# Patient Record
Sex: FEMALE | Race: WHITE | NOT HISPANIC OR LATINO | Employment: OTHER | URBAN - METROPOLITAN AREA
[De-identification: names, ages, dates, MRNs, and addresses within clinical notes are randomized per-mention and may not be internally consistent; named-entity substitution may affect disease eponyms.]

---

## 2017-01-16 ENCOUNTER — OFFICE VISIT (OUTPATIENT)
Dept: CARDIOLOGY | Facility: MEDICAL CENTER | Age: 82
End: 2017-01-16
Payer: MEDICARE

## 2017-01-16 VITALS
OXYGEN SATURATION: 94 % | WEIGHT: 173 LBS | DIASTOLIC BLOOD PRESSURE: 56 MMHG | HEART RATE: 74 BPM | HEIGHT: 67 IN | BODY MASS INDEX: 27.15 KG/M2 | SYSTOLIC BLOOD PRESSURE: 98 MMHG

## 2017-01-16 DIAGNOSIS — I48.92 ATRIAL FLUTTER, UNSPECIFIED TYPE (HCC): ICD-10-CM

## 2017-01-16 DIAGNOSIS — E78.2 MIXED HYPERLIPIDEMIA: ICD-10-CM

## 2017-01-16 DIAGNOSIS — Z79.01 ANTICOAGULANT LONG-TERM USE: ICD-10-CM

## 2017-01-16 DIAGNOSIS — R42 DIZZINESS: ICD-10-CM

## 2017-01-16 DIAGNOSIS — G45.8 OTHER SPECIFIED TRANSIENT CEREBRAL ISCHEMIAS: ICD-10-CM

## 2017-01-16 PROCEDURE — 99214 OFFICE O/P EST MOD 30 MIN: CPT | Performed by: INTERNAL MEDICINE

## 2017-01-16 RX ORDER — BRIMONIDINE TARTRATE AND TIMOLOL MALEATE 2; 5 MG/ML; MG/ML
SOLUTION OPHTHALMIC
COMMUNITY
End: 2017-07-10

## 2017-01-16 ASSESSMENT — ENCOUNTER SYMPTOMS
BRUISES/BLEEDS EASILY: 0
COUGH: 1
NERVOUS/ANXIOUS: 1
SHORTNESS OF BREATH: 0
BLURRED VISION: 0
LOSS OF CONSCIOUSNESS: 0
SPEECH CHANGE: 0
CLAUDICATION: 0
ABDOMINAL PAIN: 0
DIZZINESS: 1
DEPRESSION: 0
PND: 0
ORTHOPNEA: 0
HEADACHES: 0
FEVER: 0
MYALGIAS: 0
PALPITATIONS: 0

## 2017-01-16 NOTE — Clinical Note
[facility]   [address]  [place] [address]  Phone: [phone]  Fax: [phone]              Dorie Wadsworth  1935    Encounter Date: 1/16/2017    Rose Whipple M.D.          PROGRESS NOTE:  Subjective:   Dorie aWdsworth is a pleasant 80-year-old female with known history of hypertension, hypothyroidism, dyslipidemia, paroxysmal atrial fibrillation, TIA, on Eliquis for anticoagulation presenting today for evaluation of dizziness.    For the last 2 weeks patient has been  from congestion as well as cough worse at nighttime. Denied any bleeding issues while on anticoagulation. Still continues to have dizzy spells when she gets up from sitting to standing position no loss of consciousness. Denied any breakthrough episodes of atrial fibrillation. No complaints of lower extremity edema or claudication.      Past Medical History   Diagnosis Date   • Indigestion    • Glaucoma    • T.I.A.    • CAD (coronary artery disease)    • Arthritis      fingers   • Unspecified hemorrhagic conditions      not sure when (2009)  blood in urin    • Hypertension    • Cancer      skin   • Unspecified disorder of thyroid    • A-fib    • Atrial fibrillation 2/2009     A FIB X2,[ resolved on own][   • Paroxysmal atrial fibrillation    • Urinary bladder disorder      urinary incontinence   • Bladder problem      Chronic bladder infection for the past 17months   • Bladder disease 2011     bladder removed   • Ailment      urostomy   • Hyperlipidemia 2/24/2011   • UTI (urinary tract infection) 2/24/2011   • Hypothyroid 2/24/2011   • CHEST PAIN 3/11/2011   • TIA (transient ischemic attack) 3/11/2011   • Sepsis urinary source 1/23/2012   • Vaginal bleeding 10/22/2012   • History of hematuria      3/15/10: with Plavix.   • Stroke      2 TIAs 2/2010, no stroke, 2/2013   • CATARACT      freddie surgery complete   • Atrial fibrillation, rapid  "(CMS-[facility]) 1/31/2014   • Other specified pre-operative examination 1/28/2014     Past Surgical History   Procedure Laterality Date   • Bladder biopsy with cystoscopy  10/10/08     Performed by CARLTON LUNA at SURGERY [facility] ORS   • Pyelogram  10/10/08     Performed by CARLTON LUNA at SURGERY [facility]S   • Retrogrades  10/10/08     Performed by CARTLON LUNA at SURGERY [facility] ORS   • Bladder biopsy with cystoscopy  6/15/2009     Performed by CARLTON LUNA at SURGERY [facility] ORS   • Bladder biopsy with cystoscopy  10/12/2009     Performed by JENNIFER LINCOLN at SURGERY [facility] ORS   • Other abdominal surgery       appendectomy, pancratitis    • Cholecystectomy  1994   • Cholecystectomy  1994   • Gyn surgery       hysterectomy   • Pelvic exam under anesthesia  10/12/2009     Performed by JENNIFER LINCOLN at SURGERY [facility]   • Rectocele repair  11/3/2009     Performed by ZEKE HARRINGTON at SURGERY SAME DAY [facility]   • Other       TONSILLECTOMY AS TEENAGER   • Cystectomy  9/6/2011     Performed by JENNIFER LINCOLN at SURGERY [facility] ORS   • Ileo loop diversion  9/6/2011     Performed by JENNIFER LINCOLN at SURGERY [facility]   • Biopsy general  10/22/2012     Performed by Jennifer Lincoln M.D. at SURGERY [facility]   • Parastomal hernia repair   1/28/2014     Performed by Nicol Dorman M.D. at SURGERY [facility]     Family History   Problem Relation Age of Onset   • Stroke Mother 81   • Stroke Father 77     History   Smoking status   • Never Smoker    Smokeless tobacco   • Never Used     Allergies   Allergen Reactions   • Cardizem Swelling   • Doxycycline      Swollen lips.   • Sulfa Drugs      Makes tongue swell, severely   • Zyvox Swelling     \"Whole mouth swelled up\"    • Codeine      Does not remember the reaction     • Macrobid [Nitrofurantoin Monohydrate Macrocrystals]      Does not remember the reaction     Outpatient " Encounter Prescriptions as of 1/16/2017   Medication Sig Dispense Refill   • Brimonidine Tartrate-Timolol (COMBIGAN) 0.2-0.5 % Solution by Ophthalmic route.     • Multiple Vitamins-Minerals (PRESERVISION AREDS PO) Take  by mouth.     • apixaban (ELIQUIS) 5mg Tab Take 1 Tab by mouth 2 Times a Day. 84 Tab 0   • multivitamin (THERAGRAN) Tab Take 1 Tab by mouth every day.     • furosemide (LASIX) 20 MG Tab Take 0.5 Tabs by mouth as needed. 30 Tab 11   • Brimonidine Tartrate-Timolol 0.2-0.5 % Solution Place 1 Drop in right ear 2 Times a Day.     • metoprolol SR (TOPROL XL) 50 MG TB24 Take 1 Tab by mouth every bedtime. 30 Tab 5   • lovastatin (MEVACOR) 10 MG tablet Take 10 mg by mouth every evening.     • pantoprazole (PROTONIX) 40 MG TBEC Take 40 mg by mouth every day.     • Biotin 10 MG CAPS Take 1 Cap by mouth every day. Indications: **OTC**     • Bimatoprost (LUMIGAN) 0.01 % SOLN 1 Drop by Ophthalmic route every bedtime. Both eyes     • levothyroxine (SYNTHROID) 100 MCG TABS Take 50 mcg by mouth every morning before breakfast.     • warfarin (COUMADIN) 4 MG Tab Take one to one and one-half (1-1.5) tablets daily as directed by coumadin clinic 135 Tab 1     No facility-administered encounter medications on file as of 1/16/2017.     Review of Systems   Constitutional: Negative for fever.   HENT: Positive for congestion.    Eyes: Negative for blurred vision.   Respiratory: Positive for cough. Negative for shortness of breath.    Cardiovascular: Negative for chest pain, palpitations, orthopnea, claudication, leg swelling and PND.   Gastrointestinal: Negative for abdominal pain.   Musculoskeletal: Negative for myalgias and joint pain.   Skin: Negative for rash.   Neurological: Positive for dizziness. Negative for speech change, loss of consciousness and headaches.   Endo/Heme/Allergies: Does not bruise/bleed easily.   Psychiatric/Behavioral: Negative for depression. The patient is nervous/anxious.    All other systems  "reviewed and are negative.       Objective:   BP 98/56 mmHg  Pulse 74  Ht 1.702 m (5' 7.01\")  Wt 78.472 kg (173 lb)  BMI 27.09 kg/m2  SpO2 94%    Physical Exam   Constitutional: She is oriented to person, place, and time. She appears well-developed. No distress.   HENT:   Mouth/Throat: Mucous membranes are normal.   Eyes: Conjunctivae and EOM are normal.   Neck: No JVD present. No tracheal deviation present. No thyroid mass present.   Cardiovascular: Normal rate and regular rhythm.    No murmur heard.  Pulses:       Carotid pulses are 2+ on the right side, and 2+ on the left side.       Radial pulses are 2+ on the right side, and 2+ on the left side.        Dorsalis pedis pulses are 2+ on the right side, and 2+ on the left side.        Posterior tibial pulses are 2+ on the right side, and 2+ on the left side.   Pulmonary/Chest: Effort normal and breath sounds normal. No respiratory distress. She exhibits no tenderness.   Abdominal: Soft. There is no tenderness.   Musculoskeletal: Normal range of motion. She exhibits no edema.   Neurological: She is alert and oriented to person, place, and time. She has normal strength. She displays no tremor.   Skin: Skin is warm and dry. She is not diaphoretic.   Bruises seen in upper and lower extremities   Psychiatric: She has a normal mood and affect. Her behavior is normal.   Vitals reviewed.   Results for BERNARD BAER (MRN [id number]) as of 8/10/2016 14:55   2016    Sodium 139 138   Potassium 4.9 5.4   Chloride 108 107   Co2 25 27   Anion Gap 6.0 4.0   Glucose 98 125 (H)   Bun 24 (H) 20   Creatinine 1.34 1.43 (H)   GFR If Non  38 (A) 35 (A)   Calcium 8.8 8.9   AST(SGOT) 30    ALT(SGPT) 19    Alkaline Phosphatase 114 (H)    Cholesterol,Tot 131    Triglycerides 55    HDL 44    LDL 76      EK16  NSR 63 bpm normal axis, T-wave inversion lateral leads QRS duration 92 ms  faith    RTMST: 6/12/15  Patient exercised on Danny " protocol for 4 minutes reached 5.8 METsachieved 92% MPHR.  Rest EKG showed sinus rhythm LVH with anterior T wave inversion,no widening of QRS with exercise. (Stress test was performed toassess effect of flecainide at peak heart rate).  Inconclusive stress test secondary to baseline resting EKG abnormalities.    EKG: 3/12/14  Normal sinus rhythm at 64 bpm, normal axis is 0.5 mm ST depressions in the inferolateral leads with T-wave inversions V1 to V5 one and aVL    Transthoracic ECHO: 2/24/14   Normal left ventricular chamber size. Mild concentric left ventricular hypertrophy. Normal regional wall motion. Normal left ventricular systolic function. Left ventricular ejection fraction is 70% to 75%.  No significant valve abnormalities.   Mild tricuspid regurgitation. Right ventricular systolic pressure is estimated to be 40 mmHg.   No pericardial effusion seen.   Ascending aorta 3.0 cm. aortic root 3.1 cm.   Compared to prior study done on 1/23/12 mild increase in RV pressure.    CT chest: 1/31/14   1. No pulmonary embolus appreciated.  2. Nodular hepatic contour, appearance most compatible with cirrhosis.  3. Hazy right upper lobe infiltrate.  4. Bibasilar atelectasis, right greater than left with trace right pleural effusion.  5. Cardiomegaly  6. Atherosclerosis and atherosclerotic coronary artery disease  7. Moderate size hiatal hernia  8. Right upper lobe pulmonary nodule, recommend followup in 3 months with repeat CT chest to evaluate stability     Nuclear stress test: 10/25/13  Normal stress myocardial perfusion scan.  No evidence of ischemia or infarction. Low probability of hemodynamically significant CAD.  Normal wall motion. Calculated LVEF 78%.    Transthoracic echo: 1/23/12   Normal left ventricular chamber size. Normal left ventricular systolic function. Normal left ventricular wall thickness. Left ventricular ejection fraction is 55% to 60%.  Moderately enlarged LA.  Thickened mitral valve leaflets. Mitral  annular calcification. Mitral valve opens normally. Trace mitral regurgitation.  Moderate tricuspid regurgitation. Right ventricular systolic pressure is estimated to be 46 mmhg.    Nuclear stress test on 3/11/11   Cine images demonstrate no regional wall motion abnormality and the left ventricular ejection fraction exceeds 70%.  SPECT images show no fixed or reversible defects to suggest infarction or ischemia.  Normal myocardial perfusion scan    Assessment:   1. Hypertension  2. Dyslipidemia  3. Atrial fibrillation and dizziness  4. Chronic anticoagulation    Medical Decision Making:  Today's Assessment / Status / Plan:     1. Blood pressure is low today.  Advised patient to discontinue Toprol-XL completely.  Patient currently is taking Toprol-XL 50 mg daily.  2. Continue lovastatin 10 mg qHs.  LDL labs prior to next visit.  3. No breakthrough episodes of atrial fibrillation.  4. Continue Eliquis 5 mg BID.    Followup in one year.  Labs prior to next visit.      Thank you for allowing me to participate in taking care of patient.    Rose Whipple. MD.          Yue Restrepo M.D.  [address]  [address]  [place] [address]  VIA Facsimile: [phone]

## 2017-01-16 NOTE — PROGRESS NOTES
Subjective:   Dorie Wadsworth is a pleasant 80-year-old female with known history of hypertension, hypothyroidism, dyslipidemia, paroxysmal atrial fibrillation, TIA, on Eliquis for anticoagulation presenting today for evaluation of dizziness.    For the last 2 weeks patient has been  from congestion as well as cough worse at nighttime. Denied any bleeding issues while on anticoagulation. Still continues to have dizzy spells when she gets up from sitting to standing position no loss of consciousness. Denied any breakthrough episodes of atrial fibrillation. No complaints of lower extremity edema or claudication.      Past Medical History   Diagnosis Date   • Indigestion    • Glaucoma    • T.I.A.    • CAD (coronary artery disease)    • Arthritis      fingers   • Unspecified hemorrhagic conditions      not sure when (2009)  blood in urin    • Hypertension    • Cancer      skin   • Unspecified disorder of thyroid    • A-fib    • Atrial fibrillation 2/2009     A FIB X2,[ resolved on own][   • Paroxysmal atrial fibrillation    • Urinary bladder disorder      urinary incontinence   • Bladder problem      Chronic bladder infection for the past 17months   • Bladder disease 2011     bladder removed   • Ailment      urostomy   • Hyperlipidemia 2/24/2011   • UTI (urinary tract infection) 2/24/2011   • Hypothyroid 2/24/2011   • CHEST PAIN 3/11/2011   • TIA (transient ischemic attack) 3/11/2011   • Sepsis urinary source 1/23/2012   • Vaginal bleeding 10/22/2012   • History of hematuria      3/15/10: with Plavix.   • Stroke      2 TIAs 2/2010, no stroke, 2/2013   • CATARACT      freddie surgery complete   • Atrial fibrillation, rapid (CMS-HCC) 1/31/2014   • Other specified pre-operative examination 1/28/2014     Past Surgical History   Procedure Laterality Date   • Bladder biopsy with cystoscopy  10/10/08     Performed by CARLTON LUNA at SURGERY [facility]   • Pyelogram  10/10/08     Performed by CARLTON LUNA  "L at SURGERY [facility] ORS   • Retrogrades  10/10/08     Performed by CARLTON LUNA at SURGERY [facility] ORS   • Bladder biopsy with cystoscopy  6/15/2009     Performed by CARLTON LUNA at SURGERY [facility] ORS   • Bladder biopsy with cystoscopy  10/12/2009     Performed by JENNIFER LINCOLN at SURGERY [facility] ORS   • Other abdominal surgery       appendectomy, pancratitis    • Cholecystectomy  1994   • Cholecystectomy  1994   • Gyn surgery       hysterectomy   • Pelvic exam under anesthesia  10/12/2009     Performed by JENNIFER LINCOLN at SURGERY [facility] ORS   • Rectocele repair  11/3/2009     Performed by ZEKE HARRINGTON at SURGERY SAME DAY [facility]S   • Other       TONSILLECTOMY AS TEENAGER   • Cystectomy  9/6/2011     Performed by JENNIFER LINCOLN at SURGERY [facility] ORS   • Ileo loop diversion  9/6/2011     Performed by JENNIFER LINCOLN at SURGERY [facility]   • Biopsy general  10/22/2012     Performed by Jennifer Lincoln M.D. at SURGERY [facility]   • Parastomal hernia repair   1/28/2014     Performed by Nicol Dorman M.D. at SURGERY [facility]     Family History   Problem Relation Age of Onset   • Stroke Mother 81   • Stroke Father 77     History   Smoking status   • Never Smoker    Smokeless tobacco   • Never Used     Allergies   Allergen Reactions   • Cardizem Swelling   • Doxycycline      Swollen lips.   • Sulfa Drugs      Makes tongue swell, severely   • Zyvox Swelling     \"Whole mouth swelled up\"    • Codeine      Does not remember the reaction     • Macrobid [Nitrofurantoin Monohydrate Macrocrystals]      Does not remember the reaction     Outpatient Encounter Prescriptions as of 1/16/2017   Medication Sig Dispense Refill   • Brimonidine Tartrate-Timolol (COMBIGAN) 0.2-0.5 % Solution by Ophthalmic route.     • Multiple Vitamins-Minerals (PRESERVISION AREDS PO) Take  by mouth.     • apixaban (ELIQUIS) 5mg Tab Take 1 Tab by mouth 2 Times a " "Day. 84 Tab 0   • multivitamin (THERAGRAN) Tab Take 1 Tab by mouth every day.     • furosemide (LASIX) 20 MG Tab Take 0.5 Tabs by mouth as needed. 30 Tab 11   • Brimonidine Tartrate-Timolol 0.2-0.5 % Solution Place 1 Drop in right ear 2 Times a Day.     • metoprolol SR (TOPROL XL) 50 MG TB24 Take 1 Tab by mouth every bedtime. 30 Tab 5   • lovastatin (MEVACOR) 10 MG tablet Take 10 mg by mouth every evening.     • pantoprazole (PROTONIX) 40 MG TBEC Take 40 mg by mouth every day.     • Biotin 10 MG CAPS Take 1 Cap by mouth every day. Indications: **OTC**     • Bimatoprost (LUMIGAN) 0.01 % SOLN 1 Drop by Ophthalmic route every bedtime. Both eyes     • levothyroxine (SYNTHROID) 100 MCG TABS Take 50 mcg by mouth every morning before breakfast.     • warfarin (COUMADIN) 4 MG Tab Take one to one and one-half (1-1.5) tablets daily as directed by coumadin clinic 135 Tab 1     No facility-administered encounter medications on file as of 1/16/2017.     Review of Systems   Constitutional: Negative for fever.   HENT: Positive for congestion.    Eyes: Negative for blurred vision.   Respiratory: Positive for cough. Negative for shortness of breath.    Cardiovascular: Negative for chest pain, palpitations, orthopnea, claudication, leg swelling and PND.   Gastrointestinal: Negative for abdominal pain.   Musculoskeletal: Negative for myalgias and joint pain.   Skin: Negative for rash.   Neurological: Positive for dizziness. Negative for speech change, loss of consciousness and headaches.   Endo/Heme/Allergies: Does not bruise/bleed easily.   Psychiatric/Behavioral: Negative for depression. The patient is nervous/anxious.    All other systems reviewed and are negative.       Objective:   BP 98/56 mmHg  Pulse 74  Ht 1.702 m (5' 7.01\")  Wt 78.472 kg (173 lb)  BMI 27.09 kg/m2  SpO2 94%    Physical Exam   Constitutional: She is oriented to person, place, and time. She appears well-developed. No distress.   HENT:   Mouth/Throat: Mucous " membranes are normal.   Eyes: Conjunctivae and EOM are normal.   Neck: No JVD present. No tracheal deviation present. No thyroid mass present.   Cardiovascular: Normal rate and regular rhythm.    No murmur heard.  Pulses:       Carotid pulses are 2+ on the right side, and 2+ on the left side.       Radial pulses are 2+ on the right side, and 2+ on the left side.        Dorsalis pedis pulses are 2+ on the right side, and 2+ on the left side.        Posterior tibial pulses are 2+ on the right side, and 2+ on the left side.   Pulmonary/Chest: Effort normal and breath sounds normal. No respiratory distress. She exhibits no tenderness.   Abdominal: Soft. There is no tenderness.   Musculoskeletal: Normal range of motion. She exhibits no edema.   Neurological: She is alert and oriented to person, place, and time. She has normal strength. She displays no tremor.   Skin: Skin is warm and dry. She is not diaphoretic.   Bruises seen in upper and lower extremities   Psychiatric: She has a normal mood and affect. Her behavior is normal.   Vitals reviewed.   Results for BERNARD BAER (MRN [id number]) as of 8/10/2016 14:55   2016    Sodium 139 138   Potassium 4.9 5.4   Chloride 108 107   Co2 25 27   Anion Gap 6.0 4.0   Glucose 98 125 (H)   Bun 24 (H) 20   Creatinine 1.34 1.43 (H)   GFR If Non  38 (A) 35 (A)   Calcium 8.8 8.9   AST(SGOT) 30    ALT(SGPT) 19    Alkaline Phosphatase 114 (H)    Cholesterol,Tot 131    Triglycerides 55    HDL 44    LDL 76      EK16  NSR 63 bpm normal axis, T-wave inversion lateral leads QRS duration 92 ms  faith    RTMST: 6/12/15  Patient exercised on Danny protocol for 4 minutes reached 5.8 METsachieved 92% MPHR.  Rest EKG showed sinus rhythm LVH with anterior T wave inversion,no widening of QRS with exercise. (Stress test was performed toassess effect of flecainide at peak heart rate).  Inconclusive stress test secondary to baseline resting EKG  abnormalities.    EKG: 3/12/14  Normal sinus rhythm at 64 bpm, normal axis is 0.5 mm ST depressions in the inferolateral leads with T-wave inversions V1 to V5 one and aVL    Transthoracic ECHO: 2/24/14   Normal left ventricular chamber size. Mild concentric left ventricular hypertrophy. Normal regional wall motion. Normal left ventricular systolic function. Left ventricular ejection fraction is 70% to 75%.  No significant valve abnormalities.   Mild tricuspid regurgitation. Right ventricular systolic pressure is estimated to be 40 mmHg.   No pericardial effusion seen.   Ascending aorta 3.0 cm. aortic root 3.1 cm.   Compared to prior study done on 1/23/12 mild increase in RV pressure.    CT chest: 1/31/14   1. No pulmonary embolus appreciated.  2. Nodular hepatic contour, appearance most compatible with cirrhosis.  3. Hazy right upper lobe infiltrate.  4. Bibasilar atelectasis, right greater than left with trace right pleural effusion.  5. Cardiomegaly  6. Atherosclerosis and atherosclerotic coronary artery disease  7. Moderate size hiatal hernia  8. Right upper lobe pulmonary nodule, recommend followup in 3 months with repeat CT chest to evaluate stability     Nuclear stress test: 10/25/13  Normal stress myocardial perfusion scan.  No evidence of ischemia or infarction. Low probability of hemodynamically significant CAD.  Normal wall motion. Calculated LVEF 78%.    Transthoracic echo: 1/23/12   Normal left ventricular chamber size. Normal left ventricular systolic function. Normal left ventricular wall thickness. Left ventricular ejection fraction is 55% to 60%.  Moderately enlarged LA.  Thickened mitral valve leaflets. Mitral annular calcification. Mitral valve opens normally. Trace mitral regurgitation.  Moderate tricuspid regurgitation. Right ventricular systolic pressure is estimated to be 46 mmhg.    Nuclear stress test on 3/11/11   Cine images demonstrate no regional wall motion abnormality and the left  ventricular ejection fraction exceeds 70%.  SPECT images show no fixed or reversible defects to suggest infarction or ischemia.  Normal myocardial perfusion scan    Assessment:   1. Hypertension  2. Dyslipidemia  3. Atrial fibrillation and dizziness  4. Chronic anticoagulation    Medical Decision Making:  Today's Assessment / Status / Plan:     1. Blood pressure is low today.  Advised patient to discontinue Toprol-XL completely.  Patient currently is taking Toprol-XL 50 mg daily.  2. Continue lovastatin 10 mg qHs.  LDL labs prior to next visit.  3. No breakthrough episodes of atrial fibrillation.  4. Continue Eliquis 5 mg BID.  Labs from 7/16 showed creatinine of 1.4, age less than 80, weight more than 60 Kg (are now continue Eliquis 5 mg BID).  No bleeding issues while on anticoagulation.    Followup in one year.  Labs prior to next visit.      Thank you for allowing me to participate in taking care of patient.    Rose Donis MD.

## 2017-01-16 NOTE — MR AVS SNAPSHOT
"        Dorie Lombardir   2017 11:00 AM   Office Visit   MRN: [id number]    [facility]  [facility]   Dept Phone:  [phone]    Description:  Female : 1935   Provider:  Rose Whipple M.D.           Allergies as of 2017     Allergen Noted Reactions    Cardizem 10/19/2012   Swelling    Doxycycline 10/22/2013       Swollen lips.    Sulfa Drugs 2008       Makes tongue swell, severely    Zyvox 2011   Swelling    \"Whole mouth swelled up\"     Codeine 2008       Does not remember the reaction      Macrobid [Nitrofurantoin Monohydrate Macrocrystals] 2009       Does not remember the reaction      You were diagnosed with     Anticoagulant long-term use   [661794]       Other specified transient cerebral ischemias   [435.8.ICD-9-CM]       Atrial flutter, unspecified type (CMS-[facility])   [3796112]       Mixed hyperlipidemia   [272.2.ICD-9-CM]       Dizziness   [905946]         Vital Signs     Blood Pressure Pulse Height Weight Body Mass Index Oxygen Saturation    98/56 mmHg 74 1.702 m (5' 7.01\") 78.472 kg (173 lb) 27.09 kg/m2 94%    Smoking Status                   Never Smoker            Basic Information     Date Of Birth Sex Race Ethnicity Preferred Language    1935 Female White Non- English      Problem List              ICD-10-CM Priority Class Noted - Resolved    UTI (urinary tract infection) N39.0   2011 - Present    Hypothyroid E03.9   2011 - Present    Hypertension I10   2011 - Present    Hyperlipidemia E78.5   2011 - Present    TIA (transient ischemic attack) G45.9   3/11/2011 - Present    Atrial fibrillation (CMS-[facility]) I48.91   2012 - Present    Vaginal bleeding N93.9   10/22/2012 - Present    History of hematuria (Chronic) Z87.448   Unknown - Present    MRSA carrier Z22.322   2014 - Present    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   2014 - Present    Atrial flutter (CMS-[facility]) I48.92   2015 - Present    Anticoagulant long-term use " Z79.01   8/11/2016 - Present      Health Maintenance        Date Due Completion Dates    IMM DTaP/Tdap/Td Vaccine (1 - Tdap) 9/29/1956 ---    PAP SMEAR 9/29/1958 ---    MAMMOGRAM 9/29/1977 ---    COLONOSCOPY 9/29/1987 ---    IMM ZOSTER VACCINE 9/29/1997 ---    BONE DENSITY 9/29/2002 ---    IMM PNEUMOCOCCAL 65+ (ADULT) LOW/MEDIUM RISK SERIES (2 of 2 - PPSV23) 10/8/2017 10/8/2016            Current Immunizations     13-VALENT PCV PREVNAR 10/8/2016 12:03 PM    Influenza TIV (IM) 11/29/2013, 1/27/2012 10:00 AM    Influenza Vaccine 11/23/2010    Influenza Vaccine Adult HD 10/8/2016 12:02 PM, 10/4/2014    Pneumococcal Vaccine (UF)Historical Data 2/23/2010      Below and/or attached are the medications your provider expects you to take. Review all of your home medications and newly ordered medications with your provider and/or pharmacist. Follow medication instructions as directed by your provider and/or pharmacist. Please keep your medication list with you and share with your provider. Update the information when medications are discontinued, doses are changed, or new medications (including over-the-counter products) are added; and carry medication information at all times in the event of emergency situations     Allergies:  CARDIZEM - Swelling     DOXYCYCLINE - (reactions not documented)     SULFA DRUGS - (reactions not documented)     ZYVOX - Swelling     CODEINE - (reactions not documented)     MACROBID - (reactions not documented)               Medications  Valid as of: January 16, 2017 - 11:39 AM    Generic Name Brand Name Tablet Size Instructions for use    Apixaban (Tab) ELIQUIS 5mg Take 1 Tab by mouth 2 Times a Day.        Bimatoprost (Solution) LUMIGAN 0.01 % 1 Drop by Ophthalmic route every bedtime. Both eyes        Biotin (Cap) Biotin 10 MG Take 1 Cap by mouth every day. Indications: **OTC**        Brimonidine Tartrate-Timolol (Solution) Brimonidine Tartrate-Timolol 0.2-0.5 % Place 1 Drop in right ear 2 Times a  Day.        Brimonidine Tartrate-Timolol (Solution) Brimonidine Tartrate-Timolol 0.2-0.5 % by Ophthalmic route.        Furosemide (Tab) LASIX 20 MG Take 0.5 Tabs by mouth as needed.        Levothyroxine Sodium (Tab) SYNTHROID 100 MCG Take 50 mcg by mouth every morning before breakfast.        Lovastatin (Tab) MEVACOR 10 MG Take 10 mg by mouth every evening.        Metoprolol Succinate (TABLET SR 24 HR) TOPROL XL 50 MG Take 1 Tab by mouth every bedtime.        Multiple Vitamin (Tab) THERAGRAN  Take 1 Tab by mouth every day.        Multiple Vitamins-Minerals   Take  by mouth.        Pantoprazole Sodium (Tablet Delayed Response) PROTONIX 40 MG Take 40 mg by mouth every day.        Warfarin Sodium (Tab) COUMADIN 4 MG Take one to one and one-half (1-1.5) tablets daily as directed by coumadin clinic        .                 Medicines prescribed today were sent to:     [facility] [address]    Phone: [phone] Fax: [phone]    Open 24 Hours?: No      Medication refill instructions:       If your prescription bottle indicates you have medication refills left, it is not necessary to call your provider’s office. Please contact your pharmacy and they will refill your medication.    If your prescription bottle indicates you do not have any refills left, you may request refills at any time through one of the following ways: The online [employer] system (except Urgent Care), by calling your provider’s office, or by asking your pharmacy to contact your provider’s office with a refill request. Medication refills are processed only during regular business hours and may not be available until the next business day. Your provider may request additional information or to have a follow-up visit with you prior to refilling your medication.   *Please Note: Medication refills are assigned a new Rx number when refilled electronically. Your pharmacy may indicate that no refills  were authorized even though a new prescription for the same medication is available at the pharmacy. Please request the medicine by name with the pharmacy before contacting your provider for a refill.        Your To Do List     Future Labs/Procedures Complete By Expires    COMP METABOLIC PANEL  As directed 1/16/2018    LIPID PROFILE  As directed 1/16/2018         MyChart Status: Patient Declined

## 2017-02-01 ENCOUNTER — HOSPITAL ENCOUNTER (OUTPATIENT)
Dept: LAB | Facility: MEDICAL CENTER | Age: 82
End: 2017-02-01
Attending: INTERNAL MEDICINE
Payer: MEDICARE

## 2017-02-01 DIAGNOSIS — E78.2 MIXED HYPERLIPIDEMIA: ICD-10-CM

## 2017-02-01 LAB
ALBUMIN SERPL BCP-MCNC: 3.8 G/DL (ref 3.2–4.9)
ALBUMIN/GLOB SERPL: 0.8 G/DL
ALP SERPL-CCNC: 125 U/L (ref 30–99)
ALT SERPL-CCNC: 13 U/L (ref 2–50)
ANION GAP SERPL CALC-SCNC: 6 MMOL/L (ref 0–11.9)
AST SERPL-CCNC: 26 U/L (ref 12–45)
BILIRUB SERPL-MCNC: 0.6 MG/DL (ref 0.1–1.5)
BUN SERPL-MCNC: 18 MG/DL (ref 8–22)
CALCIUM SERPL-MCNC: 9.6 MG/DL (ref 8.5–10.5)
CHLORIDE SERPL-SCNC: 105 MMOL/L (ref 96–112)
CHOLEST SERPL-MCNC: 120 MG/DL (ref 100–199)
CO2 SERPL-SCNC: 26 MMOL/L (ref 20–33)
CREAT SERPL-MCNC: 1.14 MG/DL (ref 0.5–1.4)
GLOBULIN SER CALC-MCNC: 4.5 G/DL (ref 1.9–3.5)
GLUCOSE SERPL-MCNC: 109 MG/DL (ref 65–99)
HDLC SERPL-MCNC: 36 MG/DL
LDLC SERPL CALC-MCNC: 66 MG/DL
POTASSIUM SERPL-SCNC: 4.4 MMOL/L (ref 3.6–5.5)
PROT SERPL-MCNC: 8.3 G/DL (ref 6–8.2)
SODIUM SERPL-SCNC: 137 MMOL/L (ref 135–145)
TRIGL SERPL-MCNC: 91 MG/DL (ref 0–149)

## 2017-02-01 PROCEDURE — 80061 LIPID PANEL: CPT

## 2017-02-01 PROCEDURE — 80053 COMPREHEN METABOLIC PANEL: CPT

## 2017-02-01 PROCEDURE — 36415 COLL VENOUS BLD VENIPUNCTURE: CPT

## 2017-02-07 ENCOUNTER — TELEPHONE (OUTPATIENT)
Dept: CARDIOLOGY | Facility: MEDICAL CENTER | Age: 82
End: 2017-02-07

## 2017-02-07 NOTE — PROGRESS NOTES
Results for BERNARD BAER (MRN [id number]) as of 2/6/2017 16:09   11/17/2016 2/1/2017    Sodium 138 137   Potassium 4.5 4.4   Chloride 106 105   Co2 26 26   Anion Gap 6.0 6.0   Glucose 113 (H) 109 (H)   Bun 19 18   Creatinine 1.26 1.14   GFR If Non  41 (A) 46 (A)   Calcium 9.3 9.6   AST(SGOT) 34 26   ALT(SGPT) 23 13   Alkaline Phosphatase 148 (H) 125 (H)   Magnesium 2.0    Cholesterol,Tot 151 120   Triglycerides 95 91   HDL 40 36 (A)   LDL 92 66

## 2017-02-07 NOTE — TELEPHONE ENCOUNTER
----- Message from Kathy Duke L.P.N. sent at 2/7/2017  2:08 PM PST -----      ----- Message -----     From: Rose Whipple M.D.     Sent: 2/6/2017   4:10 PM       To: Preeti Santana R.N.    Renal function is stable  Prove alkaline phosphatase.      ----- Message -----     From: Evette Duran R.N.     Sent: 2/2/2017  11:09 AM       To: Rose Whipple M.D.

## 2017-03-20 ENCOUNTER — TELEPHONE (OUTPATIENT)
Dept: CARDIOLOGY | Facility: MEDICAL CENTER | Age: 82
End: 2017-03-20

## 2017-03-20 NOTE — TELEPHONE ENCOUNTER
Patient calling to find out how she goes about getting more eliquis as she received a 3 month supply through patient assistance [employer] and has not heard anything about how she is getting her next supply.  I saw in media on 12/5 where it stated the patient was receiving her final shipment and that she needed to reapply  For 2017... Patient states she did not fill out application for 2017 so I requested new application from [facility] and they will fax and I will complete it and patient will stop by office to sign and if we having eliquis samples she would need some to get her through until patient assistance kicks in.  SHe will come in Thursday afternoon and I instructed her to ask for me and I will have her sign and get it faxed to foundation.

## 2017-03-20 NOTE — TELEPHONE ENCOUNTER
----- Message from Shirin [place] sent at 3/20/2017 11:14 AM PDT -----  Regarding: Question about Eliquis medication  AM/Taz      Patient has question about her Eliquis. She can be reached at [phone].

## 2017-03-23 DIAGNOSIS — I48.0 PAROXYSMAL ATRIAL FIBRILLATION (HCC): ICD-10-CM

## 2017-03-24 ENCOUNTER — TELEPHONE (OUTPATIENT)
Dept: CARDIOLOGY | Facility: MEDICAL CENTER | Age: 82
End: 2017-03-24

## 2017-03-24 DIAGNOSIS — I48.0 PAROXYSMAL ATRIAL FIBRILLATION (HCC): ICD-10-CM

## 2017-03-24 NOTE — TELEPHONE ENCOUNTER
Patient assistance paperwork with new Rx sent to [employer] - patient given samples yesterday to get her through until shipment from [facility] arrives.

## 2017-04-04 ENCOUNTER — APPOINTMENT (OUTPATIENT)
Dept: RADIOLOGY | Facility: MEDICAL CENTER | Age: 82
End: 2017-04-04
Attending: EMERGENCY MEDICINE
Payer: MEDICARE

## 2017-04-04 ENCOUNTER — TELEPHONE (OUTPATIENT)
Dept: CARDIOLOGY | Facility: MEDICAL CENTER | Age: 82
End: 2017-04-04

## 2017-04-04 ENCOUNTER — APPOINTMENT (OUTPATIENT)
Dept: RADIOLOGY | Facility: MEDICAL CENTER | Age: 82
End: 2017-04-04
Payer: MEDICARE

## 2017-04-04 ENCOUNTER — HOSPITAL ENCOUNTER (EMERGENCY)
Facility: MEDICAL CENTER | Age: 82
End: 2017-04-04
Attending: EMERGENCY MEDICINE
Payer: MEDICARE

## 2017-04-04 VITALS
DIASTOLIC BLOOD PRESSURE: 74 MMHG | SYSTOLIC BLOOD PRESSURE: 120 MMHG | HEIGHT: 68 IN | WEIGHT: 176 LBS | OXYGEN SATURATION: 98 % | RESPIRATION RATE: 16 BRPM | BODY MASS INDEX: 26.67 KG/M2 | TEMPERATURE: 97.5 F | HEART RATE: 82 BPM

## 2017-04-04 DIAGNOSIS — R00.2 PALPITATIONS: ICD-10-CM

## 2017-04-04 DIAGNOSIS — S93.432A SPRAIN OF TIBIOFIBULAR LIGAMENT OF LEFT ANKLE, INITIAL ENCOUNTER: ICD-10-CM

## 2017-04-04 LAB
ALBUMIN SERPL BCP-MCNC: 3.5 G/DL (ref 3.2–4.9)
ALBUMIN/GLOB SERPL: 0.8 G/DL
ALP SERPL-CCNC: 132 U/L (ref 30–99)
ALT SERPL-CCNC: 13 U/L (ref 2–50)
ANION GAP SERPL CALC-SCNC: 7 MMOL/L (ref 0–11.9)
APTT PPP: 34 SEC (ref 24.7–36)
AST SERPL-CCNC: 28 U/L (ref 12–45)
BASOPHILS # BLD AUTO: 0.7 % (ref 0–1.8)
BASOPHILS # BLD: 0.06 K/UL (ref 0–0.12)
BILIRUB SERPL-MCNC: 0.6 MG/DL (ref 0.1–1.5)
BNP SERPL-MCNC: 389 PG/ML (ref 0–100)
BUN SERPL-MCNC: 19 MG/DL (ref 8–22)
CALCIUM SERPL-MCNC: 9.4 MG/DL (ref 8.5–10.5)
CHLORIDE SERPL-SCNC: 111 MMOL/L (ref 96–112)
CO2 SERPL-SCNC: 20 MMOL/L (ref 20–33)
CREAT SERPL-MCNC: 1.1 MG/DL (ref 0.5–1.4)
EKG IMPRESSION: NORMAL
EOSINOPHIL # BLD AUTO: 0.17 K/UL (ref 0–0.51)
EOSINOPHIL NFR BLD: 1.9 % (ref 0–6.9)
ERYTHROCYTE [DISTWIDTH] IN BLOOD BY AUTOMATED COUNT: 54.4 FL (ref 35.9–50)
GFR SERPL CREATININE-BSD FRML MDRD: 48 ML/MIN/1.73 M 2
GLOBULIN SER CALC-MCNC: 4.6 G/DL (ref 1.9–3.5)
GLUCOSE SERPL-MCNC: 100 MG/DL (ref 65–99)
HCT VFR BLD AUTO: 42.4 % (ref 37–47)
HGB BLD-MCNC: 13.5 G/DL (ref 12–16)
IMM GRANULOCYTES # BLD AUTO: 0.02 K/UL (ref 0–0.11)
IMM GRANULOCYTES NFR BLD AUTO: 0.2 % (ref 0–0.9)
INR PPP: 1.3 (ref 0.87–1.13)
LIPASE SERPL-CCNC: 76 U/L (ref 11–82)
LYMPHOCYTES # BLD AUTO: 2.86 K/UL (ref 1–4.8)
LYMPHOCYTES NFR BLD: 32.8 % (ref 22–41)
MCH RBC QN AUTO: 26.9 PG (ref 27–33)
MCHC RBC AUTO-ENTMCNC: 31.8 G/DL (ref 33.6–35)
MCV RBC AUTO: 84.5 FL (ref 81.4–97.8)
MONOCYTES # BLD AUTO: 1.31 K/UL (ref 0–0.85)
MONOCYTES NFR BLD AUTO: 15 % (ref 0–13.4)
NEUTROPHILS # BLD AUTO: 4.3 K/UL (ref 2–7.15)
NEUTROPHILS NFR BLD: 49.4 % (ref 44–72)
NRBC # BLD AUTO: 0 K/UL
NRBC BLD AUTO-RTO: 0 /100 WBC
PLATELET # BLD AUTO: 141 K/UL (ref 164–446)
PMV BLD AUTO: 11.3 FL (ref 9–12.9)
POTASSIUM SERPL-SCNC: 3.8 MMOL/L (ref 3.6–5.5)
PROT SERPL-MCNC: 8.1 G/DL (ref 6–8.2)
PROTHROMBIN TIME: 16.6 SEC (ref 12–14.6)
RBC # BLD AUTO: 5.02 M/UL (ref 4.2–5.4)
SODIUM SERPL-SCNC: 138 MMOL/L (ref 135–145)
TROPONIN I SERPL-MCNC: 0.02 NG/ML (ref 0–0.04)
WBC # BLD AUTO: 8.7 K/UL (ref 4.8–10.8)

## 2017-04-04 PROCEDURE — 85730 THROMBOPLASTIN TIME PARTIAL: CPT

## 2017-04-04 PROCEDURE — 99284 EMERGENCY DEPT VISIT MOD MDM: CPT

## 2017-04-04 PROCEDURE — 85025 COMPLETE CBC W/AUTO DIFF WBC: CPT

## 2017-04-04 PROCEDURE — 93005 ELECTROCARDIOGRAM TRACING: CPT

## 2017-04-04 PROCEDURE — 36415 COLL VENOUS BLD VENIPUNCTURE: CPT

## 2017-04-04 PROCEDURE — 80053 COMPREHEN METABOLIC PANEL: CPT

## 2017-04-04 PROCEDURE — 73600 X-RAY EXAM OF ANKLE: CPT | Mod: LT

## 2017-04-04 PROCEDURE — 71010 DX-CHEST-LIMITED (1 VIEW): CPT

## 2017-04-04 PROCEDURE — 83690 ASSAY OF LIPASE: CPT

## 2017-04-04 PROCEDURE — 84484 ASSAY OF TROPONIN QUANT: CPT

## 2017-04-04 PROCEDURE — 85610 PROTHROMBIN TIME: CPT

## 2017-04-04 PROCEDURE — 83880 ASSAY OF NATRIURETIC PEPTIDE: CPT

## 2017-04-04 RX ORDER — ROCURONIUM BROMIDE 10 MG/ML
100 INJECTION, SOLUTION INTRAVENOUS ONCE
Status: DISCONTINUED | OUTPATIENT
Start: 2017-04-04 | End: 2017-04-04

## 2017-04-04 RX ORDER — ETOMIDATE 2 MG/ML
20 INJECTION INTRAVENOUS ONCE
Status: DISCONTINUED | OUTPATIENT
Start: 2017-04-04 | End: 2017-04-04

## 2017-04-04 NOTE — ED AVS SNAPSHOT
[employer] Access Code: Activation code not generated  Current [employer] Status: Patient Declined    Your email address is not on file at [facility].  Email Addresses are required for you to sign up for [employer], please contact [phone] to verify your personal information and to provide your email address prior to attempting to register for [employer].    [place] [address]    [employer] Exchange  A secure, online tool to manage your health information     [facility]’s [employer] Exchange® is a secure, online tool that connects you to your personalized health information from the privacy of your home -- day or night - making it very easy for you to manage your healthcare. Once the activation process is completed, you can even access your medical information using the [employer] christina, which is available for free in the Apple Christina store or Google Play store.     To learn more about [employer], visit [email or web address] Exchange    There are two levels of access available (as shown below):   My Chart Features  [facility] Primary Care Doctor [facility]  Specialists [facility]  Urgent  Care Non-[facility] Primary Care Doctor   Email your healthcare team securely and privately 24/7 X X X    Manage appointments: schedule your next appointment; view details of past/upcoming appointments X      Request prescription refills. X      View recent personal medical records, including lab and immunizations X X X X   View health record, including health history, allergies, medications X X X X   Read reports about your outpatient visits, procedures, consult and ER notes X X X X   See your discharge summary, which is a recap of your hospital and/or ER visit that includes your diagnosis, lab results, and care plan X X  X     How to register for [employer]:  Once your e-mail address has been verified, follow the following steps to sign up for [employer].     1. Go to  [email or web address]  2. Click on the Sign Up Now box, which takes you to the  New Member Sign Up page. You will need to provide the following information:  a. Enter your [employer] Access Code exactly as it appears at the top of this page. (You will not need to use this code after you’ve completed the sign-up process. If you do not sign up before the expiration date, you must request a new code.)   b. Enter your date of birth.   c. Enter your home email address.   d. Click Submit, and follow the next screen’s instructions.  3. Create a [employer] ID. This will be your [employer] login ID and cannot be changed, so think of one that is secure and easy to remember.  4. Create a [employer] password. You can change your password at any time.  5. Enter your Password Reset Question and Answer. This can be used at a later time if you forget your password.   6. Enter your e-mail address. This allows you to receive e-mail notifications when new information is available in [employer].  7. Click Sign Up. You can now view your health information.    For assistance activating your [employer] account, call [phone]

## 2017-04-04 NOTE — TELEPHONE ENCOUNTER
Patient called to report having chest pressure and possibly being in Afib and that she wanted to come into the office to see Dr. Whipple - informed her that Dr. Whipple out of the office and that they symptoms she is experiencing needed to be evaluated in the ER asap.  She states that urgent care in [place] wanted her to go by ambulance when she called them with her symptoms but she refused and is going to have her daughter bring her to [facility] ER.  Will notify ER that patient is coming.

## 2017-04-04 NOTE — ED AVS SNAPSHOT
Home Care Instructions                                                                                                                Dorie Wadsworth   MRN: [id number]    Department:  [facility], Emergency Dept   Date of Visit:  4/4/2017            [facility], Emergency Dept    [address]    [place] [address]    Phone:  [phone]      You were seen by     Bhavesh Petersen M.D.      Your Diagnosis Was     Palpitations     R00.2       Medication Information     Review all of your home medications and newly ordered medications with your primary doctor and/or pharmacist as soon as possible. Follow medication instructions as directed by your doctor and/or pharmacist.     Please keep your complete medication list with you and share with your physician. Update the information when medications are discontinued, doses are changed, or new medications (including over-the-counter products) are added; and carry medication information at all times in the event of emergency situations.               Medication List      ASK your doctor about these medications        Instructions    Morning Afternoon Evening Bedtime    apixaban 5mg Tabs   Commonly known as:  ELIQUIS        Take 1 Tab by mouth 2 Times a Day.   Dose:  5 mg                        Biotin 10 MG Caps        Take 1 Cap by mouth every day. Indications: **OTC**   Dose:  1 Cap                        * Brimonidine Tartrate-Timolol 0.2-0.5 % Soln        Place 1 Drop in right ear 2 Times a Day.   Dose:  1 Drop                        * COMBIGAN 0.2-0.5 % Soln   Generic drug:  Brimonidine Tartrate-Timolol        by Ophthalmic route.                        furosemide 20 MG Tabs   Commonly known as:  LASIX        Take 0.5 Tabs by mouth as needed.   Dose:  10 mg                        levothyroxine 100 MCG Tabs   Commonly known as:  SYNTHROID        Take 50 mcg by mouth every morning before breakfast.   Dose:  50 mcg                           lovastatin 10 MG tablet   Commonly known as:  MEVACOR        Take 10 mg by mouth every evening.   Dose:  10 mg                        LUMIGAN 0.01 % Soln   Generic drug:  bimatoprost        1 Drop by Ophthalmic route every bedtime. Both eyes   Dose:  1 Drop                        metoprolol SR 50 MG Tb24   Commonly known as:  TOPROL XL        Take 1 Tab by mouth every bedtime.   Dose:  50 mg                        multivitamin Tabs        Take 1 Tab by mouth every day.   Dose:  1 Tab                        pantoprazole 40 MG Tbec   Commonly known as:  PROTONIX        Take 40 mg by mouth every day.   Dose:  40 mg                        PRESERVISION AREDS PO        Take  by mouth.                        warfarin 4 MG Tabs   Commonly known as:  COUMADIN        Take one to one and one-half (1-1.5) tablets daily as directed by coumadin clinic                        * Notice:  This list has 2 medication(s) that are the same as other medications prescribed for you. Read the directions carefully, and ask your doctor or other care provider to review them with you.            Procedures and tests performed during your visit     APTT    Btype Natriuretic Peptide    CBC with Differential    Complete Metabolic Panel (CMP)    DX-ANKLE 2- VIEWS LEFT    DX-CHEST-LIMITED (1 VIEW)    EKG (ER)    ESTIMATED GFR    Lipase    Oxygen Therapy per Protocol    Prothrombin Time    Troponin        Discharge Instructions       Cardiac Arrhythmia  Your heart is a muscle that works to pump blood through your body by regular contractions. The beating of your heart is controlled by a system of special pacemaker cells. These cells control the electrical activity of the heart. When the system controlling this regular beating is disturbed, a heart rhythm abnormality (arrhythmia) results.  WHEN YOUR HEART SKIPS A BEAT  One of the most common and least serious heart arrhythmias is called an ectopic or premature atrial heartbeat (PAC). This may  be noticed as a small change in your regular pulse. A PAC originates from the top part (atrium) of the heart. Within the right atrium, the SA node is the area that normally controls the regularity of the heart. PACs occur in heart tissue outside of the SA node region. You may feel this as a skipped beat or heart flutter, especially if several occur in succession or occur frequently.   Another arrhythmia is ventricular premature complex (VCP or PVC). These extra beats start out in the bottom, more muscular chambers of the heart. In most cases a PVC is harmless. If there are underlying causes that are making the heart irritable such as an overactive thyroid or a prior heart attack PVCs may be of more concern. In a few cases, medications to control the heart rhythm may be prescribed.  Things to try at home:  · Cut down or avoid alcohol, tobacco and caffeine.  · Get enough sleep.  · Reduce stress.  · Exercise more.  WHEN THE HEART BEATS TOO FAST  Atrial tachycardia is a fast heart rate, which starts out in the atrium. It may last from minutes to much longer. Your heart may beat 140 to 240 times per minute instead of the normal 60 to 100.  · Symptoms include a worried feeling (anxiety) and a sense that your heart is beating fast and hard.  · You may be able to stop the fast rate by holding your breath or bearing down as if you were going to have a bowel movement.  · This type of fast rate is usually not dangerous.  Atrial fibrillation and atrial flutter are other fast rhythms that start in the atria. Both conditions keep the atria from filling with enough blood so the heart does not work well.  · Symptoms include feeling light-headed or faint.  · These fast rates may be the result of heart damage or disease. Too much thyroid hormone may play a role.  · There may be no clear cause or it may be from heart disease or damage.  · Medication or a special electrical treatment (cardioversion) may be needed to get the heart  beating normally.  Ventricular tachycardia is a fast heart rate that starts in the lower muscular chambers (ventricles) This is a serious disorder that requires treatment as soon as possible. You need someone else to get and use a small defibrillator.  · Symptoms include collapse, chest pain, or being short of breath.  · Treatment may include medication, procedures to improve blood flow to the heart, or an implantable cardiac defibrillator (ICD).  DIAGNOSIS   · A cardiogram (EKG or ECG) will be done to see the arrhythmia, as well as lab tests to check the underlying cause.  · If the extra beats or fast rate come and go, you may wear a Holter monitor that records your heart rate for a longer period of time.  SEEK MEDICAL CARE IF:  · You have irregular or fast heartbeats (palpitations).  · You experience skipped beats.  · You develop lightheadedness.  · You have chest discomfort.  · You have shortness of breath.  · You have more frequent episodes, if you are already being treated.  SEEK IMMEDIATE MEDICAL CARE IF:   · You have severe chest pain, especially if the pain is crushing or pressure-like and spreads to the arms, back, neck, or jaw, or if you have sweating, feeling sick to your stomach (nausea), or shortness of breath. THIS IS AN EMERGENCY. Do not wait to see if the pain will go away. Get medical help at once. Call 911 or 0 (). DO NOT drive yourself to the hospital.  · You feel dizzy or faint.  · You have episodes of previously documented atrial tachycardia that do not resolve with the techniques your caregiver has taught you.  · Irregular or rapid heartbeats begin to occur more often than in the past, especially if they are associated with more pronounced symptoms or of longer duration.  Document Released: 12/18/2006 Document Revised: 03/11/2013 Document Reviewed: 08/05/2009  [employer]® Patient Information ©2014 [employer].    Ankle Sprain  An ankle sprain is an injury to the strong, fibrous tissues  (ligaments) that hold the bones of your ankle joint together.   CAUSES  An ankle sprain is usually caused by a fall or by twisting your ankle. Ankle sprains most commonly occur when you step on the outer edge of your foot, and your ankle turns inward. People who participate in sports are more prone to these types of injuries.   SYMPTOMS   · Pain in your ankle. The pain may be present at rest or only when you are trying to stand or walk.  · Swelling.  · Bruising. Bruising may develop immediately or within 1 to 2 days after your injury.  · Difficulty standing or walking, particularly when turning corners or changing directions.  DIAGNOSIS   Your caregiver will ask you details about your injury and perform a physical exam of your ankle to determine if you have an ankle sprain. During the physical exam, your caregiver will press on and apply pressure to specific areas of your foot and ankle. Your caregiver will try to move your ankle in certain ways. An X-ray exam may be done to be sure a bone was not broken or a ligament did not separate from one of the bones in your ankle (avulsion fracture).   TREATMENT   Certain types of braces can help stabilize your ankle. Your caregiver can make a recommendation for this. Your caregiver may recommend the use of medicine for pain. If your sprain is severe, your caregiver may refer you to a surgeon who helps to restore function to parts of your skeletal system (orthopedist) or a physical therapist.  HOME CARE INSTRUCTIONS   · Apply ice to your injury for 1-2 days or as directed by your caregiver. Applying ice helps to reduce inflammation and pain.  ¨ Put ice in a plastic bag.  ¨ Place a towel between your skin and the bag.  ¨ Leave the ice on for 15-20 minutes at a time, every 2 hours while you are awake.  · Only take over-the-counter or prescription medicines for pain, discomfort, or fever as directed by your caregiver.  · Elevate your injured ankle above the level of your heart  as much as possible for 2-3 days.  · If your caregiver recommends crutches, use them as instructed. Gradually put weight on the affected ankle. Continue to use crutches or a cane until you can walk without feeling pain in your ankle.  · If you have a plaster splint, wear the splint as directed by your caregiver. Do not rest it on anything harder than a pillow for the first 24 hours. Do not put weight on it. Do not get it wet. You may take it off to take a shower or bath.  · You may have been given an elastic bandage to wear around your ankle to provide support. If the elastic bandage is too tight (you have numbness or tingling in your foot or your foot becomes cold and blue), adjust the bandage to make it comfortable.  · If you have an air splint, you may blow more air into it or let air out to make it more comfortable. You may take your splint off at night and before taking a shower or bath. Wiggle your toes in the splint several times per day to decrease swelling.  SEEK MEDICAL CARE IF:   · You have rapidly increasing bruising or swelling.  · Your toes feel extremely cold or you lose feeling in your foot.  · Your pain is not relieved with medicine.  SEEK IMMEDIATE MEDICAL CARE IF:  · Your toes are numb or blue.  · You have severe pain that is increasing.  MAKE SURE YOU:   · Understand these instructions.  · Will watch your condition.  · Will get help right away if you are not doing well or get worse.     This information is not intended to replace advice given to you by your health care provider. Make sure you discuss any questions you have with your health care provider.     Document Released: 12/18/2006 Document Revised: 01/08/2016 Document Reviewed: 12/29/2012  [employer] Patient Education ©2016 [employer]            Patient Information     Patient Information    Following emergency treatment: all patient requiring follow-up care must return either to a private physician or a clinic if your  condition worsens before you are able to obtain further medical attention, please return to the emergency room.     Billing Information    At [facility], we work to make the billing process streamlined for our patients.  Our Representatives are here to answer any questions you may have regarding your hospital bill.  If you have insurance coverage and have supplied your insurance information to us, we will submit a claim to your insurer on your behalf.  Should you have any questions regarding your bill, we can be reached online or by phone as follows:  Online: You are able pay your bills online or live chat with our representatives about any billing questions you may have. We are here to help Monday - Friday from 8:00am to 7:30pm and 9:00am - 12:00pm on Saturdays.  Please visit [email or web address]  for more information.   Phone:  [phone] or [phone]    Please note that your emergency physician, surgeon, pathologist, radiologist, anesthesiologist, and other specialists are not employed by [facility] and will therefore bill separately for their services.  Please contact them directly for any questions concerning their bills at the numbers below:     Emergency Physician Services:  [phone]  [facility]:  [phone]  [facility]:  [phone]  [facility]:  [phone]    1. Your final bill may vary from the amount quoted upon discharge if all procedures are not complete at that time, or if your doctor has additional procedures of which we are not aware. You will receive an additional bill if you return to the Emergency Department at [facility] for suture removal regardless of the facility of which the sutures were placed.     2. Please arrange for settlement of this account at the emergency registration.    3. All self-pay accounts are due in full at the time of treatment.  If you are unable to meet this obligation  then payment is expected within 4-5 days.     4. If you have had radiology studies (CT, X-ray, Ultrasound, MRI), you have received a preliminary result during your emergency department visit. Please contact the radiology department [phone] to receive a copy of your final result. Please discuss the Final result with your primary physician or with the follow up physician provided.     Crisis Hotline:  [place] Crisis Hotline:  0-133-MTKMKEH or 1-274.662.7508  [place] Crisis Hotline:    1-214.213.6635 or 616-693-7600         ED Discharge Follow Up Questions    1. In order to provide you with very good care, we would like to follow up with a phone call in the next few days.  May we have your permission to contact you?     YES /  NO    2. What is the best phone number to call you? (       )_____-__________    3. What is the best time to call you?      Morning  /  Afternoon  /  Evening                   Patient Signature:  ____________________________________________________________    Date:  ____________________________________________________________

## 2017-04-04 NOTE — ED AVS SNAPSHOT
4/4/2017          Dorie Wadsworth  [place] [address]    Dear Dorie:    [facility] wants to ensure your discharge home is safe and you or your loved ones have had all your questions answered regarding your care after you leave the hospital.    You may receive a telephone call within two days of your discharge.  This call is to make certain you understand your discharge instructions as well as ensure we provided you with the best care possible during your stay with us.     The call will only last approximately 3-5 minutes and will be done by a nurse.    Once again, we want to ensure your discharge home is safe and that you have a clear understanding of any next steps in your care.  If you have any questions or concerns, please do not hesitate to contact us, we are here for you.  Thank you for choosing [facility] for your healthcare needs.    Sincerely,    Arturo Deleon    [facility]

## 2017-04-04 NOTE — ED NOTES
Pt c/o chest pressure, hx of afib. Pt believes she had an episode of Afib lastnight. Pt also felt like maybe she had an episode this AM. Pt on Eliquis.

## 2017-04-04 NOTE — TELEPHONE ENCOUNTER
----- Message from Yola Casey sent at 4/4/2017  7:25 AM PDT -----  Regarding: Problem with episode of A Fib  AM/Taz    Patient had an episode of A Fib last night and wants a call back at [phone] to find out what she should do.

## 2017-04-05 NOTE — TELEPHONE ENCOUNTER
THOMAS/Susana     Patient spoke to Taz on 3/24 about a program for Eliquis and she wants a call back at [phone] to discuss.              Pt wondering if we have heard anything from assistance program. Informed pt that I would get in contact with Gladys KEANE to assist with information. Pt states she has enough samples to get her through the rest of the month.

## 2017-04-05 NOTE — DISCHARGE INSTRUCTIONS
Cardiac Arrhythmia  Your heart is a muscle that works to pump blood through your body by regular contractions. The beating of your heart is controlled by a system of special pacemaker cells. These cells control the electrical activity of the heart. When the system controlling this regular beating is disturbed, a heart rhythm abnormality (arrhythmia) results.  WHEN YOUR HEART SKIPS A BEAT  One of the most common and least serious heart arrhythmias is called an ectopic or premature atrial heartbeat (PAC). This may be noticed as a small change in your regular pulse. A PAC originates from the top part (atrium) of the heart. Within the right atrium, the SA node is the area that normally controls the regularity of the heart. PACs occur in heart tissue outside of the SA node region. You may feel this as a skipped beat or heart flutter, especially if several occur in succession or occur frequently.   Another arrhythmia is ventricular premature complex (VCP or PVC). These extra beats start out in the bottom, more muscular chambers of the heart. In most cases a PVC is harmless. If there are underlying causes that are making the heart irritable such as an overactive thyroid or a prior heart attack PVCs may be of more concern. In a few cases, medications to control the heart rhythm may be prescribed.  Things to try at home:  · Cut down or avoid alcohol, tobacco and caffeine.  · Get enough sleep.  · Reduce stress.  · Exercise more.  WHEN THE HEART BEATS TOO FAST  Atrial tachycardia is a fast heart rate, which starts out in the atrium. It may last from minutes to much longer. Your heart may beat 140 to 240 times per minute instead of the normal 60 to 100.  · Symptoms include a worried feeling (anxiety) and a sense that your heart is beating fast and hard.  · You may be able to stop the fast rate by holding your breath or bearing down as if you were going to have a bowel movement.  · This type of fast rate is usually not  dangerous.  Atrial fibrillation and atrial flutter are other fast rhythms that start in the atria. Both conditions keep the atria from filling with enough blood so the heart does not work well.  · Symptoms include feeling light-headed or faint.  · These fast rates may be the result of heart damage or disease. Too much thyroid hormone may play a role.  · There may be no clear cause or it may be from heart disease or damage.  · Medication or a special electrical treatment (cardioversion) may be needed to get the heart beating normally.  Ventricular tachycardia is a fast heart rate that starts in the lower muscular chambers (ventricles) This is a serious disorder that requires treatment as soon as possible. You need someone else to get and use a small defibrillator.  · Symptoms include collapse, chest pain, or being short of breath.  · Treatment may include medication, procedures to improve blood flow to the heart, or an implantable cardiac defibrillator (ICD).  DIAGNOSIS   · A cardiogram (EKG or ECG) will be done to see the arrhythmia, as well as lab tests to check the underlying cause.  · If the extra beats or fast rate come and go, you may wear a Holter monitor that records your heart rate for a longer period of time.  SEEK MEDICAL CARE IF:  · You have irregular or fast heartbeats (palpitations).  · You experience skipped beats.  · You develop lightheadedness.  · You have chest discomfort.  · You have shortness of breath.  · You have more frequent episodes, if you are already being treated.  SEEK IMMEDIATE MEDICAL CARE IF:   · You have severe chest pain, especially if the pain is crushing or pressure-like and spreads to the arms, back, neck, or jaw, or if you have sweating, feeling sick to your stomach (nausea), or shortness of breath. THIS IS AN EMERGENCY. Do not wait to see if the pain will go away. Get medical help at once. Call 911 or 0 (). DO NOT drive yourself to the hospital.  · You feel dizzy or  faint.  · You have episodes of previously documented atrial tachycardia that do not resolve with the techniques your caregiver has taught you.  · Irregular or rapid heartbeats begin to occur more often than in the past, especially if they are associated with more pronounced symptoms or of longer duration.  Document Released: 12/18/2006 Document Revised: 03/11/2013 Document Reviewed: 08/05/2009  [employer]® Patient Information ©2014 [employer].    Ankle Sprain  An ankle sprain is an injury to the strong, fibrous tissues (ligaments) that hold the bones of your ankle joint together.   CAUSES  An ankle sprain is usually caused by a fall or by twisting your ankle. Ankle sprains most commonly occur when you step on the outer edge of your foot, and your ankle turns inward. People who participate in sports are more prone to these types of injuries.   SYMPTOMS   · Pain in your ankle. The pain may be present at rest or only when you are trying to stand or walk.  · Swelling.  · Bruising. Bruising may develop immediately or within 1 to 2 days after your injury.  · Difficulty standing or walking, particularly when turning corners or changing directions.  DIAGNOSIS   Your caregiver will ask you details about your injury and perform a physical exam of your ankle to determine if you have an ankle sprain. During the physical exam, your caregiver will press on and apply pressure to specific areas of your foot and ankle. Your caregiver will try to move your ankle in certain ways. An X-ray exam may be done to be sure a bone was not broken or a ligament did not separate from one of the bones in your ankle (avulsion fracture).   TREATMENT   Certain types of braces can help stabilize your ankle. Your caregiver can make a recommendation for this. Your caregiver may recommend the use of medicine for pain. If your sprain is severe, your caregiver may refer you to a surgeon who helps to restore function to parts of your skeletal system  (orthopedist) or a physical therapist.  HOME CARE INSTRUCTIONS   · Apply ice to your injury for 1-2 days or as directed by your caregiver. Applying ice helps to reduce inflammation and pain.  ¨ Put ice in a plastic bag.  ¨ Place a towel between your skin and the bag.  ¨ Leave the ice on for 15-20 minutes at a time, every 2 hours while you are awake.  · Only take over-the-counter or prescription medicines for pain, discomfort, or fever as directed by your caregiver.  · Elevate your injured ankle above the level of your heart as much as possible for 2-3 days.  · If your caregiver recommends crutches, use them as instructed. Gradually put weight on the affected ankle. Continue to use crutches or a cane until you can walk without feeling pain in your ankle.  · If you have a plaster splint, wear the splint as directed by your caregiver. Do not rest it on anything harder than a pillow for the first 24 hours. Do not put weight on it. Do not get it wet. You may take it off to take a shower or bath.  · You may have been given an elastic bandage to wear around your ankle to provide support. If the elastic bandage is too tight (you have numbness or tingling in your foot or your foot becomes cold and blue), adjust the bandage to make it comfortable.  · If you have an air splint, you may blow more air into it or let air out to make it more comfortable. You may take your splint off at night and before taking a shower or bath. Wiggle your toes in the splint several times per day to decrease swelling.  SEEK MEDICAL CARE IF:   · You have rapidly increasing bruising or swelling.  · Your toes feel extremely cold or you lose feeling in your foot.  · Your pain is not relieved with medicine.  SEEK IMMEDIATE MEDICAL CARE IF:  · Your toes are numb or blue.  · You have severe pain that is increasing.  MAKE SURE YOU:   · Understand these instructions.  · Will watch your condition.  · Will get help right away if you are not doing well or get  worse.     This information is not intended to replace advice given to you by your health care provider. Make sure you discuss any questions you have with your health care provider.     Document Released: 12/18/2006 Document Revised: 01/08/2016 Document Reviewed: 12/29/2012  [employer] Interactive Patient Education ©2016 [employer]

## 2017-04-05 NOTE — ED PROVIDER NOTES
CHIEF COMPLAINT  Chief Complaint   Patient presents with   • Chest Pressure       HPI (1,4)  Dorie Wadsworth is a 81 y.o. female who presents with complaint of chest pressure that has subsequently resolved.  She has a history of aflutter and reports that she had an episode of palpitations last night from approx 10:30-11:10pm and again briefly around two hours later. She denied any symptoms including SOB, dizziness, lightheadedness, n/v, chest pain during these episodes. She is on anticoagulation for aflutter with eliquis. This am she had a mild dull aching pain in mid left chest that radiating around left side into back. It resolved without intervention and was not associated with any symptoms. She called her doctor's office and was told her doctor was on vacation so came to ED for evaluation. At time of eval she states she is feeling fine and wants to go home. She does request an xray of her left ankle as she rolled it stepping off of a curb a few days ago and it has been painful and intermittently swollen since.    REVIEW OF SYSTEMS(2/10)  Gen: denies fever, chills, sweats  HENT: denies headache, change in hearing  Eyes: denies change in vision, eye pain  CV: pos chest pain, palpitations; denies edema  Pulm: denies SOB, cough, sputum production  GI: denies abd pain, nausea, vomiting, constipation, diarrhea  : denies dysuria, hematuria  MSK: L ankle pain, denies myalgias  Neuro: denies LOC, changes in sensation, focal weakness   Hem/lymph: denies easy bruising, on eliquis    PAST MEDICAL HISTORY(PFS1,2)  Past Medical History   Diagnosis Date   • Indigestion    • Glaucoma    • T.I.A.    • CAD (coronary artery disease)    • Arthritis      fingers   • Unspecified hemorrhagic conditions      not sure when (2009)  blood in urin    • Hypertension    • Cancer      skin   • Unspecified disorder of thyroid    • A-fib    • Atrial fibrillation 2/2009     A FIB X2,[ resolved on own][   • Paroxysmal atrial  fibrillation    • Urinary bladder disorder      urinary incontinence   • Bladder problem      Chronic bladder infection for the past 17months   • Bladder disease 2011     bladder removed   • Ailment      urostomy   • Hyperlipidemia 2/24/2011   • UTI (urinary tract infection) 2/24/2011   • Hypothyroid 2/24/2011   • CHEST PAIN 3/11/2011   • TIA (transient ischemic attack) 3/11/2011   • Sepsis urinary source 1/23/2012   • Vaginal bleeding 10/22/2012   • History of hematuria      3/15/10: with Plavix.   • Stroke      2 TIAs 2/2010, no stroke, 2/2013   • CATARACT      freddie surgery complete   • Atrial fibrillation, rapid (CMS-HCC) 1/31/2014   • Other specified pre-operative examination 1/28/2014       FAMILY HISTORY  Family History   Problem Relation Age of Onset   • Stroke Mother 81   • Stroke Father 77       SOCIAL HISTORY  Social History   Substance Use Topics   • Smoking status: Never Smoker    • Smokeless tobacco: Never Used   • Alcohol Use: No     History   Drug Use No       SURGICAL HISTORY  Past Surgical History   Procedure Laterality Date   • Bladder biopsy with cystoscopy  10/10/08     Performed by CARLTON LUNA at SURGERY [facility] ORS   • Pyelogram  10/10/08     Performed by CARLTON LUNA at SURGERY [facility] ORS   • Retrogrades  10/10/08     Performed by CARLTON LUNA at SURGERY [facility] ORS   • Bladder biopsy with cystoscopy  6/15/2009     Performed by CARLTON LUNA at SURGERY [facility] ORS   • Bladder biopsy with cystoscopy  10/12/2009     Performed by JENNIFER MENDES at SURGERY [facility] ORS   • Other abdominal surgery       appendectomy, pancratitis    • Cholecystectomy  1994   • Cholecystectomy  1994   • Gyn surgery       hysterectomy   • Pelvic exam under anesthesia  10/12/2009     Performed by JENNIFER MENDES at SURGERY [facility] ORS   • Rectocele repair  11/3/2009     Performed by ZEKE HARRINGTON [facility]   • Other       TONSILLECTOMY AS TEENAGER  "  • Cystectomy  9/6/2011     Performed by JENNIFER LINCOLN at SURGERY [facility] ORS   • Ileo loop diversion  9/6/2011     Performed by JENNIFER LINCOLN at SURGERY [facility] ORS   • Biopsy general  10/22/2012     Performed by Jennifer Lincoln M.D. at SURGERY [facility]   • Parastomal hernia repair   1/28/2014     Performed by Nicol Dorman M.D. at SURGERY [facility]       CURRENT MEDICATIONS  Home Medications     **Home medications have not yet been reviewed for this encounter**          ALLERGIES  Allergies   Allergen Reactions   • Cardizem Swelling   • Doxycycline      Swollen lips.   • Sulfa Drugs      Makes tongue swell, severely   • Zyvox Swelling     \"Whole mouth swelled up\"    • Codeine      Does not remember the reaction     • Macrobid [Nitrofurantoin Monohydrate Macrocrystals]      Does not remember the reaction       PHYSICAL EXAM (2,8)  VITAL SIGNS: /74 mmHg  Pulse 82  Temp(Src) 36.4 °C (97.5 °F) (Temporal)  Resp 16  Ht 1.727 m (5' 8\")  Wt 79.833 kg (176 lb)  BMI 26.77 kg/m2  SpO2 98% Reviewed  Constitutional: female sitting in chair, appears stated age, no acute distress  HENT: normocephalic, atraumatic, trachea midline, no JVD, no thyromegaly  Eyes: EOMI, PERRLA, no scleral icterus  Cardiovascular: RRR; no murmurs, gallops, or rubs; nontender to palpation  Respiratory: CTAB, normal effort  Gastrointestinal: abdomen soft, nontender, no hepatosplenomegaly, no guarding  Back: nontender midline, no step offs  Skin: no rashes or lesions noted on visualized skin  Genitourinary:  deferred  Neurologic: alert, orientedx4, CN II-XII grossly intact by passive exam  Psychiatric: normal affect, normal judgement  MSK: L ankle: intact ROM, no swelling/warmth/erythema, no joint laxity, slightly tender to palpation of medial aspect    DIFFERENTIAL DIAGNOSIS:  Aflutter with RVR, afib with RVR, anxiety, ACS    EKG  Rate 79, , QTc 455  SINUS RHYTHM   PROBABLE LVH WITH " SECONDARY REPOL ABNRM   ST DEPRESSION, CONSIDER ISCHEMIA, ANT-LAT LDS   BASELINE WANDER IN LEAD(S) V4   Compared to ECG 08/11/2016 14:04:21   ST (T wave) deviation now present   Possible ischemia still present     RADIOLOGY/PROCEDURES  DX-ANKLE 2- VIEWS LEFT   Final Result      No radiographic evidence of acute traumatic injury.      DX-CHEST-LIMITED (1 VIEW)   Final Result         1.  Minimal pulmonary edema.          LABORATORY: Reviewed as below.  Results for orders placed or performed during the hospital encounter of 04/04/17   Troponin   Result Value Ref Range    Troponin I 0.02 0.00 - 0.04 ng/mL   Btype Natriuretic Peptide   Result Value Ref Range    B Natriuretic Peptide 389 (H) 0 - 100 pg/mL   CBC with Differential   Result Value Ref Range    WBC 8.7 4.8 - 10.8 K/uL    RBC 5.02 4.20 - 5.40 M/uL    Hemoglobin 13.5 12.0 - 16.0 g/dL    Hematocrit 42.4 37.0 - 47.0 %    MCV 84.5 81.4 - 97.8 fL    MCH 26.9 (L) 27.0 - 33.0 pg    MCHC 31.8 (L) 33.6 - 35.0 g/dL    RDW 54.4 (H) 35.9 - 50.0 fL    Platelet Count 141 (L) 164 - 446 K/uL    MPV 11.3 9.0 - 12.9 fL    Neutrophils-Polys 49.40 44.00 - 72.00 %    Lymphocytes 32.80 22.00 - 41.00 %    Monocytes 15.00 (H) 0.00 - 13.40 %    Eosinophils 1.90 0.00 - 6.90 %    Basophils 0.70 0.00 - 1.80 %    Immature Granulocytes 0.20 0.00 - 0.90 %    Nucleated RBC 0.00 /100 WBC    Neutrophils (Absolute) 4.30 2.00 - 7.15 K/uL    Lymphs (Absolute) 2.86 1.00 - 4.80 K/uL    Monos (Absolute) 1.31 (H) 0.00 - 0.85 K/uL    Eos (Absolute) 0.17 0.00 - 0.51 K/uL    Baso (Absolute) 0.06 0.00 - 0.12 K/uL    Immature Granulocytes (abs) 0.02 0.00 - 0.11 K/uL    NRBC (Absolute) 0.00 K/uL   Complete Metabolic Panel (CMP)   Result Value Ref Range    Sodium 138 135 - 145 mmol/L    Potassium 3.8 3.6 - 5.5 mmol/L    Chloride 111 96 - 112 mmol/L    Co2 20 20 - 33 mmol/L    Anion Gap 7.0 0.0 - 11.9    Glucose 100 (H) 65 - 99 mg/dL    Bun 19 8 - 22 mg/dL    Creatinine 1.10 0.50 - 1.40 mg/dL    Calcium 9.4  8.5 - 10.5 mg/dL    AST(SGOT) 28 12 - 45 U/L    ALT(SGPT) 13 2 - 50 U/L    Alkaline Phosphatase 132 (H) 30 - 99 U/L    Total Bilirubin 0.6 0.1 - 1.5 mg/dL    Albumin 3.5 3.2 - 4.9 g/dL    Total Protein 8.1 6.0 - 8.2 g/dL    Globulin 4.6 (H) 1.9 - 3.5 g/dL    A-G Ratio 0.8 g/dL   Prothrombin Time   Result Value Ref Range    PT 16.6 (H) 12.0 - 14.6 sec    INR 1.30 (H) 0.87 - 1.13   APTT   Result Value Ref Range    APTT 34.0 24.7 - 36.0 sec   Lipase   Result Value Ref Range    Lipase 76 11 - 82 U/L   ESTIMATED GFR   Result Value Ref Range    GFR If  58 (A) >60 mL/min/1.73 m 2    GFR If Non  48 (A) >60 mL/min/1.73 m 2   EKG (ER)   Result Value Ref Range    Report       [facility]    Test Date:  2017  Pt Name:    BERNARD BAER                Department: ER  MRN:        [id number]                      Room:  Gender:     F                            Technician: [id number]  :        [id number]                   Requested By:ER TRIAGE PROTOCOL  Order #:    [id number]                    Reading MD:    Measurements  Intervals                                Axis  Rate:       79                           P:          0  NV:         145                          QRS:        1  QRSD:       78                           T:          171  QT:         396  QTc:        455    Interpretive Statements  SINUS RHYTHM  PROBABLE LVH WITH SECONDARY REPOL ABNRM  ST DEPRESSION, CONSIDER ISCHEMIA, ANT-LAT LDS  BASELINE WANDER IN LEAD(S) V4  Compared to ECG 2016 14:04:21  ST (T wave) deviation now present  Possible ischemia still present         INTERVENTIONS:  Medications - No data to display.  Response: reassured.    COURSE & MEDICAL DECISION MAKING  1053 - EKG in triage - no STEMI    1555 - Evaluated at bedside, patient reports she feels well and would like to go home. Labs and CXR reviewed. Patient reporting L ankle pain, will evaluate with xray.    1715 - Reviewed ankle  xrays - no acute pathology. Patient to discharge to home.    This is a 80 yo female who presented with complaint of chest pressure and recent episode of palpitations. Has history of aflutter, she is on eliquis and metoprolol. Chest pressure resolved and episodes of presumed aflutter with RVR were asymptomatic. EKG on arrival showed SR. She is stable for outpatient follow up with PCP and cardiology. Was instructed that should palpitations recur with symptoms that she should seek medical attention. Additionally complained of left ankle pain, xray neg; ligament sprain with no intervention indicated. Discharged to home in stable condition.    PLAN:  Discharge to home with instructions to follow up with PCP and cardiologist.    CONDITION: good    FINAL IMPRESSION  1. Palpitations    2. Sprain of tibiofibular ligament of left ankle, initial encounter          Electronically signed by: Ag De La Cruz, 4/4/2017 5:06 PM

## 2017-04-05 NOTE — TELEPHONE ENCOUNTER
Letter received from [employer] patient assistance program stating denial of coverage. Letter sent to scan    Informed pt and educated her that some times [employer] can accept application in two weeks or so. Number given to pt #[phone] to call and discuss possible coverage. Pt requesting to  more samples of Eliquis for the time being. Pt to come into to [facility] location tomorrow to  samples.

## 2017-06-21 ENCOUNTER — TELEPHONE (OUTPATIENT)
Dept: VASCULAR LAB | Facility: MEDICAL CENTER | Age: 82
End: 2017-06-21

## 2017-06-21 NOTE — TELEPHONE ENCOUNTER
Called to check in with pt. Per last coag note, pt was transitioning to Eliquis. Per chart review pt has been on Eliquis but has been having issues with getting it covered, and has been using samples from MD office.     Per pt, doing well on Eliquis, prefers it to warfarin, as she has less bruising. Still waiting on patient assistance application to come through.     Scheduled a office visit with [employer] in [place] for a face to face check in on 7/14/17. Pt agrees with this plan.     Nannette Cueto, PHARMD

## 2017-07-07 ENCOUNTER — HOSPITAL ENCOUNTER (OUTPATIENT)
Dept: LAB | Facility: MEDICAL CENTER | Age: 82
End: 2017-07-07
Attending: NURSE PRACTITIONER
Payer: MEDICARE

## 2017-07-07 PROCEDURE — 87077 CULTURE AEROBIC IDENTIFY: CPT

## 2017-07-07 PROCEDURE — 87086 URINE CULTURE/COLONY COUNT: CPT

## 2017-07-07 PROCEDURE — 87186 SC STD MICRODIL/AGAR DIL: CPT

## 2017-07-09 LAB
BACTERIA UR CULT: ABNORMAL
SIGNIFICANT IND 70042: ABNORMAL
SITE SITE: ABNORMAL
SOURCE SOURCE: ABNORMAL

## 2017-07-10 ENCOUNTER — HOSPITAL ENCOUNTER (OUTPATIENT)
Facility: MEDICAL CENTER | Age: 82
End: 2017-07-13
Attending: EMERGENCY MEDICINE | Admitting: HOSPITALIST
Payer: MEDICARE

## 2017-07-10 ENCOUNTER — RESOLUTE PROFESSIONAL BILLING HOSPITAL PROF FEE (OUTPATIENT)
Dept: HOSPITALIST | Facility: MEDICAL CENTER | Age: 82
End: 2017-07-10
Payer: MEDICARE

## 2017-07-10 ENCOUNTER — APPOINTMENT (OUTPATIENT)
Dept: RADIOLOGY | Facility: MEDICAL CENTER | Age: 82
End: 2017-07-10
Attending: EMERGENCY MEDICINE
Payer: MEDICARE

## 2017-07-10 DIAGNOSIS — R10.13 EPIGASTRIC PAIN: ICD-10-CM

## 2017-07-10 PROBLEM — K52.9 ENTERITIS: Status: ACTIVE | Noted: 2017-07-10

## 2017-07-10 LAB
ALBUMIN SERPL BCP-MCNC: 3.5 G/DL (ref 3.2–4.9)
ALBUMIN/GLOB SERPL: 0.9 G/DL
ALP SERPL-CCNC: 125 U/L (ref 30–99)
ALT SERPL-CCNC: 18 U/L (ref 2–50)
ANION GAP SERPL CALC-SCNC: 9 MMOL/L (ref 0–11.9)
APPEARANCE UR: ABNORMAL
APPEARANCE UR: ABNORMAL
AST SERPL-CCNC: 32 U/L (ref 12–45)
BACTERIA #/AREA URNS HPF: ABNORMAL /HPF
BASOPHILS # BLD AUTO: 0.3 % (ref 0–1.8)
BASOPHILS # BLD: 0.04 K/UL (ref 0–0.12)
BILIRUB SERPL-MCNC: 1 MG/DL (ref 0.1–1.5)
BILIRUB UR QL STRIP.AUTO: NEGATIVE
BUN SERPL-MCNC: 31 MG/DL (ref 8–22)
CALCIUM SERPL-MCNC: 8.7 MG/DL (ref 8.4–10.2)
CHLORIDE SERPL-SCNC: 109 MMOL/L (ref 96–112)
CO2 SERPL-SCNC: 19 MMOL/L (ref 20–33)
COLOR UR: YELLOW
COLOR UR: YELLOW
CREAT SERPL-MCNC: 1.22 MG/DL (ref 0.5–1.4)
CULTURE IF INDICATED INDCX: YES UA CULTURE
EKG IMPRESSION: NORMAL
EOSINOPHIL # BLD AUTO: 0.13 K/UL (ref 0–0.51)
EOSINOPHIL NFR BLD: 1.1 % (ref 0–6.9)
EPI CELLS #/AREA URNS HPF: ABNORMAL /HPF
ERYTHROCYTE [DISTWIDTH] IN BLOOD BY AUTOMATED COUNT: 49 FL (ref 35.9–50)
ERYTHROCYTE [SEDIMENTATION RATE] IN BLOOD BY WESTERGREN METHOD: 19 MM/HOUR (ref 0–30)
GFR SERPL CREATININE-BSD FRML MDRD: 42 ML/MIN/1.73 M 2
GLOBULIN SER CALC-MCNC: 4.1 G/DL (ref 1.9–3.5)
GLUCOSE SERPL-MCNC: 112 MG/DL (ref 65–99)
GLUCOSE UR STRIP.AUTO-MCNC: NEGATIVE MG/DL
GLUCOSE UR STRIP.AUTO-MCNC: NEGATIVE MG/DL
HCT VFR BLD AUTO: 44.2 % (ref 37–47)
HGB BLD-MCNC: 14.6 G/DL (ref 12–16)
IMM GRANULOCYTES # BLD AUTO: 0.04 K/UL (ref 0–0.11)
IMM GRANULOCYTES NFR BLD AUTO: 0.3 % (ref 0–0.9)
KETONES UR STRIP.AUTO-MCNC: NEGATIVE MG/DL
KETONES UR STRIP.AUTO-MCNC: NEGATIVE MG/DL
LEUKOCYTE ESTERASE UR QL STRIP.AUTO: ABNORMAL
LEUKOCYTE ESTERASE UR QL STRIP.AUTO: ABNORMAL
LIPASE SERPL-CCNC: 79 U/L (ref 7–58)
LYMPHOCYTES # BLD AUTO: 1.69 K/UL (ref 1–4.8)
LYMPHOCYTES NFR BLD: 14 % (ref 22–41)
MCH RBC QN AUTO: 27.5 PG (ref 27–33)
MCHC RBC AUTO-ENTMCNC: 33 G/DL (ref 33.6–35)
MCV RBC AUTO: 83.4 FL (ref 81.4–97.8)
MICRO URNS: ABNORMAL
MONOCYTES # BLD AUTO: 1.54 K/UL (ref 0–0.85)
MONOCYTES NFR BLD AUTO: 12.7 % (ref 0–13.4)
NEUTROPHILS # BLD AUTO: 8.64 K/UL (ref 2–7.15)
NEUTROPHILS NFR BLD: 71.6 % (ref 44–72)
NITRITE UR QL STRIP.AUTO: NEGATIVE
NITRITE UR QL STRIP.AUTO: NEGATIVE
NRBC # BLD AUTO: 0 K/UL
NRBC BLD AUTO-RTO: 0 /100 WBC
PH UR STRIP.AUTO: 6.5 [PH]
PH UR STRIP.AUTO: 7 [PH]
PLATELET # BLD AUTO: 163 K/UL (ref 164–446)
PMV BLD AUTO: 11.6 FL (ref 9–12.9)
POTASSIUM SERPL-SCNC: 4.6 MMOL/L (ref 3.6–5.5)
PROT SERPL-MCNC: 7.6 G/DL (ref 6–8.2)
PROT UR QL STRIP: 30 MG/DL
PROT UR QL STRIP: NEGATIVE MG/DL
RBC # BLD AUTO: 5.3 M/UL (ref 4.2–5.4)
RBC # URNS HPF: ABNORMAL /HPF
RBC UR QL AUTO: ABNORMAL
RBC UR QL AUTO: ABNORMAL
SODIUM SERPL-SCNC: 137 MMOL/L (ref 135–145)
SP GR UR STRIP.AUTO: 1.01
SP GR UR STRIP.AUTO: 1.01
UNIDENT CRYS URNS QL MICRO: ABNORMAL /HPF
WBC # BLD AUTO: 12.1 K/UL (ref 4.8–10.8)
WBC #/AREA URNS HPF: ABNORMAL /HPF

## 2017-07-10 PROCEDURE — 96367 TX/PROPH/DG ADDL SEQ IV INF: CPT

## 2017-07-10 PROCEDURE — 83690 ASSAY OF LIPASE: CPT

## 2017-07-10 PROCEDURE — 700111 HCHG RX REV CODE 636 W/ 250 OVERRIDE (IP): Performed by: EMERGENCY MEDICINE

## 2017-07-10 PROCEDURE — 700105 HCHG RX REV CODE 258: Performed by: EMERGENCY MEDICINE

## 2017-07-10 PROCEDURE — A9270 NON-COVERED ITEM OR SERVICE: HCPCS | Performed by: HOSPITALIST

## 2017-07-10 PROCEDURE — A9270 NON-COVERED ITEM OR SERVICE: HCPCS | Performed by: EMERGENCY MEDICINE

## 2017-07-10 PROCEDURE — 80053 COMPREHEN METABOLIC PANEL: CPT

## 2017-07-10 PROCEDURE — 96365 THER/PROPH/DIAG IV INF INIT: CPT

## 2017-07-10 PROCEDURE — 700117 HCHG RX CONTRAST REV CODE 255: Performed by: EMERGENCY MEDICINE

## 2017-07-10 PROCEDURE — 700101 HCHG RX REV CODE 250: Performed by: HOSPITALIST

## 2017-07-10 PROCEDURE — 87077 CULTURE AEROBIC IDENTIFY: CPT

## 2017-07-10 PROCEDURE — 36415 COLL VENOUS BLD VENIPUNCTURE: CPT

## 2017-07-10 PROCEDURE — 700111 HCHG RX REV CODE 636 W/ 250 OVERRIDE (IP): Performed by: HOSPITALIST

## 2017-07-10 PROCEDURE — 99220 PR INITIAL OBSERVATION CARE,LEVL III: CPT | Performed by: HOSPITALIST

## 2017-07-10 PROCEDURE — 99285 EMERGENCY DEPT VISIT HI MDM: CPT

## 2017-07-10 PROCEDURE — G0378 HOSPITAL OBSERVATION PER HR: HCPCS

## 2017-07-10 PROCEDURE — 93005 ELECTROCARDIOGRAM TRACING: CPT

## 2017-07-10 PROCEDURE — 85025 COMPLETE CBC W/AUTO DIFF WBC: CPT

## 2017-07-10 PROCEDURE — 87186 SC STD MICRODIL/AGAR DIL: CPT

## 2017-07-10 PROCEDURE — 85652 RBC SED RATE AUTOMATED: CPT

## 2017-07-10 PROCEDURE — 700105 HCHG RX REV CODE 258: Performed by: HOSPITALIST

## 2017-07-10 PROCEDURE — 81001 URINALYSIS AUTO W/SCOPE: CPT

## 2017-07-10 PROCEDURE — 96375 TX/PRO/DX INJ NEW DRUG ADDON: CPT

## 2017-07-10 PROCEDURE — 81002 URINALYSIS NONAUTO W/O SCOPE: CPT

## 2017-07-10 PROCEDURE — 87086 URINE CULTURE/COLONY COUNT: CPT

## 2017-07-10 PROCEDURE — 96374 THER/PROPH/DIAG INJ IV PUSH: CPT

## 2017-07-10 PROCEDURE — 96361 HYDRATE IV INFUSION ADD-ON: CPT

## 2017-07-10 PROCEDURE — 74177 CT ABD & PELVIS W/CONTRAST: CPT

## 2017-07-10 PROCEDURE — 96376 TX/PRO/DX INJ SAME DRUG ADON: CPT

## 2017-07-10 PROCEDURE — 700102 HCHG RX REV CODE 250 W/ 637 OVERRIDE(OP): Performed by: HOSPITALIST

## 2017-07-10 PROCEDURE — 700102 HCHG RX REV CODE 250 W/ 637 OVERRIDE(OP): Performed by: EMERGENCY MEDICINE

## 2017-07-10 RX ORDER — SODIUM CHLORIDE 9 MG/ML
INJECTION, SOLUTION INTRAVENOUS CONTINUOUS
Status: ACTIVE | OUTPATIENT
Start: 2017-07-10 | End: 2017-07-12

## 2017-07-10 RX ORDER — OMEPRAZOLE 20 MG/1
20 CAPSULE, DELAYED RELEASE ORAL DAILY
Status: DISCONTINUED | OUTPATIENT
Start: 2017-07-10 | End: 2017-07-13 | Stop reason: HOSPADM

## 2017-07-10 RX ORDER — BRIMONIDINE TARTRATE 2 MG/ML
1 SOLUTION/ DROPS OPHTHALMIC 2 TIMES DAILY
Status: DISCONTINUED | OUTPATIENT
Start: 2017-07-10 | End: 2017-07-13 | Stop reason: HOSPADM

## 2017-07-10 RX ORDER — POLYETHYLENE GLYCOL 3350 17 G/17G
1 POWDER, FOR SOLUTION ORAL
Status: DISCONTINUED | OUTPATIENT
Start: 2017-07-10 | End: 2017-07-13 | Stop reason: HOSPADM

## 2017-07-10 RX ORDER — LEVOTHYROXINE SODIUM 0.03 MG/1
25 TABLET ORAL
COMMUNITY
End: 2018-07-30

## 2017-07-10 RX ORDER — SODIUM CHLORIDE 9 MG/ML
INJECTION, SOLUTION INTRAVENOUS CONTINUOUS
Status: DISCONTINUED | OUTPATIENT
Start: 2017-07-10 | End: 2017-07-10

## 2017-07-10 RX ORDER — MORPHINE SULFATE 10 MG/ML
2-5 INJECTION, SOLUTION INTRAMUSCULAR; INTRAVENOUS EVERY 4 HOURS PRN
Status: DISCONTINUED | OUTPATIENT
Start: 2017-07-10 | End: 2017-07-13 | Stop reason: HOSPADM

## 2017-07-10 RX ORDER — LEVOTHYROXINE SODIUM 0.03 MG/1
25 TABLET ORAL
Status: DISCONTINUED | OUTPATIENT
Start: 2017-07-11 | End: 2017-07-13 | Stop reason: HOSPADM

## 2017-07-10 RX ORDER — METOPROLOL SUCCINATE 25 MG/1
50 TABLET, EXTENDED RELEASE ORAL
Status: DISCONTINUED | OUTPATIENT
Start: 2017-07-10 | End: 2017-07-13 | Stop reason: HOSPADM

## 2017-07-10 RX ORDER — BISACODYL 10 MG
10 SUPPOSITORY, RECTAL RECTAL
Status: DISCONTINUED | OUTPATIENT
Start: 2017-07-10 | End: 2017-07-13 | Stop reason: HOSPADM

## 2017-07-10 RX ORDER — ONDANSETRON 2 MG/ML
4 INJECTION INTRAMUSCULAR; INTRAVENOUS ONCE
Status: COMPLETED | OUTPATIENT
Start: 2017-07-10 | End: 2017-07-10

## 2017-07-10 RX ORDER — CIPROFLOXACIN 500 MG/1
500 TABLET, FILM COATED ORAL ONCE
Status: COMPLETED | OUTPATIENT
Start: 2017-07-10 | End: 2017-07-10

## 2017-07-10 RX ORDER — HALOPERIDOL 5 MG/ML
5 INJECTION INTRAMUSCULAR EVERY 4 HOURS PRN
Status: DISCONTINUED | OUTPATIENT
Start: 2017-07-10 | End: 2017-07-11

## 2017-07-10 RX ORDER — CIPROFLOXACIN 2 MG/ML
400 INJECTION, SOLUTION INTRAVENOUS EVERY 12 HOURS
Status: DISCONTINUED | OUTPATIENT
Start: 2017-07-10 | End: 2017-07-11

## 2017-07-10 RX ORDER — ACETAMINOPHEN 325 MG/1
650 TABLET ORAL EVERY 6 HOURS PRN
Status: DISCONTINUED | OUTPATIENT
Start: 2017-07-10 | End: 2017-07-13 | Stop reason: HOSPADM

## 2017-07-10 RX ORDER — PANTOPRAZOLE SODIUM 40 MG/1
40 TABLET, DELAYED RELEASE ORAL DAILY
Status: DISCONTINUED | OUTPATIENT
Start: 2017-07-10 | End: 2017-07-10

## 2017-07-10 RX ORDER — MULTIVIT,IRON,MINERALS/LUTEIN
1 TABLET ORAL DAILY
COMMUNITY
End: 2020-08-23

## 2017-07-10 RX ORDER — TIMOLOL MALEATE 5 MG/ML
1 SOLUTION/ DROPS OPHTHALMIC 2 TIMES DAILY
Status: DISCONTINUED | OUTPATIENT
Start: 2017-07-10 | End: 2017-07-13 | Stop reason: HOSPADM

## 2017-07-10 RX ORDER — BIOTIN 10000 MCG
1 CAPSULE ORAL DAILY
Status: DISCONTINUED | OUTPATIENT
Start: 2017-07-10 | End: 2017-07-10

## 2017-07-10 RX ORDER — ONDANSETRON 4 MG/1
4 TABLET, ORALLY DISINTEGRATING ORAL EVERY 4 HOURS PRN
Status: DISCONTINUED | OUTPATIENT
Start: 2017-07-10 | End: 2017-07-13 | Stop reason: HOSPADM

## 2017-07-10 RX ORDER — ONDANSETRON 2 MG/ML
4 INJECTION INTRAMUSCULAR; INTRAVENOUS EVERY 4 HOURS PRN
Status: DISCONTINUED | OUTPATIENT
Start: 2017-07-10 | End: 2017-07-13 | Stop reason: HOSPADM

## 2017-07-10 RX ORDER — LABETALOL HYDROCHLORIDE 5 MG/ML
10 INJECTION, SOLUTION INTRAVENOUS EVERY 4 HOURS PRN
Status: DISCONTINUED | OUTPATIENT
Start: 2017-07-10 | End: 2017-07-13 | Stop reason: HOSPADM

## 2017-07-10 RX ORDER — AMOXICILLIN 250 MG
2 CAPSULE ORAL 2 TIMES DAILY
Status: DISCONTINUED | OUTPATIENT
Start: 2017-07-10 | End: 2017-07-13 | Stop reason: HOSPADM

## 2017-07-10 RX ORDER — LOVASTATIN 20 MG/1
10 TABLET ORAL NIGHTLY
Status: DISCONTINUED | OUTPATIENT
Start: 2017-07-10 | End: 2017-07-13 | Stop reason: HOSPADM

## 2017-07-10 RX ORDER — TRAMADOL HYDROCHLORIDE 50 MG/1
50 TABLET ORAL EVERY 6 HOURS PRN
Status: DISCONTINUED | OUTPATIENT
Start: 2017-07-10 | End: 2017-07-13 | Stop reason: HOSPADM

## 2017-07-10 RX ORDER — BRIMONIDINE TARTRATE AND TIMOLOL MALEATE 2; 5 MG/ML; MG/ML
1 SOLUTION OPHTHALMIC 2 TIMES DAILY
COMMUNITY
End: 2019-12-20

## 2017-07-10 RX ORDER — LATANOPROST 50 UG/ML
1 SOLUTION/ DROPS OPHTHALMIC EVERY EVENING
Status: DISCONTINUED | OUTPATIENT
Start: 2017-07-10 | End: 2017-07-13 | Stop reason: HOSPADM

## 2017-07-10 RX ORDER — BRIMONIDINE TARTRATE AND TIMOLOL MALEATE 2; 5 MG/ML; MG/ML
1 SOLUTION OPHTHALMIC 2 TIMES DAILY
Status: DISCONTINUED | OUTPATIENT
Start: 2017-07-10 | End: 2017-07-10

## 2017-07-10 RX ADMIN — SODIUM CHLORIDE: 9 INJECTION, SOLUTION INTRAVENOUS at 17:51

## 2017-07-10 RX ADMIN — CIPROFLOXACIN 500 MG: 500 TABLET, FILM COATED ORAL at 16:44

## 2017-07-10 RX ADMIN — CIPROFLOXACIN 400 MG: 2 INJECTION, SOLUTION INTRAVENOUS at 20:13

## 2017-07-10 RX ADMIN — FENTANYL CITRATE 50 MCG: 50 INJECTION, SOLUTION INTRAMUSCULAR; INTRAVENOUS at 16:43

## 2017-07-10 RX ADMIN — IOHEXOL 100 ML: 350 INJECTION, SOLUTION INTRAVENOUS at 15:22

## 2017-07-10 RX ADMIN — BRIMONIDINE TARTRATE 1 DROP: 2 SOLUTION/ DROPS OPHTHALMIC at 20:14

## 2017-07-10 RX ADMIN — MORPHINE SULFATE 4 MG: 10 INJECTION INTRAVENOUS at 20:12

## 2017-07-10 RX ADMIN — ONDANSETRON 4 MG: 2 INJECTION INTRAMUSCULAR; INTRAVENOUS at 20:12

## 2017-07-10 RX ADMIN — TIMOLOL MALEATE 1 DROP: 5 SOLUTION OPHTHALMIC at 20:14

## 2017-07-10 RX ADMIN — METRONIDAZOLE 500 MG: 500 INJECTION, SOLUTION INTRAVENOUS at 17:51

## 2017-07-10 RX ADMIN — APIXABAN 5 MG: 5 TABLET, FILM COATED ORAL at 20:13

## 2017-07-10 RX ADMIN — SODIUM CHLORIDE: 9 INJECTION, SOLUTION INTRAVENOUS at 14:28

## 2017-07-10 RX ADMIN — LATANOPROST 1 DROP: 50 SOLUTION OPHTHALMIC at 20:14

## 2017-07-10 RX ADMIN — ONDANSETRON 4 MG: 2 INJECTION INTRAMUSCULAR; INTRAVENOUS at 14:29

## 2017-07-10 ASSESSMENT — PAIN SCALES - GENERAL
PAINLEVEL_OUTOF10: 6
PAINLEVEL_OUTOF10: 8

## 2017-07-10 ASSESSMENT — ENCOUNTER SYMPTOMS
SHORTNESS OF BREATH: 0
PALPITATIONS: 0
ABDOMINAL PAIN: 0
BLOOD IN STOOL: 0
DIZZINESS: 1
STRIDOR: 0
NERVOUS/ANXIOUS: 0
FLANK PAIN: 0
EYE DISCHARGE: 0
HEADACHES: 0
FEVER: 0
SPEECH CHANGE: 0
VOMITING: 0
NAUSEA: 1
WEAKNESS: 1
BACK PAIN: 0
DEPRESSION: 0
DIARRHEA: 0
COUGH: 0
CHILLS: 0

## 2017-07-10 ASSESSMENT — CHA2DS2 SCORE
HYPERTENSION: YES
DIABETES: NO
AGE 75 OR GREATER: YES
CHA2DS2 VASC SCORE: 6
CHF OR LEFT VENTRICULAR DYSFUNCTION: NO
VASCULAR DISEASE: NO
SEX: FEMALE
PRIOR STROKE OR TIA OR THROMBOEMBOLISM: YES
AGE 65 TO 74: NO

## 2017-07-10 ASSESSMENT — LIFESTYLE VARIABLES
EVER_SMOKED: NEVER
DO YOU DRINK ALCOHOL: NO

## 2017-07-10 NOTE — IP AVS SNAPSHOT
" <p align=\"LEFT\"><IMG SRC=\"//EMRWB/blob$/[email or web address]\" alt=\"Image\" WIDTH=\"50%\" HEIGHT=\"200\" BORDER=\"\"></p>                   Name:Dorie Wadsworth  Medical Record Number:[id number]  CSN: [id number]    YOB: 1935   Age: 81 y.o.  Sex: female  HT:1.727 m (5' 7.99\") WT: 79.4 kg (175 lb 0.7 oz)          Admit Date: 7/10/2017     Discharge Date:   Today's Date: 7/13/2017  Attending Doctor:  Luis A Hood M.D.                  Allergies:  Cardizem; Doxycycline; Sulfa drugs; Zyvox; Codeine; and Macrobid          Your appointments     Jul 14, 2017 10:30 AM   Anti-Coag Routine with [facility]   [address] [address] [phone]            Jul 14, 2017  1:30 PM   CARE MANAGER TELEPHONE VISIT with CARE MANAGER   [address]  [facility] [address] [phone]           ***IMPORTANT**** This is a phone visit only. Do not come into the office. The Care Manager will call you at the scheduled time for Care Manager Telephone Visit.            Jul 17, 2017 11:20 AM   Established Patient with ELIUD Jackson   [address]  [facility] [id number] [phone]           You will be receiving a confirmation call a few days before your appointment from our automated call confirmation system.            Aug 15, 2017 11:15 AM   FOLLOW UP with Rose Whipple M.D.   [facility]    [address]  [address]  [facility] [id number] [phone]              Follow-up Information     1. Follow up with Yue Restrepo M.D.. Schedule an appointment as soon as possible for a visit in 2 weeks.    Specialty:  Family Medicine    Contact information    [address] [facility] [address]  [address]  [place] [address] [phone]           Medication List      Take these Medications        Instructions    apixaban " 5mg Tabs   Commonly known as:  ELIQUIS    Take 1 Tab by mouth 2 Times a Day.   Dose:  5 mg       Biotin 10 MG Caps    Take 1 Cap by mouth every day. Indications: **OTC**   Dose:  1 Cap       ciprofloxacin 500 MG Tabs   Commonly known as:  CIPRO    Take 1 Tab by mouth 2 times a day for 3 days.   Dose:  500 mg       COMBIGAN 0.2-0.5 % Soln   Generic drug:  Brimonidine Tartrate-Timolol    1 Drop by Ophthalmic route 2 Times a Day.   Dose:  1 Drop       furosemide 20 MG Tabs   Commonly known as:  LASIX    Take 0.5 Tabs by mouth as needed.   Dose:  10 mg       levothyroxine 25 MCG Tabs   Commonly known as:  SYNTHROID    Take 25 mcg by mouth Every morning on an empty stomach.   Dose:  25 mcg       lovastatin 10 MG tablet   Commonly known as:  MEVACOR    Take 10 mg by mouth every evening.   Dose:  10 mg       LUMIGAN 0.01 % Soln   Generic drug:  bimatoprost    1 Drop by Ophthalmic route every bedtime. Both eyes   Dose:  1 Drop       metoprolol SR 50 MG Tb24   Commonly known as:  TOPROL XL    Take 1 Tab by mouth every bedtime.   Dose:  50 mg       pantoprazole 40 MG Tbec   Commonly known as:  PROTONIX    Take 40 mg by mouth every day.   Dose:  40 mg       * PRESERVISION AREDS PO    Take 1 Tab by mouth 2 Times a Day.   Dose:  1 Tab       * CENTRUM SILVER ULTRA WOMENS Tabs    Take 1 Tab by mouth every day.   Dose:  1 Tab       * Notice:  This list has 2 medication(s) that are the same as other medications prescribed for you. Read the directions carefully, and ask your doctor or other care provider to review them with you.

## 2017-07-10 NOTE — IP AVS SNAPSHOT
7/13/2017    Dorie Wadsworth  [place] [address]    Dear Dorie:    Critical access hospital wants to ensure your discharge home is safe and you or your loved ones have had all of your questions answered regarding your care after you leave the hospital.    Below is a list of resources and contact information should you have any questions regarding your hospital stay, follow-up instructions, or active medical symptoms.    Questions or Concerns Regarding… Contact   Medical Questions Related to Your Discharge  (7 days a week, 8am-5pm) Contact a Nurse Care Coordinator   [phone]   Medical Questions Not Related to Your Discharge  (24 hours a day / 7 days a week)  Contact the Nurse Health Line   [phone]    Medications or Discharge Instructions Refer to your discharge packet   or contact your [facility] Primary Care Provider   [phone]   Follow-up Appointment(s) Schedule your appointment via Zinwave   or contact Scheduling [phone]   Billing Review your statement via Zinwave  or contact Billing [phone]   Medical Records Review your records via Zinwave   or contact Medical Records [phone]     You may receive a telephone call within two days of discharge. This call is to make certain you understand your discharge instructions and have the opportunity to have any questions answered. You can also easily access your medical information, test results and upcoming appointments via the Zinwave free online health management tool. You can learn more and sign up at [email or web address]. For assistance setting up your Zinwave account, please call [phone].    Once again, we want to ensure your discharge home is safe and that you have a clear understanding of any next steps in your care. If you have any questions or concerns, please do not hesitate to contact us, we are here for you. Thank you for choosing [facility] for your healthcare needs.    Sincerely,    Your [facility] Healthcare Team

## 2017-07-10 NOTE — ED PROVIDER NOTES
ED Provider Note    CHIEF COMPLAINT  Chief Complaint   Patient presents with   • Abdominal Pain   • Pelvic Pain   • Flank Pain       HPI  Dorie Wadsworth is a 81 y.o. female who presents to the emergency department with abdominal discomfort. The patient states the pain started last Thursday and upper quadrant regions. She states now it has become much more diffuse. The patient describes the pain as sharp. She does have associated nausea but has not had any vomiting. She has a history of bladder cancer and has a stoma for urination but has not had any change in her bladder habits. She is unaware of any fevers but does feel warm. The patient states the pain is currently moderate to severe in intensity. She denies associated chest pain, difficulty breathing, and palpitations.    REVIEW OF SYSTEMS  See HPI for further details. All other systems are negative.     PAST MEDICAL HISTORY  Past Medical History   Diagnosis Date   • Indigestion    • Glaucoma    • T.I.A.    • CAD (coronary artery disease)    • Arthritis      fingers   • Unspecified hemorrhagic conditions      not sure when (2009)  blood in urin    • Hypertension    • Cancer (CMS-[facility])      skin   • Unspecified disorder of thyroid    • A-fib (CMS-[facility])    • Atrial fibrillation (CMS-[facility]) 2/2009     A FIB X2,[ resolved on own][   • Paroxysmal atrial fibrillation (CMS-[facility])    • Urinary bladder disorder      urinary incontinence   • Bladder problem      Chronic bladder infection for the past 17months   • Bladder disease 2011     bladder removed   • Ailment      urostomy   • Hyperlipidemia 2/24/2011   • UTI (urinary tract infection) 2/24/2011   • Hypothyroid 2/24/2011   • CHEST PAIN 3/11/2011   • TIA (transient ischemic attack) 3/11/2011   • Sepsis urinary source 1/23/2012   • Vaginal bleeding 10/22/2012   • History of hematuria      3/15/10: with Plavix.   • Stroke (CMS-[facility])      2 TIAs 2/2010, no stroke, 2/2013   • CATARACT      freddie surgery complete   • Atrial  "fibrillation, rapid (CMS-[facility]) 1/31/2014   • Other specified pre-operative examination 1/28/2014       SOCIAL HISTORY  Social History     Social History   • Marital Status:      Spouse Name: N/A   • Number of Children: N/A   • Years of Education: N/A     Social History Main Topics   • Smoking status: Never Smoker    • Smokeless tobacco: Never Used   • Alcohol Use: No   • Drug Use: No   • Sexual Activity: Not Asked     Other Topics Concern   • None     Social History Narrative           PHYSICAL EXAM  VITAL SIGNS: /61 mmHg  Pulse 89  Temp(Src) 37.4 °C (99.4 °F)  Resp 42  Ht 1.727 m (5' 8\")  Wt 78.926 kg (174 lb)  BMI 26.46 kg/m2  SpO2 96%  Constitutional: in acute distress, Non-toxic appearance.   HENT: Normocephalic, Atraumatic, tympanic membranes are intact and nonerythematous bilaterally, Oropharynx moist without exudates or erythema, Nose normal.   Eyes: PERRLA, EOMI, Conjunctiva normal.  Neck: Supple without meningismus  Lymphatic: No lymphadenopathy noted.   Cardiovascular: Normal heart rate, Normal rhythm, No murmurs, No rubs, No gallops.   Thorax & Lungs: Normal breath sounds, No respiratory distress, No wheezing, No chest tenderness.   Abdomen: Diffuse discomfort to deep palpation, slight distention, no rebound, no guarding, stoma appears healthy   Skin: Warm, Dry, No erythema, No rash.   Back: No tenderness, No CVA tenderness.   Extremities: Atraumatic with symmetric distal pulses, No edema, No tenderness, No cyanosis, No clubbing.   Neurologic: Alert & oriented x 3, cranial nerves II through XII are intact, Normal motor function, Normal sensory function, No focal deficits noted.   Psychiatric: Affect normal, Judgment normal, Mood normal.         RADIOLOGY/PROCEDURES  CT-ABDOMEN-PELVIS WITH   Final Result      New medium segmental mid small bowel wall thickening/transmural edema. This most likely indicates infectious enteritis with no ischemic cause detected. Inflammatory bowel disease is " possible      Status post cystectomy with areas of renal scarring and a stable parastomal hernia which contains fat and bowel but does not result in obstruction      Moderate severity colonic diverticulosis without evidence of diverticulitis      Severe atherosclerosis without aneurysm      Medium-sized hiatal hernia            COURSE & MEDICAL DECISION MAKING  Pertinent Labs & Imaging studies reviewed. (See chart for details)  This a 81-year-old female who presents the emergency department with abdominal discomfort. As for the source this could be from a viral enteritis as the CT scan does show some evidence of small bowel inflammation. She also has an elevation in her lipase and does have continued epigastric discomfort to deep palpation consistent with pancreatitis. Therefore we will get the patient for observation and further workup. I'm concerned as she does have a slight acidosis and therefore she develops vomiting she is having a very dehydrated. Furthermore the patient continues have discomfort despite treatment. As for her urinalysis this is from a stoma and therefore could be contaminated. The patient will receive Macrobid and we'll await her culture. The patient be admitted for observation.    FINAL IMPRESSION  1. Abdominal pain   2. Suspect pancreatitis  3. Urinary tract infection     Disposition  The patient will be admitted in stable condition    Electronically signed by: Elmer Irizarry, 7/10/2017 2:09 PM

## 2017-07-10 NOTE — IP AVS SNAPSHOT
Factorli Access Code: [email or web address]  Expires: 8/12/2017 10:12 AM    Your email address is not on file at [facility].  Email Addresses are required for you to sign up for Factorli, please contact [phone] to verify your personal information and to provide your email address prior to attempting to register for Factorli.    Dorie Wadsworth  [facility] [place] [address]    Factorli  A secure, online tool to manage your health information     [facility]’s Factorli® is a secure, online tool that connects you to your personalized health information from the privacy of your home -- day or night - making it very easy for you to manage your healthcare. Once the activation process is completed, you can even access your medical information using the Factorli christina, which is available for free in the Apple Christina store or Google Play store.     To learn more about Factorli, visit [email or web address]    There are two levels of access available (as shown below):   My Chart Features  [facility] Primary Care Doctor [facility]  Specialists [facility]  Urgent  Care Non-[facility] Primary Care Doctor   Email your healthcare team securely and privately 24/7 X X X    Manage appointments: schedule your next appointment; view details of past/upcoming appointments X      Request prescription refills. X      View recent personal medical records, including lab and immunizations X X X X   View health record, including health history, allergies, medications X X X X   Read reports about your outpatient visits, procedures, consult and ER notes X X X X   See your discharge summary, which is a recap of your hospital and/or ER visit that includes your diagnosis, lab results, and care plan X X  X     How to register for Factorli:  Once your e-mail address has been verified, follow the following steps to sign up for Factorli.     1. Go to  [email or web address]  2. Click on the Sign Up Now box, which takes you to the New Member Sign Up page.  You will need to provide the following information:  a. Enter your [employer] Access Code exactly as it appears at the top of this page. (You will not need to use this code after you’ve completed the sign-up process. If you do not sign up before the expiration date, you must request a new code.)   b. Enter your date of birth.   c. Enter your home email address.   d. Click Submit, and follow the next screen’s instructions.  3. Create a [employer] Softwaret ID. This will be your [employer] login ID and cannot be changed, so think of one that is secure and easy to remember.  4. Create a [employer] password. You can change your password at any time.  5. Enter your Password Reset Question and Answer. This can be used at a later time if you forget your password.   6. Enter your e-mail address. This allows you to receive e-mail notifications when new information is available in [employer] Power.  7. Click Sign Up. You can now view your health information.    For assistance activating your [employer] account, call [phone]

## 2017-07-10 NOTE — ED NOTES
Pt bib self with c/o of generalized abd and pelvic pain that radiates to her left flank since Friday. Reports feeling nauseous. Denies body aches and chills. Pt SOB during triage.

## 2017-07-10 NOTE — ED NOTES
1428:  IVF infusing as ordered.  Pt declined Fentanyl at this time, stated drove self to ER and does not have any one to call for ride home.    1429:  Medicated w/ Zofran as ordered  1433:  Urine collected from Urostomy bag.  POC pending.  Urine sent to lab

## 2017-07-10 NOTE — IP AVS SNAPSHOT
" Home Care Instructions                                                                                                                  Name:Dorie Wadsworth  Medical Record Number:[id number]  CSN: [id number]    YOB: 1935   Age: 81 y.o.  Sex: female  HT:1.727 m (5' 7.99\") WT: 79.4 kg (175 lb 0.7 oz)          Admit Date: 7/10/2017     Discharge Date:   Today's Date: 7/13/2017  Attending Doctor:  Luis A Hood M.D.                  Allergies:  Cardizem; Doxycycline; Sulfa drugs; Zyvox; Codeine; and Macrobid            Discharge Instructions       Discharge Instructions    Discharged to home by car with relative. Discharged via wheelchair, hospital escort: Yes.  Special equipment needed: Not Applicable    Be sure to schedule a follow-up appointment with your primary care doctor or any specialists as instructed.     Discharge Plan:   Diet Plan: Discussed  Activity Level: Discussed  Confirmed Follow up Appointment: Appointment Scheduled  Confirmed Symptoms Management: Discussed  Medication Reconciliation Updated: Yes  Influenza Vaccine Indication: Not indicated: Previously immunized this influenza season and > 8 years of age    I understand that a diet low in cholesterol, fat, and sodium is recommended for good health. Unless I have been given specific instructions below for another diet, I accept this instruction as my diet prescription.   Other diet: regular    Special Instructions: None    · Is patient discharged on Warfarin / Coumadin?   No     · Is patient Post Blood Transfusion?  No    Urinary Tract Infection  Urinary tract infections (UTIs) can develop anywhere along your urinary tract. Your urinary tract is your body's drainage system for removing wastes and extra water. Your urinary tract includes two kidneys, two ureters, a bladder, and a urethra. Your kidneys are a pair of bean-shaped organs. Each kidney is about the size of your fist. They are located below your ribs, one on each side of your " spine.  CAUSES  Infections are caused by microbes, which are microscopic organisms, including fungi, viruses, and bacteria. These organisms are so small that they can only be seen through a microscope. Bacteria are the microbes that most commonly cause UTIs.  SYMPTOMS   Symptoms of UTIs may vary by age and gender of the patient and by the location of the infection. Symptoms in young women typically include a frequent and intense urge to urinate and a painful, burning feeling in the bladder or urethra during urination. Older women and men are more likely to be tired, shaky, and weak and have muscle aches and abdominal pain. A fever may mean the infection is in your kidneys. Other symptoms of a kidney infection include pain in your back or sides below the ribs, nausea, and vomiting.  DIAGNOSIS  To diagnose a UTI, your caregiver will ask you about your symptoms. Your caregiver also will ask to provide a urine sample. The urine sample will be tested for bacteria and white blood cells. White blood cells are made by your body to help fight infection.  TREATMENT   Typically, UTIs can be treated with medication. Because most UTIs are caused by a bacterial infection, they usually can be treated with the use of antibiotics. The choice of antibiotic and length of treatment depend on your symptoms and the type of bacteria causing your infection.  HOME CARE INSTRUCTIONS  · If you were prescribed antibiotics, take them exactly as your caregiver instructs you. Finish the medication even if you feel better after you have only taken some of the medication.  · Drink enough water and fluids to keep your urine clear or pale yellow.  · Avoid caffeine, tea, and carbonated beverages. They tend to irritate your bladder.  · Empty your bladder often. Avoid holding urine for long periods of time.  · Empty your bladder before and after sexual intercourse.  · After a bowel movement, women should cleanse from front to back. Use each tissue only  once.  SEEK MEDICAL CARE IF:   · You have back pain.  · You develop a fever.  · Your symptoms do not begin to resolve within 3 days.  SEEK IMMEDIATE MEDICAL CARE IF:   · You have severe back pain or lower abdominal pain.  · You develop chills.  · You have nausea or vomiting.  · You have continued burning or discomfort with urination.  MAKE SURE YOU:   · Understand these instructions.  · Will watch your condition.  · Will get help right away if you are not doing well or get worse.     This information is not intended to replace advice given to you by your health care provider. Make sure you discuss any questions you have with your health care provider.     Document Released: 09/27/2006 Document Revised: 01/08/2016 Document Reviewed: 01/25/2013  [employer] Interactive Patient Education ©2016 [employer].    Ciprofloxacin tablets  What is this medicine?  CIPROFLOXACIN (sip sebastien FLOX a sin) is a quinolone antibiotic. It is used to treat certain kinds of bacterial infections. It will not work for colds, flu, or other viral infections.  This medicine may be used for other purposes; ask your health care provider or pharmacist if you have questions.  COMMON BRAND NAME(S): Cipro  What should I tell my health care provider before I take this medicine?  They need to know if you have any of these conditions:  -bone problems  -cerebral disease  -joint problems  -irregular heartbeat  -kidney disease  -liver disease  -myasthenia gravis  -seizure disorder  -tendon problems  -an unusual or allergic reaction to ciprofloxacin, other antibiotics or medicines, foods, dyes, or preservatives  -pregnant or trying to get pregnant  -breast-feeding  How should I use this medicine?  Take this medicine by mouth with a glass of water. Follow the directions on the prescription label. Take your medicine at regular intervals. Do not take your medicine more often than directed. Take all of your medicine as directed even if you think your are better.  Do not skip doses or stop your medicine early.  You can take this medicine with food or on an empty stomach. It can be taken with a meal that contains dairy or calcium, but do not take it alone with a dairy product, like milk or yogurt or calcium-fortified juice.  A special MedGuide will be given to you by the pharmacist with each prescription and refill. Be sure to read this information carefully each time.  Talk to your pediatrician regarding the use of this medicine in children. Special care may be needed.  Overdosage: If you think you have taken too much of this medicine contact a poison control center or emergency room at once.  NOTE: This medicine is only for you. Do not share this medicine with others.  What if I miss a dose?  If you miss a dose, take it as soon as you can. If it is almost time for your next dose, take only that dose. Do not take double or extra doses.  What may interact with this medicine?  Do not take this medicine with any of the following medications:  -cisapride  -droperidol  -terfenadine  -tizanidine  This medicine may also interact with the following medications:  -antacids  -caffeine  -cyclosporin  -didanosine (ddI) buffered tablets or powder  -medicines for diabetes  -medicines for inflammation like ibuprofen, naproxen  -methotrexate  -multivitamins  -omeprazole  -phenytoin  -probenecid  -sucralfate  -theophylline  -warfarin  This list may not describe all possible interactions. Give your health care provider a list of all the medicines, herbs, non-prescription drugs, or dietary supplements you use. Also tell them if you smoke, drink alcohol, or use illegal drugs. Some items may interact with your medicine.  What should I watch for while using this medicine?  Tell your doctor or health care professional if your symptoms do not improve.  Do not treat diarrhea with over the counter products. Contact your doctor if you have diarrhea that lasts more than 2 days or if it is severe and  watery.  You may get drowsy or dizzy. Do not drive, use machinery, or do anything that needs mental alertness until you know how this medicine affects you. Do not stand or sit up quickly, especially if you are an older patient. This reduces the risk of dizzy or fainting spells.  This medicine can make you more sensitive to the sun. Keep out of the sun. If you cannot avoid being in the sun, wear protective clothing and use sunscreen. Do not use sun lamps or tanning beds/booths.  Avoid antacids, aluminum, calcium, iron, magnesium, and zinc products for 6 hours before and 2 hours after taking a dose of this medicine.  What side effects may I notice from receiving this medicine?  Side effects that you should report to your doctor or health care professional as soon as possible:  -  allergic reactions like skin rash, itching or hives, swelling of the face, lips, or tongue  -  breathing problems  -  confusion, nightmares or hallucinations  -  feeling faint or lightheaded, falls  -  irregular heartbeat  -  joint, muscle or tendon pain or swelling  -  pain or trouble passing urine  -persistent headache with or without blurred vision  -  redness, blistering, peeling or loosening of the skin, including inside the mouth  -  seizure  -  unusual pain, numbness, tingling, or weakness  Side effects that usually do not require medical attention (report to your doctor or health care professional if they continue or are bothersome):  -  diarrhea  -  nausea or stomach upset  -  white patches or sores in the mouth  This list may not describe all possible side effects. Call your doctor for medical advice about side effects. You may report side effects to FDA at 4-066-FDA-5181.  Where should I keep my medicine?  Keep out of the reach of children.  Store at room temperature below 30 degrees C (86 degrees F). Keep container tightly closed. Throw away any unused medicine after the expiration date.  NOTE: This sheet is a summary. It may not  cover all possible information. If you have questions about this medicine, talk to your doctor, pharmacist, or health care provider.  © 2014, Elsevier/Gold Standard. (1/4/2013 12:53:06 PM)      Depression / Suicide Risk    As you are discharged from this [facility] facility, it is important to learn how to keep safe from harming yourself.    Recognize the warning signs:  · Abrupt changes in personality, positive or negative- including increase in energy   · Giving away possessions  · Change in eating patterns- significant weight changes-  positive or negative  · Change in sleeping patterns- unable to sleep or sleeping all the time   · Unwillingness or inability to communicate  · Depression  · Unusual sadness, discouragement and loneliness  · Talk of wanting to die  · Neglect of personal appearance   · Rebelliousness- reckless behavior  · Withdrawal from people/activities they love  · Confusion- inability to concentrate     If you or a loved one observes any of these behaviors or has concerns about self-harm, here's what you can do:  · Talk about it- your feelings and reasons for harming yourself  · Remove any means that you might use to hurt yourself (examples: pills, rope, extension cords, firearm)  · Get professional help from the community (Mental Health, Substance Abuse, psychological counseling)  · Do not be alone:Call your Safe Contact- someone whom you trust who will be there for you.  · Call your local CRISIS HOTLINE 927-7305 or 301-548-8717  · Call your local Children's Mobile Crisis Response Team [place] (340) 938-5177 or www.FlowPay  · Call the toll free National Suicide Prevention Hotlines   · National Suicide Prevention Lifeline 983-924-FKQT (9269)  · National Hope Line Network 800-SUICIDE (454-0721)        Your appointments     Jul 14, 2017 10:30 AM   Anti-Coag Routine with JARON ANTI-COAG   [facility] [place] [facility] [place]    [address]  [place] [address]      [phone]            Jul 14, 2017  1:30 PM   CARE MANAGER TELEPHONE VISIT with CARE MANAGER   [facility])    [address]  [place] [id number] [phone]           ***IMPORTANT**** This is a phone visit only. Do not come into the office. The Care Manager will call you at the scheduled time for Care Manager Telephone Visit.            Jul 17, 2017 11:20 AM   Established Patient with ELIUD Jackson   [facility])    [address]  [place] [address] [phone]           You will be receiving a confirmation call a few days before your appointment from our automated call confirmation system.            Aug 15, 2017 11:15 AM   FOLLOW UP with Rose Whipple M.D.   [facility]    [address]  [address]  [place] [id number] [phone]              Follow-up Information     1. Follow up with Yue Restrepo M.D.. Schedule an appointment as soon as possible for a visit in 2 weeks.    Specialty:  Family Medicine    Contact information    [address]  [address]  [place] [address]  [phone]           Discharge Medication Instructions:    Below are the medications your physician expects you to take upon discharge:    Review all your home medications and newly ordered medications with your doctor and/or pharmacist. Follow medication instructions as directed by your doctor and/or pharmacist.    Please keep your medication list with you and share with your physician.               Medication List      START taking these medications        Instructions    Morning Afternoon Evening Bedtime    ciprofloxacin 500 MG Tabs   Last time this was given:  500 mg on 7/10/2017  4:44 PM   Commonly known as:  CIPRO        Take 1 Tab by mouth 2 times a day for 3 days.   Dose:  500 mg                          CONTINUE taking these medications        Instructions    Morning Afternoon  Evening Bedtime    apixaban 5mg Tabs   Last time this was given:  5 mg on 7/13/2017  9:04 AM   Commonly known as:  ELIQUIS        Take 1 Tab by mouth 2 Times a Day.   Dose:  5 mg                        Biotin 10 MG Caps        Take 1 Cap by mouth every day. Indications: **OTC**   Dose:  1 Cap                        COMBIGAN 0.2-0.5 % Soln   Generic drug:  Brimonidine Tartrate-Timolol        1 Drop by Ophthalmic route 2 Times a Day.   Dose:  1 Drop                        furosemide 20 MG Tabs   Commonly known as:  LASIX        Take 0.5 Tabs by mouth as needed.   Dose:  10 mg                        levothyroxine 25 MCG Tabs   Last time this was given:  25 mcg on 7/13/2017  6:04 AM   Commonly known as:  SYNTHROID        Take 25 mcg by mouth Every morning on an empty stomach.   Dose:  25 mcg                        lovastatin 10 MG tablet   Last time this was given:  10 mg on 7/12/2017  9:13 PM   Commonly known as:  MEVACOR        Take 10 mg by mouth every evening.   Dose:  10 mg                        LUMIGAN 0.01 % Soln   Generic drug:  bimatoprost        1 Drop by Ophthalmic route every bedtime. Both eyes   Dose:  1 Drop                        metoprolol SR 50 MG Tb24   Last time this was given:  50 mg on 7/12/2017  9:13 PM   Commonly known as:  TOPROL XL        Take 1 Tab by mouth every bedtime.   Dose:  50 mg                        pantoprazole 40 MG Tbec   Commonly known as:  PROTONIX        Take 40 mg by mouth every day.   Dose:  40 mg                        * PRESERVISION AREDS PO        Take 1 Tab by mouth 2 Times a Day.   Dose:  1 Tab                        * CENTRUM SILVER ULTRA WOMENS Tabs        Take 1 Tab by mouth every day.   Dose:  1 Tab                        * Notice:  This list has 2 medication(s) that are the same as other medications prescribed for you. Read the directions carefully, and ask your doctor or other care provider to review them with you.         Where to Get Your Medications      These  medications were sent to [facility] [place], [place] - [address]  [address] [place] [address]     Phone:  [phone]    - ciprofloxacin 500 MG Tabs            Instructions           Diet / Nutrition:    Follow any diet instructions given to you by your doctor or the dietician, including how much salt (sodium) you are allowed each day.    If you are overweight, talk to your doctor about a weight reduction plan.    Activity:    Remain physically active following your doctor's instructions about exercise and activity.    Rest often.     Any time you become even a little tired or short of breath, SIT DOWN and rest.    Worsening Symptoms:    Report any of the following signs and symptoms to the doctor's office immediately:    *Pain of jaw, arm, or neck  *Chest pain not relieved by medication                               *Dizziness or loss of consciousness  *Difficulty breathing even when at rest   *More tired than usual                                       *Bleeding drainage or swelling of surgical site  *Swelling of feet, ankles, legs or stomach                 *Fever (>100ºF)  *Pink or blood tinged sputum  *Weight gain (3lbs/day or 5lbs /week)           *Shock from internal defibrillator (if applicable)  *Palpitations or irregular heartbeats                *Cool and/or numb extremities    Stroke Awareness    Common Risk Factors for Stroke include:    Age  Atrial Fibrillation  Carotid Artery Stenosis  Diabetes Mellitus  Excessive alcohol consumption  High blood pressure  Overweight   Physical inactivity  Smoking    Warning signs and symptoms of a stroke include:    *Sudden numbness or weakness of the face, arm or leg (especially on one side of the body).  *Sudden confusion, trouble speaking or understanding.  *Sudden trouble seeing in one or both eyes.  *Sudden trouble walking, dizziness, loss of balance or coordination.Sudden severe headache with no known cause.    It is very important to get treatment  quickly when a stroke occurs. If you experience any of the above warning signs, call 911 immediately.                   Disclaimer         Quit Smoking / Tobacco Use:    I understand the use of any tobacco products increases my chance of suffering from future heart disease or stroke and could cause other illnesses which may shorten my life. Quitting the use of tobacco products is the single most important thing I can do to improve my health. For further information on smoking / tobacco cessation call a Toll Free Quit Line at 1-273.304.8266 (*National Cancer [place]) or 1-738.384.9512 (American Lung Association) or you can access the web based program at www.lungusa.org.    [place] Tobacco Users Help Line:  (397) 329-1721       Toll Free: 1-669.151.3934  Quit Tobacco Program [facility] (065)557-5201    Crisis Hotline:    [place] Crisis Hotline:  4-333-XHFRILE or 1-456.149.4802    [place] Crisis Hotline:    1-322.489.8142 or 713-690-7259    Discharge Survey:   Thank you for choosing [facility]. We hope we did everything we could to make your hospital stay a pleasant one. You may be receiving a phone survey and we would appreciate your time and participation in answering the questions. Your input is very valuable to us in our efforts to improve our service to our patients and their families.        My signature on this form indicates that:    1. I have reviewed and understand the above information.  2. My questions regarding this information have been answered to my satisfaction.  3. I have formulated a plan with my discharge nurse to obtain my prescribed medications for home.                  Disclaimer         __________________________________                     __________       ________                       Patient Signature                                                 Date                    Time

## 2017-07-11 PROBLEM — I10 HTN (HYPERTENSION): Status: ACTIVE | Noted: 2017-07-11

## 2017-07-11 PROBLEM — D72.829 LEUKOCYTOSIS: Status: ACTIVE | Noted: 2017-07-11

## 2017-07-11 PROBLEM — N39.0 URINARY TRACT INFECTION: Status: ACTIVE | Noted: 2017-07-11

## 2017-07-11 LAB
ANION GAP SERPL CALC-SCNC: 7 MMOL/L (ref 0–11.9)
BASOPHILS # BLD AUTO: 0.2 % (ref 0–1.8)
BASOPHILS # BLD: 0.02 K/UL (ref 0–0.12)
BUN SERPL-MCNC: 24 MG/DL (ref 8–22)
CALCIUM SERPL-MCNC: 7.8 MG/DL (ref 8.4–10.2)
CHLORIDE SERPL-SCNC: 108 MMOL/L (ref 96–112)
CO2 SERPL-SCNC: 23 MMOL/L (ref 20–33)
CREAT SERPL-MCNC: 1.18 MG/DL (ref 0.5–1.4)
EOSINOPHIL # BLD AUTO: 0.17 K/UL (ref 0–0.51)
EOSINOPHIL NFR BLD: 1.8 % (ref 0–6.9)
ERYTHROCYTE [DISTWIDTH] IN BLOOD BY AUTOMATED COUNT: 50.2 FL (ref 35.9–50)
GFR SERPL CREATININE-BSD FRML MDRD: 44 ML/MIN/1.73 M 2
GLUCOSE SERPL-MCNC: 97 MG/DL (ref 65–99)
HCT VFR BLD AUTO: 37.5 % (ref 37–47)
HGB BLD-MCNC: 12.2 G/DL (ref 12–16)
IMM GRANULOCYTES # BLD AUTO: 0.03 K/UL (ref 0–0.11)
IMM GRANULOCYTES NFR BLD AUTO: 0.3 % (ref 0–0.9)
LYMPHOCYTES # BLD AUTO: 2.32 K/UL (ref 1–4.8)
LYMPHOCYTES NFR BLD: 24.7 % (ref 22–41)
MCH RBC QN AUTO: 27.2 PG (ref 27–33)
MCHC RBC AUTO-ENTMCNC: 32.5 G/DL (ref 33.6–35)
MCV RBC AUTO: 83.7 FL (ref 81.4–97.8)
MONOCYTES # BLD AUTO: 1.38 K/UL (ref 0–0.85)
MONOCYTES NFR BLD AUTO: 14.7 % (ref 0–13.4)
NEUTROPHILS # BLD AUTO: 5.48 K/UL (ref 2–7.15)
NEUTROPHILS NFR BLD: 58.3 % (ref 44–72)
NRBC # BLD AUTO: 0 K/UL
NRBC BLD AUTO-RTO: 0 /100 WBC
PLATELET # BLD AUTO: 115 K/UL (ref 164–446)
PMV BLD AUTO: 12.6 FL (ref 9–12.9)
POTASSIUM SERPL-SCNC: 4.4 MMOL/L (ref 3.6–5.5)
RBC # BLD AUTO: 4.48 M/UL (ref 4.2–5.4)
SODIUM SERPL-SCNC: 138 MMOL/L (ref 135–145)
WBC # BLD AUTO: 9.4 K/UL (ref 4.8–10.8)

## 2017-07-11 PROCEDURE — 96366 THER/PROPH/DIAG IV INF ADDON: CPT

## 2017-07-11 PROCEDURE — 96376 TX/PRO/DX INJ SAME DRUG ADON: CPT

## 2017-07-11 PROCEDURE — 99225 PR SUBSEQUENT OBSERVATION CARE,LEVEL II: CPT | Performed by: HOSPITALIST

## 2017-07-11 PROCEDURE — A9270 NON-COVERED ITEM OR SERVICE: HCPCS | Performed by: HOSPITALIST

## 2017-07-11 PROCEDURE — 700105 HCHG RX REV CODE 258: Performed by: HOSPITALIST

## 2017-07-11 PROCEDURE — 700101 HCHG RX REV CODE 250: Performed by: HOSPITALIST

## 2017-07-11 PROCEDURE — 700102 HCHG RX REV CODE 250 W/ 637 OVERRIDE(OP): Performed by: HOSPITALIST

## 2017-07-11 PROCEDURE — 700111 HCHG RX REV CODE 636 W/ 250 OVERRIDE (IP): Performed by: HOSPITALIST

## 2017-07-11 PROCEDURE — G0378 HOSPITAL OBSERVATION PER HR: HCPCS

## 2017-07-11 PROCEDURE — 85025 COMPLETE CBC W/AUTO DIFF WBC: CPT

## 2017-07-11 PROCEDURE — 80048 BASIC METABOLIC PNL TOTAL CA: CPT

## 2017-07-11 PROCEDURE — 96367 TX/PROPH/DG ADDL SEQ IV INF: CPT

## 2017-07-11 RX ORDER — HALOPERIDOL 5 MG/ML
1 INJECTION INTRAMUSCULAR EVERY 4 HOURS PRN
Status: DISCONTINUED | OUTPATIENT
Start: 2017-07-11 | End: 2017-07-13 | Stop reason: HOSPADM

## 2017-07-11 RX ADMIN — TRAMADOL HYDROCHLORIDE 50 MG: 50 TABLET, COATED ORAL at 08:16

## 2017-07-11 RX ADMIN — ONDANSETRON 4 MG: 2 INJECTION INTRAMUSCULAR; INTRAVENOUS at 02:26

## 2017-07-11 RX ADMIN — METRONIDAZOLE 500 MG: 500 INJECTION, SOLUTION INTRAVENOUS at 20:57

## 2017-07-11 RX ADMIN — SODIUM CHLORIDE: 9 INJECTION, SOLUTION INTRAVENOUS at 14:10

## 2017-07-11 RX ADMIN — METRONIDAZOLE 500 MG: 500 INJECTION, SOLUTION INTRAVENOUS at 05:51

## 2017-07-11 RX ADMIN — TRAMADOL HYDROCHLORIDE 50 MG: 50 TABLET, COATED ORAL at 14:43

## 2017-07-11 RX ADMIN — CEFTRIAXONE 2 G: 2 INJECTION, POWDER, FOR SOLUTION INTRAMUSCULAR; INTRAVENOUS at 12:46

## 2017-07-11 RX ADMIN — OMEPRAZOLE 20 MG: 20 CAPSULE, DELAYED RELEASE ORAL at 08:11

## 2017-07-11 RX ADMIN — BRIMONIDINE TARTRATE 1 DROP: 2 SOLUTION/ DROPS OPHTHALMIC at 20:57

## 2017-07-11 RX ADMIN — METRONIDAZOLE 500 MG: 500 INJECTION, SOLUTION INTRAVENOUS at 00:01

## 2017-07-11 RX ADMIN — TIMOLOL MALEATE 1 DROP: 5 SOLUTION OPHTHALMIC at 08:10

## 2017-07-11 RX ADMIN — LOVASTATIN 10 MG: 20 TABLET ORAL at 20:56

## 2017-07-11 RX ADMIN — LEVOTHYROXINE SODIUM 25 MCG: 25 TABLET ORAL at 05:51

## 2017-07-11 RX ADMIN — TIMOLOL MALEATE 1 DROP: 5 SOLUTION OPHTHALMIC at 20:57

## 2017-07-11 RX ADMIN — DOCUSATE SODIUM AND SENNOSIDES 2 TABLET: 8.6; 5 TABLET, FILM COATED ORAL at 20:56

## 2017-07-11 RX ADMIN — LATANOPROST 1 DROP: 50 SOLUTION OPHTHALMIC at 20:57

## 2017-07-11 RX ADMIN — ONDANSETRON 4 MG: 2 INJECTION INTRAMUSCULAR; INTRAVENOUS at 16:20

## 2017-07-11 RX ADMIN — BRIMONIDINE TARTRATE 1 DROP: 2 SOLUTION/ DROPS OPHTHALMIC at 08:11

## 2017-07-11 RX ADMIN — METOPROLOL SUCCINATE 50 MG: 25 TABLET, EXTENDED RELEASE ORAL at 20:55

## 2017-07-11 RX ADMIN — METRONIDAZOLE 500 MG: 500 INJECTION, SOLUTION INTRAVENOUS at 14:10

## 2017-07-11 RX ADMIN — CIPROFLOXACIN 400 MG: 2 INJECTION, SOLUTION INTRAVENOUS at 08:09

## 2017-07-11 RX ADMIN — TRAMADOL HYDROCHLORIDE 50 MG: 50 TABLET, COATED ORAL at 20:55

## 2017-07-11 RX ADMIN — MORPHINE SULFATE 4 MG: 10 INJECTION INTRAVENOUS at 02:26

## 2017-07-11 RX ADMIN — ONDANSETRON 4 MG: 4 TABLET, ORALLY DISINTEGRATING ORAL at 20:56

## 2017-07-11 RX ADMIN — APIXABAN 5 MG: 5 TABLET, FILM COATED ORAL at 08:11

## 2017-07-11 RX ADMIN — APIXABAN 5 MG: 5 TABLET, FILM COATED ORAL at 20:56

## 2017-07-11 RX ADMIN — SODIUM CHLORIDE: 9 INJECTION, SOLUTION INTRAVENOUS at 00:01

## 2017-07-11 ASSESSMENT — ENCOUNTER SYMPTOMS
COUGH: 0
HEMOPTYSIS: 0
PALPITATIONS: 0

## 2017-07-11 ASSESSMENT — PAIN SCALES - GENERAL
PAINLEVEL_OUTOF10: 3
PAINLEVEL_OUTOF10: 3
PAINLEVEL_OUTOF10: 6
PAINLEVEL_OUTOF10: 8

## 2017-07-11 NOTE — CARE PLAN
Problem: Infection  Goal: Will remain free from infection  Outcome: PROGRESSING AS EXPECTED  Pt afebrile, WBC normalized. Pt on IV ABX. Standard precautions and hand washing utilized. Pt verbalizes understanding of infection prevention and treatment.    Problem: Pain Management  Goal: Pain level will decrease to patient’s comfort goal  Outcome: PROGRESSING AS EXPECTED  Pt c/o abd/back pain. Medicated per MAR. Pt offered non pharmacological comfort measures, declines. Pt verbalizes understanding of pain management options.

## 2017-07-11 NOTE — PROGRESS NOTES
[facility] Hospitalist Progress Note    Date of Service: 2017    Chief Complaint  81 y.o. female admitted 7/10/2017 with abdominal pain, found to have small bowel enteritis and UTI    Interval Problem Update  Feels better than on admission  She continues to have abdominal discomfort and intermittent pain  Pain changes with position  Nausea improved, no emesis  Endorses polyuria, no dysuria    Consultants/Specialty  None    Disposition  TBD, probably home        Review of Systems   Respiratory: Negative for cough and hemoptysis.    Cardiovascular: Negative for chest pain and palpitations.   Skin: Negative for itching and rash.      Physical Exam  Laboratory/Imaging   Hemodynamics  Temp (24hrs), Av.7 °C (98 °F), Min:36.2 °C (97.2 °F), Max:37.4 °C (99.4 °F)   Temperature: 36.5 °C (97.7 °F)  Pulse  Av.2  Min: 73  Max: 100 Heart Rate (Monitored): 81  Blood Pressure : 118/57 mmHg, NIBP: 123/69 mmHg      Respiratory      Respiration: 18, Pulse Oximetry: 93 %        RUL Breath Sounds: Clear, RML Breath Sounds: Clear, RLL Breath Sounds: Diminished, DIA Breath Sounds: Clear, LLL Breath Sounds: Diminished    Fluids    Intake/Output Summary (Last 24 hours) at 17 1134  Last data filed at 17 1030   Gross per 24 hour   Intake   2100 ml   Output   1400 ml   Net    700 ml       Nutrition  Orders Placed This Encounter   Procedures   • Diet Order     Standing Status: Standing      Number of Occurrences: 1      Standing Expiration Date:      Order Specific Question:  Diet:     Answer:  Full Liquid [11]     Physical Exam   Constitutional: She is oriented to person, place, and time. She appears well-developed and well-nourished. No distress.   Eyes: Conjunctivae are normal. Right eye exhibits no discharge. Left eye exhibits no discharge.   Neck: Normal range of motion. Neck supple.   Cardiovascular: Normal rate and regular rhythm.    No murmur heard.  Pulmonary/Chest: Effort normal and breath sounds normal. No  respiratory distress. She has no wheezes.   Abdominal: Soft. She exhibits no distension. There is tenderness. There is no rebound and no guarding.   Hyperactive bowel sounds   Neurological: She is alert and oriented to person, place, and time.   Skin: She is not diaphoretic.       Recent Labs      07/10/17   1400  07/11/17   0437   WBC  12.1*  9.4   RBC  5.30  4.48   HEMOGLOBIN  14.6  12.2   HEMATOCRIT  44.2  37.5   MCV  83.4  83.7   MCH  27.5  27.2   MCHC  33.0*  32.5*   RDW  49.0  50.2*   PLATELETCT  163*  115*   MPV  11.6  12.6     Recent Labs      07/10/17   1400  07/11/17   0437   SODIUM  137  138   POTASSIUM  4.6  4.4   CHLORIDE  109  108   CO2  19*  23   GLUCOSE  112*  97   BUN  31*  24*   CREATININE  1.22  1.18   CALCIUM  8.7  7.8*                      Assessment/Plan     * Enteritis (present on admission)  Assessment & Plan  Imaging and exam consistent with enteritis  Is improving  Continue antibiotics and supportive medicaitons    Urinary tract infection (present on admission)  Assessment & Plan  Chart reviewed, last UTI was e coli, resistant to cipro  Change to ceftriaxone    Atrial fibrillation (CMS-HCC) (present on admission)  Assessment & Plan  Continue metoprolol, rate is controlled  Continue apixiban    Leukocytosis (present on admission)  Assessment & Plan  Resolved    HTN (hypertension) (present on admission)  Assessment & Plan  Controlled  Continue metoprolol      Medications reviewed and Labs reviewed  Rodriguez catheter: No Rodriguez            Antibiotics: Treating active infection/contamination beyond 24 hours perioperative coverage

## 2017-07-11 NOTE — PROGRESS NOTES
No changes in pt status. Pt reports pain is controlled at this time. IV ABX infusing. Pt tolerating diet, no reports of nausea. No needs/complaints, will CTM.

## 2017-07-11 NOTE — PROGRESS NOTES
Bedside report received from Letitia GIBBS. Assumed care. POC discussed. Pt resting comfortably in bed. Safety precautions in place.

## 2017-07-11 NOTE — CARE PLAN
Problem: Communication  Goal: The ability to communicate needs accurately and effectively will improve  Outcome: PROGRESSING AS EXPECTED  Oriented patient to the call light in order to alert staff of her needs. Educated patient about the plan of care, procedures, treatments (IVF and abx), medications (pain medications and antiemetics), and allowing for patient questions and answering them appropriately    Problem: Safety  Goal: Will remain free from falls  Outcome: PROGRESSING AS EXPECTED  Assessing patient for risk factors for falls and implementing fall precautions as needed. Bed alarm is on, bed controls are on and bed is locked in a low position, treaded slipper socks on patient, CLIP    Problem: Infection  Goal: Will remain free from infection  Outcome: PROGRESSING AS EXPECTED  Administering antiinfective (IV Flagyl and Cipro) as ordered and assessing for signs and symptoms of improvement    Problem: Pain Management  Goal: Pain level will decrease to patient’s comfort goal  Outcome: PROGRESSING AS EXPECTED  Assessing patient's pain, patient encouraged to verbalize experiencing pain. 0-10 pain scale explained, verbalized understanding. Administer pain meds as ordered.

## 2017-07-11 NOTE — PROGRESS NOTES
Assessment completed. Pt AAOx4. C/o pain in abd 6/10, medicated per MAR. No nausea this AM. Urostomy draining without issue, pt managing well on her own, declines wound care. Due meds given. IV ABX infusing, pt sitting up in bed for breakfast. No other needs/complaints, call light in reach, will monitor.

## 2017-07-11 NOTE — DISCHARGE PLANNING
Medical Social Work    Referral: Pt discussed at IDT rounds this AM.    Intervention: Per edmundheet, pt lives with adult child and expects to d.c home.  No SS needs identified nor any requests for SERGIO Us during rounds.      Plan: SERGIO available for any assistance with d.c planning.    Care Transition Team Assessment    Information Source  Information Given By: Patient    Readmission Evaluation  Is this a readmission?: No    Elopement Risk  Legal Hold: No  Ambulatory or Self Mobile in Wheelchair: No-Not an Elopement Risk  Elopement Risk: Not at Risk for Elopement    Interdisciplinary Discharge Planning  Does Admitting Nurse Feel This Could be a Complex Discharge?: No  Primary Care Physician: Dr. Meme Restrepo  Lives with - Patient's Self Care Capacity: Adult Children  Patient or legal guardian wants to designate a caregiver (see row info): No  Support Systems: Children  Housing / Facility: [address]  Do You Take your Prescribed Medications Regularly: Yes  Able to Return to Previous ADL's: Yes  Mobility Issues: No  Prior Services: Home-Independent  Patient Expects to be Discharged to:: home  Assistance Needed: No  Durable Medical Equipment: Not Applicable    Discharge Preparedness  What is your plan after discharge?: Uncertain - pending medical team collaboration  What are your discharge supports?: Child         Finances  Prescription Coverage: Yes    Vision / Hearing Impairment  Vision Impairment : No  Hearing Impairment : Yes  Hearing Impairment: Both Ears, Hearing Device Not Available    Values / Beliefs / Concerns  Values / Beliefs Concerns : No    Advance Directive  Advance Directive?: None    Domestic Abuse  Have you ever been the victim of abuse or violence?: No    Psychological Assessment  History of Substance Abuse: None         Anticipated Discharge Information  Anticipated discharge disposition: Home

## 2017-07-11 NOTE — PROGRESS NOTES
1722 Pt arrived to room 302-B via gurney.    1900 Report given to [facility] nurse, Letitia, at bedside.

## 2017-07-11 NOTE — H&P
Hospital Medicine History and Physical    Date of Service  7/10/2017    Chief Complaint  Chief Complaint   Patient presents with   • Abdominal Pain   • Pelvic Pain   • Flank Pain       History of Presenting Illness  81 y.o. female who presented 7/10/2017 with mid abdominal pain over the last four days.  No diarrhea, no melena/hematochezia.  She denies fever.  Has a hx of justin-bladder with stoma but no acute symptoms of UTI despite a possible UTI on UA here.  She has not been on antibiotics.  She has had off and on nausea but no vomiting over the past several days.  Decrease in appetite.  Her CT abd is showing small bowel enteritis.     Primary Care Physician  Yue Restrepo M.D.    Code Status  FULL    Review of Systems  Review of Systems   Constitutional: Negative for fever and chills.   Eyes: Negative for discharge.   Respiratory: Negative for cough, shortness of breath and stridor.    Cardiovascular: Negative for chest pain, palpitations and leg swelling.   Gastrointestinal: Positive for nausea. Negative for vomiting, abdominal pain, diarrhea, blood in stool and melena.   Genitourinary: Negative for dysuria, hematuria and flank pain.   Musculoskeletal: Negative for back pain and joint pain.   Skin: Negative for rash.   Neurological: Positive for dizziness and weakness. Negative for speech change and headaches.   Psychiatric/Behavioral: Negative for depression. The patient is not nervous/anxious.           Past Medical History  Past Medical History   Diagnosis Date   • Indigestion    • Glaucoma    • T.I.A.    • CAD (coronary artery disease)    • Arthritis      fingers   • Unspecified hemorrhagic conditions      not sure when (2009)  blood in urin    • Hypertension    • Cancer (CMS-HCC)      skin   • Unspecified disorder of thyroid    • A-fib (CMS-HCC)    • Atrial fibrillation (CMS-HCC) 2/2009     A FIB X2,[ resolved on own][   • Paroxysmal atrial fibrillation (CMS-HCC)    • Urinary bladder disorder       urinary incontinence   • Bladder problem      Chronic bladder infection for the past 17months   • Bladder disease 2011     bladder removed   • Ailment      urostomy   • Hyperlipidemia 2/24/2011   • UTI (urinary tract infection) 2/24/2011   • Hypothyroid 2/24/2011   • CHEST PAIN 3/11/2011   • TIA (transient ischemic attack) 3/11/2011   • Sepsis urinary source 1/23/2012   • Vaginal bleeding 10/22/2012   • History of hematuria      3/15/10: with Plavix.   • Stroke (CMS-HCC)      2 TIAs 2/2010, no stroke, 2/2013   • CATARACT      freddie surgery complete   • Atrial fibrillation, rapid (CMS-HCC) 1/31/2014   • Other specified pre-operative examination 1/28/2014       Surgical History  Past Surgical History   Procedure Laterality Date   • Bladder biopsy with cystoscopy  10/10/08     Performed by CARLTON LUNA at SURGERY [facility]   • Pyelogram  10/10/08     Performed by CARLTON LUNA at SURGERY [facility]   • Retrogrades  10/10/08     Performed by CARLTON LUNA at SURGERY [facility]   • Bladder biopsy with cystoscopy  6/15/2009     Performed by CARLTON LUNA at SURGERY [facility]   • Bladder biopsy with cystoscopy  10/12/2009     Performed by JENNIFER LINCOLN at SURGERY [facility]   • Other abdominal surgery       appendectomy, pancratitis    • Cholecystectomy  1994   • Cholecystectomy  1994   • Gyn surgery       hysterectomy   • Pelvic exam under anesthesia  10/12/2009     Performed by JENNIFER LINCOLN at SURGERY [facility]   • Rectocele repair  11/3/2009     Performed by ZEKE HARRINGTON at SURGERY [facility] DAY [facility]   • Other       TONSILLECTOMY AS TEENAGER   • Cystectomy  9/6/2011     Performed by JENNIFER LINCOLN at SURGERY [facility] OR[facility]   • Ileo loop diversion  9/6/2011     Performed by JENNIFER LINCOLN at SURGERY [facility]   • Biopsy general  10/22/2012     Performed by Jennifer Lincoln M.D. at SURGERY [facility]   • Parastomal hernia repair    "2014     Performed by Nicol Dorman M.D. at SURGERY [facility]       Medications  No current facility-administered medications on file prior to encounter.     Current Outpatient Prescriptions on File Prior to Encounter   Medication Sig Dispense Refill   • apixaban (ELIQUIS) 5mg Tab Take 1 Tab by mouth 2 Times a Day. 180 Tab 3   • Multiple Vitamins-Minerals (PRESERVISION AREDS PO) Take 1 Tab by mouth 2 Times a Day.     • furosemide (LASIX) 20 MG Tab Take 0.5 Tabs by mouth as needed. 30 Tab 11   • metoprolol SR (TOPROL XL) 50 MG TB24 Take 1 Tab by mouth every bedtime. 30 Tab 5   • lovastatin (MEVACOR) 10 MG tablet Take 10 mg by mouth every evening.     • pantoprazole (PROTONIX) 40 MG TBEC Take 40 mg by mouth every day.     • Biotin 10 MG CAPS Take 1 Cap by mouth every day. Indications: **OTC**     • Bimatoprost (LUMIGAN) 0.01 % SOLN 1 Drop by Ophthalmic route every bedtime. Both eyes         Family History  Family History   Problem Relation Age of Onset   • Stroke Mother 81   • Stroke Father 77       Social History  Social History   Substance Use Topics   • Smoking status: Never Smoker    • Smokeless tobacco: Never Used   • Alcohol Use: No       Allergies  Allergies   Allergen Reactions   • Cardizem Swelling   • Doxycycline      Swollen lips.   • Sulfa Drugs      Makes tongue swell, severely   • Zyvox Swelling     \"Whole mouth swelled up\"    • Codeine      Does not remember the reaction     • Macrobid [Nitrofurantoin Monohydrate Macrocrystals]      Does not remember the reaction        Physical Exam  Laboratory   Hemodynamics  Temp (24hrs), Av.4 °C (99.4 °F), Min:37.4 °C (99.4 °F), Max:37.4 °C (99.4 °F)   Temperature: 37.4 °C (99.4 °F)  Pulse  Av  Min: 78  Max: 100 Heart Rate (Monitored): 81  Blood Pressure : 116/61 mmHg, NIBP: 123/69 mmHg      Respiratory      Respiration: 13, Pulse Oximetry: 93 %             Physical Exam   Constitutional: She is oriented to person, place, and time. She appears " well-nourished. No distress.   HENT:   Head: Normocephalic and atraumatic.   Nose: Nose normal.   Mouth/Throat: Oropharynx is clear and moist. No oropharyngeal exudate.   Eyes: Conjunctivae and EOM are normal. Pupils are equal, round, and reactive to light. Right eye exhibits no discharge. Left eye exhibits no discharge. No scleral icterus.   Neck: No tracheal deviation present.   Cardiovascular: Normal rate, regular rhythm, normal heart sounds and intact distal pulses.    No murmur heard.  Pulses:       Radial pulses are 2+ on the right side, and 2+ on the left side.        Dorsalis pedis pulses are 2+ on the right side, and 2+ on the left side.   Pulmonary/Chest: Effort normal and breath sounds normal. No stridor. No respiratory distress. She has no wheezes.   Abdominal: Soft. Bowel sounds are normal. She exhibits no distension. There is no tenderness. There is no rebound.   Urostomy with yellow urine.     Musculoskeletal: She exhibits no edema.   Lymphadenopathy:     She has no cervical adenopathy.   Neurological: She is alert and oriented to person, place, and time. No cranial nerve deficit.   Skin: Skin is warm and dry. She is not diaphoretic.   Psychiatric: She has a normal mood and affect. Her behavior is normal. Thought content normal.   Vitals reviewed.      Recent Labs      07/10/17   1400   WBC  12.1*   RBC  5.30   HEMOGLOBIN  14.6   HEMATOCRIT  44.2   MCV  83.4   MCH  27.5   MCHC  33.0*   RDW  49.0   PLATELETCT  163*   MPV  11.6     Recent Labs      07/10/17   1400   SODIUM  137   POTASSIUM  4.6   CHLORIDE  109   CO2  19*   GLUCOSE  112*   BUN  31*   CREATININE  1.22   CALCIUM  8.7                   Imaging  CT Abd reviewed   Assessment/Plan     I anticipate this patient is appropriate for observation    1.  Small bowel enteritis:       - cipro and flagyl started.  Check ESR.  Pain management and antiemetics.         2.  Questionable Urinary tract infection       - follow urine culture.   Antibiotics    3.  Paroxysmal atrial fibrillation       - anticoagulation, rate control.  Monitor vitals.    4.  Hypertension       - monitor vitals.  Continue active treatment with outpatient meds    5.  Hypothyroidism:       - levothyroxine    VTE prophylaxis on Eliquis.

## 2017-07-11 NOTE — ASSESSMENT & PLAN NOTE
Imaging and exam consistent with enteritis  Symptoms a bit worse, abdomen more distended today  She did have a significant bowel movement this morning  Continue antibiotics and supportive medicaitons  Consider repeat xray in AM if doesn't improve

## 2017-07-11 NOTE — PROGRESS NOTES
1900: Bedside report received from Tammy GIBBS, patient updated about POC and questions answered, IVF without difficulty CLIP    2015: Assessment performed, patient is A&O x 4 at this time, patient complaining of 8/10 lower abdominal pain, prn pain medication as well as due medications administered per MAR, patient's urostomy pouch also emptied at this time, patient denies any other needs or complaints at this time, safety precautions remain in place    2240: Patient states pain is no longer present at this time, urostomy bag emptied again, patient denies any needs or complaints at this time    0222: Patient complaining of 8/10 lower abdominal as well as back pain, prn pain medication administered per MAR, patient denies any other complaints and resumed resting after medication administration    0400: Patient resting on and off in bed with no s/s of distress noted, respirations even and unlabored    0600: Due meds administered per MAR, patient's urostomy pouch emptied, hand  provided    0700: Report given to Effie CABRERA, patient laying comfortably in bed and denies any needs or complaints at this time, patient refusing wound care orders for urostomy at this time, day shift RN updated

## 2017-07-12 LAB
ANION GAP SERPL CALC-SCNC: 8 MMOL/L (ref 0–11.9)
BASOPHILS # BLD AUTO: 0.4 % (ref 0–1.8)
BASOPHILS # BLD: 0.03 K/UL (ref 0–0.12)
BUN SERPL-MCNC: 17 MG/DL (ref 8–22)
CALCIUM SERPL-MCNC: 8 MG/DL (ref 8.4–10.2)
CHLORIDE SERPL-SCNC: 108 MMOL/L (ref 96–112)
CO2 SERPL-SCNC: 23 MMOL/L (ref 20–33)
CREAT SERPL-MCNC: 1.16 MG/DL (ref 0.5–1.4)
EOSINOPHIL # BLD AUTO: 0.23 K/UL (ref 0–0.51)
EOSINOPHIL NFR BLD: 2.9 % (ref 0–6.9)
ERYTHROCYTE [DISTWIDTH] IN BLOOD BY AUTOMATED COUNT: 50.8 FL (ref 35.9–50)
GFR SERPL CREATININE-BSD FRML MDRD: 45 ML/MIN/1.73 M 2
GLUCOSE SERPL-MCNC: 98 MG/DL (ref 65–99)
HCT VFR BLD AUTO: 37.4 % (ref 37–47)
HGB BLD-MCNC: 11.9 G/DL (ref 12–16)
IMM GRANULOCYTES # BLD AUTO: 0.04 K/UL (ref 0–0.11)
IMM GRANULOCYTES NFR BLD AUTO: 0.5 % (ref 0–0.9)
LYMPHOCYTES # BLD AUTO: 2.08 K/UL (ref 1–4.8)
LYMPHOCYTES NFR BLD: 25.9 % (ref 22–41)
MCH RBC QN AUTO: 27.5 PG (ref 27–33)
MCHC RBC AUTO-ENTMCNC: 31.8 G/DL (ref 33.6–35)
MCV RBC AUTO: 86.6 FL (ref 81.4–97.8)
MONOCYTES # BLD AUTO: 1.1 K/UL (ref 0–0.85)
MONOCYTES NFR BLD AUTO: 13.7 % (ref 0–13.4)
NEUTROPHILS # BLD AUTO: 4.55 K/UL (ref 2–7.15)
NEUTROPHILS NFR BLD: 56.6 % (ref 44–72)
NRBC # BLD AUTO: 0 K/UL
NRBC BLD AUTO-RTO: 0 /100 WBC
PLATELET # BLD AUTO: 110 K/UL (ref 164–446)
PMV BLD AUTO: 11.7 FL (ref 9–12.9)
POTASSIUM SERPL-SCNC: 4.2 MMOL/L (ref 3.6–5.5)
RBC # BLD AUTO: 4.32 M/UL (ref 4.2–5.4)
SODIUM SERPL-SCNC: 139 MMOL/L (ref 135–145)
WBC # BLD AUTO: 8 K/UL (ref 4.8–10.8)

## 2017-07-12 PROCEDURE — 80048 BASIC METABOLIC PNL TOTAL CA: CPT

## 2017-07-12 PROCEDURE — A9270 NON-COVERED ITEM OR SERVICE: HCPCS | Performed by: HOSPITALIST

## 2017-07-12 PROCEDURE — 85025 COMPLETE CBC W/AUTO DIFF WBC: CPT

## 2017-07-12 PROCEDURE — 700111 HCHG RX REV CODE 636 W/ 250 OVERRIDE (IP): Performed by: HOSPITALIST

## 2017-07-12 PROCEDURE — G0378 HOSPITAL OBSERVATION PER HR: HCPCS

## 2017-07-12 PROCEDURE — 99225 PR SUBSEQUENT OBSERVATION CARE,LEVEL II: CPT | Performed by: HOSPITALIST

## 2017-07-12 PROCEDURE — 700102 HCHG RX REV CODE 250 W/ 637 OVERRIDE(OP): Performed by: HOSPITALIST

## 2017-07-12 PROCEDURE — 700101 HCHG RX REV CODE 250: Performed by: HOSPITALIST

## 2017-07-12 PROCEDURE — 96366 THER/PROPH/DIAG IV INF ADDON: CPT

## 2017-07-12 PROCEDURE — 700105 HCHG RX REV CODE 258: Performed by: HOSPITALIST

## 2017-07-12 RX ORDER — TEMAZEPAM 15 MG/1
15 CAPSULE ORAL
Status: DISCONTINUED | OUTPATIENT
Start: 2017-07-12 | End: 2017-07-13 | Stop reason: HOSPADM

## 2017-07-12 RX ADMIN — BRIMONIDINE TARTRATE 1 DROP: 2 SOLUTION/ DROPS OPHTHALMIC at 21:12

## 2017-07-12 RX ADMIN — OMEPRAZOLE 20 MG: 20 CAPSULE, DELAYED RELEASE ORAL at 09:00

## 2017-07-12 RX ADMIN — APIXABAN 5 MG: 5 TABLET, FILM COATED ORAL at 21:13

## 2017-07-12 RX ADMIN — APIXABAN 5 MG: 5 TABLET, FILM COATED ORAL at 09:00

## 2017-07-12 RX ADMIN — TRAMADOL HYDROCHLORIDE 50 MG: 50 TABLET, COATED ORAL at 04:44

## 2017-07-12 RX ADMIN — TIMOLOL MALEATE 1 DROP: 5 SOLUTION OPHTHALMIC at 09:00

## 2017-07-12 RX ADMIN — BRIMONIDINE TARTRATE 1 DROP: 2 SOLUTION/ DROPS OPHTHALMIC at 09:00

## 2017-07-12 RX ADMIN — LATANOPROST 1 DROP: 50 SOLUTION OPHTHALMIC at 21:12

## 2017-07-12 RX ADMIN — LEVOTHYROXINE SODIUM 25 MCG: 25 TABLET ORAL at 05:38

## 2017-07-12 RX ADMIN — ONDANSETRON 4 MG: 4 TABLET, ORALLY DISINTEGRATING ORAL at 04:44

## 2017-07-12 RX ADMIN — METRONIDAZOLE 500 MG: 500 INJECTION, SOLUTION INTRAVENOUS at 21:13

## 2017-07-12 RX ADMIN — METRONIDAZOLE 500 MG: 500 INJECTION, SOLUTION INTRAVENOUS at 05:38

## 2017-07-12 RX ADMIN — TEMAZEPAM 15 MG: 15 CAPSULE ORAL at 21:21

## 2017-07-12 RX ADMIN — SODIUM CHLORIDE: 9 INJECTION, SOLUTION INTRAVENOUS at 05:38

## 2017-07-12 RX ADMIN — TRAMADOL HYDROCHLORIDE 50 MG: 50 TABLET, COATED ORAL at 12:25

## 2017-07-12 RX ADMIN — LOVASTATIN 10 MG: 20 TABLET ORAL at 21:13

## 2017-07-12 RX ADMIN — CEFTRIAXONE 2 G: 2 INJECTION, POWDER, FOR SOLUTION INTRAMUSCULAR; INTRAVENOUS at 09:00

## 2017-07-12 RX ADMIN — METOPROLOL SUCCINATE 50 MG: 25 TABLET, EXTENDED RELEASE ORAL at 21:13

## 2017-07-12 RX ADMIN — METRONIDAZOLE 500 MG: 500 INJECTION, SOLUTION INTRAVENOUS at 14:46

## 2017-07-12 RX ADMIN — TIMOLOL MALEATE 1 DROP: 5 SOLUTION OPHTHALMIC at 21:12

## 2017-07-12 ASSESSMENT — PAIN SCALES - GENERAL
PAINLEVEL_OUTOF10: 4
PAINLEVEL_OUTOF10: 3
PAINLEVEL_OUTOF10: 6
PAINLEVEL_OUTOF10: 4

## 2017-07-12 ASSESSMENT — ENCOUNTER SYMPTOMS
HEMOPTYSIS: 0
PALPITATIONS: 0
WEAKNESS: 0
CHILLS: 0
COUGH: 0

## 2017-07-12 NOTE — CARE PLAN
Problem: Communication  Goal: The ability to communicate needs accurately and effectively will improve  Outcome: PROGRESSING AS EXPECTED  Oriented patient to the call light in order to alert staff of her needs. Educated patient about the plan of care, procedures, treatments, medications (IVF and abx as well as pain medications), and allowing for patient questions and answering them appropriately    Problem: Safety  Goal: Will remain free from falls  Outcome: PROGRESSING AS EXPECTED  Assessing patient for risk factors for falls and implementing fall precautions as needed. Bed controls are on and bed is locked in a low position, treaded slipper socks on patient, CLIP    Problem: Infection  Goal: Will remain free from infection  Outcome: PROGRESSING AS EXPECTED  Administering antiinfective (IV Ceftriaxone and Flagyl) as ordered and assessing for signs and symptoms of improvement

## 2017-07-12 NOTE — PROGRESS NOTES
Bedside report given to Letitia GIBBS. POC discussed. Pt resting comfortably in bed. Safety precautions in place.

## 2017-07-12 NOTE — CARE PLAN
Problem: Venous Thromboembolism (VTW)/Deep Vein Thrombosis (DVT) Prevention:  Goal: Patient will participate in Venous Thrombosis (VTE)/Deep Vein Thrombosis (DVT)Prevention Measures  Outcome: PROGRESSING AS EXPECTED  Pt on Eliquis for afib. Verbalizes understanding of VTE prevention. Pt encouraged to mobilize/ambulate.    Problem: Bowel/Gastric:  Goal: Normal bowel function is maintained or improved  Outcome: PROGRESSING AS EXPECTED  Pt had large BM today. Abd pain is better than admission. No n/v. Tolerating diet. Pt on IV ABX for UTI/enteritis. Pt verbalizes understanding of GI POC.

## 2017-07-12 NOTE — PROGRESS NOTES
Assessment completed. Pt AAOx4, c/o pain in abd 3/10, declines interventions. Pt states no n/v, tolerated GI soft diet for breakfast this AM. Pt up to bathroom, SBA. Formed BMx1. Back to bed safely. IV ABX infusing without issue. No needs at this time, call light in reach, will monitor.

## 2017-07-12 NOTE — PROGRESS NOTES
[facility] Hospitalist Progress Note    Date of Service: 2017    Chief Complaint  81 y.o. female admitted 7/10/2017 with abdominal pain, found to have small bowel enteritis and UTI    Interval Problem Update  Feels better than on admission  She continues to have abdominal discomfort and intermittent pain  Pain changes with position  Nausea improved, no emesis  Endorses polyuria, no dysuria    Consultants/Specialty  None    Disposition  TBD, probably home        Review of Systems   Constitutional: Negative for chills.   Respiratory: Negative for cough and hemoptysis.    Cardiovascular: Negative for chest pain and palpitations.   Skin: Negative for itching and rash.   Neurological: Negative for weakness.      Physical Exam  Laboratory/Imaging   Hemodynamics  Temp (24hrs), Av.7 °C (98.1 °F), Min:36.6 °C (97.9 °F), Max:36.8 °C (98.2 °F)   Temperature: 36.7 °C (98.1 °F)  Pulse  Av.7  Min: 69  Max: 100    Blood Pressure : 130/67 mmHg      Respiratory      Respiration: 18, Pulse Oximetry: 91 %        RUL Breath Sounds: Clear, RML Breath Sounds: Clear, RLL Breath Sounds: Diminished, IDA Breath Sounds: Clear, LLL Breath Sounds: Diminished    Fluids    Intake/Output Summary (Last 24 hours) at 17 1450  Last data filed at 17 1300   Gross per 24 hour   Intake   1040 ml   Output   1950 ml   Net   -910 ml       Nutrition  Orders Placed This Encounter   Procedures   • Diet Order     Standing Status: Standing      Number of Occurrences: 1      Standing Expiration Date:      Order Specific Question:  Diet:     Answer:  Low Fiber(GI Soft) [2]     Physical Exam   Constitutional: She is oriented to person, place, and time. She appears well-developed and well-nourished. No distress.   Eyes: Conjunctivae are normal. Right eye exhibits no discharge. Left eye exhibits no discharge.   Neck: Normal range of motion. Neck supple.   Cardiovascular: Normal rate and regular rhythm.    No murmur heard.  Pulmonary/Chest: Effort  normal and breath sounds normal. No respiratory distress. She has no wheezes.   Abdominal: Soft. She exhibits no distension. There is tenderness. There is no rebound and no guarding.   Normoactive bowel sounds  No rebound or guarding   Musculoskeletal: Normal range of motion. She exhibits no edema.   Neurological: She is alert and oriented to person, place, and time.   Skin: She is not diaphoretic.       Recent Labs      07/10/17   1400  07/11/17   0437  07/12/17   0415   WBC  12.1*  9.4  8.0   RBC  5.30  4.48  4.32   HEMOGLOBIN  14.6  12.2  11.9*   HEMATOCRIT  44.2  37.5  37.4   MCV  83.4  83.7  86.6   MCH  27.5  27.2  27.5   MCHC  33.0*  32.5*  31.8*   RDW  49.0  50.2*  50.8*   PLATELETCT  163*  115*  110*   MPV  11.6  12.6  11.7     Recent Labs      07/10/17   1400  07/11/17   0437  07/12/17   0415   SODIUM  137  138  139   POTASSIUM  4.6  4.4  4.2   CHLORIDE  109  108  108   CO2  19*  23  23   GLUCOSE  112*  97  98   BUN  31*  24*  17   CREATININE  1.22  1.18  1.16   CALCIUM  8.7  7.8*  8.0*                      Assessment/Plan     * Enteritis (present on admission)  Assessment & Plan  Imaging and exam consistent with enteritis  Symptoms a bit worse, abdomen more distended today  She did have a significant bowel movement this morning  Continue antibiotics and supportive medicaitons  Consider repeat xray in AM if doesn't improve    Urinary tract infection (present on admission)  Assessment & Plan  E coli, sens pending  Continue ceftriaxone    Atrial fibrillation (CMS-HCC) (present on admission)  Assessment & Plan  Continue metoprolol, rate is controlled  Continue apixiban    Leukocytosis (present on admission)  Assessment & Plan  Resolved    HTN (hypertension) (present on admission)  Assessment & Plan  Controlled  Continue metoprolol      Medications reviewed and Labs reviewed  Rodriguez catheter: No Rodriguez            Antibiotics: Treating active infection/contamination beyond 24 hours perioperative coverage

## 2017-07-12 NOTE — PROGRESS NOTES
Bedside report received from Letitia GIBBS @ 0715. Assumed care. POC discussed. Pt resting comfortably in bed. Safety precautions in place.

## 2017-07-12 NOTE — PROGRESS NOTES
1900: Report received from Effie GIBBS, patient laying comfortably in bed, safety precautions in place, CLIP    1915: Assessment performed, patient is A&O x 4 at this time, patient denies any needs or discomfort, patient gown and linens changed as well as ostomy bag (by patient), IVF infusing without difficulty, safety precautions in place, CLIP    2100: Due medications administered per MAR, patient complaining of 3/10 abdominal pain, prn pain medication as well as antinausea medication, patient denies any other needs at this time    0000: Patient resting comfortably in bed with no s/s of distress noted, respirations even and unlabored, IVF infusing without difficulty, safety precautions in place, CLIP    0200: Patient complaining of inability to fall asleep, patient offered quiet pack but refuses at this time, lights dimmed and TV turned off, patient denies any other needs or complaints at this time    0444: Patient complaining of 4/10 lower abdominal pain, prn pain medication administered per MAR    0700: Report given to Effie GIBBS, POC discussed, patient laying comfortably in bed and denies any needs or complaint

## 2017-07-12 NOTE — DISCHARGE PLANNING
Medical Social Work    Referral: Pt discussed at IDT rounds this AM.    Intervention: Per flowsheet, pt lives with adult child and expects to d.c home.  Blood cultures gram negative rods in urine.  No SS needs identified nor any requests for SERGIO Us during rounds.      Plan: SERGIO available for any assistance with d.c planning.

## 2017-07-13 VITALS
OXYGEN SATURATION: 92 % | WEIGHT: 175.04 LBS | SYSTOLIC BLOOD PRESSURE: 140 MMHG | RESPIRATION RATE: 18 BRPM | HEART RATE: 73 BPM | BODY MASS INDEX: 26.53 KG/M2 | TEMPERATURE: 98.2 F | HEIGHT: 68 IN | DIASTOLIC BLOOD PRESSURE: 70 MMHG

## 2017-07-13 PROBLEM — D72.829 LEUKOCYTOSIS: Status: RESOLVED | Noted: 2017-07-11 | Resolved: 2017-07-13

## 2017-07-13 LAB
ANION GAP SERPL CALC-SCNC: 7 MMOL/L (ref 0–11.9)
BACTERIA UR CULT: ABNORMAL
BASOPHILS # BLD AUTO: 0.5 % (ref 0–1.8)
BASOPHILS # BLD: 0.05 K/UL (ref 0–0.12)
BUN SERPL-MCNC: 14 MG/DL (ref 8–22)
CALCIUM SERPL-MCNC: 8.3 MG/DL (ref 8.4–10.2)
CHLORIDE SERPL-SCNC: 106 MMOL/L (ref 96–112)
CO2 SERPL-SCNC: 25 MMOL/L (ref 20–33)
CREAT SERPL-MCNC: 1.12 MG/DL (ref 0.5–1.4)
EOSINOPHIL # BLD AUTO: 0.23 K/UL (ref 0–0.51)
EOSINOPHIL NFR BLD: 2.3 % (ref 0–6.9)
ERYTHROCYTE [DISTWIDTH] IN BLOOD BY AUTOMATED COUNT: 50.7 FL (ref 35.9–50)
GFR SERPL CREATININE-BSD FRML MDRD: 47 ML/MIN/1.73 M 2
GLUCOSE SERPL-MCNC: 94 MG/DL (ref 65–99)
HCT VFR BLD AUTO: 39.4 % (ref 37–47)
HGB BLD-MCNC: 12.5 G/DL (ref 12–16)
IMM GRANULOCYTES # BLD AUTO: 0.05 K/UL (ref 0–0.11)
IMM GRANULOCYTES NFR BLD AUTO: 0.5 % (ref 0–0.9)
LYMPHOCYTES # BLD AUTO: 2.03 K/UL (ref 1–4.8)
LYMPHOCYTES NFR BLD: 20.2 % (ref 22–41)
MCH RBC QN AUTO: 27.5 PG (ref 27–33)
MCHC RBC AUTO-ENTMCNC: 31.7 G/DL (ref 33.6–35)
MCV RBC AUTO: 86.6 FL (ref 81.4–97.8)
MONOCYTES # BLD AUTO: 1.32 K/UL (ref 0–0.85)
MONOCYTES NFR BLD AUTO: 13.1 % (ref 0–13.4)
NEUTROPHILS # BLD AUTO: 6.38 K/UL (ref 2–7.15)
NEUTROPHILS NFR BLD: 63.4 % (ref 44–72)
NRBC # BLD AUTO: 0 K/UL
NRBC BLD AUTO-RTO: 0 /100 WBC
PLATELET # BLD AUTO: 129 K/UL (ref 164–446)
PMV BLD AUTO: 12.6 FL (ref 9–12.9)
POTASSIUM SERPL-SCNC: 4.1 MMOL/L (ref 3.6–5.5)
RBC # BLD AUTO: 4.55 M/UL (ref 4.2–5.4)
SIGNIFICANT IND 70042: ABNORMAL
SITE SITE: ABNORMAL
SODIUM SERPL-SCNC: 138 MMOL/L (ref 135–145)
SOURCE SOURCE: ABNORMAL
WBC # BLD AUTO: 10.1 K/UL (ref 4.8–10.8)

## 2017-07-13 PROCEDURE — 99217 PR OBSERVATION CARE DISCHARGE: CPT | Performed by: HOSPITALIST

## 2017-07-13 PROCEDURE — 96366 THER/PROPH/DIAG IV INF ADDON: CPT

## 2017-07-13 PROCEDURE — 700111 HCHG RX REV CODE 636 W/ 250 OVERRIDE (IP): Performed by: HOSPITALIST

## 2017-07-13 PROCEDURE — 700102 HCHG RX REV CODE 250 W/ 637 OVERRIDE(OP): Performed by: HOSPITALIST

## 2017-07-13 PROCEDURE — 700101 HCHG RX REV CODE 250: Performed by: HOSPITALIST

## 2017-07-13 PROCEDURE — A9270 NON-COVERED ITEM OR SERVICE: HCPCS | Performed by: HOSPITALIST

## 2017-07-13 PROCEDURE — 85025 COMPLETE CBC W/AUTO DIFF WBC: CPT

## 2017-07-13 PROCEDURE — G0378 HOSPITAL OBSERVATION PER HR: HCPCS

## 2017-07-13 PROCEDURE — 700105 HCHG RX REV CODE 258: Performed by: HOSPITALIST

## 2017-07-13 PROCEDURE — 80048 BASIC METABOLIC PNL TOTAL CA: CPT

## 2017-07-13 RX ORDER — CIPROFLOXACIN 500 MG/1
500 TABLET, FILM COATED ORAL 2 TIMES DAILY
Qty: 6 TAB | Refills: 0 | Status: SHIPPED | OUTPATIENT
Start: 2017-07-13 | End: 2017-07-15

## 2017-07-13 RX ADMIN — BRIMONIDINE TARTRATE 1 DROP: 2 SOLUTION/ DROPS OPHTHALMIC at 09:04

## 2017-07-13 RX ADMIN — TIMOLOL MALEATE 1 DROP: 5 SOLUTION OPHTHALMIC at 09:04

## 2017-07-13 RX ADMIN — LEVOTHYROXINE SODIUM 25 MCG: 25 TABLET ORAL at 06:04

## 2017-07-13 RX ADMIN — OMEPRAZOLE 20 MG: 20 CAPSULE, DELAYED RELEASE ORAL at 09:05

## 2017-07-13 RX ADMIN — APIXABAN 5 MG: 5 TABLET, FILM COATED ORAL at 09:04

## 2017-07-13 RX ADMIN — METRONIDAZOLE 500 MG: 500 INJECTION, SOLUTION INTRAVENOUS at 06:04

## 2017-07-13 RX ADMIN — CEFTRIAXONE 2 G: 2 INJECTION, POWDER, FOR SOLUTION INTRAMUSCULAR; INTRAVENOUS at 09:05

## 2017-07-13 ASSESSMENT — LIFESTYLE VARIABLES: EVER_SMOKED: NEVER

## 2017-07-13 ASSESSMENT — PAIN SCALES - GENERAL: PAINLEVEL_OUTOF10: 4

## 2017-07-13 NOTE — PROGRESS NOTES
Patient feeling well at rest, has abdominal pain when standing up and walking. MD explained her discharge directions.

## 2017-07-13 NOTE — PROGRESS NOTES
A&oX4, very pleasant and cooperative. C/o 4/10 generalized, abd pain (dull, aching and constant), but refused tramadol and heat pack is provided. Tolerating regular diet. Up ambulating to BR with SBA. Assisted to empty urostomy bag. Sleep aid given per MAR. Denies other needs or concerns. Call light is in reach and BA is on.

## 2017-07-13 NOTE — DISCHARGE SUMMARY
CHIEF COMPLAINT ON ADMISSION  Chief Complaint   Patient presents with   • Abdominal Pain   • Pelvic Pain   • Flank Pain       CODE STATUS  Full Code    HPI & HOSPITAL COURSE  This is a 81 y.o. Female with a history of atrial fibrillation admitted with abdominal complaints, workup included a CT scan of the abdomen that was consistent with probable infectious colitis.  Patient also had a urinary tract infection, cultures eventually grew e coli sensitive to cipro.  Patient has gradually improved, she is tolerating PO diet, having bowel movements. Abdominal pain is largely resolved, her abdomen is still a little distended.  On the day of discharge we discussed that she should follow up with her primary care physician and/or gastroenterology if symptoms do not completely resolve as there is a small chance that her symptoms and CT scan findings could be caused by inflammatory bowel disease.    Therefore, she is discharged in good and stable condition with close outpatient follow-up.    DISCHARGE PROBLEM LIST  Principal Problem:    Enteritis POA: Yes  Active Problems:    Urinary tract infection POA: Yes    Atrial fibrillation (CMS-HCC) POA: Yes    HTN (hypertension) POA: Yes  Resolved Problems:    Leukocytosis POA: Yes      FOLLOW UP  Future Appointments  Date Time Provider Department Center   7/14/2017 10:30 AM [facility]   8/15/2017 11:15 AM Rose Whipple M.D. [facility] None     No follow-up provider specified.    MEDICATIONS ON DISCHARGE   Dorie Wadsworthra   Home Medication Instructions DELANEY:[id number]    Printed on:07/13/17 0846   Medication Information                      apixaban (ELIQUIS) 5mg Tab  Take 1 Tab by mouth 2 Times a Day.             Bimatoprost (LUMIGAN) 0.01 % SOLN  1 Drop by Ophthalmic route every bedtime. Both eyes             Biotin 10 MG CAPS  Take 1 Cap by mouth every day. Indications: **OTC**             Brimonidine Tartrate-Timolol (COMBIGAN) 0.2-0.5 % Solution  1 Drop by  Ophthalmic route 2 Times a Day.             ciprofloxacin (CIPRO) 500 MG Tab  Take 1 Tab by mouth 2 times a day for 3 days.             furosemide (LASIX) 20 MG Tab  Take 0.5 Tabs by mouth as needed.             levothyroxine (SYNTHROID) 25 MCG Tab  Take 25 mcg by mouth Every morning on an empty stomach.             lovastatin (MEVACOR) 10 MG tablet  Take 10 mg by mouth every evening.             metoprolol SR (TOPROL XL) 50 MG TB24  Take 1 Tab by mouth every bedtime.             Multiple Vitamins-Minerals (CENTRUM SILVER ULTRA WOMENS) Tab  Take 1 Tab by mouth every day.             Multiple Vitamins-Minerals (PRESERVISION AREDS PO)  Take 1 Tab by mouth 2 Times a Day.             pantoprazole (PROTONIX) 40 MG TBEC  Take 40 mg by mouth every day.                 DIET  Orders Placed This Encounter   Procedures   • Diet Order     Standing Status: Standing      Number of Occurrences: 1      Standing Expiration Date:      Order Specific Question:  Diet:     Answer:  Regular [1]       ACTIVITY  As tolerated.  Weight bearing as tolerated      CONSULTATIONS  None    PROCEDURES  CT abdomen and pelvis:    New medium segmental mid small bowel wall thickening/transmural edema. This most likely indicates infectious enteritis with no ischemic cause detected. 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 which contains fat and bowel but does not result in obstruction    Moderate severity colonic diverticulosis without evidence of diverticulitis    Severe atherosclerosis without aneurysm    Medium-sized hiatal hernia    LABORATORY  Lab Results   Component Value Date/Time    SODIUM 138 07/13/2017 04:27 AM    POTASSIUM 4.1 07/13/2017 04:27 AM    CHLORIDE 106 07/13/2017 04:27 AM    CO2 25 07/13/2017 04:27 AM    GLUCOSE 94 07/13/2017 04:27 AM    BUN 14 07/13/2017 04:27 AM    CREATININE 1.12 07/13/2017 04:27 AM        Lab Results   Component Value Date/Time     WBC 10.1 07/13/2017 04:27 AM    HEMOGLOBIN 12.5 07/13/2017 04:27 AM    HEMATOCRIT 39.4 07/13/2017 04:27 AM    PLATELET COUNT 129* 07/13/2017 04:27 AM        Total time of the discharge process exceeds 32 minutes

## 2017-07-13 NOTE — DISCHARGE PLANNING
Medical Social Work    Referral: Pt discussed at IDT rounds this AM.    Intervention: Per flowsheet, pt lives with adult child and expects to d.c home.  Probable d.c home today.  No SS needs identified nor any requests for [facility] Us during rounds.      Plan: SERGIO available for any assistance with d.c planning.

## 2017-07-13 NOTE — PROGRESS NOTES
NO acute changes in pt status. Pt is sleeping with RR even and unlabored. Call light in reach and bed alarm is set-- monitoring.

## 2017-07-13 NOTE — CARE PLAN
Problem: Safety  Goal: Will remain free from falls  Outcome: PROGRESSING AS EXPECTED  Pt educated regarding the use of call light when needing assistance. Pt demonstrated and verbalized the proper use of a call light and to call for assistance. Fall precautions in place, treaded slippers on, personal belongings/phone in reach. Pt has a steady gate ambulating SBA and is encouraged to call if assistance is needed. Bed alarm is set.     Problem: Venous Thromboembolism (VTW)/Deep Vein Thrombosis (DVT) Prevention:  Goal: Patient will participate in Venous Thrombosis (VTE)/Deep Vein Thrombosis (DVT)Prevention Measures  Outcome: PROGRESSING AS EXPECTED    07/13/17 0019   OTHER   Pharmacologic Prophylaxis Used Apixaban

## 2017-07-13 NOTE — PROGRESS NOTES
Received report from day shift RN; care assumed. Pt sitting up in bed, watching tv-- denies current needs. CLIP, pt calling for assistance, fall precautions in place (bed alarm is set), will CTM.

## 2017-07-13 NOTE — DISCHARGE INSTRUCTIONS
Discharge Instructions    Discharged to home by car with relative. Discharged via wheelchair, hospital escort: Yes.  Special equipment needed: Not Applicable    Be sure to schedule a follow-up appointment with your primary care doctor or any specialists as instructed.     Discharge Plan:   Diet Plan: Discussed  Activity Level: Discussed  Confirmed Follow up Appointment: Appointment Scheduled  Confirmed Symptoms Management: Discussed  Medication Reconciliation Updated: Yes  Influenza Vaccine Indication: Not indicated: Previously immunized this influenza season and > 8 years of age    I understand that a diet low in cholesterol, fat, and sodium is recommended for good health. Unless I have been given specific instructions below for another diet, I accept this instruction as my diet prescription.   Other diet: regular    Special Instructions: None    · Is patient discharged on Warfarin / Coumadin?   No     · Is patient Post Blood Transfusion?  No    Urinary Tract Infection  Urinary tract infections (UTIs) can develop anywhere along your urinary tract. Your urinary tract is your body's drainage system for removing wastes and extra water. Your urinary tract includes two kidneys, two ureters, a bladder, and a urethra. Your kidneys are a pair of bean-shaped organs. Each kidney is about the size of your fist. They are located below your ribs, one on each side of your spine.  CAUSES  Infections are caused by microbes, which are microscopic organisms, including fungi, viruses, and bacteria. These organisms are so small that they can only be seen through a microscope. Bacteria are the microbes that most commonly cause UTIs.  SYMPTOMS   Symptoms of UTIs may vary by age and gender of the patient and by the location of the infection. Symptoms in young women typically include a frequent and intense urge to urinate and a painful, burning feeling in the bladder or urethra during urination. Older women and men are more likely to be  tired, shaky, and weak and have muscle aches and abdominal pain. A fever may mean the infection is in your kidneys. Other symptoms of a kidney infection include pain in your back or sides below the ribs, nausea, and vomiting.  DIAGNOSIS  To diagnose a UTI, your caregiver will ask you about your symptoms. Your caregiver also will ask to provide a urine sample. The urine sample will be tested for bacteria and white blood cells. White blood cells are made by your body to help fight infection.  TREATMENT   Typically, UTIs can be treated with medication. Because most UTIs are caused by a bacterial infection, they usually can be treated with the use of antibiotics. The choice of antibiotic and length of treatment depend on your symptoms and the type of bacteria causing your infection.  HOME CARE INSTRUCTIONS  · If you were prescribed antibiotics, take them exactly as your caregiver instructs you. Finish the medication even if you feel better after you have only taken some of the medication.  · Drink enough water and fluids to keep your urine clear or pale yellow.  · Avoid caffeine, tea, and carbonated beverages. They tend to irritate your bladder.  · Empty your bladder often. Avoid holding urine for long periods of time.  · Empty your bladder before and after sexual intercourse.  · After a bowel movement, women should cleanse from front to back. Use each tissue only once.  SEEK MEDICAL CARE IF:   · You have back pain.  · You develop a fever.  · Your symptoms do not begin to resolve within 3 days.  SEEK IMMEDIATE MEDICAL CARE IF:   · You have severe back pain or lower abdominal pain.  · You develop chills.  · You have nausea or vomiting.  · You have continued burning or discomfort with urination.  MAKE SURE YOU:   · Understand these instructions.  · Will watch your condition.  · Will get help right away if you are not doing well or get worse.     This information is not intended to replace advice given to you by your  health care provider. Make sure you discuss any questions you have with your health care provider.     Document Released: 09/27/2006 Document Revised: 01/08/2016 Document Reviewed: 01/25/2013  [employer] Interactive Patient Education ©2016 [employer]    Ciprofloxacin tablets  What is this medicine?  CIPROFLOXACIN (sip sebastien FLOX a sin) is a quinolone antibiotic. It is used to treat certain kinds of bacterial infections. It will not work for colds, flu, or other viral infections.  This medicine may be used for other purposes; ask your health care provider or pharmacist if you have questions.  COMMON BRAND NAME(S): Cipro  What should I tell my health care provider before I take this medicine?  They need to know if you have any of these conditions:  -bone problems  -cerebral disease  -joint problems  -irregular heartbeat  -kidney disease  -liver disease  -myasthenia gravis  -seizure disorder  -tendon problems  -an unusual or allergic reaction to ciprofloxacin, other antibiotics or medicines, foods, dyes, or preservatives  -pregnant or trying to get pregnant  -breast-feeding  How should I use this medicine?  Take this medicine by mouth with a glass of water. Follow the directions on the prescription label. Take your medicine at regular intervals. Do not take your medicine more often than directed. Take all of your medicine as directed even if you think your are better. Do not skip doses or stop your medicine early.  You can take this medicine with food or on an empty stomach. It can be taken with a meal that contains dairy or calcium, but do not take it alone with a dairy product, like milk or yogurt or calcium-fortified juice.  A special MedGuide will be given to you by the pharmacist with each prescription and refill. Be sure to read this information carefully each time.  Talk to your pediatrician regarding the use of this medicine in children. Special care may be needed.  Overdosage: If you think you have taken too  much of this medicine contact a poison control center or emergency room at once.  NOTE: This medicine is only for you. Do not share this medicine with others.  What if I miss a dose?  If you miss a dose, take it as soon as you can. If it is almost time for your next dose, take only that dose. Do not take double or extra doses.  What may interact with this medicine?  Do not take this medicine with any of the following medications:  -cisapride  -droperidol  -terfenadine  -tizanidine  This medicine may also interact with the following medications:  -antacids  -caffeine  -cyclosporin  -didanosine (ddI) buffered tablets or powder  -medicines for diabetes  -medicines for inflammation like ibuprofen, naproxen  -methotrexate  -multivitamins  -omeprazole  -phenytoin  -probenecid  -sucralfate  -theophylline  -warfarin  This list may not describe all possible interactions. Give your health care provider a list of all the medicines, herbs, non-prescription drugs, or dietary supplements you use. Also tell them if you smoke, drink alcohol, or use illegal drugs. Some items may interact with your medicine.  What should I watch for while using this medicine?  Tell your doctor or health care professional if your symptoms do not improve.  Do not treat diarrhea with over the counter products. Contact your doctor if you have diarrhea that lasts more than 2 days or if it is severe and watery.  You may get drowsy or dizzy. Do not drive, use machinery, or do anything that needs mental alertness until you know how this medicine affects you. Do not stand or sit up quickly, especially if you are an older patient. This reduces the risk of dizzy or fainting spells.  This medicine can make you more sensitive to the sun. Keep out of the sun. If you cannot avoid being in the sun, wear protective clothing and use sunscreen. Do not use sun lamps or tanning beds/booths.  Avoid antacids, aluminum, calcium, iron, magnesium, and zinc products for 6  hours before and 2 hours after taking a dose of this medicine.  What side effects may I notice from receiving this medicine?  Side effects that you should report to your doctor or health care professional as soon as possible:  -  allergic reactions like skin rash, itching or hives, swelling of the face, lips, or tongue  -  breathing problems  -  confusion, nightmares or hallucinations  -  feeling faint or lightheaded, falls  -  irregular heartbeat  -  joint, muscle or tendon pain or swelling  -  pain or trouble passing urine  -persistent headache with or without blurred vision  -  redness, blistering, peeling or loosening of the skin, including inside the mouth  -  seizure  -  unusual pain, numbness, tingling, or weakness  Side effects that usually do not require medical attention (report to your doctor or health care professional if they continue or are bothersome):  -  diarrhea  -  nausea or stomach upset  -  white patches or sores in the mouth  This list may not describe all possible side effects. Call your doctor for medical advice about side effects. You may report side effects to FDA at 8-704-FDA-1933.  Where should I keep my medicine?  Keep out of the reach of children.  Store at room temperature below 30 degrees C (86 degrees F). Keep container tightly closed. Throw away any unused medicine after the expiration date.  NOTE: This sheet is a summary. It may not cover all possible information. If you have questions about this medicine, talk to your doctor, pharmacist, or health care provider.  © 2014, [employer]vier/[employer]. (1/4/2013 12:53:06 PM)      Depression / Suicide Risk    As you are discharged from this [facility] facility, it is important to learn how to keep safe from harming yourself.    Recognize the warning signs:  · Abrupt changes in personality, positive or negative- including increase in energy   · Giving away possessions  · Change in eating patterns- significant weight changes-  positive or  negative  · Change in sleeping patterns- unable to sleep or sleeping all the time   · Unwillingness or inability to communicate  · Depression  · Unusual sadness, discouragement and loneliness  · Talk of wanting to die  · Neglect of personal appearance   · Rebelliousness- reckless behavior  · Withdrawal from people/activities they love  · Confusion- inability to concentrate     If you or a loved one observes any of these behaviors or has concerns about self-harm, here's what you can do:  · Talk about it- your feelings and reasons for harming yourself  · Remove any means that you might use to hurt yourself (examples: pills, rope, extension cords, firearm)  · Get professional help from the community (Mental Health, Substance Abuse, psychological counseling)  · Do not be alone:Call your Safe Contact- someone whom you trust who will be there for you.  · Call your local CRISIS HOTLINE 827-9860 or 739-269-2332  · Call your local Children's Mobile Crisis Response Team [place] (935) 928-4906 or www.[employer]  · Call the toll free National Suicide Prevention Hotlines   · National Suicide Prevention Lifeline 653-118-IFJS (9175)  · National Hope Line Network 800-SUICIDE (280-1326)

## 2017-07-13 NOTE — PROGRESS NOTES
No changes in pt status throughout the day. Medicated for pain per MAR. No n/v, pt tolerating current diet. No complaints or needs. Bedside report given to Glenna GIBBS. POC discussed. Pt resting comfortably in bed. Safety precautions in place.

## 2017-07-14 ENCOUNTER — PATIENT OUTREACH (OUTPATIENT)
Dept: HEALTH INFORMATION MANAGEMENT | Facility: OTHER | Age: 82
End: 2017-07-14

## 2017-07-15 ENCOUNTER — RESOLUTE PROFESSIONAL BILLING HOSPITAL PROF FEE (OUTPATIENT)
Dept: HOSPITALIST | Facility: MEDICAL CENTER | Age: 82
End: 2017-07-15
Payer: MEDICARE

## 2017-07-15 ENCOUNTER — HOSPITAL ENCOUNTER (OUTPATIENT)
Facility: MEDICAL CENTER | Age: 82
End: 2017-07-16
Attending: EMERGENCY MEDICINE | Admitting: INTERNAL MEDICINE
Payer: MEDICARE

## 2017-07-15 ENCOUNTER — APPOINTMENT (OUTPATIENT)
Dept: RADIOLOGY | Facility: MEDICAL CENTER | Age: 82
End: 2017-07-15
Attending: EMERGENCY MEDICINE
Payer: MEDICARE

## 2017-07-15 DIAGNOSIS — R53.1 WEAKNESS: ICD-10-CM

## 2017-07-15 DIAGNOSIS — R31.9 HEMATURIA: ICD-10-CM

## 2017-07-15 DIAGNOSIS — R10.84 GENERALIZED ABDOMINAL PAIN: ICD-10-CM

## 2017-07-15 PROBLEM — R10.9 ABDOMINAL PAIN: Status: ACTIVE | Noted: 2017-07-15

## 2017-07-15 PROBLEM — R19.7 DIARRHEA: Status: ACTIVE | Noted: 2017-07-15

## 2017-07-15 LAB
ALBUMIN SERPL BCP-MCNC: 3.3 G/DL (ref 3.2–4.9)
ALBUMIN/GLOB SERPL: 0.9 G/DL
ALP SERPL-CCNC: 106 U/L (ref 30–99)
ALT SERPL-CCNC: 14 U/L (ref 2–50)
ANION GAP SERPL CALC-SCNC: 8 MMOL/L (ref 0–11.9)
APPEARANCE UR: ABNORMAL
AST SERPL-CCNC: 22 U/L (ref 12–45)
BACTERIA #/AREA URNS HPF: ABNORMAL /HPF
BASOPHILS # BLD AUTO: 0.6 % (ref 0–1.8)
BASOPHILS # BLD: 0.05 K/UL (ref 0–0.12)
BILIRUB SERPL-MCNC: 0.4 MG/DL (ref 0.1–1.5)
BILIRUB UR QL STRIP.AUTO: NEGATIVE
BUN SERPL-MCNC: 19 MG/DL (ref 8–22)
C DIFF DNA SPEC QL NAA+PROBE: NEGATIVE
C DIFF TOX A+B STL QL IA: NEGATIVE
C DIFF TOX GENS STL QL NAA+PROBE: NEGATIVE
CALCIUM SERPL-MCNC: 8.5 MG/DL (ref 8.4–10.2)
CHLORIDE SERPL-SCNC: 109 MMOL/L (ref 96–112)
CO2 SERPL-SCNC: 22 MMOL/L (ref 20–33)
COLOR UR: YELLOW
CREAT SERPL-MCNC: 1.12 MG/DL (ref 0.5–1.4)
CULTURE IF INDICATED INDCX: YES UA CULTURE
EKG IMPRESSION: NORMAL
EOSINOPHIL # BLD AUTO: 0.23 K/UL (ref 0–0.51)
EOSINOPHIL NFR BLD: 2.7 % (ref 0–6.9)
EPI CELLS #/AREA URNS HPF: ABNORMAL /HPF
ERYTHROCYTE [DISTWIDTH] IN BLOOD BY AUTOMATED COUNT: 50 FL (ref 35.9–50)
GFR SERPL CREATININE-BSD FRML MDRD: 47 ML/MIN/1.73 M 2
GLOBULIN SER CALC-MCNC: 3.8 G/DL (ref 1.9–3.5)
GLUCOSE SERPL-MCNC: 101 MG/DL (ref 65–99)
GLUCOSE UR STRIP.AUTO-MCNC: NEGATIVE MG/DL
HCT VFR BLD AUTO: 42 % (ref 37–47)
HGB BLD-MCNC: 13.4 G/DL (ref 12–16)
IMM GRANULOCYTES # BLD AUTO: 0.04 K/UL (ref 0–0.11)
IMM GRANULOCYTES NFR BLD AUTO: 0.5 % (ref 0–0.9)
KETONES UR STRIP.AUTO-MCNC: NEGATIVE MG/DL
LEUKOCYTE ESTERASE UR QL STRIP.AUTO: ABNORMAL
LIPASE SERPL-CCNC: 61 U/L (ref 7–58)
LYMPHOCYTES # BLD AUTO: 1.66 K/UL (ref 1–4.8)
LYMPHOCYTES NFR BLD: 19.5 % (ref 22–41)
MCH RBC QN AUTO: 27.6 PG (ref 27–33)
MCHC RBC AUTO-ENTMCNC: 31.9 G/DL (ref 33.6–35)
MCV RBC AUTO: 86.4 FL (ref 81.4–97.8)
MICRO URNS: ABNORMAL
MONOCYTES # BLD AUTO: 0.99 K/UL (ref 0–0.85)
MONOCYTES NFR BLD AUTO: 11.6 % (ref 0–13.4)
MUCOUS THREADS #/AREA URNS HPF: ABNORMAL /HPF
NEUTROPHILS # BLD AUTO: 5.56 K/UL (ref 2–7.15)
NEUTROPHILS NFR BLD: 65.1 % (ref 44–72)
NITRITE UR QL STRIP.AUTO: POSITIVE
NRBC # BLD AUTO: 0 K/UL
NRBC BLD AUTO-RTO: 0 /100 WBC
PH UR STRIP.AUTO: 6.5 [PH]
PLATELET # BLD AUTO: 143 K/UL (ref 164–446)
PMV BLD AUTO: 10.7 FL (ref 9–12.9)
POTASSIUM SERPL-SCNC: 4 MMOL/L (ref 3.6–5.5)
PROT SERPL-MCNC: 7.1 G/DL (ref 6–8.2)
PROT UR QL STRIP: NEGATIVE MG/DL
RBC # BLD AUTO: 4.86 M/UL (ref 4.2–5.4)
RBC # URNS HPF: >150 /HPF
RBC UR QL AUTO: ABNORMAL
SODIUM SERPL-SCNC: 139 MMOL/L (ref 135–145)
SP GR UR STRIP.AUTO: 1.01
TSH SERPL DL<=0.005 MIU/L-ACNC: 5.2 UIU/ML (ref 0.35–5.5)
UNIDENT CRYS URNS QL MICRO: ABNORMAL /HPF
WBC # BLD AUTO: 8.5 K/UL (ref 4.8–10.8)
WBC #/AREA URNS HPF: ABNORMAL /HPF

## 2017-07-15 PROCEDURE — 700111 HCHG RX REV CODE 636 W/ 250 OVERRIDE (IP): Performed by: EMERGENCY MEDICINE

## 2017-07-15 PROCEDURE — 81001 URINALYSIS AUTO W/SCOPE: CPT

## 2017-07-15 PROCEDURE — 87493 C DIFF AMPLIFIED PROBE: CPT

## 2017-07-15 PROCEDURE — A9270 NON-COVERED ITEM OR SERVICE: HCPCS | Performed by: INTERNAL MEDICINE

## 2017-07-15 PROCEDURE — 99285 EMERGENCY DEPT VISIT HI MDM: CPT

## 2017-07-15 PROCEDURE — 36415 COLL VENOUS BLD VENIPUNCTURE: CPT

## 2017-07-15 PROCEDURE — 87324 CLOSTRIDIUM AG IA: CPT

## 2017-07-15 PROCEDURE — 96376 TX/PRO/DX INJ SAME DRUG ADON: CPT

## 2017-07-15 PROCEDURE — 96375 TX/PRO/DX INJ NEW DRUG ADDON: CPT

## 2017-07-15 PROCEDURE — 87086 URINE CULTURE/COLONY COUNT: CPT

## 2017-07-15 PROCEDURE — 700102 HCHG RX REV CODE 250 W/ 637 OVERRIDE(OP): Performed by: INTERNAL MEDICINE

## 2017-07-15 PROCEDURE — 87186 SC STD MICRODIL/AGAR DIL: CPT

## 2017-07-15 PROCEDURE — 84443 ASSAY THYROID STIM HORMONE: CPT

## 2017-07-15 PROCEDURE — 94760 N-INVAS EAR/PLS OXIMETRY 1: CPT

## 2017-07-15 PROCEDURE — 700117 HCHG RX CONTRAST REV CODE 255: Performed by: EMERGENCY MEDICINE

## 2017-07-15 PROCEDURE — 85025 COMPLETE CBC W/AUTO DIFF WBC: CPT

## 2017-07-15 PROCEDURE — 99220 PR INITIAL OBSERVATION CARE,LEVL III: CPT | Performed by: INTERNAL MEDICINE

## 2017-07-15 PROCEDURE — 700105 HCHG RX REV CODE 258: Performed by: INTERNAL MEDICINE

## 2017-07-15 PROCEDURE — 96374 THER/PROPH/DIAG INJ IV PUSH: CPT

## 2017-07-15 PROCEDURE — G0378 HOSPITAL OBSERVATION PER HR: HCPCS

## 2017-07-15 PROCEDURE — 700111 HCHG RX REV CODE 636 W/ 250 OVERRIDE (IP): Performed by: INTERNAL MEDICINE

## 2017-07-15 PROCEDURE — 93005 ELECTROCARDIOGRAM TRACING: CPT | Performed by: EMERGENCY MEDICINE

## 2017-07-15 PROCEDURE — 83690 ASSAY OF LIPASE: CPT

## 2017-07-15 PROCEDURE — 74177 CT ABD & PELVIS W/CONTRAST: CPT

## 2017-07-15 PROCEDURE — 87077 CULTURE AEROBIC IDENTIFY: CPT

## 2017-07-15 PROCEDURE — 80053 COMPREHEN METABOLIC PANEL: CPT

## 2017-07-15 RX ORDER — ONDANSETRON 2 MG/ML
4 INJECTION INTRAMUSCULAR; INTRAVENOUS EVERY 4 HOURS PRN
Status: DISCONTINUED | OUTPATIENT
Start: 2017-07-15 | End: 2017-07-16 | Stop reason: HOSPADM

## 2017-07-15 RX ORDER — LEVOTHYROXINE SODIUM 0.05 MG/1
25 TABLET ORAL
Status: DISCONTINUED | OUTPATIENT
Start: 2017-07-16 | End: 2017-07-16 | Stop reason: HOSPADM

## 2017-07-15 RX ORDER — ONDANSETRON 4 MG/1
4 TABLET, ORALLY DISINTEGRATING ORAL EVERY 4 HOURS PRN
Status: DISCONTINUED | OUTPATIENT
Start: 2017-07-15 | End: 2017-07-16 | Stop reason: HOSPADM

## 2017-07-15 RX ORDER — TIMOLOL MALEATE 5 MG/ML
1 SOLUTION/ DROPS OPHTHALMIC 2 TIMES DAILY
Status: DISCONTINUED | OUTPATIENT
Start: 2017-07-15 | End: 2017-07-16 | Stop reason: HOSPADM

## 2017-07-15 RX ORDER — METOPROLOL SUCCINATE 25 MG/1
25 TABLET, EXTENDED RELEASE ORAL DAILY
COMMUNITY
End: 2018-07-30

## 2017-07-15 RX ORDER — OXYCODONE HYDROCHLORIDE 5 MG/1
5 TABLET ORAL
Status: DISCONTINUED | OUTPATIENT
Start: 2017-07-15 | End: 2017-07-16 | Stop reason: HOSPADM

## 2017-07-15 RX ORDER — M-VIT,TX,IRON,MINS/CALC/FOLIC 27MG-0.4MG
1 TABLET ORAL DAILY
Status: DISCONTINUED | OUTPATIENT
Start: 2017-07-16 | End: 2017-07-16 | Stop reason: HOSPADM

## 2017-07-15 RX ORDER — LORAZEPAM 2 MG/ML
1 INJECTION INTRAMUSCULAR ONCE
Status: DISCONTINUED | OUTPATIENT
Start: 2017-07-15 | End: 2017-07-15

## 2017-07-15 RX ORDER — ONDANSETRON 2 MG/ML
4 INJECTION INTRAMUSCULAR; INTRAVENOUS ONCE
Status: COMPLETED | OUTPATIENT
Start: 2017-07-15 | End: 2017-07-15

## 2017-07-15 RX ORDER — BRIMONIDINE TARTRATE 2 MG/ML
1 SOLUTION/ DROPS OPHTHALMIC 2 TIMES DAILY
Status: DISCONTINUED | OUTPATIENT
Start: 2017-07-15 | End: 2017-07-16 | Stop reason: HOSPADM

## 2017-07-15 RX ORDER — METOPROLOL SUCCINATE 25 MG/1
25 TABLET, EXTENDED RELEASE ORAL DAILY
Status: DISCONTINUED | OUTPATIENT
Start: 2017-07-16 | End: 2017-07-16 | Stop reason: HOSPADM

## 2017-07-15 RX ORDER — BRIMONIDINE TARTRATE AND TIMOLOL MALEATE 2; 5 MG/ML; MG/ML
1 SOLUTION OPHTHALMIC 2 TIMES DAILY
Status: DISCONTINUED | OUTPATIENT
Start: 2017-07-15 | End: 2017-07-15

## 2017-07-15 RX ORDER — OXYCODONE HYDROCHLORIDE 5 MG/1
2.5 TABLET ORAL
Status: DISCONTINUED | OUTPATIENT
Start: 2017-07-15 | End: 2017-07-16 | Stop reason: HOSPADM

## 2017-07-15 RX ORDER — SODIUM CHLORIDE 9 MG/ML
INJECTION, SOLUTION INTRAVENOUS CONTINUOUS
Status: DISCONTINUED | OUTPATIENT
Start: 2017-07-15 | End: 2017-07-16 | Stop reason: HOSPADM

## 2017-07-15 RX ORDER — CEFUROXIME AXETIL 250 MG/1
250 TABLET ORAL 2 TIMES DAILY
COMMUNITY
Start: 2017-07-12 | End: 2018-07-30

## 2017-07-15 RX ORDER — MORPHINE SULFATE 4 MG/ML
2 INJECTION, SOLUTION INTRAMUSCULAR; INTRAVENOUS
Status: DISCONTINUED | OUTPATIENT
Start: 2017-07-15 | End: 2017-07-16 | Stop reason: HOSPADM

## 2017-07-15 RX ORDER — LOVASTATIN 20 MG/1
10 TABLET ORAL DAILY
Status: DISCONTINUED | OUTPATIENT
Start: 2017-07-16 | End: 2017-07-16 | Stop reason: HOSPADM

## 2017-07-15 RX ORDER — ACETAMINOPHEN 325 MG/1
650 TABLET ORAL EVERY 6 HOURS PRN
Status: DISCONTINUED | OUTPATIENT
Start: 2017-07-15 | End: 2017-07-16 | Stop reason: HOSPADM

## 2017-07-15 RX ORDER — BIOTIN 10000 MCG
1 CAPSULE ORAL DAILY
Status: DISCONTINUED | OUTPATIENT
Start: 2017-07-15 | End: 2017-07-15

## 2017-07-15 RX ADMIN — ONDANSETRON 4 MG: 4 TABLET, ORALLY DISINTEGRATING ORAL at 21:24

## 2017-07-15 RX ADMIN — ACETAMINOPHEN 650 MG: 325 TABLET, FILM COATED ORAL at 23:53

## 2017-07-15 RX ADMIN — ONDANSETRON 4 MG: 2 INJECTION INTRAMUSCULAR; INTRAVENOUS at 16:44

## 2017-07-15 RX ADMIN — APIXABAN 5 MG: 5 TABLET, FILM COATED ORAL at 21:24

## 2017-07-15 RX ADMIN — ONDANSETRON 4 MG: 2 INJECTION INTRAMUSCULAR; INTRAVENOUS at 13:31

## 2017-07-15 RX ADMIN — SODIUM CHLORIDE: 9 INJECTION, SOLUTION INTRAVENOUS at 16:49

## 2017-07-15 RX ADMIN — HYDROMORPHONE HYDROCHLORIDE 0.5 MG: 1 INJECTION, SOLUTION INTRAMUSCULAR; INTRAVENOUS; SUBCUTANEOUS at 16:37

## 2017-07-15 RX ADMIN — OXYCODONE HYDROCHLORIDE 5 MG: 5 TABLET ORAL at 23:53

## 2017-07-15 RX ADMIN — HYDROMORPHONE HYDROCHLORIDE 0.5 MG: 1 INJECTION, SOLUTION INTRAMUSCULAR; INTRAVENOUS; SUBCUTANEOUS at 14:29

## 2017-07-15 RX ADMIN — IOHEXOL 100 ML: 350 INJECTION, SOLUTION INTRAVENOUS at 15:03

## 2017-07-15 RX ADMIN — HYDROMORPHONE HYDROCHLORIDE 0.5 MG: 1 INJECTION, SOLUTION INTRAMUSCULAR; INTRAVENOUS; SUBCUTANEOUS at 15:31

## 2017-07-15 ASSESSMENT — LIFESTYLE VARIABLES
ALCOHOL_USE: NO
EVER_SMOKED: NEVER
EVER_SMOKED: NEVER
DO YOU DRINK ALCOHOL: NO
EVER_SMOKED: NEVER

## 2017-07-15 ASSESSMENT — PATIENT HEALTH QUESTIONNAIRE - PHQ9
SUM OF ALL RESPONSES TO PHQ9 QUESTIONS 1 AND 2: 0
SUM OF ALL RESPONSES TO PHQ QUESTIONS 1-9: 0
1. LITTLE INTEREST OR PLEASURE IN DOING THINGS: NOT AT ALL
2. FEELING DOWN, DEPRESSED, IRRITABLE, OR HOPELESS: NOT AT ALL

## 2017-07-15 ASSESSMENT — PAIN SCALES - GENERAL
PAINLEVEL_OUTOF10: 4
PAINLEVEL_OUTOF10: 8

## 2017-07-15 NOTE — ED NOTES
States discharged on Thursday after small bowel inflammation and UTI.   States now having dizziness and weakness. States dizziness increased when turns head.  Now having diarrhea.

## 2017-07-15 NOTE — IP AVS SNAPSHOT
" Home Care Instructions                                                                                                                  Name:Dorie Wadsworth  Medical Record Number:[id number]  CSN: [id number]    YOB: 1935   Age: 81 y.o.  Sex: female  HT:1.727 m (5' 8\") WT: 77.111 kg (170 lb)          Admit Date: 7/15/2017     Discharge Date:   Today's Date: 7/16/2017  Attending Doctor:  AMPARO Trevino*                  Allergies:  Cardizem; Doxycycline; Sulfa drugs; Zyvox; Codeine; and Macrobid            Discharge Instructions       Discharge Instructions    Discharged to home by car with relative. Discharged via wheelchair, hospital escort: Yes.  Special equipment needed: Not Applicable    Be sure to schedule a follow-up appointment with your primary care doctor or any specialists as instructed.     Discharge Plan:   Diet Plan: Discussed  Activity Level: Discussed  Confirmed Follow up Appointment: Patient to Call and Schedule Appointment  Confirmed Symptoms Management: Discussed  Medication Reconciliation Updated: Yes  Influenza Vaccine Indication: Not indicated: Previously immunized this influenza season and > 8 years of age    I understand that a diet low in cholesterol, fat, and sodium is recommended for good health. Unless I have been given specific instructions below for another diet, I accept this instruction as my diet prescription.   Other diet: advance as tolerated    Special Instructions: None    · Is patient discharged on Warfarin / Coumadin?   No     · Is patient Post Blood Transfusion?  No    Depression / Suicide Risk    As [facility] are [facility], it is important to learn how to keep safe from harming yourself.    Recognize the warning signs:  · Abrupt changes in personality, positive or negative- including increase in energy   · Giving away possessions  · Change in eating patterns- significant weight changes-  positive or negative  · Change in " sleeping patterns- unable to sleep or sleeping all the time   · Unwillingness or inability to communicate  · Depression  · Unusual sadness, discouragement and loneliness  · Talk of wanting to die  · Neglect of personal appearance   · Rebelliousness- reckless behavior  · Withdrawal from people/activities they love  · Confusion- inability to concentrate     If you or a loved one observes any of these behaviors or has concerns about self-harm, here's what you can do:  · Talk about it- your feelings and reasons for harming yourself  · Remove any means that you might use to hurt yourself (examples: pills, rope, extension cords, firearm)  · Get professional help from the community (Mental Health, Substance Abuse, psychological counseling)  · Do not be alone:Call your Safe Contact- someone whom you trust who will be there for you.  · Call your local CRISIS HOTLINE 875-0027 or 482-934-9191  · Call your local Children's Mobile Crisis Response Team [place] (012) 459-5306 or www.Tout  · Call the toll free National Suicide Prevention Hotlines   · National Suicide Prevention Lifeline 263-691-RRBL (5629)  · National Hope Line Network 800-SUICIDE (347-0668)      Abdominal Pain (Nonspecific)  Your exam might not show the exact reason you have abdominal pain. Since there are many different causes of abdominal pain, another checkup and more tests may be needed. It is very important to follow up for lasting (persistent) or worsening symptoms. A possible cause of abdominal pain in any person who still has his or her appendix is acute appendicitis. Appendicitis is often hard to diagnose. Normal blood tests, urine tests, ultrasound, and CT scans do not completely rule out early appendicitis or other causes of abdominal pain. Sometimes, only the changes that happen over time will allow appendicitis and other causes of abdominal pain to be determined. Other potential problems that may require surgery may also take time to  become more apparent. Because of this, it is important that you follow all of the instructions below.  HOME CARE INSTRUCTIONS   · Rest as much as possible.   · Do not eat solid food until your pain is gone.   · While adults or children have pain: A diet of water, weak decaffeinated tea, broth or bouillon, gelatin, oral rehydration solutions (ORS), frozen ice pops, or ice chips may be helpful.   · When pain is gone in adults or children: Start a light diet (dry toast, crackers, applesauce, or white rice). Increase the diet slowly as long as it does not bother you. Eat no dairy products (including cheese and eggs) and no spicy, fatty, fried, or high-fiber foods.   · Use no alcohol, caffeine, or cigarettes.   · Take your regular medicines unless your caregiver told you not to.   · Take any prescribed medicine as directed.   · Only take over-the-counter or prescription medicines for pain, discomfort, or fever as directed by your caregiver. Do not give aspirin to children.   If your caregiver has given you a follow-up appointment, it is very important to keep that appointment. Not keeping the appointment could result in a permanent injury and/or lasting (chronic) pain and/or disability. If there is any problem keeping the appointment, you must call to reschedule.   SEEK IMMEDIATE MEDICAL CARE IF:   · Your pain is not gone in 24 hours.   · Your pain becomes worse, changes location, or feels different.   · You or your child has an oral temperature above 102° F (38.9° C), not controlled by medicine.   · Your baby is older than 3 months with a rectal temperature of 102° F (38.9° C) or higher.   · Your baby is 3 months old or younger with a rectal temperature of 100.4° F (38° C) or higher.   · You have shaking chills.   · You keep throwing up (vomiting) or cannot drink liquids.   · There is blood in your vomit or you see blood in your bowel movements.   · Your bowel movements become dark or black.   · You have frequent bowel  movements.   · Your bowel movements stop (become blocked) or you cannot pass gas.   · You have bloody, frequent, or painful urination.   · You have yellow discoloration in the skin or whites of the eyes.   · Your stomach becomes bloated or bigger.   · You have dizziness or fainting.   · You have chest or back pain.   MAKE SURE YOU:   · Understand these instructions.   · Will watch your condition.   · Will get help right away if you are not doing well or get worse.   Document Released: 12/18/2006 Document Revised: 03/11/2013 Document Reviewed: 11/15/2010  ExitCare® Patient Information ©2013 "[employer]".      Diarrhea  Diarrhea is frequent loose and watery bowel movements. It can cause you to feel weak and dehydrated. Dehydration can cause you to become tired and thirsty, have a dry mouth, and have decreased urination that often is dark yellow. Diarrhea is a sign of another problem, most often an infection that will not last long. In most cases, diarrhea typically lasts 2-3 days. However, it can last longer if it is a sign of something more serious. It is important to treat your diarrhea as directed by your caregiver to lessen or prevent future episodes of diarrhea.  CAUSES   Some common causes include:  · Gastrointestinal infections caused by viruses, bacteria, or parasites.  · Food poisoning or food allergies.  · Certain medicines, such as antibiotics, chemotherapy, and laxatives.  · Artificial sweeteners and fructose.  · Digestive disorders.  HOME CARE INSTRUCTIONS  · Ensure adequate fluid intake (hydration): Have 1 cup (8 oz) of fluid for each diarrhea episode. Avoid fluids that contain simple sugars or sports drinks, fruit juices, whole milk products, and sodas. Your urine should be clear or pale yellow if you are drinking enough fluids. Hydrate with an oral rehydration solution that you can purchase at pharmacies, retail stores, and online. You can prepare an oral rehydration solution at home by mixing the  following ingredients together:  ¨  - tsp table salt.  ¨ ¾ tsp baking soda.  ¨  tsp salt substitute containing potassium chloride.  ¨ 1  tablespoons sugar.  ¨ 1 L (34 oz) of water.  · Certain foods and beverages may increase the speed at which food moves through the gastrointestinal (GI) tract. These foods and beverages should be avoided and include:  ¨ Caffeinated and alcoholic beverages.  ¨ High-fiber foods, such as raw fruits and vegetables, nuts, seeds, and whole grain breads and cereals.  ¨ Foods and beverages sweetened with sugar alcohols, such as xylitol, sorbitol, and mannitol.  · Some foods may be well tolerated and may help thicken stool including:  ¨ Starchy foods, such as rice, toast, pasta, low-sugar cereal, oatmeal, grits, baked potatoes, crackers, and bagels.  ¨ Bananas.  ¨ Applesauce.  · Add probiotic-rich foods to help increase healthy bacteria in the GI tract, such as yogurt and fermented milk products.  · Wash your hands well after each diarrhea episode.  · Only take over-the-counter or prescription medicines as directed by your caregiver.  · Take a warm bath to relieve any burning or pain from frequent diarrhea episodes.  SEEK IMMEDIATE MEDICAL CARE IF:   · You are unable to keep fluids down.  · You have persistent vomiting.  · You have blood in your stool, or your stools are black and tarry.  · You do not urinate in 6-8 hours, or there is only a small amount of very dark urine.  · You have abdominal pain that increases or localizes.  · You have weakness, dizziness, confusion, or light-headedness.  · You have a severe headache.  · Your diarrhea gets worse or does not get better.  · You have a fever or persistent symptoms for more than 2-3 days.  · You have a fever and your symptoms suddenly get worse.  MAKE SURE YOU:   · Understand these instructions.  · Will watch your condition.  · Will get help right away if you are not doing well or get worse.     This information is not intended to replace  advice given to you by your health care provider. Make sure you discuss any questions you have with your health care provider.     Document Released: 12/08/2003 Document Revised: 01/08/2016 Document Reviewed: 08/25/2013  [employer] Patient Education ©2016 [employer]          Your appointments     Jul 21, 2017  1:15 PM   Anti-Coag Routine with [place] ANTI-COAG   [facility] [place]    [address]  [place] [address] [phone]            Aug 15, 2017 11:15 AM   FOLLOW UP with Rose Whipple M.D.   [facility] (--)    [address]  [address]  [place] [address] [phone]                 Discharge Medication Instructions:    Below are the medications your physician expects you to take upon discharge:    Review all your home medications and newly ordered medications with your doctor and/or pharmacist. Follow medication instructions as directed by your doctor and/or pharmacist.    Please keep your medication list with you and share with your physician.               Medication List      CONTINUE taking these medications        Instructions    Morning Afternoon Evening Bedtime    apixaban 5mg Tabs   Last time this was given:  5 mg on 7/16/2017 10:00 AM   Commonly known as:  ELIQUIS        Take 1 Tab by mouth 2 Times a Day.   Dose:  5 mg                        Biotin 10 MG Caps        Take 1 Cap by mouth every day. Indications: **OTC**   Dose:  1 Cap                        cefUROXime 250 MG Tabs   Last time this was given:  250 mg on 7/16/2017 10:00 AM   Commonly known as:  CEFTIN        Take 250 mg by mouth 2 times a day. 10 DAYS   Dose:  250 mg                        COMBIGAN 0.2-0.5 % Soln   Generic drug:  Brimonidine Tartrate-Timolol        Place 1 Drop in both eyes 2 Times a Day.   Dose:  1 Drop                        furosemide 20 MG Tabs   Commonly known as:  LASIX        Take 0.5 Tabs by mouth as  needed.   Dose:  10 mg                        levothyroxine 25 MCG Tabs   Last time this was given:  25 mcg on 7/16/2017 10:00 AM   Commonly known as:  SYNTHROID        Take 25 mcg by mouth Every morning on an empty stomach.   Dose:  25 mcg                        lovastatin 10 MG tablet   Last time this was given:  10 mg on 7/16/2017 10:01 AM   Commonly known as:  MEVACOR        Take 10 mg by mouth every day.   Dose:  10 mg                        LUMIGAN 0.01 % Soln   Generic drug:  bimatoprost        Place 1 Drop in both eyes every day. Both eyes   Dose:  1 Drop                        metoprolol SR 25 MG Tb24   Last time this was given:  25 mg on 7/16/2017 10:00 AM   Commonly known as:  TOPROL XL        Take 25 mg by mouth every day.   Dose:  25 mg                        pantoprazole 40 MG Tbec   Commonly known as:  PROTONIX        Take 40 mg by mouth every day.   Dose:  40 mg                        * PRESERVISION AREDS PO   Last time this was given:  1 Tab on 7/16/2017 10:00 AM        Take 1 Tab by mouth 2 Times a Day.   Dose:  1 Tab                        * CENTRUM SILVER ULTRA WOMENS Tabs   Last time this was given:  1 Tab on 7/16/2017 10:00 AM        Take 1 Tab by mouth every day.   Dose:  1 Tab                        * Notice:  This list has 2 medication(s) that are the same as other medications prescribed for you. Read the directions carefully, and ask your doctor or other care provider to review them with you.            Orders for after discharge     REFERRAL TO SKILLED NURSING FACILITY    Complete by:  As directed              Instructions           Diet / Nutrition:    Follow any diet instructions given to you by your doctor or the dietician, including how much salt (sodium) you are allowed each day.    If you are overweight, talk to your doctor about a weight reduction plan.    Activity:    Remain physically active following your doctor's instructions about exercise and activity.    Rest often.     Any  time you become even a little tired or short of breath, SIT DOWN and rest.    Worsening Symptoms:    Report any of the following signs and symptoms to the doctor's office immediately:    *Pain of jaw, arm, or neck  *Chest pain not relieved by medication                               *Dizziness or loss of consciousness  *Difficulty breathing even when at rest   *More tired than usual                                       *Bleeding drainage or swelling of surgical site  *Swelling of feet, ankles, legs or stomach                 *Fever (>100ºF)  *Pink or blood tinged sputum  *Weight gain (3lbs/day or 5lbs /week)           *Shock from internal defibrillator (if applicable)  *Palpitations or irregular heartbeats                *Cool and/or numb extremities    Stroke Awareness    Common Risk Factors for Stroke include:    Age  Atrial Fibrillation  Carotid Artery Stenosis  Diabetes Mellitus  Excessive alcohol consumption  High blood pressure  Overweight   Physical inactivity  Smoking    Warning signs and symptoms of a stroke include:    *Sudden numbness or weakness of the face, arm or leg (especially on one side of the body).  *Sudden confusion, trouble speaking or understanding.  *Sudden trouble seeing in one or both eyes.  *Sudden trouble walking, dizziness, loss of balance or coordination.Sudden severe headache with no known cause.    It is very important to get treatment quickly when a stroke occurs. If you experience any of the above warning signs, call 911 immediately.                   Disclaimer         Quit Smoking / Tobacco Use:    I understand the use of any tobacco products increases my chance of suffering from future heart disease or stroke and could cause other illnesses which may shorten my life. Quitting the use of tobacco products is the single most important thing I can do to improve my health. For further information on smoking / tobacco cessation call a Toll Free Quit Line at 1-689.761.5837 (*National  Cancer [place]) or 1-228.317.5746 (American Lung Association) or you can access the web based program at www.lungusa.org.    [place] Tobacco Users Help Line:  (520) 295-5039       Toll Free: 0-445-414-7669  Quit Tobacco Program [facility] [phone]    Crisis Hotline:    [place] Crisis Hotline:  3-067-HQJPDWM or 1-398.869.4809    [place] Crisis Hotline:    1-202.514.7003 or 520-125-7458    Discharge Survey:   Thank you for choosing [facility]. We hope we did everything we could to make your hospital stay a pleasant one. You may be receiving a phone survey and we would appreciate your time and participation in answering the questions. Your input is very valuable to us in our efforts to improve our service to our patients and their families.        My signature on this form indicates that:    1. I have reviewed and understand the above information.  2. My questions regarding this information have been answered to my satisfaction.  3. I have formulated a plan with my discharge nurse to obtain my prescribed medications for home.                  Disclaimer         __________________________________                     __________       ________                       Patient Signature                                                 Date                    Time

## 2017-07-15 NOTE — IP AVS SNAPSHOT
" <p align=\"LEFT\"><IMG SRC=\"//[email or web address]\" alt=\"Image\" WIDTH=\"50%\" HEIGHT=\"200\" BORDER=\"\"></p>                   Name:Dorie Wadsworth  Medical Record Number:[id number]  CSN: [id number]    YOB: 1935   Age: 81 y.o.  Sex: female  HT:1.727 m (5' 8\") WT: 77.111 kg (170 lb)          Admit Date: 7/15/2017     Discharge Date:   Today's Date: 7/16/2017  Attending Doctor:  AMPARO Trevino*                  Allergies:  Cardizem; Doxycycline; Sulfa drugs; Zyvox; Codeine; and Macrobid          Your appointments     Jul 21, 2017  1:15 PM   Anti-Coag Routine with [place] ANTI-COAG   [facility] ([address] [id number] [phone]            Aug 15, 2017 11:15 AM   FOLLOW UP with Rose Whipple M.D.   [facility] (--)    [address]  [address]  [facility] [address] [phone]                 Medication List      Take these Medications        Instructions    apixaban 5mg Tabs   Commonly known as:  ELIQUIS    Take 1 Tab by mouth 2 Times a Day.   Dose:  5 mg       Biotin 10 MG Caps    Take 1 Cap by mouth every day. Indications: **OTC**   Dose:  1 Cap       cefUROXime 250 MG Tabs   Commonly known as:  CEFTIN    Take 250 mg by mouth 2 times a day. 10 DAYS   Dose:  250 mg       COMBIGAN 0.2-0.5 % Soln   Generic drug:  Brimonidine Tartrate-Timolol    Place 1 Drop in both eyes 2 Times a Day.   Dose:  1 Drop       furosemide 20 MG Tabs   Commonly known as:  LASIX    Take 0.5 Tabs by mouth as needed.   Dose:  10 mg       levothyroxine 25 MCG Tabs   Commonly known as:  SYNTHROID    Take 25 mcg by mouth Every morning on an empty stomach.   Dose:  25 mcg       lovastatin 10 MG tablet   Commonly known as:  MEVACOR    Take 10 mg by mouth every day.   Dose:  10 mg       LUMIGAN 0.01 % Soln   Generic drug:  bimatoprost    Place 1 Drop in both eyes every day. Both eyes   Dose:  1 Drop   "    metoprolol SR 25 MG Tb24   Commonly known as:  TOPROL XL    Take 25 mg by mouth every day.   Dose:  25 mg       pantoprazole 40 MG Tbec   Commonly known as:  PROTONIX    Take 40 mg by mouth every day.   Dose:  40 mg       * PRESERVISION AREDS PO    Take 1 Tab by mouth 2 Times a Day.   Dose:  1 Tab       * CENTRUM SILVER ULTRA WOMENS Tabs    Take 1 Tab by mouth every day.   Dose:  1 Tab       * Notice:  This list has 2 medication(s) that are the same as other medications prescribed for you. Read the directions carefully, and ask your doctor or other care provider to review them with you.

## 2017-07-15 NOTE — H&P
[facility] Hospitalist H&P    Admission Date / Service Date: 07/15/2017    Patient's PCP: Yue Restrepo M.D.    CC: Abdominal pain, nausea and vomiting    HPI: This is a 81 years old female presenting to the hospital with above complaints. She was recently admitted to the hospital on 07/10/2017 and discharged 07/13/2017 when she was treated for small bowel enteritis. There was concern for underlying UTI. She received three days of IV ceftriaxone during the hospital stay. She reports she was feeling fell when going home. Then she had been weak at home. She reports that last night she was weak to the point that she had to crawl into her bed. She reports that she had development of abdominal pain last night. She reports it has persisted since then. She reports the pain to be located in lower abdominal quadrants. It is a hurting pain in character. She reports prior to presentation it was 10/10 in intensity which improved with IV dilaudid. She denies any radiation of her pain. She reports improvement with pain medications received in the ER. She reports worsening with movement. She reports associated nausea and vomiting. She reports vomiting today twice prior to coming to the ER. This did not contain any blood. She denies any fever and chills. She reports she never gets fevers but last night had diaphoresis with pain. She reports interval development of diarrhea. She reports prior to presentation today she had three bowel movements (massive and profusely water) and last night another four of same order. She denies any BRBPR. Slighter darker in appearance. Otherwise she denies any headaches, chest pain, SOB, cough, LE swelling, orthopnea or PND. She has an underlying ileal conduit. During my evaluation I discussed the patient's underlying ER work up with her. I discussed the results of her CBC, electrolytes, renal function and CT abdomen and pelvis. After this patient was really upset with me and her results. She reports  there must be something wrong with me and I need to be admitted to the hospital. I cannot go all the way back to [place] and be miserable. Her daughter shares the same concerns. Patient lives with her son who is a long distance  is away 4 days of a week. Daughter lives four minutes away and checks on mother. Daughter believes, given patients current condition she is no way stable to go back home. At this point I have offered overnight observation which they are agreeable to.     Medications / Drug list prior to admission:  No current facility-administered medications on file prior to encounter.     Current Outpatient Prescriptions on File Prior to Encounter   Medication Sig Dispense Refill   • Brimonidine Tartrate-Timolol (COMBIGAN) 0.2-0.5 % Solution Place 1 Drop in both eyes 2 Times a Day.     • levothyroxine (SYNTHROID) 25 MCG Tab Take 25 mcg by mouth Every morning on an empty stomach.     • Multiple Vitamins-Minerals (CENTRUM SILVER ULTRA WOMENS) Tab Take 1 Tab by mouth every day.     • apixaban (ELIQUIS) 5mg Tab Take 1 Tab by mouth 2 Times a Day. 180 Tab 3   • Multiple Vitamins-Minerals (PRESERVISION AREDS PO) Take 1 Tab by mouth 2 Times a Day.     • furosemide (LASIX) 20 MG Tab Take 0.5 Tabs by mouth as needed. 30 Tab 11   • lovastatin (MEVACOR) 10 MG tablet Take 10 mg by mouth every day.     • pantoprazole (PROTONIX) 40 MG TBEC Take 40 mg by mouth every day.     • Biotin 10 MG CAPS Take 1 Cap by mouth every day. Indications: **OTC**     • Bimatoprost (LUMIGAN) 0.01 % SOLN Place 1 Drop in both eyes every day. Both eyes         Current list of administered Medications:    Current facility-administered medications:   •  HYDROmorphone (DILAUDID) injection 0.5 mg, 0.5 mg, Intravenous, Q30 MIN PRN, Damien Coronado M.D., 0.5 mg at 07/15/17 1531  •  lorazepam (ATIVAN) injection 1 mg, 1 mg, Intravenous, Once, Damien Coronado M.D.    Current outpatient prescriptions:   •  metoprolol SR (TOPROL XL) 25 MG  TABLET SR 24 HR, Take 25 mg by mouth every day., Disp: , Rfl:   •  cefUROXime (CEFTIN) 250 MG Tab, Take 250 mg by mouth 2 times a day. 10 DAYS, Disp: , Rfl:   •  Brimonidine Tartrate-Timolol (COMBIGAN) 0.2-0.5 % Solution, Place 1 Drop in both eyes 2 Times a Day., Disp: , Rfl:   •  levothyroxine (SYNTHROID) 25 MCG Tab, Take 25 mcg by mouth Every morning on an empty stomach., Disp: , Rfl:   •  Multiple Vitamins-Minerals (CENTRUM SILVER ULTRA WOMENS) Tab, Take 1 Tab by mouth every day., Disp: , Rfl:   •  apixaban (ELIQUIS) 5mg Tab, Take 1 Tab by mouth 2 Times a Day., Disp: 180 Tab, Rfl: 3  •  Multiple Vitamins-Minerals (PRESERVISION AREDS PO), Take 1 Tab by mouth 2 Times a Day., Disp: , Rfl:   •  furosemide (LASIX) 20 MG Tab, Take 0.5 Tabs by mouth as needed., Disp: 30 Tab, Rfl: 11  •  lovastatin (MEVACOR) 10 MG tablet, Take 10 mg by mouth every day., Disp: , Rfl:   •  pantoprazole (PROTONIX) 40 MG TBEC, Take 40 mg by mouth every day., Disp: , Rfl:   •  Biotin 10 MG CAPS, Take 1 Cap by mouth every day. Indications: **OTC**, Disp: , Rfl:   •  Bimatoprost (LUMIGAN) 0.01 % SOLN, Place 1 Drop in both eyes every day. Both eyes, Disp: , Rfl:     Past Medical History   Diagnosis Date   • Indigestion    • Glaucoma    • T.I.A.    • CAD (coronary artery disease)    • Arthritis      fingers   • Unspecified hemorrhagic conditions      not sure when (2009)  blood in urin    • Hypertension    • Cancer (CMS-HCC)      skin   • Unspecified disorder of thyroid    • A-fib (CMS-HCC)    • Atrial fibrillation (CMS-HCC) 2/2009     A FIB X2,[ resolved on own][   • Paroxysmal atrial fibrillation (CMS-HCC)    • Urinary bladder disorder      urinary incontinence   • Bladder problem      Chronic bladder infection for the past 17months   • Bladder disease 2011     bladder removed   • Ailment      urostomy   • Hyperlipidemia 2/24/2011   • UTI (urinary tract infection) 2/24/2011   • Hypothyroid 2/24/2011   • CHEST PAIN 3/11/2011   • TIA (transient  ischemic attack) 3/11/2011   • Sepsis urinary source 1/23/2012   • Vaginal bleeding 10/22/2012   • History of hematuria      3/15/10: with Plavix.   • Stroke (CMS-HCC)      2 TIAs 2/2010, no stroke, 2/2013   • CATARACT      freddie surgery complete   • Atrial fibrillation, rapid (CMS-HCC) 1/31/2014   • Other specified pre-operative examination 1/28/2014       Past Surgical History   Procedure Laterality Date   • Bladder biopsy with cystoscopy  10/10/08     Performed by CARLTON LUNA at SURGERY [facility] ORS   • Pyelogram  10/10/08     Performed by CARLTON LUNA at SURGERY [facility] ORS   • Retrogrades  10/10/08     Performed by CARLTON LUNA at SURGERY [facility] ORS   • Bladder biopsy with cystoscopy  6/15/2009     Performed by CARLTON LUNA at SURGERY [facility]   • Bladder biopsy with cystoscopy  10/12/2009     Performed by JENNIFER LINCOLN at SURGERY [facility]   • Other abdominal surgery       appendectomy, pancratitis    • Cholecystectomy  1994   • Cholecystectomy  1994   • Gyn surgery       hysterectomy   • Pelvic exam under anesthesia  10/12/2009     Performed by JENNIFER LINCOLN at SURGERY [facility] ORS   • Rectocele repair  11/3/2009     Performed by ZEKE HARRINGTON at SURGERY SAME DAY [facility]   • Other       TONSILLECTOMY AS TEENAGER   • Cystectomy  9/6/2011     Performed by JENNIFER LINCOLN at SURGERY [facility] ORS   • Ileo loop diversion  9/6/2011     Performed by JENNIFER LINCOLN at SURGERY [facility]   • Biopsy general  10/22/2012     Performed by Jennifer Lincoln M.D. at SURGERY [facility] ORS   • Parastomal hernia repair   1/28/2014     Performed by Nicol Dorman M.D. at SURGERY [facility]       Family History   Problem Relation Age of Onset   • Stroke Mother 81   • Stroke Father 77       Social History     Social History   • Marital Status:      Spouse Name: N/A   • Number of Children: N/A   • Years of Education: N/A  "    Occupational History   • Not on file.     Social History Main Topics   • Smoking status: Never Smoker    • Smokeless tobacco: Never Used   • Alcohol Use: No   • Drug Use: No   • Sexual Activity: Not on file     Other Topics Concern   • Not on file     Social History Narrative       Allergies   Allergen Reactions   • Cardizem Swelling   • Doxycycline      Swollen lips.   • Sulfa Drugs      Makes tongue swell, severely   • Zyvox Swelling     \"Whole mouth swelled up\"    • Codeine      Does not remember the reaction     • Macrobid [Nitrofurantoin Monohydrate Macrocrystals]      Does not remember the reaction       Review of systems:  A detail review of symptoms was reviewed with patient. This is reviewed in H&P and PMH. Otherwise negative.     Physical exam:  Patient Vitals for the past 24 hrs:   BP Temp Pulse Resp SpO2 Height Weight   07/15/17 1538 - - 72 19 93 % - -   07/15/17 1518 - - 68 17 93 % - -   07/15/17 1438 - - 71 14 94 % - -   07/15/17 1418 - - 67 (!) 21 96 % - -   07/15/17 1358 - - 68 (!) 24 97 % - -   07/15/17 1338 - - 72 (!) 24 95 % - -   07/15/17 1318 - - 71 (!) 25 96 % - -   07/15/17 1258 - - 71 (!) 29 97 % - -   07/15/17 1103 - - - - - - 79 kg (174 lb 2.6 oz)   07/15/17 1059 151/98 mmHg 37.1 °C (98.8 °F) 68 18 97 % 1.727 m (5' 7.99\") -       General: A&O x 3. In no acute distress.   HEENT: Head is normocephalic. EOMI, PERRLA, No conjunctival pallor or scleral icterus. No Nasal discharge. No pharyngeal exudates or erythema. Ears appear normal externally. Otoscopic examination not performed. Moist mucous membranes are noted.   Neck: No palpable LN. No palpable thyromegaly. No JVD.   CVS: RRR. S1 + S2. No M/R/G  Resp: CTAB. No wheezing or crackles/rhonchi.  Abdomen: Soft, NT, ND, BS +ve and normal in frequency. No palpable organomegaly. Ileal conduit /  Ostomy bag with clear urine noted.   : Not examined.   MSK: No joint swelling / tenderness on exam. No back/spine abnormalities noted.   Skin: No " rashes, erythema or wounds.   Neurological: CN I-XII examined and without any gross deficits. Gait is normal. Motor 5/5 in all extremities. No gross sensory deficits. DTR +2 in b/l upper and lower extremities. No cerebellar signs. No rigidity on exam.   Extremities: Pulse 2+ in b/l LE. No edema. No cyanosis.     Data:  Laboratory studies:  Lab Results   Component Value Date/Time    WBC 8.5 07/15/2017 01:30 PM    RBC 4.86 07/15/2017 01:30 PM    HEMOGLOBIN 13.4 07/15/2017 01:30 PM    HEMATOCRIT 42.0 07/15/2017 01:30 PM    MCV 86.4 07/15/2017 01:30 PM    MCH 27.6 07/15/2017 01:30 PM    MCHC 31.9* 07/15/2017 01:30 PM    MPV 10.7 07/15/2017 01:30 PM    NEUTROPHILS-POLYS 65.10 07/15/2017 01:30 PM    LYMPHOCYTES 19.50* 07/15/2017 01:30 PM    MONOCYTES 11.60 07/15/2017 01:30 PM    EOSINOPHILS 2.70 07/15/2017 01:30 PM    BASOPHILS 0.60 07/15/2017 01:30 PM    HYPOCHROMIA 1+ 02/03/2014 06:28 AM    ANISOCYTOSIS 1+ 03/19/2012 10:00 AM        Lab Results   Component Value Date/Time    SODIUM 139 07/15/2017 01:30 PM    POTASSIUM 4.0 07/15/2017 01:30 PM    CHLORIDE 109 07/15/2017 01:30 PM    CO2 22 07/15/2017 01:30 PM    GLUCOSE 101* 07/15/2017 01:30 PM    BUN 19 07/15/2017 01:30 PM    CREATININE 1.12 07/15/2017 01:30 PM        Recent Labs      07/15/17   1330   ASTSGOT  22   ALTSGPT  14   TBILIRUBIN  0.4   ALKPHOSPHAT  106*   GLOBULIN  3.8*       Lab Results   Component Value Date/Time    PT 16.6* 04/04/2017 01:18 PM    INR 1.30* 04/04/2017 01:18 PM        Imaging:  CT-ABDOMEN-PELVIS WITH   Final Result      1.  No acute abnormality identified within the abdomen or pelvis      2.  Cirrhosis      3.  Prior cholecystectomy with mild associated biliary dilation      4.  Bilateral renal scarring      5.  Ileal conduit present      6.  Right parastomal hernia containing small bowel but without evidence for obstruction or inflammation      7.  Previous hysterectomy             Admission status: Observation    Impression:  1. Abdominal  pain  2. Nausea, vomiting and diarrhea  3. R/O C Diff  4. CKD stage III  5. Paroxysmal atrial fibrillation  6. Hyperlipidemia  7. Hypothyroidism  8. On therapeutic anticoagulation with eliquis  9. Hypertension  10. Glaucoma  11. GERD  12. Hx CAD  13. S/P cystectomy with ileal conduit   14. UA consistent with ileal urine    Plan:  At this point patient will be admitted to the hospital for symptom control. For the level of her diarrhea and vomiting would anticipate some form of renal dysfunction, electrolyte abnormalities or any other objective manifestations. CT obtained reveals resolution of prior abnormalities. CBC obtained is baseline for the patient. No evidence of bandemia or leukocytosis. Patient is afebrile with stable vital signs. Would recommend checking a C Diff given recent abx usage, PPI use and recent nosocomial exposure. This has been ordered by me. Urine is likely reflective of ileal urine. Without any objective signs of infection and given suspicion for C Diff, will hold abx treatment for now. If concerns exist consider ID consultation as opposed to ongoing empiric treatment. Continue gentle IVF hydration. Maintain pain control and anti emetic control. Monitor patient's bowel movement and discuss with rounding hospitalist onb rounds for accurate assessment. Check a TSH. Initiate the patient on a full liquid diet and advance as tolerated by the patient. Repeat CBC / BMP in the morning tomorrow. Obtain PT evaluation. Otherwise continue baseline regimen of chronic co morbid conditions. On metoprolol and AC with eliquis for underlying A-Fib. Patient admitted with full code status.         Abbi Booth MD  [facility] hospitalist  07/15/2017

## 2017-07-15 NOTE — IP AVS SNAPSHOT
7/16/2017    Dorie W[address]  [place] [address]    Dear Dorie:    [facility] wants to ensure your discharge home is safe and you or your loved ones have had all of your questions answered regarding your care after you leave the hospital.    Below is a list of resources and contact information should you have any questions regarding your hospital stay, follow-up instructions, or active medical symptoms.    Questions or Concerns Regarding… Contact   Medical Questions Related to Your Discharge  (7 days a week, 8am-5pm) Contact a Nurse Care Coordinator   [phone]   Medical Questions Not Related to Your Discharge  (24 hours a day / 7 days a week)  Contact the Nurse Health Line   [phone]    Medications or Discharge Instructions Refer to your discharge packet   or contact your [facility] Primary Care Provider   [phone]   Follow-up Appointment(s) Schedule your appointment via [employer]   or contact Scheduling [phone]   Billing Review your statement via [employer]  or contact Billing [phone]   Medical Records Review your records via [employer]   or contact Medical Records [phone]     You may receive a telephone call within two days of discharge. This call is to make certain you understand your discharge instructions and have the opportunity to have any questions answered. You can also easily access your medical information, test results and upcoming appointments via the [employer] free online health management tool. You can learn more and sign up at [employer] For assistance setting up your [employer] account, please call [phone].    Once again, we want to ensure your discharge home is safe and that you have a clear understanding of any next steps in your care. If you have any questions or concerns, please do not hesitate to contact us, we are here for you. Thank you for choosing [facility] for your healthcare needs.    Sincerely,    Your [facility] Healthcare Team

## 2017-07-15 NOTE — ED PROVIDER NOTES
ED Provider Note    CHIEF COMPLAINT  Chief Complaint   Patient presents with   • Nausea/Vomiting/Diarrhea   • Abdominal Pain   • Dizziness       HPI  Dorie Wadsworth is a 81 y.o. female who presents to the emergency department noting increased abdominal pain since Thursday. Patient was diagnosed as having some type of bowel inflammation. Patient initially realized that she was not suffering from a normal urinary tract infection. Had lower abdominal pain and pressure. Patient originally thought that she possibly ate something wrong. On Monday she had increased symptoms. She was seen by her physician and told to come to the ED. Patient was admitted and subsequently discharged on Thursday. Patient notes that she felt fine but then upon going to bed last evening had markedly increased pain with associated nausea and diarrhea. Patient states she feels somewhat dizzy with standing. And has needed to use increased amount of pain medication. She describes the pain as moderate 5 out of 10. Notes some minimal back pain.    No aggravating or relieving maneuvers. She describes no blood in his stool.    REVIEW OF SYSTEMS  See HPI for further details. All other systems are negative.     PAST MEDICAL HISTORY  Past Medical History   Diagnosis Date   • Indigestion    • Glaucoma    • T.I.A.    • CAD (coronary artery disease)    • Arthritis      fingers   • Unspecified hemorrhagic conditions      not sure when (2009)  blood in urin    • Hypertension    • Cancer (CMS-HCC)      skin   • Unspecified disorder of thyroid    • A-fib (CMS-[facility])    • Atrial fibrillation (CMS-HCC) 2/2009     A FIB X2,[ resolved on own][   • Paroxysmal atrial fibrillation (CMS-[facility])    • Urinary bladder disorder      urinary incontinence   • Bladder problem      Chronic bladder infection for the past 17months   • Bladder disease 2011     bladder removed   • Ailment      urostomy   • Hyperlipidemia 2/24/2011   • UTI (urinary tract infection) 2/24/2011   •  Hypothyroid 2/24/2011   • CHEST PAIN 3/11/2011   • TIA (transient ischemic attack) 3/11/2011   • Sepsis urinary source 1/23/2012   • Vaginal bleeding 10/22/2012   • History of hematuria      3/15/10: with Plavix.   • Stroke (CMS-HCC)      2 TIAs 2/2010, no stroke, 2/2013   • CATARACT      freddie surgery complete   • Atrial fibrillation, rapid (CMS-HCC) 1/31/2014   • Other specified pre-operative examination 1/28/2014       FAMILY HISTORY  No significant family history    SOCIAL HISTORY  Social History     Social History   • Marital Status:      Spouse Name: N/A   • Number of Children: N/A   • Years of Education: N/A     Social History Main Topics   • Smoking status: Never Smoker    • Smokeless tobacco: Never Used   • Alcohol Use: No   • Drug Use: No   • Sexual Activity: Not Asked     Other Topics Concern   • None     Social History Narrative       SURGICAL HISTORY  Past Surgical History   Procedure Laterality Date   • Bladder biopsy with cystoscopy  10/10/08     Performed by CARLTON LUNA at SURGERY [facility]   • Pyelogram  10/10/08     Performed by CARLTON LUNA at SURGERY [facility]   • Retrogrades  10/10/08     Performed by CARLTON LUNA at [facility]   • Bladder biopsy with cystoscopy  6/15/2009     Performed by CARLTON LUNA at SURGERY [facility]   • Bladder biopsy with cystoscopy  10/12/2009     Performed by JENNIFER MENDES at [facility]   • Other abdominal surgery       appendectomy, pancratitis    • Cholecystectomy  1994   • Cholecystectomy  1994   • Gyn surgery       hysterectomy   • Pelvic exam under anesthesia  10/12/2009     Performed by JENNIFER MENDES at SURGERY [facility] ORS   • Rectocele repair  11/3/2009     Performed by ZEKE HARRINGTON at SURGERY [facility] ORS   • Other       TONSILLECTOMY AS TEENAGER   • Cystectomy  9/6/2011     Performed by JENNIFER MENDES at SURGERY [facility] ORS   • Ileo loop diversion  9/6/2011  "    Performed by JENNIFER LINCOLN at SURGERY [facility]   • Biopsy general  10/22/2012     Performed by Jennifer Lincoln M.D. at SURGERY [facility]   • Parastomal hernia repair   1/28/2014     Performed by Nicol Dorman M.D. at SURGERY [facility]       CURRENT MEDICATIONS  Home Medications     **Home medications have not yet been reviewed for this encounter**           ALLERGIES  Allergies   Allergen Reactions   • Cardizem Swelling   • Doxycycline      Swollen lips.   • Sulfa Drugs      Makes tongue swell, severely   • Zyvox Swelling     \"Whole mouth swelled up\"    • Codeine      Does not remember the reaction     • Macrobid [Nitrofurantoin Monohydrate Macrocrystals]      Does not remember the reaction       PHYSICAL EXAM  VITAL SIGNS: /98 mmHg  Pulse 68  Temp(Src) 37.1 °C (98.8 °F)  Resp 18  Ht 1.727 m (5' 7.99\")  Wt 79 kg (174 lb 2.6 oz)  BMI 26.49 kg/m2  SpO2 97%    Constitutional: Well developed, Well nourished, No acute distress, Non-toxic appearance.   HENT: Normocephalic, Atraumatic, Bilateral external ears normal, Oropharynx moist, no evidence of dehydration, No oral exudates, Nose normal.   Eyes: PERRLA, EOMI, Conjunctiva normal, No discharge.   Neck: Normal range of motion, No tenderness, Supple, No stridor. No masses. No evidence of meningitis or meningismus.   Lymphatic: No lymphadenopathy noted.   Cardiovascular: Normal heart rate, Normal rhythm, No murmurs, No rubs, No gallops.   Thorax & Lungs: Normal breath sounds, No respiratory distress, No wheezing or rhonchi, No chest tenderness.   Abdomen: Bowel sounds normal, Soft, No tenderness, No masses, No pulsatile masses. No guarding or rebound. No evidence of peritoneal findings.  Skin: Warm, Dry, No erythema, No rash. No exanthem.   Back: No tenderness.   Musculoskeletal: Good range of motion in all major joints. No major deformities noted.   Neurologic: Alert & oriented x 3, Normal motor function, No focal " deficits noted.   Psychiatric: Affect normal, mood normal.                                                     Urologic: No CVA tenderness no evidence of pyelonephritis.                         EKG      RADIOLOGY/PROCEDURES  CT-ABDOMEN-PELVIS WITH   Final Result      1.  No acute abnormality identified within the abdomen or pelvis      2.  Cirrhosis      3.  Prior cholecystectomy with mild associated biliary dilation      4.  Bilateral renal scarring      5.  Ileal conduit present      6.  Right parastomal hernia containing small bowel but without evidence for obstruction or inflammation      7.  Previous hysterectomy               COURSE & MEDICAL DECISION MAKING  Pertinent Labs & Imaging studies reviewed. (See chart for details)  Review of his discharge diagnoses as follows:    DISCHARGE PROBLEM LIST  Principal Problem:    Enteritis POA: Yes  Active Problems:    Urinary tract infection POA: Yes    Atrial fibrillation (CMS-HCC) POA: Yes    HTN (hypertension) POA: Yes  Resolved Problems:    Leukocytosis POA: Yes  EKG Interpretation    Interpreted by me    Rhythm: atrial fibrillation - rapid  Rate: tachycardia 109  Axis: normal  Ectopy: none  Conduction: irregularly irregular  ST Segments: nonspecific changes  T Waves: no acute change  Q Waves: nonspecific    Clinical Impression: atrial fibrillation with rapid ventricular rate      Results for orders placed or performed during the hospital encounter of 07/15/17   CBC WITH DIFFERENTIAL   Result Value Ref Range    WBC 8.5 4.8 - 10.8 K/uL    RBC 4.86 4.20 - 5.40 M/uL    Hemoglobin 13.4 12.0 - 16.0 g/dL    Hematocrit 42.0 37.0 - 47.0 %    MCV 86.4 81.4 - 97.8 fL    MCH 27.6 27.0 - 33.0 pg    MCHC 31.9 (L) 33.6 - 35.0 g/dL    RDW 50.0 35.9 - 50.0 fL    Platelet Count 143 (L) 164 - 446 K/uL    MPV 10.7 9.0 - 12.9 fL    Neutrophils-Polys 65.10 44.00 - 72.00 %    Lymphocytes 19.50 (L) 22.00 - 41.00 %    Monocytes 11.60 0.00 - 13.40 %    Eosinophils 2.70 0.00 - 6.90 %     Basophils 0.60 0.00 - 1.80 %    Immature Granulocytes 0.50 0.00 - 0.90 %    Nucleated RBC 0.00 /100 WBC    Neutrophils (Absolute) 5.56 2.00 - 7.15 K/uL    Lymphs (Absolute) 1.66 1.00 - 4.80 K/uL    Monos (Absolute) 0.99 (H) 0.00 - 0.85 K/uL    Eos (Absolute) 0.23 0.00 - 0.51 K/uL    Baso (Absolute) 0.05 0.00 - 0.12 K/uL    Immature Granulocytes (abs) 0.04 0.00 - 0.11 K/uL    NRBC (Absolute) 0.00 K/uL   COMP METABOLIC PANEL   Result Value Ref Range    Sodium 139 135 - 145 mmol/L    Potassium 4.0 3.6 - 5.5 mmol/L    Chloride 109 96 - 112 mmol/L    Co2 22 20 - 33 mmol/L    Anion Gap 8.0 0.0 - 11.9    Glucose 101 (H) 65 - 99 mg/dL    Bun 19 8 - 22 mg/dL    Creatinine 1.12 0.50 - 1.40 mg/dL    Calcium 8.5 8.4 - 10.2 mg/dL    AST(SGOT) 22 12 - 45 U/L    ALT(SGPT) 14 2 - 50 U/L    Alkaline Phosphatase 106 (H) 30 - 99 U/L    Total Bilirubin 0.4 0.1 - 1.5 mg/dL    Albumin 3.3 3.2 - 4.9 g/dL    Total Protein 7.1 6.0 - 8.2 g/dL    Globulin 3.8 (H) 1.9 - 3.5 g/dL    A-G Ratio 0.9 g/dL   LIPASE   Result Value Ref Range    Lipase 61 (H) 7 - 58 U/L   URINALYSIS CULTURE, IF INDICATED   Result Value Ref Range    Color Yellow     Character Hazy (A)     Specific Gravity 1.010 <1.035    Ph 6.5 5.0-8.0    Glucose Negative Negative mg/dL    Ketones Negative Negative mg/dL    Protein Negative Negative mg/dL    Bilirubin Negative Negative    Nitrite Positive (A) Negative    Leukocyte Esterase Small (A) Negative    Occult Blood Large (A) Negative    Micro Urine Req Microscopic     Culture Indicated Yes UA Culture   URINE MICROSCOPIC (W/UA)   Result Value Ref Range    WBC 10-20 (A) /hpf    RBC >150 (A) /hpf    Bacteria Moderate (A) None /hpf    Epithelial Cells Few Few /hpf    Mucous Threads Few /hpf    Urine Crystals Few Amorphous /hpf   ESTIMATED GFR   Result Value Ref Range    GFR If  57 (A) >60 mL/min/1.73 m 2    GFR If Non  47 (A) >60 mL/min/1.73 m 2        Review of old charts is patient of her abdominal  pain which mostly resolved. Patient also treated for urinary tract infection. The urine culture noted that it was sensitive to Septra. Patient had serial abdominal examinations during observation showed no evidence of acute abdomen. No significant abnormality to laboratory. The patient's CAT scan shows resolution of previous inflamed intestines.    Patient and daughter are very concerned if she gets discharge. She is concerned that she will have increasing pain this evening. Also notes inability to walk secondary to generalized weakness.    Presentation reviewed with internal medicine patient will be admitted for observation.    FINAL IMPRESSION  1. Generalized abdominal pain    2. Weakness    3. Hematuria             Electronically signed by: Damien Coronado, 7/15/2017 12:39 PM

## 2017-07-15 NOTE — ED NOTES
Med rec complete per patient  Allergies reviewed    Patient was given Cipro here on Discharge but didn't take it, she was given Ceftin by another

## 2017-07-15 NOTE — IP AVS SNAPSHOT
SeedInvest Access Code: [email or web address]  Expires: 8/12/2017 10:12 AM    Your email address is not on file at [facility].  Email Addresses are required for you to sign up for SeedInvest, please contact [phone] to verify your personal information and to provide your email address prior to attempting to register for SeedInvest.    Dorie Wadsworth  [facility] [place] [address]    SeedInvest  A secure, online tool to manage your health information     [facility]’s SeedInvest® is a secure, online tool that connects you to your personalized health information from the privacy of your home -- day or night - making it very easy for you to manage your healthcare. Once the activation process is completed, you can even access your medical information using the SeedInvest christina, which is available for free in the Apple Christina store or Google Play store.     To learn more about SeedInvest, visit [email or web address]    There are two levels of access available (as shown below):   My Chart Features  [facility] Primary Care Doctor [facility]  Specialists [facility]  Urgent  Care Non-[facility] Primary Care Doctor   Email your healthcare team securely and privately 24/7 X X X    Manage appointments: schedule your next appointment; view details of past/upcoming appointments X      Request prescription refills. X      View recent personal medical records, including lab and immunizations X X X X   View health record, including health history, allergies, medications X X X X   Read reports about your outpatient visits, procedures, consult and ER notes X X X X   See your discharge summary, which is a recap of your hospital and/or ER visit that includes your diagnosis, lab results, and care plan X X  X     How to register for SeedInvest:  Once your e-mail address has been verified, follow the following steps to sign up for SeedInvest.     1. Go to  [email or web address]  2. Click on the Sign Up Now box, which takes you to the New Member Sign Up page.  You will need to provide the following information:  a. Enter your [facility] Access Code exactly as it appears at the top of this page. (You will not need to use this code after you’ve completed the sign-up process. If you do not sign up before the expiration date, you must request a new code.)   b. Enter your date of birth.   c. Enter your home email address.   d. Click Submit, and follow the next screen’s instructions.  3. Create a HackerOnet ID. This will be your [facility] login ID and cannot be changed, so think of one that is secure and easy to remember.  4. Create a [facility] password. You can change your password at any time.  5. Enter your Password Reset Question and Answer. This can be used at a later time if you forget your password.   6. Enter your e-mail address. This allows you to receive e-mail notifications when new information is available in [facility].  7. Click Sign Up. You can now view your health information.    For assistance activating your [facility] account, call [phone]

## 2017-07-16 VITALS
WEIGHT: 170 LBS | RESPIRATION RATE: 18 BRPM | OXYGEN SATURATION: 98 % | SYSTOLIC BLOOD PRESSURE: 150 MMHG | BODY MASS INDEX: 25.76 KG/M2 | TEMPERATURE: 97.6 F | DIASTOLIC BLOOD PRESSURE: 97 MMHG | HEIGHT: 68 IN | HEART RATE: 80 BPM

## 2017-07-16 PROBLEM — R10.9 ABDOMINAL PAIN: Status: RESOLVED | Noted: 2017-07-15 | Resolved: 2017-07-16

## 2017-07-16 PROBLEM — R19.7 DIARRHEA: Status: RESOLVED | Noted: 2017-07-15 | Resolved: 2017-07-16

## 2017-07-16 LAB
ANION GAP SERPL CALC-SCNC: 8 MMOL/L (ref 0–11.9)
BASOPHILS # BLD AUTO: 0.4 % (ref 0–1.8)
BASOPHILS # BLD: 0.04 K/UL (ref 0–0.12)
BUN SERPL-MCNC: 11 MG/DL (ref 8–22)
CALCIUM SERPL-MCNC: 7.9 MG/DL (ref 8.4–10.2)
CHLORIDE SERPL-SCNC: 110 MMOL/L (ref 96–112)
CO2 SERPL-SCNC: 20 MMOL/L (ref 20–33)
CREAT SERPL-MCNC: 0.85 MG/DL (ref 0.5–1.4)
EOSINOPHIL # BLD AUTO: 0.2 K/UL (ref 0–0.51)
EOSINOPHIL NFR BLD: 2.1 % (ref 0–6.9)
ERYTHROCYTE [DISTWIDTH] IN BLOOD BY AUTOMATED COUNT: 48.1 FL (ref 35.9–50)
GFR SERPL CREATININE-BSD FRML MDRD: >60 ML/MIN/1.73 M 2
GLUCOSE SERPL-MCNC: 90 MG/DL (ref 65–99)
HCT VFR BLD AUTO: 36.8 % (ref 37–47)
HGB BLD-MCNC: 12.1 G/DL (ref 12–16)
IMM GRANULOCYTES # BLD AUTO: 0.04 K/UL (ref 0–0.11)
IMM GRANULOCYTES NFR BLD AUTO: 0.4 % (ref 0–0.9)
LYMPHOCYTES # BLD AUTO: 2.08 K/UL (ref 1–4.8)
LYMPHOCYTES NFR BLD: 21.7 % (ref 22–41)
MCH RBC QN AUTO: 26.9 PG (ref 27–33)
MCHC RBC AUTO-ENTMCNC: 32.9 G/DL (ref 33.6–35)
MCV RBC AUTO: 82 FL (ref 81.4–97.8)
MONOCYTES # BLD AUTO: 1.15 K/UL (ref 0–0.85)
MONOCYTES NFR BLD AUTO: 12 % (ref 0–13.4)
NEUTROPHILS # BLD AUTO: 6.08 K/UL (ref 2–7.15)
NEUTROPHILS NFR BLD: 63.4 % (ref 44–72)
NRBC # BLD AUTO: 0 K/UL
NRBC BLD AUTO-RTO: 0 /100 WBC
PLATELET # BLD AUTO: 151 K/UL (ref 164–446)
PMV BLD AUTO: 11.3 FL (ref 9–12.9)
POTASSIUM SERPL-SCNC: 3.7 MMOL/L (ref 3.6–5.5)
RBC # BLD AUTO: 4.49 M/UL (ref 4.2–5.4)
SODIUM SERPL-SCNC: 138 MMOL/L (ref 135–145)
WBC # BLD AUTO: 9.6 K/UL (ref 4.8–10.8)

## 2017-07-16 PROCEDURE — G0378 HOSPITAL OBSERVATION PER HR: HCPCS

## 2017-07-16 PROCEDURE — 99217 PR OBSERVATION CARE DISCHARGE: CPT | Performed by: HOSPITALIST

## 2017-07-16 PROCEDURE — 700102 HCHG RX REV CODE 250 W/ 637 OVERRIDE(OP): Performed by: INTERNAL MEDICINE

## 2017-07-16 PROCEDURE — 700102 HCHG RX REV CODE 250 W/ 637 OVERRIDE(OP): Performed by: HOSPITALIST

## 2017-07-16 PROCEDURE — 97161 PT EVAL LOW COMPLEX 20 MIN: CPT

## 2017-07-16 PROCEDURE — A9270 NON-COVERED ITEM OR SERVICE: HCPCS | Performed by: HOSPITALIST

## 2017-07-16 PROCEDURE — A9270 NON-COVERED ITEM OR SERVICE: HCPCS | Performed by: INTERNAL MEDICINE

## 2017-07-16 PROCEDURE — 700105 HCHG RX REV CODE 258: Performed by: INTERNAL MEDICINE

## 2017-07-16 PROCEDURE — G8979 MOBILITY GOAL STATUS: HCPCS | Mod: CI

## 2017-07-16 PROCEDURE — G8978 MOBILITY CURRENT STATUS: HCPCS | Mod: CI

## 2017-07-16 PROCEDURE — G8980 MOBILITY D/C STATUS: HCPCS | Mod: CI

## 2017-07-16 PROCEDURE — 85025 COMPLETE CBC W/AUTO DIFF WBC: CPT

## 2017-07-16 PROCEDURE — 80048 BASIC METABOLIC PNL TOTAL CA: CPT

## 2017-07-16 RX ORDER — PANTOPRAZOLE SODIUM 40 MG/1
40 TABLET, DELAYED RELEASE ORAL DAILY
Status: DISCONTINUED | OUTPATIENT
Start: 2017-07-16 | End: 2017-07-16

## 2017-07-16 RX ORDER — OMEPRAZOLE 20 MG/1
20 CAPSULE, DELAYED RELEASE ORAL DAILY
Status: DISCONTINUED | OUTPATIENT
Start: 2017-07-16 | End: 2017-07-16 | Stop reason: HOSPADM

## 2017-07-16 RX ORDER — FUROSEMIDE 20 MG/1
10 TABLET ORAL
Status: DISCONTINUED | OUTPATIENT
Start: 2017-07-16 | End: 2017-07-16 | Stop reason: HOSPADM

## 2017-07-16 RX ORDER — CEFUROXIME AXETIL 250 MG/1
250 TABLET ORAL EVERY 12 HOURS
Status: DISCONTINUED | OUTPATIENT
Start: 2017-07-16 | End: 2017-07-16 | Stop reason: HOSPADM

## 2017-07-16 RX ADMIN — CEFUROXIME AXETIL 250 MG: 250 TABLET ORAL at 10:00

## 2017-07-16 RX ADMIN — MULTIPLE VITAMINS W/ MINERALS TAB 1 TABLET: TAB at 10:00

## 2017-07-16 RX ADMIN — OMEPRAZOLE 20 MG: 20 CAPSULE, DELAYED RELEASE ORAL at 10:17

## 2017-07-16 RX ADMIN — METOPROLOL SUCCINATE 25 MG: 25 TABLET, EXTENDED RELEASE ORAL at 10:00

## 2017-07-16 RX ADMIN — OXYCODONE HYDROCHLORIDE 2.5 MG: 5 TABLET ORAL at 15:04

## 2017-07-16 RX ADMIN — LOVASTATIN 10 MG: 20 TABLET ORAL at 10:01

## 2017-07-16 RX ADMIN — OXYCODONE HYDROCHLORIDE 5 MG: 5 TABLET ORAL at 05:19

## 2017-07-16 RX ADMIN — SODIUM CHLORIDE: 9 INJECTION, SOLUTION INTRAVENOUS at 05:20

## 2017-07-16 RX ADMIN — APIXABAN 5 MG: 5 TABLET, FILM COATED ORAL at 10:00

## 2017-07-16 RX ADMIN — LEVOTHYROXINE SODIUM 25 MCG: 50 TABLET ORAL at 10:00

## 2017-07-16 ASSESSMENT — PAIN SCALES - GENERAL
PAINLEVEL_OUTOF10: 4
PAINLEVEL_OUTOF10: 4

## 2017-07-16 ASSESSMENT — GAIT ASSESSMENTS
DISTANCE (FEET): 150
GAIT LEVEL OF ASSIST: SUPERVISED

## 2017-07-16 ASSESSMENT — LIFESTYLE VARIABLES: EVER_SMOKED: NEVER

## 2017-07-16 NOTE — PROGRESS NOTES
Gave report to NOC nurseMarion.  Discussed POC.  Pt resting in bed, semi-fowlers, informed [facility] nurse about the need to complete the admission process, safety precautions in place.

## 2017-07-16 NOTE — PROGRESS NOTES
Administered prescribed meds per MAR.  Assessed pt, discussed POC, updated communication board.  Pt resting in bed, semi-fowlers, calm/cooperative, no s/s of acute distress, respirations even and unlabored, on room air, c/o 4/10 abdominal and back pain (declined pain meds offered), all lines patent, IVF infusing, room is clutter free, denies any other needs at this time, personal belongings w/in reach, safety precautions in place, will CTM.

## 2017-07-16 NOTE — THERAPY
"82 y/o female adm for abdominal pain and diarrhea, recent hosptalization for uti. Pt does not present with any dizziness/lightheadedness during OOB mobility. Intact motor and sensory BLE. No LOB during level ground ambulation with no AD within room (pt in isoaltion R/O cdiff.) Pt states she has a dtr who lives 5 minutes from her. No further acute PT services required at this time. Pt negates any need for HH services upon DC home.    Physical Therapy Evaluation completed.   Bed Mobility:  Supine to Sit: Modified Independent  Transfers: Sit to Stand: Supervised  Gait: Level Of Assist: Supervised with No Equipment Needed       Plan of Care: Patient with no further skilled PT needs in the acute care setting at this time  Discharge Recommendations: Equipment: No Equipment Needed. Post-acute therapy Currently anticipate no further skilled therapy needs once patient is discharged from the inpatient setting.    See \"Rehab Therapy-Acute\" Patient Summary Report for complete documentation.     "

## 2017-07-16 NOTE — PROGRESS NOTES
Admit Skin Assessment:  Pink Warm Dry.  Somewhat fragile. Good turgor.  Superficial scratches on calves from scott bushes.  Bruises on upper extremities from previous venipuncture.  Midline healed lower abdominal old surgical scar.

## 2017-07-16 NOTE — DISCHARGE PLANNING
Patient discussed in morning rounds.  Patient reportedly lives at home and her son is there four nights a week.  Patient plans to return home when medically able. No current SS needs noted.

## 2017-07-16 NOTE — DISCHARGE INSTRUCTIONS
Discharge Instructions    Discharged to home by car with relative. Discharged via wheelchair, hospital escort: Yes.  Special equipment needed: Not Applicable    Be sure to schedule a follow-up appointment with your primary care doctor or any specialists as instructed.     Discharge Plan:   Diet Plan: Discussed  Activity Level: Discussed  Confirmed Follow up Appointment: Patient to Call and Schedule Appointment  Confirmed Symptoms Management: Discussed  Medication Reconciliation Updated: Yes  Influenza Vaccine Indication: Not indicated: Previously immunized this influenza season and > 8 years of age    I understand that a diet low in cholesterol, fat, and sodium is recommended for good health. Unless I have been given specific instructions below for another diet, I accept this instruction as my diet prescription.   Other diet: advance as tolerated    Special Instructions: None    · Is patient discharged on Warfarin / Coumadin?   No     · Is patient Post Blood Transfusion?  No    Depression / Suicide Risk    As you are discharged from this [facility] facility, it is important to learn how to keep safe from harming yourself.    Recognize the warning signs:  · Abrupt changes in personality, positive or negative- including increase in energy   · Giving away possessions  · Change in eating patterns- significant weight changes-  positive or negative  · Change in sleeping patterns- unable to sleep or sleeping all the time   · Unwillingness or inability to communicate  · Depression  · Unusual sadness, discouragement and loneliness  · Talk of wanting to die  · Neglect of personal appearance   · Rebelliousness- reckless behavior  · Withdrawal from people/activities they love  · Confusion- inability to concentrate     If you or a loved one observes any of these behaviors or has concerns about self-harm, here's what you can do:  · Talk about it- your feelings and reasons for harming yourself  · Remove any means that you might  use to hurt yourself (examples: pills, rope, extension cords, firearm)  · Get professional help from the community (Mental Health, Substance Abuse, psychological counseling)  · Do not be alone:Call your Safe Contact- someone whom you trust who will be there for you.  · Call your local CRISIS HOTLINE 140-2537 or 869-572-7382  · Call your local Children's Mobile Crisis Response Team [place] (158) 503-0420 or www.Authentic8  · Call the toll free National Suicide Prevention Hotlines   · National Suicide Prevention Lifeline 480-885-CLHJ (0516)  · [employer] Line Network 800-SUICIDE (704-6927)      Abdominal Pain (Nonspecific)  Your exam might not show the exact reason you have abdominal pain. Since there are many different causes of abdominal pain, another checkup and more tests may be needed. It is very important to follow up for lasting (persistent) or worsening symptoms. A possible cause of abdominal pain in any person who still has his or her appendix is acute appendicitis. Appendicitis is often hard to diagnose. Normal blood tests, urine tests, ultrasound, and CT scans do not completely rule out early appendicitis or other causes of abdominal pain. Sometimes, only the changes that happen over time will allow appendicitis and other causes of abdominal pain to be determined. Other potential problems that may require surgery may also take time to become more apparent. Because of this, it is important that you follow all of the instructions below.  HOME CARE INSTRUCTIONS   · Rest as much as possible.   · Do not eat solid food until your pain is gone.   · While adults or children have pain: A diet of water, weak decaffeinated tea, broth or bouillon, gelatin, oral rehydration solutions (ORS), frozen ice pops, or ice chips may be helpful.   · When pain is gone in adults or children: Start a light diet (dry toast, crackers, applesauce, or white rice). Increase the diet slowly as long as it does not bother  you. Eat no dairy products (including cheese and eggs) and no spicy, fatty, fried, or high-fiber foods.   · Use no alcohol, caffeine, or cigarettes.   · Take your regular medicines unless your caregiver told you not to.   · Take any prescribed medicine as directed.   · Only take over-the-counter or prescription medicines for pain, discomfort, or fever as directed by your caregiver. Do not give aspirin to children.   If your caregiver has given you a follow-up appointment, it is very important to keep that appointment. Not keeping the appointment could result in a permanent injury and/or lasting (chronic) pain and/or disability. If there is any problem keeping the appointment, you must call to reschedule.   SEEK IMMEDIATE MEDICAL CARE IF:   · Your pain is not gone in 24 hours.   · Your pain becomes worse, changes location, or feels different.   · You or your child has an oral temperature above 102° F (38.9° C), not controlled by medicine.   · Your baby is older than 3 months with a rectal temperature of 102° F (38.9° C) or higher.   · Your baby is 3 months old or younger with a rectal temperature of 100.4° F (38° C) or higher.   · You have shaking chills.   · You keep throwing up (vomiting) or cannot drink liquids.   · There is blood in your vomit or you see blood in your bowel movements.   · Your bowel movements become dark or black.   · You have frequent bowel movements.   · Your bowel movements stop (become blocked) or you cannot pass gas.   · You have bloody, frequent, or painful urination.   · You have yellow discoloration in the skin or whites of the eyes.   · Your stomach becomes bloated or bigger.   · You have dizziness or fainting.   · You have chest or back pain.   MAKE SURE YOU:   · Understand these instructions.   · Will watch your condition.   · Will get help right away if you are not doing well or get worse.   Document Released: 12/18/2006 Document Revised: 03/11/2013 Document Reviewed:  11/15/2010  ExitCare® Patient Information ©2013 [employer].      Diarrhea  Diarrhea is frequent loose and watery bowel movements. It can cause you to feel weak and dehydrated. Dehydration can cause you to become tired and thirsty, have a dry mouth, and have decreased urination that often is dark yellow. Diarrhea is a sign of another problem, most often an infection that will not last long. In most cases, diarrhea typically lasts 2-3 days. However, it can last longer if it is a sign of something more serious. It is important to treat your diarrhea as directed by your caregiver to lessen or prevent future episodes of diarrhea.  CAUSES   Some common causes include:  · Gastrointestinal infections caused by viruses, bacteria, or parasites.  · Food poisoning or food allergies.  · Certain medicines, such as antibiotics, chemotherapy, and laxatives.  · Artificial sweeteners and fructose.  · Digestive disorders.  HOME CARE INSTRUCTIONS  · Ensure adequate fluid intake (hydration): Have 1 cup (8 oz) of fluid for each diarrhea episode. Avoid fluids that contain simple sugars or sports drinks, fruit juices, whole milk products, and sodas. Your urine should be clear or pale yellow if you are drinking enough fluids. Hydrate with an oral rehydration solution that you can purchase at pharmacies, retail stores, and online. You can prepare an oral rehydration solution at home by mixing the following ingredients together:  ¨  - tsp table salt.  ¨ ¾ tsp baking soda.  ¨  tsp salt substitute containing potassium chloride.  ¨ 1  tablespoons sugar.  ¨ 1 L (34 oz) of water.  · Certain foods and beverages may increase the speed at which food moves through the gastrointestinal (GI) tract. These foods and beverages should be avoided and include:  ¨ Caffeinated and alcoholic beverages.  ¨ High-fiber foods, such as raw fruits and vegetables, nuts, seeds, and whole grain breads and cereals.  ¨ Foods and beverages sweetened with sugar alcohols,  such as xylitol, sorbitol, and mannitol.  · Some foods may be well tolerated and may help thicken stool including:  ¨ Starchy foods, such as rice, toast, pasta, low-sugar cereal, oatmeal, grits, baked potatoes, crackers, and bagels.  ¨ Bananas.  ¨ Applesauce.  · Add probiotic-rich foods to help increase healthy bacteria in the GI tract, such as yogurt and fermented milk products.  · Wash your hands well after each diarrhea episode.  · Only take over-the-counter or prescription medicines as directed by your caregiver.  · Take a warm bath to relieve any burning or pain from frequent diarrhea episodes.  SEEK IMMEDIATE MEDICAL CARE IF:   · You are unable to keep fluids down.  · You have persistent vomiting.  · You have blood in your stool, or your stools are black and tarry.  · You do not urinate in 6-8 hours, or there is only a small amount of very dark urine.  · You have abdominal pain that increases or localizes.  · You have weakness, dizziness, confusion, or light-headedness.  · You have a severe headache.  · Your diarrhea gets worse or does not get better.  · You have a fever or persistent symptoms for more than 2-3 days.  · You have a fever and your symptoms suddenly get worse.  MAKE SURE YOU:   · Understand these instructions.  · Will watch your condition.  · Will get help right away if you are not doing well or get worse.     This information is not intended to replace advice given to you by your health care provider. Make sure you discuss any questions you have with your health care provider.     Document Released: 12/08/2003 Document Revised: 01/08/2016 Document Reviewed: 08/25/2013  [employer] Patient Education ©2016 [employer]

## 2017-07-16 NOTE — CARE PLAN
Problem: Safety  Goal: Will remain free from falls  Outcome: PROGRESSING AS EXPECTED  Pt remains free of accidental falls.  Pt uses call light approprietly.  Fall precautions in place: anti slip socks on, bed in low position, call light/personal belongings w/in reach, room is clutter free, hourly rounding.    Problem: Knowledge Deficit  Goal: Knowledge of disease process/condition, treatment plan, diagnostic tests, and medications will improve  Outcome: PROGRESSING AS EXPECTED  Discussed POC, tx and meds.  Pt verbalized understanding of disease process.  Encouraged pt to ask questions.

## 2017-07-16 NOTE — PROGRESS NOTES
RN Admit and Night Shift Note:  /93, 136/92, 118/74 This shift. Irregular Heart Rhythm by auscultation and palpation.  Rate 100s to 120s.  S1, S2.  Good pulses no edema.  Lungs diminished in bases.  RA sats 94% and better.  Afebrile.  Pain 4 to 7/10 pain relieved to 2/10 with one 5mg oxycodone given twice this shift.  Tylenol given once for HA.  NS at 100ml/hr w/ good urine output to urostomy.  Nannette urine w/ sediment.  Hyperactive BT.  Nausea relieved with zofran ODT given one time this shift.  No stool since yesterday noon, 7/15/17.

## 2017-07-16 NOTE — DISCHARGE SUMMARY
Admission: 7/15/17  Discharge 7/16/17    CHIEF COMPLAINT ON ADMISSION  Chief Complaint   Patient presents with   • Nausea/Vomiting/Diarrhea   • Abdominal Pain   • Dizziness       CODE STATUS  Full Code    HPI & HOSPITAL COURSE  Please see original H&P for specific information,  81 y.o. female here with nausea/vomiting/diarrhea with abdominal pain, recently d/c from hospital, she had new ct abdomen that was negative for acute finding, cbc, bmp were wnl, patient had UA showing UTI, patient was on cefuroxime as outpatient that she just started, patient c diff is negative, patient received ivf, and supportive treatment, today she is feeling better, she is tolerating po intake, no diarrhea, no nausea or vomiting, no abdominal pain, patient is afebrile, she wants to go home, she will restart her ATB, and she will f/u with PCP and urologist as outpatient. Patient expressed understanding of her d/c plan and agreed with it.     Therefore, she is discharged in good and stable condition with close outpatient follow-up.    SPECIFIC OUTPATIENT FOLLOW-UP  PCP in 1 week.     DISCHARGE PROBLEM LIST  Active Problems:    * No active hospital problems. *  Resolved Problems:    Diarrhea POA: Unknown    Abdominal pain POA: Unknown  patient is tolerating diet, no new complains, no more diarrhea.     FOLLOW UP  Future Appointments  Date Time Provider Department Center   7/21/2017 1:15 PM [facility]   8/15/2017 11:15 AM DELISA Somers None     No follow-up provider specified.    MEDICATIONS ON DISCHARGE   Dorie Wadsworth   Home Medication Instructions DELANEY:[id number]    Printed on:07/16/17 1256   Medication Information                      apixaban (ELIQUIS) 5mg Tab  Take 1 Tab by mouth 2 Times a Day.             Bimatoprost (LUMIGAN) 0.01 % SOLN  Place 1 Drop in both eyes every day. Both eyes             Biotin 10 MG CAPS  Take 1 Cap by mouth every day. Indications: **OTC**             Brimonidine  Tartrate-Timolol (COMBIGAN) 0.2-0.5 % Solution  Place 1 Drop in both eyes 2 Times a Day.             cefUROXime (CEFTIN) 250 MG Tab  Take 250 mg by mouth 2 times a day. 10 DAYS             furosemide (LASIX) 20 MG Tab  Take 0.5 Tabs by mouth as needed.             levothyroxine (SYNTHROID) 25 MCG Tab  Take 25 mcg by mouth Every morning on an empty stomach.             lovastatin (MEVACOR) 10 MG tablet  Take 10 mg by mouth every day.             metoprolol SR (TOPROL XL) 25 MG TABLET SR 24 HR  Take 25 mg by mouth every day.             Multiple Vitamins-Minerals (CENTRUM SILVER ULTRA WOMENS) Tab  Take 1 Tab by mouth every day.             Multiple Vitamins-Minerals (PRESERVISION AREDS PO)  Take 1 Tab by mouth 2 Times a Day.             pantoprazole (PROTONIX) 40 MG TBEC  Take 40 mg by mouth every day.                 DIET  Regular home diet.     ACTIVITY  As tolerated.  Weight bearing as tolerated      CONSULTATIONS      PROCEDURES      LABORATORY  Lab Results   Component Value Date/Time    SODIUM 138 07/16/2017 04:52 AM    POTASSIUM 3.7 07/16/2017 04:52 AM    CHLORIDE 110 07/16/2017 04:52 AM    CO2 20 07/16/2017 04:52 AM    GLUCOSE 90 07/16/2017 04:52 AM    BUN 11 07/16/2017 04:52 AM    CREATININE 0.85 07/16/2017 04:52 AM        Lab Results   Component Value Date/Time    WBC 9.6 07/16/2017 04:52 AM    HEMOGLOBIN 12.1 07/16/2017 04:52 AM    HEMATOCRIT 36.8* 07/16/2017 04:52 AM    PLATELET COUNT 151* 07/16/2017 04:52 AM        Total time of the discharge process exceeds 30 min

## 2017-07-16 NOTE — DISCHARGE PLANNING
SW spoke with Hospitalist regarding SNF referral placed yesterday.  Per current Hospitalist he is discharging her home and does not feel that SNF is necessary at this time.

## 2017-07-16 NOTE — PROGRESS NOTES
Pt discharged home w/all personal belongings.  Pt verbalized understanding of all discharge instructions.  VSS and peripheral IV discharged.  Pt declined to have this RN make follow up appt for her w/her PCP, verbalizes that her PCP has a walk in clinic that guarantees patients are seen same day, instructed pt to go see her PCP this week.

## 2017-07-17 ENCOUNTER — PATIENT OUTREACH (OUTPATIENT)
Dept: HEALTH INFORMATION MANAGEMENT | Facility: OTHER | Age: 82
End: 2017-07-17

## 2017-07-17 LAB
BACTERIA UR CULT: ABNORMAL
BACTERIA UR CULT: ABNORMAL
SIGNIFICANT IND 70042: ABNORMAL
SITE SITE: ABNORMAL
SOURCE SOURCE: ABNORMAL

## 2017-07-20 ENCOUNTER — HOSPITAL ENCOUNTER (OUTPATIENT)
Dept: LAB | Facility: MEDICAL CENTER | Age: 82
End: 2017-07-20
Attending: FAMILY MEDICINE
Payer: MEDICARE

## 2017-07-20 LAB
T3FREE SERPL-MCNC: 4 PG/ML (ref 2.4–4.2)
T4 FREE SERPL-MCNC: 1.39 NG/DL (ref 0.53–1.43)
TSH SERPL DL<=0.005 MIU/L-ACNC: 4.52 UIU/ML (ref 0.3–3.7)

## 2017-07-20 PROCEDURE — 84439 ASSAY OF FREE THYROXINE: CPT

## 2017-07-20 PROCEDURE — 84481 FREE ASSAY (FT-3): CPT

## 2017-07-20 PROCEDURE — 84443 ASSAY THYROID STIM HORMONE: CPT

## 2017-07-20 PROCEDURE — 36415 COLL VENOUS BLD VENIPUNCTURE: CPT

## 2017-07-21 ENCOUNTER — ANTICOAGULATION VISIT (OUTPATIENT)
Dept: MEDICAL GROUP | Facility: PHYSICIAN GROUP | Age: 82
End: 2017-07-21
Payer: MEDICARE

## 2017-07-21 VITALS — SYSTOLIC BLOOD PRESSURE: 113 MMHG | HEART RATE: 59 BPM | DIASTOLIC BLOOD PRESSURE: 58 MMHG

## 2017-07-21 DIAGNOSIS — I48.0 PAROXYSMAL ATRIAL FIBRILLATION (HCC): ICD-10-CM

## 2017-07-21 DIAGNOSIS — Z79.01 LONG TERM (CURRENT) USE OF ANTICOAGULANTS: ICD-10-CM

## 2017-07-21 DIAGNOSIS — I48.92 ATRIAL FLUTTER, UNSPECIFIED TYPE (HCC): ICD-10-CM

## 2017-07-21 LAB — INR PPP: 1.4 (ref 2–3.5)

## 2017-07-21 PROCEDURE — 85610 PROTHROMBIN TIME: CPT | Performed by: PHYSICIAN ASSISTANT

## 2017-07-21 NOTE — PROGRESS NOTES
Anticoagulation Summary as of 7/21/2017     INR goal    Prior goal 2.0-3.0   Selected INR 1.4! (7/21/2017)   Maintenance plan No maintenance plan   Plan last modified Favio Witt, PHARMD (7/21/2017)   Next INR check    Target end date Indefinite    Indications   Atrial flutter (CMS-HCC) [I48.92]  Long term (current) use of anticoagulants [Z79.01] [Z79.01]         Anticoagulation Episode Summary     INR check location     Preferred lab     Send INR reminders to     Comments       Anticoagulation Care Providers     Provider Role Specialty Phone number    Rose Whipple M.D. Referring Cardiology [phone]    [facility] Referring  [phone]        Anticoagulation Patient Findings   Positives Antibiotic Use    Negatives Missed Doses, Extra Doses, Medication Changes, Diet Changes, Dental/Other Procedures, Hospitalization, Bleeding Gums, Nose Bleeds, Blood in Urine, Blood in Stool, Any Bruising, Other Complaints    Comments UTI         Health Status Since Last Assessment  Any new relevant medical problems, ED visits/hospitalizations, yes, UTI   Any embolic events (stroke / TIA / systemic embolism), no    Adherence with DOAC Therapy  1 or more missed doses in an average week , no  BLEEDING RISK ASSESSMENT NB:    Bleeding Risk Assessment  Severe epistaxis, no Hemoptysis , no  Excessive or unusual bruising / hematomas , no  GIB / melena / BRBPR / hematemesis , no  Hematuria? Abnormal vaginal bleeding , no  Concerning daily headache or subdural hematoma symptoms , no  Decreasing hemoglobin or new anemia , nop  Latest hemoglobin:   Lab Results   Component Value Date/Time    WBC 9.6 07/16/2017 04:52 AM    RBC 4.49 07/16/2017 04:52 AM    HEMOGLOBIN 12.1 07/16/2017 04:52 AM    HEMATOCRIT 36.8* 07/16/2017 04:52 AM    MCV 82.0 07/16/2017 04:52 AM    MCH 26.9* 07/16/2017 04:52 AM    MCHC 32.9* 07/16/2017 04:52 AM    MPV 11.3 07/16/2017 04:52 AM    NEUTROPHILS-POLYS 63.40 07/16/2017 04:52 AM     LYMPHOCYTES 21.70* 07/16/2017 04:52 AM    MONOCYTES 12.00 07/16/2017 04:52 AM    EOSINOPHILS 2.10 07/16/2017 04:52 AM    BASOPHILS 0.40 07/16/2017 04:52 AM    HYPOCHROMIA 1+ 02/03/2014 06:28 AM    ANISOCYTOSIS 1+ 03/19/2012 10:00 AM      EtOH overuse, no  Falls, presyncope, syncope, or seizures, no  Uncontrolled hypertension, no  CREATININE CLEARANCE /    Creatinine Clearance/Renal Function  Latest eGFR / creatinine:  Lab Results   Component Value Date/Time    SODIUM 138 07/16/2017 04:52 AM    POTASSIUM 3.7 07/16/2017 04:52 AM    CHLORIDE 110 07/16/2017 04:52 AM    CO2 20 07/16/2017 04:52 AM    GLUCOSE 90 07/16/2017 04:52 AM    BUN 11 07/16/2017 04:52 AM    CREATININE 0.85 07/16/2017 04:52 AM      • Is eGFR less than 50ml/min, no    Drug Interactions  ASA / other antiplatelets., no  NSAID, no  Other drug interactions, some IBU PRN  (Review med list / OTCs;)  Current Outpatient Prescriptions on File Prior to Visit   Medication Sig Dispense Refill   • metoprolol SR (TOPROL XL) 25 MG TABLET SR 24 HR Take 25 mg by mouth every day.     • cefUROXime (CEFTIN) 250 MG Tab Take 250 mg by mouth 2 times a day. 10 DAYS     • Brimonidine Tartrate-Timolol (COMBIGAN) 0.2-0.5 % Solution Place 1 Drop in both eyes 2 Times a Day.     • levothyroxine (SYNTHROID) 25 MCG Tab Take 25 mcg by mouth Every morning on an empty stomach.     • Multiple Vitamins-Minerals (CENTRUM SILVER ULTRA WOMENS) Tab Take 1 Tab by mouth every day.     • apixaban (ELIQUIS) 5mg Tab Take 1 Tab by mouth 2 Times a Day. 180 Tab 3   • Multiple Vitamins-Minerals (PRESERVISION AREDS PO) Take 1 Tab by mouth 2 Times a Day.     • furosemide (LASIX) 20 MG Tab Take 0.5 Tabs by mouth as needed. 30 Tab 11   • lovastatin (MEVACOR) 10 MG tablet Take 10 mg by mouth every day.     • pantoprazole (PROTONIX) 40 MG TBEC Take 40 mg by mouth every day.     • Biotin 10 MG CAPS Take 1 Cap by mouth every day. Indications: **OTC**     • Bimatoprost (LUMIGAN) 0.01 % SOLN Place 1 Drop in  both eyes every day. Both eyes       No current facility-administered medications on file prior to visit.       Examination  Blood pressure:, wnl    Final Assessment and Recommendations:  Patient appears stable from the anticoagulation standpoint  Benefits of continued DOAC therapy outweigh risks for this patient  Recommend continue current DOAC at same dose    Follow up:  Will follow up with patient in 1 year or PRN     Favio Witt, FLORD

## 2017-08-15 ENCOUNTER — OFFICE VISIT (OUTPATIENT)
Dept: CARDIOLOGY | Facility: MEDICAL CENTER | Age: 82
End: 2017-08-15
Payer: MEDICARE

## 2017-08-15 VITALS
WEIGHT: 169 LBS | HEART RATE: 70 BPM | DIASTOLIC BLOOD PRESSURE: 68 MMHG | HEIGHT: 67 IN | BODY MASS INDEX: 26.53 KG/M2 | SYSTOLIC BLOOD PRESSURE: 106 MMHG | OXYGEN SATURATION: 94 %

## 2017-08-15 DIAGNOSIS — I10 ESSENTIAL HYPERTENSION: ICD-10-CM

## 2017-08-15 DIAGNOSIS — Z79.01 LONG TERM (CURRENT) USE OF ANTICOAGULANTS: ICD-10-CM

## 2017-08-15 DIAGNOSIS — I48.0 PAROXYSMAL ATRIAL FIBRILLATION (HCC): ICD-10-CM

## 2017-08-15 PROCEDURE — 99214 OFFICE O/P EST MOD 30 MIN: CPT | Performed by: INTERNAL MEDICINE

## 2017-08-15 ASSESSMENT — ENCOUNTER SYMPTOMS
FEVER: 0
DIZZINESS: 1
LOSS OF CONSCIOUSNESS: 0
SPEECH CHANGE: 0
MYALGIAS: 0
SHORTNESS OF BREATH: 0
DIARRHEA: 0
CLAUDICATION: 0
PALPITATIONS: 0
BRUISES/BLEEDS EASILY: 0
PND: 0
DEPRESSION: 0
ORTHOPNEA: 0
BLURRED VISION: 0
HEADACHES: 0
COUGH: 0
NERVOUS/ANXIOUS: 1
ABDOMINAL PAIN: 0

## 2017-08-15 NOTE — MR AVS SNAPSHOT
"        Dorie Wadsworth   8/15/2017 11:15 AM   Office Visit   MRN: [id number]    Department:  [facility]   Dept Phone:  [phone]    Description:  Female : 1935   Provider:  Rose Whipple M.D.           Reason for Visit     Follow-Up           Allergies as of 8/15/2017     Allergen Noted Reactions    Cardizem 10/19/2012   Swelling    Doxycycline 10/22/2013       Swollen lips.    Sulfa Drugs 2008       Makes tongue swell, severely    Zyvox 2011   Swelling    \"Whole mouth swelled up\"     Codeine 2008       Does not remember the reaction      Macrobid [Nitrofurantoin Monohydrate Macrocrystals] 2009       Does not remember the reaction      You were diagnosed with     Paroxysmal atrial fibrillation (CMS-HCC)   [971048]       Essential hypertension   [6218689]       Long term (current) use of anticoagulants   [V58.61.ICD-9-CM]         Vital Signs     Blood Pressure Pulse Height Weight Body Mass Index Oxygen Saturation    106/68 mmHg 70 1.702 m (5' 7\") 76.658 kg (169 lb) 26.46 kg/m2 94%    Smoking Status                   Never Smoker            Basic Information     Date Of Birth Sex Race Ethnicity Preferred Language    1935 Female White Non- English      Your appointments     2018  1:00 PM   Anti-Coag Routine with JARON PHARMACIST   [facility] (Jaron)    [address] [place] [id number] [phone]              Problem List              ICD-10-CM Priority Class Noted - Resolved    UTI (urinary tract infection) N39.0   2011 - Present    Hypothyroid E03.9   2011 - Present    Hypertension I10   2011 - Present    Hyperlipidemia E78.5   2011 - Present    TIA (transient ischemic attack) G45.9   3/11/2011 - Present    Atrial fibrillation (CMS-HCC) I48.91 Medium  2012 - Present    Vaginal bleeding N93.9   10/22/2012 - Present    History of hematuria (Chronic) Z87.448   Unknown - Present    MRSA " carrier Z22.322   2/1/2014 - Present    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   2/1/2014 - Present    Atrial flutter (CMS-HCC) I48.92   5/18/2015 - Present    Anticoagulant long-term use Z79.01   8/11/2016 - Present    Enteritis K52.9 High  7/10/2017 - Present    Urinary tract infection N39.0 High  7/11/2017 - Present    HTN (hypertension) I10 Low  7/11/2017 - Present    Long term (current) use of anticoagulants [Z79.01] Z79.01   7/21/2017 - Present      Health Maintenance        Date Due Completion Dates    IMM DTaP/Tdap/Td Vaccine (1 - Tdap) 9/29/1956 ---    PAP SMEAR 9/29/1958 ---    MAMMOGRAM 9/29/1977 ---    COLONOSCOPY 9/29/1987 ---    IMM ZOSTER VACCINE 9/29/1997 ---    BONE DENSITY 9/29/2002 ---    IMM INFLUENZA (1) 9/1/2017 10/8/2016, 10/1/2016, 10/4/2014, 11/29/2013, 1/27/2012    IMM PNEUMOCOCCAL 65+ (ADULT) LOW/MEDIUM RISK SERIES (2 of 2 - PPSV23) 10/8/2017 10/8/2016            Current Immunizations     13-VALENT PCV PREVNAR 10/8/2016 12:03 PM    Influenza TIV (IM) 10/1/2016, 11/29/2013, 1/27/2012 10:00 AM    Influenza Vaccine 11/23/2010    Influenza Vaccine Adult HD 10/8/2016 12:02 PM, 10/4/2014    Pneumococcal Vaccine (UF)Historical Data 2/23/2010      Below and/or attached are the medications your provider expects you to take. Review all of your home medications and newly ordered medications with your provider and/or pharmacist. Follow medication instructions as directed by your provider and/or pharmacist. Please keep your medication list with you and share with your provider. Update the information when medications are discontinued, doses are changed, or new medications (including over-the-counter products) are added; and carry medication information at all times in the event of emergency situations     Allergies:  CARDIZEM - Swelling     DOXYCYCLINE - (reactions not documented)     SULFA DRUGS - (reactions not documented)     ZYVOX - Swelling     CODEINE - (reactions not documented)     MACROBID - (reactions not  documented)               Medications  Valid as of: August 15, 2017 - 11:39 AM    Generic Name Brand Name Tablet Size Instructions for use    apixaban (ELIQUIS) 5 MG Tab tablet (Tab) ELIQUIS 5 MG Take 1 Tab by mouth 2 Times a Day.        Bimatoprost (Solution) LUMIGAN 0.01 % Place 1 Drop in both eyes every day. Both eyes        Biotin (Cap) Biotin 10 MG Take 1 Cap by mouth every day. Indications: **OTC**        Brimonidine Tartrate-Timolol (Solution) Brimonidine Tartrate-Timolol 0.2-0.5 % Place 1 Drop in both eyes 2 Times a Day.        Cefuroxime Axetil (Tab) CEFTIN 250 MG Take 250 mg by mouth 2 times a day. 10 DAYS        Furosemide (Tab) LASIX 20 MG Take 0.5 Tabs by mouth as needed.        Levothyroxine Sodium (Tab) SYNTHROID 25 MCG Take 25 mcg by mouth Every morning on an empty stomach.        Lovastatin (Tab) MEVACOR 10 MG Take 10 mg by mouth every day.        Metoprolol Succinate (TABLET SR 24 HR) TOPROL XL 25 MG Take 25 mg by mouth every day.        Multiple Vitamins-Minerals   Take 1 Tab by mouth 2 Times a Day.        Multiple Vitamins-Minerals (Tab) CENTRUM SILVER ULTRA WOMENS  Take 1 Tab by mouth every day.        Pantoprazole Sodium (Tablet Delayed Response) PROTONIX 40 MG Take 40 mg by mouth every day.        .                 Medicines prescribed today were sent to:     [facility] - [address]    [address] [place] [address]    Phone: [phone] Fax: [phone]    Open 24 Hours?: [facility] - [facility] [address] [address]    Phone: [phone] Fax: [phone]    Open 24 Hours?: No      Medication refill instructions:       If your prescription bottle indicates you have medication refills left, it is not necessary to call your provider’s office. Please contact your pharmacy and they will refill your medication.    If your prescription bottle indicates you do not have any refills left, you may request refills at any time  through one of the following ways: The online GeneriMed system (except Urgent Care), by calling your provider’s office, or by asking your pharmacy to contact your provider’s office with a refill request. Medication refills are processed only during regular business hours and may not be available until the next business day. Your provider may request additional information or to have a follow-up visit with you prior to refilling your medication.   *Please Note: Medication refills are assigned a new Rx number when refilled electronically. Your pharmacy may indicate that no refills were authorized even though a new prescription for the same medication is available at the pharmacy. Please request the medicine by name with the pharmacy before contacting your provider for a refill.        Your To Do List     Future Labs/Procedures Complete By Expires    COMP METABOLIC PANEL  As directed 8/15/2018    LIPID PROFILE  As directed 8/15/2018         GeneriMed Access Code: [email or web address]  Expires: 9/14/2017 11:39 AM    GeneriMed  A secure, online tool to manage your health information     [employer]’s GeneriMed® is a secure, online tool that connects you to your personalized health information from the privacy of your home -- day or night - making it very easy for you to manage your healthcare. Once the activation process is completed, you can even access your medical information using the GeneriMed christina, which is available for free in the Apple Christina store or Google Play store.     GeneriMed provides the following levels of access (as shown below):   My Chart Features   [facility] Doctor [facility]  Specialists [facility]  Urgent  Care [facility]  Primary Care  Doctor   Email your healthcare team securely and privately 24/7 X X X    Manage appointments: schedule your next appointment; view details of past/upcoming appointments X      Request prescription refills. X      View recent personal medical records, including lab and  immunizations X X X X   View health record, including health history, allergies, medications X X X X   Read reports about your outpatient visits, procedures, consult and ER notes X X X X   See your discharge summary, which is a recap of your hospital and/or ER visit that includes your diagnosis, lab results, and care plan. X X       How to register for Restaro:  1. Go to  [email or web address].  2. Click on the Sign Up Now box, which takes you to the New Member Sign Up page. You will need to provide the following information:  a. Enter your Restaro Access Code exactly as it appears at the top of this page. (You will not need to use this code after you’ve completed the sign-up process. If you do not sign up before the expiration date, you must request a new code.)   b. Enter your date of birth.   c. Enter your home email address.   d. Click Submit, and follow the next screen’s instructions.  3. Create a Restaro ID. This will be your Restaro login ID and cannot be changed, so think of one that is secure and easy to remember.  4. Create a Restaro password. You can change your password at any time.  5. Enter your Password Reset Question and Answer. This can be used at a later time if you forget your password.   6. Enter your e-mail address. This allows you to receive e-mail notifications when new information is available in Restaro.  7. Click Sign Up. You can now view your health information.    For assistance activating your Restaro account, call [phone]

## 2017-08-15 NOTE — Clinical Note
[facility]   [address]   [address]  [place] [address]  Phone: [phone]  Fax: [phone]              Dorie Wadsworth  1935    Encounter Date: 8/15/2017    Rose Whipple M.D.          PROGRESS NOTE:  Subjective:   Dorie Wadsworth is a pleasant 81-year-old female with known history of hypertension, hypothyroidism, dyslipidemia, paroxysmal atrial fibrillation, TIA on Eliquis for anticoagulation presenting today for evaluation of boxes more atrial fibrillation.    Patient was recently admitted to the hospital for abdominal pain and was diagnosed with UTI currently on cefuroxime. Denied any recent breakthrough episodes of atrial fibrillation. No bleeding issues while on anticoagulation with Eliquis. He continues to have intermittent episodes of dizziness but no loss of consciousness. No complaints of exertional chest pain pressure or tightness. Denied any complaints of lower extremity edema or claudication.      Past Medical History   Diagnosis Date   • Indigestion    • Glaucoma    • T.I.A.    • CAD (coronary artery disease)    • Arthritis      fingers   • Unspecified hemorrhagic conditions      not sure when (2009)  blood in urin    • Hypertension    • Cancer (CMS-[facility])      skin   • Unspecified disorder of thyroid    • A-fib (CMS-[facility])    • Atrial fibrillation (CMS-[facility]) 2/2009     A FIB X2,[ resolved on own][   • Paroxysmal atrial fibrillation (CMS-[facility])    • Urinary bladder disorder      urinary incontinence   • Bladder problem      Chronic bladder infection for the past 17months   • Bladder disease 2011     bladder removed   • Ailment      urostomy   • Hyperlipidemia 2/24/2011   • UTI (urinary tract infection) 2/24/2011   • Hypothyroid 2/24/2011   • CHEST PAIN 3/11/2011   • TIA (transient ischemic attack) 3/11/2011   • Sepsis urinary source 1/23/2012   • Vaginal bleeding 10/22/2012   • History of hematuria     "3/15/10: with Plavix.   • Stroke (CMS-HCC)      2 TIAs 2/2010, no stroke, 2/2013   • CATARACT      freddie surgery complete   • Atrial fibrillation, rapid (CMS-HCC) 1/31/2014   • Other specified pre-operative examination 1/28/2014     Past Surgical History   Procedure Laterality Date   • Bladder biopsy with cystoscopy  10/10/08     Performed by CARLTON LUNA at SURGERY [facility]   • Pyelogram  10/10/08     Performed by CARLTON LUNA at SURGERY [facility] ORS   • Retrogrades  10/10/08     Performed by CARLTON LUNA at SURGERY [facility]   • Bladder biopsy with cystoscopy  6/15/2009     Performed by CARLTON LUNA at SURGERY [facility]   • Bladder biopsy with cystoscopy  10/12/2009     Performed by JENNIFER LINCOLN at SURGERY [facility]   • Other abdominal surgery       appendectomy, pancratitis    • Cholecystectomy  1994   • Cholecystectomy  1994   • Gyn surgery       hysterectomy   • Pelvic exam under anesthesia  10/12/2009     Performed by JENNIFER LINCOLN at SURGERY [facility]   • Rectocele repair  11/3/2009     Performed by ZEKE HARRINGTON at SURGERY [facility] DAY [facility]   • Other       TONSILLECTOMY AS TEENAGER   • Cystectomy  9/6/2011     Performed by JENNIFER LINCOLN at SURGERY [facility]   • Ileo loop diversion  9/6/2011     Performed by JENNIFER LINCOLN at SURGERY [facility]   • Biopsy general  10/22/2012     Performed by Jennifer Lincoln M.D. at SURGERY [facility]   • Parastomal hernia repair   1/28/2014     Performed by Nicol Dorman M.D. at SURGERY [facility]     Family History   Problem Relation Age of Onset   • Stroke Mother 81   • Stroke Father 77     History   Smoking status   • Never Smoker    Smokeless tobacco   • Never Used     Allergies   Allergen Reactions   • Cardizem Swelling   • Doxycycline      Swollen lips.   • Sulfa Drugs      Makes tongue swell, severely   • Zyvox Swelling     \"Whole mouth swelled up\"    • Codeine  "     Does not remember the reaction     • Macrobid [Nitrofurantoin Monohydrate Macrocrystals]      Does not remember the reaction     Outpatient Encounter Prescriptions as of 8/15/2017   Medication Sig Dispense Refill   • apixaban (ELIQUIS) 5mg Tab Take 1 Tab by mouth 2 Times a Day. 180 Tab 3   • metoprolol SR (TOPROL XL) 25 MG TABLET SR 24 HR Take 25 mg by mouth every day.     • Brimonidine Tartrate-Timolol (COMBIGAN) 0.2-0.5 % Solution Place 1 Drop in both eyes 2 Times a Day.     • levothyroxine (SYNTHROID) 25 MCG Tab Take 25 mcg by mouth Every morning on an empty stomach.     • Multiple Vitamins-Minerals (CENTRUM SILVER ULTRA WOMENS) Tab Take 1 Tab by mouth every day.     • Multiple Vitamins-Minerals (PRESERVISION AREDS PO) Take 1 Tab by mouth 2 Times a Day.     • furosemide (LASIX) 20 MG Tab Take 0.5 Tabs by mouth as needed. 30 Tab 11   • lovastatin (MEVACOR) 10 MG tablet Take 10 mg by mouth every day.     • pantoprazole (PROTONIX) 40 MG TBEC Take 40 mg by mouth every day.     • Biotin 10 MG CAPS Take 1 Cap by mouth every day. Indications: **OTC**     • Bimatoprost (LUMIGAN) 0.01 % SOLN Place 1 Drop in both eyes every day. Both eyes     • cefUROXime (CEFTIN) 250 MG Tab Take 250 mg by mouth 2 times a day. 10 DAYS       No facility-administered encounter medications on file as of 8/15/2017.     Review of Systems   Constitutional: Negative for fever.   HENT: Negative for congestion.    Eyes: Negative for blurred vision.   Respiratory: Negative for cough and shortness of breath.    Cardiovascular: Negative for chest pain, palpitations, orthopnea, claudication, leg swelling and PND.   Gastrointestinal: Negative for abdominal pain and diarrhea.   Musculoskeletal: Negative for myalgias and joint pain.   Skin: Negative for rash.   Neurological: Positive for dizziness. Negative for speech change, loss of consciousness and headaches.   Endo/Heme/Allergies: Does not bruise/bleed easily.   Psychiatric/Behavioral: Negative  "for depression. The patient is nervous/anxious.    All other systems reviewed and are negative.       Objective:   /68 mmHg  Pulse 70  Ht 1.702 m (5' 7\")  Wt 76.658 kg (169 lb)  BMI 26.46 kg/m2  SpO2 94%    Physical Exam   Constitutional: She is oriented to person, place, and time. She appears well-developed. No distress.   HENT:   Mouth/Throat: Mucous membranes are normal.   Eyes: Conjunctivae and EOM are normal.   Neck: No JVD present. No tracheal deviation present. No thyroid mass present.   Cardiovascular: Normal rate and regular rhythm.    No murmur heard.  Pulses:       Carotid pulses are 2+ on the right side, and 2+ on the left side.       Radial pulses are 2+ on the right side, and 2+ on the left side.        Dorsalis pedis pulses are 2+ on the right side, and 2+ on the left side.        Posterior tibial pulses are 2+ on the right side, and 2+ on the left side.   Pulmonary/Chest: Effort normal and breath sounds normal. No respiratory distress. She exhibits no tenderness.   Abdominal: Soft. There is no tenderness.   Musculoskeletal: Normal range of motion. She exhibits no edema.   Neurological: She is alert and oriented to person, place, and time. She has normal strength. She displays no tremor.   Skin: Skin is warm and dry. She is not diaphoretic.   Bruises seen in upper and lower extremities   Psychiatric: She has a normal mood and affect. Her behavior is normal.   Vitals reviewed.   Results for BERNARD BAER (MRN [id number]) as of 8/15/2017 12:20   2/1/2017  7/15/2017  7/16/2017    Sodium 137 139 138   Potassium 4.4 4.0 3.7   Chloride 105 109 110   Co2 26 22 20   Anion Gap 6.0 8.0 8.0   Glucose 109 (H) 101 (H) 90   Bun 18 19 11   Creatinine 1.14 1.12 0.85   GFR If Non  46 (A) 47 (A) >60   Calcium 9.6 8.5 7.9 (L)   AST(SGOT) 26 22    ALT(SGPT) 13 14    Alkaline Phosphatase 125 (H) 106 (H)    Cholesterol,Tot 120     Triglycerides 91     HDL 36 (A)     LDL 66         EKG: " 5/16/16  NSR 63 bpm normal axis, T-wave inversion lateral leads QRS duration 92 ms  faith    RTMST: 6/12/15  Patient exercised on Danny protocol for 4 minutes reached 5.8 METsachieved 92% MPHR.  Rest EKG showed sinus rhythm LVH with anterior T wave inversion,no widening of QRS with exercise. (Stress test was performed toassess effect of flecainide at peak heart rate).  Inconclusive stress test secondary to baseline resting EKG abnormalities.    EKG: 3/12/14  Normal sinus rhythm at 64 bpm, normal axis is 0.5 mm ST depressions in the inferolateral leads with T-wave inversions V1 to V5 one and aVL    Transthoracic ECHO: 2/24/14   Normal left ventricular chamber size. Mild concentric left ventricular hypertrophy. Normal regional wall motion. Normal left ventricular systolic function. Left ventricular ejection fraction is 70% to 75%.  No significant valve abnormalities.   Mild tricuspid regurgitation. Right ventricular systolic pressure is estimated to be 40 mmHg.   No pericardial effusion seen.   Ascending aorta 3.0 cm. aortic root 3.1 cm.   Compared to prior study done on 1/23/12 mild increase in RV pressure.    CT chest: 1/31/14   1. No pulmonary embolus appreciated.  2. Nodular hepatic contour, appearance most compatible with cirrhosis.  3. Hazy right upper lobe infiltrate.  4. Bibasilar atelectasis, right greater than left with trace right pleural effusion.  5. Cardiomegaly  6. Atherosclerosis and atherosclerotic coronary artery disease  7. Moderate size hiatal hernia  8. Right upper lobe pulmonary nodule, recommend followup in 3 months with repeat CT chest to evaluate stability     Nuclear stress test: 10/25/13  Normal stress myocardial perfusion scan.  No evidence of ischemia or infarction. Low probability of hemodynamically significant CAD.  Normal wall motion. Calculated LVEF 78%.    Transthoracic echo: 1/23/12   Normal left ventricular chamber size. Normal left ventricular systolic function. Normal left  ventricular wall thickness. Left ventricular ejection fraction is 55% to 60%.  Moderately enlarged LA.  Thickened mitral valve leaflets. Mitral annular calcification. Mitral valve opens normally. Trace mitral regurgitation.  Moderate tricuspid regurgitation. Right ventricular systolic pressure is estimated to be 46 mmhg.    Nuclear stress test on 3/11/11   Cine images demonstrate no regional wall motion abnormality and the left ventricular ejection fraction exceeds 70%.  SPECT images show no fixed or reversible defects to suggest infarction or ischemia.  Normal myocardial perfusion scan    Assessment:   1. Hypertension  2. Dyslipidemia  3. Atrial fibrillation and dizziness  4. Chronic anticoagulation    Medical Decision Making:  Today's Assessment / Status / Plan:     1. Blood pressure is low today.  Decrease Toprol-XL to 12.5 mg daily if no improvement in dizziness discontinue Toprol-XL completely.  2. Continue lovastatin 10 mg qHs.  LDL less than 70.  3. No breakthrough episodes of atrial fibrillation.  4. Continue Eliquis 5 mg BID.  Labs from 7/16/17 normal renal function.  No bleeding issues while on anticoagulation.    Followup in one year.  Labs prior to next visit.      Thank you for allowing me to participate in taking care of patient.    Rose Whipple. MD.          Yue Restrepo M.D.  [address]  [address]  [place] [address]  VIA Facsimile: [phone]

## 2017-08-15 NOTE — PROGRESS NOTES
Subjective:   Dorie Wadsworth is a pleasant 81-year-old female with known history of hypertension, hypothyroidism, dyslipidemia, paroxysmal atrial fibrillation, TIA on Eliquis for anticoagulation presenting today for evaluation of boxes more atrial fibrillation.    Patient was recently admitted to the hospital for abdominal pain and was diagnosed with UTI currently on cefuroxime. Denied any recent breakthrough episodes of atrial fibrillation. No bleeding issues while on anticoagulation with Eliquis. He continues to have intermittent episodes of dizziness but no loss of consciousness. No complaints of exertional chest pain pressure or tightness. Denied any complaints of lower extremity edema or claudication.      Past Medical History   Diagnosis Date   • Indigestion    • Glaucoma    • T.I.A.    • CAD (coronary artery disease)    • Arthritis      fingers   • Unspecified hemorrhagic conditions      not sure when (2009)  blood in urin    • Hypertension    • Cancer (CMS-HCC)      skin   • Unspecified disorder of thyroid    • A-fib (CMS-HCC)    • Atrial fibrillation (CMS-HCC) 2/2009     A FIB X2,[ resolved on own][   • Paroxysmal atrial fibrillation (CMS-HCC)    • Urinary bladder disorder      urinary incontinence   • Bladder problem      Chronic bladder infection for the past 17months   • Bladder disease 2011     bladder removed   • Ailment      urostomy   • Hyperlipidemia 2/24/2011   • UTI (urinary tract infection) 2/24/2011   • Hypothyroid 2/24/2011   • CHEST PAIN 3/11/2011   • TIA (transient ischemic attack) 3/11/2011   • Sepsis urinary source 1/23/2012   • Vaginal bleeding 10/22/2012   • History of hematuria      3/15/10: with Plavix.   • Stroke (CMS-HCC)      2 TIAs 2/2010, no stroke, 2/2013   • CATARACT      freddie surgery complete   • Atrial fibrillation, rapid (CMS-HCC) 1/31/2014   • Other specified pre-operative examination 1/28/2014     Past Surgical History   Procedure Laterality Date   • Bladder biopsy  "with cystoscopy  10/10/08     Performed by CARLTON LUNA at SURGERY [facility] ORS   • Pyelogram  10/10/08     Performed by CARLTON LUNA at SURGERY [facility] ORS   • Retrogrades  10/10/08     Performed by CARLTON LUNA at [facility] ORS   • Bladder biopsy with cystoscopy  6/15/2009     Performed by CARLTON LUNA at SURGERY [facility] ORS   • Bladder biopsy with cystoscopy  10/12/2009     Performed by JENNIFER LINCOLN at [facility] ORS   • Other abdominal surgery       appendectomy, pancratitis    • Cholecystectomy  1994   • Cholecystectomy  1994   • Gyn surgery       hysterectomy   • Pelvic exam under anesthesia  10/12/2009     Performed by JENNIFER LINCOLN at SURGERY [facility] ORS   • Rectocele repair  11/3/2009     Performed by ZEKE HARRINGTON at SURGERY [facility] ORS   • Other       TONSILLECTOMY AS TEENAGER   • Cystectomy  9/6/2011     Performed by JENNIFER LINCOLN at SURGERY [facility] ORS   • Ileo loop diversion  9/6/2011     Performed by JENNIFER LINCOLN at [facility] ORS   • Biopsy general  10/22/2012     Performed by Jennifer Lincoln M.D. at SURGERY [facility] ORS   • Parastomal hernia repair   1/28/2014     Performed by Nicol Dorman M.D. at SURGERY [facility]     Family History   Problem Relation Age of Onset   • Stroke Mother 81   • Stroke Father 77     History   Smoking status   • Never Smoker    Smokeless tobacco   • Never Used     Allergies   Allergen Reactions   • Cardizem Swelling   • Doxycycline      Swollen lips.   • Sulfa Drugs      Makes tongue swell, severely   • Zyvox Swelling     \"Whole mouth swelled up\"    • Codeine      Does not remember the reaction     • Macrobid [Nitrofurantoin Monohydrate Macrocrystals]      Does not remember the reaction     Outpatient Encounter Prescriptions as of 8/15/2017   Medication Sig Dispense Refill   • apixaban (ELIQUIS) 5mg Tab Take 1 Tab by mouth 2 Times a Day. 180 Tab 3   • " "metoprolol SR (TOPROL XL) 25 MG TABLET SR 24 HR Take 25 mg by mouth every day.     • Brimonidine Tartrate-Timolol (COMBIGAN) 0.2-0.5 % Solution Place 1 Drop in both eyes 2 Times a Day.     • levothyroxine (SYNTHROID) 25 MCG Tab Take 25 mcg by mouth Every morning on an empty stomach.     • Multiple Vitamins-Minerals (CENTRUM SILVER ULTRA WOMENS) Tab Take 1 Tab by mouth every day.     • Multiple Vitamins-Minerals (PRESERVISION AREDS PO) Take 1 Tab by mouth 2 Times a Day.     • furosemide (LASIX) 20 MG Tab Take 0.5 Tabs by mouth as needed. 30 Tab 11   • lovastatin (MEVACOR) 10 MG tablet Take 10 mg by mouth every day.     • pantoprazole (PROTONIX) 40 MG TBEC Take 40 mg by mouth every day.     • Biotin 10 MG CAPS Take 1 Cap by mouth every day. Indications: **OTC**     • Bimatoprost (LUMIGAN) 0.01 % SOLN Place 1 Drop in both eyes every day. Both eyes     • cefUROXime (CEFTIN) 250 MG Tab Take 250 mg by mouth 2 times a day. 10 DAYS       No facility-administered encounter medications on file as of 8/15/2017.     Review of Systems   Constitutional: Negative for fever.   HENT: Negative for congestion.    Eyes: Negative for blurred vision.   Respiratory: Negative for cough and shortness of breath.    Cardiovascular: Negative for chest pain, palpitations, orthopnea, claudication, leg swelling and PND.   Gastrointestinal: Negative for abdominal pain and diarrhea.   Musculoskeletal: Negative for myalgias and joint pain.   Skin: Negative for rash.   Neurological: Positive for dizziness. Negative for speech change, loss of consciousness and headaches.   Endo/Heme/Allergies: Does not bruise/bleed easily.   Psychiatric/Behavioral: Negative for depression. The patient is nervous/anxious.    All other systems reviewed and are negative.       Objective:   /68 mmHg  Pulse 70  Ht 1.702 m (5' 7\")  Wt 76.658 kg (169 lb)  BMI 26.46 kg/m2  SpO2 94%    Physical Exam   Constitutional: She is oriented to person, place, and time. She " appears well-developed. No distress.   HENT:   Mouth/Throat: Mucous membranes are normal.   Eyes: Conjunctivae and EOM are normal.   Neck: No JVD present. No tracheal deviation present. No thyroid mass present.   Cardiovascular: Normal rate and regular rhythm.    No murmur heard.  Pulses:       Carotid pulses are 2+ on the right side, and 2+ on the left side.       Radial pulses are 2+ on the right side, and 2+ on the left side.        Dorsalis pedis pulses are 2+ on the right side, and 2+ on the left side.        Posterior tibial pulses are 2+ on the right side, and 2+ on the left side.   Pulmonary/Chest: Effort normal and breath sounds normal. No respiratory distress. She exhibits no tenderness.   Abdominal: Soft. There is no tenderness.   Musculoskeletal: Normal range of motion. She exhibits no edema.   Neurological: She is alert and oriented to person, place, and time. She has normal strength. She displays no tremor.   Skin: Skin is warm and dry. She is not diaphoretic.   Bruises seen in upper and lower extremities   Psychiatric: She has a normal mood and affect. Her behavior is normal.   Vitals reviewed.   Results for BERNARD BAER (MRN [id number]) as of 8/15/2017 12:20   2017  7/15/2017  2017    Sodium 137 139 138   Potassium 4.4 4.0 3.7   Chloride 105 109 110   Co2 26 22 20   Anion Gap 6.0 8.0 8.0   Glucose 109 (H) 101 (H) 90   Bun 18 19 11   Creatinine 1.14 1.12 0.85   GFR If Non  46 (A) 47 (A) >60   Calcium 9.6 8.5 7.9 (L)   AST(SGOT) 26 22    ALT(SGPT) 13 14    Alkaline Phosphatase 125 (H) 106 (H)    Cholesterol,Tot 120     Triglycerides 91     HDL 36 (A)     LDL 66         EK16  NSR 63 bpm normal axis, T-wave inversion lateral leads QRS duration 92 ms  faith    RTMST: 6/12/15  Patient exercised on Danny protocol for 4 minutes reached 5.8 METsachieved 92% MPHR.  Rest EKG showed sinus rhythm LVH with anterior T wave inversion,no widening of QRS with exercise.  (Stress test was performed toassess effect of flecainide at peak heart rate).  Inconclusive stress test secondary to baseline resting EKG abnormalities.    EKG: 3/12/14  Normal sinus rhythm at 64 bpm, normal axis is 0.5 mm ST depressions in the inferolateral leads with T-wave inversions V1 to V5 one and aVL    Transthoracic ECHO: 2/24/14   Normal left ventricular chamber size. Mild concentric left ventricular hypertrophy. Normal regional wall motion. Normal left ventricular systolic function. Left ventricular ejection fraction is 70% to 75%.  No significant valve abnormalities.   Mild tricuspid regurgitation. Right ventricular systolic pressure is estimated to be 40 mmHg.   No pericardial effusion seen.   Ascending aorta 3.0 cm. aortic root 3.1 cm.   Compared to prior study done on 1/23/12 mild increase in RV pressure.    CT chest: 1/31/14   1. No pulmonary embolus appreciated.  2. Nodular hepatic contour, appearance most compatible with cirrhosis.  3. Hazy right upper lobe infiltrate.  4. Bibasilar atelectasis, right greater than left with trace right pleural effusion.  5. Cardiomegaly  6. Atherosclerosis and atherosclerotic coronary artery disease  7. Moderate size hiatal hernia  8. Right upper lobe pulmonary nodule, recommend followup in 3 months with repeat CT chest to evaluate stability     Nuclear stress test: 10/25/13  Normal stress myocardial perfusion scan.  No evidence of ischemia or infarction. Low probability of hemodynamically significant CAD.  Normal wall motion. Calculated LVEF 78%.    Transthoracic echo: 1/23/12   Normal left ventricular chamber size. Normal left ventricular systolic function. Normal left ventricular wall thickness. Left ventricular ejection fraction is 55% to 60%.  Moderately enlarged LA.  Thickened mitral valve leaflets. Mitral annular calcification. Mitral valve opens normally. Trace mitral regurgitation.  Moderate tricuspid regurgitation. Right ventricular systolic pressure is  estimated to be 46 mmhg.    Nuclear stress test on 3/11/11   Cine images demonstrate no regional wall motion abnormality and the left ventricular ejection fraction exceeds 70%.  SPECT images show no fixed or reversible defects to suggest infarction or ischemia.  Normal myocardial perfusion scan    Assessment:   1. Hypertension  2. Dyslipidemia  3. Atrial fibrillation and dizziness  4. Chronic anticoagulation    Medical Decision Making:  Today's Assessment / Status / Plan:     1. Blood pressure is low today.  Decrease Toprol-XL to 12.5 mg daily if no improvement in dizziness discontinue Toprol-XL completely.  2. Continue lovastatin 10 mg qHs.  LDL less than 70.  3. No breakthrough episodes of atrial fibrillation.  4. Continue Eliquis 5 mg BID.  Labs from 7/16/17 normal renal function.  No bleeding issues while on anticoagulation.    Followup in one year.  Labs prior to next visit.      Thank you for allowing me to participate in taking care of patient.    Rose Donis MD.

## 2017-10-16 DIAGNOSIS — I48.91 ATRIAL FIBRILLATION, UNSPECIFIED TYPE (HCC): ICD-10-CM

## 2017-10-31 ENCOUNTER — OFFICE VISIT (OUTPATIENT)
Dept: DERMATOLOGY | Facility: IMAGING CENTER | Age: 82
End: 2017-10-31
Payer: MEDICARE

## 2017-10-31 DIAGNOSIS — L20.84 INTRINSIC ECZEMA: ICD-10-CM

## 2017-10-31 DIAGNOSIS — L82.1 SEBORRHEIC KERATOSES: ICD-10-CM

## 2017-10-31 PROCEDURE — 99202 OFFICE O/P NEW SF 15 MIN: CPT | Mod: 25 | Performed by: NURSE PRACTITIONER

## 2017-10-31 PROCEDURE — 17110 DESTRUCTION B9 LES UP TO 14: CPT | Mod: GZ | Performed by: NURSE PRACTITIONER

## 2017-10-31 NOTE — PROGRESS NOTES
Chief Complaint   Patient presents with   • Skin Lesion         HISTORY OF THE PRESENT ILLNESS: Patient is a 82 y.o. femalePatient whose primary care physician is Dr. Restrepo. This pleasant patient is here today regarding skin lesions. She denies any personal or family history of skin cancer. She was seen Dr. Chappell at this point helically TINO Wadsworth for dermatology but she has not seen her in more than 2 years and was not able to get an appointment recently.      Intrinsic eczema  Patient has had dry skin for many years. She uses mild soap and lotions but has some itchy red spots that persist.       Allergies: Cardizem; Doxycycline; Sulfa drugs; Zyvox; Codeine; and Macrobid [nitrofurantoin monohydrate macrocrystals]    Current Outpatient Prescriptions   Medication Sig Dispense Refill   • apixaban (ELIQUIS) 5 MG Tab tablet Take 1 Tab by mouth 2 Times a Day. 60 Tab 11   • metoprolol SR (TOPROL XL) 25 MG TABLET SR 24 HR Take 25 mg by mouth every day.     • cefUROXime (CEFTIN) 250 MG Tab Take 250 mg by mouth 2 times a day. 10 DAYS     • Brimonidine Tartrate-Timolol (COMBIGAN) 0.2-0.5 % Solution Place 1 Drop in both eyes 2 Times a Day.     • levothyroxine (SYNTHROID) 25 MCG Tab Take 25 mcg by mouth Every morning on an empty stomach.     • Multiple Vitamins-Minerals (CENTRUM SILVER ULTRA WOMENS) Tab Take 1 Tab by mouth every day.     • Multiple Vitamins-Minerals (PRESERVISION AREDS PO) Take 1 Tab by mouth 2 Times a Day.     • furosemide (LASIX) 20 MG Tab Take 0.5 Tabs by mouth as needed. 30 Tab 11   • lovastatin (MEVACOR) 10 MG tablet Take 10 mg by mouth every day.     • pantoprazole (PROTONIX) 40 MG TBEC Take 40 mg by mouth every day.     • Biotin 10 MG CAPS Take 1 Cap by mouth every day. Indications: **OTC**     • Bimatoprost (LUMIGAN) 0.01 % SOLN Place 1 Drop in both eyes every day. Both eyes       No current facility-administered medications for this visit.        Past Medical History:   Diagnosis Date   • A-fib  (CMS-HCC)    • Ailment     urostomy   • Arthritis     fingers   • Atrial fibrillation (CMS-HCC) 2/2009    A FIB X2,[ resolved on own][   • Atrial fibrillation, rapid (CMS-HCC) 1/31/2014   • Bladder disease 2011    bladder removed   • Bladder problem     Chronic bladder infection for the past 17months   • CAD (coronary artery disease)    • Cancer (CMS-HCC)     skin   • CATARACT     freddie surgery complete   • CHEST PAIN 3/11/2011   • Glaucoma    • History of hematuria     3/15/10: with Plavix.   • Hyperlipidemia 2/24/2011   • Hypertension    • Hypothyroid 2/24/2011   • Indigestion    • Intrinsic eczema 10/31/2017   • Other specified pre-operative examination 1/28/2014   • Paroxysmal atrial fibrillation (CMS-HCC)    • Sepsis urinary source 1/23/2012   • Stroke (CMS-HCC)     2 TIAs 2/2010, no stroke, 2/2013   • T.I.A.    • TIA (transient ischemic attack) 3/11/2011   • Unspecified disorder of thyroid    • Unspecified hemorrhagic conditions     not sure when (2009)  blood in urin    • Urinary bladder disorder     urinary incontinence   • UTI (urinary tract infection) 2/24/2011   • Vaginal bleeding 10/22/2012       Past Surgical History:   Procedure Laterality Date   • PARASTOMAL HERNIA REPAIR   1/28/2014    Performed by Nicol Dorman M.D. at SURGERY [facility] ORS   • BIOPSY GENERAL  10/22/2012    Performed by Jennifer Lincoln M.D. at SURGERY [facility] ORS   • CYSTECTOMY  9/6/2011    Performed by JENNIFER LINCOLN at SURGERY [facility] ORS   • ILEO LOOP DIVERSION  9/6/2011    Performed by JENNIFER LINCOLN at SURGERY [facility] ORS   • RECTOCELE REPAIR  11/3/2009    Performed by ZEKE HARRINGTON at SURGERY [facility] DAY [facility] ORS   • BLADDER BIOPSY WITH CYSTOSCOPY  10/12/2009    Performed by JENNIFER LINCOLN at SURGERY [facility] ORS   • PELVIC EXAM UNDER ANESTHESIA  10/12/2009    Performed by JENNIFER LINCOLN at SURGERY [facility] ORS   • BLADDER BIOPSY WITH CYSTOSCOPY  6/15/2009    Performed  by CARLTON LUNA at SURGERY [facility]   • BLADDER BIOPSY WITH CYSTOSCOPY  10/10/08    Performed by CARLTON LUNA at SURGERY [facility]   • PYELOGRAM  10/10/08    Performed by CARLTON LUNA at SURGERY [facility]   • RETROGRADES  10/10/08    Performed by CARLTON LUNA at SURGERY [facility]   • CHOLECYSTECTOMY     • CHOLECYSTECTOMY     • GYN SURGERY      hysterectomy   • OTHER      TONSILLECTOMY AS TEENAGER   • OTHER ABDOMINAL SURGERY      appendectomy, pancratitis        Social History   Substance Use Topics   • Smoking status: Never Smoker   • Smokeless tobacco: Never Used   • Alcohol use No       Family Status   Relation Status   • Mother    • Father      Family History   Problem Relation Age of Onset   • Stroke Mother 81   • Stroke Father 77       Review of Systems   Skin: Multiple skin lesions she is concerned about     Exam: There were no vitals taken for this visit.  General: Normal appearing. No distress.  Skin: Scalp no lesions were seen  Face: right cheek just lateral to nose 2-3 mm light brown slightly raised lesion consistent with actinic keratosis  Back: Left shoulder 1 cm seborrheic keratosis, mid back 2 1 cm actinic keratosis, lower back 31 cm seborrheic keratosis  Chest: Right upper chest incision from history of port inserted for long-term antibiotics noted    Abdomen: Ileostomy noted left lower abdomen two 8 mm actinic keratosis   Buttocks no concerning lesions are noted  Right lower extremity: Medial lower leg a 4 mm skin colored slightly erythematous flat lesion noted and a pictures taken of this lesion is most consistent with eczema.  Both lower extremities have considerable discoloration noted after patient has her legs lowered in a sitting position, which improves with movement of her feet and legs.  Patient consents to treatment with liquid nitrogen she is aware that this treatment can cause hypo-or hyperpigmentation erythema and/or blistering.  7  seborrheic keratosis retreated on the back, two on the left lower abdomen. And one lesion on the right lower extremity      Please note that this dictation was created using voice recognition software. I have made every reasonable attempt to correct obvious errors, but I expect that there are errors of grammar and possibly content that I did not discover before finalizing the note.      Assessment/Plan  1. Seborrheic keratoses  Treated with liquid nitrogen she is aware that these lesions can recur and may need to be treated again in future.    2. Intrinsic eczema  We discussed using mild soaps and creams and hydration. Patient is given a written sheet with suggestions for these.    She will continue to monitor her skin. She will return in 3-6 months for follow-up appointment to recheck the lesion on her right lower extremity.

## 2017-10-31 NOTE — ASSESSMENT & PLAN NOTE
Patient has had dry skin for many years. She uses mild soap and lotions but has some itchy red spots that persist.

## 2017-11-07 DIAGNOSIS — I48.91 ATRIAL FIBRILLATION, UNSPECIFIED TYPE (HCC): ICD-10-CM

## 2017-11-20 ENCOUNTER — APPOINTMENT (OUTPATIENT)
Dept: RADIOLOGY | Facility: MEDICAL CENTER | Age: 82
End: 2017-11-20
Attending: EMERGENCY MEDICINE
Payer: MEDICARE

## 2017-11-20 ENCOUNTER — HOSPITAL ENCOUNTER (EMERGENCY)
Facility: MEDICAL CENTER | Age: 82
End: 2017-11-20
Attending: EMERGENCY MEDICINE
Payer: MEDICARE

## 2017-11-20 VITALS
WEIGHT: 171.74 LBS | BODY MASS INDEX: 26.03 KG/M2 | DIASTOLIC BLOOD PRESSURE: 78 MMHG | OXYGEN SATURATION: 95 % | HEIGHT: 68 IN | HEART RATE: 62 BPM | SYSTOLIC BLOOD PRESSURE: 174 MMHG | TEMPERATURE: 98.1 F | RESPIRATION RATE: 18 BRPM

## 2017-11-20 DIAGNOSIS — N30.01 ACUTE CYSTITIS WITH HEMATURIA: ICD-10-CM

## 2017-11-20 DIAGNOSIS — R31.0 GROSS HEMATURIA: ICD-10-CM

## 2017-11-20 LAB
ABO GROUP BLD: NORMAL
ALBUMIN SERPL BCP-MCNC: 3.4 G/DL (ref 3.2–4.9)
ALBUMIN/GLOB SERPL: 0.8 G/DL
ALP SERPL-CCNC: 104 U/L (ref 30–99)
ALT SERPL-CCNC: 15 U/L (ref 2–50)
ANION GAP SERPL CALC-SCNC: 6 MMOL/L (ref 0–11.9)
APPEARANCE UR: CLEAR
APTT PPP: 33.7 SEC (ref 24.7–36)
AST SERPL-CCNC: 28 U/L (ref 12–45)
BACTERIA #/AREA URNS HPF: ABNORMAL /HPF
BASOPHILS # BLD AUTO: 1.3 % (ref 0–1.8)
BASOPHILS # BLD: 0.08 K/UL (ref 0–0.12)
BILIRUB SERPL-MCNC: 0.7 MG/DL (ref 0.1–1.5)
BILIRUB UR QL STRIP.AUTO: NEGATIVE
BLD GP AB SCN SERPL QL: NORMAL
BUN SERPL-MCNC: 20 MG/DL (ref 8–22)
CALCIUM SERPL-MCNC: 9.3 MG/DL (ref 8.5–10.5)
CHLORIDE SERPL-SCNC: 108 MMOL/L (ref 96–112)
CO2 SERPL-SCNC: 24 MMOL/L (ref 20–33)
COLOR UR: YELLOW
CREAT SERPL-MCNC: 1.07 MG/DL (ref 0.5–1.4)
CULTURE IF INDICATED INDCX: YES UA CULTURE
EOSINOPHIL # BLD AUTO: 0.25 K/UL (ref 0–0.51)
EOSINOPHIL NFR BLD: 4 % (ref 0–6.9)
EPI CELLS #/AREA URNS HPF: NEGATIVE /HPF
ERYTHROCYTE [DISTWIDTH] IN BLOOD BY AUTOMATED COUNT: 48.5 FL (ref 35.9–50)
GFR SERPL CREATININE-BSD FRML MDRD: 49 ML/MIN/1.73 M 2
GLOBULIN SER CALC-MCNC: 4.1 G/DL (ref 1.9–3.5)
GLUCOSE SERPL-MCNC: 120 MG/DL (ref 65–99)
GLUCOSE UR STRIP.AUTO-MCNC: NEGATIVE MG/DL
HCT VFR BLD AUTO: 39.3 % (ref 37–47)
HGB BLD-MCNC: 12.6 G/DL (ref 12–16)
IMM GRANULOCYTES # BLD AUTO: 0.02 K/UL (ref 0–0.11)
IMM GRANULOCYTES NFR BLD AUTO: 0.3 % (ref 0–0.9)
INR PPP: 1.32 (ref 0.87–1.13)
KETONES UR STRIP.AUTO-MCNC: NEGATIVE MG/DL
LEUKOCYTE ESTERASE UR QL STRIP.AUTO: ABNORMAL
LIPASE SERPL-CCNC: 99 U/L (ref 11–82)
LYMPHOCYTES # BLD AUTO: 1.98 K/UL (ref 1–4.8)
LYMPHOCYTES NFR BLD: 31.6 % (ref 22–41)
MCH RBC QN AUTO: 27.3 PG (ref 27–33)
MCHC RBC AUTO-ENTMCNC: 32.1 G/DL (ref 33.6–35)
MCV RBC AUTO: 85.1 FL (ref 81.4–97.8)
MICRO URNS: ABNORMAL
MONOCYTES # BLD AUTO: 0.99 K/UL (ref 0–0.85)
MONOCYTES NFR BLD AUTO: 15.8 % (ref 0–13.4)
NEUTROPHILS # BLD AUTO: 2.94 K/UL (ref 2–7.15)
NEUTROPHILS NFR BLD: 47 % (ref 44–72)
NITRITE UR QL STRIP.AUTO: NEGATIVE
NRBC # BLD AUTO: 0 K/UL
NRBC BLD AUTO-RTO: 0 /100 WBC
PH UR STRIP.AUTO: 6.5 [PH]
PLATELET # BLD AUTO: 147 K/UL (ref 164–446)
PMV BLD AUTO: 11.3 FL (ref 9–12.9)
POTASSIUM SERPL-SCNC: 3.5 MMOL/L (ref 3.6–5.5)
PROT SERPL-MCNC: 7.5 G/DL (ref 6–8.2)
PROT UR QL STRIP: NEGATIVE MG/DL
PROTHROMBIN TIME: 16.1 SEC (ref 12–14.6)
RBC # BLD AUTO: 4.62 M/UL (ref 4.2–5.4)
RBC # URNS HPF: ABNORMAL /HPF
RBC UR QL AUTO: ABNORMAL
RH BLD: NORMAL
SODIUM SERPL-SCNC: 138 MMOL/L (ref 135–145)
SP GR UR STRIP.AUTO: 1.01
UROBILINOGEN UR STRIP.AUTO-MCNC: 0.2 MG/DL
WBC # BLD AUTO: 6.3 K/UL (ref 4.8–10.8)
WBC #/AREA URNS HPF: ABNORMAL /HPF

## 2017-11-20 PROCEDURE — 85730 THROMBOPLASTIN TIME PARTIAL: CPT

## 2017-11-20 PROCEDURE — 85025 COMPLETE CBC W/AUTO DIFF WBC: CPT

## 2017-11-20 PROCEDURE — 86850 RBC ANTIBODY SCREEN: CPT

## 2017-11-20 PROCEDURE — 86901 BLOOD TYPING SEROLOGIC RH(D): CPT

## 2017-11-20 PROCEDURE — 81001 URINALYSIS AUTO W/SCOPE: CPT

## 2017-11-20 PROCEDURE — 700117 HCHG RX CONTRAST REV CODE 255: Performed by: EMERGENCY MEDICINE

## 2017-11-20 PROCEDURE — 83690 ASSAY OF LIPASE: CPT

## 2017-11-20 PROCEDURE — 71010 DX-CHEST-LIMITED (1 VIEW): CPT

## 2017-11-20 PROCEDURE — 87086 URINE CULTURE/COLONY COUNT: CPT

## 2017-11-20 PROCEDURE — 87077 CULTURE AEROBIC IDENTIFY: CPT

## 2017-11-20 PROCEDURE — 700105 HCHG RX REV CODE 258

## 2017-11-20 PROCEDURE — 700111 HCHG RX REV CODE 636 W/ 250 OVERRIDE (IP): Performed by: EMERGENCY MEDICINE

## 2017-11-20 PROCEDURE — 85610 PROTHROMBIN TIME: CPT

## 2017-11-20 PROCEDURE — 80053 COMPREHEN METABOLIC PANEL: CPT

## 2017-11-20 PROCEDURE — 87186 SC STD MICRODIL/AGAR DIL: CPT

## 2017-11-20 PROCEDURE — 86900 BLOOD TYPING SEROLOGIC ABO: CPT

## 2017-11-20 PROCEDURE — 74177 CT ABD & PELVIS W/CONTRAST: CPT

## 2017-11-20 PROCEDURE — 96365 THER/PROPH/DIAG IV INF INIT: CPT

## 2017-11-20 PROCEDURE — 71010 DX-CHEST-LIMITED (1 VIEW): CPT | Performed by: EMERGENCY MEDICINE

## 2017-11-20 PROCEDURE — 99284 EMERGENCY DEPT VISIT MOD MDM: CPT

## 2017-11-20 RX ORDER — CEFDINIR 300 MG/1
300 CAPSULE ORAL 2 TIMES DAILY
Qty: 14 CAP | Refills: 0 | Status: SHIPPED | OUTPATIENT
Start: 2017-11-20 | End: 2017-11-27

## 2017-11-20 RX ORDER — SODIUM CHLORIDE 9 MG/ML
INJECTION, SOLUTION INTRAVENOUS
Status: COMPLETED
Start: 2017-11-20 | End: 2017-11-20

## 2017-11-20 RX ORDER — CEFTRIAXONE 2 G/1
2 INJECTION, POWDER, FOR SOLUTION INTRAMUSCULAR; INTRAVENOUS ONCE
Status: COMPLETED | OUTPATIENT
Start: 2017-11-20 | End: 2017-11-20

## 2017-11-20 RX ADMIN — IOHEXOL 100 ML: 350 INJECTION, SOLUTION INTRAVENOUS at 13:34

## 2017-11-20 RX ADMIN — CEFTRIAXONE SODIUM 2 G: 2 INJECTION, POWDER, FOR SOLUTION INTRAMUSCULAR; INTRAVENOUS at 14:44

## 2017-11-20 RX ADMIN — SODIUM CHLORIDE 100 ML: 9 INJECTION, SOLUTION INTRAVENOUS at 14:44

## 2017-11-20 NOTE — LETTER
11/22/2017               Dorie Wadsworth  [place] [address]        Dear Dorie (MR#[id number])    This letter is sent in regards to your, recent visit to the [facility] Emergency Department on 11/20/2017.  During the visit, tests were performed to assist the physician in a medical diagnosis.  A review of those tests requires that we notify you of the following:    Your urine culture was POSITIVE for a bacteria called Escherichia coli. The antibiotic prescribed for you (cefdinir) should be active to treat this bacteria. IT IS IMPORTANT THAT YOU CONTINUE TAKING YOUR ANTIBIOTIC UNTIL IT IS FINISHED.      Please feel free to contact me at the number below if you have any questions or concerns. Thank you for your cooperation in the matter.    Sincerely,  ED Culture Follow-Up Staff  Maricel Newberry, PharmD    [facility], Emergency Department  [address] [place] [id number]  [phone] (ED Culture Line)  [phone]

## 2017-11-20 NOTE — ED NOTES
"Chief Complaint   Patient presents with   • Sent by MD     Pt called MD due to increased output in urostomy with blood and mucus. Pt with red output in bag. Pt reports x5 bags filled with her normal output is 2 a day. Pt reports low back pain and HA. Blood pressure (!) 174/78, pulse 86, temperature 36.2 °C (97.1 °F), temperature source Temporal, resp. rate 18, height 1.727 m (5' 8\"), weight 77.9 kg (171 lb 11.8 oz), SpO2 99 %.      "

## 2017-11-20 NOTE — DISCHARGE INSTRUCTIONS
Please follow-up with your primary care provider for blood pressure management.        Urinary Tract Infection  A urinary tract infection (UTI) can occur any place along the urinary tract. The tract includes the kidneys, ureters, bladder, and urethra. A type of germ called bacteria often causes a UTI. UTIs are often helped with antibiotic medicine.   HOME CARE   · If given, take antibiotics as told by your doctor. Finish them even if you start to feel better.  · Drink enough fluids to keep your pee (urine) clear or pale yellow.  · Avoid tea, drinks with caffeine, and bubbly (carbonated) drinks.  · Pee often. Avoid holding your pee in for a long time.  · Pee before and after having sex (intercourse).  · Wipe from front to back after you poop (bowel movement) if you are a woman. Use each tissue only once.  GET HELP RIGHT AWAY IF:   · You have back pain.  · You have lower belly (abdominal) pain.  · You have chills.  · You feel sick to your stomach (nauseous).  · You throw up (vomit).  · Your burning or discomfort with peeing does not go away.  · You have a fever.  · Your symptoms are not better in 3 days.  MAKE SURE YOU:   · Understand these instructions.  · Will watch your condition.  · Will get help right away if you are not doing well or get worse.     This information is not intended to replace advice given to you by your health care provider. Make sure you discuss any questions you have with your health care provider.     Document Released: 06/05/2009 Document Revised: 01/08/2016 Document Reviewed: 07/18/2013  [employer] Patient Education ©2016 [employer]

## 2017-11-20 NOTE — ED PROVIDER NOTES
"ED Provider Note    Scribed for Kemar Grossman M.D. by Kaia Osborn. 11/20/2017  11:19 AM    Primary care provider: Yue Restrepo M.D.  Means of arrival: Walk-in  History obtained from: Patient  History limited by: None     CHIEF COMPLAINT  Chief Complaint   Patient presents with   • Sent by MD ACOSTA  Dorie Wadsworth is a 82 y.o. female who presents to the Emergency Department for bloody output into urostomy bag onset roughly four days ago. Per patient, she typically uses two urostomy bags per day. And she has been using up to five urostomy bags in one day over the past three days. Past medical history consists of hypertension and atrial fibrillation. She takes Eliquis. She called her urologist this morning and spoke to the nurse practitioner who instructed her to come to the ED. Other symptoms include headache, lower back pain, dizziness, generalized malaise, abdominal distension and dizziness. Her last urinary tract infection was \"several months ago\" and her current symptoms feel similar. Patient states, \"I usually have lower back pain with an infection\".     REVIEW OF SYSTEMS  Pertinent positives include blood in urostomy bag, headache, lower back pain, dizziness, generalized malaise, abdominal distension and dizziness. Pertinent negatives include no fever, syncope.  All other systems reviewed and negative.  C.     PAST MEDICAL HISTORY   has a past medical history of A-fib (CMS-HCC); Ailment; Arthritis; Atrial fibrillation (CMS-[facility]) (2/2009); Atrial fibrillation, rapid (CMS-HCC) (1/31/2014); Bladder disease (2011); Bladder problem; CAD (coronary artery disease); [place] CATARACT; CHEST PAIN (3/11/2011); Glaucoma; History of hematuria; Hyperlipidemia (2/24/2011); Hypertension; Hypothyroid (2/24/2011); Indigestion; Intrinsic eczema (10/31/2017); Other specified pre-operative examination (1/28/2014); Paroxysmal atrial fibrillation (CMS-HCC); Sepsis urinary source (1/23/2012); Stroke " "(CMS-HCC); T.I.A.; TIA (transient ischemic attack) (3/11/2011); Unspecified disorder of thyroid; Unspecified hemorrhagic conditions; Urinary bladder disorder; UTI (urinary tract infection) (2/24/2011); and Vaginal bleeding (10/22/2012).    SURGICAL HISTORY   has a past surgical history that includes bladder biopsy with cystoscopy (10/10/08); pyelogram (10/10/08); retrogrades (10/10/08); bladder biopsy with cystoscopy (6/15/2009); bladder biopsy with cystoscopy (10/12/2009); other abdominal surgery; cholecystectomy (1994); cholecystectomy (1994); gyn surgery; pelvic exam under anesthesia (10/12/2009); rectocele repair (11/3/2009); other; cystectomy (9/6/2011); ileo loop diversion (9/6/2011); biopsy general (10/22/2012); and parastomal hernia repair (1/28/2014).    SOCIAL HISTORY  Social History   Substance Use Topics   • Smoking status: Never Smoker   • Smokeless tobacco: Never Used   • Alcohol use No      History   Drug Use No       FAMILY HISTORY  Family History   Problem Relation Age of Onset   • Stroke Mother 81   • Stroke Father 77       CURRENT MEDICATIONS  Current medications can be found in the nurse's note.     ALLERGIES  Allergies   Allergen Reactions   • Cardizem Swelling   • Doxycycline      Swollen lips.   • Sulfa Drugs      Makes tongue swell, severely   • Zyvox Swelling     \"Whole mouth swelled up\"    • Codeine      Does not remember the reaction     • Macrobid [Nitrofurantoin Monohydrate Macrocrystals]      Does not remember the reaction       PHYSICAL EXAM  VITAL SIGNS: BP (!) 174/78   Pulse 86   Temp 36.2 °C (97.1 °F) (Temporal)   Resp 18   Ht 1.727 m (5' 8\")   Wt 77.9 kg (171 lb 11.8 oz)   SpO2 99%   BMI 26.11 kg/m²     Constitutional: Well developed, Well nourished, Mild distress, Non-toxic appearance.   HENT: Normocephalic, Atraumatic, Bilateral external ears normal, Oropharynx moist, No oral exudates.   Eyes: PERRLA, EOMI, Conjunctiva normal, No discharge.   Neck: No tenderness, Supple, " No stridor.   Lymphatic: No lymphadenopathy noted.   Cardiovascular: Normal heart rate, Normal rhythm.   Thorax & Lungs: Clear to auscultation bilaterally, No respiratory distress, No wheezing, No crackles.   Abdomen: Mild fullness around urostomy site. Slight purple discoloration around urostomy site. Soft, No tenderness, No masses, No pulsatile masses.   Skin: Warm, Dry, No erythema, No rash.   Extremities:, No edema No cyanosis.   Musculoskeletal: No tenderness to palpation or major deformities noted.  Intact distal pulses  Neurologic: Awake, alert. Moves all extremities spontaneously.  Psychiatric: Slightly anxious. Judgment normal.    LABS  Results for orders placed or performed during the hospital encounter of 11/20/17   COD (ADULT)   Result Value Ref Range    ABO Grouping Only A     Rh Grouping Only POS     Antibody Screen-Cod NEG    CBC WITH DIFFERENTIAL   Result Value Ref Range    WBC 6.3 4.8 - 10.8 K/uL    RBC 4.62 4.20 - 5.40 M/uL    Hemoglobin 12.6 12.0 - 16.0 g/dL    Hematocrit 39.3 37.0 - 47.0 %    MCV 85.1 81.4 - 97.8 fL    MCH 27.3 27.0 - 33.0 pg    MCHC 32.1 (L) 33.6 - 35.0 g/dL    RDW 48.5 35.9 - 50.0 fL    Platelet Count 147 (L) 164 - 446 K/uL    MPV 11.3 9.0 - 12.9 fL    Neutrophils-Polys 47.00 44.00 - 72.00 %    Lymphocytes 31.60 22.00 - 41.00 %    Monocytes 15.80 (H) 0.00 - 13.40 %    Eosinophils 4.00 0.00 - 6.90 %    Basophils 1.30 0.00 - 1.80 %    Immature Granulocytes 0.30 0.00 - 0.90 %    Nucleated RBC 0.00 /100 WBC    Neutrophils (Absolute) 2.94 2.00 - 7.15 K/uL    Lymphs (Absolute) 1.98 1.00 - 4.80 K/uL    Monos (Absolute) 0.99 (H) 0.00 - 0.85 K/uL    Eos (Absolute) 0.25 0.00 - 0.51 K/uL    Baso (Absolute) 0.08 0.00 - 0.12 K/uL    Immature Granulocytes (abs) 0.02 0.00 - 0.11 K/uL    NRBC (Absolute) 0.00 K/uL   COMP METABOLIC PANEL   Result Value Ref Range    Co2 24 20 - 33 mmol/L    Glucose 120 (H) 65 - 99 mg/dL    Bun 20 8 - 22 mg/dL    Creatinine 1.07 0.50 - 1.40 mg/dL    Calcium 9.3  8.5 - 10.5 mg/dL    AST(SGOT) 28 12 - 45 U/L    ALT(SGPT) 15 2 - 50 U/L    Alkaline Phosphatase 104 (H) 30 - 99 U/L    Total Bilirubin 0.7 0.1 - 1.5 mg/dL    Albumin 3.4 3.2 - 4.9 g/dL    Total Protein 7.5 6.0 - 8.2 g/dL    Globulin 4.1 (H) 1.9 - 3.5 g/dL    A-G Ratio 0.8 g/dL    Sodium 138 135 - 145 mmol/L    Potassium 3.5 (L) 3.6 - 5.5 mmol/L    Chloride 108 96 - 112 mmol/L    Anion Gap 6.0 0.0 - 11.9   LIPASE   Result Value Ref Range    Lipase 99 (H) 11 - 82 U/L   PROTHROMBIN TIME   Result Value Ref Range    PT 16.1 (H) 12.0 - 14.6 sec    INR 1.32 (H) 0.87 - 1.13   APTT   Result Value Ref Range    APTT 33.7 24.7 - 36.0 sec   URINALYSIS,CULTURE IF INDICATED   Result Value Ref Range    Color Yellow     Character Clear     Specific Gravity 1.014 <1.035    Ph 6.5 5.0 - 8.0    Glucose Negative Negative mg/dL    Ketones Negative Negative mg/dL    Protein Negative Negative mg/dL    Bilirubin Negative Negative    Urobilinogen, Urine 0.2 Negative    Nitrite Negative Negative    Leukocyte Esterase Moderate (A) Negative    Occult Blood Large (A) Negative    Micro Urine Req Microscopic     Culture Indicated Yes UA Culture   ESTIMATED GFR   Result Value Ref Range    GFR If African American 60 >60 mL/min/1.73 m 2    GFR If Non African American 49 (A) >60 mL/min/1.73 m 2   URINE MICROSCOPIC (W/UA)   Result Value Ref Range    WBC  (A) /hpf    RBC  (A) /hpf    Bacteria Moderate (A) None /hpf    Epithelial Cells Negative /hpf      All labs reviewed by me.    RADIOLOGY  CT-ABDOMEN-PELVIS WITH   Final Result      1.  No etiology identified for hematuria      2.  Bilateral renal scarring      3.  Diverting urostomy present      4.  Prior cystectomy      5.  Cirrhosis      DX-CHEST-LIMITED (1 VIEW)   Final Result      Mild bilateral infrahilar/lung base atelectasis/possible edema or pneumonitis.        The radiologist's interpretation of all radiological studies have been reviewed by me.    COURSE & MEDICAL DECISION  MAKING  Pertinent Labs & Imaging studies reviewed. (See chart for details)    I reviewed the patient's medical records which showed the patient has history of bladder cancer.     11:19 AM - Patient seen and examined at bedside. Ordered CT abdomen- pelvis, urinalysis culture, estimated GFR, CBC, CMP, lipase, APTT and other labs to evaluate her symptoms. The differential diagnoses include but are not limited to: UTI, bleeding stoma    11:45 AM Rechecked patient at bedside. I assured the patient her blood levels do not show significant blood loss. Visualized stoma site, which has no active bleeding.     1:39 PM Reviewed patient;s lab results, which are shown above.     1:54 PM Ordered 2 gr Rocephin.    2:10 PM Rechecked patient at bedside and discussed lab and radiology results, which are shown above. WE discussed the discharge medications I will prescribe.     Decision Making:  Patient is currently in secondary to blood in her urine, stoma appears to be intact slightly ecchymosis around the stoma, no active bleeding. Laboratory tests were unremarkable, the patient's hemoglobin is 12, the patient's INR slightly elevated, CT scan is negative. I believe the patient likely has a urinary tract infection, we will put the patient on Rocephin, Omnicef, have the patient follow up with urology, return with worsening symptoms.    The patient will return for new or worsening symptoms and is stable at the time of discharge.    The patient is referred to a primary physician for blood pressure management, diabetic screening, and for all other preventative health concerns.    DISPOSITION:  Patient will be discharged home in stable condition.    FOLLOW UP:  [facility], Emergency Dept  [facility] [facility] [address] [phone]    If symptoms worsen      OUTPATIENT MEDICATIONS:  Discharge Medication List as of 11/20/2017  2:48 PM      START taking these medications    Details   cefdinir (OMNICEF) 300 MG  Cap Take 1 Cap by mouth 2 times a day for 7 days., Disp-14 Cap, R-0, Normal             FINAL IMPRESSION  1. Gross hematuria    2. Acute cystitis with hematuria          IKaia (Scribe), am scribing for, and in the presence of, Kemar Grossman M.D..    Electronically signed by: Kaia Osborn (Scribe), 11/20/2017    IKemar M.D. personally performed the services described in this documentation, as scribed by Kaia Osborn in my presence, and it is both accurate and complete.    The note accurately reflects work and decisions made by me.  Kemar Grossman  11/20/2017  4:03 PM

## 2017-11-20 NOTE — ED NOTES
IV removed. Patient verbalizes understanding of discharge instructions. Patient to follow up with PCP for further treatment. Patient ambulatory to discharge with steady gait. NAD or deficits noted at time of discharge.

## 2017-11-21 ENCOUNTER — PATIENT OUTREACH (OUTPATIENT)
Dept: HEALTH INFORMATION MANAGEMENT | Facility: OTHER | Age: 82
End: 2017-11-21

## 2017-11-22 NOTE — ED NOTES
ED Positive Culture Follow-up/Notification Note:    Date: 11/22/17     Patient seen in the ED on 11/20/2017 for bloody output in the urostomy bag x 4 days. Is on Eliquis at home.   1. Gross hematuria    2. Acute cystitis with hematuria     Given Rocephin 2 g IV in the ER.    Discharge Medication List as of 11/20/2017  2:48 PM      START taking these medications    Details   cefdinir (OMNICEF) 300 MG Cap Take 1 Cap by mouth 2 times a day for 7 days., Disp-14 Cap, R-0, Normal             Allergies: Cardizem; Doxycycline; Sulfa drugs; Zyvox; Codeine; and Macrobid [nitrofurantoin monohydrate macrocrystals]     Final cultures:   Results     Procedure Component Value Units Date/Time    URINE CULTURE(NEW) [[id number]]  (Abnormal)  (Susceptibility) Collected:  11/20/17 1256    Order Status:  Completed Specimen:  Urine Updated:  11/22/17 1027     Significant Indicator POS (POS)     Source UR     Site --     Urine Culture -- (A)     Urine Culture -- (A)     Escherichia coli  >100,000 cfu/mL      Culture & Susceptibility     ESCHERICHIA COLI     Antibiotic Sensitivity Microscan Unit Status    Ampicillin Resistant >16 mcg/mL Final    Cefepime Sensitive <=8 mcg/mL Final    Cefotaxime Sensitive <=2 mcg/mL Final    Cefotetan Sensitive <=16 mcg/mL Final    Ceftazidime Sensitive <=1 mcg/mL Final    Ceftriaxone Sensitive <=8 mcg/mL Final    Cefuroxime Sensitive <=4 mcg/mL Final    Cephalothin Intermediate 16 mcg/mL Final    Ciprofloxacin Resistant >2 mcg/mL Final    Gentamicin Sensitive <=4 mcg/mL Final    Levofloxacin Resistant >4 mcg/mL Final    Nitrofurantoin Sensitive <=32 mcg/mL Final    Pip/Tazobactam Sensitive <=16 mcg/mL Final    Piperacillin Resistant >64 mcg/mL Final    Tigecycline Sensitive <=2 mcg/mL Final    Tobramycin Sensitive <=4 mcg/mL Final    Trimeth/Sulfa Sensitive <=2/38 mcg/mL Final                       URINALYSIS,CULTURE IF INDICATED [[id number]]  (Abnormal) Collected:  11/20/17 1256    Order Status:  Completed  Specimen:  Urine Updated:  11/20/17 1318     Color Yellow     Character Clear     Specific Gravity 1.014     Ph 6.5     Glucose Negative mg/dL      Ketones Negative mg/dL      Protein Negative mg/dL      Bilirubin Negative     Urobilinogen, Urine 0.2     Nitrite Negative     Leukocyte Esterase Moderate (A)     Occult Blood Large (A)     Micro Urine Req Microscopic     Culture Indicated Yes UA Culture           Plan:   Appropriate antibiotic therapy prescribed. No changes required based upon culture result.  Sent letter to patient to notify of positive culture result and encourage compliance with prescribed antibiotics.     Maricel Newberry

## 2017-11-28 ENCOUNTER — TELEPHONE (OUTPATIENT)
Dept: CARDIOLOGY | Facility: MEDICAL CENTER | Age: 82
End: 2017-11-28

## 2017-11-29 ENCOUNTER — HOSPITAL ENCOUNTER (OUTPATIENT)
Facility: MEDICAL CENTER | Age: 82
End: 2017-11-29
Attending: NURSE PRACTITIONER
Payer: MEDICARE

## 2017-11-29 PROCEDURE — 87186 SC STD MICRODIL/AGAR DIL: CPT

## 2017-11-29 PROCEDURE — 87086 URINE CULTURE/COLONY COUNT: CPT

## 2017-11-29 PROCEDURE — 87077 CULTURE AEROBIC IDENTIFY: CPT

## 2017-11-29 NOTE — TELEPHONE ENCOUNTER
S/W Pt and she wanted to consult with  an order from ER. To cut Eliquis in half until urology consult.   To Dr. Whipple and SAI Maciel for review.  Lourdes ANGEL RN     ----- Message from Shirin Colorado sent at 11/27/2017 10:55 AM PST -----  Regarding: question about stoma  AM/Lourdes      Patient has question about her stoma. She can be reached at [phone].

## 2017-12-04 NOTE — TELEPHONE ENCOUNTER
I personally spoke with patient today.  Patient currently in sinus rhythm  If she has any further significant bleeding issues okay to hold Eliquis if needed.  Rose

## 2017-12-29 ENCOUNTER — HOSPITAL ENCOUNTER (OUTPATIENT)
Dept: LAB | Facility: MEDICAL CENTER | Age: 82
End: 2017-12-29
Attending: NURSE PRACTITIONER
Payer: MEDICARE

## 2017-12-29 PROCEDURE — 87086 URINE CULTURE/COLONY COUNT: CPT

## 2017-12-31 LAB
BACTERIA UR CULT: NORMAL
SIGNIFICANT IND 70042: NORMAL
SITE SITE: NORMAL
SOURCE SOURCE: NORMAL

## 2018-02-07 ENCOUNTER — HOSPITAL ENCOUNTER (OUTPATIENT)
Dept: RADIOLOGY | Facility: MEDICAL CENTER | Age: 83
End: 2018-02-07
Attending: UROLOGY
Payer: MEDICARE

## 2018-02-07 DIAGNOSIS — R31.0 GROSS HEMATURIA: ICD-10-CM

## 2018-02-07 PROCEDURE — 78708 K FLOW/FUNCT IMAGE W/DRUG: CPT

## 2018-02-07 PROCEDURE — 700111 HCHG RX REV CODE 636 W/ 250 OVERRIDE (IP)

## 2018-02-07 RX ORDER — FUROSEMIDE 10 MG/ML
INJECTION INTRAMUSCULAR; INTRAVENOUS
Status: COMPLETED
Start: 2018-02-07 | End: 2018-02-07

## 2018-02-07 RX ADMIN — FUROSEMIDE 20 MG: 10 INJECTION, SOLUTION INTRAMUSCULAR; INTRAVENOUS at 13:45

## 2018-02-22 ENCOUNTER — OFFICE VISIT (OUTPATIENT)
Dept: URGENT CARE | Facility: PHYSICIAN GROUP | Age: 83
End: 2018-02-22
Payer: MEDICARE

## 2018-02-22 VITALS
DIASTOLIC BLOOD PRESSURE: 78 MMHG | TEMPERATURE: 98 F | HEART RATE: 76 BPM | HEIGHT: 67 IN | OXYGEN SATURATION: 96 % | WEIGHT: 170 LBS | BODY MASS INDEX: 26.68 KG/M2 | SYSTOLIC BLOOD PRESSURE: 138 MMHG | RESPIRATION RATE: 16 BRPM

## 2018-02-22 DIAGNOSIS — R06.02 SOB (SHORTNESS OF BREATH): ICD-10-CM

## 2018-02-22 DIAGNOSIS — J06.9 URI WITH COUGH AND CONGESTION: ICD-10-CM

## 2018-02-22 PROCEDURE — 99214 OFFICE O/P EST MOD 30 MIN: CPT | Performed by: PHYSICIAN ASSISTANT

## 2018-02-22 RX ORDER — METHYLPREDNISOLONE 4 MG/1
TABLET ORAL
Qty: 21 TAB | Refills: 0 | Status: SHIPPED | OUTPATIENT
Start: 2018-02-22 | End: 2018-07-30

## 2018-02-22 RX ORDER — BENZONATATE 200 MG/1
200 CAPSULE ORAL 3 TIMES DAILY PRN
Qty: 60 CAP | Refills: 0 | Status: SHIPPED | OUTPATIENT
Start: 2018-02-22 | End: 2018-07-30

## 2018-02-22 RX ORDER — AMOXICILLIN AND CLAVULANATE POTASSIUM 875; 125 MG/1; MG/1
1 TABLET, FILM COATED ORAL 2 TIMES DAILY
Qty: 20 TAB | Refills: 0 | Status: CANCELLED | OUTPATIENT
Start: 2018-02-22 | End: 2018-03-04

## 2018-02-22 RX ORDER — AZITHROMYCIN 250 MG/1
TABLET, FILM COATED ORAL
Qty: 6 TAB | Refills: 0 | Status: SHIPPED | OUTPATIENT
Start: 2018-02-22 | End: 2018-07-30

## 2018-02-22 NOTE — PROGRESS NOTES
Chief Complaint   Patient presents with   • URI       HISTORY OF PRESENT ILLNESS: Patient is a 82 y.o. female who presents today for the following:    Cough x 2 weeks  + nasal congestion, ears plugged, dizzy when standing, dry cough, HA, SOB  Was feeling better x 2-3 days then acutely worse yesterday  OTC meds tried: cough/cold meds - was helping until yesterday     Patient Active Problem List    Diagnosis Date Noted   • Urinary tract infection 07/11/2017     Priority: High   • Enteritis 07/10/2017     Priority: High   • Atrial fibrillation (CMS-HCC) 01/23/2012     Priority: Medium   • HTN (hypertension) 07/11/2017     Priority: Low   • Seborrheic keratoses 10/31/2017   • Intrinsic eczema 10/31/2017   • Long term (current) use of anticoagulants [Z79.01] 07/21/2017   • Anticoagulant long-term use 08/11/2016   • Atrial flutter (CMS-HCC) 05/18/2015   • 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/01/2014   • 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   • History of hematuria    • Vaginal bleeding 10/22/2012   • TIA (transient ischemic attack) 03/11/2011   • UTI (urinary tract infection) 02/24/2011   • Hypothyroid 02/24/2011   • Hypertension 02/24/2011   • Hyperlipidemia 02/24/2011       Allergies:Cardizem; Doxycycline; Sulfa drugs; Zyvox; Codeine; and Macrobid [nitrofurantoin monohydrate macrocrystals]    Current Outpatient Prescriptions Ordered in [facility]   Medication Sig Dispense Refill   • MethylPREDNISolone (MEDROL DOSEPAK) 4 MG Tablet Therapy Pack Use as package directs 21 Tab 0   • benzonatate (TESSALON) 200 MG capsule Take 1 Cap by mouth 3 times a day as needed for Cough. 60 Cap 0   • azithromycin (ZITHROMAX) 250 MG Tab Use as package directs 6 Tab 0   • apixaban (ELIQUIS) 5 MG Tab tablet Take 1 Tab by mouth 2 Times a Day. 60 Tab 11   • metoprolol SR (TOPROL XL) 25 MG TABLET SR 24 HR Take 25 mg by mouth every day.     • cefUROXime (CEFTIN) 250 MG Tab Take 250 mg by mouth 2 times a day. 10 DAYS     • Brimonidine Tartrate-Timolol (COMBIGAN) 0.2-0.5 % Solution Place  1 Drop in both eyes 2 Times a Day.     • levothyroxine (SYNTHROID) 25 MCG Tab Take 25 mcg by mouth Every morning on an empty stomach.     • Multiple Vitamins-Minerals (CENTRUM SILVER ULTRA WOMENS) Tab Take 1 Tab by mouth every day.     • Multiple Vitamins-Minerals (PRESERVISION AREDS PO) Take 1 Tab by mouth 2 Times a Day.     • furosemide (LASIX) 20 MG Tab Take 0.5 Tabs by mouth as needed. 30 Tab 11   • lovastatin (MEVACOR) 10 MG tablet Take 10 mg by mouth every day.     • pantoprazole (PROTONIX) 40 MG TBEC Take 40 mg by mouth every day.     • Biotin 10 MG CAPS Take 1 Cap by mouth every day. Indications: **OTC**     • Bimatoprost (LUMIGAN) 0.01 % SOLN Place 1 Drop in both eyes every day. Both eyes       No current [facility]-ordered facility-administered medications on file.        Past Medical History:   Diagnosis Date   • A-fib (CMS-HCC)    • Ailment     urostomy   • Arthritis     fingers   • Atrial fibrillation (CMS-HCC) 2/2009    A FIB X2,[ resolved on own][   • Atrial fibrillation, rapid (CMS-HCC) 1/31/2014   • Bladder disease 2011    bladder removed   • Bladder problem     Chronic bladder infection for the past 17months   • CAD (coronary artery disease)    • Cancer (CMS-HCC)     skin   • CATARACT     freddie surgery complete   • CHEST PAIN 3/11/2011   • Glaucoma    • History of hematuria     3/15/10: with Plavix.   • Hyperlipidemia 2/24/2011   • Hypertension    • Hypothyroid 2/24/2011   • Indigestion    • Intrinsic eczema 10/31/2017   • Other specified pre-operative examination 1/28/2014   • Paroxysmal atrial fibrillation (CMS-HCC)    • Sepsis urinary source 1/23/2012   • Stroke (CMS-HCC)     2 TIAs 2/2010, no stroke, 2/2013   • T.I.A.    • TIA (transient ischemic attack) 3/11/2011   • Unspecified disorder of thyroid    • Unspecified hemorrhagic conditions     not sure when (2009)  blood in urin    • Urinary bladder disorder     urinary incontinence   • UTI (urinary tract infection) 2/24/2011   • Vaginal bleeding  "10/22/2012       Social History   Substance Use Topics   • Smoking status: Never Smoker   • Smokeless tobacco: Never Used   • Alcohol use No       Family Status   Relation Status   • Mother    • Father      Family History   Problem Relation Age of Onset   • Stroke Mother 81   • Stroke Father 77       ROS:   Review of Systems   Constitutional: Negative for fever, chills, weight loss and malaise/fatigue.   HENT: Negative for ear pain, nosebleeds, congestion, sore throat and neck pain.    Eyes: Negative for blurred vision.   Respiratory: Negative for  sputum production,  and wheezing.    Cardiovascular: Negative for chest pain, palpitations, orthopnea and leg swelling.   Gastrointestinal: Negative for heartburn, nausea, vomiting and abdominal pain.   Genitourinary: Negative for dysuria, urgency and frequency.       Exam:  Blood pressure 138/78, pulse 76, temperature 36.7 °C (98 °F), resp. rate 16, height 1.702 m (5' 7\"), weight 77.1 kg (170 lb), SpO2 96 %.  General: Well developed, well nourished. No distress.  HEENT: Conjunctiva clear, lids without ptosis, PERRL/EOMI. Ears normal shape and contour, canals are clear bilaterally, tympanic membranes are benign. Nasal mucosa benign. Oropharynx is without erythema, edema or exudates. MMM.  Pulmonary: Coarse breath sounds throughout.  Cardiovascular: Regular rate and rhythm without murmur. No edema.   Neurologic: Grossly nonfocal.  Lymph: No cervical lymphadenopathy noted.  Skin: Warm, dry, good turgor. No rashes in visible areas.   Psych: Normal mood. Alert and oriented x3. Judgment and insight is normal.    Assessment/Plan:  Take all medications as directed. Discussed appropriate over-the-counter symptomatic medication, and when to return to clinic. Follow-up for worsening or persistent symptoms.  1. URI with cough and congestion  benzonatate (TESSALON) 200 MG capsule    azithromycin (ZITHROMAX) 250 MG Tab   2. SOB (shortness of breath)  MethylPREDNISolone " (MEDROL DOSEPAK) 4 MG Tablet Therapy Pack

## 2018-07-05 ENCOUNTER — TELEPHONE (OUTPATIENT)
Dept: CARDIOLOGY | Facility: MEDICAL CENTER | Age: 83
End: 2018-07-05

## 2018-07-05 DIAGNOSIS — I48.0 PAROXYSMAL ATRIAL FIBRILLATION (HCC): ICD-10-CM

## 2018-07-05 RX ORDER — FUROSEMIDE 20 MG/1
10 TABLET ORAL PRN
Qty: 30 TAB | Refills: 0 | Status: SHIPPED | OUTPATIENT
Start: 2018-07-05 | End: 2018-07-30

## 2018-07-05 NOTE — TELEPHONE ENCOUNTER
"----- Message from Shirin Colorado sent at 7/5/2018 11:21 AM PDT -----  Regarding: [facility]'s pharmacy needs specific instructions  Michael Hall at [facility] is calling about furosemide prescription. She says she needs more specific directions, can't use \"as needed\". She can be reached at [phone].  "

## 2018-07-25 DIAGNOSIS — I48.91 ATRIAL FIBRILLATION, UNSPECIFIED TYPE (HCC): ICD-10-CM

## 2018-07-26 NOTE — TELEPHONE ENCOUNTER
[facility]    Left VM for pt to contact the clinic to follow up with anticoagulation.  30 day Eliquis Rx sent until pt can be seen.    Rodney Menendez, PharmD

## 2018-07-30 ENCOUNTER — HOSPITAL ENCOUNTER (INPATIENT)
Facility: MEDICAL CENTER | Age: 83
LOS: 2 days | DRG: 690 | End: 2018-08-01
Attending: EMERGENCY MEDICINE | Admitting: INTERNAL MEDICINE
Payer: MEDICARE

## 2018-07-30 DIAGNOSIS — N12 PYELONEPHRITIS: ICD-10-CM

## 2018-07-30 DIAGNOSIS — Z93.6 PRESENCE OF UROSTOMY (HCC): ICD-10-CM

## 2018-07-30 PROBLEM — N18.2 CKD (CHRONIC KIDNEY DISEASE), STAGE II: Status: ACTIVE | Noted: 2018-07-30

## 2018-07-30 PROBLEM — E83.39 HYPOPHOSPHATEMIA: Status: ACTIVE | Noted: 2018-07-30

## 2018-07-30 PROBLEM — D50.9 MICROCYTIC ANEMIA: Status: ACTIVE | Noted: 2018-07-30

## 2018-07-30 LAB
ALBUMIN SERPL BCP-MCNC: 3.4 G/DL (ref 3.2–4.9)
ALBUMIN/GLOB SERPL: 0.8 G/DL
ALP SERPL-CCNC: 131 U/L (ref 30–99)
ALT SERPL-CCNC: 17 U/L (ref 2–50)
AMORPH CRY #/AREA URNS HPF: PRESENT /HPF
ANION GAP SERPL CALC-SCNC: 6 MMOL/L (ref 0–11.9)
ANISOCYTOSIS BLD QL SMEAR: ABNORMAL
APPEARANCE UR: ABNORMAL
APTT PPP: 36.3 SEC (ref 24.7–36)
AST SERPL-CCNC: 29 U/L (ref 12–45)
BACTERIA #/AREA URNS HPF: ABNORMAL /HPF
BASOPHILS # BLD AUTO: 1.3 % (ref 0–1.8)
BASOPHILS # BLD: 0.19 K/UL (ref 0–0.12)
BILIRUB SERPL-MCNC: 0.4 MG/DL (ref 0.1–1.5)
BILIRUB UR QL STRIP.AUTO: NEGATIVE
BLOOD CULTURE HOLD CXBCH: NORMAL
BUN SERPL-MCNC: 24 MG/DL (ref 8–22)
CALCIUM SERPL-MCNC: 8.9 MG/DL (ref 8.5–10.5)
CHLORIDE SERPL-SCNC: 107 MMOL/L (ref 96–112)
CO2 SERPL-SCNC: 22 MMOL/L (ref 20–33)
COLOR UR: YELLOW
COMMENT 1642: NORMAL
CREAT SERPL-MCNC: 1.01 MG/DL (ref 0.5–1.4)
EOSINOPHIL # BLD AUTO: 0.35 K/UL (ref 0–0.51)
EOSINOPHIL NFR BLD: 2.3 % (ref 0–6.9)
EPI CELLS #/AREA URNS HPF: ABNORMAL /HPF
ERYTHROCYTE [DISTWIDTH] IN BLOOD BY AUTOMATED COUNT: 52.8 FL (ref 35.9–50)
GLOBULIN SER CALC-MCNC: 4.2 G/DL (ref 1.9–3.5)
GLUCOSE SERPL-MCNC: 84 MG/DL (ref 65–99)
GLUCOSE UR STRIP.AUTO-MCNC: NEGATIVE MG/DL
HCT VFR BLD AUTO: 37.9 % (ref 37–47)
HGB BLD-MCNC: 11.7 G/DL (ref 12–16)
IMM GRANULOCYTES # BLD AUTO: 1.06 K/UL (ref 0–0.11)
IMM GRANULOCYTES NFR BLD AUTO: 7.1 % (ref 0–0.9)
INR PPP: 1.32 (ref 0.87–1.13)
KETONES UR STRIP.AUTO-MCNC: NEGATIVE MG/DL
LACTATE BLD-SCNC: 1.3 MMOL/L (ref 0.5–2)
LACTATE BLD-SCNC: 1.5 MMOL/L (ref 0.5–2)
LEUKOCYTE ESTERASE UR QL STRIP.AUTO: ABNORMAL
LYMPHOCYTES # BLD AUTO: 3.01 K/UL (ref 1–4.8)
LYMPHOCYTES NFR BLD: 20.1 % (ref 22–41)
MAGNESIUM SERPL-MCNC: 1.9 MG/DL (ref 1.5–2.5)
MCH RBC QN AUTO: 23.9 PG (ref 27–33)
MCHC RBC AUTO-ENTMCNC: 30.9 G/DL (ref 33.6–35)
MCV RBC AUTO: 77.3 FL (ref 81.4–97.8)
MICRO URNS: ABNORMAL
MONOCYTES # BLD AUTO: 1.74 K/UL (ref 0–0.85)
MONOCYTES NFR BLD AUTO: 11.6 % (ref 0–13.4)
MORPHOLOGY BLD-IMP: NORMAL
MUCOUS THREADS #/AREA URNS HPF: ABNORMAL /HPF
NEUTROPHILS # BLD AUTO: 8.61 K/UL (ref 2–7.15)
NEUTROPHILS NFR BLD: 57.6 % (ref 44–72)
NITRITE UR QL STRIP.AUTO: POSITIVE
NRBC # BLD AUTO: 0 K/UL
NRBC BLD-RTO: 0 /100 WBC
PH UR STRIP.AUTO: 6 [PH]
PHOSPHATE SERPL-MCNC: 1.8 MG/DL (ref 2.5–4.5)
PLATELET # BLD AUTO: 243 K/UL (ref 164–446)
PLATELET BLD QL SMEAR: NORMAL
PMV BLD AUTO: 11 FL (ref 9–12.9)
POTASSIUM SERPL-SCNC: 4.5 MMOL/L (ref 3.6–5.5)
PROT SERPL-MCNC: 7.6 G/DL (ref 6–8.2)
PROT UR QL STRIP: NEGATIVE MG/DL
PROTHROMBIN TIME: 16.1 SEC (ref 12–14.6)
RBC # BLD AUTO: 4.9 M/UL (ref 4.2–5.4)
RBC # URNS HPF: ABNORMAL /HPF
RBC BLD AUTO: PRESENT
RBC UR QL AUTO: ABNORMAL
RENAL EPI CELLS #/AREA URNS HPF: ABNORMAL /HPF
SODIUM SERPL-SCNC: 135 MMOL/L (ref 135–145)
SP GR UR STRIP.AUTO: 1.01
UROBILINOGEN UR STRIP.AUTO-MCNC: 0.2 MG/DL
WBC # BLD AUTO: 15 K/UL (ref 4.8–10.8)
WBC #/AREA URNS HPF: ABNORMAL /HPF

## 2018-07-30 PROCEDURE — 87186 SC STD MICRODIL/AGAR DIL: CPT | Mod: 91

## 2018-07-30 PROCEDURE — 81001 URINALYSIS AUTO W/SCOPE: CPT

## 2018-07-30 PROCEDURE — 99285 EMERGENCY DEPT VISIT HI MDM: CPT

## 2018-07-30 PROCEDURE — 700111 HCHG RX REV CODE 636 W/ 250 OVERRIDE (IP): Performed by: EMERGENCY MEDICINE

## 2018-07-30 PROCEDURE — 700102 HCHG RX REV CODE 250 W/ 637 OVERRIDE(OP): Performed by: INTERNAL MEDICINE

## 2018-07-30 PROCEDURE — 96367 TX/PROPH/DG ADDL SEQ IV INF: CPT

## 2018-07-30 PROCEDURE — 87040 BLOOD CULTURE FOR BACTERIA: CPT

## 2018-07-30 PROCEDURE — 87077 CULTURE AEROBIC IDENTIFY: CPT | Mod: 91

## 2018-07-30 PROCEDURE — 84100 ASSAY OF PHOSPHORUS: CPT

## 2018-07-30 PROCEDURE — 87086 URINE CULTURE/COLONY COUNT: CPT

## 2018-07-30 PROCEDURE — 99223 1ST HOSP IP/OBS HIGH 75: CPT | Performed by: INTERNAL MEDICINE

## 2018-07-30 PROCEDURE — 700111 HCHG RX REV CODE 636 W/ 250 OVERRIDE (IP): Mod: JG | Performed by: INTERNAL MEDICINE

## 2018-07-30 PROCEDURE — 80053 COMPREHEN METABOLIC PANEL: CPT

## 2018-07-30 PROCEDURE — 93005 ELECTROCARDIOGRAM TRACING: CPT | Performed by: INTERNAL MEDICINE

## 2018-07-30 PROCEDURE — 96365 THER/PROPH/DIAG IV INF INIT: CPT

## 2018-07-30 PROCEDURE — 85730 THROMBOPLASTIN TIME PARTIAL: CPT

## 2018-07-30 PROCEDURE — 83735 ASSAY OF MAGNESIUM: CPT

## 2018-07-30 PROCEDURE — 700101 HCHG RX REV CODE 250: Performed by: INTERNAL MEDICINE

## 2018-07-30 PROCEDURE — 96375 TX/PRO/DX INJ NEW DRUG ADDON: CPT

## 2018-07-30 PROCEDURE — 96366 THER/PROPH/DIAG IV INF ADDON: CPT

## 2018-07-30 PROCEDURE — 85610 PROTHROMBIN TIME: CPT

## 2018-07-30 PROCEDURE — 83605 ASSAY OF LACTIC ACID: CPT

## 2018-07-30 PROCEDURE — 700105 HCHG RX REV CODE 258: Performed by: EMERGENCY MEDICINE

## 2018-07-30 PROCEDURE — 93010 ELECTROCARDIOGRAM REPORT: CPT | Performed by: INTERNAL MEDICINE

## 2018-07-30 PROCEDURE — A9270 NON-COVERED ITEM OR SERVICE: HCPCS | Performed by: INTERNAL MEDICINE

## 2018-07-30 PROCEDURE — 85025 COMPLETE CBC W/AUTO DIFF WBC: CPT

## 2018-07-30 PROCEDURE — 770021 HCHG ROOM/CARE - ISO PRIVATE

## 2018-07-30 PROCEDURE — 700105 HCHG RX REV CODE 258: Performed by: INTERNAL MEDICINE

## 2018-07-30 RX ORDER — ONDANSETRON 2 MG/ML
4 INJECTION INTRAMUSCULAR; INTRAVENOUS EVERY 4 HOURS PRN
Status: DISCONTINUED | OUTPATIENT
Start: 2018-07-30 | End: 2018-08-01 | Stop reason: HOSPADM

## 2018-07-30 RX ORDER — SODIUM CHLORIDE 9 MG/ML
30 INJECTION, SOLUTION INTRAVENOUS
Status: DISCONTINUED | OUTPATIENT
Start: 2018-07-30 | End: 2018-08-01 | Stop reason: HOSPADM

## 2018-07-30 RX ORDER — BRIMONIDINE TARTRATE AND TIMOLOL MALEATE 2; 5 MG/ML; MG/ML
1 SOLUTION OPHTHALMIC 2 TIMES DAILY
Status: DISCONTINUED | OUTPATIENT
Start: 2018-07-30 | End: 2018-07-30

## 2018-07-30 RX ORDER — SODIUM CHLORIDE 9 MG/ML
500 INJECTION, SOLUTION INTRAVENOUS
Status: DISCONTINUED | OUTPATIENT
Start: 2018-07-30 | End: 2018-08-01 | Stop reason: HOSPADM

## 2018-07-30 RX ORDER — METOPROLOL SUCCINATE 50 MG/1
25 TABLET, EXTENDED RELEASE ORAL DAILY
COMMUNITY
End: 2019-03-13

## 2018-07-30 RX ORDER — MORPHINE SULFATE 4 MG/ML
4 INJECTION, SOLUTION INTRAMUSCULAR; INTRAVENOUS ONCE
Status: COMPLETED | OUTPATIENT
Start: 2018-07-30 | End: 2018-07-30

## 2018-07-30 RX ORDER — M-VIT,TX,IRON,MINS/CALC/FOLIC 27MG-0.4MG
1 TABLET ORAL DAILY
Status: DISCONTINUED | OUTPATIENT
Start: 2018-07-31 | End: 2018-08-01 | Stop reason: HOSPADM

## 2018-07-30 RX ORDER — ONDANSETRON 4 MG/1
4 TABLET, ORALLY DISINTEGRATING ORAL EVERY 4 HOURS PRN
Status: DISCONTINUED | OUTPATIENT
Start: 2018-07-30 | End: 2018-08-01 | Stop reason: HOSPADM

## 2018-07-30 RX ORDER — LEVOTHYROXINE SODIUM 112 UG/1
112 TABLET ORAL
Status: DISCONTINUED | OUTPATIENT
Start: 2018-07-31 | End: 2018-08-01 | Stop reason: HOSPADM

## 2018-07-30 RX ORDER — METOPROLOL SUCCINATE 25 MG/1
25 TABLET, EXTENDED RELEASE ORAL DAILY
Status: DISCONTINUED | OUTPATIENT
Start: 2018-07-31 | End: 2018-08-01 | Stop reason: HOSPADM

## 2018-07-30 RX ORDER — LOVASTATIN 20 MG/1
10 TABLET ORAL DAILY
Status: DISCONTINUED | OUTPATIENT
Start: 2018-07-31 | End: 2018-08-01 | Stop reason: HOSPADM

## 2018-07-30 RX ORDER — ACETAMINOPHEN 325 MG/1
650 TABLET ORAL EVERY 6 HOURS PRN
Status: DISCONTINUED | OUTPATIENT
Start: 2018-07-30 | End: 2018-08-01 | Stop reason: HOSPADM

## 2018-07-30 RX ORDER — LATANOPROST 50 UG/ML
1 SOLUTION/ DROPS OPHTHALMIC
Status: DISCONTINUED | OUTPATIENT
Start: 2018-07-30 | End: 2018-08-01 | Stop reason: HOSPADM

## 2018-07-30 RX ORDER — ONDANSETRON 2 MG/ML
4 INJECTION INTRAMUSCULAR; INTRAVENOUS ONCE
Status: COMPLETED | OUTPATIENT
Start: 2018-07-30 | End: 2018-07-30

## 2018-07-30 RX ORDER — BRIMONIDINE TARTRATE 2 MG/ML
1 SOLUTION/ DROPS OPHTHALMIC 2 TIMES DAILY
Status: DISCONTINUED | OUTPATIENT
Start: 2018-07-30 | End: 2018-08-01 | Stop reason: HOSPADM

## 2018-07-30 RX ORDER — LEVOTHYROXINE SODIUM 112 UG/1
112 TABLET ORAL
COMMUNITY
End: 2019-03-13

## 2018-07-30 RX ORDER — TIMOLOL MALEATE 5 MG/ML
1 SOLUTION/ DROPS OPHTHALMIC 2 TIMES DAILY
Status: DISCONTINUED | OUTPATIENT
Start: 2018-07-30 | End: 2018-08-01 | Stop reason: HOSPADM

## 2018-07-30 RX ORDER — BENZONATATE 200 MG/1
200 CAPSULE ORAL 3 TIMES DAILY PRN
Status: DISCONTINUED | OUTPATIENT
Start: 2018-07-30 | End: 2018-07-30

## 2018-07-30 RX ADMIN — APIXABAN 5 MG: 5 TABLET, FILM COATED ORAL at 22:36

## 2018-07-30 RX ADMIN — VANCOMYCIN HYDROCHLORIDE 1800 MG: 100 INJECTION, POWDER, LYOPHILIZED, FOR SOLUTION INTRAVENOUS at 18:39

## 2018-07-30 RX ADMIN — TIMOLOL MALEATE 1 DROP: 5 SOLUTION OPHTHALMIC at 23:44

## 2018-07-30 RX ADMIN — ACETAMINOPHEN 650 MG: 325 TABLET, FILM COATED ORAL at 22:36

## 2018-07-30 RX ADMIN — SODIUM PHOSPHATE, MONOBASIC, MONOHYDRATE AND SODIUM PHOSPHATE, DIBASIC, ANHYDROUS 30 MMOL: 276; 142 INJECTION, SOLUTION INTRAVENOUS at 23:48

## 2018-07-30 RX ADMIN — ONDANSETRON 4 MG: 2 INJECTION INTRAMUSCULAR; INTRAVENOUS at 17:15

## 2018-07-30 RX ADMIN — DAPTOMYCIN 280 MG: 500 INJECTION, POWDER, LYOPHILIZED, FOR SOLUTION INTRAVENOUS at 22:25

## 2018-07-30 RX ADMIN — PIPERACILLIN AND TAZOBACTAM 3.38 G: 3; .375 INJECTION, POWDER, LYOPHILIZED, FOR SOLUTION INTRAVENOUS; PARENTERAL at 18:05

## 2018-07-30 RX ADMIN — BRIMONIDINE TARTRATE 1 DROP: 2 SOLUTION OPHTHALMIC at 23:44

## 2018-07-30 RX ADMIN — CEFTRIAXONE 2 G: 2 INJECTION, POWDER, FOR SOLUTION INTRAMUSCULAR; INTRAVENOUS at 22:57

## 2018-07-30 RX ADMIN — MORPHINE SULFATE 4 MG: 4 INJECTION INTRAVENOUS at 17:13

## 2018-07-30 ASSESSMENT — LIFESTYLE VARIABLES
DO YOU DRINK ALCOHOL: NO
SUBSTANCE_ABUSE: 0
EVER_SMOKED: NEVER

## 2018-07-30 ASSESSMENT — ENCOUNTER SYMPTOMS
MEMORY LOSS: 0
WHEEZING: 0
CHILLS: 1
CONSTIPATION: 0
DEPRESSION: 0
HEARTBURN: 0
DIZZINESS: 1
BLURRED VISION: 0
PND: 0
NAUSEA: 0
SHORTNESS OF BREATH: 0
DIARRHEA: 0
HEADACHES: 0
PALPITATIONS: 0
FLANK PAIN: 1
WEAKNESS: 1
TREMORS: 0
HALLUCINATIONS: 0
WEIGHT LOSS: 1
SPUTUM PRODUCTION: 0
COUGH: 0
INSOMNIA: 0
CLAUDICATION: 0
SENSORY CHANGE: 0
MYALGIAS: 1
SEIZURES: 0
SPEECH CHANGE: 0
BACK PAIN: 1
FOCAL WEAKNESS: 0
NECK PAIN: 0
TINGLING: 0
LOSS OF CONSCIOUSNESS: 0
FALLS: 0
DOUBLE VISION: 0
HEMOPTYSIS: 0
BLOOD IN STOOL: 0
ORTHOPNEA: 0
ABDOMINAL PAIN: 1
VOMITING: 0
DIAPHORESIS: 1
FEVER: 1
NERVOUS/ANXIOUS: 1

## 2018-07-30 ASSESSMENT — PATIENT HEALTH QUESTIONNAIRE - PHQ9
2. FEELING DOWN, DEPRESSED, IRRITABLE, OR HOPELESS: NOT AT ALL
1. LITTLE INTEREST OR PLEASURE IN DOING THINGS: SEVERAL DAYS
SUM OF ALL RESPONSES TO PHQ9 QUESTIONS 1 AND 2: 1

## 2018-07-30 ASSESSMENT — PAIN SCALES - GENERAL
PAINLEVEL_OUTOF10: 6
PAINLEVEL_OUTOF10: 5
PAINLEVEL_OUTOF10: 6

## 2018-07-30 ASSESSMENT — COGNITIVE AND FUNCTIONAL STATUS - GENERAL
DAILY ACTIVITIY SCORE: 20
TOILETING: A LITTLE
CLIMB 3 TO 5 STEPS WITH RAILING: A LITTLE
MOBILITY SCORE: 21
STANDING UP FROM CHAIR USING ARMS: A LITTLE
SUGGESTED CMS G CODE MODIFIER MOBILITY: CJ
HELP NEEDED FOR BATHING: A LITTLE
WALKING IN HOSPITAL ROOM: A LITTLE
DRESSING REGULAR LOWER BODY CLOTHING: A LITTLE
SUGGESTED CMS G CODE MODIFIER DAILY ACTIVITY: CJ
DRESSING REGULAR UPPER BODY CLOTHING: A LITTLE

## 2018-07-30 NOTE — ED NOTES
[place] [facility] Fall Risk Assessment Tool    Present to ED because of fall  NO  (Syncope, seizure, or ALOC)  Age>70   YES  Altered Mental Status:  (Intoxicated with Alcohol or Substance Confusion,  Inability to follow instructions, disorientation)   NO  Impaired Mobility:  Ambulates or transfers with assistive devices or assist  Ambulates with unsteady gait and no assistance  Unable to ambulate or transfer   NO  Nursing Judgement  (Bowel or bladder incontinence, diarrhea, urinary   frequency or urgency, leg weakness, orthostatic   hypotension, dizziness or vertigo, narcotic use).   NO    Fall Risk Interventions    Move the patient closer to the nurse's station  Familiarize the patient with environment.  Place call light within reach and demonstrate call light use.  Keep patient's personal possessions within patient safe reach  (if appropriate.)  Place stretcher in low position and brakes locked  Place yellow socks and armband on patient  Place green triangle on patient's door  Give patient urinal if applicable  Keep floor surfaces clean and dry.  Keep patient care areas uncluttered.  Use a lap belt or poesy vest  Assess patient hourly for: Pain, Personal Needs, Position change, and call   light access

## 2018-07-30 NOTE — ED TRIAGE NOTES
"Chief Complaint   Patient presents with   • Sent by MD     urinary infection    • Tired     more tired than nl    Pt sent from MD reports \"I have a lot of puss in my urine\" pt with urostomy.  "

## 2018-07-30 NOTE — ED NOTES
"Pt seen at PCP today, UA revealed \"alot of puss\" per pt. Pt has Urostomy to RLQ. Pt c/o sweats/feeling cold/hot over last 10 days \"I thought it was the flu\"  "

## 2018-07-31 LAB
ALBUMIN SERPL BCP-MCNC: 2.7 G/DL (ref 3.2–4.9)
ALBUMIN/GLOB SERPL: 0.8 G/DL
ALP SERPL-CCNC: 109 U/L (ref 30–99)
ALT SERPL-CCNC: 14 U/L (ref 2–50)
ANION GAP SERPL CALC-SCNC: 9 MMOL/L (ref 0–11.9)
ANISOCYTOSIS BLD QL SMEAR: ABNORMAL
AST SERPL-CCNC: 23 U/L (ref 12–45)
BASOPHILS # BLD AUTO: 2.6 % (ref 0–1.8)
BASOPHILS # BLD: 0.28 K/UL (ref 0–0.12)
BILIRUB SERPL-MCNC: 0.3 MG/DL (ref 0.1–1.5)
BUN SERPL-MCNC: 21 MG/DL (ref 8–22)
BURR CELLS BLD QL SMEAR: NORMAL
CALCIUM SERPL-MCNC: 8.2 MG/DL (ref 8.5–10.5)
CHLORIDE SERPL-SCNC: 110 MMOL/L (ref 96–112)
CK SERPL-CCNC: 19 U/L (ref 0–154)
CO2 SERPL-SCNC: 20 MMOL/L (ref 20–33)
CREAT SERPL-MCNC: 1.08 MG/DL (ref 0.5–1.4)
EKG IMPRESSION: NORMAL
EOSINOPHIL # BLD AUTO: 0.1 K/UL (ref 0–0.51)
EOSINOPHIL NFR BLD: 0.9 % (ref 0–6.9)
ERYTHROCYTE [DISTWIDTH] IN BLOOD BY AUTOMATED COUNT: 51.8 FL (ref 35.9–50)
FERRITIN SERPL-MCNC: 56.2 NG/ML (ref 10–291)
FOLATE SERPL-MCNC: 21 NG/ML
GLOBULIN SER CALC-MCNC: 3.6 G/DL (ref 1.9–3.5)
GLUCOSE SERPL-MCNC: 122 MG/DL (ref 65–99)
HCT VFR BLD AUTO: 33.3 % (ref 37–47)
HGB BLD-MCNC: 10.3 G/DL (ref 12–16)
HGB RETIC QN AUTO: 29.3 PG/CELL (ref 29–35)
IMM RETICS NFR: 22 % (ref 9.3–17.4)
IRON SATN MFR SERPL: 16 % (ref 15–55)
IRON SERPL-MCNC: 48 UG/DL (ref 40–170)
LACTATE BLD-SCNC: 1.2 MMOL/L (ref 0.5–2)
LACTATE BLD-SCNC: 1.6 MMOL/L (ref 0.5–2)
LYMPHOCYTES # BLD AUTO: 2.14 K/UL (ref 1–4.8)
LYMPHOCYTES NFR BLD: 20 % (ref 22–41)
MACROCYTES BLD QL SMEAR: ABNORMAL
MAGNESIUM SERPL-MCNC: 1.7 MG/DL (ref 1.5–2.5)
MANUAL DIFF BLD: NORMAL
MCH RBC QN AUTO: 23.6 PG (ref 27–33)
MCHC RBC AUTO-ENTMCNC: 30.9 G/DL (ref 33.6–35)
MCV RBC AUTO: 76.2 FL (ref 81.4–97.8)
METAMYELOCYTES NFR BLD MANUAL: 2.6 %
MICROCYTES BLD QL SMEAR: ABNORMAL
MONOCYTES # BLD AUTO: 0.37 K/UL (ref 0–0.85)
MONOCYTES NFR BLD AUTO: 3.5 % (ref 0–13.4)
MORPHOLOGY BLD-IMP: NORMAL
NEUTROPHILS # BLD AUTO: 7.53 K/UL (ref 2–7.15)
NEUTROPHILS NFR BLD: 69.5 % (ref 44–72)
NEUTS BAND NFR BLD MANUAL: 0.9 % (ref 0–10)
NRBC # BLD AUTO: 0 K/UL
NRBC BLD-RTO: 0 /100 WBC
OVALOCYTES BLD QL SMEAR: NORMAL
PHOSPHATE SERPL-MCNC: 3.5 MG/DL (ref 2.5–4.5)
PLATELET # BLD AUTO: 206 K/UL (ref 164–446)
PLATELET BLD QL SMEAR: NORMAL
PMV BLD AUTO: 10.9 FL (ref 9–12.9)
POIKILOCYTOSIS BLD QL SMEAR: NORMAL
POLYCHROMASIA BLD QL SMEAR: NORMAL
POTASSIUM SERPL-SCNC: 4.2 MMOL/L (ref 3.6–5.5)
PROT SERPL-MCNC: 6.3 G/DL (ref 6–8.2)
RBC # BLD AUTO: 4.37 M/UL (ref 4.2–5.4)
RBC BLD AUTO: PRESENT
RETICS # AUTO: 0.07 M/UL (ref 0.04–0.06)
RETICS/RBC NFR: 1.5 % (ref 0.8–2.1)
SODIUM SERPL-SCNC: 139 MMOL/L (ref 135–145)
TIBC SERPL-MCNC: 304 UG/DL (ref 250–450)
TSH SERPL DL<=0.005 MIU/L-ACNC: 3.75 UIU/ML (ref 0.38–5.33)
VIT B12 SERPL-MCNC: 933 PG/ML (ref 211–911)
WBC # BLD AUTO: 10.7 K/UL (ref 4.8–10.8)

## 2018-07-31 PROCEDURE — 36415 COLL VENOUS BLD VENIPUNCTURE: CPT

## 2018-07-31 PROCEDURE — 84100 ASSAY OF PHOSPHORUS: CPT

## 2018-07-31 PROCEDURE — 700102 HCHG RX REV CODE 250 W/ 637 OVERRIDE(OP): Performed by: INTERNAL MEDICINE

## 2018-07-31 PROCEDURE — 99232 SBSQ HOSP IP/OBS MODERATE 35: CPT | Performed by: FAMILY MEDICINE

## 2018-07-31 PROCEDURE — 83605 ASSAY OF LACTIC ACID: CPT

## 2018-07-31 PROCEDURE — 85007 BL SMEAR W/DIFF WBC COUNT: CPT

## 2018-07-31 PROCEDURE — 82746 ASSAY OF FOLIC ACID SERUM: CPT

## 2018-07-31 PROCEDURE — 700105 HCHG RX REV CODE 258: Performed by: INTERNAL MEDICINE

## 2018-07-31 PROCEDURE — 80053 COMPREHEN METABOLIC PANEL: CPT

## 2018-07-31 PROCEDURE — 82728 ASSAY OF FERRITIN: CPT

## 2018-07-31 PROCEDURE — 84443 ASSAY THYROID STIM HORMONE: CPT

## 2018-07-31 PROCEDURE — 83550 IRON BINDING TEST: CPT

## 2018-07-31 PROCEDURE — 85027 COMPLETE CBC AUTOMATED: CPT

## 2018-07-31 PROCEDURE — A9270 NON-COVERED ITEM OR SERVICE: HCPCS | Performed by: HOSPITALIST

## 2018-07-31 PROCEDURE — 85046 RETICYTE/HGB CONCENTRATE: CPT

## 2018-07-31 PROCEDURE — 700111 HCHG RX REV CODE 636 W/ 250 OVERRIDE (IP): Performed by: INTERNAL MEDICINE

## 2018-07-31 PROCEDURE — 83540 ASSAY OF IRON: CPT

## 2018-07-31 PROCEDURE — 770021 HCHG ROOM/CARE - ISO PRIVATE

## 2018-07-31 PROCEDURE — 83735 ASSAY OF MAGNESIUM: CPT

## 2018-07-31 PROCEDURE — 82607 VITAMIN B-12: CPT

## 2018-07-31 PROCEDURE — A9270 NON-COVERED ITEM OR SERVICE: HCPCS | Performed by: INTERNAL MEDICINE

## 2018-07-31 PROCEDURE — 700102 HCHG RX REV CODE 250 W/ 637 OVERRIDE(OP): Performed by: HOSPITALIST

## 2018-07-31 PROCEDURE — 82550 ASSAY OF CK (CPK): CPT

## 2018-07-31 RX ORDER — HYDROCODONE BITARTRATE AND ACETAMINOPHEN 5; 325 MG/1; MG/1
1-2 TABLET ORAL EVERY 12 HOURS PRN
Status: DISCONTINUED | OUTPATIENT
Start: 2018-07-31 | End: 2018-08-01 | Stop reason: HOSPADM

## 2018-07-31 RX ADMIN — METOPROLOL SUCCINATE 25 MG: 25 TABLET, EXTENDED RELEASE ORAL at 06:07

## 2018-07-31 RX ADMIN — LEVOTHYROXINE SODIUM 112 MCG: 112 TABLET ORAL at 06:07

## 2018-07-31 RX ADMIN — APIXABAN 5 MG: 5 TABLET, FILM COATED ORAL at 18:28

## 2018-07-31 RX ADMIN — LOVASTATIN 10 MG: 20 TABLET ORAL at 06:07

## 2018-07-31 RX ADMIN — APIXABAN 5 MG: 5 TABLET, FILM COATED ORAL at 06:07

## 2018-07-31 RX ADMIN — BRIMONIDINE TARTRATE 1 DROP: 2 SOLUTION OPHTHALMIC at 18:29

## 2018-07-31 RX ADMIN — MULTIPLE VITAMINS W/ MINERALS TAB 1 TABLET: TAB at 06:07

## 2018-07-31 RX ADMIN — TIMOLOL MALEATE 1 DROP: 5 SOLUTION OPHTHALMIC at 06:07

## 2018-07-31 RX ADMIN — HYDROCODONE BITARTRATE AND ACETAMINOPHEN 2 TABLET: 5; 325 TABLET ORAL at 03:31

## 2018-07-31 RX ADMIN — LATANOPROST 1 DROP: 50 SOLUTION OPHTHALMIC at 06:08

## 2018-07-31 RX ADMIN — HYDROCODONE BITARTRATE AND ACETAMINOPHEN 2 TABLET: 5; 325 TABLET ORAL at 21:33

## 2018-07-31 RX ADMIN — TIMOLOL MALEATE 1 DROP: 5 SOLUTION OPHTHALMIC at 18:26

## 2018-07-31 RX ADMIN — BRIMONIDINE TARTRATE 1 DROP: 2 SOLUTION OPHTHALMIC at 06:08

## 2018-07-31 RX ADMIN — CEFTRIAXONE 2 G: 2 INJECTION, POWDER, FOR SOLUTION INTRAMUSCULAR; INTRAVENOUS at 21:34

## 2018-07-31 ASSESSMENT — ENCOUNTER SYMPTOMS
CHILLS: 0
DIZZINESS: 0
TINGLING: 0
HEMOPTYSIS: 0
HEARTBURN: 0
DIAPHORESIS: 0
COUGH: 0
BACK PAIN: 0
NAUSEA: 0
HEADACHES: 0
BLURRED VISION: 0
DOUBLE VISION: 0
MYALGIAS: 0
NECK PAIN: 0
SPUTUM PRODUCTION: 0
PALPITATIONS: 0
FEVER: 0
ORTHOPNEA: 0
VOMITING: 0
PHOTOPHOBIA: 0

## 2018-07-31 ASSESSMENT — PATIENT HEALTH QUESTIONNAIRE - PHQ9
1. LITTLE INTEREST OR PLEASURE IN DOING THINGS: NOT AT ALL
SUM OF ALL RESPONSES TO PHQ9 QUESTIONS 1 AND 2: 0

## 2018-07-31 ASSESSMENT — PAIN SCALES - GENERAL
PAINLEVEL_OUTOF10: 7
PAINLEVEL_OUTOF10: 8
PAINLEVEL_OUTOF10: ASSUMED PAIN PRESENT

## 2018-07-31 NOTE — CARE PLAN
Problem: Safety  Goal: Will remain free from falls  Pt is fatigued/weak. Safety precautions in place. Bed in low position, treaded socks on, personal possessions in reach, call light in reach and pt using it to call for assistance.     Problem: Venous Thromboembolism (VTW)/Deep Vein Thrombosis (DVT) Prevention:  Goal: Patient will participate in Venous Thrombosis (VTE)/Deep Vein Thrombosis (DVT)Prevention Measures  Pt receiving Eliquis daily.    Problem: Pain Management  Goal: Pain level will decrease to patient's comfort goal  Medicated for flank pain per MAR.

## 2018-07-31 NOTE — ED PROVIDER NOTES
ED Provider Note    HPI: Patient is an 82-year-old female who presented to the emergency department July 30, 2018 at 2:18 PM with a chief complaint of fatigue.    Patient was seen by her primary care provider today.  Point-of-care urinalysis appeared to show infection.  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  The patient states couple days ago she had some significant bleeding from her urostomy site but she states this happens from time to time and it resolved so she did not come to the ER at that time.  The patient has had sweats and felt hot and cold over the last few days but has not checked her temperature.  No chest pain or shortness of breath.  No headache no neck stiffness no photophobia.  No change in bowel habits.  No other somatic complaints    Review of Systems: Positive for feeling hot and cold urostomy bleeding which has resolved, positive for fatigue negative for chest pain shortness of breath headache neck stiffness photophobia change in bowel habits.  Review of systems reviewed with patient, all other systems negative    Past medical/surgical history: TIA coronary artery disease hypertension skin cancer atrial fibrillation status post bladder removal with urostomy placement elevated cholesterol urinary tract infection hypothyroid chest pain urosepsis atrial fibrillation with rapid response cholecystectomy hernia repair    Medications: Eliquis Lasix metoprolol Synthroid, June Mevacor Protonix    Allergies: Cardizem doxycycline sulfa drugs Zyvox codeine Macrobid    Social History: No history of smoking no alcohol use      Physical exam: Constitutional: Pleasant female awake alert appeared tired  Vital signs: Temperature 98.9 pulse 83 respirations 16 blood pressure 144/87 pulse oximetry 98%  EYES: PERRL, EOMI, Conjunctivae and sclera normal, eyelids normal bilaterally.  Neck: Trachea midline. No cervical masses seen or palpated. Normal range of motion, supple. No meningeal signs  elicited.  Cardiac: Regular rate and rhythm. S1-S2 present. No S3 or S4 present. No murmurs, rubs, or gallops heard. No edema or varicosities were seen.   Lungs: Clear to auscultation with good aeration throughout. No wheezes, rales, or rhonchi heard. Patient's chest wall moved symmetrically with each respiratory effort. Patient was not making use of accessory muscles of respiration in breathing.  Abdomen: Soft nontender to palpation.  Subjective pain in both lower quadrants is not increased with palpation no rebound or guarding elicited. No organomegaly identified. No pulsatile abdominal masses identified.   Musculoskeletal:  no  pain with palpitation or movement of muscle, bone or joint , no obvious musculoskeletal deformities identified.  Neurologic: alert and awake answers questions appropriately. Moves all four extremities independently, no gross focal abnormalities identified. Normal strength and motor.  Skin: no rash or lesion seen, no palpable dermatologic lesions identified.  Urostomy site looks okay  Psychiatric: not anxious, delusional, or hallucinating.    Medical decision making: Laboratory studies were obtained (please see lab sheet for all results) significant findings included leukocytosis of 15.0.  There is a shift in the differential with 7.1% immature granulocytes concerning for infection.  Renal function normal urinalysis shows a grossly infected urine with 100 250 white cells positive for nitrites and leukocyte esterase.  Lactic acid was normal    Urology consulted; Dr. Dodd consulted for urology.  He said he would be glad to follow.  The patient will be admitted by hospitalist service.  Patient started on antibiotics per protocol, the patient does have a past history of MRSA infection and so she was placed on vancomycin.  Patient does not appear to be septic at this time given her blood pressure normal lactic acid.  Should her infection not clear replacement of urostomy tube may be  necessary.    Further care and hospital course per attending physician summary    Impression 1) pyelonephritis  2) presence of urostomy tube

## 2018-07-31 NOTE — ED NOTES
Med rec updated and complete.  Allergies reviewed.  Pt denies antibiotic use in last 30 days.  All morning doses taken. Interviewed pt at bedside.

## 2018-07-31 NOTE — PROGRESS NOTES
[facility] Hospitalist Progress Note    Date of Service: 2018    Chief Complaint  82 y.o. female admitted 2018 with leukocytosis and ?of pyelonephritis    Interval Problem Update  none    Consultants/Specialty  none    Disposition  pending        Review of Systems   Constitutional: Negative for chills, diaphoresis and fever.   HENT: Negative for ear pain, hearing loss and tinnitus.    Eyes: Negative for blurred vision, double vision and photophobia.   Respiratory: Negative for cough, hemoptysis and sputum production.    Cardiovascular: Negative for chest pain, palpitations and orthopnea.   Gastrointestinal: Negative for heartburn, nausea and vomiting.   Genitourinary: Negative for dysuria, frequency and urgency.   Musculoskeletal: Negative for back pain, myalgias and neck pain.   Skin: Negative for itching and rash.   Neurological: Negative for dizziness, tingling and headaches.      Physical Exam  Laboratory/Imaging   Hemodynamics  Temp (24hrs), Av.2 °C (97.2 °F), Min:36.1 °C (97 °F), Max:36.3 °C (97.4 °F)   Temperature: 36.3 °C (97.4 °F)  Pulse  Av.6  Min: 56  Max: 89    Blood Pressure : 123/65, NIBP: 160/61      Respiratory      Respiration: 18, Pulse Oximetry: 98 %        RUL Breath Sounds: Clear, RML Breath Sounds: Clear, RLL Breath Sounds: Clear, IDA Breath Sounds: Clear, LLL Breath Sounds: Clear    Fluids    Intake/Output Summary (Last 24 hours) at 18 1455  Last data filed at 18 0900   Gross per 24 hour   Intake             1610 ml   Output             1140 ml   Net              470 ml       Nutrition  Orders Placed This Encounter   Procedures   • Diet Order Regular     Standing Status:   Standing     Number of Occurrences:   1     Order Specific Question:   Diet:     Answer:   Regular [1]     Physical Exam   Constitutional: She is oriented to person, place, and time. She appears well-developed and well-nourished.   HENT:   Head: Normocephalic and atraumatic.   Eyes: Pupils are  equal, round, and reactive to light. Conjunctivae are normal.   Neck: Normal range of motion. Neck supple.   Cardiovascular: Normal rate and regular rhythm.    Pulmonary/Chest: Effort normal and breath sounds normal.   Abdominal: Soft. Bowel sounds are normal.   Urostomy bag in place   Musculoskeletal: She exhibits no edema or tenderness.   Neurological: She is alert and oriented to person, place, and time.   Skin: Skin is warm and dry.       Recent Labs      07/30/18   1625  07/31/18   0105   WBC  15.0*  10.7   RBC  4.90  4.37   HEMOGLOBIN  11.7*  10.3*   HEMATOCRIT  37.9  33.3*   MCV  77.3*  76.2*   MCH  23.9*  23.6*   MCHC  30.9*  30.9*   RDW  52.8*  51.8*   PLATELETCT  243  206   MPV  11.0  10.9     Recent Labs      07/30/18   1625  07/31/18   0104   SODIUM  135  139   POTASSIUM  4.5  4.2   CHLORIDE  107  110   CO2  22  20   GLUCOSE  84  122*   BUN  24*  21   CREATININE  1.01  1.08   CALCIUM  8.9  8.2*     Recent Labs      07/30/18   1625   APTT  36.3*   INR  1.32*                  Assessment/Plan     Pyelonephritis- (present on admission)   Assessment & Plan    Pt's urine culture is negative  I do not believe that she has uti with pyelo  The back pain is most likley muscular strain        Hypophosphatemia- (present on admission)   Assessment & Plan    This has been replaced        CKD (chronic kidney disease), stage II- (present on admission)   Assessment & Plan    Monitor renal function / Avoid nephrotoxins and dose medications renally        Microcytic anemia- (present on admission)   Assessment & Plan    Microcytic  Chronic  No sign of bleed        Leukocytosis- (present on admission)   Assessment & Plan    Has resolved  Does not look toxic  Will continue with rocephin for now  Blood cultures are negative  Urine culture is negative  No resp complaint        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

## 2018-07-31 NOTE — PROGRESS NOTES
2 RN skin check done with SAI Sanchez. Skin intact, no open areas noted. Sacrum pink/red and blanching. Urostomy right upper quadrant CDI.

## 2018-07-31 NOTE — PROGRESS NOTES
Client is lying on bed, with urostomy secured on the right side of the abdomen: with urine in bag yellow in color and hazy in appearance. No complains of flank pain. She's on low risk for fall. Reminded about the use of call light when needs assistance, belongings are within reached

## 2018-07-31 NOTE — ED NOTES
Received report from lacie Goddard. Patient updated on POC. White board updated. VSS. Vanco running

## 2018-07-31 NOTE — H&P
Hospital Medicine History & Physical Note    Date of Service  7/30/2018    Primary Care Physician  Yue Restrepo M.D.    Consultants  None    Code Status  FULL CODE     Chief Complaint  Weakness / UTI    History of Presenting Illness  82 y.o. female who presented 7/30/2018 with weakness and diagnosis of urinary tract infection from an outpatient provider.  She has underlying history of atrial fibrillation, anticoagulated with Eliquis.  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  Patient reports that she has not been feeling well for quite some time.  Approximately 1 week ago patient started having chills which lasted for 2-3 days.  She reports that she was not febrile at this time.  He subsequently started developing myalgias and arthralgias.  She thought that she was likely having a flulike illness.  She tried to continue to hydrate herself at home.  Subsequently transient improvement in her symptoms.  And subsequently 4 days ago she had intermittent bleeding from the urostomy site.  She discussed these findings with her urologist, urology team advised her to present to the emergency room for evaluation.  Patient previously had a similar occurrence, at the time of presentation to the emergency room her symptoms had resolved and no workup was done and hence she decided that her symptoms will resolve by its own and they will not be any benefit to present to the emergency room.  Hence she continued to wait.  Over the weekend her bleeding did improve but she started having fever and chills again now with development of bilateral flank pain radiating towards the back.  She characterizes the flank pain as a dull pain, at times 10 out of 10 in intensity, nonradiating.  She has not noticed any improving or worsening factors of this pain.  Associated fever and chills now.  Lack of appetite, has had increased hydration at home.  Worsening malaise, lethargy and fatigue with subsequent  development of lightheadedness to the point that she was unable to walk straight and was holding onto things to walk.  She lives by herself and [facility].  Given these findings she presented to her primary care physician's office today, who checked out everything and found that everything was okay but she had really nasty urinary tract infection and told her that she could not help her in the office and advised her to present to the emergency room for further evaluation.    Review of Systems  Review of Systems   Constitutional: Positive for chills, diaphoresis, fever, malaise/fatigue and weight loss.   HENT: Negative for hearing loss and tinnitus.    Eyes: Negative for blurred vision and double vision.   Respiratory: Negative for cough, hemoptysis, sputum production, shortness of breath and wheezing.    Cardiovascular: Negative for chest pain, palpitations, orthopnea, claudication, leg swelling and PND.   Gastrointestinal: Positive for abdominal pain. Negative for blood in stool, constipation, diarrhea, heartburn, melena, nausea and vomiting.   Genitourinary: Positive for flank pain and hematuria. Negative for dysuria, frequency and urgency.        Urostomy   Musculoskeletal: Positive for back pain, joint pain and myalgias. Negative for falls and neck pain.   Skin: Negative for itching and rash.   Neurological: Positive for dizziness and weakness. Negative for tingling, tremors, sensory change, speech change, focal weakness, seizures, loss of consciousness and headaches.   Psychiatric/Behavioral: Negative for depression, hallucinations, memory loss, substance abuse and suicidal ideas. The patient is nervous/anxious. The patient does not have insomnia.        Past Medical History   has a past medical history of A-fib ([facility]); Ailment; Arthritis; Atrial fibrillation ([facility]) (2/2009); Atrial fibrillation, rapid ([facility]) (1/31/2014); Bladder disease (2011); Bladder problem; CAD (coronary artery disease); Cancer ([facility]);  "CATARACT; CHEST PAIN (3/11/2011); Glaucoma; History of hematuria; Hyperlipidemia (2/24/2011); Hypertension; Hypothyroid (2/24/2011); Indigestion; Intrinsic eczema (10/31/2017); Other specified pre-operative examination (1/28/2014); Paroxysmal atrial fibrillation (HCC); Sepsis urinary source (1/23/2012); Stroke (HCC); T.I.A.; TIA (transient ischemic attack) (3/11/2011); Unspecified disorder of thyroid; Unspecified hemorrhagic conditions; Urinary bladder disorder; UTI (urinary tract infection) (2/24/2011); and Vaginal bleeding (10/22/2012). She also has no past medical history of Backpain; COPD; Fall; Heart murmur; Psychiatric disorder; or Seizure disorder ([facility]).    Surgical History   has a past surgical history that includes bladder biopsy with cystoscopy (10/10/08); pyelogram (10/10/08); retrogrades (10/10/08); bladder biopsy with cystoscopy (6/15/2009); bladder biopsy with cystoscopy (10/12/2009); other abdominal surgery; cholecystectomy (1994); cholecystectomy (1994); gyn surgery; pelvic exam under anesthesia (10/12/2009); rectocele repair (11/3/2009); other; cystectomy (9/6/2011); ileo loop diversion (9/6/2011); biopsy general (10/22/2012); and parastomal hernia repair  (1/28/2014).     Family History  family history includes Stroke (age of onset: 77) in her father; Stroke (age of onset: 81) in her mother.     Social History   reports that she has never smoked. She has never used smokeless tobacco. She reports that she does not drink alcohol or use drugs.    Allergies  Allergies   Allergen Reactions   • Cardizem Swelling   • Doxycycline      Swollen lips.   • Sulfa Drugs      Makes tongue swell, severely   • Zyvox Swelling     \"Whole mouth swelled up\"    • Codeine      Does not remember the reaction     • Macrobid [Nitrofurantoin Monohydrate Macrocrystals]      Does not remember the reaction       Medications  Prior to Admission Medications   Prescriptions Last Dose Informant Patient Reported? Taking? "   Bimatoprost (LUMIGAN) 0.01 % SOLN 7/29/2018 at 1930 Patient Yes No   Sig: Place 1 Drop in both eyes every evening. Both eyes   Biotin 10 MG CAPS 7/30/2018 at 0830 Patient Yes No   Sig: Take 1 Cap by mouth every day.   Brimonidine Tartrate-Timolol (COMBIGAN) 0.2-0.5 % Solution 7/30/2018 at 0900 Patient Yes No   Sig: Place 1 Drop in both eyes 2 Times a Day.   Multiple Vitamins-Minerals (CENTRUM SILVER ULTRA WOMENS) Tab 7/30/2018 at 0830 Patient Yes No   Sig: Take 1 Tab by mouth every day.   Multiple Vitamins-Minerals (PRESERVISION AREDS PO) 7/30/2018 at 0830 Patient Yes No   Sig: Take 1 Tab by mouth 2 Times a Day.   apixaban (ELIQUIS) 5mg Tab 7/30/2018 at 0830 Patient No No   Sig: Take 1 Tab by mouth 2 Times a Day. Please call [phone] for an appt to have more refills.   levothyroxine (SYNTHROID) 112 MCG Tab 7/30/2018 at 0800 Patient Yes Yes   Sig: Take 112 mcg by mouth Every morning on an empty stomach.   lovastatin (MEVACOR) 10 MG tablet 7/30/2018 at 0830 Patient Yes No   Sig: Take 10 mg by mouth every day.   metoprolol SR (TOPROL XL) 50 MG TABLET SR 24 HR 7/30/2018 at 0830 Patient Yes Yes   Sig: Take 25 mg by mouth every day.   pantoprazole (PROTONIX) 40 MG TBEC 7/30/2018 at 0800 Patient Yes No   Sig: Take 40 mg by mouth every day.      Facility-Administered Medications: None       Physical Exam  Blood Pressure : 144/87   Temperature: 37.2 °C (98.9 °F)   Pulse: (!) 58   Respiration: 16   Pulse Oximetry: 97 %     Physical Exam   Constitutional: She is oriented to person, place, and time. She appears well-developed and well-nourished. No distress.   HENT:   Head: Normocephalic.   Mouth/Throat: Oropharynx is clear and moist. No oropharyngeal exudate.   Eyes: Pupils are equal, round, and reactive to light. Conjunctivae are normal. No scleral icterus.   Neck: No JVD present.   Cardiovascular: Normal rate, regular rhythm and normal heart sounds.  Exam reveals no gallop and no friction rub.    No murmur  heard.  Pulses:       Posterior tibial pulses are 2+ on the right side, and 2+ on the left side.   Cap refill < 3s   Pulmonary/Chest: Breath sounds normal. No stridor. No respiratory distress. She has no wheezes. She has no rales.   Abdominal: Soft. Bowel sounds are normal. She exhibits no distension. There is no tenderness. There is no rebound and no guarding.   There is a urostomy in place   Musculoskeletal: Normal range of motion. She exhibits no edema, tenderness or deformity.   Neurological: She is alert and oriented to person, place, and time. No cranial nerve deficit.   Skin: Skin is warm and dry. She is not diaphoretic.   Psychiatric: She has a normal mood and affect. Her behavior is normal. Judgment and thought content normal.       Laboratory:  Recent Labs      07/30/18   1625   WBC  15.0*   RBC  4.90   HEMOGLOBIN  11.7*   HEMATOCRIT  37.9   MCV  77.3*   MCH  23.9*   MCHC  30.9*   RDW  52.8*   PLATELETCT  243   MPV  11.0     Recent Labs      07/30/18   1625   SODIUM  135   POTASSIUM  4.5   CHLORIDE  107   CO2  22   GLUCOSE  84   BUN  24*   CREATININE  1.01   CALCIUM  8.9     Recent Labs      07/30/18   1625   ALTSGPT  17   ASTSGOT  29   ALKPHOSPHAT  131*   TBILIRUBIN  0.4   GLUCOSE  84     Recent Labs      07/30/18   1625   APTT  36.3*   INR  1.32*             Lab Results   Component Value Date    TROPONINI 0.02 04/04/2017       Urinalysis:    Recent Labs      07/30/18   1620   SPECGRAVITY  1.010   GLUCOSEUR  Negative   KETONES  Negative   NITRITE  Positive*   LEUKESTERAS  Moderate*   WBCURINE  100-150*   RBCURINE  20-50*   BACTERIA  Moderate*   EPITHELCELL  Few        Imaging:  No orders to display         Assessment/Plan:  I anticipate this patient will require at least two midnights for appropriate medical management, necessitating inpatient admission.    Pyelonephritis- (present on admission)   Assessment & Plan    Patient has underlying history of cystectomy and has an underlying urostomy  Now  presents with bilateral flank pain, back pain  Leukocytosis, bleeding from urostomy site recently  She has previously grown VRE, MRSA with vancomycin MICKI of 2 and other gram-negative organisms  She is allergic to a number of antibiotics    At this time we will initiate this patient on IV daptomycin, discussed with pharmacy  In addition we will start the patient on IV ceftriaxone  I recommended we consult infectious disease team in the morning tomorrow  If there is any ongoing bleeding from urostomy, urology consultation should be obtained  De-escalate antibiotics as clinically appropriate  Monitor for daptomycin toxicity        Hypophosphatemia- (present on admission)   Assessment & Plan    This has been replaced        CKD (chronic kidney disease), stage II- (present on admission)   Assessment & Plan    Monitor renal function / Avoid nephrotoxins and dose medications renally        Microcytic anemia- (present on admission)   Assessment & Plan    Check iron studies, ferritin, B12, folate, reticulocyte count and TSH  Monitor Hb / Restrictive transfusion strategy  No evidence of acute bleeding  On anticoagulation  Will likely need outpatient GI evaluation/urology evaluation for bleeding from urostomy site        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

## 2018-07-31 NOTE — CARE PLAN
Problem: Infection  Goal: Will remain free from infection  Outcome: PROGRESSING AS EXPECTED  Educated patient about the importance of hand hygiene to prevent infection. Also discussed that visitors also needs to have hand washing frequently    Problem: Skin Integrity  Goal: Risk for impaired skin integrity will decrease  Outcome: PROGRESSING AS EXPECTED  Patient had urostomy at Right side of the abdomen. Discussed and reminded about skin care to prevent breakdown

## 2018-07-31 NOTE — ED NOTES
[id number] from Lab called with critical result of 7.1 Inmature Granulocytes at 1723. Critical lab result read back to [id number].   Dr. Clark notified of critical lab result.  Critical lab result read back by Dr. Clark.

## 2018-07-31 NOTE — PROGRESS NOTES
Pt refuses bed alarm despite education on safety/necessity. Pt uses call light appropriately when she needs assistance.

## 2018-07-31 NOTE — PROGRESS NOTES
Received report from SAI Billingsley. Pt on unit @ 7480 via THYME. Assessment completed. Pt is A&OX. Right upper quadrant urostomy productive, urine appears yellow and hazy. Pt c/o flank pain, medicated per MAR. Call light within reach, personal belongings available, bed in lowest position, treaded socks on. Hourly rounding in place. Admit profile completed.

## 2018-07-31 NOTE — ASSESSMENT & PLAN NOTE
Pt's urine culture is negative  I do not believe that she has uti with pyelo  The back pain is most likley muscular strain

## 2018-07-31 NOTE — ASSESSMENT & PLAN NOTE
Has resolved  Does not look toxic  Will continue with rocephin for now  Blood cultures are negative  Urine culture is negative  No resp complaint

## 2018-07-31 NOTE — DIETARY
Nutrition note  Patient with weight loss and poor po itnake prior to admit   Current intake is 75% or more of meals and BMI is 25 which is in the healthy range   Appetite was down secondary to illness   Patient tried to keep fluid intake good   BUN is 24 slightly elevated secondary to DX and hydration   RD will continue to follow for changes

## 2018-08-01 ENCOUNTER — APPOINTMENT (OUTPATIENT)
Dept: RADIOLOGY | Facility: MEDICAL CENTER | Age: 83
DRG: 690 | End: 2018-08-01
Attending: FAMILY MEDICINE
Payer: MEDICARE

## 2018-08-01 VITALS
TEMPERATURE: 98 F | BODY MASS INDEX: 25.49 KG/M2 | HEIGHT: 68 IN | RESPIRATION RATE: 16 BRPM | OXYGEN SATURATION: 98 % | DIASTOLIC BLOOD PRESSURE: 81 MMHG | WEIGHT: 168.21 LBS | SYSTOLIC BLOOD PRESSURE: 144 MMHG | HEART RATE: 80 BPM

## 2018-08-01 PROCEDURE — A9270 NON-COVERED ITEM OR SERVICE: HCPCS | Performed by: HOSPITALIST

## 2018-08-01 PROCEDURE — A9270 NON-COVERED ITEM OR SERVICE: HCPCS | Performed by: INTERNAL MEDICINE

## 2018-08-01 PROCEDURE — 99239 HOSP IP/OBS DSCHRG MGMT >30: CPT | Performed by: FAMILY MEDICINE

## 2018-08-01 PROCEDURE — 72100 X-RAY EXAM L-S SPINE 2/3 VWS: CPT

## 2018-08-01 PROCEDURE — 700102 HCHG RX REV CODE 250 W/ 637 OVERRIDE(OP): Performed by: INTERNAL MEDICINE

## 2018-08-01 PROCEDURE — 700102 HCHG RX REV CODE 250 W/ 637 OVERRIDE(OP): Performed by: HOSPITALIST

## 2018-08-01 RX ORDER — ACETAMINOPHEN 325 MG/1
650 TABLET ORAL EVERY 6 HOURS PRN
Qty: 30 TAB | Refills: 0 | Status: SHIPPED | OUTPATIENT
Start: 2018-08-01 | End: 2019-03-13

## 2018-08-01 RX ORDER — CEFDINIR 300 MG/1
300 CAPSULE ORAL 2 TIMES DAILY
Qty: 6 CAP | Refills: 0 | Status: SHIPPED | OUTPATIENT
Start: 2018-08-01 | End: 2019-03-13

## 2018-08-01 RX ORDER — FERROUS SULFATE 325(65) MG
325 TABLET ORAL DAILY
Qty: 30 TAB | Refills: 0 | Status: SHIPPED | OUTPATIENT
Start: 2018-08-01 | End: 2019-03-13

## 2018-08-01 RX ADMIN — HYDROCODONE BITARTRATE AND ACETAMINOPHEN 2 TABLET: 5; 325 TABLET ORAL at 09:33

## 2018-08-01 RX ADMIN — APIXABAN 5 MG: 5 TABLET, FILM COATED ORAL at 05:11

## 2018-08-01 RX ADMIN — LOVASTATIN 10 MG: 20 TABLET ORAL at 05:11

## 2018-08-01 RX ADMIN — BRIMONIDINE TARTRATE 1 DROP: 2 SOLUTION OPHTHALMIC at 05:15

## 2018-08-01 RX ADMIN — TIMOLOL MALEATE 1 DROP: 5 SOLUTION OPHTHALMIC at 05:15

## 2018-08-01 RX ADMIN — ACETAMINOPHEN 650 MG: 325 TABLET, FILM COATED ORAL at 05:10

## 2018-08-01 RX ADMIN — METOPROLOL SUCCINATE 25 MG: 25 TABLET, EXTENDED RELEASE ORAL at 05:10

## 2018-08-01 RX ADMIN — LEVOTHYROXINE SODIUM 112 MCG: 112 TABLET ORAL at 05:10

## 2018-08-01 RX ADMIN — MULTIPLE VITAMINS W/ MINERALS TAB 1 TABLET: TAB at 05:12

## 2018-08-01 ASSESSMENT — PAIN SCALES - GENERAL
PAINLEVEL_OUTOF10: 7
PAINLEVEL_OUTOF10: 7

## 2018-08-01 NOTE — DISCHARGE INSTRUCTIONS
Discharge Instructions    Discharged to home by car with relative. Discharged via wheelchair, hospital escort: Yes.  Special equipment needed: Not Applicable    Be sure to schedule a follow-up appointment with your primary care doctor or any specialists as instructed.     Discharge Plan:   Diet Plan: Discussed  Activity Level: Discussed  Confirmed Follow up Appointment: Patient to Call and Schedule Appointment  Confirmed Symptoms Management: Discussed  Medication Reconciliation Updated: Yes  Influenza Vaccine Indication: Not indicated: Previously immunized this influenza season and > 8 years of age, Indicated: Not available from distributor/    I understand that a diet low in cholesterol, fat, and sodium is recommended for good health. Unless I have been given specific instructions below for another diet, I accept this instruction as my diet prescription.   Other diet: Regular Healthy Diet    Special Instructions: None    · Is patient discharged on Warfarin / Coumadin?   No     Depression / Suicide Risk    As you are discharged from this [facility] facility, it is important to learn how to keep safe from harming yourself.    Recognize the warning signs:  · Abrupt changes in personality, positive or negative- including increase in energy   · Giving away possessions  · Change in eating patterns- significant weight changes-  positive or negative  · Change in sleeping patterns- unable to sleep or sleeping all the time   · Unwillingness or inability to communicate  · Depression  · Unusual sadness, discouragement and loneliness  · Talk of wanting to die  · Neglect of personal appearance   · Rebelliousness- reckless behavior  · Withdrawal from people/activities they love  · Confusion- inability to concentrate     If you or a loved one observes any of these behaviors or has concerns about self-harm, here's what you can do:  · Talk about it- your feelings and reasons for harming yourself  · Remove any means  that you might use to hurt yourself (examples: pills, rope, extension cords, firearm)  · Get professional help from the community (Mental Health, Substance Abuse, psychological counseling)  · Do not be alone:Call your Safe Contact- someone whom you trust who will be there for you.  · Call your local CRISIS HOTLINE 345-3649 or 034-690-2465  · Call your local Children's Mobile Crisis Response Team [place] (455) 534-7394 or [employer]  · Call the toll free National Suicide Prevention Hotlines   · National Suicide Prevention Lifeline 264-833-BMNK (8085)  · [employer] Hope Line Network 800-SUICIDE (272-5847)      Pyelonephritis, Adult  Introduction  Pyelonephritis is a kidney infection. The kidneys are the organs that filter a person's blood and move waste out of the bloodstream and into the urine. Urine passes from the kidneys, through the ureters, and into the bladder. There are two main types of pyelonephritis:  · Infections that come on quickly without any warning (acute pyelonephritis).  · Infections that last for a long period of time (chronic pyelonephritis).  In most cases, the infection clears up with treatment and does not cause further problems. More severe infections or chronic infections can sometimes spread to the bloodstream or lead to other problems with the kidneys.  What are the causes?  This condition is usually caused by:  · Bacteria traveling from the bladder to the kidney through infected urine. The urine in the bladder can become infected with bacteria from:  ¨ Bladder infection (cystitis).  ¨ Inflammation of the prostate gland (prostatitis).  ¨ Sexual intercourse, in females.  · Bacteria traveling from the bloodstream to the kidney.  What increases the risk?  This condition is more likely to develop in:  · Pregnant women.  · Older people.  · People who have diabetes.  · People who have kidney stones or bladder stones.  · People who have other abnormalities of the kidney or  ureter.  · People who have a catheter placed in the bladder.  · People who have cancer.  · People who are sexually active.  · Women who use spermicides.  · People who have had a prior urinary tract infection.  What are the signs or symptoms?  Symptoms of this condition include:  · Frequent urination.  · Strong or persistent urge to urinate.  · Burning or stinging when urinating.  · Abdominal pain.  · Back pain.  · Pain in the side or flank area.  · Fever.  · Chills.  · Blood in the urine, or dark urine.  · Nausea.  · Vomiting.  How is this diagnosed?  This condition may be diagnosed based on:  · Medical history and physical exam.  · Urine tests.  · Blood tests.  You may also have imaging tests of the kidneys, such as an ultrasound or CT scan.  How is this treated?  Treatment for this condition may depend on the severity of the infection.  · If the infection is mild and is found early, you may be treated with antibiotic medicines taken by mouth. You will need to drink fluids to remain hydrated.  · If the infection is more severe, you may need to stay in the hospital and receive antibiotics given directly into a vein through an IV tube. You may also need to receive fluids through an IV tube if you are not able to remain hydrated. After your hospital stay, you may need to take oral antibiotics for a period of time.  Other treatments may be required, depending on the cause of the infection.  Follow these instructions at home:  Medicines  · Take over-the-counter and prescription medicines only as told by your health care provider.  · If you were prescribed an antibiotic medicine, take it as told by your health care provider. Do not stop taking the antibiotic even if you start to feel better.  General instructions  · Drink enough fluid to keep your urine clear or pale yellow.  · Avoid caffeine, tea, and carbonated beverages. They tend to irritate the bladder.  · Urinate often. Avoid holding in urine for long periods of  time.  · Urinate before and after sex.  · After a bowel movement, women should cleanse from front to back. Use each tissue only once.  · Keep all follow-up visits as told by your health care provider. This is important.  Contact a health care provider if:  · Your symptoms do not get better after 2 days of treatment.  · Your symptoms get worse.  · You have a fever.  Get help right away if:  · You are unable to take your antibiotics or fluids.  · You have shaking chills.  · You vomit.  · You have severe flank or back pain.  · You have extreme weakness or fainting.  This information is not intended to replace advice given to you by your health care provider. Make sure you discuss any questions you have with your health care provider.  Document Released: 12/18/2006 Document Revised: 05/25/2017 Document Reviewed: 04/11/2016  © 2017 Elsevier      Urinary Tract Infection, Adult  A urinary tract infection (UTI) is an infection of any part of the urinary tract, which includes the kidneys, ureters, bladder, and urethra. These organs make, store, and get rid of urine in the body. UTI can be a bladder infection (cystitis) or kidney infection (pyelonephritis).  What are the causes?  This infection may be caused by fungi, viruses, or bacteria. Bacteria are the most common cause of UTIs. This condition can also be caused by repeated incomplete emptying of the bladder during urination.  What increases the risk?  This condition is more likely to develop if:  · You ignore your need to urinate or hold urine for long periods of time.  · You do not empty your bladder completely during urination.  · You wipe back to front after urinating or having a bowel movement, if you are female.  · You are uncircumcised, if you are male.  · You are constipated.  · You have a urinary catheter that stays in place (indwelling).  · You have a weak defense (immune) system.  · You have a medical condition that affects your bowels, kidneys, or  bladder.  · You have diabetes.  · You take antibiotic medicines frequently or for long periods of time, and the antibiotics no longer work well against certain types of infections (antibiotic resistance).  · You take medicines that irritate your urinary tract.  · You are exposed to chemicals that irritate your urinary tract.  · You are female.  What are the signs or symptoms?  Symptoms of this condition include:  · Fever.  · Frequent urination or passing small amounts of urine frequently.  · Needing to urinate urgently.  · Pain or burning with urination.  · Urine that smells bad or unusual.  · Cloudy urine.  · Pain in the lower abdomen or back.  · Trouble urinating.  · Blood in the urine.  · Vomiting or being less hungry than normal.  · Diarrhea or abdominal pain.  · Vaginal discharge, if you are female.  How is this diagnosed?  This condition is diagnosed with a medical history and physical exam. You will also need to provide a urine sample to test your urine. Other tests may be done, including:  · Blood tests.  · Sexually transmitted disease (STD) testing.  If you have had more than one UTI, a cystoscopy or imaging studies may be done to determine the cause of the infections.  How is this treated?  Treatment for this condition often includes a combination of two or more of the following:  · Antibiotic medicine.  · Other medicines to treat less common causes of UTI.  · Over-the-counter medicines to treat pain.  · Drinking enough water to stay hydrated.  Follow these instructions at home:  · Take over-the-counter and prescription medicines only as told by your health care provider.  · If you were prescribed an antibiotic, take it as told by your health care provider. Do not stop taking the antibiotic even if you start to feel better.  · Avoid alcohol, caffeine, tea, and carbonated beverages. They can irritate your bladder.  · Drink enough fluid to keep your urine clear or pale yellow.  · Keep all follow-up visits as  told by your health care provider. This is important.  · Make sure to:  ¨ Empty your bladder often and completely. Do not hold urine for long periods of time.  ¨ Empty your bladder before and after sex.  ¨ Wipe from front to back after a bowel movement if you are female. Use each tissue one time when you wipe.  Contact a health care provider if:  · You have back pain.  · You have a fever.  · You feel nauseous or vomit.  · Your symptoms do not get better after 3 days.  · Your symptoms go away and then return.  Get help right away if:  · You have severe back pain or lower abdominal pain.  · You are vomiting and cannot keep down any medicines or water.  This information is not intended to replace advice given to you by your health care provider. Make sure you discuss any questions you have with your health care provider.  Document Released: 09/27/2006 Document Revised: 05/31/2017 Document Reviewed: 11/07/2016  [employer] Patient Education © 2017 [employer]      Back Pain, Adult  Back pain is very common in adults. The cause of back pain is rarely dangerous and the pain often gets better over time. The cause of your back pain may not be known. Some common causes of back pain include:  · Strain of the muscles or ligaments supporting the spine.  · Wear and tear (degeneration) of the spinal disks.  · Arthritis.  · Direct injury to the back.  For many people, back pain may return. Since back pain is rarely dangerous, most people can learn to manage this condition on their own.  Follow these instructions at home:  Watch your back pain for any changes. The following actions may help to lessen any discomfort you are feeling:  · Remain active. It is stressful on your back to sit or  one place for long periods of time. Do not sit, drive, or  one place for more than 30 minutes at a time. Take short walks on even surfaces as soon as you are able. Try to increase the length of time you walk each  day.  · Exercise regularly as directed by your health care provider. Exercise helps your back heal faster. It also helps avoid future injury by keeping your muscles strong and flexible.  · Do not stay in bed. Resting more than 1-2 days can delay your recovery.  · Pay attention to your body when you bend and lift. The most comfortable positions are those that put less stress on your recovering back. Always use proper lifting techniques, including:  ¨ Bending your knees.  ¨ Keeping the load close to your body.  ¨ Avoiding twisting.  · Find a comfortable position to sleep. Use a firm mattress and lie on your side with your knees slightly bent. If you lie on your back, put a pillow under your knees.  · Avoid feeling anxious or stressed. Stress increases muscle tension and can worsen back pain. It is important to recognize when you are anxious or stressed and learn ways to manage it, such as with exercise.  · Take medicines only as directed by your health care provider. Over-the-counter medicines to reduce pain and inflammation are often the most helpful. Your health care provider may prescribe muscle relaxant drugs. These medicines help dull your pain so you can more quickly return to your normal activities and healthy exercise.  · Apply ice to the injured area:  ¨ Put ice in a plastic bag.  ¨ Place a towel between your skin and the bag.  ¨ Leave the ice on for 20 minutes, 2-3 times a day for the first 2-3 days. After that, ice and heat may be alternated to reduce pain and spasms.  · Maintain a healthy weight. Excess weight puts extra stress on your back and makes it difficult to maintain good posture.  Contact a health care provider if:  · You have pain that is not relieved with rest or medicine.  · You have increasing pain going down into the legs or buttocks.  · You have pain that does not improve in one week.  · You have night pain.  · You lose weight.  · You have a fever or chills.  Get help right away if:  · You  develop new bowel or bladder control problems.  · You have unusual weakness or numbness in your arms or legs.  · You develop nausea or vomiting.  · You develop abdominal pain.  · You feel faint.  This information is not intended to replace advice given to you by your health care provider. Make sure you discuss any questions you have with your health care provider.  Document Released: 12/18/2006 Document Revised: 04/27/2017 Document Reviewed: 04/21/2015  [employer] Interactive Patient Education © 2017 [employer].

## 2018-08-01 NOTE — PROGRESS NOTES
Assumed care of pt at shift change. Pt awake, alert and oriented x4. Pt complaining of 7/10 flank pain, medicated per MAR. Urostomy bag noted to RUQ. Pt denies any further needs at this time. Call light and personal belongings within reach, treaded socks on, bed locked in lowest position.

## 2018-08-01 NOTE — PROGRESS NOTES
Discharge orders received. MD reviewed X-Ray results, stated okay to send pt home. Pt felt safe to discharge. All paperwork gone over with pt. Pt understands to  scripts from pharmacy. All questions addressed. Pt wheeled down by volunteers, all belongings gathered. Pt ride down at ER entrance. Pt understands to call PCP and make an appointment with in a week.

## 2018-08-01 NOTE — CARE PLAN
Problem: Infection  Goal: Will remain free from infection  Outcome: PROGRESSING AS EXPECTED  Pt receiving IV abx for infection.     Problem: Pain Management  Goal: Pain level will decrease to patient's comfort goal  Outcome: PROGRESSING AS EXPECTED  Pt complaining of 7/10 flank pain, medicated per MAR.

## 2018-08-01 NOTE — PROGRESS NOTES
Pt having pain in back, medication given when available. Pt to have X-Ray at 1100 then be discharged if lumbar X-Ray negative. Pt anxious to discharge. Pt's daughter called for update, update given. Urostomy emptied, pt self cares for urostomy.

## 2018-08-01 NOTE — CARE PLAN
Problem: Discharge Barriers/Planning  Goal: Patient's continuum of care needs will be met  Outcome: PROGRESSING AS EXPECTED  Pt expected to discharge today pending X-Ray. Pt updated on POC.    Problem: Pain Management  Goal: Pain level will decrease to patient's comfort goal  Outcome: PROGRESSING AS EXPECTED  Medication given as needed for pain control. Pt states pain of 7/10.

## 2018-08-01 NOTE — DISCHARGE SUMMARY
Discharge Summary    CHIEF COMPLAINT ON ADMISSION  Chief Complaint   Patient presents with   • Sent by MD     urinary infection    • Tired     more tired than nl    8/2/2018  THE urine culture came back today positive for MRSA and enterococcus,  I called the pt and left a message to call me so I can prescribe her macrobid and to stop omnicef.  She is now diagnosed with UTI,her urine culture as of 8/1/2018 was negative.  Pt called me and I explained to her the new result of the UA,macrobid is called to her pharmacy.  Reason for Admission  Back Pain     Admission Date  7/30/2018    CODE STATUS  Full Code    HPI & HOSPITAL COURSE  This is a 82 y.o. female here with  weakness and diagnosis of urinary tract infection from an outpatient provider.  She has underlying history of atrial fibrillation, anticoagulated with Eliquis.  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  Patient reports that she has not been feeling well for quite some time.  Approximately 1 week ago patient started having chills which lasted for 2-3 days.  She reports that she was not febrile at this time.  He subsequently started developing myalgias and arthralgias.  She thought that she was likely having a flulike illness.  She tried to continue to hydrate herself at home.  Subsequently transient improvement in her symptoms.  And subsequently 4 days ago she had intermittent bleeding from the urostomy site.  She discussed these findings with her urologist, urology team advised her to present to the emergency room for evaluation.  Patient previously had a similar occurrence, at the time of presentation to the emergency room her symptoms had resolved and no workup was done and hence she decided that her symptoms will resolve by its own and they will not be any benefit to present to the emergency room.  Hence she continued to wait.  Over the weekend her bleeding did improve but she started having fever and chills again now with  development of bilateral flank pain radiating towards the back.  She characterizes the flank pain as a dull pain, at times 10 out of 10 in intensity, nonradiating.    She was admitted and noted to have leukocytosis with UTI and probable pyelonephritis.however her urine culture is negative and therfore I do not believe she has uti or pyelonephritis.her leukocytosis has resolved.she complains of lumbar pain and will do lumbar xray and if that is negative then most likley she has muscular strain.she also stated jason she has been walking around without any trouble.she is also noted to have microcytic anemia and she is placed on ferrous sulfate.i noticed that her h/h have been dropping for slowly and it is possible that she may have slow gi bleed and I recommend gi referal for  f/u as outpt by pcp.for a.fib will continue with lopressor and eliquis and will check cbc on 8/6/18 and if there is further drop then discontinuing eliquis is good consideration.we also will continue with antibiotic for 3 more days.       Therefore, she is discharged in good and stable condition to home with close outpatient follow-up.    The patient met 2-midnight criteria for an inpatient stay at the time of discharge.    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 *      FOLLOW UP  Future Appointments  Date Time Provider Department Center   8/1/2018 10:30 AM [facility]  1 [facility] [place] [place]     Yue Restrepo M.D.  [address] [place] [address] [address]  [address]  [place] [id number]  [phone]    Schedule an appointment as soon as  possible for a visit in 1 week  Hospital follow-up appointment with PCP      MEDICATIONS ON DISCHARGE     Medication List      START taking these medications      Instructions   acetaminophen 325 MG Tabs  Commonly known as:  TYLENOL   Take 2 Tabs by mouth every 6 hours as needed (Mild Pain; (Pain scale 1-3); Temp greater than 100.5 F).  Dose:  650 mg     cefdinir 300 MG Caps  Commonly known as:  OMNICEF   Take 1 Cap by mouth 2 times a day.  Dose:  300 mg     ferrous sulfate 325 (65 Fe) MG tablet   Take 1 Tab by mouth every day.  Dose:  325 mg        CONTINUE taking these medications      Instructions   apixaban 5mg Tabs  Commonly known as:  ELIQUIS   Take 1 Tab by mouth 2 Times a Day. Please call [phone] for an appt to have more refills.  Dose:  5 mg     Biotin 10 MG Caps   Take 1 Cap by mouth every day.  Dose:  1 Cap     COMBIGAN 0.2-0.5 % Soln  Generic drug:  Brimonidine Tartrate-Timolol   Place 1 Drop in both eyes 2 Times a Day.  Dose:  1 Drop     levothyroxine 112 MCG Tabs  Commonly known as:  SYNTHROID   Take 112 mcg by mouth Every morning on an empty stomach.  Dose:  112 mcg     lovastatin 10 MG tablet  Commonly known as:  MEVACOR   Take 10 mg by mouth every day.  Dose:  10 mg     LUMIGAN 0.01 % Soln  Generic drug:  bimatoprost   Place 1 Drop in both eyes every morning. Both eyes  Dose:  1 Drop     metoprolol SR 50 MG Tb24  Commonly known as:  TOPROL XL   Take 25 mg by mouth every day.  Dose:  25 mg     pantoprazole 40 MG Tbec  Commonly known as:  PROTONIX   Take 40 mg by mouth every day.  Dose:  40 mg     * PRESERVISION AREDS PO   Take 1 Tab by mouth 2 Times a Day.  Dose:  1 Tab     * CENTRUM SILVER ULTRA WOMENS Tabs   Take 1 Tab by mouth every day.  Dose:  1 Tab        * This list has 2 medication(s) that are the same as other medications prescribed for you. Read the directions carefully, and ask your doctor or other care provider to review them with you.                Allergies  Allergies   Allergen  "Reactions   • Cardizem Swelling   • Doxycycline      Swollen lips.   • Sulfa Drugs      Makes tongue swell, severely   • Zyvox Swelling     \"Whole mouth swelled up\"    • Codeine      Does not remember the reaction     • Macrobid [Nitrofurantoin Monohydrate Macrocrystals]      Does not remember the reaction       DIET  Orders Placed This Encounter   Procedures   • Diet Order Regular     Standing Status:   Standing     Number of Occurrences:   1     Order Specific Question:   Diet:     Answer:   Regular [1]       ACTIVITY  As tolerated.  Weight bearing as tolerated    CONSULTATIONS  none    PROCEDURES  none    LABORATORY  Lab Results   Component Value Date    SODIUM 139 07/31/2018    POTASSIUM 4.2 07/31/2018    CHLORIDE 110 07/31/2018    CO2 20 07/31/2018    GLUCOSE 122 (H) 07/31/2018    BUN 21 07/31/2018    CREATININE 1.08 07/31/2018    CREATININE 1.2 02/10/2009        Lab Results   Component Value Date    WBC 10.7 07/31/2018    HEMOGLOBIN 10.3 (L) 07/31/2018    HEMATOCRIT 33.3 (L) 07/31/2018    PLATELETCT 206 07/31/2018        Total time of the discharge process exceeds 35 minutes.  "

## 2018-08-02 ENCOUNTER — PATIENT OUTREACH (OUTPATIENT)
Dept: HEALTH INFORMATION MANAGEMENT | Facility: OTHER | Age: 83
End: 2018-08-02

## 2018-08-03 ENCOUNTER — PATIENT OUTREACH (OUTPATIENT)
Dept: HEALTH INFORMATION MANAGEMENT | Facility: OTHER | Age: 83
End: 2018-08-03

## 2018-08-04 LAB
BACTERIA BLD CULT: NORMAL
BACTERIA BLD CULT: NORMAL
SIGNIFICANT IND 70042: NORMAL
SIGNIFICANT IND 70042: NORMAL
SITE SITE: NORMAL
SITE SITE: NORMAL
SOURCE SOURCE: NORMAL
SOURCE SOURCE: NORMAL

## 2018-08-07 ENCOUNTER — HOSPITAL ENCOUNTER (EMERGENCY)
Facility: MEDICAL CENTER | Age: 83
End: 2018-08-07
Attending: EMERGENCY MEDICINE
Payer: MEDICARE

## 2018-08-07 VITALS
BODY MASS INDEX: 26.66 KG/M2 | WEIGHT: 175.93 LBS | DIASTOLIC BLOOD PRESSURE: 86 MMHG | OXYGEN SATURATION: 95 % | TEMPERATURE: 96.9 F | HEIGHT: 68 IN | RESPIRATION RATE: 18 BRPM | HEART RATE: 71 BPM | SYSTOLIC BLOOD PRESSURE: 163 MMHG

## 2018-08-07 DIAGNOSIS — K94.01 BLEEDING FROM COLOSTOMY STOMA (HCC): ICD-10-CM

## 2018-08-07 LAB
ANION GAP SERPL CALC-SCNC: 7 MMOL/L (ref 0–11.9)
APTT PPP: 30 SEC (ref 24.7–36)
BASOPHILS # BLD AUTO: 1.7 % (ref 0–1.8)
BASOPHILS # BLD: 0.15 K/UL (ref 0–0.12)
BUN SERPL-MCNC: 24 MG/DL (ref 8–22)
CALCIUM SERPL-MCNC: 8.7 MG/DL (ref 8.5–10.5)
CHLORIDE SERPL-SCNC: 109 MMOL/L (ref 96–112)
CO2 SERPL-SCNC: 21 MMOL/L (ref 20–33)
CREAT SERPL-MCNC: 1.21 MG/DL (ref 0.5–1.4)
EOSINOPHIL # BLD AUTO: 0.24 K/UL (ref 0–0.51)
EOSINOPHIL NFR BLD: 2.7 % (ref 0–6.9)
ERYTHROCYTE [DISTWIDTH] IN BLOOD BY AUTOMATED COUNT: 54.9 FL (ref 35.9–50)
GLUCOSE SERPL-MCNC: 92 MG/DL (ref 65–99)
HCT VFR BLD AUTO: 33.3 % (ref 37–47)
HGB BLD-MCNC: 10.4 G/DL (ref 12–16)
IMM GRANULOCYTES # BLD AUTO: 0.06 K/UL (ref 0–0.11)
IMM GRANULOCYTES NFR BLD AUTO: 0.7 % (ref 0–0.9)
INR PPP: 1.29 (ref 0.87–1.13)
LYMPHOCYTES # BLD AUTO: 2.64 K/UL (ref 1–4.8)
LYMPHOCYTES NFR BLD: 29.4 % (ref 22–41)
MCH RBC QN AUTO: 24.4 PG (ref 27–33)
MCHC RBC AUTO-ENTMCNC: 31.2 G/DL (ref 33.6–35)
MCV RBC AUTO: 78 FL (ref 81.4–97.8)
MONOCYTES # BLD AUTO: 1.2 K/UL (ref 0–0.85)
MONOCYTES NFR BLD AUTO: 13.4 % (ref 0–13.4)
NEUTROPHILS # BLD AUTO: 4.68 K/UL (ref 2–7.15)
NEUTROPHILS NFR BLD: 52.1 % (ref 44–72)
NRBC # BLD AUTO: 0 K/UL
NRBC BLD-RTO: 0 /100 WBC
PLATELET # BLD AUTO: 261 K/UL (ref 164–446)
PMV BLD AUTO: 10.8 FL (ref 9–12.9)
POTASSIUM SERPL-SCNC: 4.4 MMOL/L (ref 3.6–5.5)
PROTHROMBIN TIME: 15.8 SEC (ref 12–14.6)
RBC # BLD AUTO: 4.27 M/UL (ref 4.2–5.4)
SODIUM SERPL-SCNC: 137 MMOL/L (ref 135–145)
WBC # BLD AUTO: 9 K/UL (ref 4.8–10.8)

## 2018-08-07 PROCEDURE — 99283 EMERGENCY DEPT VISIT LOW MDM: CPT

## 2018-08-07 PROCEDURE — 85025 COMPLETE CBC W/AUTO DIFF WBC: CPT

## 2018-08-07 PROCEDURE — 85610 PROTHROMBIN TIME: CPT

## 2018-08-07 PROCEDURE — 80048 BASIC METABOLIC PNL TOTAL CA: CPT

## 2018-08-07 PROCEDURE — 85730 THROMBOPLASTIN TIME PARTIAL: CPT

## 2018-08-07 ASSESSMENT — LIFESTYLE VARIABLES: DO YOU DRINK ALCOHOL: NO

## 2018-08-07 NOTE — ED PROVIDER NOTES
ED Provider Note    CHIEF COMPLAINT  Chief Complaint   Patient presents with   • Other     Patient states her urinary stoma is bleeding.         HPI  Dorie Wadsworht is a 82 y.o. female who presents with bleeding from around her stoma.  Patient is status post cystectomy and ileostomy.  He reports this was from frequent urinary tract infections.  She is followed by Dr. Boyer.  She developed bleeding from her urostomy site about 1 week ago and was admitted to the hospital after being found to have a urinary tract infection.  The bleeding resolved.  She was identified to have MRSA in her urine as well as enterococcus.  She followed up with her doctor because of recurrent bleeding from the stoma.  She states that it is bleeding from the left side of the stoma.  When she saw her doctor blood was squirting out.  She is filled 3 urostomy bags with bright red blood.  She is now starting to feel dizzy and lightheaded.  The bleeding comes on periodically.  It can come on without trauma or when she changes her bag.  Patient has a history of atrial fibrillation and is on Eliquis.  She has been compliant with this medication.  She denies having a fever.  No rash.    REVIEW OF SYSTEMS  As per HPI, otherwise a 10 point review of systems is negative    PAST MEDICAL HISTORY  Past Medical History:   Diagnosis Date   • A-fib ([facility])    • Ailment     urostomy   • Arthritis     fingers   • Atrial fibrillation ([facility]) 2/2009    A FIB X2,[ resolved on own][   • Atrial fibrillation, rapid ([facility]) 1/31/2014   • Back pain    • Bladder disease 2011    bladder removed   • Bladder problem     Chronic bladder infection for the past 17months   • CAD (coronary artery disease)    • Cancer (HCC)     skin   • CATARACT     freddie surgery complete   • CHEST PAIN 3/11/2011   • Glaucoma    • History of hematuria     3/15/10: with Plavix.   • Hyperlipidemia 2/24/2011   • Hypertension    • Hypothyroid 2/24/2011   • Indigestion    • Intrinsic eczema  10/31/2017   • Other specified pre-operative examination 1/28/2014   • Paroxysmal atrial fibrillation (HCC)    • Sepsis urinary source 1/23/2012   • Stroke (HCC)     2 TIAs 2/2010, no stroke, 2/2013   • T.I.A.    • TIA (transient ischemic attack) 3/11/2011   • Unspecified disorder of thyroid    • Unspecified hemorrhagic conditions     not sure when (2009)  blood in urin    • Urinary bladder disorder     urinary incontinence   • UTI (urinary tract infection) 2/24/2011   • Vaginal bleeding 10/22/2012       SOCIAL HISTORY  Social History   Substance Use Topics   • Smoking status: Never Smoker   • Smokeless tobacco: Never Used   • Alcohol use No       SURGICAL HISTORY  Past Surgical History:   Procedure Laterality Date   • PARASTOMAL HERNIA REPAIR   1/28/2014    Performed by Nicol Dorman M.D. at SURGERY [facility]   • BIOPSY GENERAL  10/22/2012    Performed by Jennifer Lincoln M.D. at SURGERY [facility]   • CYSTECTOMY  9/6/2011    Performed by JENNIFER LINCOLN at [facility]   • ILEO LOOP DIVERSION  9/6/2011    Performed by JENNIFER LINCOLN at SURGERY [facility]   • RECTOCELE REPAIR  11/3/2009    Performed by ZEKE HARRINGTON at SURGERY SAME DAY [facility]   • BLADDER BIOPSY WITH CYSTOSCOPY  10/12/2009    Performed by JENNIFER LINCOLN at [facility]   • PELVIC EXAM UNDER ANESTHESIA  10/12/2009    Performed by JENNIFER LINCOLN at SURGERY [facility]   • BLADDER BIOPSY WITH CYSTOSCOPY  6/15/2009    Performed by CARLTON LUNA at SURGERY [facility]   • BLADDER BIOPSY WITH CYSTOSCOPY  10/10/08    Performed by CARLTON LUNA at SURGERY [facility]   • PYELOGRAM  10/10/08    Performed by CARLTON LUNA at SURGERY [facility]   • RETROGRADES  10/10/08    Performed by CARLTON LUNA at [facility]   • CHOLECYSTECTOMY  1994   • CHOLECYSTECTOMY  1994   • GYN SURGERY      hysterectomy   • OTHER      TONSILLECTOMY AS TEENAGER   •  "OTHER ABDOMINAL SURGERY      appendectomy, pancratitis        CURRENT MEDICATIONS  Home Medications     Reviewed by Jayda Gutierres R.N. (Registered Nurse) on 08/07/18 at 0503  Med List Status: Not Addressed   Medication Last Dose Status   acetaminophen (TYLENOL) 325 MG Tab  Active   apixaban (ELIQUIS) 5mg Tab 7/30/2018 Active   Bimatoprost (LUMIGAN) 0.01 % SOLN 7/30/2018 Active   Biotin 10 MG CAPS 7/30/2018 Active   Brimonidine Tartrate-Timolol (COMBIGAN) 0.2-0.5 % Solution 7/30/2018 Active   cefdinir (OMNICEF) 300 MG Cap  Active   ferrous sulfate 325 (65 Fe) MG tablet  Active   levothyroxine (SYNTHROID) 112 MCG Tab 7/30/2018 Active   lovastatin (MEVACOR) 10 MG tablet 7/30/2018 Active   metoprolol SR (TOPROL XL) 50 MG TABLET SR 24 HR 7/30/2018 Active   Multiple Vitamins-Minerals (CENTRUM SILVER ULTRA WOMENS) Tab 7/30/2018 Active   Multiple Vitamins-Minerals (PRESERVISION AREDS PO) 7/30/2018 Active   pantoprazole (PROTONIX) 40 MG TBEC 7/30/2018 Active                ALLERGIES  Allergies   Allergen Reactions   • Cardizem Swelling   • Doxycycline      Swollen lips.   • Sulfa Drugs      Makes tongue swell, severely   • Zyvox Swelling     \"Whole mouth swelled up\"    • Codeine      Does not remember the reaction         PHYSICAL EXAM  VITAL SIGNS: BP (!) 163/86   Pulse 78   Temp 36.1 °C (96.9 °F)   Resp 18   Ht 1.727 m (5' 8\")   Wt 79.8 kg (175 lb 14.8 oz)   SpO2 99%   BMI 26.75 kg/m²    Constitutional: Mildly pale-appearing elderly female  HENT:  Atraumatic, Normocephalic.Oropharynx moist mucus membranes, Nose normal inspection.   Eyes: Normal inspection  Neck: Supple  Cardiovascular: Normal heart rate, Normal rhythm.  Symmetric peripheral pulses.   Thorax & Lungs: No respiratory distress, No wheezing, No rales, No rhonchi, No chest tenderness.   Abdomen: Nontender.  Normal bowel sounds.  Bright red blood in her urostomy bag.  There is a urostomy stoma in the right abdomen.  There is slow oozing blood which appears to " be coming from the right lateral aspect of the stoma at the skin margin.  No definitive location was identified.  The bleeding stopped with brief pressure to the area.  Skin: Mildly pale  Extremities: No clubbing, cyanosis, edema, no Homans or cords   Neurologic: Grossly normal   Psychiatric: Anxious appearing    Labs:  Results for orders placed or performed during the hospital encounter of 08/07/18   CBC WITH DIFFERENTIAL   Result Value Ref Range    WBC 9.0 4.8 - 10.8 K/uL    RBC 4.27 4.20 - 5.40 M/uL    Hemoglobin 10.4 (L) 12.0 - 16.0 g/dL    Hematocrit 33.3 (L) 37.0 - 47.0 %    MCV 78.0 (L) 81.4 - 97.8 fL    MCH 24.4 (L) 27.0 - 33.0 pg    MCHC 31.2 (L) 33.6 - 35.0 g/dL    RDW 54.9 (H) 35.9 - 50.0 fL    Platelet Count 261 164 - 446 K/uL    MPV 10.8 9.0 - 12.9 fL    Neutrophils-Polys 52.10 44.00 - 72.00 %    Lymphocytes 29.40 22.00 - 41.00 %    Monocytes 13.40 0.00 - 13.40 %    Eosinophils 2.70 0.00 - 6.90 %    Basophils 1.70 0.00 - 1.80 %    Immature Granulocytes 0.70 0.00 - 0.90 %    Nucleated RBC 0.00 /100 WBC    Neutrophils (Absolute) 4.68 2.00 - 7.15 K/uL    Lymphs (Absolute) 2.64 1.00 - 4.80 K/uL    Monos (Absolute) 1.20 (H) 0.00 - 0.85 K/uL    Eos (Absolute) 0.24 0.00 - 0.51 K/uL    Baso (Absolute) 0.15 (H) 0.00 - 0.12 K/uL    Immature Granulocytes (abs) 0.06 0.00 - 0.11 K/uL    NRBC (Absolute) 0.00 K/uL   BASIC METABOLIC PANEL   Result Value Ref Range    Sodium 137 135 - 145 mmol/L    Potassium 4.4 3.6 - 5.5 mmol/L    Chloride 109 96 - 112 mmol/L    Co2 21 20 - 33 mmol/L    Glucose 92 65 - 99 mg/dL    Bun 24 (H) 8 - 22 mg/dL    Creatinine 1.21 0.50 - 1.40 mg/dL    Calcium 8.7 8.5 - 10.5 mg/dL    Anion Gap 7.0 0.0 - 11.9   PT/INR   Result Value Ref Range    PT 15.8 (H) 12.0 - 14.6 sec    INR 1.29 (H) 0.87 - 1.13   APTT   Result Value Ref Range    APTT 30.0 24.7 - 36.0 sec   ESTIMATED GFR   Result Value Ref Range    GFR If  52 (A) >60 mL/min/1.73 m 2    GFR If Non  43 (A) >60  mL/min/1.73 m 2          COURSE & MEDICAL DECISION MAKING  Patient presents with bleeding from her stoma at the junction of the mucosa and the skin on the right side.  Light pressure was applied to the bleeding area over the right lateral aspect of her stoma and bleeding aborted.  The stoma itself appears quite well and I cannot see exactly where the bleeding is from.  Her vital signs were stable although she reported she had felt weak and therefore I repeated laboratory data.  Her hemoglobin is unchanged.  Data was otherwise unremarkable.    I consulted urology.  Discussed the case with Dr. Garcia.  He would like to see Ms. Wadsworth in his office in a few hours.  He is expecting her at 10 AM today.  I have given contact information of the location of the new clinic.  I asked the patient to apply gentle pressure to the area of bleeding if bleeding returns.  She should come back to the ER for uncontrolled bleeding or concern.    FINAL IMPRESSION  1.  Bleeding urostomy stoma  2.  Anticoagulation with Eliquis      This dictation was created using voice recognition software. The accuracy of the dictation is limited to the abilities of the software.  The nursing notes were reviewed and certain aspects of this information were incorporated into this note.      Electronically signed by: Damon Pena, 8/7/2018 5:20 AM

## 2018-08-07 NOTE — ED NOTES
Discharge paperwork completed with pt-verbalized understanding. Denies any further questions or concerns at this time. Ambulates to exit without difficulty.

## 2018-08-07 NOTE — ED TRIAGE NOTES
"Chief Complaint   Patient presents with   • Other     Patient states her urinary stoma is bleeding.         Patient ambulatory to triage. Patient states she had her bladder removed in 2011 due to constant UTI's. Per patient her urinary stoma is bleeding. Per patient is it the 4th time this year it has started bleeding. Patient sees Dr. Boyer and she has an appointment for Friday morning but states she can't wait, she \"will not have any blood left\". Patient states it started bleeding Sunday night. Patient states she feels weak and nauseated.     Patient placed in wheelchair and placed back in lobby. Educated about triage process.   "

## 2018-08-08 ENCOUNTER — PATIENT OUTREACH (OUTPATIENT)
Dept: HEALTH INFORMATION MANAGEMENT | Facility: OTHER | Age: 83
End: 2018-08-08

## 2018-08-29 ENCOUNTER — HOSPITAL ENCOUNTER (OUTPATIENT)
Dept: LAB | Facility: MEDICAL CENTER | Age: 83
End: 2018-08-29
Attending: INTERNAL MEDICINE
Payer: MEDICARE

## 2018-08-29 LAB
ALBUMIN SERPL BCP-MCNC: 3.6 G/DL (ref 3.2–4.9)
ALBUMIN/GLOB SERPL: 0.8 G/DL
ALP SERPL-CCNC: 99 U/L (ref 30–99)
ALT SERPL-CCNC: 13 U/L (ref 2–50)
ANION GAP SERPL CALC-SCNC: 7 MMOL/L (ref 0–11.9)
AST SERPL-CCNC: 27 U/L (ref 12–45)
BILIRUB SERPL-MCNC: 0.5 MG/DL (ref 0.1–1.5)
BUN SERPL-MCNC: 17 MG/DL (ref 8–22)
CALCIUM SERPL-MCNC: 9 MG/DL (ref 8.5–10.5)
CHLORIDE SERPL-SCNC: 107 MMOL/L (ref 96–112)
CHOLEST SERPL-MCNC: 139 MG/DL (ref 100–199)
CO2 SERPL-SCNC: 25 MMOL/L (ref 20–33)
CREAT SERPL-MCNC: 1.19 MG/DL (ref 0.5–1.4)
GLOBULIN SER CALC-MCNC: 4.3 G/DL (ref 1.9–3.5)
GLUCOSE SERPL-MCNC: 99 MG/DL (ref 65–99)
HDLC SERPL-MCNC: 50 MG/DL
LDLC SERPL CALC-MCNC: 75 MG/DL
POTASSIUM SERPL-SCNC: 4.2 MMOL/L (ref 3.6–5.5)
PROT SERPL-MCNC: 7.9 G/DL (ref 6–8.2)
SODIUM SERPL-SCNC: 139 MMOL/L (ref 135–145)
TRIGL SERPL-MCNC: 70 MG/DL (ref 0–149)

## 2018-08-29 PROCEDURE — 80053 COMPREHEN METABOLIC PANEL: CPT

## 2018-08-29 PROCEDURE — 36415 COLL VENOUS BLD VENIPUNCTURE: CPT

## 2018-08-29 PROCEDURE — 80061 LIPID PANEL: CPT

## 2018-09-03 ENCOUNTER — PATIENT OUTREACH (OUTPATIENT)
Dept: HEALTH INFORMATION MANAGEMENT | Facility: OTHER | Age: 83
End: 2018-09-03

## 2018-09-07 NOTE — PROGRESS NOTES
Dorie Wadsworth was admitted on 7/30/18 for UTI, once treated she was discharged home on 8/1/18. [facility] patient advocate assisted with multiple discharge needs including 5 appointments, 2 Primary Care Physician appointments, 1 Imaging appointment, 1 Urology appointment with Dr. Kayode Mitchell @ [facility]rology [facility], 1 Laboratory appointment. Of the 5 appointments the patient kept 4 and cancelled her Urology appointment on 8/10 due to a scheduling conflict from the patient. Patient is also scheduled for 1 future follow up with, Cardiologist on 10/25. PPS Screening was conducted 80%

## 2018-09-21 ENCOUNTER — TELEPHONE (OUTPATIENT)
Dept: VASCULAR LAB | Facility: MEDICAL CENTER | Age: 83
End: 2018-09-21

## 2018-09-21 NOTE — TELEPHONE ENCOUNTER
[facility]    Followed up with pt's anticoagulation.  She states she hasn't had any additional bleeding since the ER notes from August of this year.  She confirms urology provided care to stop her bleeding.  Medication is affordable for her, no additional questions from the pt.  Refill sent to pt's pharmacy, will continue to monitor.    Rodney Menendez, PharmD

## 2018-10-25 ENCOUNTER — OFFICE VISIT (OUTPATIENT)
Dept: CARDIOLOGY | Facility: MEDICAL CENTER | Age: 83
End: 2018-10-25
Payer: MEDICARE

## 2018-10-25 VITALS
HEIGHT: 67 IN | BODY MASS INDEX: 26.06 KG/M2 | SYSTOLIC BLOOD PRESSURE: 130 MMHG | HEART RATE: 72 BPM | DIASTOLIC BLOOD PRESSURE: 80 MMHG | OXYGEN SATURATION: 98 % | WEIGHT: 166 LBS

## 2018-10-25 DIAGNOSIS — I48.92 ATRIAL FLUTTER, UNSPECIFIED TYPE (HCC): ICD-10-CM

## 2018-10-25 DIAGNOSIS — Z79.01 CHRONIC ANTICOAGULATION: ICD-10-CM

## 2018-10-25 DIAGNOSIS — I48.91 ATRIAL FIBRILLATION, UNSPECIFIED TYPE (HCC): ICD-10-CM

## 2018-10-25 DIAGNOSIS — I10 ESSENTIAL HYPERTENSION: ICD-10-CM

## 2018-10-25 DIAGNOSIS — I48.0 PAF (PAROXYSMAL ATRIAL FIBRILLATION) (HCC): ICD-10-CM

## 2018-10-25 PROCEDURE — 93000 ELECTROCARDIOGRAM COMPLETE: CPT | Performed by: NURSE PRACTITIONER

## 2018-10-25 PROCEDURE — 99214 OFFICE O/P EST MOD 30 MIN: CPT | Performed by: NURSE PRACTITIONER

## 2018-10-25 ASSESSMENT — ENCOUNTER SYMPTOMS
FEVER: 0
COUGH: 0
ABDOMINAL PAIN: 0
SENSORY CHANGE: 0
DOUBLE VISION: 0
ORTHOPNEA: 0
HEMOPTYSIS: 0
FOCAL WEAKNESS: 0
PND: 0
TINGLING: 0
DIZZINESS: 0
HEARTBURN: 0
PALPITATIONS: 0
LOSS OF CONSCIOUSNESS: 0
SHORTNESS OF BREATH: 0
CHILLS: 0
SORE THROAT: 0
MUSCULOSKELETAL NEGATIVE: 1
NAUSEA: 0
PSYCHIATRIC NEGATIVE: 1
WEIGHT LOSS: 0
TREMORS: 0
VOMITING: 0
WHEEZING: 0
SPUTUM PRODUCTION: 0
STRIDOR: 0
HEADACHES: 0
WEAKNESS: 0
SPEECH CHANGE: 0
BLURRED VISION: 0
BLOOD IN STOOL: 0
DIARRHEA: 0

## 2018-10-25 NOTE — PROGRESS NOTES
Cardiology/Electrophysiology Follow-up Note      Subjective:   Chief Complaint:   Chief Complaint   Patient presents with   • Atrial Fibrillation     PP DX:PAF       Dorie Wadsworth is a 83 y.o. female who presents today for follow up atrial fibrillation.      She is a previous patient of Dr. Whipple. Past medical history also significant for atrial flutter, HTN, hypothyroidism, TIA, CKD, and long term use on anticoagualtion    Today in follow up she states that she has been doing well in terms of her Afib.  She does not think that she has had any significant afib for about 2 years.  Every now and then she states that she feels a little palpitation, but that it is very brief and turns on and off quickly.  She has had some trouble with renal infections and has been hospitalized with that.  She denies chest pain, dizziness, palpitations, pre syncope or syncope, dyspnea, PND, orthopnea, or lower extremity edema.      Patient endorses medication compliance.  She is requesting a refill of Eliquis today.         Past Medical History:   Diagnosis Date   • A-fib (HCC)    • Ailment     urostomy   • Arthritis     fingers   • Atrial fibrillation (HCC) 2/2009    A FIB X2,[ resolved on own][   • Atrial fibrillation, rapid (HCC) 1/31/2014   • Back pain    • Bladder disease 2011    bladder removed   • Bladder problem     Chronic bladder infection for the past 17months   • CAD (coronary artery disease)    • Cancer (HCC)     skin   • CATARACT     freddie surgery complete   • CHEST PAIN 3/11/2011   • Glaucoma    • History of hematuria     3/15/10: with Plavix.   • Hyperlipidemia 2/24/2011   • Hypertension    • Hypothyroid 2/24/2011   • Indigestion    • Intrinsic eczema 10/31/2017   • Other specified pre-operative examination 1/28/2014   • Paroxysmal atrial fibrillation (HCC)    • Sepsis urinary source 1/23/2012   • Stroke (HCC)     2 TIAs 2/2010, no stroke, 2/2013   • T.I.A.    • TIA (transient ischemic attack) 3/11/2011   •  Unspecified disorder of thyroid    • Unspecified hemorrhagic conditions     not sure when (2009)  blood in urin    • Urinary bladder disorder     urinary incontinence   • UTI (urinary tract infection) 2/24/2011   • Vaginal bleeding 10/22/2012     Past Surgical History:   Procedure Laterality Date   • PARASTOMAL HERNIA REPAIR   1/28/2014    Performed by Nicol Dorman M.D. at SURGERY [facility]   • BIOPSY GENERAL  10/22/2012    Performed by Jennifer Lincoln M.D. at SURGERY [facility]   • CYSTECTOMY  9/6/2011    Performed by JENNIFER LINCOLN at [facility]   • ILEO LOOP DIVERSION  9/6/2011    Performed by JENNIFER LINCOLN at SURGERY [facility]   • RECTOCELE REPAIR  11/3/2009    Performed by ZEKE HARRINGTON at SURGERY SAME DAY [facility]   • BLADDER BIOPSY WITH CYSTOSCOPY  10/12/2009    Performed by JENNIFER LINCOLN at SURGERY [facility]   • PELVIC EXAM UNDER ANESTHESIA  10/12/2009    Performed by JENNIFER LINCOLN at SURGERY [facility]   • BLADDER BIOPSY WITH CYSTOSCOPY  6/15/2009    Performed by CARLTON LUNA at SURGERY [facility]   • BLADDER BIOPSY WITH CYSTOSCOPY  10/10/08    Performed by CARLTON LUNA at SURGERY [facility]   • PYELOGRAM  10/10/08    Performed by CARLTON LUNA at SURGERY [facility]   • RETROGRADES  10/10/08    Performed by CARLTON LUNA at SURGERY [facility]   • CHOLECYSTECTOMY  1994   • CHOLECYSTECTOMY  1994   • GYN SURGERY      hysterectomy   • OTHER      TONSILLECTOMY AS TEENAGER   • OTHER ABDOMINAL SURGERY      appendectomy, pancratitis      Family History   Problem Relation Age of Onset   • Stroke Mother 81   • Stroke Father 77     Social History     Social History   • Marital status:      Spouse name: N/A   • Number of children: N/A   • Years of education: N/A     Occupational History   • Not on file.     Social History Main Topics   • Smoking status: Never Smoker   • Smokeless tobacco: Never Used  "  • Alcohol use No   • Drug use: No   • Sexual activity: Not on file     Other Topics Concern   • Not on file     Social History Narrative   • No narrative on file     Allergies   Allergen Reactions   • Cardizem Swelling   • Doxycycline      Swollen lips.   • Sulfa Drugs      Makes tongue swell, severely   • Zyvox Swelling     \"Whole mouth swelled up\"    • Codeine      Does not remember the reaction         Current Outpatient Prescriptions   Medication Sig Dispense Refill   • ELIQUIS 5 MG Tab TAKE 1 TABLET BY MOUTH TWO TIMES DAILY 180 Tab 1   • acetaminophen (TYLENOL) 325 MG Tab Take 2 Tabs by mouth every 6 hours as needed (Mild Pain; (Pain scale 1-3); Temp greater than 100.5 F). 30 Tab 0   • ferrous sulfate 325 (65 Fe) MG tablet Take 1 Tab by mouth every day. 30 Tab 0   • cefdinir (OMNICEF) 300 MG Cap Take 1 Cap by mouth 2 times a day. 6 Cap 0   • levothyroxine (SYNTHROID) 112 MCG Tab Take 112 mcg by mouth Every morning on an empty stomach.     • metoprolol SR (TOPROL XL) 50 MG TABLET SR 24 HR Take 25 mg by mouth every day.     • Brimonidine Tartrate-Timolol (COMBIGAN) 0.2-0.5 % Solution Place 1 Drop in both eyes 2 Times a Day.     • Multiple Vitamins-Minerals (CENTRUM SILVER ULTRA WOMENS) Tab Take 1 Tab by mouth every day.     • Multiple Vitamins-Minerals (PRESERVISION AREDS PO) Take 1 Tab by mouth 2 Times a Day.     • lovastatin (MEVACOR) 10 MG tablet Take 10 mg by mouth every day.     • pantoprazole (PROTONIX) 40 MG TBEC Take 40 mg by mouth every day.     • Biotin 10 MG CAPS Take 1 Cap by mouth every day.     • Bimatoprost (LUMIGAN) 0.01 % SOLN Place 1 Drop in both eyes every morning. Both eyes       No current facility-administered medications for this visit.        Review of Systems   Constitutional: Negative for chills, fever, malaise/fatigue and weight loss.   HENT: Negative for congestion, sore throat and tinnitus.    Eyes: Negative for blurred vision and double vision.   Respiratory: Negative for cough, " "hemoptysis, sputum production, shortness of breath, wheezing and stridor.    Cardiovascular: Negative for chest pain, palpitations, orthopnea, leg swelling and PND.   Gastrointestinal: Negative for abdominal pain, blood in stool, diarrhea, heartburn, nausea and vomiting.   Genitourinary: Negative for hematuria.   Musculoskeletal: Negative.    Skin: Negative for rash.   Neurological: Negative for dizziness, tingling, tremors, sensory change, speech change, focal weakness, loss of consciousness, weakness and headaches.   Psychiatric/Behavioral: Negative.      All others systems reviewed and negative.     Objective:     Blood pressure 130/80, pulse 72, height 1.702 m (5' 7\"), weight 75.3 kg (166 lb), SpO2 98 %, not currently breastfeeding. Body mass index is 26 kg/m².    Physical Exam   Constitutional: She is oriented to person, place, and time and well-developed, well-nourished, and in no distress.   HENT:   Head: Normocephalic and atraumatic.   Eyes: Pupils are equal, round, and reactive to light. Conjunctivae and EOM are normal.   Neck: Normal range of motion. Neck supple. No JVD present.   Cardiovascular: Normal rate, regular rhythm, normal heart sounds and intact distal pulses.  Exam reveals no gallop and no friction rub.    No murmur heard.  No carotid bruits bilaterally.    Pulmonary/Chest: Effort normal and breath sounds normal. No respiratory distress. She has no wheezes. She has no rales. She exhibits no tenderness.   Abdominal: Soft. Bowel sounds are normal. There is no tenderness.   Musculoskeletal: Normal range of motion. She exhibits no edema.   Neurological: She is alert and oriented to person, place, and time.   Skin: Skin is warm and dry. No rash noted. No erythema.   Psychiatric: Mood, memory, affect and judgment normal.         Cardiac Imaging and Procedures Review:    EKG dated 10/25/18:   Sinus rhythm, rate 74.     Echo dated 2/25/14:   CONCLUSIONS  Normal left ventricular chamber size. Mild " concentric left ventricular   hypertrophy. Normal regional wall motion. Normal left ventricular   systolic function. Left ventricular ejection fraction is 70% to 75%.  No significant valve abnormalities.   Mild tricuspid regurgitation. Right ventricular systolic pressure is   estimated to be 40 mmHg.   No pericardial effusion seen.   Ascending aorta 3.0 cm. aortic root 3.1 cm.   Compared to prior study done on 1/23/12 mild increase in RV pressure.    Labs (personally reviewed and notable for):   Lab Results   Component Value Date/Time    SODIUM 139 08/29/2018 10:10 AM    POTASSIUM 4.2 08/29/2018 10:10 AM    CHLORIDE 107 08/29/2018 10:10 AM    CO2 25 08/29/2018 10:10 AM    GLUCOSE 99 08/29/2018 10:10 AM    BUN 17 08/29/2018 10:10 AM    CREATININE 1.19 08/29/2018 10:10 AM      Lab Results   Component Value Date/Time    WBC 9.0 08/07/2018 05:25 AM    RBC 4.27 08/07/2018 05:25 AM    HEMOGLOBIN 10.4 (L) 08/07/2018 05:25 AM    HEMATOCRIT 33.3 (L) 08/07/2018 05:25 AM    MCV 78.0 (L) 08/07/2018 05:25 AM    MCH 24.4 (L) 08/07/2018 05:25 AM    MCHC 31.2 (L) 08/07/2018 05:25 AM    MPV 10.8 08/07/2018 05:25 AM    NEUTSPOLYS 52.10 08/07/2018 05:25 AM    LYMPHOCYTES 29.40 08/07/2018 05:25 AM    MONOCYTES 13.40 08/07/2018 05:25 AM    EOSINOPHILS 2.70 08/07/2018 05:25 AM    BASOPHILS 1.70 08/07/2018 05:25 AM    ANISOCYTOSIS 1+ 07/31/2018 01:05 AM      PT/INR:   Lab Results   Component Value Date/Time    PROTHROMBTM 15.8 (H) 08/07/2018 05:25 AM    INR 1.29 (H) 08/07/2018 05:25 AM       Assessment:     1. PAF (paroxysmal atrial fibrillation) (HCC)  EKG   2. Atrial fibrillation, unspecified type (HCC)  apixaban (ELIQUIS) 5mg Tab   3. Atrial flutter, unspecified type (HCC)     4. Essential hypertension     5. Chronic anticoagulation         Medical Decision Making:  Today's Assessment / Status / Plan:   1. PAF/AFL:  - In sinus today and rhythm is well controled per her report.  Not on any AADs at this time.   - recent TSH normal  7/18.  - Continue Toprol and Eliquis.     2. HTN:  - Blood pressure is well controled today.  - Continue Toprol.    3. Anticoagulation:  - Tolerating well and she denies evidence of internal bleeding at this time. Eliquis refilled through the [facility] pharmacy.       Plan reviewed in detail with the patient and she verbalizes understanding and is in agreement.   RTC in 6 months to establish with Dr. Moreno (she prefers a female cardiologist), sooner if clinical condition changes  Collaborating MD/ADD: DANIE Berry.     LISBETH Alvarado.

## 2018-11-01 LAB — EKG IMPRESSION: NORMAL

## 2019-03-13 ENCOUNTER — APPOINTMENT (OUTPATIENT)
Dept: RADIOLOGY | Facility: MEDICAL CENTER | Age: 84
DRG: 389 | End: 2019-03-13
Attending: EMERGENCY MEDICINE
Payer: MEDICARE

## 2019-03-13 ENCOUNTER — APPOINTMENT (OUTPATIENT)
Dept: RADIOLOGY | Facility: MEDICAL CENTER | Age: 84
DRG: 389 | End: 2019-03-13
Attending: HOSPITALIST
Payer: MEDICARE

## 2019-03-13 ENCOUNTER — HOSPITAL ENCOUNTER (INPATIENT)
Facility: MEDICAL CENTER | Age: 84
LOS: 7 days | DRG: 389 | End: 2019-03-20
Attending: EMERGENCY MEDICINE | Admitting: HOSPITALIST
Payer: MEDICARE

## 2019-03-13 DIAGNOSIS — Z93.2 ILEOSTOMY IN PLACE (HCC): ICD-10-CM

## 2019-03-13 DIAGNOSIS — I48.91 ATRIAL FIBRILLATION WITH RVR (HCC): ICD-10-CM

## 2019-03-13 DIAGNOSIS — I10 HTN (HYPERTENSION), MALIGNANT: ICD-10-CM

## 2019-03-13 DIAGNOSIS — R10.9 ABDOMINAL PAIN, UNSPECIFIED ABDOMINAL LOCATION: ICD-10-CM

## 2019-03-13 DIAGNOSIS — I48.0 PAROXYSMAL ATRIAL FIBRILLATION (HCC): ICD-10-CM

## 2019-03-13 DIAGNOSIS — A41.9 SEPSIS, DUE TO UNSPECIFIED ORGANISM: ICD-10-CM

## 2019-03-13 DIAGNOSIS — K56.609 SMALL BOWEL OBSTRUCTION (HCC): ICD-10-CM

## 2019-03-13 DIAGNOSIS — K56.609 SBO (SMALL BOWEL OBSTRUCTION) (HCC): ICD-10-CM

## 2019-03-13 DIAGNOSIS — I48.91 ATRIAL FIBRILLATION WITH RAPID VENTRICULAR RESPONSE (HCC): ICD-10-CM

## 2019-03-13 PROBLEM — R65.10 SIRS (SYSTEMIC INFLAMMATORY RESPONSE SYNDROME) (HCC): Status: ACTIVE | Noted: 2019-03-13

## 2019-03-13 PROBLEM — N17.9 AKI (ACUTE KIDNEY INJURY) (HCC): Status: ACTIVE | Noted: 2019-03-13

## 2019-03-13 PROBLEM — K74.60 CIRRHOSIS (HCC): Status: ACTIVE | Noted: 2019-03-13

## 2019-03-13 PROBLEM — E87.20 LACTIC ACIDOSIS: Status: ACTIVE | Noted: 2019-03-13

## 2019-03-13 LAB
ALBUMIN SERPL BCP-MCNC: 4 G/DL (ref 3.2–4.9)
ALBUMIN/GLOB SERPL: 0.9 G/DL
ALP SERPL-CCNC: 153 U/L (ref 30–99)
ALT SERPL-CCNC: 26 U/L (ref 2–50)
ANION GAP SERPL CALC-SCNC: 14 MMOL/L (ref 0–11.9)
AST SERPL-CCNC: 46 U/L (ref 12–45)
BASOPHILS # BLD AUTO: 0.5 % (ref 0–1.8)
BASOPHILS # BLD: 0.1 K/UL (ref 0–0.12)
BILIRUB SERPL-MCNC: 0.8 MG/DL (ref 0.1–1.5)
BUN SERPL-MCNC: 34 MG/DL (ref 8–22)
CALCIUM SERPL-MCNC: 9.7 MG/DL (ref 8.5–10.5)
CHLORIDE SERPL-SCNC: 98 MMOL/L (ref 96–112)
CO2 SERPL-SCNC: 24 MMOL/L (ref 20–33)
CREAT SERPL-MCNC: 2.12 MG/DL (ref 0.5–1.4)
EOSINOPHIL # BLD AUTO: 0.03 K/UL (ref 0–0.51)
EOSINOPHIL NFR BLD: 0.2 % (ref 0–6.9)
ERYTHROCYTE [DISTWIDTH] IN BLOOD BY AUTOMATED COUNT: 47.3 FL (ref 35.9–50)
GLOBULIN SER CALC-MCNC: 4.6 G/DL (ref 1.9–3.5)
GLUCOSE SERPL-MCNC: 113 MG/DL (ref 65–99)
HCT VFR BLD AUTO: 43.8 % (ref 37–47)
HGB BLD-MCNC: 13.5 G/DL (ref 12–16)
IMM GRANULOCYTES # BLD AUTO: 0.07 K/UL (ref 0–0.11)
IMM GRANULOCYTES NFR BLD AUTO: 0.4 % (ref 0–0.9)
LACTATE BLD-SCNC: 3 MMOL/L (ref 0.5–2)
LIPASE SERPL-CCNC: 32 U/L (ref 11–82)
LYMPHOCYTES # BLD AUTO: 2.95 K/UL (ref 1–4.8)
LYMPHOCYTES NFR BLD: 15.9 % (ref 22–41)
MCH RBC QN AUTO: 24.7 PG (ref 27–33)
MCHC RBC AUTO-ENTMCNC: 30.8 G/DL (ref 33.6–35)
MCV RBC AUTO: 80.1 FL (ref 81.4–97.8)
MONOCYTES # BLD AUTO: 2.19 K/UL (ref 0–0.85)
MONOCYTES NFR BLD AUTO: 11.8 % (ref 0–13.4)
NEUTROPHILS # BLD AUTO: 13.24 K/UL (ref 2–7.15)
NEUTROPHILS NFR BLD: 71.2 % (ref 44–72)
NRBC # BLD AUTO: 0 K/UL
NRBC BLD-RTO: 0 /100 WBC
PLATELET # BLD AUTO: 195 K/UL (ref 164–446)
PMV BLD AUTO: 11.3 FL (ref 9–12.9)
POTASSIUM SERPL-SCNC: 4.4 MMOL/L (ref 3.6–5.5)
PROT SERPL-MCNC: 8.6 G/DL (ref 6–8.2)
RBC # BLD AUTO: 5.47 M/UL (ref 4.2–5.4)
SODIUM SERPL-SCNC: 136 MMOL/L (ref 135–145)
TROPONIN I SERPL-MCNC: <0.01 NG/ML (ref 0–0.04)
WBC # BLD AUTO: 18.6 K/UL (ref 4.8–10.8)

## 2019-03-13 PROCEDURE — 99223 1ST HOSP IP/OBS HIGH 75: CPT | Performed by: HOSPITALIST

## 2019-03-13 PROCEDURE — 96365 THER/PROPH/DIAG IV INF INIT: CPT

## 2019-03-13 PROCEDURE — 80053 COMPREHEN METABOLIC PANEL: CPT

## 2019-03-13 PROCEDURE — 770006 HCHG ROOM/CARE - MED/SURG/GYN SEMI*

## 2019-03-13 PROCEDURE — 84484 ASSAY OF TROPONIN QUANT: CPT

## 2019-03-13 PROCEDURE — 700111 HCHG RX REV CODE 636 W/ 250 OVERRIDE (IP): Performed by: EMERGENCY MEDICINE

## 2019-03-13 PROCEDURE — 99285 EMERGENCY DEPT VISIT HI MDM: CPT

## 2019-03-13 PROCEDURE — 83690 ASSAY OF LIPASE: CPT

## 2019-03-13 PROCEDURE — 700105 HCHG RX REV CODE 258: Performed by: EMERGENCY MEDICINE

## 2019-03-13 PROCEDURE — 74176 CT ABD & PELVIS W/O CONTRAST: CPT

## 2019-03-13 PROCEDURE — 85610 PROTHROMBIN TIME: CPT

## 2019-03-13 PROCEDURE — 83605 ASSAY OF LACTIC ACID: CPT

## 2019-03-13 PROCEDURE — 84145 PROCALCITONIN (PCT): CPT

## 2019-03-13 PROCEDURE — 85025 COMPLETE CBC W/AUTO DIFF WBC: CPT

## 2019-03-13 PROCEDURE — 96375 TX/PRO/DX INJ NEW DRUG ADDON: CPT

## 2019-03-13 PROCEDURE — 87040 BLOOD CULTURE FOR BACTERIA: CPT

## 2019-03-13 RX ORDER — METOPROLOL SUCCINATE 25 MG/1
12.5 TABLET, EXTENDED RELEASE ORAL DAILY
Status: ON HOLD | COMMUNITY
End: 2019-03-20

## 2019-03-13 RX ORDER — POLYETHYLENE GLYCOL 3350 17 G/17G
1 POWDER, FOR SOLUTION ORAL
Status: DISCONTINUED | OUTPATIENT
Start: 2019-03-13 | End: 2019-03-13

## 2019-03-13 RX ORDER — LEVOTHYROXINE SODIUM 112 UG/1
112 TABLET ORAL
COMMUNITY

## 2019-03-13 RX ORDER — SODIUM CHLORIDE 9 MG/ML
500 INJECTION, SOLUTION INTRAVENOUS
Status: COMPLETED | OUTPATIENT
Start: 2019-03-13 | End: 2019-03-14

## 2019-03-13 RX ORDER — METOPROLOL SUCCINATE 25 MG/1
12.5 TABLET, EXTENDED RELEASE ORAL DAILY
Status: DISCONTINUED | OUTPATIENT
Start: 2019-03-14 | End: 2019-03-16

## 2019-03-13 RX ORDER — SODIUM CHLORIDE 9 MG/ML
INJECTION, SOLUTION INTRAVENOUS CONTINUOUS
Status: DISCONTINUED | OUTPATIENT
Start: 2019-03-13 | End: 2019-03-18

## 2019-03-13 RX ORDER — LEVOTHYROXINE SODIUM 0.03 MG/1
25 TABLET ORAL
Status: DISCONTINUED | OUTPATIENT
Start: 2019-03-14 | End: 2019-03-15

## 2019-03-13 RX ORDER — SODIUM CHLORIDE 9 MG/ML
1000 INJECTION, SOLUTION INTRAVENOUS
Status: COMPLETED | OUTPATIENT
Start: 2019-03-13 | End: 2019-03-13

## 2019-03-13 RX ORDER — ONDANSETRON 2 MG/ML
4 INJECTION INTRAMUSCULAR; INTRAVENOUS ONCE
Status: COMPLETED | OUTPATIENT
Start: 2019-03-13 | End: 2019-03-13

## 2019-03-13 RX ORDER — ONDANSETRON 4 MG/1
4 TABLET, ORALLY DISINTEGRATING ORAL EVERY 4 HOURS PRN
Status: DISCONTINUED | OUTPATIENT
Start: 2019-03-13 | End: 2019-03-20 | Stop reason: HOSPADM

## 2019-03-13 RX ORDER — OXYCODONE HYDROCHLORIDE 5 MG/1
5 TABLET ORAL
Status: DISCONTINUED | OUTPATIENT
Start: 2019-03-13 | End: 2019-03-19

## 2019-03-13 RX ORDER — OXYCODONE HYDROCHLORIDE 10 MG/1
10 TABLET ORAL
Status: DISCONTINUED | OUTPATIENT
Start: 2019-03-13 | End: 2019-03-19

## 2019-03-13 RX ORDER — AMOXICILLIN 250 MG
2 CAPSULE ORAL 2 TIMES DAILY
Status: DISCONTINUED | OUTPATIENT
Start: 2019-03-13 | End: 2019-03-13

## 2019-03-13 RX ORDER — LOVASTATIN 20 MG/1
10 TABLET ORAL DAILY
Status: DISCONTINUED | OUTPATIENT
Start: 2019-03-14 | End: 2019-03-16

## 2019-03-13 RX ORDER — MORPHINE SULFATE 4 MG/ML
4 INJECTION, SOLUTION INTRAMUSCULAR; INTRAVENOUS
Status: DISCONTINUED | OUTPATIENT
Start: 2019-03-13 | End: 2019-03-19

## 2019-03-13 RX ORDER — ONDANSETRON 2 MG/ML
4 INJECTION INTRAMUSCULAR; INTRAVENOUS EVERY 4 HOURS PRN
Status: DISCONTINUED | OUTPATIENT
Start: 2019-03-13 | End: 2019-03-20 | Stop reason: HOSPADM

## 2019-03-13 RX ORDER — BISACODYL 10 MG
10 SUPPOSITORY, RECTAL RECTAL
Status: DISCONTINUED | OUTPATIENT
Start: 2019-03-13 | End: 2019-03-13

## 2019-03-13 RX ORDER — OMEPRAZOLE 20 MG/1
20 CAPSULE, DELAYED RELEASE ORAL DAILY
Status: DISCONTINUED | OUTPATIENT
Start: 2019-03-14 | End: 2019-03-16

## 2019-03-13 RX ADMIN — CEFTRIAXONE SODIUM 2 G: 2 INJECTION, POWDER, FOR SOLUTION INTRAMUSCULAR; INTRAVENOUS at 22:19

## 2019-03-13 RX ADMIN — SODIUM CHLORIDE 1000 ML: 9 INJECTION, SOLUTION INTRAVENOUS at 21:39

## 2019-03-13 RX ADMIN — ONDANSETRON 4 MG: 2 INJECTION INTRAMUSCULAR; INTRAVENOUS at 21:39

## 2019-03-13 RX ADMIN — FENTANYL CITRATE 50 MCG: 50 INJECTION INTRAMUSCULAR; INTRAVENOUS at 21:41

## 2019-03-13 ASSESSMENT — ENCOUNTER SYMPTOMS
ABDOMINAL PAIN: 1
SPEECH CHANGE: 0
SENSORY CHANGE: 0
FLANK PAIN: 0
BLURRED VISION: 0
COUGH: 0
DOUBLE VISION: 0
HALLUCINATIONS: 0
PALPITATIONS: 0
FEVER: 0
EYE DISCHARGE: 0
VOMITING: 1
HEMOPTYSIS: 0
WEAKNESS: 1
DIZZINESS: 0
DEPRESSION: 0
CHILLS: 0
FOCAL WEAKNESS: 0
BRUISES/BLEEDS EASILY: 0
MYALGIAS: 0
NAUSEA: 1
SHORTNESS OF BREATH: 0
HEARTBURN: 0

## 2019-03-13 ASSESSMENT — LIFESTYLE VARIABLES: SUBSTANCE_ABUSE: 0

## 2019-03-13 NOTE — ED TRIAGE NOTES
"Chief Complaint   Patient presents with   • N/V     went to PCP for check up on monday feeling fine. that night she vomited \"buckets full\" pt has had N/V for 3 days now. denies diarrhea or fever    • Constipation     3 days        "

## 2019-03-14 ENCOUNTER — APPOINTMENT (OUTPATIENT)
Dept: RADIOLOGY | Facility: MEDICAL CENTER | Age: 84
DRG: 389 | End: 2019-03-14
Attending: HOSPITALIST
Payer: MEDICARE

## 2019-03-14 LAB
ALBUMIN SERPL BCP-MCNC: 3.4 G/DL (ref 3.2–4.9)
ALBUMIN/GLOB SERPL: 0.8 G/DL
ALP SERPL-CCNC: 123 U/L (ref 30–99)
ALT SERPL-CCNC: 20 U/L (ref 2–50)
ANION GAP SERPL CALC-SCNC: 10 MMOL/L (ref 0–11.9)
APPEARANCE UR: ABNORMAL
AST SERPL-CCNC: 36 U/L (ref 12–45)
BACTERIA #/AREA URNS HPF: ABNORMAL /HPF
BASOPHILS # BLD AUTO: 0.3 % (ref 0–1.8)
BASOPHILS # BLD: 0.04 K/UL (ref 0–0.12)
BILIRUB SERPL-MCNC: 0.5 MG/DL (ref 0.1–1.5)
BILIRUB UR QL STRIP.AUTO: NEGATIVE
BUN SERPL-MCNC: 41 MG/DL (ref 8–22)
CALCIUM SERPL-MCNC: 8.3 MG/DL (ref 8.5–10.5)
CAOX CRY #/AREA URNS HPF: ABNORMAL /HPF
CHLORIDE SERPL-SCNC: 102 MMOL/L (ref 96–112)
CO2 SERPL-SCNC: 26 MMOL/L (ref 20–33)
COLOR UR: ABNORMAL
CREAT SERPL-MCNC: 2.01 MG/DL (ref 0.5–1.4)
EOSINOPHIL # BLD AUTO: 0.03 K/UL (ref 0–0.51)
EOSINOPHIL NFR BLD: 0.2 % (ref 0–6.9)
EPI CELLS #/AREA URNS HPF: NEGATIVE /HPF
ERYTHROCYTE [DISTWIDTH] IN BLOOD BY AUTOMATED COUNT: 46.8 FL (ref 35.9–50)
GLOBULIN SER CALC-MCNC: 4.2 G/DL (ref 1.9–3.5)
GLUCOSE SERPL-MCNC: 106 MG/DL (ref 65–99)
GLUCOSE UR STRIP.AUTO-MCNC: NEGATIVE MG/DL
HAV IGM SERPL QL IA: NEGATIVE
HBV CORE IGM SER QL: NEGATIVE
HBV SURFACE AG SER QL: NEGATIVE
HCT VFR BLD AUTO: 39.7 % (ref 37–47)
HCV AB SER QL: NEGATIVE
HGB BLD-MCNC: 12.6 G/DL (ref 12–16)
HYALINE CASTS #/AREA URNS LPF: ABNORMAL /LPF
IMM GRANULOCYTES # BLD AUTO: 0.05 K/UL (ref 0–0.11)
IMM GRANULOCYTES NFR BLD AUTO: 0.3 % (ref 0–0.9)
INR PPP: 1.22 (ref 0.87–1.13)
KETONES UR STRIP.AUTO-MCNC: ABNORMAL MG/DL
LACTATE BLD-SCNC: 1.5 MMOL/L (ref 0.5–2)
LEUKOCYTE ESTERASE UR QL STRIP.AUTO: ABNORMAL
LYMPHOCYTES # BLD AUTO: 2.23 K/UL (ref 1–4.8)
LYMPHOCYTES NFR BLD: 14.4 % (ref 22–41)
MCH RBC QN AUTO: 25.3 PG (ref 27–33)
MCHC RBC AUTO-ENTMCNC: 31.7 G/DL (ref 33.6–35)
MCV RBC AUTO: 79.6 FL (ref 81.4–97.8)
MICRO URNS: ABNORMAL
MONOCYTES # BLD AUTO: 2.26 K/UL (ref 0–0.85)
MONOCYTES NFR BLD AUTO: 14.6 % (ref 0–13.4)
NEUTROPHILS # BLD AUTO: 10.85 K/UL (ref 2–7.15)
NEUTROPHILS NFR BLD: 70.2 % (ref 44–72)
NITRITE UR QL STRIP.AUTO: NEGATIVE
NRBC # BLD AUTO: 0 K/UL
NRBC BLD-RTO: 0 /100 WBC
PH UR STRIP.AUTO: 5.5 [PH]
PLATELET # BLD AUTO: 169 K/UL (ref 164–446)
PMV BLD AUTO: 12.3 FL (ref 9–12.9)
POTASSIUM SERPL-SCNC: 4.3 MMOL/L (ref 3.6–5.5)
PROCALCITONIN SERPL-MCNC: 0.17 NG/ML
PROT SERPL-MCNC: 7.6 G/DL (ref 6–8.2)
PROT UR QL STRIP: NEGATIVE MG/DL
PROTHROMBIN TIME: 15.6 SEC (ref 12–14.6)
RBC # BLD AUTO: 4.99 M/UL (ref 4.2–5.4)
RBC # URNS HPF: ABNORMAL /HPF
RBC UR QL AUTO: NEGATIVE
SODIUM SERPL-SCNC: 138 MMOL/L (ref 135–145)
SP GR UR STRIP.AUTO: 1.02
UROBILINOGEN UR STRIP.AUTO-MCNC: 0.2 MG/DL
WBC # BLD AUTO: 15.5 K/UL (ref 4.8–10.8)
WBC #/AREA URNS HPF: ABNORMAL /HPF

## 2019-03-14 PROCEDURE — 96367 TX/PROPH/DG ADDL SEQ IV INF: CPT

## 2019-03-14 PROCEDURE — 83605 ASSAY OF LACTIC ACID: CPT

## 2019-03-14 PROCEDURE — 700111 HCHG RX REV CODE 636 W/ 250 OVERRIDE (IP): Performed by: FAMILY MEDICINE

## 2019-03-14 PROCEDURE — 80053 COMPREHEN METABOLIC PANEL: CPT

## 2019-03-14 PROCEDURE — 36415 COLL VENOUS BLD VENIPUNCTURE: CPT

## 2019-03-14 PROCEDURE — 700105 HCHG RX REV CODE 258: Performed by: HOSPITALIST

## 2019-03-14 PROCEDURE — 81001 URINALYSIS AUTO W/SCOPE: CPT

## 2019-03-14 PROCEDURE — 80074 ACUTE HEPATITIS PANEL: CPT

## 2019-03-14 PROCEDURE — 96375 TX/PRO/DX INJ NEW DRUG ADDON: CPT

## 2019-03-14 PROCEDURE — 700101 HCHG RX REV CODE 250: Performed by: EMERGENCY MEDICINE

## 2019-03-14 PROCEDURE — 85025 COMPLETE CBC W/AUTO DIFF WBC: CPT

## 2019-03-14 PROCEDURE — 700111 HCHG RX REV CODE 636 W/ 250 OVERRIDE (IP): Performed by: HOSPITALIST

## 2019-03-14 PROCEDURE — 76705 ECHO EXAM OF ABDOMEN: CPT

## 2019-03-14 PROCEDURE — 99233 SBSQ HOSP IP/OBS HIGH 50: CPT | Performed by: FAMILY MEDICINE

## 2019-03-14 PROCEDURE — 770006 HCHG ROOM/CARE - MED/SURG/GYN SEMI*

## 2019-03-14 PROCEDURE — 304538 HCHG NG TUBE

## 2019-03-14 RX ORDER — HEPARIN SODIUM 5000 [USP'U]/ML
5000 INJECTION, SOLUTION INTRAVENOUS; SUBCUTANEOUS EVERY 8 HOURS
Status: DISCONTINUED | OUTPATIENT
Start: 2019-03-14 | End: 2019-03-15

## 2019-03-14 RX ADMIN — SODIUM CHLORIDE: 9 INJECTION, SOLUTION INTRAVENOUS at 17:32

## 2019-03-14 RX ADMIN — MORPHINE SULFATE 4 MG: 4 INJECTION INTRAVENOUS at 21:08

## 2019-03-14 RX ADMIN — MORPHINE SULFATE 4 MG: 4 INJECTION INTRAVENOUS at 05:24

## 2019-03-14 RX ADMIN — SODIUM CHLORIDE: 9 INJECTION, SOLUTION INTRAVENOUS at 00:31

## 2019-03-14 RX ADMIN — HEPARIN SODIUM 5000 UNITS: 5000 INJECTION, SOLUTION INTRAVENOUS; SUBCUTANEOUS at 21:13

## 2019-03-14 RX ADMIN — HEPARIN SODIUM 5000 UNITS: 5000 INJECTION, SOLUTION INTRAVENOUS; SUBCUTANEOUS at 14:00

## 2019-03-14 RX ADMIN — METRONIDAZOLE 500 MG: 500 INJECTION, SOLUTION INTRAVENOUS at 00:09

## 2019-03-14 ASSESSMENT — PATIENT HEALTH QUESTIONNAIRE - PHQ9
SUM OF ALL RESPONSES TO PHQ9 QUESTIONS 1 AND 2: 0
2. FEELING DOWN, DEPRESSED, IRRITABLE, OR HOPELESS: NOT AT ALL
1. LITTLE INTEREST OR PLEASURE IN DOING THINGS: NOT AT ALL

## 2019-03-14 ASSESSMENT — COGNITIVE AND FUNCTIONAL STATUS - GENERAL
SUGGESTED CMS G CODE MODIFIER MOBILITY: CH
HELP NEEDED FOR BATHING: A LITTLE
SUGGESTED CMS G CODE MODIFIER DAILY ACTIVITY: CJ
DAILY ACTIVITIY SCORE: 20
TOILETING: A LITTLE
DRESSING REGULAR LOWER BODY CLOTHING: A LITTLE
MOBILITY SCORE: 24
DRESSING REGULAR UPPER BODY CLOTHING: A LITTLE

## 2019-03-14 ASSESSMENT — COPD QUESTIONNAIRES
DURING THE PAST 4 WEEKS HOW MUCH DID YOU FEEL SHORT OF BREATH: NONE/LITTLE OF THE TIME
HAVE YOU SMOKED AT LEAST 100 CIGARETTES IN YOUR ENTIRE LIFE: NO/DON'T KNOW
DO YOU EVER COUGH UP ANY MUCUS OR PHLEGM?: NO/ONLY WITH OCCASIONAL COLDS OR INFECTIONS
COPD SCREENING SCORE: 2
IN THE PAST 12 MONTHS DO YOU DO LESS THAN YOU USED TO BECAUSE OF YOUR BREATHING PROBLEMS: DISAGREE/UNSURE

## 2019-03-14 ASSESSMENT — ENCOUNTER SYMPTOMS
HEMOPTYSIS: 0
HEARTBURN: 0
PALPITATIONS: 0
COUGH: 0
DIZZINESS: 0
PHOTOPHOBIA: 0
FEVER: 0
VOMITING: 1
ABDOMINAL PAIN: 1
DOUBLE VISION: 0
CHILLS: 0
BLURRED VISION: 0
MYALGIAS: 0
NAUSEA: 1
DEPRESSION: 0
HEADACHES: 0
NECK PAIN: 0

## 2019-03-14 ASSESSMENT — LIFESTYLE VARIABLES
ALCOHOL_USE: NO
EVER_SMOKED: NEVER

## 2019-03-14 NOTE — ED NOTES
Pt resting comfortably, no needs at this time, bed in lowered position, side rails up, call light in reach

## 2019-03-14 NOTE — CONSULTS
DATE OF SERVICE:  03/14/2019    REASON FOR CONSULTATION:  Small-bowel obstruction.    HISTORY OF PRESENT ILLNESS:  Patient is an 83-year-old female with history of   total cystectomy, ileal conduit reconstruction on the right lower quadrant by   Dr. Lincoln in 2011.  Reason for surgery per the patient was severe chronic   cystitis E. coli bacteria.  She is also status post parastomal hernia repair   in 2014 by Dr. Nicol Dorman.  She presented to the emergency department with   3-day history of worsening abdominal pain and large amount of emesis.    Reported her last bowel movement was Monday.  She also says that her urostomy   is putting out much less urine and appears to be thick and cloudy.  Since   arriving to the emergency department, she did have large emesis with slight   relief of symptoms.  She is currently complaining of 4/4 abdominal pain,   mostly in her lower abdomen.  She describes the pain as intermittent crampy   pain without modifying factors.  Patient's history also significant for atrial   fibrillation, she is currently not on anticoagulation after recurrent   bleeding from her stoma.  Patient's CT findings also showed she has cirrhosis   with dilatation of proximal tributaries consistent with portal hypertension.    Patient was not aware that she has cirrhosis.  She does report, in the past,   episodes of hematemesis.  She also has chronic kidney insufficiency.    PAST MEDICAL HISTORY:  Atrial fibrillation, arthritis, coronary artery   disease, chronic urinary tract infection, hypertension, hypothyroid,   cirrhosis, portal hypertension, TIA, COPD.    PAST SURGICAL HISTORY:  Cystoscopy, cholecystectomy in 1994, cystectomy in   2011 with ileal conduit reconstruction, parastomal hernia repair in 2014.    FAMILY HISTORY:  Father and mother both had strokes.    ALLERGIES:  TO CARDIZEM, DOXYCYCLINE, SULFA DRUGS, ZYVOX, CODEINE.    SOCIAL HISTORY:  Denies alcohol, tobacco or illicit drugs.  Denies  history of   chronic alcohol.      MEDICATIONS:  Multivitamin, levothyroxine, lovastatin, metoprolol,   pantoprazole.    REVIEW OF SYSTEMS:  All other review of systems on a 10-point review except   for that stated in the HPI are negative.    PHYSICAL EXAMINATION:    VITAL SIGNS:  Temperature 98, pulse 90s, blood pressure 120s/50s, saturating   98% on room air.  GENERAL:  Patient is alert and oriented x3, in no apparent distress,   nondiaphoretic, nontoxic appearing.  HEENT:  No scleral icterus.  Normocephalic, atraumatic.  Mucous membranes dry.  CARDIOVASCULAR:  Regular rate.  No murmurs.  EXTREMITIES:  Warm.  No edema.  PULMONARY:  Lungs are clear to auscultation bilaterally.  Normal respiratory   effort.  ABDOMEN:  Patient's abdomen is soft, mildly distended.  She has tenderness to   palpation in the left periumbilical region and lower quadrants without rebound   or guarding.  Stoma in the right lower quadrant is pink, no parastomal   hernias appreciated.  There is dark urine within the stoma bag.  SKIN:  Warm and well perfused.  No petechiae or rashes.  NEUROLOGIC:  Cranial nerves II-XII grossly intact.  Normal sensation and   strength in bilateral upper and lower extremities.    LABORATORY DATA:  White count 15 from 18, hemoglobin 12, hematocrit 39 and   platelets 169.  Sodium 138, potassium 4.3, chloride 102, bicarbonate 26, BUN   41, creatinine 2, calcium 8.3, AST 36, ALT 20, alkaline phosphatase 123.  INR   1.2.  Lactic acid 3.    IMAGING STUDIES:  CT findings diffusely dilated bowel throughout the abdomen,   transition point appears to be in the terminal ileum consistent with   small-bowel obstruction, small amount of fecalization in the terminal ileum.    No renal calculi.  Cirrhosis with portal hypertension.  No pelvic fluid.    ASSESSMENT AND PLAN:  This is an 83-year-old female with history of multiple   abdominal surgeries, small-bowel obstruction most likely related to adhesions,  Patient is currently  nontoxic appearing.  Her abdominal exam is benign.  She is an appropriate candidate for   expected management.  Plan to place an NG tube to low continuous wall   suction.  She is dehydrated.  She has been given 3 liters of fluid in the   emergency department and her electrolytes have been corrected.  History of   atrial fibrillation, not on anticoagulation; history of cirrhosis; chronic   renal insufficiency.  should she clinically   not resolve or worsen in the next 24-48 hours then plan for exploratory surgery.  Plan also is for   urology evaluation in the event that patient should require surgery.       ____________________________________     MD IGNACIO Duron / NTS    DD:  03/14/2019 10:16:45  DT:  03/14/2019 13:35:21    D#:  [id number]  Job#:  [id number]

## 2019-03-14 NOTE — ASSESSMENT & PLAN NOTE
Iron/multivitamin supplementation once GI issues resolved and stable  Monitor Hb / Restrictive transfusion strategy

## 2019-03-14 NOTE — ED NOTES
Received report from mariela, taking over pt care. Pt sitting up ,no distress, no needs at this time. Bed in lowered position, side rails up, call light in reach

## 2019-03-14 NOTE — DISCHARGE PLANNING
Care Transition Team Assessment    Information Source  Orientation : Oriented x 4  Information Given By: Patient  Informant's Name:  (Dorie Wadsworth)  Who is responsible for making decisions for patient? : Patient    Readmission Evaluation  Is this a readmission?: No    Elopement Risk  Legal Hold: No  Ambulatory or Self Mobile in Wheelchair: No-Not an Elopement Risk  Elopement Risk: Not at Risk for Elopement    Interdisciplinary Discharge Planning  Does Admitting Nurse Feel This Could be a Complex Discharge?: No  Primary Care Physician:  (Coreen Cisse MD)  Lives with - Patient's Self Care Capacity: Adult Children  Support Systems: Children  Housing / Facility: [address] [place] [address]  Do You Take your Prescribed Medications Regularly: Yes (Pharmacy: [facility] in [place])  Able to Return to Previous ADL's: Yes  Mobility Issues: No  Patient Expects to be Discharged to::  (Home)  Assistance Needed: No  Durable Medical Equipment: Not Applicable    Discharge Preparedness  What is your plan after discharge?:  (Home)  What are your discharge supports?: Child  Prior Functional Level: Ambulatory  Difficulity with ADLs: None  Difficulity with IADLs: None    Functional Assesment  Prior Functional Level: Ambulatory    Finances  Financial Barriers to Discharge: No  Prescription Coverage: Yes    Vision / Hearing Impairment  Vision Impairment : No  Hearing Impairment : No         Advance Directive  Advance Directive?: POLST    Domestic Abuse  Have you ever been the victim of abuse or violence?: No    Psychological Assessment  History of Substance Abuse: None  History of Psychiatric Problems: No  Newly Diagnosed Illness: No    Discharge Risks or Barriers  Discharge risks or barriers?: No    Anticipated Discharge Information  Anticipated discharge disposition: Home  Discharge Address:  ([address] [place] [address])  Discharge Contact Phone Number:  (Crescencio Armstrong (son) [phone])

## 2019-03-14 NOTE — ED NOTES
Med rec updated and complete and   Allergies reviewed per discussion with pt at bedside.  Pt reports that he doctor stopped the apixaban because it was  Making her stoma bleed.   removed all medications that pt stated she is no longer taking.

## 2019-03-14 NOTE — ASSESSMENT & PLAN NOTE
Improved, March 19, 2019, tolerating clear liquid diet at this time    Continue clear liquid diet  Surgical team following, advancement of diet per surgical team  Any surgical interventions per surgical team  Ambulate  Limit opioids as able  Monitor and stabilize electrolytes  KUB today, interval KUB tomorrow, orders placed by me

## 2019-03-14 NOTE — ED NOTES
Pt sitting up on phone comfortably, no needs at this time, bed in lowered position, side rails up, call light in reach

## 2019-03-14 NOTE — ED NOTES
Pt resting. Emptied patient urine collection bag, urine appeared gelatinous. Sample collected in container for potential urine analysis, prefer fresh sample from collection bag. Pt denies needs at this time.

## 2019-03-14 NOTE — ED NOTES
Pt appears to be resting quietly. Even, easy respirations noted. Does not appear to be distressed, will continue to monitor. Pt state she feels better.

## 2019-03-14 NOTE — H&P
Hospital Medicine History & Physical Note    Date of Service  3/13/2019    Primary Care Physician  Coreen Cisse M.D.    Consultants  General surgery     Code Status  full    Chief Complaint  abd pain n/v     History of Presenting Illness  83 y.o. female who presented 3/13/2019 with Past medical history for atrial fibrillation not on anticoagulation, hypertension hypothyroidism CKD history of cystectomy and has a left ileostomy in place who presents with abdominal pain.  This patient presents with diffuse abdominal pain and last known bowel movement was on Sunday.  She has had intermittent nausea and vomiting multiple episodes throughout yesterday and today.  She has been able to keep anything down.  She has epigastric discomfort as well.  She is also noticed poor output from her ileostomy.  She denies any fevers chills or sweats.  She will be admitted to the hospital with a small bowel obstruction.    Review of Systems  Review of Systems   Constitutional: Negative for chills and fever.   HENT: Negative for congestion, hearing loss and tinnitus.    Eyes: Negative for blurred vision, double vision and discharge.   Respiratory: Negative for cough, hemoptysis and shortness of breath.    Cardiovascular: Negative for chest pain, palpitations and leg swelling.   Gastrointestinal: Positive for abdominal pain, nausea and vomiting. Negative for heartburn.   Genitourinary: Negative for dysuria and flank pain.   Musculoskeletal: Negative for joint pain and myalgias.   Skin: Negative for rash.   Neurological: Positive for weakness. Negative for dizziness, sensory change, speech change and focal weakness.   Endo/Heme/Allergies: Negative for environmental allergies. Does not bruise/bleed easily.   Psychiatric/Behavioral: Negative for depression, hallucinations and substance abuse.       Past Medical History   has a past medical history of A-fib ([facility]); Ailment; Arthritis; Atrial fibrillation ([facility]) (2/2009); Atrial fibrillation,  "rapid ([facility]) (1/31/2014); Back pain; Bladder disease (2011); Bladder problem; CAD (coronary artery disease); Cancer ([facility]); CATARACT; CHEST PAIN (3/11/2011); Glaucoma; History of hematuria; Hyperlipidemia (2/24/2011); Hypertension; Hypothyroid (2/24/2011); Indigestion; Intrinsic eczema (10/31/2017); Other specified pre-operative examination (1/28/2014); Paroxysmal atrial fibrillation ([facility]); Sepsis urinary source (1/23/2012); Stroke ([facility]); T.I.A.; TIA (transient ischemic attack) (3/11/2011); Unspecified disorder of thyroid; Unspecified hemorrhagic conditions; Urinary bladder disorder; UTI (urinary tract infection) (2/24/2011); and Vaginal bleeding (10/22/2012). She also has no past medical history of COPD; Fall; Heart murmur; Psychiatric disorder; or Seizure disorder ([facility]).    Surgical History   has a past surgical history that includes bladder biopsy with cystoscopy (10/10/08); pyelogram (10/10/08); retrogrades (10/10/08); bladder biopsy with cystoscopy (6/15/2009); bladder biopsy with cystoscopy (10/12/2009); other abdominal surgery; cholecystectomy (1994); cholecystectomy (1994); gyn surgery; pelvic exam under anesthesia (10/12/2009); rectocele repair (11/3/2009); other; cystectomy (9/6/2011); ileo loop diversion (9/6/2011); biopsy general (10/22/2012); and parastomal hernia repair  (1/28/2014).     Family History  family history includes Stroke (age of onset: 77) in her father; Stroke (age of onset: 81) in her mother.     Social History   reports that she has never smoked. She has never used smokeless tobacco. She reports that she does not drink alcohol or use drugs.    Allergies  Allergies   Allergen Reactions   • Cardizem Swelling   • Doxycycline      Swollen lips.   • Sulfa Drugs      Makes tongue swell, severely   • Zyvox Swelling     \"Whole mouth swelled up\"    • Codeine      Does not remember the reaction         Medications  Prior to Admission Medications   Prescriptions Last Dose Informant Patient Reported? " Taking?   Bimatoprost (LUMIGAN) 0.01 % SOLN 3/13/2019 at 0800 Patient Yes No   Sig: Place 1 Drop in both eyes every morning. Both eyes   Biotin 10 MG CAPS 3/12/2019 at 0930 Patient Yes No   Sig: Take 1 Cap by mouth every day.   Brimonidine Tartrate-Timolol (COMBIGAN) 0.2-0.5 % Solution 3/13/2019 at 0800 Patient Yes No   Sig: Place 1 Drop in both eyes 2 Times a Day.   Multiple Vitamins-Minerals (CENTRUM SILVER ULTRA WOMENS) Tab 3/12/2019 at 0930 Patient Yes No   Sig: Take 1 Tab by mouth every day.   Multiple Vitamins-Minerals (PRESERVISION AREDS PO) 3/12/2019 at 1730 Patient Yes No   Sig: Take 1 Tab by mouth 2 Times a Day.   levothyroxine (SYNTHROID) 25 MCG Tab 3/12/2019 at 0700 Patient Yes Yes   Sig: Take 25 mcg by mouth Every morning on an empty stomach.   lovastatin (MEVACOR) 10 MG tablet 3/12/2019 at 0930 Patient Yes No   Sig: Take 10 mg by mouth every day.   metoprolol SR (TOPROL XL) 25 MG TABLET SR 24 HR 3/12/2019 at 0930 Patient Yes Yes   Sig: Take 12.5 mg by mouth every day.   pantoprazole (PROTONIX) 40 MG TBEC 3/12/2019 at 0900 Patient Yes No   Sig: Take 40 mg by mouth every day.      Facility-Administered Medications: None       Physical Exam  Temp:  [36.2 °C (97.1 °F)-36.2 °C (97.2 °F)] 36.2 °C (97.2 °F)  Pulse:  [] 92  Resp:  [16-18] 18  BP: (121-153)/(71-82) 153/82  SpO2:  [93 %-95 %] 94 %    Physical Exam   Constitutional: She is oriented to person, place, and time. She appears well-developed and well-nourished. She appears distressed.   HENT:   Head: Normocephalic and atraumatic.   Dry oral mucosa   Eyes: Pupils are equal, round, and reactive to light. Conjunctivae and EOM are normal.   Neck: Normal range of motion. Neck supple. No JVD present.   Cardiovascular: Normal rate, regular rhythm, normal heart sounds and intact distal pulses.    No murmur heard.  Pulmonary/Chest: Effort normal and breath sounds normal. No respiratory distress. She has no wheezes.   Abdominal: Soft. Bowel sounds are  normal. She exhibits no distension. There is tenderness.   Diffuse ttp, ostomy present   Musculoskeletal: Normal range of motion. She exhibits no edema.   Neurological: She is alert and oriented to person, place, and time. She exhibits normal muscle tone.   Skin: Skin is warm and dry.   Psychiatric: She has a normal mood and affect. Her behavior is normal. Judgment and thought content normal.   Nursing note and vitals reviewed.      Laboratory:  Recent Labs      03/13/19   1751   WBC  18.6*   RBC  5.47*   HEMOGLOBIN  13.5   HEMATOCRIT  43.8   MCV  80.1*   MCH  24.7*   MCHC  30.8*   RDW  47.3   PLATELETCT  195   MPV  11.3     Recent Labs      03/13/19   1751   SODIUM  136   POTASSIUM  4.4   CHLORIDE  98   CO2  24   GLUCOSE  113*   BUN  34*   CREATININE  2.12*   CALCIUM  9.7     Recent Labs      03/13/19   1751   ALTSGPT  26   ASTSGOT  46*   ALKPHOSPHAT  153*   TBILIRUBIN  0.8   LIPASE  32   GLUCOSE  113*                 Recent Labs      03/13/19   2044   TROPONINI  <0.01       Urinalysis:    No results found     Imaging:  CT-RENAL COLIC EVALUATION(A/P W/O)   Final Result      1.  High-grade distal small bowel obstruction      2.  Cirrhosis with associated portal hypertension      3.  Right lower quadrant ostomy present.      4.  Abdominal aortic atherosclerotic plaque      5.  Prior hysterectomy         No renal or ureteral calculus or obstruction.            Assessment/Plan:  I anticipate this patient will require at least two midnights for appropriate medical management, necessitating inpatient admission.    * Small bowel obstruction (HCC)   Assessment & Plan    High grade distal small bowel obstruction in high risk patient with multiple abdominal surgeries   NPO for now   IVF   NG tube LIS ordered  Pain control, anti emetics  General surgery consulted by ERP      SIRS (systemic inflammatory response syndrome) (HCC)   Assessment & Plan    Suspect this is reactive from nausea and vomiting   Given ceftriaxone and  metronidazole in ER   No source of infection at this point, will check UA  Sepsis not present on admission      Lactic acidosis   Assessment & Plan    Not due to sepsis likely dehydration   Will give IVF and repeat lactic      Cirrhosis (HCC)   Assessment & Plan    Unclear etiology of cirrhosis noted on CT scan  Will check RUQ ultrasound   Hepatitis panel   Denies alcohol abuse, will check ggt   Normal platelets, bilirubin   Will check INR      MARSHALL (acute kidney injury) (HCC)   Assessment & Plan    Pre renal from volume loss due to nausea and vomiting   Continue with IVF   Repeat renal function in am   Monitor I/o  Consider urine lytes, renal ultrasound and nephrology consult if no improvement     HTN (hypertension)- (present on admission)   Assessment & Plan    Resume home anti hypertensives when able      Leukocytosis- (present on admission)   Assessment & Plan    I suspect this is likely reactive from acute nausea and vomiting   Will rehydrate with IVF, repeat cbc in am   Check procal although patient does have renal dysfunction doubt useful   Check UA likely chronic infection and patient asymptmatic   Will hold on abx for now     Persistent atrial fibrillation (HCC)- (present on admission)   Assessment & Plan    Hx of, off of Anticoag due to excessive bleeding from stoma   Cont bb for now      Acquired hypothyroidism- (present on admission)   Assessment & Plan    Resume home levothyroxine when able         VTE prophylaxis: scd

## 2019-03-14 NOTE — ASSESSMENT & PLAN NOTE
CHADVASC score 6 (history of TIA, no history of stroke)  Used to be on Eliquis which was stopped in August last year due to bleeding from trauma.  However, had a visit with her cardiologist in October 2018 and had a refill on her Eliquis.  Currently with BRUNO JAUREGUI.  Unable to give oral medications for rate control.  Metoprolol 5 mg IV every 5 minutes for heart rate of 110 bpm and higher per blood pressure parameters for now.  Continue with heparin drip for now.  If no bleeding from her stroma noted, will discharge patient on her home dose Eliquis.

## 2019-03-14 NOTE — PROGRESS NOTES
Pt arrived to the floor, A&O x's 4, VSS, pain is a 7/10 but denies any intervention. NGT to L nare with minimal output to LIS, denies any nausea or vomiting. Urostomy to RLQ with odorous, sedimented UO. Pt is on RA, denies any SOB or chest pain. POC discussed with pt, all questions and concerns have been addressed. Call light and personal items within reach, yellow non-skid socks, bed alarm and fall risk bracelet on. Bed in low/locked position.     2 RN skin check complete. IV infiltration noted to R AC, swelling and redness noted, hot pack applied. Urostomy to RLQ, all other skin intact. No indication of skin breakdown or pressure injuries.

## 2019-03-14 NOTE — ED PROVIDER NOTES
"ED Provider Note    CHIEF COMPLAINT  Chief Complaint   Patient presents with   • N/V     went to PCP for check up on monday feeling fine. that night she vomited \"buckets full\" pt has had N/V for 3 days now. denies diarrhea or fever    • Constipation     3 days    • Abdominal Pain       HPI  Dorie Wadsworth is a 83 y.o. female here for evaluation of nausea, vomiting, and abdominal pain.  The patient states that she has a history of constipation, but that over the last 4 days, she has had increasing vomiting and abdominal pain.  She has been unable to keep anything down.  She states that the pain is in the epigastrium and left upper quadrant, radiating down to the left lower quadrant.  She has no back pain, and no leg pain.  Patient is here with her son.  She was seen and evaluated at the family practice office earlier today, was told to come to the ER for further evaluation.  Nothing alleviates or exacerbates her symptoms, and describes her pain as aching and intermittently sharp.    PAST MEDICAL HISTORY   has a past medical history of A-fib ([facility]); Ailment; Arthritis; Atrial fibrillation ([facility]) (2/2009); Atrial fibrillation, rapid ([facility]) (1/31/2014); Back pain; Bladder disease (2011); Bladder problem; CAD (coronary artery disease); Cancer ([facility]); CATARACT; CHEST PAIN (3/11/2011); Glaucoma; History of hematuria; Hyperlipidemia (2/24/2011); Hypertension; Hypothyroid (2/24/2011); Indigestion; Intrinsic eczema (10/31/2017); Other specified pre-operative examination (1/28/2014); Paroxysmal atrial fibrillation ([facility]); Sepsis urinary source (1/23/2012); Stroke ([facility]); T.I.A.; TIA (transient ischemic attack) (3/11/2011); Unspecified disorder of thyroid; Unspecified hemorrhagic conditions; Urinary bladder disorder; UTI (urinary tract infection) (2/24/2011); and Vaginal bleeding (10/22/2012).    SOCIAL HISTORY  Social History     Social History Main Topics   • Smoking status: Never Smoker   • Smokeless tobacco: Never Used " "  • Alcohol use No   • Drug use: No   • Sexual activity: Not on file       Family History  No bleeding disorders    SURGICAL HISTORY   has a past surgical history that includes bladder biopsy with cystoscopy (10/10/08); pyelogram (10/10/08); retrogrades (10/10/08); bladder biopsy with cystoscopy (6/15/2009); bladder biopsy with cystoscopy (10/12/2009); other abdominal surgery; cholecystectomy (1994); cholecystectomy (1994); gyn surgery; pelvic exam under anesthesia (10/12/2009); rectocele repair (11/3/2009); other; cystectomy (9/6/2011); ileo loop diversion (9/6/2011); biopsy general (10/22/2012); and parastomal hernia repair (1/28/2014).    CURRENT MEDICATIONS  Home Medications    **Home medications have not yet been reviewed for this encounter**         ALLERGIES  Allergies   Allergen Reactions   • Cardizem Swelling   • Doxycycline      Swollen lips.   • Sulfa Drugs      Makes tongue swell, severely   • Zyvox Swelling     \"Whole mouth swelled up\"    • Codeine      Does not remember the reaction         REVIEW OF SYSTEMS  See HPI for further details. Review of systems as above, otherwise all other systems are negative.     PHYSICAL EXAM  Constitutional: Well developed, well nourished. No acute distress.  HEENT: Normocephalic, atraumatic. Posterior pharynx clear and moist.  Eyes:  EOMI. Normal sclera.  Neck: Supple, Full range of motion, nontender.  Chest/Pulmonary: clear to ausculation. Symmetrical expansion.   Cardio: Regular rate and rhythm with no murmur.   Abdomen: Soft, diffuse tenderness, increase in epigastrium and left upper quadrant.  No peritoneal signs. No guarding. No palpable masses.  Musculoskeletal: No deformity, no edema, neurovascular intact.   Neuro: Clear speech, appropriate, cooperative, cranial nerves II-XII grossly intact.  Psych: Normal mood and affect    Results for orders placed or performed during the hospital encounter of 03/13/19   CBC WITH DIFFERENTIAL   Result Value Ref Range    WBC " 18.6 (H) 4.8 - 10.8 K/uL    RBC 5.47 (H) 4.20 - 5.40 M/uL    Hemoglobin 13.5 12.0 - 16.0 g/dL    Hematocrit 43.8 37.0 - 47.0 %    MCV 80.1 (L) 81.4 - 97.8 fL    MCH 24.7 (L) 27.0 - 33.0 pg    MCHC 30.8 (L) 33.6 - 35.0 g/dL    RDW 47.3 35.9 - 50.0 fL    Platelet Count 195 164 - 446 K/uL    MPV 11.3 9.0 - 12.9 fL    Neutrophils-Polys 71.20 44.00 - 72.00 %    Lymphocytes 15.90 (L) 22.00 - 41.00 %    Monocytes 11.80 0.00 - 13.40 %    Eosinophils 0.20 0.00 - 6.90 %    Basophils 0.50 0.00 - 1.80 %    Immature Granulocytes 0.40 0.00 - 0.90 %    Nucleated RBC 0.00 /100 WBC    Neutrophils (Absolute) 13.24 (H) 2.00 - 7.15 K/uL    Lymphs (Absolute) 2.95 1.00 - 4.80 K/uL    Monos (Absolute) 2.19 (H) 0.00 - 0.85 K/uL    Eos (Absolute) 0.03 0.00 - 0.51 K/uL    Baso (Absolute) 0.10 0.00 - 0.12 K/uL    Immature Granulocytes (abs) 0.07 0.00 - 0.11 K/uL    NRBC (Absolute) 0.00 K/uL   COMP METABOLIC PANEL   Result Value Ref Range    Sodium 136 135 - 145 mmol/L    Potassium 4.4 3.6 - 5.5 mmol/L    Chloride 98 96 - 112 mmol/L    Co2 24 20 - 33 mmol/L    Anion Gap 14.0 (H) 0.0 - 11.9    Glucose 113 (H) 65 - 99 mg/dL    Bun 34 (H) 8 - 22 mg/dL    Creatinine 2.12 (H) 0.50 - 1.40 mg/dL    Calcium 9.7 8.5 - 10.5 mg/dL    AST(SGOT) 46 (H) 12 - 45 U/L    ALT(SGPT) 26 2 - 50 U/L    Alkaline Phosphatase 153 (H) 30 - 99 U/L    Total Bilirubin 0.8 0.1 - 1.5 mg/dL    Albumin 4.0 3.2 - 4.9 g/dL    Total Protein 8.6 (H) 6.0 - 8.2 g/dL    Globulin 4.6 (H) 1.9 - 3.5 g/dL    A-G Ratio 0.9 g/dL   LIPASE   Result Value Ref Range    Lipase 32 11 - 82 U/L   ESTIMATED GFR   Result Value Ref Range    GFR If  27 (A) >60 mL/min/1.73 m 2    GFR If Non African American 22 (A) >60 mL/min/1.73 m 2   TROPONIN   Result Value Ref Range    Troponin I <0.01 0.00 - 0.04 ng/mL   LACTIC ACID   Result Value Ref Range    Lactic Acid 3.0 (H) 0.5 - 2.0 mmol/L     CT-RENAL COLIC EVALUATION(A/P W/O)   Final Result      1.  High-grade distal small bowel  obstruction      2.  Cirrhosis with associated portal hypertension      3.  Right lower quadrant ostomy present.      4.  Abdominal aortic atherosclerotic plaque      5.  Prior hysterectomy         No renal or ureteral calculus or obstruction.            PROCEDURES     MEDICAL RECORD  I have reviewed patient's medical record and pertinent results are listed above.    COURSE & MEDICAL DECISION MAKING  I have reviewed any medical record information, laboratory studies and radiographic results as noted above.    Dr. Samayoa will admit the pt.     10:47 PM  Dr. Goyal will see as consult.   Pt will be kept npo, and has fluids and iv abx going now.     CRITICAL CARE  The very real possibility of a deterioration of this patient's condition required the highest level of my preparedness for sudden, emergent intervention.  I provided critical care services, which included medication orders, frequent reevaluations of the patient's condition and response to treatment, ordering and reviewing test results, and discussing the case with various consultants.  The critical care time associated with the care of the patient was 46 minutes. Review chart for interventions. This time is exclusive of any other billable procedures.         FINAL IMPRESSION  Small bowel obstruction  Sepsis   Critical care time 46 minutes.    Electronically signed by: Alan Horner, 3/13/2019 9:08 PM

## 2019-03-14 NOTE — PROGRESS NOTES
Attending Hospitalist today is Dr Womack starting at 0700. Please call this Physician for orders, updates and questions.

## 2019-03-15 ENCOUNTER — APPOINTMENT (OUTPATIENT)
Dept: CARDIOLOGY | Facility: MEDICAL CENTER | Age: 84
DRG: 389 | End: 2019-03-15
Attending: FAMILY MEDICINE
Payer: MEDICARE

## 2019-03-15 ENCOUNTER — APPOINTMENT (OUTPATIENT)
Dept: RADIOLOGY | Facility: MEDICAL CENTER | Age: 84
DRG: 389 | End: 2019-03-15
Attending: FAMILY MEDICINE
Payer: MEDICARE

## 2019-03-15 PROBLEM — R79.89 ELEVATED TROPONIN: Status: ACTIVE | Noted: 2019-03-15

## 2019-03-15 PROBLEM — I48.91 ATRIAL FIBRILLATION WITH RVR (HCC): Status: ACTIVE | Noted: 2019-03-15

## 2019-03-15 LAB
ALBUMIN SERPL BCP-MCNC: 3 G/DL (ref 3.2–4.9)
ALBUMIN/GLOB SERPL: 0.9 G/DL
ALP SERPL-CCNC: 106 U/L (ref 30–99)
ALT SERPL-CCNC: 14 U/L (ref 2–50)
ANION GAP SERPL CALC-SCNC: 8 MMOL/L (ref 0–11.9)
APTT PPP: 31.4 SEC (ref 24.7–36)
AST SERPL-CCNC: 22 U/L (ref 12–45)
BASOPHILS # BLD AUTO: 0.6 % (ref 0–1.8)
BASOPHILS # BLD: 0.06 K/UL (ref 0–0.12)
BILIRUB SERPL-MCNC: 0.5 MG/DL (ref 0.1–1.5)
BUN SERPL-MCNC: 44 MG/DL (ref 8–22)
CALCIUM SERPL-MCNC: 8 MG/DL (ref 8.5–10.5)
CHLORIDE SERPL-SCNC: 108 MMOL/L (ref 96–112)
CO2 SERPL-SCNC: 23 MMOL/L (ref 20–33)
CREAT SERPL-MCNC: 1.32 MG/DL (ref 0.5–1.4)
EKG IMPRESSION: NORMAL
EOSINOPHIL # BLD AUTO: 0.14 K/UL (ref 0–0.51)
EOSINOPHIL NFR BLD: 1.3 % (ref 0–6.9)
ERYTHROCYTE [DISTWIDTH] IN BLOOD BY AUTOMATED COUNT: 50.4 FL (ref 35.9–50)
GLOBULIN SER CALC-MCNC: 3.5 G/DL (ref 1.9–3.5)
GLUCOSE SERPL-MCNC: 85 MG/DL (ref 65–99)
HCT VFR BLD AUTO: 36.5 % (ref 37–47)
HGB BLD-MCNC: 11.1 G/DL (ref 12–16)
IMM GRANULOCYTES # BLD AUTO: 0.03 K/UL (ref 0–0.11)
IMM GRANULOCYTES NFR BLD AUTO: 0.3 % (ref 0–0.9)
LV EJECT FRACT MOD 2C 99903: 53.24
LV EJECT FRACT MOD 4C 99902: 67.37
LV EJECT FRACT MOD BP 99901: 60.61
LYMPHOCYTES # BLD AUTO: 2.56 K/UL (ref 1–4.8)
LYMPHOCYTES NFR BLD: 24.6 % (ref 22–41)
MCH RBC QN AUTO: 25.1 PG (ref 27–33)
MCHC RBC AUTO-ENTMCNC: 30.4 G/DL (ref 33.6–35)
MCV RBC AUTO: 82.4 FL (ref 81.4–97.8)
MONOCYTES # BLD AUTO: 2.01 K/UL (ref 0–0.85)
MONOCYTES NFR BLD AUTO: 19.3 % (ref 0–13.4)
NEUTROPHILS # BLD AUTO: 5.61 K/UL (ref 2–7.15)
NEUTROPHILS NFR BLD: 53.9 % (ref 44–72)
NRBC # BLD AUTO: 0 K/UL
NRBC BLD-RTO: 0 /100 WBC
PLATELET # BLD AUTO: 150 K/UL (ref 164–446)
PMV BLD AUTO: 11.9 FL (ref 9–12.9)
POTASSIUM SERPL-SCNC: 3.9 MMOL/L (ref 3.6–5.5)
PROT SERPL-MCNC: 6.5 G/DL (ref 6–8.2)
RBC # BLD AUTO: 4.43 M/UL (ref 4.2–5.4)
SODIUM SERPL-SCNC: 139 MMOL/L (ref 135–145)
TROPONIN I SERPL-MCNC: 0.05 NG/ML (ref 0–0.04)
TROPONIN I SERPL-MCNC: 0.08 NG/ML (ref 0–0.04)
TROPONIN I SERPL-MCNC: 0.1 NG/ML (ref 0–0.04)
TROPONIN I SERPL-MCNC: 0.12 NG/ML (ref 0–0.04)
TSH SERPL DL<=0.005 MIU/L-ACNC: 0.64 UIU/ML (ref 0.38–5.33)
WBC # BLD AUTO: 10.4 K/UL (ref 4.8–10.8)

## 2019-03-15 PROCEDURE — 700101 HCHG RX REV CODE 250: Performed by: FAMILY MEDICINE

## 2019-03-15 PROCEDURE — 36415 COLL VENOUS BLD VENIPUNCTURE: CPT

## 2019-03-15 PROCEDURE — 85730 THROMBOPLASTIN TIME PARTIAL: CPT

## 2019-03-15 PROCEDURE — 93306 TTE W/DOPPLER COMPLETE: CPT

## 2019-03-15 PROCEDURE — 700111 HCHG RX REV CODE 636 W/ 250 OVERRIDE (IP): Performed by: FAMILY MEDICINE

## 2019-03-15 PROCEDURE — 93306 TTE W/DOPPLER COMPLETE: CPT | Mod: 26 | Performed by: INTERNAL MEDICINE

## 2019-03-15 PROCEDURE — A9270 NON-COVERED ITEM OR SERVICE: HCPCS | Performed by: HOSPITALIST

## 2019-03-15 PROCEDURE — 99233 SBSQ HOSP IP/OBS HIGH 50: CPT | Performed by: FAMILY MEDICINE

## 2019-03-15 PROCEDURE — 700102 HCHG RX REV CODE 250 W/ 637 OVERRIDE(OP): Performed by: HOSPITALIST

## 2019-03-15 PROCEDURE — 700105 HCHG RX REV CODE 258: Performed by: HOSPITALIST

## 2019-03-15 PROCEDURE — 93010 ELECTROCARDIOGRAM REPORT: CPT | Performed by: INTERNAL MEDICINE

## 2019-03-15 PROCEDURE — 71045 X-RAY EXAM CHEST 1 VIEW: CPT

## 2019-03-15 PROCEDURE — 93005 ELECTROCARDIOGRAM TRACING: CPT | Performed by: FAMILY MEDICINE

## 2019-03-15 PROCEDURE — 84484 ASSAY OF TROPONIN QUANT: CPT

## 2019-03-15 PROCEDURE — 770020 HCHG ROOM/CARE - TELE (206)

## 2019-03-15 PROCEDURE — 84443 ASSAY THYROID STIM HORMONE: CPT

## 2019-03-15 PROCEDURE — 700111 HCHG RX REV CODE 636 W/ 250 OVERRIDE (IP): Performed by: HOSPITALIST

## 2019-03-15 PROCEDURE — 85025 COMPLETE CBC W/AUTO DIFF WBC: CPT

## 2019-03-15 PROCEDURE — 80053 COMPREHEN METABOLIC PANEL: CPT

## 2019-03-15 RX ORDER — METOPROLOL TARTRATE 1 MG/ML
5 INJECTION, SOLUTION INTRAVENOUS
Status: DISCONTINUED | OUTPATIENT
Start: 2019-03-15 | End: 2019-03-16

## 2019-03-15 RX ORDER — HEPARIN SODIUM 1000 [USP'U]/ML
3200 INJECTION, SOLUTION INTRAVENOUS; SUBCUTANEOUS PRN
Status: DISCONTINUED | OUTPATIENT
Start: 2019-03-15 | End: 2019-03-16

## 2019-03-15 RX ORDER — LABETALOL HYDROCHLORIDE 5 MG/ML
10 INJECTION, SOLUTION INTRAVENOUS ONCE
Status: COMPLETED | OUTPATIENT
Start: 2019-03-15 | End: 2019-03-15

## 2019-03-15 RX ORDER — HEPARIN SODIUM 1000 [USP'U]/ML
6000 INJECTION, SOLUTION INTRAVENOUS; SUBCUTANEOUS ONCE
Status: COMPLETED | OUTPATIENT
Start: 2019-03-15 | End: 2019-03-15

## 2019-03-15 RX ADMIN — LABETALOL HYDROCHLORIDE 10 MG: 5 INJECTION, SOLUTION INTRAVENOUS at 12:57

## 2019-03-15 RX ADMIN — SODIUM CHLORIDE: 9 INJECTION, SOLUTION INTRAVENOUS at 00:08

## 2019-03-15 RX ADMIN — HEPARIN SODIUM 1200 UNITS/HR: 5000 INJECTION, SOLUTION INTRAVENOUS at 14:19

## 2019-03-15 RX ADMIN — LEVOTHYROXINE SODIUM ANHYDROUS 13 MCG: 100 INJECTION, POWDER, LYOPHILIZED, FOR SOLUTION INTRAVENOUS at 17:08

## 2019-03-15 RX ADMIN — SODIUM CHLORIDE: 9 INJECTION, SOLUTION INTRAVENOUS at 10:51

## 2019-03-15 RX ADMIN — HEPARIN SODIUM 5000 UNITS: 5000 INJECTION, SOLUTION INTRAVENOUS; SUBCUTANEOUS at 06:22

## 2019-03-15 RX ADMIN — HEPARIN SODIUM 6000 UNITS: 1000 INJECTION, SOLUTION INTRAVENOUS; SUBCUTANEOUS at 14:19

## 2019-03-15 RX ADMIN — HEPARIN SODIUM 25000 UNITS: 5000 INJECTION, SOLUTION INTRAVENOUS at 17:53

## 2019-03-15 RX ADMIN — METOPROLOL SUCCINATE 12.5 MG: 25 TABLET, EXTENDED RELEASE ORAL at 10:46

## 2019-03-15 RX ADMIN — SODIUM CHLORIDE: 9 INJECTION, SOLUTION INTRAVENOUS at 17:15

## 2019-03-15 RX ADMIN — MORPHINE SULFATE 4 MG: 4 INJECTION INTRAVENOUS at 20:30

## 2019-03-15 RX ADMIN — MORPHINE SULFATE 4 MG: 4 INJECTION INTRAVENOUS at 12:38

## 2019-03-15 ASSESSMENT — ENCOUNTER SYMPTOMS
DIZZINESS: 0
VOMITING: 1
CHILLS: 0
NECK PAIN: 0
PALPITATIONS: 0
TINGLING: 0
COUGH: 0
HEARTBURN: 0
HEMOPTYSIS: 0
MYALGIAS: 0
ABDOMINAL PAIN: 1
HEADACHES: 0
BLURRED VISION: 0
NAUSEA: 1
FEVER: 0
EYE PAIN: 0
DOUBLE VISION: 0
PHOTOPHOBIA: 0
BACK PAIN: 0
DEPRESSION: 0

## 2019-03-15 ASSESSMENT — LIFESTYLE VARIABLES: SUBSTANCE_ABUSE: 0

## 2019-03-15 NOTE — PROGRESS NOTES
Patient called RN stating she still is having chest pressure and now is having difficulty breathing. Lungs are now wheezy and with crackles.  CIC Rapid RN notified. Patient in apparent distress. 2 L nasal cannula placed and patient stating breathing is easier, but chest pain is still significant. IV morphine given to help with this. MD has been notified and is placing orders. Son Joni notified of patient condition. Patient will transfer to [place] [id number]. Patient relieved and thankful.

## 2019-03-15 NOTE — ASSESSMENT & PLAN NOTE
Paroxysmal  Amiodarone drip  Start Metoprolol  IV metoprolol prn  Hold anticoagulation - bleeding ostomy site

## 2019-03-15 NOTE — PROGRESS NOTES
Patient called for RN complaining for chest tightness moving towards the right chest.   VS were taken, noting elevated BP and tachycardia. Patient has recent history of a fib and a fib rhythym was noted on the heart monitor.   Stat ecg and troponin levels were ordered along with visit from two rapid response nurses. No rapid was called for the patient at this time.   Patient continued to complain of chest tightness, but not getting any worse. NG tube suction was stopped and metoprolol was given for heart.  was notified of lab results.   As of now, patient is steady with call light in reach.

## 2019-03-15 NOTE — PROGRESS NOTES
Progress Note    Author:  Paola Goyal MD    Date & Time:   3/15/2019   9:09 AM          Patient ID:             Name:             Dorie Wadsworth   YOB: 1935  Age:                 83 y.o.  female   MRN:               [id number]    ________________________________________________________________________      Interval Events:         Patient reports she is feeling much better since NGT placed yesterday  Total 1.3L bilous output  She no long feels distended, she still has some pain in her LLQ much pain is minimal    No flatus or bms  She is up to chair       Exam:       SpO2  Min: 90 %  Max: 99 %  O2 (LPM)  Min: 0  Max: 0  NIBP  Min: 116/44  Max: 153/71  Heart Rate (Monitored)  Min: 86  Max: 104  Temp  Min: 35.9 °C (96.7 °F)  Max: 36.9 °C (98.5 °F)    Intake/Output Summary (Last 24 hours) at 03/15/19 0909  Last data filed at 03/15/19 0500   Gross per 24 hour   Intake             1625 ml   Output             1725 ml   Net             -100 ml       DIET ORDERS (Through next 24h)    Start     Ordered    03/13/19 2245  Diet NPO  ALL MEALS     Question:  Restrict to:  Answer:  Strict    03/13/19 2245            Physical Exam  GENERAL:  Alert and oriented x 3. NAD, non-diaphoretic  Lungs: Clear to auscultation bilaterally.  Normal respiratory effort  Abd: Soft, Non-tender, non-distended.     Recent Labs      03/13/19   1751  03/14/19   0237  03/15/19   0320   SODIUM  136  138  139   POTASSIUM  4.4  4.3  3.9   CHLORIDE  98  102  108   CO2  24  26  23   BUN  34*  41*  44*   CREATININE  2.12*  2.01*  1.32   CALCIUM  9.7  8.3*  8.0*       Recent Labs      03/13/19   1751  03/14/19   0237  03/15/19   0320   ALTSGPT  26  20  14   ASTSGOT  46*  36  22   ALKPHOSPHAT  153*  123*  106*   TBILIRUBIN  0.8  0.5  0.5   LIPASE  32   --    --    GLUCOSE  113*  106*  85       Recent Labs      03/13/19   1751  03/13/19   2335  03/14/19   0521  03/15/19   0320   RBC  5.47*   --   4.99  4.43   HEMOGLOBIN  13.5   --   12.6   11.1*   HEMATOCRIT  43.8   --   39.7  36.5*   PLATELETCT  195   --   169  150*   PROTHROMBTM   --   15.6*   --    --    INR   --   1.22*   --    --        Recent Labs      03/13/19   1751  03/14/19   0237  03/14/19   0521  03/15/19   0320   WBC  18.6*   --   15.5*  10.4   NEUTSPOLYS  71.20   --   70.20  53.90   LYMPHOCYTES  15.90*   --   14.40*  24.60   MONOCYTES  11.80   --   14.60*  19.30*   EOSINOPHILS  0.20   --   0.20  1.30   BASOPHILS  0.50   --   0.30  0.60   ASTSGOT  46*  36   --   22   ALTSGPT  26  20   --   14   ALKPHOSPHAT  153*  123*   --   106*   TBILIRUBIN  0.8  0.5   --   0.5         ________________________________________________________________________      Patient Active Problem List   Diagnosis   • UTI (urinary tract infection)   • Acquired hypothyroidism   • Hypertension   • Dyslipidemia   • Pyelonephritis   • TIA (transient ischemic attack)   • Persistent atrial fibrillation (HCC)   • Vaginal bleeding   • History of hematuria   • MRSA carrier   • 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   • Anticoagulant long-term use   • Enteritis   • Urinary tract infection   • Leukocytosis   • HTN (hypertension)   • Long term (current) use of anticoagulants [Z79.01]   • Seborrheic keratoses   • Intrinsic eczema   • Microcytic anemia   • CKD (chronic kidney disease), stage II   • Hypophosphatemia   • Small bowel obstruction (HCC)   • MARSHALL (acute kidney injury) (HCC)   • Cirrhosis (HCC)   • Lactic acidosis   • SIRS (systemic inflammatory response syndrome) (HCC)   • Ileostomy in place (HCC)       Plan:  Abdominal exam benign, patient appears comfortable  Labs have normalized after fluid resuscitation    CPM for now. NGT, monitor electrolytes

## 2019-03-15 NOTE — CARE PLAN
Problem: Bowel/Gastric:  Goal: Normal bowel function is maintained or improved  Patient not passing gas at this time. NGT to suction.    Problem: Pain Management  Goal: Pain level will decrease to patient's comfort goal  Patient requesting pain medications as needed.

## 2019-03-15 NOTE — PROGRESS NOTES
Received report from night nurse. Assessed patient after report, patient complained of painful right wrist IV and pain in her ears. Pt has an NG tube in left nare, was clamped off during morning assessment. Pt has urostomy in RLQ, denies any pain, N/V. Discussed and pt understands plan of care for the day. Call light and personal belongings are in reach. Patient's bed is in low and locked position with call light in reach.     Pt has a goal today of getting her right wrist IV out and to have her ears looked at to see why they are causing her such pain.

## 2019-03-15 NOTE — CARE PLAN
Problem: Venous Thromboembolism (VTW)/Deep Vein Thrombosis (DVT) Prevention:  Goal: Patient will participate in Venous Thrombosis (VTE)/Deep Vein Thrombosis (DVT)Prevention Measures    Intervention: Encourage ambulation/mobilization at level directed by Physical Therapy in collaboration with Interdisciplinary Team  Patient encouraged to ambulate on unit.      Problem: Pain Management  Goal: Pain level will decrease to patient's comfort goal  Outcome: PROGRESSING AS EXPECTED  Patient medicated with PRN pain medication as ordered.

## 2019-03-15 NOTE — PROGRESS NOTES
· 2 RN skin check complete with SAI Morgan.  · Devices in place NG tube to left nare, silicone oxygen tubing in place.  · Skin assessed under devices all skin intact..  · Confirmed pressure ulcers found on N/A.  · New potential pressure ulcers noted on N/A. Wound consult placed and wound reported.  · The following interventions in place patient encouraged to turn frequently.   · Left heel red and boggy and blanching from patients foot on edge of bed, patient repositioned.   · Light healing bruise to low back noted.  · All other skin intact.

## 2019-03-15 NOTE — PROGRESS NOTES
Monitor room aware of new patient to room, HR upon arrive to unit patient Afib 130. Patient now NSR HR 91. Patient converted at 1353.

## 2019-03-15 NOTE — PROGRESS NOTES
[facility] Medicine Daily Progress Note    Date of Service  3/15/2019    Chief Complaint  83 y.o. female admitted 3/13/2019 with abdominal pain, nausea, and vomiting.    Hospital Course  This is 83 years old female who has extensive abdominal surgical history including cystectomy, ileal conduit, urethrectomy and bowel suspension comes in with abdominal pain associated with nausea and vomiting.  Found to have distal high-grade small bowel obstruction.  NG tube was placed and connected to continue suctioning.  General surgery consulted.  Interval Problem Update  120 cc output reported.  No vomiting since this morning.  Abdominal pain fairly controlled.  No acute distress noted.  3/15: Almost 1300 cc output from the NG tube.  No vomiting.  Did not pass flatus.  No bowel movement.  Was noted to have heart rates in the 140-150s.  Found to be in A. fib RVR.  Was transferred to telemetry for close monitoring.  Consultants/Specialty  General surgery    Code Status  Full code    Disposition  TBD    Review of Systems  Review of Systems   Constitutional: Positive for malaise/fatigue. Negative for chills and fever.   HENT: Negative for ear discharge, ear pain, hearing loss and tinnitus.    Eyes: Negative for blurred vision, double vision, photophobia and pain.   Respiratory: Negative for cough and hemoptysis.    Cardiovascular: Negative for chest pain and palpitations.   Gastrointestinal: Positive for abdominal pain (Improved ), nausea (Improved ) and vomiting. Negative for heartburn.   Genitourinary: Negative for dysuria and urgency.   Musculoskeletal: Negative for back pain, myalgias and neck pain.   Skin: Negative for rash.   Neurological: Negative for dizziness, tingling and headaches.   Psychiatric/Behavioral: Negative for depression, substance abuse and suicidal ideas.        Physical Exam  Temp:  [35.9 °C (96.7 °F)-36.9 °C (98.5 °F)] 36.7 °C (98.1 °F)  Pulse:  [] 93  Resp:  [16-17] 17  BP: (106-136)/(62-85)  106/62  SpO2:  [92 %-94 %] 92 %    Physical Exam   Constitutional: She is oriented to person, place, and time. No distress.   HENT:   Head: Normocephalic and atraumatic.   Mouth/Throat: No oropharyngeal exudate.   NG tube in place.   Eyes: Pupils are equal, round, and reactive to light. Right eye exhibits no discharge. Left eye exhibits no discharge.   Neck: Normal range of motion. No tracheal deviation present. No thyromegaly present.   Cardiovascular: An irregularly irregular rhythm present. Tachycardia present.  Exam reveals no gallop and no friction rub.    Pulmonary/Chest: Effort normal. No respiratory distress.   Abdominal: Soft. She exhibits distension. Bowel sounds are decreased. There is tenderness (Mild TTP on periumbilical and lower abdomen area). There is no rebound.   Colostomy bag in place.   Musculoskeletal: She exhibits no tenderness or deformity.   Neurological: She is alert and oriented to person, place, and time.   Skin: Skin is warm and dry. She is not diaphoretic. No erythema.   Psychiatric: She has a normal mood and affect.       Fluids    Intake/Output Summary (Last 24 hours) at 03/15/19 1610  Last data filed at 03/15/19 1200   Gross per 24 hour   Intake             2625 ml   Output             1675 ml   Net              950 ml       Laboratory  Recent Labs      03/13/19   1751  03/14/19   0521  03/15/19   0320   WBC  18.6*  15.5*  10.4   RBC  5.47*  4.99  4.43   HEMOGLOBIN  13.5  12.6  11.1*   HEMATOCRIT  43.8  39.7  36.5*   MCV  80.1*  79.6*  82.4   MCH  24.7*  25.3*  25.1*   MCHC  30.8*  31.7*  30.4*   RDW  47.3  46.8  50.4*   PLATELETCT  195  169  150*   MPV  11.3  12.3  11.9     Recent Labs      03/13/19   1751  03/14/19   0237  03/15/19   0320   SODIUM  136  138  139   POTASSIUM  4.4  4.3  3.9   CHLORIDE  98  102  108   CO2  24  26  23   GLUCOSE  113*  106*  85   BUN  34*  41*  44*   CREATININE  2.12*  2.01*  1.32   CALCIUM  9.7  8.3*  8.0*     Recent Labs      03/13/19   5815   03/15/19   1312   APTT   --   31.4   INR  1.22*   --                Imaging  DX-CHEST-LIMITED (1 VIEW)   Final Result      1.  Hypoinflation with mild bibasilar atelectasis.   2.  Supportive tubing as described above.      US-RUQ   Final Result      1.  Prior cholecystectomy. No biliary dilation.      2.  Cirrhosis      3.  No other finding      CT-RENAL COLIC EVALUATION(A/P W/O)   Final Result      1.  High-grade distal small bowel obstruction      2.  Cirrhosis with associated portal hypertension      3.  Right lower quadrant ostomy present.      4.  Abdominal aortic atherosclerotic plaque      5.  Prior hysterectomy         No renal or ureteral calculus or obstruction.      EC-ECHOCARDIOGRAM COMPLETE W/O CONT    (Results Pending)        Assessment/Plan  * Small bowel obstruction (HCC)   Assessment & Plan    High grade distal small bowel obstruction in high risk patient with multiple abdominal surgeries   Bowel rest.  Continue with NG tube with suctioning.  IV fluids.  General surgery Dr. Goyal following.     Elevated troponin- (present on admission)   Assessment & Plan    Likely demand ischemia.  EKG with no significant changes ST/T waves compared to EKG in August 2018.  Trend.     Atrial fibrillation with RVR ([facility])- (present on admission)   Assessment & Plan    I started heparin drip.  Metoprolol 5 mg IV every 5 minutes as needed per parameters.  Echocardiogram ordered.  Check TSH.  Can be discharged on home dose Eliquis if there is no bleeding noted from her stroma.     Ileostomy in place ([facility])   Assessment & Plan    Hx of      SIRS (systemic inflammatory response syndrome) ([facility])   Assessment & Plan    Suspect this is reactive from nausea and vomiting   Given ceftriaxone and metronidazole in ER   No source of infection at this point, will check UA  Sepsis not present on admission      Lactic acidosis   Assessment & Plan    Likely due to tissue hypoperfusion from volume loss.  Continue with IV hydration.  Now  resolved.     Cirrhosis (HCC)   Assessment & Plan    Unclear etiology of cirrhosis noted on CT scan  Abdominal ultrasound confirmed liver cirrhosis.  No ascites.  Hepatitis panel   Denies alcohol abuse, will check ggt   Normal platelets, bilirubin        MARSHALL (acute kidney injury) (HCC)   Assessment & Plan    Pre renal from volume loss due to nausea and vomiting   Continue with IV hydration.  Avoid nephrotoxins.  Renal dose all medications.  Kidney function improving.     Microcytic anemia- (present on admission)   Assessment & Plan    We will check iron studies.  Monitor H&H.     HTN (hypertension)- (present on admission)   Assessment & Plan    On IV as needed pressure medications for now as patient is n.p.o.     Leukocytosis- (present on admission)   Assessment & Plan    I suspect this is likely reactive from acute nausea and vomiting   Cultures obtained.  Trending down.  Lactic acidosis normalized.     Persistent atrial fibrillation (HCC)- (present on admission)   Assessment & Plan    CHADVASC score 6 (history of TIA, no history of stroke)  Used to be on Eliquis which was stopped in August last year due to bleeding from trauma.  However, had a visit with her cardiologist in October 2018 and had a refill on her Eliquis.  Currently with A. fib RVR.  Unable to give oral medications for rate control.  Metoprolol 5 mg IV every 5 minutes for heart rate of 110 bpm and higher per blood pressure parameters for now.  Continue with heparin drip for now.  If no bleeding from her stroma noted, will discharge patient on her home dose Eliquis.     Acquired hypothyroidism- (present on admission)   Assessment & Plan    Resume home levothyroxine when able     Plan of care discussed with multidisciplinary team during rounds.    VTE prophylaxis: Heparin

## 2019-03-15 NOTE — PROGRESS NOTES
Patient transported by tele RN to  82-2. Patient on monitor. Bedside report given. Patient stable as transferred off unit.

## 2019-03-15 NOTE — PROGRESS NOTES
Assumed care of patient, bedside report received from Cathy. Updated on POC, call light within reach and fall precautions in place. Bed locked and in lowest position. Patient instructed to call for assistance before getting out of bed. All questions answered, no other needs at this time.

## 2019-03-16 PROBLEM — N18.30 CKD (CHRONIC KIDNEY DISEASE) STAGE 3, GFR 30-59 ML/MIN: Status: ACTIVE | Noted: 2019-03-16

## 2019-03-16 LAB
ANION GAP SERPL CALC-SCNC: 6 MMOL/L (ref 0–11.9)
ANISOCYTOSIS BLD QL SMEAR: ABNORMAL
APTT PPP: 146 SEC (ref 24.7–36)
APTT PPP: 205.7 SEC (ref 24.7–36)
BASOPHILS # BLD AUTO: 0.9 % (ref 0–1.8)
BASOPHILS # BLD: 0.09 K/UL (ref 0–0.12)
BUN SERPL-MCNC: 30 MG/DL (ref 8–22)
CALCIUM SERPL-MCNC: 8.3 MG/DL (ref 8.5–10.5)
CHLORIDE SERPL-SCNC: 113 MMOL/L (ref 96–112)
CO2 SERPL-SCNC: 25 MMOL/L (ref 20–33)
CREAT SERPL-MCNC: 0.98 MG/DL (ref 0.5–1.4)
EKG IMPRESSION: NORMAL
EOSINOPHIL # BLD AUTO: 0 K/UL (ref 0–0.51)
EOSINOPHIL NFR BLD: 0 % (ref 0–6.9)
ERYTHROCYTE [DISTWIDTH] IN BLOOD BY AUTOMATED COUNT: 52.1 FL (ref 35.9–50)
FERRITIN SERPL-MCNC: 33.9 NG/ML (ref 10–291)
GLUCOSE SERPL-MCNC: 101 MG/DL (ref 65–99)
HCT VFR BLD AUTO: 37.4 % (ref 37–47)
HGB BLD-MCNC: 11.1 G/DL (ref 12–16)
HGB RETIC QN AUTO: 30.7 PG/CELL (ref 29–35)
IMM RETICS NFR: 21.1 % (ref 9.3–17.4)
IRON SATN MFR SERPL: 5 % (ref 15–55)
IRON SERPL-MCNC: 19 UG/DL (ref 40–170)
LYMPHOCYTES # BLD AUTO: 1.91 K/UL (ref 1–4.8)
LYMPHOCYTES NFR BLD: 19.1 % (ref 22–41)
MANUAL DIFF BLD: NORMAL
MCH RBC QN AUTO: 24.8 PG (ref 27–33)
MCHC RBC AUTO-ENTMCNC: 29.7 G/DL (ref 33.6–35)
MCV RBC AUTO: 83.7 FL (ref 81.4–97.8)
MICROCYTES BLD QL SMEAR: ABNORMAL
MONOCYTES # BLD AUTO: 1.48 K/UL (ref 0–0.85)
MONOCYTES NFR BLD AUTO: 14.8 % (ref 0–13.4)
MORPHOLOGY BLD-IMP: NORMAL
NEUTROPHILS # BLD AUTO: 6.52 K/UL (ref 2–7.15)
NEUTROPHILS NFR BLD: 63.5 % (ref 44–72)
NEUTS BAND NFR BLD MANUAL: 1.7 % (ref 0–10)
NRBC # BLD AUTO: 0 K/UL
NRBC BLD-RTO: 0 /100 WBC
OVALOCYTES BLD QL SMEAR: NORMAL
PLATELET # BLD AUTO: 134 K/UL (ref 164–446)
PLATELET BLD QL SMEAR: NORMAL
PMV BLD AUTO: 11.2 FL (ref 9–12.9)
POIKILOCYTOSIS BLD QL SMEAR: NORMAL
POTASSIUM SERPL-SCNC: 4.8 MMOL/L (ref 3.6–5.5)
RBC # BLD AUTO: 4.47 M/UL (ref 4.2–5.4)
RBC BLD AUTO: PRESENT
RETICS # AUTO: 0.07 M/UL (ref 0.04–0.06)
RETICS/RBC NFR: 1.7 % (ref 0.8–2.1)
SODIUM SERPL-SCNC: 144 MMOL/L (ref 135–145)
TIBC SERPL-MCNC: 374 UG/DL (ref 250–450)
TROPONIN I SERPL-MCNC: 0.08 NG/ML (ref 0–0.04)
WBC # BLD AUTO: 10 K/UL (ref 4.8–10.8)

## 2019-03-16 PROCEDURE — 700111 HCHG RX REV CODE 636 W/ 250 OVERRIDE (IP): Performed by: HOSPITALIST

## 2019-03-16 PROCEDURE — 99233 SBSQ HOSP IP/OBS HIGH 50: CPT | Performed by: FAMILY MEDICINE

## 2019-03-16 PROCEDURE — 85007 BL SMEAR W/DIFF WBC COUNT: CPT

## 2019-03-16 PROCEDURE — 82728 ASSAY OF FERRITIN: CPT

## 2019-03-16 PROCEDURE — A9270 NON-COVERED ITEM OR SERVICE: HCPCS | Performed by: FAMILY MEDICINE

## 2019-03-16 PROCEDURE — 700102 HCHG RX REV CODE 250 W/ 637 OVERRIDE(OP): Performed by: FAMILY MEDICINE

## 2019-03-16 PROCEDURE — 700111 HCHG RX REV CODE 636 W/ 250 OVERRIDE (IP): Performed by: FAMILY MEDICINE

## 2019-03-16 PROCEDURE — 84484 ASSAY OF TROPONIN QUANT: CPT

## 2019-03-16 PROCEDURE — 36415 COLL VENOUS BLD VENIPUNCTURE: CPT

## 2019-03-16 PROCEDURE — 85730 THROMBOPLASTIN TIME PARTIAL: CPT

## 2019-03-16 PROCEDURE — 700102 HCHG RX REV CODE 250 W/ 637 OVERRIDE(OP): Performed by: HOSPITALIST

## 2019-03-16 PROCEDURE — 770020 HCHG ROOM/CARE - TELE (206)

## 2019-03-16 PROCEDURE — 83550 IRON BINDING TEST: CPT

## 2019-03-16 PROCEDURE — 80048 BASIC METABOLIC PNL TOTAL CA: CPT

## 2019-03-16 PROCEDURE — 83540 ASSAY OF IRON: CPT

## 2019-03-16 PROCEDURE — 700105 HCHG RX REV CODE 258: Performed by: HOSPITALIST

## 2019-03-16 PROCEDURE — 700101 HCHG RX REV CODE 250: Performed by: FAMILY MEDICINE

## 2019-03-16 PROCEDURE — A9270 NON-COVERED ITEM OR SERVICE: HCPCS | Performed by: HOSPITALIST

## 2019-03-16 PROCEDURE — 93010 ELECTROCARDIOGRAM REPORT: CPT | Performed by: INTERNAL MEDICINE

## 2019-03-16 PROCEDURE — 85027 COMPLETE CBC AUTOMATED: CPT

## 2019-03-16 PROCEDURE — 85046 RETICYTE/HGB CONCENTRATE: CPT

## 2019-03-16 RX ORDER — BRIMONIDINE TARTRATE 2 MG/ML
1 SOLUTION/ DROPS OPHTHALMIC 2 TIMES DAILY
Status: DISCONTINUED | OUTPATIENT
Start: 2019-03-16 | End: 2019-03-20 | Stop reason: HOSPADM

## 2019-03-16 RX ORDER — HYDRALAZINE HYDROCHLORIDE 20 MG/ML
10 INJECTION INTRAMUSCULAR; INTRAVENOUS EVERY 6 HOURS PRN
Status: DISCONTINUED | OUTPATIENT
Start: 2019-03-16 | End: 2019-03-20 | Stop reason: HOSPADM

## 2019-03-16 RX ORDER — TIMOLOL MALEATE 5 MG/ML
1 SOLUTION/ DROPS OPHTHALMIC 2 TIMES DAILY
Status: DISCONTINUED | OUTPATIENT
Start: 2019-03-16 | End: 2019-03-20 | Stop reason: HOSPADM

## 2019-03-16 RX ORDER — BRIMONIDINE TARTRATE AND TIMOLOL MALEATE 2; 5 MG/ML; MG/ML
1 SOLUTION OPHTHALMIC 2 TIMES DAILY
Status: DISCONTINUED | OUTPATIENT
Start: 2019-03-16 | End: 2019-03-16

## 2019-03-16 RX ORDER — LATANOPROST 50 UG/ML
1 SOLUTION/ DROPS OPHTHALMIC EVERY EVENING
Status: DISCONTINUED | OUTPATIENT
Start: 2019-03-16 | End: 2019-03-20 | Stop reason: HOSPADM

## 2019-03-16 RX ORDER — METOPROLOL TARTRATE 1 MG/ML
5 INJECTION, SOLUTION INTRAVENOUS
Status: COMPLETED | OUTPATIENT
Start: 2019-03-16 | End: 2019-03-16

## 2019-03-16 RX ADMIN — MORPHINE SULFATE 4 MG: 4 INJECTION INTRAVENOUS at 19:37

## 2019-03-16 RX ADMIN — BRIMONIDINE TARTRATE 1 DROP: 2 SOLUTION OPHTHALMIC at 09:38

## 2019-03-16 RX ADMIN — LOVASTATIN 10 MG: 20 TABLET ORAL at 06:16

## 2019-03-16 RX ADMIN — HYDRALAZINE HYDROCHLORIDE 10 MG: 20 INJECTION INTRAMUSCULAR; INTRAVENOUS at 09:43

## 2019-03-16 RX ADMIN — METOPROLOL SUCCINATE 12.5 MG: 25 TABLET, EXTENDED RELEASE ORAL at 06:16

## 2019-03-16 RX ADMIN — SODIUM CHLORIDE: 9 INJECTION, SOLUTION INTRAVENOUS at 01:16

## 2019-03-16 RX ADMIN — OMEPRAZOLE 20 MG: 20 CAPSULE, DELAYED RELEASE ORAL at 06:15

## 2019-03-16 RX ADMIN — METOPROLOL TARTRATE 5 MG: 1 INJECTION, SOLUTION INTRAVENOUS at 18:19

## 2019-03-16 RX ADMIN — METOPROLOL TARTRATE 5 MG: 1 INJECTION, SOLUTION INTRAVENOUS at 16:42

## 2019-03-16 RX ADMIN — MORPHINE SULFATE 4 MG: 4 INJECTION INTRAVENOUS at 06:11

## 2019-03-16 RX ADMIN — TIMOLOL MALEATE 1 DROP: 5 SOLUTION OPHTHALMIC at 09:38

## 2019-03-16 RX ADMIN — SODIUM CHLORIDE: 9 INJECTION, SOLUTION INTRAVENOUS at 18:21

## 2019-03-16 RX ADMIN — METOPROLOL TARTRATE 5 MG: 1 INJECTION, SOLUTION INTRAVENOUS at 19:40

## 2019-03-16 RX ADMIN — TIMOLOL MALEATE 1 DROP: 5 SOLUTION OPHTHALMIC at 16:41

## 2019-03-16 RX ADMIN — BRIMONIDINE TARTRATE 1 DROP: 2 SOLUTION OPHTHALMIC at 16:41

## 2019-03-16 RX ADMIN — LEVOTHYROXINE SODIUM ANHYDROUS 13 MCG: 100 INJECTION, POWDER, LYOPHILIZED, FOR SOLUTION INTRAVENOUS at 06:12

## 2019-03-16 RX ADMIN — SODIUM CHLORIDE: 9 INJECTION, SOLUTION INTRAVENOUS at 09:37

## 2019-03-16 ASSESSMENT — ENCOUNTER SYMPTOMS
NERVOUS/ANXIOUS: 0
ABDOMINAL PAIN: 1
DIZZINESS: 0
PALPITATIONS: 0
BLURRED VISION: 0
SHORTNESS OF BREATH: 0
HEADACHES: 0
RESPIRATORY NEGATIVE: 1
WHEEZING: 0
BACK PAIN: 0
HEARTBURN: 0
NAUSEA: 0
CARDIOVASCULAR NEGATIVE: 1
CHILLS: 0
FEVER: 0
ROS GI COMMENTS: MILD ABDOMINAL PAIN
COUGH: 0
VOMITING: 0
WEAKNESS: 1
NECK PAIN: 0
SORE THROAT: 0
DIARRHEA: 0

## 2019-03-16 NOTE — PROGRESS NOTES
Order clarified with dr Burnette regarding need for heparin drip due to discrepancy with telephone order at 14:30 and note at 16:09.  States he wants to continue the heparin drip at this time. Pharmacy updated - no need for additional bolus, will restart drip at ordered rate.

## 2019-03-16 NOTE — WOUND TEAM
In to see patient for ostomy consult.  Patient has right sided urostomy for several years.  Patient preforms self care and changed appliance this morning.  Uses 1 piece 1 3/4 urostomy pouch.  Patient states she has additional supplies and does not need any assistance.  Communicated with RN.  Please re-consult if any needs come up.

## 2019-03-16 NOTE — PROGRESS NOTES
Patient starting to bleed from her stoma site, Dr. Park notified. Orders to stop heparin gtt received.

## 2019-03-16 NOTE — PROGRESS NOTES
Spoke with patients daughter in-law who was updated on patients transfer to telemetry unit and plan of care.

## 2019-03-16 NOTE — PROGRESS NOTES
This RN called and spoke to Adan in pharmacy. Updated on APTT results and how heparin drip was spotted this morning unnecessarily. Adan recommended following the first dose change on heparin protocol at this time.

## 2019-03-16 NOTE — CARE PLAN
Problem: Safety  Goal: Will remain free from injury  Outcome: PROGRESSING AS EXPECTED      Problem: Bowel/Gastric:  Goal: Normal bowel function is maintained or improved  Outcome: PROGRESSING SLOWER THAN EXPECTED      Problem: Knowledge Deficit  Goal: Knowledge of disease process/condition, treatment plan, diagnostic tests, and medications will improve  Outcome: PROGRESSING AS EXPECTED

## 2019-03-16 NOTE — PROGRESS NOTES
Hospital Medicine Daily Progress Note    Date of Service  3/16/2019    Chief Complaint  83 y.o. female admitted 3/13/2019 with Small bowel obstruction    Hospital Course  Admitted with small bowel obstruction, acute kidney injury, known history of paroxysmal atrial fibrillation, had episode of atrial fibrillation rapid ventricular rate.    Interval Problem Update  SBO -NGT in place still with significant output  MARSHALL -  crea trending down  A. Fib - back in SR  HTN - sbp 160s    Consultants/Specialty  Surgery    Code Status  Full    Disposition  TBD    Review of Systems  Review of Systems   Constitutional: Positive for malaise/fatigue. Negative for chills and fever.   HENT: Negative for hearing loss and sore throat.    Eyes: Negative for blurred vision.   Respiratory: Negative for cough, shortness of breath and wheezing.    Cardiovascular: Negative for chest pain, palpitations and leg swelling.   Gastrointestinal: Positive for abdominal pain. Negative for diarrhea, heartburn, nausea and vomiting.   Genitourinary: Negative for dysuria.   Musculoskeletal: Negative for back pain and neck pain.   Skin: Negative for rash.   Neurological: Positive for weakness. Negative for dizziness and headaches.   Psychiatric/Behavioral: The patient is not nervous/anxious.         Physical Exam  Temp:  [36.7 °C (98 °F)-37.2 °C (99 °F)] 37 °C (98.6 °F)  Pulse:  [] 118  Resp:  [17-18] 18  BP: (106-175)/() 173/109  SpO2:  [90 %-92 %] 92 %    Physical Exam   Constitutional: She is oriented to person, place, and time. She appears well-developed and well-nourished.   HENT:   Head: Normocephalic and atraumatic.   Eyes: Pupils are equal, round, and reactive to light. Conjunctivae are normal.   Neck: No tracheal deviation present. No thyromegaly present.   Cardiovascular: Normal rate and regular rhythm.    Pulmonary/Chest: Effort normal and breath sounds normal.   Abdominal: She exhibits distension. There is no tenderness. There is no  rebound and no guarding.   ostomy   Musculoskeletal: She exhibits edema.   Lymphadenopathy:     She has no cervical adenopathy.   Neurological: She is alert and oriented to person, place, and time.   Skin: Skin is warm and dry.   Nursing note and vitals reviewed.      Fluids    Intake/Output Summary (Last 24 hours) at 03/16/19 1242  Last data filed at 03/16/19 0937   Gross per 24 hour   Intake              200 ml   Output             3050 ml   Net            -2850 ml       Laboratory  Recent Labs      03/14/19   0521  03/15/19   0320  03/16/19   0749   WBC  15.5*  10.4  10.0   RBC  4.99  4.43  4.47   HEMOGLOBIN  12.6  11.1*  11.1*   HEMATOCRIT  39.7  36.5*  37.4   MCV  79.6*  82.4  83.7   MCH  25.3*  25.1*  24.8*   MCHC  31.7*  30.4*  29.7*   RDW  46.8  50.4*  52.1*   PLATELETCT  169  150*  134*   MPV  12.3  11.9  11.2     Recent Labs      03/14/19   0237  03/15/19   0320  03/16/19   0749   SODIUM  138  139  144   POTASSIUM  4.3  3.9  4.8   CHLORIDE  102  108  113*   CO2  26  23  25   GLUCOSE  106*  85  101*   BUN  41*  44*  30*   CREATININE  2.01*  1.32  0.98   CALCIUM  8.3*  8.0*  8.3*     Recent Labs      03/13/19   2335  03/15/19   1312  03/16/19   0008  03/16/19   0749   APTT   --   31.4  205.7*  146.0*   INR  1.22*   --    --    --                Imaging  EC-ECHOCARDIOGRAM COMPLETE W/O CONT   Final Result      DX-CHEST-LIMITED (1 VIEW)   Final Result      1.  Hypoinflation with mild bibasilar atelectasis.   2.  Supportive tubing as described above.      US-RUQ   Final Result      1.  Prior cholecystectomy. No biliary dilation.      2.  Cirrhosis      3.  No other finding      CT-RENAL COLIC EVALUATION(A/P W/O)   Final Result      1.  High-grade distal small bowel obstruction      2.  Cirrhosis with associated portal hypertension      3.  Right lower quadrant ostomy present.      4.  Abdominal aortic atherosclerotic plaque      5.  Prior hysterectomy         No renal or ureteral calculus or obstruction.            Assessment/Plan  * Small bowel obstruction (HCC)- (present on admission)   Assessment & Plan    Keep NPO  NG tube in place - monitor output  IVF hydration     Atrial fibrillation with RVR (HCC)- (present on admission)   Assessment & Plan    Paroxysmal  IV metoprolol as needed for sustained heart rate >130  Stop heparin drip       MARSHALL (acute kidney injury) (HCC)- (present on admission)   Assessment & Plan    IVF hydration, follow BMP     CKD (chronic kidney disease) stage 3, GFR 30-59 ml/min (HCC)- (present on admission)   Assessment & Plan    Follow bmp     Elevated troponin- (present on admission)   Assessment & Plan    Likely demand ischemia  Echocardiogram reviewed     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       HTN (hypertension)- (present on admission)   Assessment & Plan    IV hydralazine as needed     Leukocytosis- (present on admission)   Assessment & Plan    Follow CBC     Microcytic anemia- (present on admission)   Assessment & Plan    Follow CBC     Acquired hypothyroidism- (present on admission)   Assessment & Plan    Hold levothyroxine           VTE prophylaxis: SCD

## 2019-03-16 NOTE — PROGRESS NOTES
Julius from Lab called with critical result of APTT of 146 at 0845. Critical lab result read back to Julius.   This critical lab result is within parameters established by  for this patient

## 2019-03-16 NOTE — PROGRESS NOTES
"RN to room, patient C/O \"tube coming out.\" Air bolus given to patient, unable to hear bowel sounds. Tape removed and tube removed. NGT replaced, confirmed with air bolus and gastric secretions return. Tube retaped and placed to suction.   "

## 2019-03-16 NOTE — PROGRESS NOTES
Spoke with dr senior. Updated DR on pts conversion back SR in 90s. Order to stop heparin drip at this time, since pt has a history of bleeding, and to just continue with subcutaneous heparin. Heparin drip discontinued at this time.

## 2019-03-16 NOTE — CARE PLAN
Problem: Communication  Goal: The ability to communicate needs accurately and effectively will improve  Outcome: PROGRESSING AS EXPECTED    Intervention: [place] patient and significant other/support system to call light to alert staff of needs  Patient oriented to call light system and has been able to communicate needs accurately and effectively.      Problem: Safety  Goal: Will remain free from falls  Outcome: PROGRESSING AS EXPECTED    Intervention: Assess risk factors for falls  Patient has been assessed for fall risks and fall precautions have been put in place.

## 2019-03-16 NOTE — PROGRESS NOTES
Assumed care of patient, bedside report received from Anni. Updated on POC, call light within reach and fall precautions in place. Bed locked and in lowest position. Patient instructed to call for assistance before getting out of bed. All questions answered, no other needs at this time.

## 2019-03-16 NOTE — PROGRESS NOTES
Surgical Progress Note    Author: Jamaal HELENA Conley Date & Time created: 3/16/2019   9:21 AM     Interval Events:    Review of Systems   Respiratory: Negative.    Cardiovascular: Negative.    Gastrointestinal: Negative for nausea and vomiting.        Mild abdominal pain     Hemodynamics:  Temp (24hrs), Av.9 °C (98.5 °F), Min:36.7 °C (98 °F), Max:37.2 °C (99 °F)  Temperature: 37.2 °C (99 °F)  Pulse  Av.3  Min: 82  Max: 151   Blood Pressure : (!) 162/108     Respiratory:    Respiration: 18, Pulse Oximetry: 90 %           Neuro:  GCS       Fluids:    Intake/Output Summary (Last 24 hours) at 19 09  Last data filed at 19 0200   Gross per 24 hour   Intake              700 ml   Output             2050 ml   Net            -1350 ml     Weight: 72.2 kg (159 lb 2.8 oz)  Current Diet Order   Procedures   • Diet NPO     Physical Exam   Cardiovascular:   Irregular    Pulmonary/Chest: Effort normal and breath sounds normal.   Abdominal: Soft. Bowel sounds are normal. She exhibits distension.   Mildly tender to palpation     Labs:  Recent Results (from the past 24 hour(s))   EKG    Collection Time: 03/15/19 10:49 AM   Result Value Ref Range    Report       [facility]    Test Date:  2019-03-15  Pt Name:    BERNARD BAER                Department: 141  MRN:        [id number]                      Room:       T824  Gender:     Female                       Technician: PEPE  :        [id number]                   Requested By:CHEN URENA  Order #:    [id number]                    Reading MD: Harish Flores MD    Measurements  Intervals                                Axis  Rate:       145                          P:  GA:                                      QRS:        -11  QRSD:       79                           T:          177  QT:         273  QTc:        424    Interpretive Statements  ATRIAL FIBRILLATION WITH RAPID V-RATE  LVH WITH SECONDARY REPOLARIZATION ABNORMALITY  ST DEPRESSION, PROBABLY RATE  RELATED  Compared to ECG 10/25/2018 13:24:35    Sinus rhythm no longer present      Electronically Signed On 3- 17:31:02 PDT by Harish Flores MD     TROPONIN    Collection Time: 03/15/19 10:55 AM   Result Value Ref Range    Troponin I 0.05 (H) 0.00 - 0.04 ng/mL   EKG    Collection Time: 03/15/19 12:32 PM   Result Value Ref Range    Report       [facility]    Test Date:  2019-03-15  Pt Name:    BERNARD BAER                Department: 141  MRN:        [id number]                      Room:       24  Gender:     Female                       Technician: Keenko  :        [id number]                   Requested By:CHEN URENA  Order #:    [id number]                    Reading MD: Harish Flores MD    Measurements  Intervals                                Axis  Rate:       143                          P:  ID:                                      QRS:        -13  QRSD:       80                           T:          180  QT:         288  QTc:        444    Interpretive Statements  ATRIAL FIBRILLATION WITH RAPID V-RATE  LVH WITH SECONDARY REPOLARIZATION ABNORMALITY  Compared to ECG 03/15/2019 10:49:04  NO SIGNIFICANT CHANGES  Electronically Signed On 3- 17:32:17 PDT by Harish Flores MD     TROPONIN    Collection Time: 03/15/19 12:52 PM   Result Value Ref Range    Troponin I 0.08 (H) 0.00 - 0.04 ng/mL   TSH    Collection Time: 03/15/19 12:57 PM   Result Value Ref Range    TSH 0.640 0.380 - 5.330 uIU/mL   APTT    Collection Time: 03/15/19  1:12 PM   Result Value Ref Range    APTT 31.4 24.7 - 36.0 sec   EKG    Collection Time: 03/15/19  2:18 PM   Result Value Ref Range    Report       [facility]    Test Date:  2019-03-15  Pt Name:    BERNARD BAER                Department: 183  MRN:        [id number]                      Room:       T824  Gender:     Female                       Technician: Keenko  :        [id number]                   Requested By:CHEN URENA  Order #:     [id number]                    Reading MD: Harish Flores MD    Measurements  Intervals                                Axis  Rate:       91                           P:          31  UT:         136                          QRS:        -18  QRSD:       76                           T:          185  QT:         400  QTc:        493    Interpretive Statements  SINUS RHYTHM  VENTRICULAR PREMATURE COMPLEX  LVH WITH SECONDARY REPOLARIZATION ABNORMALITY  BORDERLINE PROLONGED QT INTERVAL  Compared to ECG 03/15/2019 12:32:54  Ventricular premature complex(es) now present  Atrial fibrillation no longer present    Electronically Signed On 3- 17:35:09 PDT by Harish Flores MD     EC-ECHOCARDIOGRAM COMPLETE W/O CONT    Collection Time: 03/15/19  4:49 PM   Result Value Ref Range    Eject.Frac. MOD BP 60.61     Eject.Frac. MOD 4C 67.37     Eject.Frac. MOD 2C 53.24    TROPONIN    Collection Time: 03/15/19  6:24 PM   Result Value Ref Range    Troponin I 0.12 (H) 0.00 - 0.04 ng/mL   EKG    Collection Time: 03/15/19  8:09 PM   Result Value Ref Range    Report       [facility]    Test Date:  2019-03-15  Pt Name:    BERNARD BAER                Department: [facility]  MRN:        [id number]                      Room:       [facility]  Gender:     Female                       Technician: SABINE  :        [id number]                   Requested By:CHEN URENA  Order #:    [id number]                    Reading MD: Kan Berry MD    Measurements  Intervals                                Axis  Rate:       112                          P:          4  UT:         132                          QRS:        18  QRSD:       78                           T:          155  QT:         328  QTc:        448    Interpretive Statements  SINUS TACHYCARDIA  PROBABLE LVH WITH SECONDARY REPOL ABNRM  Compared to ECG 03/15/2019 14:18:44  Ventricular premature complex(es) no longer present    Electronically Signed On 3- 6:22:14 PDT by Kan  MD Jamal     TROPONIN    Collection Time: 03/15/19  8:24 PM   Result Value Ref Range    Troponin I 0.10 (H) 0.00 - 0.04 ng/mL   FERRITIN    Collection Time: 03/16/19 12:07 AM   Result Value Ref Range    Ferritin 33.9 10.0 - 291.0 ng/mL   RETICULOCYTES COUNT    Collection Time: 03/16/19 12:08 AM   Result Value Ref Range    Reticulocyte Count 1.7 0.8 - 2.1 %    Retic, Absolute 0.07 (H) 0.04 - 0.06 M/uL    Imm. Reticulocyte Fraction 21.1 (H) 9.3 - 17.4 %    Retic Hgb Equivalent 30.7 29.0 - 35.0 pg/cell   APTT    Collection Time: 03/16/19 12:08 AM   Result Value Ref Range    APTT 205.7 (HH) 24.7 - 36.0 sec   TROPONIN    Collection Time: 03/16/19  2:22 AM   Result Value Ref Range    Troponin I 0.08 (H) 0.00 - 0.04 ng/mL   IRON/TOTAL IRON BIND    Collection Time: 03/16/19  2:22 AM   Result Value Ref Range    Iron 19 (L) 40 - 170 ug/dL    Total Iron Binding 374 250 - 450 ug/dL    % Saturation 5 (L) 15 - 55 %   APTT    Collection Time: 03/16/19  7:49 AM   Result Value Ref Range    APTT 146.0 (HH) 24.7 - 36.0 sec   CBC WITH DIFFERENTIAL    Collection Time: 03/16/19  7:49 AM   Result Value Ref Range    WBC 10.0 4.8 - 10.8 K/uL    RBC 4.47 4.20 - 5.40 M/uL    Hemoglobin 11.1 (L) 12.0 - 16.0 g/dL    Hematocrit 37.4 37.0 - 47.0 %    MCV 83.7 81.4 - 97.8 fL    MCH 24.8 (L) 27.0 - 33.0 pg    MCHC 29.7 (L) 33.6 - 35.0 g/dL    RDW 52.1 (H) 35.9 - 50.0 fL    Platelet Count 134 (L) 164 - 446 K/uL    MPV 11.2 9.0 - 12.9 fL    Nucleated RBC 0.00 /100 WBC    NRBC (Absolute) 0.00 K/uL   Basic Metabolic Panel    Collection Time: 03/16/19  7:49 AM   Result Value Ref Range    Sodium 144 135 - 145 mmol/L    Potassium 4.8 3.6 - 5.5 mmol/L    Chloride 113 (H) 96 - 112 mmol/L    Co2 25 20 - 33 mmol/L    Glucose 101 (H) 65 - 99 mg/dL    Bun 30 (H) 8 - 22 mg/dL    Creatinine 0.98 0.50 - 1.40 mg/dL    Calcium 8.3 (L) 8.5 - 10.5 mg/dL    Anion Gap 6.0 0.0 - 11.9   ESTIMATED GFR    Collection Time: 03/16/19  7:49 AM   Result Value Ref Range     GFR If African American >60 >60 mL/min/1.73 m 2    GFR If Non African American 54 (A) >60 mL/min/1.73 m 2     Medical Decision Making, by Problem:  Active Hospital Problems    Diagnosis   • Microcytic anemia [D50.9]     Priority: Low   • Leukocytosis [D72.829]     Priority: Low   • HTN (hypertension) [I10]     Priority: Low   • Persistent atrial fibrillation (HCC) [I48.1]     Priority: Low   • Acquired hypothyroidism [E03.9]     Priority: Low   • Atrial fibrillation with RVR (HCC) [I48.91]   • Elevated troponin [R74.8]   • Small bowel obstruction (HCC) [K56.609]   • MARSHALL (acute kidney injury) (HCC) [N17.9]   • Cirrhosis (HCC) [K74.60]   • Lactic acidosis [E87.2]   • SIRS (systemic inflammatory response syndrome) (HCC) [R65.10]   • Ileostomy in place (HCC) [Z93.2]     Plan:  Afib as per medicine. Continue NG, IVf, will get films in AM to check on distension. If not passing gas may need SBFT    Quality Measures:  Quality-Core Measures    Discussed patient condition with Patient

## 2019-03-17 ENCOUNTER — APPOINTMENT (OUTPATIENT)
Dept: RADIOLOGY | Facility: MEDICAL CENTER | Age: 84
DRG: 389 | End: 2019-03-17
Attending: SURGERY
Payer: MEDICARE

## 2019-03-17 PROBLEM — E83.42 HYPOMAGNESEMIA: Status: ACTIVE | Noted: 2019-03-17

## 2019-03-17 LAB
ALBUMIN SERPL BCP-MCNC: 2.8 G/DL (ref 3.2–4.9)
ALBUMIN/GLOB SERPL: 0.8 G/DL
ALP SERPL-CCNC: 86 U/L (ref 30–99)
ALT SERPL-CCNC: 10 U/L (ref 2–50)
ANION GAP SERPL CALC-SCNC: 9 MMOL/L (ref 0–11.9)
AST SERPL-CCNC: 19 U/L (ref 12–45)
BASOPHILS # BLD AUTO: 0.4 % (ref 0–1.8)
BASOPHILS # BLD: 0.04 K/UL (ref 0–0.12)
BILIRUB SERPL-MCNC: 0.7 MG/DL (ref 0.1–1.5)
BUN SERPL-MCNC: 23 MG/DL (ref 8–22)
CALCIUM SERPL-MCNC: 8 MG/DL (ref 8.5–10.5)
CHLORIDE SERPL-SCNC: 112 MMOL/L (ref 96–112)
CO2 SERPL-SCNC: 19 MMOL/L (ref 20–33)
CREAT SERPL-MCNC: 0.89 MG/DL (ref 0.5–1.4)
EOSINOPHIL # BLD AUTO: 0.06 K/UL (ref 0–0.51)
EOSINOPHIL NFR BLD: 0.5 % (ref 0–6.9)
ERYTHROCYTE [DISTWIDTH] IN BLOOD BY AUTOMATED COUNT: 50.1 FL (ref 35.9–50)
GLOBULIN SER CALC-MCNC: 3.5 G/DL (ref 1.9–3.5)
GLUCOSE SERPL-MCNC: 87 MG/DL (ref 65–99)
HCT VFR BLD AUTO: 38.3 % (ref 37–47)
HGB BLD-MCNC: 11.8 G/DL (ref 12–16)
IMM GRANULOCYTES # BLD AUTO: 0.09 K/UL (ref 0–0.11)
IMM GRANULOCYTES NFR BLD AUTO: 0.8 % (ref 0–0.9)
LYMPHOCYTES # BLD AUTO: 1.96 K/UL (ref 1–4.8)
LYMPHOCYTES NFR BLD: 17.2 % (ref 22–41)
MAGNESIUM SERPL-MCNC: 1.8 MG/DL (ref 1.5–2.5)
MCH RBC QN AUTO: 25.2 PG (ref 27–33)
MCHC RBC AUTO-ENTMCNC: 30.8 G/DL (ref 33.6–35)
MCV RBC AUTO: 81.8 FL (ref 81.4–97.8)
MONOCYTES # BLD AUTO: 2.14 K/UL (ref 0–0.85)
MONOCYTES NFR BLD AUTO: 18.8 % (ref 0–13.4)
NEUTROPHILS # BLD AUTO: 7.08 K/UL (ref 2–7.15)
NEUTROPHILS NFR BLD: 62.3 % (ref 44–72)
NRBC # BLD AUTO: 0 K/UL
NRBC BLD-RTO: 0 /100 WBC
PLATELET # BLD AUTO: 185 K/UL (ref 164–446)
PMV BLD AUTO: 12 FL (ref 9–12.9)
POTASSIUM SERPL-SCNC: 3.7 MMOL/L (ref 3.6–5.5)
PROT SERPL-MCNC: 6.3 G/DL (ref 6–8.2)
RBC # BLD AUTO: 4.68 M/UL (ref 4.2–5.4)
SODIUM SERPL-SCNC: 140 MMOL/L (ref 135–145)
WBC # BLD AUTO: 11.4 K/UL (ref 4.8–10.8)

## 2019-03-17 PROCEDURE — 700111 HCHG RX REV CODE 636 W/ 250 OVERRIDE (IP): Performed by: HOSPITALIST

## 2019-03-17 PROCEDURE — 700111 HCHG RX REV CODE 636 W/ 250 OVERRIDE (IP): Performed by: FAMILY MEDICINE

## 2019-03-17 PROCEDURE — 83735 ASSAY OF MAGNESIUM: CPT

## 2019-03-17 PROCEDURE — 700111 HCHG RX REV CODE 636 W/ 250 OVERRIDE (IP): Performed by: NURSE PRACTITIONER

## 2019-03-17 PROCEDURE — 36415 COLL VENOUS BLD VENIPUNCTURE: CPT

## 2019-03-17 PROCEDURE — 74019 RADEX ABDOMEN 2 VIEWS: CPT

## 2019-03-17 PROCEDURE — 700101 HCHG RX REV CODE 250: Performed by: HOSPITALIST

## 2019-03-17 PROCEDURE — 700102 HCHG RX REV CODE 250 W/ 637 OVERRIDE(OP): Performed by: FAMILY MEDICINE

## 2019-03-17 PROCEDURE — 99233 SBSQ HOSP IP/OBS HIGH 50: CPT | Performed by: FAMILY MEDICINE

## 2019-03-17 PROCEDURE — 700105 HCHG RX REV CODE 258: Performed by: NURSE PRACTITIONER

## 2019-03-17 PROCEDURE — 700105 HCHG RX REV CODE 258: Performed by: INTERNAL MEDICINE

## 2019-03-17 PROCEDURE — 700105 HCHG RX REV CODE 258: Performed by: HOSPITALIST

## 2019-03-17 PROCEDURE — A9270 NON-COVERED ITEM OR SERVICE: HCPCS | Performed by: FAMILY MEDICINE

## 2019-03-17 PROCEDURE — 80053 COMPREHEN METABOLIC PANEL: CPT

## 2019-03-17 PROCEDURE — 99222 1ST HOSP IP/OBS MODERATE 55: CPT | Performed by: INTERNAL MEDICINE

## 2019-03-17 PROCEDURE — 85025 COMPLETE CBC W/AUTO DIFF WBC: CPT

## 2019-03-17 PROCEDURE — 700111 HCHG RX REV CODE 636 W/ 250 OVERRIDE (IP): Performed by: INTERNAL MEDICINE

## 2019-03-17 PROCEDURE — 770020 HCHG ROOM/CARE - TELE (206)

## 2019-03-17 RX ORDER — DEXTROSE MONOHYDRATE 50 MG/ML
INJECTION, SOLUTION INTRAVENOUS CONTINUOUS
Status: DISCONTINUED | OUTPATIENT
Start: 2019-03-17 | End: 2019-03-18 | Stop reason: CLARIF

## 2019-03-17 RX ORDER — MAGNESIUM SULFATE HEPTAHYDRATE 40 MG/ML
2 INJECTION, SOLUTION INTRAVENOUS ONCE
Status: COMPLETED | OUTPATIENT
Start: 2019-03-17 | End: 2019-03-17

## 2019-03-17 RX ORDER — METOPROLOL TARTRATE 1 MG/ML
5 INJECTION, SOLUTION INTRAVENOUS ONCE
Status: DISCONTINUED | OUTPATIENT
Start: 2019-03-17 | End: 2019-03-17

## 2019-03-17 RX ORDER — DEXTROSE MONOHYDRATE 50 MG/ML
INJECTION, SOLUTION INTRAVENOUS CONTINUOUS
Status: DISCONTINUED | OUTPATIENT
Start: 2019-03-17 | End: 2019-03-17

## 2019-03-17 RX ORDER — METOPROLOL TARTRATE 1 MG/ML
5 INJECTION, SOLUTION INTRAVENOUS ONCE
Status: COMPLETED | OUTPATIENT
Start: 2019-03-17 | End: 2019-03-17

## 2019-03-17 RX ADMIN — TIMOLOL MALEATE 1 DROP: 5 SOLUTION OPHTHALMIC at 17:09

## 2019-03-17 RX ADMIN — SODIUM CHLORIDE: 9 INJECTION, SOLUTION INTRAVENOUS at 21:02

## 2019-03-17 RX ADMIN — METOPROLOL TARTRATE 5 MG: 1 INJECTION, SOLUTION INTRAVENOUS at 01:00

## 2019-03-17 RX ADMIN — AMIODARONE HYDROCHLORIDE 150 MG: 1.5 INJECTION, SOLUTION INTRAVENOUS at 10:48

## 2019-03-17 RX ADMIN — SODIUM CHLORIDE: 9 INJECTION, SOLUTION INTRAVENOUS at 17:11

## 2019-03-17 RX ADMIN — DEXTROSE MONOHYDRATE: 50 INJECTION, SOLUTION INTRAVENOUS at 10:49

## 2019-03-17 RX ADMIN — SODIUM CHLORIDE: 9 INJECTION, SOLUTION INTRAVENOUS at 02:06

## 2019-03-17 RX ADMIN — MORPHINE SULFATE 4 MG: 4 INJECTION INTRAVENOUS at 00:59

## 2019-03-17 RX ADMIN — LATANOPROST 1 DROP: 50 SOLUTION OPHTHALMIC at 17:13

## 2019-03-17 RX ADMIN — MAGNESIUM SULFATE IN WATER 2 G: 40 INJECTION, SOLUTION INTRAVENOUS at 08:55

## 2019-03-17 RX ADMIN — BRIMONIDINE TARTRATE 1 DROP: 2 SOLUTION OPHTHALMIC at 17:11

## 2019-03-17 RX ADMIN — HYDRALAZINE HYDROCHLORIDE 10 MG: 20 INJECTION INTRAMUSCULAR; INTRAVENOUS at 23:54

## 2019-03-17 RX ADMIN — MORPHINE SULFATE 4 MG: 4 INJECTION INTRAVENOUS at 21:02

## 2019-03-17 RX ADMIN — TIMOLOL MALEATE 1 DROP: 5 SOLUTION OPHTHALMIC at 05:08

## 2019-03-17 RX ADMIN — AMIODARONE HYDROCHLORIDE 1 MG/MIN: 50 INJECTION, SOLUTION INTRAVENOUS at 11:00

## 2019-03-17 RX ADMIN — BRIMONIDINE TARTRATE 1 DROP: 2 SOLUTION OPHTHALMIC at 05:08

## 2019-03-17 RX ADMIN — AMIODARONE HYDROCHLORIDE 0.5 MG/MIN: 50 INJECTION, SOLUTION INTRAVENOUS at 18:39

## 2019-03-17 ASSESSMENT — ENCOUNTER SYMPTOMS
EYE DISCHARGE: 0
BLURRED VISION: 0
NERVOUS/ANXIOUS: 0
TROUBLE SWALLOWING: 0
ARTHRALGIAS: 0
CHILLS: 0
DIZZINESS: 0
BACK PAIN: 0
EYE ITCHING: 0
SORE THROAT: 0
FEVER: 0
WHEEZING: 0
CONSTIPATION: 0
ABDOMINAL PAIN: 0
VOICE CHANGE: 0
SLEEP DISTURBANCE: 0
ABDOMINAL DISTENTION: 0
AGITATION: 0
RESPIRATORY NEGATIVE: 1
HEARTBURN: 0
PALPITATIONS: 0
APNEA: 0
WEAKNESS: 1
DIARRHEA: 0
HEADACHES: 0
ADENOPATHY: 0
NECK PAIN: 0
COUGH: 0
CARDIOVASCULAR NEGATIVE: 1
SHORTNESS OF BREATH: 0
VOMITING: 0
ABDOMINAL PAIN: 1
WEAKNESS: 0
STRIDOR: 0
NAUSEA: 0
BRUISES/BLEEDS EASILY: 0
CHEST TIGHTNESS: 0

## 2019-03-17 NOTE — PROGRESS NOTES
Monitor summary: SR-Afib , frequent PACs and rare PVCs. Converted to Afib at 1556    Measurements: .14/.08/.30

## 2019-03-17 NOTE — CONSULTS
Cardiology Initial Consultation    Date of Service  3/17/2019    Referring Physician  Ariel Park M.D.    Reason for Consultation  Atrial fibrillation with rapid rates    History of Presenting Illness  Dorie Wadsworth is a 83 y.o. female with a past medical history of atrial fibrillation/flutter on anticoagulation seen by my partner as well as hypertension and hyperlipidemia who presented 3/13/2019 with intractable nausea and vomiting    NG tube placed surgical following -extensive abdominal surgical history  Small bowel obstruction slowly improving, no plans for surgery  When she came to the hospital she was in rapid atrial fibrillation.  She had been taking all her medication including her anticoagulation.  Very compliant  Denies cardiac symptoms including chest pain breathlessness or presyncope    Back in sinus rhythm on the 15th.  In the last 24 hours went back into rapid atrial fibrillation  Minimally symptomatic but has not been able to take oral medications easily.  Denies significant stressors    Review of Systems  Review of Systems   HENT: Negative for congestion, dental problem, trouble swallowing and voice change.    Eyes: Negative for discharge and itching.   Respiratory: Negative for apnea, chest tightness, shortness of breath, wheezing and stridor.    Cardiovascular: Negative for chest pain, palpitations and leg swelling.   Gastrointestinal: Negative for abdominal distention, abdominal pain, constipation, diarrhea, nausea and vomiting.   Endocrine: Negative for cold intolerance and heat intolerance.   Genitourinary: Negative for difficulty urinating and dysuria.   Musculoskeletal: Negative for arthralgias and back pain.   Allergic/Immunologic: Negative for environmental allergies.   Neurological: Negative for dizziness and weakness.   Hematological: Negative for adenopathy. Does not bruise/bleed easily.   Psychiatric/Behavioral: Negative for agitation, behavioral problems and sleep  disturbance. The patient is not nervous/anxious.    All other systems reviewed and are negative.      Past Medical History   has a past medical history of A-fib ([facility]); Ailment; Arthritis; Atrial fibrillation ([facility]) (2/2009); Atrial fibrillation, rapid ([facility]) (1/31/2014); Back pain; Bladder disease (2011); Bladder problem; CAD (coronary artery disease); Cancer ([facility]); CATARACT; CHEST PAIN (3/11/2011); Glaucoma; History of hematuria; Hyperlipidemia (2/24/2011); Hypertension; Hypothyroid (2/24/2011); Indigestion; Intrinsic eczema (10/31/2017); Other specified pre-operative examination (1/28/2014); Paroxysmal atrial fibrillation ([facility]); Sepsis urinary source (1/23/2012); Stroke ([facility]); T.I.A.; TIA (transient ischemic attack) (3/11/2011); Unspecified disorder of thyroid; Unspecified hemorrhagic conditions; Urinary bladder disorder; UTI (urinary tract infection) (2/24/2011); and Vaginal bleeding (10/22/2012). She also has no past medical history of COPD; Fall; Heart murmur; Psychiatric disorder; or Seizure disorder ([facility]).    Surgical History   has a past surgical history that includes bladder biopsy with cystoscopy (10/10/08); pyelogram (10/10/08); retrogrades (10/10/08); bladder biopsy with cystoscopy (6/15/2009); bladder biopsy with cystoscopy (10/12/2009); other abdominal surgery; cholecystectomy (1994); cholecystectomy (1994); gyn surgery; pelvic exam under anesthesia (10/12/2009); rectocele repair (11/3/2009); other; cystectomy (9/6/2011); ileo loop diversion (9/6/2011); biopsy general (10/22/2012); and parastomal hernia repair  (1/28/2014).    Family History  family history includes Stroke (age of onset: 77) in her father; Stroke (age of onset: 81) in her mother.    Social History   reports that she has never smoked. She has never used smokeless tobacco. She reports that she does not drink alcohol or use drugs.    Medications  Prior to Admission Medications   Prescriptions Last Dose Informant Patient Reported? Taking?  "  Bimatoprost (LUMIGAN) 0.01 % SOLN 3/13/2019 at 0800 Patient Yes No   Sig: Place 1 Drop in both eyes every morning. Both eyes   Biotin 10 MG CAPS 3/12/2019 at 0930 Patient Yes No   Sig: Take 1 Cap by mouth every day.   Brimonidine Tartrate-Timolol (COMBIGAN) 0.2-0.5 % Solution 3/13/2019 at 0800 Patient Yes No   Sig: Place 1 Drop in both eyes 2 Times a Day.   Multiple Vitamins-Minerals (CENTRUM SILVER ULTRA WOMENS) Tab 3/12/2019 at 0930 Patient Yes No   Sig: Take 1 Tab by mouth every day.   Multiple Vitamins-Minerals (PRESERVISION AREDS PO) 3/12/2019 at 1730 Patient Yes No   Sig: Take 1 Tab by mouth 2 Times a Day.   levothyroxine (SYNTHROID) 25 MCG Tab 3/12/2019 at 0700 Patient Yes Yes   Sig: Take 25 mcg by mouth Every morning on an empty stomach.   lovastatin (MEVACOR) 10 MG tablet 3/12/2019 at 0930 Patient Yes No   Sig: Take 10 mg by mouth every day.   metoprolol SR (TOPROL XL) 25 MG TABLET SR 24 HR 3/12/2019 at 0930 Patient Yes Yes   Sig: Take 12.5 mg by mouth every day.   pantoprazole (PROTONIX) 40 MG TBEC 3/12/2019 at 0900 Patient Yes No   Sig: Take 40 mg by mouth every day.      Facility-Administered Medications: None       Allergies  Allergies   Allergen Reactions   • Cardizem Swelling   • Doxycycline      Swollen lips.   • Sulfa Drugs      Makes tongue swell, severely   • Zyvox Swelling     \"Whole mouth swelled up\"    • Codeine      Does not remember the reaction         Vital signs in last 24 hours  Temp:  [36.5 °C (97.7 °F)-37.6 °C (99.7 °F)] 36.5 °C (97.7 °F)  Pulse:  [] 123  Resp:  [14-24] 17  BP: (120-149)/(83-99) 120/95  SpO2:  [92 %-97 %] 92 %    Physical Exam  Physical Exam   Constitutional: She is oriented to person, place, and time. She appears well-developed and well-nourished.   NAD, dry mucous membranes - NG in nares.  Talkative - pleasant   HENT:   Head: Normocephalic and atraumatic.   Eyes: Pupils are equal, round, and reactive to light. Conjunctivae and EOM are normal. No scleral " icterus.   Neck: Neck supple. No JVD present. No thyromegaly present.   Cardiovascular: Intact distal pulses.    No murmur heard.  Pulmonary/Chest: Effort normal and breath sounds normal. She exhibits no tenderness.   Abdominal: Soft. She exhibits no distension. There is no tenderness.   Musculoskeletal: She exhibits no edema or tenderness.   Lymphadenopathy:     She has no cervical adenopathy.   Neurological: She is alert and oriented to person, place, and time. No cranial nerve deficit.   Skin: Skin is warm and dry.   Psychiatric: She has a normal mood and affect. Her behavior is normal.       Lab Review  Lab Results   Component Value Date/Time    WBC 11.4 (H) 03/17/2019 01:55 AM    RBC 4.68 03/17/2019 01:55 AM    HEMOGLOBIN 11.8 (L) 03/17/2019 01:55 AM    HEMATOCRIT 38.3 03/17/2019 01:55 AM    MCV 81.8 03/17/2019 01:55 AM    MCH 25.2 (L) 03/17/2019 01:55 AM    MCHC 30.8 (L) 03/17/2019 01:55 AM    MPV 12.0 03/17/2019 01:55 AM      Lab Results   Component Value Date/Time    SODIUM 140 03/17/2019 01:55 AM    POTASSIUM 3.7 03/17/2019 01:55 AM    CHLORIDE 112 03/17/2019 01:55 AM    CO2 19 (L) 03/17/2019 01:55 AM    GLUCOSE 87 03/17/2019 01:55 AM    BUN 23 (H) 03/17/2019 01:55 AM    CREATININE 0.89 03/17/2019 01:55 AM    CREATININE 1.2 02/10/2009 03:50 PM      Lab Results   Component Value Date/Time    ASTSGOT 19 03/17/2019 01:55 AM    ALTSGPT 10 03/17/2019 01:55 AM     Lab Results   Component Value Date/Time    CHOLSTRLTOT 139 08/29/2018 10:10 AM    LDL 75 08/29/2018 10:10 AM    HDL 50 08/29/2018 10:10 AM    TRIGLYCERIDE 70 08/29/2018 10:10 AM    TROPONINI 0.08 (H) 03/16/2019 02:22 AM             Cardiac Imaging and Procedures Review  EKG:  My personal interpretation of the EKG dated March 13 and 15 shows rapid atrial fibrillation and then normal sinus rhythm with a normal EKG  Echocardiogram: March 14 ejection fraction 70% no significant valve disease pulmonary pressure about 50 mmHg      Imaging  Chest X-Ray: No  infiltrate or effusion    Stress Test: Normal 2011    Assessment/Plan    A. fib - RVR  Persistent was sinus rhythm within the last 72 hours  Compliant on her anticoagulation  Rhythm control strategy reasonable with her n.p.o. status and significant stressors including bowel obstruction  Significant stroke risk score long-term.  Resume anticoagulation when surgically deemed appropriate    Indeterminate troponin  Not even close to the level of myocardial infarction  Shows stress on the heart, incidental  A. fib and bowel control as above are paramount    Elevated pulmonary pressure  Asymptomatic  No evidence of heart failure, rate control appropriate  Follow long-term    Discussed with primary hospitalist this morning and nursing team, will follow along as warranted during her hospital stay  Thank you for allowing me to participate in the care of this patient.  Please contact me with any questions.    Verna Berry M.D.   Cardiologist, [facility]  [phone]

## 2019-03-17 NOTE — CARE PLAN
Problem: Communication  Goal: The ability to communicate needs accurately and effectively will improve  Outcome: PROGRESSING AS EXPECTED    Intervention: [place] patient and significant other/support system to call light to alert staff of needs  Patient oriented to call light system and has been able to communicate needs accurately and effectively.      Problem: Safety  Goal: Will remain free from falls  Outcome: PROGRESSING AS EXPECTED    Intervention: Assess risk factors for falls  Patient has been assessed for fall risks and fall precautions have been put in place.

## 2019-03-17 NOTE — PROGRESS NOTES
Surgical Progress Note    Author: Jamaal Conley Date & Time created: 3/17/2019   10:08 AM     Interval Events:    Review of Systems   Respiratory: Negative.    Cardiovascular: Negative.    Gastrointestinal: Negative for abdominal pain, heartburn, nausea and vomiting.        No flatus or stool yet     Hemodynamics:  Temp (24hrs), Av.2 °C (99 °F), Min:36.5 °C (97.7 °F), Max:37.6 °C (99.7 °F)  Temperature: 36.5 °C (97.7 °F)  Pulse  Av.3  Min: 82  Max: 151   Blood Pressure : 120/95     Respiratory:    Respiration: 17, Pulse Oximetry: 92 %           Neuro:  GCS       Fluids:    Intake/Output Summary (Last 24 hours) at 19 1008  Last data filed at 19 0832   Gross per 24 hour   Intake                0 ml   Output              975 ml   Net             -975 ml        Current Diet Order   Procedures   • Diet NPO     Physical Exam   Abdominal: Soft. She exhibits no distension. There is no tenderness.   No bowel sounds. Xrays still pending     Labs:  Recent Results (from the past 24 hour(s))   CBC WITH DIFFERENTIAL    Collection Time: 19  1:55 AM   Result Value Ref Range    WBC 11.4 (H) 4.8 - 10.8 K/uL    RBC 4.68 4.20 - 5.40 M/uL    Hemoglobin 11.8 (L) 12.0 - 16.0 g/dL    Hematocrit 38.3 37.0 - 47.0 %    MCV 81.8 81.4 - 97.8 fL    MCH 25.2 (L) 27.0 - 33.0 pg    MCHC 30.8 (L) 33.6 - 35.0 g/dL    RDW 50.1 (H) 35.9 - 50.0 fL    Platelet Count 185 164 - 446 K/uL    MPV 12.0 9.0 - 12.9 fL    Neutrophils-Polys 62.30 44.00 - 72.00 %    Lymphocytes 17.20 (L) 22.00 - 41.00 %    Monocytes 18.80 (H) 0.00 - 13.40 %    Eosinophils 0.50 0.00 - 6.90 %    Basophils 0.40 0.00 - 1.80 %    Immature Granulocytes 0.80 0.00 - 0.90 %    Nucleated RBC 0.00 /100 WBC    Neutrophils (Absolute) 7.08 2.00 - 7.15 K/uL    Lymphs (Absolute) 1.96 1.00 - 4.80 K/uL    Monos (Absolute) 2.14 (H) 0.00 - 0.85 K/uL    Eos (Absolute) 0.06 0.00 - 0.51 K/uL    Baso (Absolute) 0.04 0.00 - 0.12 K/uL    Immature Granulocytes (abs) 0.09 0.00 -  0.11 K/uL    NRBC (Absolute) 0.00 K/uL   Comp Metabolic Panel    Collection Time: 03/17/19  1:55 AM   Result Value Ref Range    Sodium 140 135 - 145 mmol/L    Potassium 3.7 3.6 - 5.5 mmol/L    Chloride 112 96 - 112 mmol/L    Co2 19 (L) 20 - 33 mmol/L    Anion Gap 9.0 0.0 - 11.9    Glucose 87 65 - 99 mg/dL    Bun 23 (H) 8 - 22 mg/dL    Creatinine 0.89 0.50 - 1.40 mg/dL    Calcium 8.0 (L) 8.5 - 10.5 mg/dL    AST(SGOT) 19 12 - 45 U/L    ALT(SGPT) 10 2 - 50 U/L    Alkaline Phosphatase 86 30 - 99 U/L    Total Bilirubin 0.7 0.1 - 1.5 mg/dL    Albumin 2.8 (L) 3.2 - 4.9 g/dL    Total Protein 6.3 6.0 - 8.2 g/dL    Globulin 3.5 1.9 - 3.5 g/dL    A-G Ratio 0.8 g/dL   MAGNESIUM    Collection Time: 03/17/19  1:55 AM   Result Value Ref Range    Magnesium 1.8 1.5 - 2.5 mg/dL   ESTIMATED GFR    Collection Time: 03/17/19  1:55 AM   Result Value Ref Range    GFR If African American >60 >60 mL/min/1.73 m 2    GFR If Non African American >60 >60 mL/min/1.73 m 2     Medical Decision Making, by Problem:  Active Hospital Problems    Diagnosis   • Atrial fibrillation with RVR ([facility]) [I48.91]     Priority: High   • Small bowel obstruction (HCC) [K56.609]     Priority: High   • MARSHALL (acute kidney injury) ([facility]) [N17.9]     Priority: High   • Hypomagnesemia [E83.42]     Priority: Medium   • CKD (chronic kidney disease) stage 3, GFR 30-59 ml/min ([facility]) [N18.3]     Priority: Medium   • Elevated troponin [R74.8]     Priority: Medium   • Cirrhosis (HCC) [K74.60]     Priority: Medium   • Lactic acidosis [E87.2]     Priority: Medium   • SIRS (systemic inflammatory response syndrome) ([facility]) [R65.10]     Priority: Medium   • Ileostomy in place ([facility]) [Z93.2]     Priority: Medium   • Leukocytosis [D72.829]     Priority: Medium   • HTN (hypertension) [I10]     Priority: Medium   • Microcytic anemia [D50.9]     Priority: Low   • Acquired hypothyroidism [E03.9]     Priority: Low     Plan:  Stable, wbc up slightly, if xrays show no improvement or are worse will  need to get SBFT.    Quality Measures:  Quality-Core Measures    Discussed patient condition with Patient

## 2019-03-18 ENCOUNTER — APPOINTMENT (OUTPATIENT)
Dept: RADIOLOGY | Facility: MEDICAL CENTER | Age: 84
DRG: 389 | End: 2019-03-18
Attending: SURGERY
Payer: MEDICARE

## 2019-03-18 PROBLEM — I48.91 ATRIAL FIBRILLATION WITH RAPID VENTRICULAR RESPONSE (HCC): Status: ACTIVE | Noted: 2019-03-18

## 2019-03-18 PROBLEM — I10 HTN (HYPERTENSION), MALIGNANT: Status: ACTIVE | Noted: 2019-03-18

## 2019-03-18 PROBLEM — E87.6 HYPOKALEMIA: Status: ACTIVE | Noted: 2019-03-18

## 2019-03-18 LAB
ALBUMIN SERPL BCP-MCNC: 2.9 G/DL (ref 3.2–4.9)
ALBUMIN/GLOB SERPL: 0.8 G/DL
ALP SERPL-CCNC: 90 U/L (ref 30–99)
ALT SERPL-CCNC: 9 U/L (ref 2–50)
ANION GAP SERPL CALC-SCNC: 10 MMOL/L (ref 0–11.9)
AST SERPL-CCNC: 23 U/L (ref 12–45)
BASOPHILS # BLD AUTO: 0.5 % (ref 0–1.8)
BASOPHILS # BLD: 0.06 K/UL (ref 0–0.12)
BILIRUB SERPL-MCNC: 0.6 MG/DL (ref 0.1–1.5)
BUN SERPL-MCNC: 23 MG/DL (ref 8–22)
CALCIUM SERPL-MCNC: 7.9 MG/DL (ref 8.5–10.5)
CHLORIDE SERPL-SCNC: 112 MMOL/L (ref 96–112)
CO2 SERPL-SCNC: 17 MMOL/L (ref 20–33)
CREAT SERPL-MCNC: 0.79 MG/DL (ref 0.5–1.4)
EOSINOPHIL # BLD AUTO: 0.11 K/UL (ref 0–0.51)
EOSINOPHIL NFR BLD: 0.9 % (ref 0–6.9)
ERYTHROCYTE [DISTWIDTH] IN BLOOD BY AUTOMATED COUNT: 51.1 FL (ref 35.9–50)
GLOBULIN SER CALC-MCNC: 3.6 G/DL (ref 1.9–3.5)
GLUCOSE SERPL-MCNC: 105 MG/DL (ref 65–99)
HCT VFR BLD AUTO: 44.1 % (ref 37–47)
HGB BLD-MCNC: 13.2 G/DL (ref 12–16)
IMM GRANULOCYTES # BLD AUTO: 0.17 K/UL (ref 0–0.11)
IMM GRANULOCYTES NFR BLD AUTO: 1.5 % (ref 0–0.9)
LYMPHOCYTES # BLD AUTO: 2.05 K/UL (ref 1–4.8)
LYMPHOCYTES NFR BLD: 17.6 % (ref 22–41)
MAGNESIUM SERPL-MCNC: 2 MG/DL (ref 1.5–2.5)
MCH RBC QN AUTO: 24.7 PG (ref 27–33)
MCHC RBC AUTO-ENTMCNC: 29.9 G/DL (ref 33.6–35)
MCV RBC AUTO: 82.6 FL (ref 81.4–97.8)
MONOCYTES # BLD AUTO: 1.92 K/UL (ref 0–0.85)
MONOCYTES NFR BLD AUTO: 16.4 % (ref 0–13.4)
NEUTROPHILS # BLD AUTO: 7.37 K/UL (ref 2–7.15)
NEUTROPHILS NFR BLD: 63.1 % (ref 44–72)
NRBC # BLD AUTO: 0 K/UL
NRBC BLD-RTO: 0 /100 WBC
PHOSPHATE SERPL-MCNC: 1.9 MG/DL (ref 2.5–4.5)
PLATELET # BLD AUTO: 225 K/UL (ref 164–446)
PMV BLD AUTO: 11.4 FL (ref 9–12.9)
POTASSIUM SERPL-SCNC: 3.3 MMOL/L (ref 3.6–5.5)
PROT SERPL-MCNC: 6.5 G/DL (ref 6–8.2)
RBC # BLD AUTO: 5.34 M/UL (ref 4.2–5.4)
SODIUM SERPL-SCNC: 139 MMOL/L (ref 135–145)
WBC # BLD AUTO: 11.7 K/UL (ref 4.8–10.8)

## 2019-03-18 PROCEDURE — 700102 HCHG RX REV CODE 250 W/ 637 OVERRIDE(OP): Performed by: INTERNAL MEDICINE

## 2019-03-18 PROCEDURE — 84100 ASSAY OF PHOSPHORUS: CPT

## 2019-03-18 PROCEDURE — A9270 NON-COVERED ITEM OR SERVICE: HCPCS | Performed by: INTERNAL MEDICINE

## 2019-03-18 PROCEDURE — 700101 HCHG RX REV CODE 250: Performed by: FAMILY MEDICINE

## 2019-03-18 PROCEDURE — 700111 HCHG RX REV CODE 636 W/ 250 OVERRIDE (IP): Performed by: FAMILY MEDICINE

## 2019-03-18 PROCEDURE — 700105 HCHG RX REV CODE 258: Performed by: FAMILY MEDICINE

## 2019-03-18 PROCEDURE — 700102 HCHG RX REV CODE 250 W/ 637 OVERRIDE(OP): Performed by: SURGERY

## 2019-03-18 PROCEDURE — 700111 HCHG RX REV CODE 636 W/ 250 OVERRIDE (IP): Performed by: NURSE PRACTITIONER

## 2019-03-18 PROCEDURE — 700102 HCHG RX REV CODE 250 W/ 637 OVERRIDE(OP): Performed by: FAMILY MEDICINE

## 2019-03-18 PROCEDURE — A9270 NON-COVERED ITEM OR SERVICE: HCPCS | Performed by: SURGERY

## 2019-03-18 PROCEDURE — 700105 HCHG RX REV CODE 258: Performed by: HOSPITALIST

## 2019-03-18 PROCEDURE — 770020 HCHG ROOM/CARE - TELE (206)

## 2019-03-18 PROCEDURE — 99233 SBSQ HOSP IP/OBS HIGH 50: CPT | Performed by: FAMILY MEDICINE

## 2019-03-18 PROCEDURE — 36415 COLL VENOUS BLD VENIPUNCTURE: CPT

## 2019-03-18 PROCEDURE — 83735 ASSAY OF MAGNESIUM: CPT

## 2019-03-18 PROCEDURE — 85025 COMPLETE CBC W/AUTO DIFF WBC: CPT

## 2019-03-18 PROCEDURE — 80053 COMPREHEN METABOLIC PANEL: CPT

## 2019-03-18 PROCEDURE — A9270 NON-COVERED ITEM OR SERVICE: HCPCS | Performed by: FAMILY MEDICINE

## 2019-03-18 PROCEDURE — 700105 HCHG RX REV CODE 258: Performed by: NURSE PRACTITIONER

## 2019-03-18 PROCEDURE — 99233 SBSQ HOSP IP/OBS HIGH 50: CPT | Performed by: INTERNAL MEDICINE

## 2019-03-18 RX ORDER — METOPROLOL SUCCINATE 25 MG/1
25 TABLET, EXTENDED RELEASE ORAL
Status: DISCONTINUED | OUTPATIENT
Start: 2019-03-18 | End: 2019-03-18

## 2019-03-18 RX ORDER — LEVOTHYROXINE SODIUM 0.03 MG/1
25 TABLET ORAL
Status: DISCONTINUED | OUTPATIENT
Start: 2019-03-18 | End: 2019-03-20 | Stop reason: HOSPADM

## 2019-03-18 RX ORDER — DEXTROSE MONOHYDRATE, SODIUM CHLORIDE, AND POTASSIUM CHLORIDE 50; 1.49; 9 G/1000ML; G/1000ML; G/1000ML
INJECTION, SOLUTION INTRAVENOUS CONTINUOUS
Status: DISCONTINUED | OUTPATIENT
Start: 2019-03-18 | End: 2019-03-19

## 2019-03-18 RX ORDER — AMIODARONE HYDROCHLORIDE 200 MG/1
400 TABLET ORAL TWICE DAILY
Status: DISCONTINUED | OUTPATIENT
Start: 2019-03-18 | End: 2019-03-20 | Stop reason: HOSPADM

## 2019-03-18 RX ORDER — METOPROLOL TARTRATE 50 MG/1
100 TABLET, FILM COATED ORAL EVERY 8 HOURS
Status: DISCONTINUED | OUTPATIENT
Start: 2019-03-18 | End: 2019-03-20 | Stop reason: HOSPADM

## 2019-03-18 RX ORDER — MAGNESIUM SULFATE HEPTAHYDRATE 40 MG/ML
2 INJECTION, SOLUTION INTRAVENOUS ONCE
Status: COMPLETED | OUTPATIENT
Start: 2019-03-18 | End: 2019-03-18

## 2019-03-18 RX ORDER — METOPROLOL TARTRATE 50 MG/1
50 TABLET, FILM COATED ORAL EVERY 8 HOURS
Status: DISCONTINUED | OUTPATIENT
Start: 2019-03-18 | End: 2019-03-18

## 2019-03-18 RX ADMIN — POTASSIUM CHLORIDE, DEXTROSE MONOHYDRATE AND SODIUM CHLORIDE: 150; 5; 900 INJECTION, SOLUTION INTRAVENOUS at 09:15

## 2019-03-18 RX ADMIN — LEVOTHYROXINE SODIUM 25 MCG: 25 TABLET ORAL at 10:22

## 2019-03-18 RX ADMIN — TIMOLOL MALEATE 1 DROP: 5 SOLUTION OPHTHALMIC at 05:00

## 2019-03-18 RX ADMIN — AMIODARONE HYDROCHLORIDE 400 MG: 200 TABLET ORAL at 21:29

## 2019-03-18 RX ADMIN — METOPROLOL TARTRATE 100 MG: 50 TABLET ORAL at 13:32

## 2019-03-18 RX ADMIN — TIMOLOL MALEATE 1 DROP: 5 SOLUTION OPHTHALMIC at 17:42

## 2019-03-18 RX ADMIN — SODIUM CHLORIDE: 9 INJECTION, SOLUTION INTRAVENOUS at 05:42

## 2019-03-18 RX ADMIN — AMIODARONE HYDROCHLORIDE 0.5 MG/MIN: 50 INJECTION, SOLUTION INTRAVENOUS at 11:42

## 2019-03-18 RX ADMIN — MAGNESIUM SULFATE IN WATER 2 G: 40 INJECTION, SOLUTION INTRAVENOUS at 09:14

## 2019-03-18 RX ADMIN — HYDRALAZINE HYDROCHLORIDE 10 MG: 20 INJECTION INTRAMUSCULAR; INTRAVENOUS at 10:23

## 2019-03-18 RX ADMIN — MAGNESIUM HYDROXIDE 30 ML: 400 SUSPENSION ORAL at 09:39

## 2019-03-18 RX ADMIN — BRIMONIDINE TARTRATE 1 DROP: 2 SOLUTION OPHTHALMIC at 05:00

## 2019-03-18 RX ADMIN — LATANOPROST 1 DROP: 50 SOLUTION OPHTHALMIC at 17:42

## 2019-03-18 RX ADMIN — METOPROLOL TARTRATE 100 MG: 50 TABLET ORAL at 21:29

## 2019-03-18 RX ADMIN — POTASSIUM PHOSPHATE, MONOBASIC AND POTASSIUM PHOSPHATE, DIBASIC 30 MMOL: 224; 236 INJECTION, SOLUTION INTRAVENOUS at 11:42

## 2019-03-18 RX ADMIN — HYALURONIDASE (HUMAN RECOMBINANT) 150 UNITS: 150 INJECTION, SOLUTION SUBCUTANEOUS at 13:33

## 2019-03-18 RX ADMIN — BRIMONIDINE TARTRATE 1 DROP: 2 SOLUTION OPHTHALMIC at 17:41

## 2019-03-18 RX ADMIN — MAGNESIUM HYDROXIDE 30 ML: 400 SUSPENSION ORAL at 20:17

## 2019-03-18 ASSESSMENT — ENCOUNTER SYMPTOMS
FEVER: 0
BLOOD IN STOOL: 0
PALPITATIONS: 0
CHILLS: 0
NAUSEA: 0
VOMITING: 0
FATIGUE: 1
SHORTNESS OF BREATH: 0
WEAKNESS: 1
CHEST TIGHTNESS: 0
BLURRED VISION: 0
HEARTBURN: 0
ABDOMINAL PAIN: 0
SORE THROAT: 0
WHEEZING: 0
HEADACHES: 0
DIARRHEA: 0
DIZZINESS: 0
NERVOUS/ANXIOUS: 0
BACK PAIN: 0
COUGH: 0
NECK PAIN: 0

## 2019-03-18 ASSESSMENT — PATIENT HEALTH QUESTIONNAIRE - PHQ9
2. FEELING DOWN, DEPRESSED, IRRITABLE, OR HOPELESS: NOT AT ALL
1. LITTLE INTEREST OR PLEASURE IN DOING THINGS: NOT AT ALL
SUM OF ALL RESPONSES TO PHQ9 QUESTIONS 1 AND 2: 0

## 2019-03-18 NOTE — PROGRESS NOTES
Chart reviewed for AMI and HF quality measures. Neither diagnosis applicable at this time, patient is being followed by cardiology for AF c RVR; Indeterminate troponin, due to stress on heart; and elevated pulmonary pressure, no evidence of HF.     Please place a Consult Nurse Navigator order in the HF order set should AMI or HF become an active diagnosis.    Nina CARTER RN, [employer] ext. [phone] M-F

## 2019-03-18 NOTE — PROGRESS NOTES
Progress Note    Author:  Paola Goyal MD    Date & Time:   3/18/2019   7:31 AM          Patient ID:             Name:             Dorie Wadsworth   YOB: 1935  Age:                 83 y.o.  female   MRN:               [id number]    ________________________________________________________________________      Interval Events:         Patient reports she has been passing a lot of gas since yesterday evening  And had one small bm    Denies abdominal pain       Exam:       SpO2  Min: 90 %  Max: 99 %  O2 (LPM)  Min: 0  Max: 0  NIBP  Min: 116/44  Max: 153/71  Heart Rate (Monitored)  Min: 86  Max: 104  Temp  Min: 35.9 °C (96.7 °F)  Max: 37.6 °C (99.7 °F)    Intake/Output Summary (Last 24 hours) at 03/18/19 0731  Last data filed at 03/17/19 1638   Gross per 24 hour   Intake                0 ml   Output              700 ml   Net             -700 ml       DIET ORDERS (Through next 24h)    Start     Ordered    03/18/19 0731  Diet Order Clear Liquids - No Red Foods (sips of clears, hold for nausea/vomiting)  ALL MEALS     Question:  Diet:  Answer:  Clear Liquids - No Red Foods  Comment:  sips of clears, hold for nausea/vomiting    03/18/19 0730            Physical Exam  GENERAL:  Alert and oriented x 3. NAD, normal respiratory effort  CV: Extremities warm no edema.   Lungs: Clear to auscultation bilaterally.    Abd: Soft, Non-tender, non-distended. Normal bowel sounds.  Incision c/d/i without sign of infection.          Recent Labs      03/16/19   0749  03/17/19   0155  03/18/19   0304   SODIUM  144  140  139   POTASSIUM  4.8  3.7  3.3*   CHLORIDE  113*  112  112   CO2  25  19*  17*   BUN  30*  23*  23*   CREATININE  0.98  0.89  0.79   MAGNESIUM   --   1.8  2.0   PHOSPHORUS   --    --   1.9*   CALCIUM  8.3*  8.0*  7.9*       Recent Labs      03/16/19   0749  03/17/19   0155  03/18/19   0304   ALTSGPT   --   10  9   ASTSGOT   --   19  23   ALKPHOSPHAT   --   86  90   TBILIRUBIN   --   0.7  0.6   GLUCOSE   101*  87  105*       Recent Labs      03/15/19   1312  03/16/19   0007  03/16/19   0008  03/16/19   0222  03/16/19   0749  03/17/19   0155  03/18/19   0304   RBC   --    --    --    --   4.47  4.68  5.34   HEMOGLOBIN   --    --    --    --   11.1*  11.8*  13.2   HEMATOCRIT   --    --    --    --   37.4  38.3  44.1   PLATELETCT   --    --    --    --   134*  185  225   APTT  31.4   --   205.7*   --   146.0*   --    --    IRON   --    --    --   19*   --    --    --    FERRITIN   --   33.9   --    --    --    --    --    TOTIRONBC   --    --    --   374   --    --    --        Recent Labs      03/16/19   0749  03/17/19   0155  03/18/19   0304   WBC  10.0  11.4*  11.7*   NEUTSPOLYS  63.50  62.30  63.10   LYMPHOCYTES  19.10*  17.20*  17.60*   MONOCYTES  14.80*  18.80*  16.40*   EOSINOPHILS  0.00  0.50  0.90   BASOPHILS  0.90  0.40  0.50   ASTSGOT   --   19  23   ALTSGPT   --   10  9   ALKPHOSPHAT   --   86  90   TBILIRUBIN   --   0.7  0.6         ________________________________________________________________________      Patient Active Problem List   Diagnosis   • UTI (urinary tract infection)   • Acquired hypothyroidism   • Hypertension   • Dyslipidemia   • Pyelonephritis   • TIA (transient ischemic attack)   • Vaginal bleeding   • History of hematuria   • MRSA carrier   • 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   • Anticoagulant long-term use   • Enteritis   • Urinary tract infection   • Leukocytosis   • HTN (hypertension)   • Long term (current) use of anticoagulants [Z79.01]   • Seborrheic keratoses   • Intrinsic eczema   • Microcytic anemia   • CKD (chronic kidney disease), stage II   • Hypophosphatemia   • Small bowel obstruction (HCC)   • MARSHALL (acute kidney injury) (HCC)   • Cirrhosis (HCC)   • Lactic acidosis   • SIRS (systemic inflammatory response syndrome) (HCC)   • Ileostomy in place (HCC)   • Atrial fibrillation with RVR (HCC)   • Elevated troponin   • CKD (chronic kidney disease) stage 3, GFR 30-59 ml/min (HCC)   • Hypomagnesemia        Plan:  Hold of of SBFT  Nasogastric tube removed  PO trial with sips of clears  ambulate

## 2019-03-18 NOTE — DIETARY
"Nutrition services: Day 5 of admit.  Dorie Wadsworth is a 83 y.o. female with admitting DX of small bowel obstruction.   Pt with poor PO intake pta per nutrition admit screen, and now NPO/clear liquids x 5 days.     Assessment:  Height: 173 cm (5' 8.11\")  Weight: 74 kg (163 lb 2.3 oz)  Body mass index is 24.73 kg/m².  Diet/Intake: clear liquids    Evaluation:    1. 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 Presented this admit with 3-day hx of worsening abdominal pain and emesis  3. Per today's surgical note, pt has been passing a lot of gas and had one small BM  4. Hold off on SBFT per surgery - NG tube was removed and clear liquids started  5. MAR: K-phos (for K 3.3 and phos 1.9 this morning)  6. Fluids: D5 with NaCl and KCl @ 75 ml/hr (306 kcal/d)  7. Iron 19 (3/16)  8. Weight is up 1.7 kg from admit wt - likely r/t fluid status    Malnutrition Risk: Pt at risk for acute injury/illness related malnutrition d/t </= 50% of estimated energy needs for >/= 5 days (NPO/clears x 5 days). However does not meet further criteria at this time.     Recommendations/Plan:  1. Advance diet beyond clear liquids per MD   2. RD to monitor for diet advancement with adequate intake vs need for nutrition support    RD to follow           "

## 2019-03-18 NOTE — CARE PLAN
Problem: Nutritional:  Goal: Achieve adequate nutritional intake  Diet will advance beyond NPO/clears and patient will consume >50% of meals vs nutrition support to start   Outcome: NOT MET

## 2019-03-18 NOTE — PROGRESS NOTES
Hospital Medicine Daily Progress Note    Date of Service  3/18/2019    Chief Complaint  83 y.o. female admitted 3/13/2019 with Small bowel obstruction    Hospital Course  Admitted with small bowel obstruction, acute kidney injury, known history of paroxysmal atrial fibrillation, had episode of atrial fibrillation rapid ventricular rate.    Interval Problem Update  SBO - pt states she had a BM yesterday, NGT removed  MARSHALL -  crea trending down  A. Fib - rate 130s  HTN - sbp 160s  Low magnesium, potassium and phosphorus    Consultants/Specialty  Surgery  Cardiology    Code Status  Full    Disposition  TBD    Review of Systems  Review of Systems   Constitutional: Positive for malaise/fatigue. Negative for chills and fever.   HENT: Negative for hearing loss and sore throat.    Eyes: Negative for blurred vision.   Respiratory: Negative for cough, shortness of breath and wheezing.    Cardiovascular: Negative for chest pain, palpitations and leg swelling.   Gastrointestinal: Negative for abdominal pain, diarrhea, heartburn, nausea and vomiting.   Genitourinary: Negative for dysuria.   Musculoskeletal: Negative for back pain and neck pain.   Skin: Negative for rash.   Neurological: Positive for weakness. Negative for dizziness and headaches.   Psychiatric/Behavioral: The patient is not nervous/anxious.         Physical Exam  Temp:  [36.4 °C (97.6 °F)-37.4 °C (99.4 °F)] 36.7 °C (98 °F)  Pulse:  [106-130] 126  Resp:  [17-18] 17  BP: (122-180)/() 167/106  SpO2:  [95 %-97 %] 96 %    Physical Exam   Constitutional: She is oriented to person, place, and time. She appears well-developed and well-nourished.   HENT:   Head: Normocephalic and atraumatic.   Eyes: Pupils are equal, round, and reactive to light. Conjunctivae are normal.   Neck: No tracheal deviation present. No thyromegaly present.   Cardiovascular: An irregularly irregular rhythm present. Tachycardia present.    Pulmonary/Chest: Effort normal and breath sounds  normal.   Abdominal: She exhibits distension. There is no tenderness. There is no rebound and no guarding.   ostomy   Musculoskeletal: She exhibits edema.   Lymphadenopathy:     She has no cervical adenopathy.   Neurological: She is alert and oriented to person, place, and time.   Skin: Skin is warm and dry.   Nursing note and vitals reviewed.      Fluids    Intake/Output Summary (Last 24 hours) at 03/18/19 1003  Last data filed at 03/17/19 1638   Gross per 24 hour   Intake                0 ml   Output              300 ml   Net             -300 ml       Laboratory  Recent Labs      03/16/19   0749  03/17/19   0155  03/18/19   0304   WBC  10.0  11.4*  11.7*   RBC  4.47  4.68  5.34   HEMOGLOBIN  11.1*  11.8*  13.2   HEMATOCRIT  37.4  38.3  44.1   MCV  83.7  81.8  82.6   MCH  24.8*  25.2*  24.7*   MCHC  29.7*  30.8*  29.9*   RDW  52.1*  50.1*  51.1*   PLATELETCT  134*  185  225   MPV  11.2  12.0  11.4     Recent Labs      03/16/19   0749  03/17/19   0155  03/18/19   0304   SODIUM  144  140  139   POTASSIUM  4.8  3.7  3.3*   CHLORIDE  113*  112  112   CO2  25  19*  17*   GLUCOSE  101*  87  105*   BUN  30*  23*  23*   CREATININE  0.98  0.89  0.79   CALCIUM  8.3*  8.0*  7.9*     Recent Labs      03/15/19   1312  03/16/19   0008  03/16/19   0749   APTT  31.4  205.7*  146.0*               Imaging  [id number] [id number]   Final Result      1.  Small bowel dilation consistent with small bowel obstruction. Probably no interval change      2.  Enteric catheter tip projects over the stomach      EC-ECHOCARDIOGRAM COMPLETE W/O CONT   Final Result      DX-CHEST-LIMITED (1 VIEW)   Final Result      1.  Hypoinflation with mild bibasilar atelectasis.   2.  Supportive tubing as described above.      US-RUQ   Final Result      1.  Prior cholecystectomy. No biliary dilation.      2.  Cirrhosis      3.  No other finding      CT-RENAL COLIC EVALUATION(A/P W/O)   Final Result      1.  High-grade distal small bowel obstruction      2.   Cirrhosis with associated portal hypertension      3.  Right lower quadrant ostomy present.      4.  Abdominal aortic atherosclerotic plaque      5.  Prior hysterectomy         No renal or ureteral calculus or obstruction.           Assessment/Plan  * Small bowel obstruction (HCC)- (present on admission)   Assessment & Plan    Started on Clear liquids  IVF hydration     Atrial fibrillation with RVR (HCC)- (present on admission)   Assessment & Plan    Paroxysmal  Amiodarone drip  Start Metoprolol  IV metoprolol prn  Hold anticoagulation - bleeding ostomy site     MARSHALL (acute kidney injury) (HCC)- (present on admission)   Assessment & Plan    IVF hydration, follow BMP     Hypokalemia   Assessment & Plan    IV KCl, follow bmp     Hypomagnesemia- (present on admission)   Assessment & Plan    IV Mg  Recheck level tomorrow     CKD (chronic kidney disease) stage 3, GFR 30-59 ml/min (HCC)- (present on admission)   Assessment & Plan    Follow bmp     Elevated troponin- (present on admission)   Assessment & Plan    Likely demand ischemia  Echocardiogram reviewed     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       Hypophosphatemia- (present on admission)   Assessment & Plan    IV Kphos, recheck level tomorrow     HTN (hypertension)- (present on admission)   Assessment & Plan    Start Metoprolol  IV hydralazine as needed     Leukocytosis- (present on admission)   Assessment & Plan    Follow CBC     Microcytic anemia- (present on admission)   Assessment & Plan    Follow CBC     Acquired hypothyroidism- (present on admission)   Assessment & Plan    Resume levothyroxine           VTE prophylaxis:  SCD

## 2019-03-18 NOTE — PROGRESS NOTES
Cardiology Follow Up Progress Note    Date of Service  3/18/2019    Attending Physician  Ariel Park M.D.    Chief Complaint   Intractable nausea and vomiting.    Consulted for atrial fibrillation with RVR.    HPI  Dorie Wadsworth is a 83 y.o. female admitted 3/13/2019 with intractable nausea and vomiting.  Abdominal x-ray showed small bowel dilation consistent with small bowel obstruction.  Patient was also found to be in atrial fibrillation with RVR.    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    Interim Events  3/18/19: Patient states that she is feeling better. Tolerating liquid diet. No significant evets overnight. Denies chest pain, palpitations or shortness of breath.     Monitor: A-Fib 106-135 with rare PVC's and Couplets    Labs:  WBC 11.7  Mg 2.0, K 3.3, Ca 7.9, Bun 23, Cr 0.79, GFR >60    Review of Systems  Review of Systems   Constitutional: Positive for fatigue. Negative for fever.   Respiratory: Negative for chest tightness and shortness of breath.    Cardiovascular: Negative for chest pain, palpitations and leg swelling.   Gastrointestinal: Negative for abdominal pain and blood in stool.   Genitourinary: Negative for hematuria.   Musculoskeletal: Negative for gait problem.   Neurological: Positive for weakness. Negative for dizziness and syncope.   All other systems reviewed and are negative.      Vital signs in last 24 hours  Temp:  [36.4 °C (97.6 °F)-37.4 °C (99.4 °F)] 36.7 °C (98 °F)  Pulse:  [106-130] 126  Resp:  [17-18] 17  BP: (122-180)/() 180/106  SpO2:  [95 %-97 %] 96 %    Physical Exam  Physical Exam   Constitutional: She is oriented to person, place, and time. She appears well-developed and well-nourished.   HENT:   Head: Normocephalic.   Eyes: EOM are normal.   Neck: No JVD present.    Cardiovascular: Normal heart sounds and intact distal pulses.  An irregularly irregular rhythm present. Tachycardia present.    Pulmonary/Chest: Effort normal and breath sounds normal.   Abdominal: Soft. There is no tenderness.   Genitourinary:   Genitourinary Comments: Urostomy    Musculoskeletal: Normal range of motion. She exhibits no edema.   Neurological: She is alert and oriented to person, place, and time.   Skin: Skin is warm and dry.   Psychiatric: She has a normal mood and affect. Her behavior is normal. Thought content normal.   Nursing note and vitals reviewed.      Lab Review  Lab Results   Component Value Date/Time    WBC 11.7 (H) 03/18/2019 03:04 AM    RBC 5.34 03/18/2019 03:04 AM    HEMOGLOBIN 13.2 03/18/2019 03:04 AM    HEMATOCRIT 44.1 03/18/2019 03:04 AM    MCV 82.6 03/18/2019 03:04 AM    MCH 24.7 (L) 03/18/2019 03:04 AM    MCHC 29.9 (L) 03/18/2019 03:04 AM    MPV 11.4 03/18/2019 03:04 AM      Lab Results   Component Value Date/Time    SODIUM 139 03/18/2019 03:04 AM    POTASSIUM 3.3 (L) 03/18/2019 03:04 AM    CHLORIDE 112 03/18/2019 03:04 AM    CO2 17 (L) 03/18/2019 03:04 AM    GLUCOSE 105 (H) 03/18/2019 03:04 AM    BUN 23 (H) 03/18/2019 03:04 AM    CREATININE 0.79 03/18/2019 03:04 AM    CREATININE 1.2 02/10/2009 03:50 PM      Lab Results   Component Value Date/Time    ASTSGOT 23 03/18/2019 03:04 AM    ALTSGPT 9 03/18/2019 03:04 AM     Lab Results   Component Value Date/Time    CHOLSTRLTOT 139 08/29/2018 10:10 AM    LDL 75 08/29/2018 10:10 AM    HDL 50 08/29/2018 10:10 AM    TRIGLYCERIDE 70 08/29/2018 10:10 AM    TROPONINI 0.08 (H) 03/16/2019 02:22 AM             Cardiac Imaging and Procedures Review  Echocardiogram 3/15/19:  Normal left ventricular systolic function.  Left ventricular ejection fraction is visually estimated to be 70%.  Grade II diastolic dysfunction.  The right ventricle was normal in size and function.  Mild tricuspid regurgitation.  Estimated right ventricular systolic  pressure is 45 mmHg.  Compared to the images of the prior study done 2/25/2014 -  there has   been no significant change.         Assessment/Plan  Atrial fibrillation with RVR:  -Rate improved, still elevated.   -Normal TSH on 3/15/19.  -Keep Mag 2.0 and K above 4.   -Continue Amiodarone gtt.  -Lopressor 50 mg TID started by primary.   -Minimum CHADS2 score of 4.  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  -Tolerated ASA 81 mg daily in the past, start when deemed appropriate from surgery perspective.     Indeterminate troponin:  -Trop peak of 0.12.   -In setting of bowel obstruction and A-Fib with RVR.   -Denies chest pain.    Pulmonary hypertension:  -Asymptomatic.   -No evidence of acute heart failure.    SBO:  -Resolving.   -Surgery consulted and following.     HTN:  -Elevated this am.  -Lopressor as above.     HLD:  -Stable.  -LDL on 8/29/18 was 75.    Thank you for allowing us to participate in the care of this patient.  We will continue following such in the care of this patient.  Please us if you have any questions or concerns.      LISBETH Bae.   [facility]  [phone]

## 2019-03-18 NOTE — CARE PLAN
Problem: Communication  Goal: The ability to communicate needs accurately and effectively will improve  Outcome: PROGRESSING AS EXPECTED    Intervention: [place] patient and significant other/support system to call light to alert staff of needs  Patient oriented to call light system and has been able to communicate needs accurately and effectively.      Problem: Safety  Goal: Will remain free from falls  Outcome: PROGRESSING AS EXPECTED    Intervention: Assess risk factors for falls  Patient has been assessed for fall risks and fall precautions have been put in place.

## 2019-03-19 ENCOUNTER — APPOINTMENT (OUTPATIENT)
Dept: RADIOLOGY | Facility: MEDICAL CENTER | Age: 84
DRG: 389 | End: 2019-03-19
Attending: INTERNAL MEDICINE
Payer: MEDICARE

## 2019-03-19 PROBLEM — I10 HTN (HYPERTENSION), MALIGNANT: Status: RESOLVED | Noted: 2019-03-18 | Resolved: 2019-03-19

## 2019-03-19 PROBLEM — I48.0 PAROXYSMAL ATRIAL FIBRILLATION (HCC): Status: ACTIVE | Noted: 2019-03-18

## 2019-03-19 PROBLEM — R65.10 SIRS (SYSTEMIC INFLAMMATORY RESPONSE SYNDROME) (HCC): Status: RESOLVED | Noted: 2019-03-13 | Resolved: 2019-03-19

## 2019-03-19 PROBLEM — E87.20 LACTIC ACIDOSIS: Status: RESOLVED | Noted: 2019-03-13 | Resolved: 2019-03-19

## 2019-03-19 PROBLEM — R79.89 ELEVATED TROPONIN: Status: RESOLVED | Noted: 2019-03-15 | Resolved: 2019-03-19

## 2019-03-19 PROBLEM — E83.39 HYPOPHOSPHATEMIA: Status: RESOLVED | Noted: 2018-07-30 | Resolved: 2019-03-19

## 2019-03-19 PROBLEM — I10 HTN (HYPERTENSION): Status: RESOLVED | Noted: 2017-07-11 | Resolved: 2019-03-19

## 2019-03-19 PROBLEM — N18.30 CKD (CHRONIC KIDNEY DISEASE) STAGE 3, GFR 30-59 ML/MIN: Status: RESOLVED | Noted: 2019-03-16 | Resolved: 2019-03-19

## 2019-03-19 PROBLEM — E87.6 HYPOKALEMIA: Status: RESOLVED | Noted: 2019-03-18 | Resolved: 2019-03-19

## 2019-03-19 PROBLEM — E83.42 HYPOMAGNESEMIA: Status: RESOLVED | Noted: 2019-03-17 | Resolved: 2019-03-19

## 2019-03-19 PROBLEM — N17.9 AKI (ACUTE KIDNEY INJURY) (HCC): Status: RESOLVED | Noted: 2019-03-13 | Resolved: 2019-03-19

## 2019-03-19 PROBLEM — I48.91 ATRIAL FIBRILLATION WITH RVR (HCC): Status: RESOLVED | Noted: 2019-03-15 | Resolved: 2019-03-19

## 2019-03-19 LAB
ALBUMIN SERPL BCP-MCNC: 2.8 G/DL (ref 3.2–4.9)
ALBUMIN/GLOB SERPL: 0.8 G/DL
ALP SERPL-CCNC: 97 U/L (ref 30–99)
ALT SERPL-CCNC: 19 U/L (ref 2–50)
ANION GAP SERPL CALC-SCNC: 9 MMOL/L (ref 0–11.9)
AST SERPL-CCNC: 44 U/L (ref 12–45)
BACTERIA BLD CULT: NORMAL
BACTERIA BLD CULT: NORMAL
BASOPHILS # BLD AUTO: 0 % (ref 0–1.8)
BASOPHILS # BLD: 0 K/UL (ref 0–0.12)
BILIRUB SERPL-MCNC: 0.6 MG/DL (ref 0.1–1.5)
BUN SERPL-MCNC: 20 MG/DL (ref 8–22)
CALCIUM SERPL-MCNC: 7.8 MG/DL (ref 8.5–10.5)
CHLORIDE SERPL-SCNC: 108 MMOL/L (ref 96–112)
CO2 SERPL-SCNC: 21 MMOL/L (ref 20–33)
CREAT SERPL-MCNC: 0.78 MG/DL (ref 0.5–1.4)
EOSINOPHIL # BLD AUTO: 0 K/UL (ref 0–0.51)
EOSINOPHIL NFR BLD: 0 % (ref 0–6.9)
ERYTHROCYTE [DISTWIDTH] IN BLOOD BY AUTOMATED COUNT: 49.1 FL (ref 35.9–50)
GLOBULIN SER CALC-MCNC: 3.5 G/DL (ref 1.9–3.5)
GLUCOSE SERPL-MCNC: 129 MG/DL (ref 65–99)
HCT VFR BLD AUTO: 36.8 % (ref 37–47)
HGB BLD-MCNC: 11.7 G/DL (ref 12–16)
LYMPHOCYTES # BLD AUTO: 0.84 K/UL (ref 1–4.8)
LYMPHOCYTES NFR BLD: 6.8 % (ref 22–41)
MAGNESIUM SERPL-MCNC: 2.4 MG/DL (ref 1.5–2.5)
MANUAL DIFF BLD: NORMAL
MCH RBC QN AUTO: 25.4 PG (ref 27–33)
MCHC RBC AUTO-ENTMCNC: 31.8 G/DL (ref 33.6–35)
MCV RBC AUTO: 79.8 FL (ref 81.4–97.8)
MONOCYTES # BLD AUTO: 2.19 K/UL (ref 0–0.85)
MONOCYTES NFR BLD AUTO: 17.8 % (ref 0–13.4)
MORPHOLOGY BLD-IMP: NORMAL
MYELOCYTES NFR BLD MANUAL: 0.8 %
NEUTROPHILS # BLD AUTO: 9.16 K/UL (ref 2–7.15)
NEUTROPHILS NFR BLD: 72 % (ref 44–72)
NEUTS BAND NFR BLD MANUAL: 2.5 % (ref 0–10)
NRBC # BLD AUTO: 0 K/UL
NRBC BLD-RTO: 0 /100 WBC
PHOSPHATE SERPL-MCNC: 2.8 MG/DL (ref 2.5–4.5)
PLATELET # BLD AUTO: 240 K/UL (ref 164–446)
PLATELET BLD QL SMEAR: NORMAL
PMV BLD AUTO: 11.1 FL (ref 9–12.9)
POTASSIUM SERPL-SCNC: 3.6 MMOL/L (ref 3.6–5.5)
PROT SERPL-MCNC: 6.3 G/DL (ref 6–8.2)
RBC # BLD AUTO: 4.61 M/UL (ref 4.2–5.4)
SIGNIFICANT IND 70042: NORMAL
SIGNIFICANT IND 70042: NORMAL
SITE SITE: NORMAL
SITE SITE: NORMAL
SODIUM SERPL-SCNC: 138 MMOL/L (ref 135–145)
SOURCE SOURCE: NORMAL
SOURCE SOURCE: NORMAL
WBC # BLD AUTO: 12.3 K/UL (ref 4.8–10.8)

## 2019-03-19 PROCEDURE — 85027 COMPLETE CBC AUTOMATED: CPT

## 2019-03-19 PROCEDURE — 700102 HCHG RX REV CODE 250 W/ 637 OVERRIDE(OP): Performed by: INTERNAL MEDICINE

## 2019-03-19 PROCEDURE — 83735 ASSAY OF MAGNESIUM: CPT

## 2019-03-19 PROCEDURE — 74018 RADEX ABDOMEN 1 VIEW: CPT

## 2019-03-19 PROCEDURE — 84100 ASSAY OF PHOSPHORUS: CPT

## 2019-03-19 PROCEDURE — 700111 HCHG RX REV CODE 636 W/ 250 OVERRIDE (IP): Performed by: INTERNAL MEDICINE

## 2019-03-19 PROCEDURE — 93005 ELECTROCARDIOGRAM TRACING: CPT | Performed by: HOSPITALIST

## 2019-03-19 PROCEDURE — 700102 HCHG RX REV CODE 250 W/ 637 OVERRIDE(OP): Performed by: FAMILY MEDICINE

## 2019-03-19 PROCEDURE — A9270 NON-COVERED ITEM OR SERVICE: HCPCS | Performed by: FAMILY MEDICINE

## 2019-03-19 PROCEDURE — 700101 HCHG RX REV CODE 250: Performed by: FAMILY MEDICINE

## 2019-03-19 PROCEDURE — 80053 COMPREHEN METABOLIC PANEL: CPT

## 2019-03-19 PROCEDURE — 97165 OT EVAL LOW COMPLEX 30 MIN: CPT

## 2019-03-19 PROCEDURE — 97535 SELF CARE MNGMENT TRAINING: CPT

## 2019-03-19 PROCEDURE — 97162 PT EVAL MOD COMPLEX 30 MIN: CPT

## 2019-03-19 PROCEDURE — 99232 SBSQ HOSP IP/OBS MODERATE 35: CPT | Performed by: INTERNAL MEDICINE

## 2019-03-19 PROCEDURE — 36415 COLL VENOUS BLD VENIPUNCTURE: CPT

## 2019-03-19 PROCEDURE — A9270 NON-COVERED ITEM OR SERVICE: HCPCS | Performed by: INTERNAL MEDICINE

## 2019-03-19 PROCEDURE — 700102 HCHG RX REV CODE 250 W/ 637 OVERRIDE(OP): Performed by: NURSE PRACTITIONER

## 2019-03-19 PROCEDURE — A9270 NON-COVERED ITEM OR SERVICE: HCPCS | Performed by: NURSE PRACTITIONER

## 2019-03-19 PROCEDURE — 85007 BL SMEAR W/DIFF WBC COUNT: CPT

## 2019-03-19 PROCEDURE — 770020 HCHG ROOM/CARE - TELE (206)

## 2019-03-19 RX ORDER — TRAMADOL HYDROCHLORIDE 50 MG/1
50 TABLET ORAL EVERY 6 HOURS PRN
Status: DISCONTINUED | OUTPATIENT
Start: 2019-03-19 | End: 2019-03-20 | Stop reason: HOSPADM

## 2019-03-19 RX ORDER — OMEPRAZOLE 20 MG/1
20 CAPSULE, DELAYED RELEASE ORAL DAILY
Status: DISCONTINUED | OUTPATIENT
Start: 2019-03-19 | End: 2019-03-20 | Stop reason: HOSPADM

## 2019-03-19 RX ORDER — POTASSIUM CHLORIDE 20 MEQ/1
40 TABLET, EXTENDED RELEASE ORAL ONCE
Status: COMPLETED | OUTPATIENT
Start: 2019-03-19 | End: 2019-03-19

## 2019-03-19 RX ORDER — ACETAMINOPHEN 500 MG
500 TABLET ORAL EVERY 6 HOURS PRN
Status: DISCONTINUED | OUTPATIENT
Start: 2019-03-19 | End: 2019-03-20 | Stop reason: HOSPADM

## 2019-03-19 RX ORDER — POTASSIUM CHLORIDE 20 MEQ/1
40 TABLET, EXTENDED RELEASE ORAL ONCE
Status: DISCONTINUED | OUTPATIENT
Start: 2019-03-19 | End: 2019-03-19

## 2019-03-19 RX ORDER — MAGNESIUM SULFATE HEPTAHYDRATE 40 MG/ML
2 INJECTION, SOLUTION INTRAVENOUS ONCE
Status: COMPLETED | OUTPATIENT
Start: 2019-03-19 | End: 2019-03-19

## 2019-03-19 RX ADMIN — METOPROLOL TARTRATE 100 MG: 50 TABLET ORAL at 06:00

## 2019-03-19 RX ADMIN — POTASSIUM CHLORIDE, DEXTROSE MONOHYDRATE AND SODIUM CHLORIDE: 150; 5; 900 INJECTION, SOLUTION INTRAVENOUS at 09:38

## 2019-03-19 RX ADMIN — LEVOTHYROXINE SODIUM 25 MCG: 25 TABLET ORAL at 06:00

## 2019-03-19 RX ADMIN — ASPIRIN 81 MG: 81 TABLET, COATED ORAL at 11:38

## 2019-03-19 RX ADMIN — TIMOLOL MALEATE 1 DROP: 5 SOLUTION OPHTHALMIC at 06:00

## 2019-03-19 RX ADMIN — MAGNESIUM SULFATE IN WATER 2 G: 40 INJECTION, SOLUTION INTRAVENOUS at 08:00

## 2019-03-19 RX ADMIN — LATANOPROST 1 DROP: 50 SOLUTION OPHTHALMIC at 18:04

## 2019-03-19 RX ADMIN — BRIMONIDINE TARTRATE 1 DROP: 2 SOLUTION OPHTHALMIC at 18:04

## 2019-03-19 RX ADMIN — BRIMONIDINE TARTRATE 1 DROP: 2 SOLUTION OPHTHALMIC at 06:00

## 2019-03-19 RX ADMIN — TIMOLOL MALEATE 1 DROP: 5 SOLUTION OPHTHALMIC at 18:04

## 2019-03-19 RX ADMIN — METOPROLOL TARTRATE 100 MG: 50 TABLET ORAL at 21:20

## 2019-03-19 RX ADMIN — POTASSIUM CHLORIDE 40 MEQ: 1500 TABLET, EXTENDED RELEASE ORAL at 09:38

## 2019-03-19 RX ADMIN — AMIODARONE HYDROCHLORIDE 400 MG: 200 TABLET ORAL at 18:04

## 2019-03-19 RX ADMIN — ACETAMINOPHEN 500 MG: 500 TABLET ORAL at 21:20

## 2019-03-19 RX ADMIN — OMEPRAZOLE 20 MG: 20 CAPSULE, DELAYED RELEASE ORAL at 11:38

## 2019-03-19 RX ADMIN — ACETAMINOPHEN 500 MG: 500 TABLET ORAL at 15:35

## 2019-03-19 RX ADMIN — METOPROLOL TARTRATE 100 MG: 50 TABLET ORAL at 15:35

## 2019-03-19 RX ADMIN — AMIODARONE HYDROCHLORIDE 400 MG: 200 TABLET ORAL at 06:00

## 2019-03-19 ASSESSMENT — COGNITIVE AND FUNCTIONAL STATUS - GENERAL
HELP NEEDED FOR BATHING: A LITTLE
DRESSING REGULAR LOWER BODY CLOTHING: A LITTLE
SUGGESTED CMS G CODE MODIFIER MOBILITY: CH
SUGGESTED CMS G CODE MODIFIER DAILY ACTIVITY: CJ
DAILY ACTIVITIY SCORE: 21
MOBILITY SCORE: 24
TOILETING: A LITTLE

## 2019-03-19 ASSESSMENT — ENCOUNTER SYMPTOMS
DIARRHEA: 0
DIZZINESS: 0
BLOOD IN STOOL: 0
SORE THROAT: 0
MYALGIAS: 0
CONSTIPATION: 0
BACK PAIN: 0
FEVER: 0
HEMOPTYSIS: 0
BLURRED VISION: 0
PALPITATIONS: 0
HEARTBURN: 0
NECK PAIN: 0
SHORTNESS OF BREATH: 0
CLAUDICATION: 0
WHEEZING: 0
SPUTUM PRODUCTION: 0
ORTHOPNEA: 0
FATIGUE: 1
DEPRESSION: 0
HEADACHES: 0
CHEST TIGHTNESS: 0
NERVOUS/ANXIOUS: 0
ABDOMINAL PAIN: 0
PND: 0
VOMITING: 0
FALLS: 0
WEAKNESS: 1
WEAKNESS: 0
CHILLS: 0
NAUSEA: 0
DIAPHORESIS: 0
COUGH: 0

## 2019-03-19 ASSESSMENT — PATIENT HEALTH QUESTIONNAIRE - PHQ9
SUM OF ALL RESPONSES TO PHQ9 QUESTIONS 1 AND 2: 0
1. LITTLE INTEREST OR PLEASURE IN DOING THINGS: NOT AT ALL
2. FEELING DOWN, DEPRESSED, IRRITABLE, OR HOPELESS: NOT AT ALL

## 2019-03-19 ASSESSMENT — ACTIVITIES OF DAILY LIVING (ADL): TOILETING: INDEPENDENT

## 2019-03-19 ASSESSMENT — GAIT ASSESSMENTS
DISTANCE (FEET): 10
GAIT LEVEL OF ASSIST: STAND BY ASSIST

## 2019-03-19 NOTE — PROGRESS NOTES
Pt's IV infiltrated. Spoke with cardiology. Updated them on patient's progress. Orders obtained for amiodarone PO to be started. Amiodarone gtt d/c'd. Warm compress to pt's left forearm.

## 2019-03-19 NOTE — PROGRESS NOTES
Tele: afib 120's QRS 0.08. Pt converted to NSR at 1400 per call from monitor room. Rate has been controlled in the 80's -90's.

## 2019-03-19 NOTE — PROGRESS NOTES
[facility] Medicine Daily Progress Note    Date of Service  3/19/2019    Chief Complaint  83 y.o. female admitted 3/13/2019 with Small bowel obstruction    Hospital Course  Admitted with small bowel obstruction, acute kidney injury, known history of paroxysmal atrial fibrillation, had episode of atrial fibrillation rapid ventricular rate.    Interval Problem Update  Patient seen and evaluated on rounds  Discussed with nursing staff on rounds  She is feeling well, no abdominal pain at this time, tolerating clear liquid diet  No nausea or vomiting  Passing gas, having output in ostomy  Surgical team following  Cardiology team following  A normal sinus rhythm, rates are controlled  No chest pain, shortness of breath  No other complaints at this time    Consultants/Specialty  General surgery  Cardiology    Code Status  Full code    Disposition  Continue telemetry monitoring  Anticipate discharge home once acute issues resolved    Review of Systems  Review of Systems   Constitutional: Positive for malaise/fatigue. Negative for chills, diaphoresis and fever.   HENT: Negative for hearing loss, sore throat and tinnitus.    Eyes: Negative for blurred vision.   Respiratory: Negative for cough, hemoptysis, sputum production, shortness of breath and wheezing.    Cardiovascular: Negative for chest pain, palpitations, orthopnea, claudication, leg swelling and PND.   Gastrointestinal: Negative for abdominal pain, blood in stool, constipation, diarrhea, heartburn, melena, nausea and vomiting.   Genitourinary: Negative for dysuria and urgency.   Musculoskeletal: Negative for back pain, falls, joint pain, myalgias and neck pain.   Skin: Negative for itching and rash.   Neurological: Negative for dizziness, weakness and headaches.   Psychiatric/Behavioral: Negative for depression. The patient is not nervous/anxious.         Physical Exam  Temp:  [36.1 °C (97 °F)-37 °C (98.6 °F)] 36.5 °C (97.7 °F)  Pulse:  [] 66  Resp:  [15-20]  16  BP: (108-153)/(68-93) 108/68  SpO2:  [96 %-98 %] 96 %    Physical Exam   Constitutional: She is oriented to person, place, and time. She appears well-developed and well-nourished. No distress.   Body mass index is 26.1 kg/m².   HENT:   Head: Normocephalic and atraumatic.   Mouth/Throat: No oropharyngeal exudate.   Eyes: Pupils are equal, round, and reactive to light. Conjunctivae are normal. No scleral icterus.   Neck: Normal range of motion. No JVD present.   Cardiovascular: Normal rate, regular rhythm, normal heart sounds and intact distal pulses.  Exam reveals no gallop and no friction rub.    No murmur heard.  Pulmonary/Chest: Effort normal and breath sounds normal. No stridor. No respiratory distress. She has no wheezes.   Diminished bases   Abdominal: Soft. She exhibits distension. There is no tenderness. There is no rebound and no guarding.   ostomy   Musculoskeletal: She exhibits edema. She exhibits no tenderness or deformity.   Lymphadenopathy:     She has no cervical adenopathy.   Neurological: She is alert and oriented to person, place, and time. No cranial nerve deficit.   Skin: Skin is warm and dry. She is not diaphoretic.   Psychiatric: She has a normal mood and affect. Her behavior is normal. Judgment and thought content normal.   Nursing note and vitals reviewed.      Fluids    Intake/Output Summary (Last 24 hours) at 03/19/19 1105  Last data filed at 03/19/19 1000   Gross per 24 hour   Intake              360 ml   Output              200 ml   Net              160 ml       Laboratory  Recent Labs      03/17/19   0155  03/18/19   0304  03/19/19   0108   WBC  11.4*  11.7*  12.3*   RBC  4.68  5.34  4.61   HEMOGLOBIN  11.8*  13.2  11.7*   HEMATOCRIT  38.3  44.1  36.8*   MCV  81.8  82.6  79.8*   MCH  25.2*  24.7*  25.4*   MCHC  30.8*  29.9*  31.8*   RDW  50.1*  51.1*  49.1   PLATELETCT  185  225  240   MPV  12.0  11.4  11.1     Recent Labs      03/17/19   0155  03/18/19   0304  03/19/19   0108    SODIUM  140  139  138   POTASSIUM  3.7  3.3*  3.6   CHLORIDE  112  112  108   CO2  19*  17*  21   GLUCOSE  87  105*  129*   BUN  23*  23*  20   CREATININE  0.89  0.79  0.78   CALCIUM  8.0*  7.9*  7.8*                   Imaging  [id number] VIEWS   Final Result      1.  Small bowel dilation consistent with small bowel obstruction. Probably no interval change      2.  Enteric catheter tip projects over the stomach      EC-ECHOCARDIOGRAM COMPLETE W/O CONT   Final Result      DX-CHEST-LIMITED (1 VIEW)   Final Result      1.  Hypoinflation with mild bibasilar atelectasis.   2.  Supportive tubing as described above.      US-RUQ   Final Result      1.  Prior cholecystectomy. No biliary dilation.      2.  Cirrhosis      3.  No other finding      CT-RENAL COLIC EVALUATION(A/P W/O)   Final Result      1.  High-grade distal small bowel obstruction      2.  Cirrhosis with associated portal hypertension      3.  Right lower quadrant ostomy present.      4.  Abdominal aortic atherosclerotic plaque      5.  Prior hysterectomy         No renal or ureteral calculus or obstruction.      [id number] VIEW    (Results Pending)        Assessment/Plan  * Small bowel obstruction (HCC)- (present on admission)   Assessment & Plan    Improved, March 19, 2019, tolerating clear liquid diet at this time    Continue clear liquid diet  Surgical team following, advancement of diet per surgical team  Any surgical interventions per surgical team  Ambulate  Limit opioids as able  Monitor and stabilize electrolytes  KUB today, interval KUB tomorrow, orders placed by me     Paroxysmal atrial fibrillation (HCC)- (present on admission)   Assessment & Plan    On amiodarone  Continue metoprolol with holding parameters   ASA 81  Anticoagulation once okay from surgical perspective, per cardiology team  Cardiology team following  Will need close outpatient cardiology follow-up     Ileostomy in place (HCC)- (present on admission)   Assessment & Plan     Continue ostomy care     Cirrhosis (HCC)- (present on admission)   Assessment & Plan    On US  Will need outpatient GI evaluation      Microcytic anemia- (present on admission)   Assessment & Plan    Iron/multivitamin supplementation once GI issues resolved and stable  Monitor Hb / Restrictive transfusion strategy     Leukocytosis- (present on admission)   Assessment & Plan    No infectious concerns apparent, will monitor CBC     Acquired hypothyroidism- (present on admission)   Assessment & Plan    Continue Synthroid, TSH was checked          VTE prophylaxis: SCD and SC Lovenox

## 2019-03-19 NOTE — CARE PLAN
Problem: Safety  Goal: Will remain free from injury  Outcome: PROGRESSING AS EXPECTED  Pt verbalizes understanding of safety instructions and calls for assistance appropriately.    Problem: Knowledge Deficit  Goal: Knowledge of the prescribed therapeutic regimen will improve  Outcome: PROGRESSING AS EXPECTED  Discussed prescribed medications with pt. Pt verbalizes understanding and has no questions at this time.

## 2019-03-19 NOTE — THERAPY
"Occupational Therapy Evaluation completed.   Functional Status:  Spv w/bed mobility, spv w/sit>stand, sudden incontinence of bowel, txf to toilet w/min A, min A for LB dressing and clean up. Txf to shower bench for additional hygiene, using grab bar for sit>stand then again back to the toilet. Bowel issues are limiting pts ability to ambulate further at this time. Pt remained up in chair w/alarm on and call light and tray table w/in reach RN aware   Plan of Care: Will benefit from Occupational Therapy 3 times per week  Discharge Recommendations:  Equipment: Will Continue to Assess for Equipment Needs. Post-acute therapy Recommend home health services for continued occupational therapy services      See \"Rehab Therapy-Acute\" Patient Summary Report for complete documentation.      83 yr old female admitted for n/v and constipation. PMHX; Afib, arthritis, back pain, CAD, cancer, CKD, cystectomy and ileostomy, and HTN. This admission pt was dx w/ SBO, paroxysmal Afib, microcytic anemia, and leukocytosis. Pt is demonstrating decreased activity tolerance and deconditioning related to recent hospital course, impacting independence w/ADL's. Anticipate pt will continue to progress in this setting to d/c w/HH   "

## 2019-03-19 NOTE — PROGRESS NOTES
Progress Note    Author:  Paola Goyal MD    Date & Time:   3/19/2019   2:22 PM          Patient ID:             Name:             Dorie Wadsworth   YOB: 1935  Age:                 83 y.o.  female   MRN:               [id number]    ________________________________________________________________________      Interval Events:         Patient tolerating clears  Multiple loose bms last night and this am  Denies abdominal pain       Exam:       SpO2  Min: 90 %  Max: 99 %  O2 (LPM)  Min: 0  Max: 2  NIBP  Min: 116/44  Max: 153/71  Heart Rate (Monitored)  Min: 86  Max: 104  Temp  Min: 35.9 °C (96.7 °F)  Max: 37.6 °C (99.7 °F)    Intake/Output Summary (Last 24 hours) at 03/19/19 1422  Last data filed at 03/19/19 1000   Gross per 24 hour   Intake              360 ml   Output              200 ml   Net              160 ml       DIET ORDERS (Through next 24h)    Start     Ordered    03/19/19 1423  Diet Order Regular  ALL MEALS     Question:  Diet:  Answer:  Regular    03/19/19 1422            Physical Exam  GENERAL:  Alert and oriented x 3. NAD, normal respiratory effort    Lungs: Clear to auscultation bilaterally.    Abd: Soft, Non-tender, non-distended.      Recent Labs      03/17/19   0155 03/18/19   0304  03/19/19   0108   SODIUM  140  139  138   POTASSIUM  3.7  3.3*  3.6   CHLORIDE  112  112  108   CO2  19*  17*  21   BUN  23*  23*  20   CREATININE  0.89  0.79  0.78   MAGNESIUM  1.8  2.0  2.4   PHOSPHORUS   --   1.9*  2.8   CALCIUM  8.0*  7.9*  7.8*       Recent Labs      03/17/19   0155  03/18/19   0304  03/19/19   0108   ALTSGPT  10  9  19   ASTSGOT  19  23  44   ALKPHOSPHAT  86  90  97   TBILIRUBIN  0.7  0.6  0.6   GLUCOSE  87  105*  129*       Recent Labs      03/17/19   0155  03/18/19   0304  03/19/19   0108   RBC  4.68  5.34  4.61   HEMOGLOBIN  11.8*  13.2  11.7*   HEMATOCRIT  38.3  44.1  36.8*   PLATELETCT  185  225  240       Recent Labs      03/17/19   0155  03/18/19   0304  03/19/19   0108    WBC  11.4*  11.7*  12.3*   NEUTSPOLYS  62.30  63.10  72.00   LYMPHOCYTES  17.20*  17.60*  6.80*   MONOCYTES  18.80*  16.40*  17.80*   EOSINOPHILS  0.50  0.90  0.00   BASOPHILS  0.40  0.50  0.00   ASTSGOT  19  23  44   ALTSGPT  10  9  19   ALKPHOSPHAT  86  90  97   TBILIRUBIN  0.7  0.6  0.6         ________________________________________________________________________      Patient Active Problem List   Diagnosis   • UTI (urinary tract infection)   • Acquired hypothyroidism   • Hypertension   • Dyslipidemia   • Pyelonephritis   • TIA (transient ischemic attack)   • Vaginal bleeding   • History of hematuria   • MRSA carrier   • 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

## 2019-03-19 NOTE — THERAPY
"Physical Therapy Evaluation completed.   Bed Mobility:  Supine to Sit: Supervised  Transfers: Sit to Stand: Supervised  Gait: Level Of Assist: Stand by Assist with No Equipment Needed       Plan of Care: Will benefit from Physical Therapy 3 times per week  Discharge Recommendations: Equipment: Will Continue to Assess for Equipment Needs.     Pt admitted for SBO and presents most limited by bowel incontinence. She was able to complete gait to/from bathroom without device but did require occasional steadying assist for transfers. While here, pt could benefit from an additional visit for mobility progression. Anticipate pt to be functionally capable of return home upon DC.     See \"Rehab Therapy-Acute\" Patient Summary Report for complete documentation.     "

## 2019-03-19 NOTE — ASSESSMENT & PLAN NOTE
On amiodarone  Continue metoprolol with holding parameters   ASA 81  Anticoagulation once okay from surgical perspective, per cardiology team  Cardiology team following  Will need close outpatient cardiology follow-up

## 2019-03-19 NOTE — PROGRESS NOTES
Cardiology Follow Up Progress Note    Date of Service  3/19/2019    Attending Physician  Abbi Booth M.D.    Chief Complaint   Intractable nausea and vomiting.     Consulted for atrial fibrillation with RVR.     HPI  Dorie Wadsworth is a 83 y.o. female admitted 3/13/2019 with intractable nausea and vomiting.  Abdominal x-ray showed small bowel dilation consistent with small bowel obstruction.  Patient was also found to be in atrial fibrillation with RVR.     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     Interim Events  3/18/19: Patient states that she is feeling better. Tolerating liquid diet. No significant evets overnight. Denies chest pain, palpitations or shortness of breath.      3/19/19: Continues to feel better. Tolerating PO intake. No significant issues overnight.     Monitor: SR 70-93 without ectop    Labs:  WBC 12.3  Ca 7.8    Review of Systems  Review of Systems   Constitutional: Positive for fatigue. Negative for fever.   Respiratory: Negative for chest tightness and shortness of breath.    Cardiovascular: Negative for chest pain, palpitations and leg swelling.   Gastrointestinal: Negative for abdominal pain and blood in stool.   Genitourinary: Negative for hematuria.   Musculoskeletal: Negative for gait problem.   Neurological: Positive for weakness. Negative for dizziness and syncope.   All other systems reviewed and are negative.      Vital signs in last 24 hours  Temp:  [36.1 °C (97 °F)-37 °C (98.6 °F)] 36.1 °C (97 °F)  Pulse:  [] 71  Resp:  [15-20] 15  BP: (139-167)/() 153/85  SpO2:  [96 %-98 %] 97 %    Physical Exam  Physical Exam   Constitutional: She is oriented to person, place, and time. She appears well-developed and well-nourished.   HENT:   Head: Normocephalic.   Eyes: EOM are normal.    Neck: No JVD present.   Cardiovascular: Normal rate, regular rhythm, normal heart sounds and intact distal pulses.    Pulmonary/Chest: Effort normal and breath sounds normal. No respiratory distress.   Abdominal: Soft. Bowel sounds are normal. There is no tenderness.   Genitourinary:   Genitourinary Comments: Urostomy    Musculoskeletal: Normal range of motion. She exhibits no edema.   Neurological: She is alert and oriented to person, place, and time.   Skin: Skin is warm and dry.   Psychiatric: She has a normal mood and affect. Her behavior is normal. Thought content normal.   Nursing note and vitals reviewed.      Lab Review  Lab Results   Component Value Date/Time    WBC 12.3 (H) 03/19/2019 01:08 AM    RBC 4.61 03/19/2019 01:08 AM    HEMOGLOBIN 11.7 (L) 03/19/2019 01:08 AM    HEMATOCRIT 36.8 (L) 03/19/2019 01:08 AM    MCV 79.8 (L) 03/19/2019 01:08 AM    MCH 25.4 (L) 03/19/2019 01:08 AM    MCHC 31.8 (L) 03/19/2019 01:08 AM    MPV 11.1 03/19/2019 01:08 AM      Lab Results   Component Value Date/Time    SODIUM 138 03/19/2019 01:08 AM    POTASSIUM 3.6 03/19/2019 01:08 AM    CHLORIDE 108 03/19/2019 01:08 AM    CO2 21 03/19/2019 01:08 AM    GLUCOSE 129 (H) 03/19/2019 01:08 AM    BUN 20 03/19/2019 01:08 AM    CREATININE 0.78 03/19/2019 01:08 AM    CREATININE 1.2 02/10/2009 03:50 PM      Lab Results   Component Value Date/Time    ASTSGOT 44 03/19/2019 01:08 AM    ALTSGPT 19 03/19/2019 01:08 AM     Lab Results   Component Value Date/Time    CHOLSTRLTOT 139 08/29/2018 10:10 AM    LDL 75 08/29/2018 10:10 AM    HDL 50 08/29/2018 10:10 AM    TRIGLYCERIDE 70 08/29/2018 10:10 AM    TROPONINI 0.08 (H) 03/16/2019 02:22 AM             Cardiac Imaging and Procedures Review  Echocardiogram 3/15/19:  Normal left ventricular systolic function.  Left ventricular ejection fraction is visually estimated to be 70%.  Grade II diastolic dysfunction.  The right ventricle was normal in size and function.  Mild tricuspid  regurgitation.  Estimated right ventricular systolic pressure is 45 mmHg.  Compared to the images of the prior study done 2/25/2014 -  there has   been no significant change.         Assessment/Plan  Atrial fibrillation with RVR:  -Converted to NSR yesterday at 1400.   -Maintain Mag at 2.5 and K at 4.5.    -Minimum CHADS2 score of 4.  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  -Tolerated ASA 81 mg daily in the past, start when deemed appropriate from surgery perspective.   -Continue Amiodarone 400 mg BID X 10 days then down to 200 mg daily.   -Continue Lopressor 100 mg TID.   -KCL 40 mEq X 1 this am.   -Mag 2 g X 1 this am.   -BMP and Mag in am.      Indeterminate troponin:  -Trop peak of 0.12.   -In setting of bowel obstruction and A-Fib with RVR.   -Denies chest pain.     Pulmonary hypertension:  -Asymptomatic.   -No evidence of acute heart failure.     SBO:  -Resolving.   -Surgery consulted and following.      HTN:  -Improving.   -Lopressor as above.      HLD:  -Stable.  -LDL on 8/29/18 was 75.     Thank you for allowing us to participate in the care of this patient.  We will sign off with time and follow up with patient outpatient as below.  Please us if you have any questions or concerns.     Future Appointments  Date Time Provider Department Center   3/28/2019 10:15 AM ELIUD aBe [facility] None   4/29/2019 12:40 PM Jonna Vega M.D. [facility] None       ELIUD Bae   [facility]  [phone]

## 2019-03-20 ENCOUNTER — APPOINTMENT (OUTPATIENT)
Dept: RADIOLOGY | Facility: MEDICAL CENTER | Age: 84
DRG: 389 | End: 2019-03-20
Attending: INTERNAL MEDICINE
Payer: MEDICARE

## 2019-03-20 VITALS
WEIGHT: 172.18 LBS | BODY MASS INDEX: 26.1 KG/M2 | DIASTOLIC BLOOD PRESSURE: 90 MMHG | OXYGEN SATURATION: 94 % | RESPIRATION RATE: 17 BRPM | HEART RATE: 88 BPM | HEIGHT: 68 IN | SYSTOLIC BLOOD PRESSURE: 130 MMHG | TEMPERATURE: 97.1 F

## 2019-03-20 LAB
ALBUMIN SERPL BCP-MCNC: 2.8 G/DL (ref 3.2–4.9)
ALBUMIN/GLOB SERPL: 0.8 G/DL
ALP SERPL-CCNC: 89 U/L (ref 30–99)
ALT SERPL-CCNC: 19 U/L (ref 2–50)
ANION GAP SERPL CALC-SCNC: 8 MMOL/L (ref 0–11.9)
AST SERPL-CCNC: 39 U/L (ref 12–45)
BASOPHILS # BLD AUTO: 0.9 % (ref 0–1.8)
BASOPHILS # BLD: 0.08 K/UL (ref 0–0.12)
BILIRUB SERPL-MCNC: 0.4 MG/DL (ref 0.1–1.5)
BUN SERPL-MCNC: 23 MG/DL (ref 8–22)
CALCIUM SERPL-MCNC: 7.7 MG/DL (ref 8.5–10.5)
CHLORIDE SERPL-SCNC: 110 MMOL/L (ref 96–112)
CO2 SERPL-SCNC: 20 MMOL/L (ref 20–33)
CREAT SERPL-MCNC: 0.95 MG/DL (ref 0.5–1.4)
CRP SERPL HS-MCNC: 5.82 MG/DL (ref 0–0.75)
EKG IMPRESSION: NORMAL
EOSINOPHIL # BLD AUTO: 0.08 K/UL (ref 0–0.51)
EOSINOPHIL NFR BLD: 0.9 % (ref 0–6.9)
ERYTHROCYTE [DISTWIDTH] IN BLOOD BY AUTOMATED COUNT: 49.7 FL (ref 35.9–50)
ERYTHROCYTE [SEDIMENTATION RATE] IN BLOOD BY WESTERGREN METHOD: 38 MM/HOUR (ref 0–30)
GLOBULIN SER CALC-MCNC: 3.3 G/DL (ref 1.9–3.5)
GLUCOSE SERPL-MCNC: 98 MG/DL (ref 65–99)
HCT VFR BLD AUTO: 34.7 % (ref 37–47)
HGB BLD-MCNC: 10.8 G/DL (ref 12–16)
LYMPHOCYTES # BLD AUTO: 2.29 K/UL (ref 1–4.8)
LYMPHOCYTES NFR BLD: 25.2 % (ref 22–41)
MAGNESIUM SERPL-MCNC: 2.4 MG/DL (ref 1.5–2.5)
MANUAL DIFF BLD: NORMAL
MCH RBC QN AUTO: 25.3 PG (ref 27–33)
MCHC RBC AUTO-ENTMCNC: 31.1 G/DL (ref 33.6–35)
MCV RBC AUTO: 81.3 FL (ref 81.4–97.8)
MONOCYTES # BLD AUTO: 0.79 K/UL (ref 0–0.85)
MONOCYTES NFR BLD AUTO: 8.7 % (ref 0–13.4)
MORPHOLOGY BLD-IMP: NORMAL
NEUTROPHILS # BLD AUTO: 5.78 K/UL (ref 2–7.15)
NEUTROPHILS NFR BLD: 63.5 % (ref 44–72)
NRBC # BLD AUTO: 0 K/UL
NRBC BLD-RTO: 0 /100 WBC
PHOSPHATE SERPL-MCNC: 2.8 MG/DL (ref 2.5–4.5)
PLATELET # BLD AUTO: 204 K/UL (ref 164–446)
PLATELET BLD QL SMEAR: NORMAL
PMV BLD AUTO: 11.4 FL (ref 9–12.9)
POIKILOCYTOSIS BLD QL SMEAR: NORMAL
POTASSIUM SERPL-SCNC: 4.1 MMOL/L (ref 3.6–5.5)
PROCALCITONIN SERPL-MCNC: 0.06 NG/ML
PROT SERPL-MCNC: 6.1 G/DL (ref 6–8.2)
RBC # BLD AUTO: 4.27 M/UL (ref 4.2–5.4)
RBC BLD AUTO: PRESENT
SCHISTOCYTES BLD QL SMEAR: NORMAL
SMUDGE CELLS BLD QL SMEAR: NORMAL
SODIUM SERPL-SCNC: 138 MMOL/L (ref 135–145)
WBC # BLD AUTO: 9.1 K/UL (ref 4.8–10.8)

## 2019-03-20 PROCEDURE — 93010 ELECTROCARDIOGRAM REPORT: CPT | Performed by: INTERNAL MEDICINE

## 2019-03-20 PROCEDURE — A9270 NON-COVERED ITEM OR SERVICE: HCPCS | Performed by: INTERNAL MEDICINE

## 2019-03-20 PROCEDURE — 85027 COMPLETE CBC AUTOMATED: CPT

## 2019-03-20 PROCEDURE — 700102 HCHG RX REV CODE 250 W/ 637 OVERRIDE(OP): Performed by: INTERNAL MEDICINE

## 2019-03-20 PROCEDURE — 83735 ASSAY OF MAGNESIUM: CPT

## 2019-03-20 PROCEDURE — 85652 RBC SED RATE AUTOMATED: CPT

## 2019-03-20 PROCEDURE — 36415 COLL VENOUS BLD VENIPUNCTURE: CPT

## 2019-03-20 PROCEDURE — 700102 HCHG RX REV CODE 250 W/ 637 OVERRIDE(OP): Performed by: FAMILY MEDICINE

## 2019-03-20 PROCEDURE — 86140 C-REACTIVE PROTEIN: CPT

## 2019-03-20 PROCEDURE — 80053 COMPREHEN METABOLIC PANEL: CPT

## 2019-03-20 PROCEDURE — 99239 HOSP IP/OBS DSCHRG MGMT >30: CPT | Performed by: INTERNAL MEDICINE

## 2019-03-20 PROCEDURE — A9270 NON-COVERED ITEM OR SERVICE: HCPCS | Performed by: FAMILY MEDICINE

## 2019-03-20 PROCEDURE — 84100 ASSAY OF PHOSPHORUS: CPT

## 2019-03-20 PROCEDURE — 84145 PROCALCITONIN (PCT): CPT

## 2019-03-20 PROCEDURE — 74018 RADEX ABDOMEN 1 VIEW: CPT

## 2019-03-20 PROCEDURE — 700102 HCHG RX REV CODE 250 W/ 637 OVERRIDE(OP): Performed by: HOSPITALIST

## 2019-03-20 PROCEDURE — 85007 BL SMEAR W/DIFF WBC COUNT: CPT

## 2019-03-20 PROCEDURE — A9270 NON-COVERED ITEM OR SERVICE: HCPCS | Performed by: HOSPITALIST

## 2019-03-20 RX ORDER — ASPIRIN 81 MG/1
81 TABLET ORAL DAILY
Qty: 30 TAB | Refills: 0 | Status: SHIPPED | OUTPATIENT
Start: 2019-03-21

## 2019-03-20 RX ORDER — AMIODARONE HYDROCHLORIDE 200 MG/1
TABLET ORAL
Qty: 60 TAB | Refills: 0 | Status: SHIPPED | OUTPATIENT
Start: 2019-03-20 | End: 2019-03-28

## 2019-03-20 RX ORDER — DIPHENHYDRAMINE HCL 25 MG
25 TABLET ORAL ONCE
Status: COMPLETED | OUTPATIENT
Start: 2019-03-20 | End: 2019-03-20

## 2019-03-20 RX ORDER — METOPROLOL TARTRATE 100 MG/1
100 TABLET ORAL 2 TIMES DAILY
Qty: 60 TAB | Refills: 0 | Status: SHIPPED | OUTPATIENT
Start: 2019-03-20 | End: 2019-03-28 | Stop reason: SDUPTHER

## 2019-03-20 RX ADMIN — ASPIRIN 81 MG: 81 TABLET, COATED ORAL at 06:03

## 2019-03-20 RX ADMIN — TIMOLOL MALEATE 1 DROP: 5 SOLUTION OPHTHALMIC at 06:04

## 2019-03-20 RX ADMIN — DIPHENHYDRAMINE HCL 25 MG: 25 TABLET ORAL at 03:55

## 2019-03-20 RX ADMIN — METOPROLOL TARTRATE 100 MG: 50 TABLET ORAL at 06:03

## 2019-03-20 RX ADMIN — BRIMONIDINE TARTRATE 1 DROP: 2 SOLUTION OPHTHALMIC at 06:04

## 2019-03-20 RX ADMIN — OMEPRAZOLE 20 MG: 20 CAPSULE, DELAYED RELEASE ORAL at 06:03

## 2019-03-20 RX ADMIN — LEVOTHYROXINE SODIUM 25 MCG: 25 TABLET ORAL at 06:03

## 2019-03-20 RX ADMIN — AMIODARONE HYDROCHLORIDE 400 MG: 200 TABLET ORAL at 06:03

## 2019-03-20 NOTE — DISCHARGE PLANNING
"Anticipated Discharge Disposition: Home    Action: LSW spoke with pt at bedside to discuss discharge plan. LSW explained HH to pt. Pt stated she is feeling \"back to normal\" and is planning to go back to her \"bowling and exercises\" and does not wish to have HH at this time.     LSW explained Medicare rights to pt and pt signed IMM.      Barriers to Discharge: None    Plan: No further discharge needs at this time.     "

## 2019-03-20 NOTE — CARE PLAN
Problem: Communication  Goal: The ability to communicate needs accurately and effectively will improve  Outcome: PROGRESSING AS EXPECTED  Pt educated on POC and medications. Pt verbalize understanding.     Problem: Safety  Goal: Will remain free from injury  Outcome: PROGRESSING AS EXPECTED  Bedside table and call light are within reach. Bed is locked and in the lowest position. Treaded socks are on. Patient educated to call for assistance.

## 2019-03-20 NOTE — DISCHARGE INSTRUCTIONS
Discharge Instructions    Discharged to home by car with relative. Discharged via wheelchair, hospital escort: Yes.  Special equipment needed: Not Applicable    Be sure to schedule a follow-up appointment with your primary care doctor or any specialists as instructed.     Discharge Plan:   Diet Plan: Discussed  Activity Level: Discussed  Confirmed Follow up Appointment: Patient to Call and Schedule Appointment  Confirmed Symptoms Management: Discussed  Medication Reconciliation Updated: Yes  Influenza Vaccine Indication: Patient Refuses    I understand that a diet low in cholesterol, fat, and sodium is recommended for good health. Unless I have been given specific instructions below for another diet, I accept this instruction as my diet prescription.   Other diet: Regular    Special Instructions: Follow up with your PCP within one week of hospital discharge. Discuss your hospital course with your PCP. You had a belly obstruction this is better now. Take amiodarone 400 mg twice daily for one week then 200 mg once daily thereafter. Start Metoprolol 100 mg twice daily. Follow up with cardiology team in outpatient setting for further recommendations. You have imaging findings of cirrhosis, discuss with your PCP and seek a referral to liver doctors.  Have your PCP check blood counts and chemistry panel in 1 week of hospital discharge.    · Is patient discharged on Warfarin / Coumadin?   No     Depression / Suicide Risk    As you are discharged from this [facility] facility, it is important to learn how to keep safe from harming yourself.    Recognize the warning signs:  · Abrupt changes in personality, positive or negative- including increase in energy   · Giving away possessions  · Change in eating patterns- significant weight changes-  positive or negative  · Change in sleeping patterns- unable to sleep or sleeping all the time   · Unwillingness or inability to communicate  · Depression  · Unusual sadness,  discouragement and loneliness  · Talk of wanting to die  · Neglect of personal appearance   · Rebelliousness- reckless behavior  · Withdrawal from people/activities they love  · Confusion- inability to concentrate     If you or a loved one observes any of these behaviors or has concerns about self-harm, here's what you can do:  · Talk about it- your feelings and reasons for harming yourself  · Remove any means that you might use to hurt yourself (examples: pills, rope, extension cords, firearm)  · Get professional help from the community (Mental Health, Substance Abuse, psychological counseling)  · Do not be alone:Call your Safe Contact- someone whom you trust who will be there for you.  · Call your local CRISIS HOTLINE 940-8901 or 916-535-1022  · Call your local Children's Mobile Crisis Response Team [place] (728) 662-9102 or www.Socialite  · Call the toll free National Suicide Prevention Hotlines   · National Suicide Prevention Lifeline 680-402-UAOA (2103)  · [employer] Hope Line Network 800-SUICIDE (193-0197)          Small Bowel Obstruction  A small bowel obstruction is a blockage in the small bowel. The small bowel, which is also called the small intestine, is a long, slender tube that connects the stomach to the colon. When a person eats and drinks, food and fluids go from the stomach to the small bowel. This is where most of the nutrients in the food and fluids are absorbed.  A small bowel obstruction will prevent food and fluids from passing through the small bowel as they normally do during digestion. The small bowel can become partially or completely blocked. This can cause symptoms such as abdominal pain, vomiting, and bloating. If this condition is not treated, it can be dangerous because the small bowel could rupture.  What are the causes?  Common causes of this condition include:  · Scar tissue from previous surgery or radiation treatment.  · Recent surgery. This may cause the movements of  the bowel to slow down and cause food to block the intestine.  · Hernias.  · Inflammatory bowel disease (colitis).  · Twisting of the bowel (volvulus).  · Tumors.  · A foreign body.  · Slipping of a part of the bowel into another part (intussusception).  What are the signs or symptoms?  Symptoms of this condition include:  · Abdominal pain. This may be dull cramps or sharp pain. It may occur in one area, or it may be present in the entire abdomen. Pain can range from mild to severe, depending on the degree of obstruction.  · Nausea and vomiting. Vomit may be greenish or a yellow bile color.  · Abdominal bloating.  · Constipation.  · Lack of passing gas.  · Frequent belching.  · Diarrhea. This may occur if the obstruction is partial and runny stool is able to leak around the obstruction.  How is this diagnosed?  This condition may be diagnosed based on a physical exam, medical history, and X-rays of the abdomen. You may also have other tests, such as a CT scan of the abdomen and pelvis.  How is this treated?  Treatment for this condition depends on the cause and severity of the problem. Treatment options may include:  · Bed rest along with fluids and pain medicines that are given through an IV tube inserted into one of your veins. Sometimes, this is all that is needed for the obstruction to improve.  · Following a simple diet. In some cases, a clear liquid diet may be required for several days. This allows the bowel to rest.  · Placement of a small tube (nasogastric tube) into the stomach. When the bowel is blocked, it usually swells up like a balloon that is filled with air and fluids. The air and fluids may be removed by suction through the nasogastric tube. This can help with pain, discomfort, and nausea. It can also help the obstruction to clear up faster.  · Surgery. This may be required if other treatments do not work. Bowel obstruction from a hernia may require early surgery and can be an emergency procedure.  Surgery may also be required for scar tissue that causes frequent or severe obstructions.  Follow these instructions at home:  · Get plenty of rest.  · Follow instructions from your health care provider about eating restrictions. You may need to avoid solid foods and consume only clear liquids until your condition improves.  · Take over-the-counter and prescription medicines only as told by your health care provider.  · Keep all follow-up visits as told by your health care provider. This is important.  Contact a health care provider if:  · You have a fever.  · You have chills.  Get help right away if:  · You have increased pain or cramping.  · You vomit blood.  · You have uncontrolled vomiting or nausea.  · You cannot drink fluids because of vomiting or pain.  · You develop confusion.  · You begin feeling very dry or thirsty (dehydrated).  · You have severe bloating.  · You feel extremely weak or you faint.  This information is not intended to replace advice given to you by your health care provider. Make sure you discuss any questions you have with your health care provider.  Document Released: 03/06/2007 Document Revised: 08/14/2017 Document Reviewed: 02/11/2016  [employer] Interactive Patient Education © 2017 [employer]      Atrial Fibrillation  Atrial fibrillation is a type of irregular or rapid heartbeat (arrhythmia). In atrial fibrillation, the heart quivers continuously in a chaotic pattern. This occurs when parts of the heart receive disorganized signals that make the heart unable to pump blood normally. This can increase the risk for stroke, heart failure, and other heart-related conditions. There are different types of atrial fibrillation, including:  · Paroxysmal atrial fibrillation. This type starts suddenly, and it usually stops on its own shortly after it starts.  · Persistent atrial fibrillation. This type often lasts longer than a week. It may stop on its own or with treatment.  · Long-lasting  persistent atrial fibrillation. This type lasts longer than 12 months.  · Permanent atrial fibrillation. This type does not go away.  Talk with your health care provider to learn about the type of atrial fibrillation that you have.  What are the causes?  This condition is caused by some heart-related conditions or procedures, including:  · A heart attack.  · Coronary artery disease.  · Heart failure.  · Heart valve conditions.  · High blood pressure.  · Inflammation of the sac that surrounds the heart (pericarditis).  · Heart surgery.  · Certain heart rhythm disorders, such as Reyes-Parkinson-White syndrome.  Other causes include:  · Pneumonia.  · Obstructive sleep apnea.  · Blockage of an artery in the lungs (pulmonary embolism, or PE).  · Lung cancer.  · Chronic lung disease.  · Thyroid problems, especially if the thyroid is overactive (hyperthyroidism).  · Caffeine.  · Excessive alcohol use or illegal drug use.  · Use of some medicines, including certain decongestants and diet pills.  Sometimes, the cause cannot be found.  What increases the risk?  This condition is more likely to develop in:  · People who are older in age.  · People who smoke.  · People who have diabetes mellitus.  · People who are overweight (obese).  · Athletes who exercise vigorously.  What are the signs or symptoms?  Symptoms of this condition include:  · A feeling that your heart is beating rapidly or irregularly.  · A feeling of discomfort or pain in your chest.  · Shortness of breath.  · Sudden light-headedness or weakness.  · Getting tired easily during exercise.  In some cases, there are no symptoms.  How is this diagnosed?  Your health care provider may be able to detect atrial fibrillation when taking your pulse. If detected, this condition may be diagnosed with:  · An electrocardiogram (ECG).  · A Holter monitor test that records your heartbeat patterns over a 24-hour period.  · Transthoracic echocardiogram (TTE) to evaluate how blood  flows through your heart.  · Transesophageal echocardiogram (VERNA) to view more detailed images of your heart.  · A stress test.  · Imaging tests, such as a CT scan or chest X-ray.  · Blood tests.  How is this treated?  The main goals of treatment are to prevent blood clots from forming and to keep your heart beating at a normal rate and rhythm. The type of treatment that you receive depends on many factors, such as your underlying medical conditions and how you feel when you are experiencing atrial fibrillation.  This condition may be treated with:  · Medicine to slow down the heart rate, bring the heart’s rhythm back to normal, or prevent clots from forming.  · Electrical cardioversion. This is a procedure that resets your heart’s rhythm by delivering a controlled, low-energy shock to the heart through your skin.  · Different types of ablation, such as catheter ablation, catheter ablation with pacemaker, or surgical ablation. These procedures destroy the heart tissues that send abnormal signals. When the pacemaker is used, it is placed under your skin to help your heart beat in a regular rhythm.  Follow these instructions at home:  · Take over-the counter and prescription medicines only as told by your health care provider.  · If your health care provider prescribed a blood-thinning medicine (anticoagulant), take it exactly as told. Taking too much blood-thinning medicine can cause bleeding. If you do not take enough blood-thinning medicine, you will not have the protection that you need against stroke and other problems.  · Do not use tobacco products, including cigarettes, chewing tobacco, and e-cigarettes. If you need help quitting, ask your health care provider.  · If you have obstructive sleep apnea, manage your condition as told by your health care provider.  · Do not drink alcohol.  · Do not drink beverages that contain caffeine, such as coffee, soda, and tea.  · Maintain a healthy weight. Do not use diet  pills unless your health care provider approves. Diet pills may make heart problems worse.  · Follow diet instructions as told by your health care provider.  · Exercise regularly as told by your health care provider.  · Keep all follow-up visits as told by your health care provider. This is important.  How is this prevented?  · Avoid drinking beverages that contain caffeine or alcohol.  · Avoid certain medicines, especially medicines that are used for breathing problems.  · Avoid certain herbs and herbal medicines, such as those that contain ephedra or ginseng.  · Do not use illegal drugs, such as cocaine and amphetamines.  · Do not smoke.  · Manage your high blood pressure.  Contact a health care provider if:  · You notice a change in the rate, rhythm, or strength of your heartbeat.  · You are taking an anticoagulant and you notice increased bruising.  · You tire more easily when you exercise or exert yourself.  Get help right away if:  · You have chest pain, abdominal pain, sweating, or weakness.  · You feel nauseous.  · You notice blood in your vomit, bowel movement, or urine.  · You have shortness of breath.  · You suddenly have swollen feet and ankles.  · You feel dizzy.  · You have sudden weakness or numbness of the face, arm, or leg, especially on one side of the body.  · You have trouble speaking, trouble understanding, or both (aphasia).  · Your face or your eyelid droops on one side.  These symptoms may represent a serious problem that is an emergency. Do not wait to see if the symptoms will go away. Get medical help right away. Call your local emergency services (911 in the [place] Do not drive yourself to the hospital.   This information is not intended to replace advice given to you by your health care provider. Make sure you discuss any questions you have with your health care provider.  Document Released: 12/18/2006 Document Revised: 04/26/2017 Document Reviewed: 04/13/2016  [employer]  Patient Education © 2017 [employer]

## 2019-03-20 NOTE — CARE PLAN
Problem: Nutritional:  Goal: Achieve adequate nutritional intake  Diet will advance beyond NPO/clears and patient will consume >50% of meals vs nutrition support to start    Outcome: MET Date Met: 03/20/19  Pt diet advanced to regular. PO >50% for most recent 3 meals.

## 2019-03-20 NOTE — FACE TO FACE
Face to Face Supporting Documentation - Home Health    The encounter with this patient was in whole or in part the primary reason for home health admission.    Date of encounter:   Patient:                    MRN:                       YOB: 2017  Dorie Wadsworth  [id number]  1935     Home health to see patient for:  Skilled Nursing care for assessment, interventions & education, Registered dietitian consult, Medical social work consult, Home health aide, Physical Therapy evaluation and treatment and Occupational therapy evaluation and treatment    Skilled need for:  Comment: SBO    Skilled nursing interventions to include:  Comment: Comanagement of medical concerns    Homebound status evidenced by:  Needs the assistance of another person in order to leave the home. Leaving home requires a considerable and taxing effort. There is a normal inability to leave the home.    Community Physician to provide follow up care: Coreen Cisse M.D.     Optional Interventions? No      I certify the face to face encounter for this home health care referral meets the CMS requirements and the encounter/clinical assessment with the patient was, in whole, or in part, for the medical condition(s) listed above, which is the primary reason for home health care. Based on my clinical findings: the service(s) are medically necessary, support the need for home health care, and the homebound criteria are met.  I certify that this patient has had a face to face encounter by myself.  Abbi Booth M.D. - NPI: [id number]

## 2019-03-20 NOTE — PROGRESS NOTES
Pt discharged home.  Tele box removed.  IV DC'ed.  Discharge instructions provided to pt and she verbalizes understanding.  Pt escorted via WC to  to meet daughter.

## 2019-03-20 NOTE — PROGRESS NOTES
Assumed care of patient bedside report received from SAI Sabillon updated on POC, call light within reach and fall precautions in place. Bed locked and in lowest position. Patient instructed to call for assistance before getting out of bed.  All questions answered, no other needs at this time.

## 2019-03-20 NOTE — PROGRESS NOTES
Bedside report received with RN. Pt care assumed. Assessment completed. No complaints of pain at this time. Safety precautions in place.

## 2019-03-20 NOTE — DISCHARGE SUMMARY
"Discharge Summary    CHIEF COMPLAINT ON ADMISSION  Chief Complaint   Patient presents with   • N/V     went to PCP for check up on monday feeling fine. that night she vomited \"buckets full\" pt has had N/V for 3 days now. denies diarrhea or fever    • Constipation     3 days    • Abdominal Pain       Reason for Admission  vomiting     Admission Date  3/13/2019    CODE STATUS  Full Code    HPI & HOSPITAL COURSE  This is a 83 y.o. female here with N/V, Constipation and abdominal pain.     Patient has underlying history of CKD stage III, paroxysmal atrial fibrillation, hypothyroidism, dyslipidemia, cystectomy with ileal conduit.  Patient presented to the hospital on March 30, 2019 with abdominal pain, nausea and vomiting and constipation.  CT of the abdomen and pelvis obtained on presentation revealed high-grade distal small bowel obstruction.  Imaging findings of cirrhosis with associated portal hypertension were noted.  Ultrasound right upper quadrant also concerning for cirrhosis, no other acute concerns.  Patient was admitted to the hospital, conservative management for small bowel obstruction was initiated and general surgery was consulted.  Patient was evaluated by Dr. Paola Goyal, ongoing conservative management with NG decompression and n.p.o. status, IV fluid hydration was recommended.  While managing, small bowel obstruction hospitalization was complicated by development of atrial fibrillation with rapid ventricular rate requiring transfer to the telemetry unit.  Patient remained intermittently in paroxysmal atrial fibrillation with rapid ventricular rate, cardiology consultation was obtained.  She was initiated on amiodarone, high-dose metoprolol per cardiology team recommendations.  Echocardiogram obtained during the hospitalization reveals a preserved ejection fraction, grade 2 diastolic dysfunction and RVSP of 45.  Patient had ongoing improvement during her hospitalization.  From her small bowel obstruction " standpoint, she is tolerating a regular diet at this time.  She has been cleared from surgical perspective for discharge.  From atrial fibrillation perspective, she is in normal sinus rhythm now, cardiology team has recommended continuing metoprolol and amiodarone and outpatient follow-up with cardiology team.  She has in the past been unable to tolerate anticoagulation because of issues with bleeding from stoma site.  She will further discuss anticoagulation with outpatient cardiology team.  Meanwhile she is on aspirin 81.  In addition she will need outpatient GI evaluation for imaging findings of cirrhosis, referral through her PCP.  No further acute hospitalization needs, patient feels her baseline and is eager to discharge home at this point in time.  Home health care ordered, social workers informed for arrangement of home health care.  Patient is discharged home in stable condition with recommendations to maintain close outpatient follow-up with PCP and cardiology.  Schedulers informed to arrange outpatient follow-up with PCP and cardiology.            Therefore, she is discharged in good and stable condition to home with close outpatient follow-up.    The patient met 2-midnight criteria for an inpatient stay at the time of discharge.    Discharge Date  03/20/19    FOLLOW UP ITEMS POST DISCHARGE  Arrangement of home health care per  prior to discharge.     Follow up with your PCP within one week of hospital discharge. Discuss your hospital course with your PCP. You had a belly obstruction this is better now. Take amiodarone 400 mg twice daily for one week then 200 mg once daily thereafter. Start Metoprolol 100 mg twice daily. Follow up with cardiology team in outpatient setting for further recommendations. You have imaging findings of cirrhosis, discuss with your PCP and seek a referral to liver doctors.  Have your PCP check blood counts and chemistry panel in 1 week of hospital discharge.    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 *      FOLLOW UP  Future Appointments  Date Time Provider Department Center   3/28/2019 10:15 AM ELIUD Bae [facility] None   4/29/2019 12:40 PM Jonna Vega M.D. [facility] None     No follow-up provider specified.    MEDICATIONS ON DISCHARGE     Medication List      START taking these medications      Instructions   amiodarone 200 MG Tabs  Commonly known as:  CORDARONE   Take 400 mg twice daily for 7 days, then 200 mg once daily     aspirin 81 MG EC tablet  Start taking on:  3/21/2019   Take 1 Tab by mouth every day.  Dose:  81 mg     metoprolol 100 MG Tabs  Commonly known as:  LOPRESSOR   Take 1 Tab by mouth 2 times a day.  Dose:  100 mg        CONTINUE taking these medications      Instructions   Biotin 10 MG Caps   Take 1 Cap by mouth every day.  Dose:  1 Cap     COMBIGAN 0.2-0.5 % Soln  Generic drug:  Brimonidine Tartrate-Timolol   Place 1 Drop in both eyes 2 Times a Day.  Dose:  1 Drop     levothyroxine 25 MCG Tabs  Commonly known as:  SYNTHROID   Take 25 mcg by mouth Every morning on an empty stomach.  Dose:  25 mcg     lovastatin 10 MG tablet  Commonly known as:  MEVACOR   Take 10 mg by mouth every day.  Dose:  10 mg     LUMIGAN 0.01 % Soln  Generic drug:  bimatoprost   Place 1 Drop in both eyes every morning. Both eyes  Dose:  1 Drop     pantoprazole 40 MG Tbec  Commonly known as:  PROTONIX   Take 40 mg by mouth every day.  Dose:  40 mg     * PRESERVISION AREDS PO   Take 1 Tab by mouth 2 Times a Day.  Dose:  1 Tab     * CENTRUM SILVER ULTRA WOMENS Tabs   Take 1 Tab by mouth every day.  Dose:  1 Tab        * This list has 2 medication(s) that are the same  "as other medications prescribed for you. Read the directions carefully, and ask your doctor or other care provider to review them with you.            STOP taking these medications    metoprolol SR 25 MG Tb24  Commonly known as:  TOPROL XL            Allergies  Allergies   Allergen Reactions   • Cardizem Swelling   • Doxycycline      Swollen lips.   • Sulfa Drugs      Makes tongue swell, severely   • Zyvox Swelling     \"Whole mouth swelled up\"    • Codeine      Does not remember the reaction         DIET  Orders Placed This Encounter   Procedures   • Diet Order Regular     Standing Status:   Standing     Number of Occurrences:   1     Order Specific Question:   Diet:     Answer:   Regular [1]       ACTIVITY  As tolerated.  Weight bearing as tolerated    CONSULTATIONS  Cardiology  General surgery     PROCEDURES  None    LABORATORY  Lab Results   Component Value Date    SODIUM 138 03/20/2019    POTASSIUM 4.1 03/20/2019    CHLORIDE 110 03/20/2019    CO2 20 03/20/2019    GLUCOSE 98 03/20/2019    BUN 23 (H) 03/20/2019    CREATININE 0.95 03/20/2019    CREATININE 1.2 02/10/2009        Lab Results   Component Value Date    WBC 9.1 03/20/2019    HEMOGLOBIN 10.8 (L) 03/20/2019    HEMATOCRIT 34.7 (L) 03/20/2019    PLATELETCT 204 03/20/2019        Total time of the discharge process exceeds 35 minutes.  "

## 2019-03-21 ENCOUNTER — PATIENT OUTREACH (OUTPATIENT)
Dept: HEALTH INFORMATION MANAGEMENT | Facility: OTHER | Age: 84
End: 2019-03-21

## 2019-03-22 ENCOUNTER — PATIENT OUTREACH (OUTPATIENT)
Dept: HEALTH INFORMATION MANAGEMENT | Facility: OTHER | Age: 84
End: 2019-03-22

## 2019-03-28 ENCOUNTER — OFFICE VISIT (OUTPATIENT)
Dept: CARDIOLOGY | Facility: MEDICAL CENTER | Age: 84
End: 2019-03-28
Payer: MEDICARE

## 2019-03-28 VITALS
BODY MASS INDEX: 24.25 KG/M2 | WEIGHT: 160 LBS | HEIGHT: 68 IN | DIASTOLIC BLOOD PRESSURE: 70 MMHG | HEART RATE: 76 BPM | OXYGEN SATURATION: 100 % | SYSTOLIC BLOOD PRESSURE: 130 MMHG

## 2019-03-28 DIAGNOSIS — I10 HTN (HYPERTENSION), MALIGNANT: ICD-10-CM

## 2019-03-28 DIAGNOSIS — E78.2 MIXED HYPERLIPIDEMIA: ICD-10-CM

## 2019-03-28 DIAGNOSIS — K56.609 SMALL BOWEL OBSTRUCTION (HCC): ICD-10-CM

## 2019-03-28 DIAGNOSIS — I48.0 PAROXYSMAL ATRIAL FIBRILLATION (HCC): ICD-10-CM

## 2019-03-28 DIAGNOSIS — I27.20 PULMONARY HYPERTENSION (HCC): ICD-10-CM

## 2019-03-28 LAB — EKG IMPRESSION: NORMAL

## 2019-03-28 PROCEDURE — 99214 OFFICE O/P EST MOD 30 MIN: CPT | Performed by: NURSE PRACTITIONER

## 2019-03-28 PROCEDURE — 93000 ELECTROCARDIOGRAM COMPLETE: CPT | Performed by: INTERNAL MEDICINE

## 2019-03-28 RX ORDER — METOPROLOL TARTRATE 100 MG/1
100 TABLET ORAL 2 TIMES DAILY
Qty: 60 TAB | Refills: 11 | Status: SHIPPED | OUTPATIENT
Start: 2019-03-28 | End: 2020-08-23

## 2019-03-28 ASSESSMENT — ENCOUNTER SYMPTOMS
DIZZINESS: 0
CLAUDICATION: 0
ORTHOPNEA: 0
WEIGHT LOSS: 0
SHORTNESS OF BREATH: 0
FOCAL WEAKNESS: 1
WEAKNESS: 0
PALPITATIONS: 0
PND: 0

## 2019-03-28 NOTE — PROGRESS NOTES
Chief Complaint   Patient presents with   • Atrial Fibrillation     hospital follow up       Subjective:   Dorie Wadsworth is a 83 y.o. female former patient of Dr. Rose Whipple who presents today for hospital follow-up.    Patient was admitted on 3/13/19 with intractable nausea and vomiting.  Abdominal x-ray showed small bowel dilation consistent with small bowel obstruction.  Patient was also found to be in atrial fibrillation with RVR with indeterminate troponin level peak of 0.12.  Patient small bowel obstruction resolved with out surgical intervention and patient converted to normal sinus rhythm with the use of amiodarone.  Patient was initiated on daily low-dose aspirin as she has a significant history of urostomy stoma bleeding with chronic anticoagulation.  She was discharged on 3/20/19.    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    Today patient states that she is feeling better.  Her only complaint is some weakness in her knees from being on bedrest and hospital and the sensation of clogged ears after recent sinus infection.  Patient denies shortness of breath, palpitations, chest pain, lower extremity edema or dizziness. She is tolerating daily low dose ASA well.     Past Medical History:   Diagnosis Date   • A-fib (HCC)    • Ailment     urostomy   • Arthritis     fingers   • Atrial fibrillation (HCC) 2/2009    A FIB X2,[ resolved on own][   • Atrial fibrillation with rapid ventricular response ([facility]) 3/18/2019   • Atrial fibrillation, rapid ([facility]) 1/31/2014   • Back pain    • Bladder disease 2011    bladder removed   • Bladder problem     Chronic bladder infection for the past 17months   • CAD (coronary artery disease)    • Cancer (HCC)     skin   • CATARACT     freddie surgery complete   • CHEST PAIN 3/11/2011   •  Glaucoma    • History of hematuria     3/15/10: with Plavix.   • HTN (hypertension), malignant 3/18/2019   • HTN (hypertension), malignant 3/18/2019   • Hyperlipidemia 2/24/2011   • Hypertension    • Hypothyroid 2/24/2011   • Indigestion    • Intrinsic eczema 10/31/2017   • Other specified pre-operative examination 1/28/2014   • Paroxysmal atrial fibrillation (HCC)    • Sepsis urinary source 1/23/2012   • Stroke (HCC)     2 TIAs 2/2010, no stroke, 2/2013   • T.I.A.    • TIA (transient ischemic attack) 3/11/2011   • Unspecified disorder of thyroid    • Unspecified hemorrhagic conditions     not sure when (2009)  blood in urin    • Urinary bladder disorder     urinary incontinence   • UTI (urinary tract infection) 2/24/2011   • Vaginal bleeding 10/22/2012     Past Surgical History:   Procedure Laterality Date   • PARASTOMAL HERNIA REPAIR   1/28/2014    Performed by Nicol Dorman M.D. at SURGERY [facility]   • BIOPSY GENERAL  10/22/2012    Performed by Jennifer Lincoln M.D. at SURGERY [facility]   • CYSTECTOMY  9/6/2011    Performed by JENNIFER LINCOLN at [facility]   • ILEO LOOP DIVERSION  9/6/2011    Performed by JENNIFER LINCOLN at [facility]   • RECTOCELE REPAIR  11/3/2009    Performed by ZEKE HARRINGTON at SURGERY [facility] DAY [facility]   • BLADDER BIOPSY WITH CYSTOSCOPY  10/12/2009    Performed by JENNIFER LINCOLN at SURGERY [facility]   • PELVIC EXAM UNDER ANESTHESIA  10/12/2009    Performed by JENNIFER LINCOLN at SURGERY [facility]   • BLADDER BIOPSY WITH CYSTOSCOPY  6/15/2009    Performed by CARLTON LUNA at SURGERY [facility]   • BLADDER BIOPSY WITH CYSTOSCOPY  10/10/08    Performed by CARLTON LUNA at SURGERY [facility]   • PYELOGRAM  10/10/08    Performed by CARLTON LUNA at [facility]   • RETROGRADES  10/10/08    Performed by CARLTON LUNA at [facility]   • CHOLECYSTECTOMY  1994   •  "CHOLECYSTECTOMY  1994   • GYN SURGERY      hysterectomy   • OTHER      TONSILLECTOMY AS TEENAGER   • OTHER ABDOMINAL SURGERY      appendectomy, pancratitis      Family History   Problem Relation Age of Onset   • Stroke Mother 81   • Stroke Father 77     Social History     Social History   • Marital status:      Spouse name: N/A   • Number of children: N/A   • Years of education: N/A     Occupational History   • Not on file.     Social History Main Topics   • Smoking status: Never Smoker   • Smokeless tobacco: Never Used   • Alcohol use No   • Drug use: No   • Sexual activity: Not on file     Other Topics Concern   • Not on file     Social History Narrative   • No narrative on file     Allergies   Allergen Reactions   • Cardizem Swelling   • Doxycycline      Swollen lips.   • Sulfa Drugs      Makes tongue swell, severely   • Zyvox Swelling     \"Whole mouth swelled up\"    • Codeine      Does not remember the reaction       Outpatient Encounter Prescriptions as of 3/28/2019   Medication Sig Dispense Refill   • metoprolol (LOPRESSOR) 100 MG Tab Take 1 Tab by mouth 2 times a day. 60 Tab 11   • aspirin EC 81 MG EC tablet Take 1 Tab by mouth every day. 30 Tab 0   • levothyroxine (SYNTHROID) 25 MCG Tab Take 25 mcg by mouth Every morning on an empty stomach.     • Brimonidine Tartrate-Timolol (COMBIGAN) 0.2-0.5 % Solution Place 1 Drop in both eyes 2 Times a Day.     • Multiple Vitamins-Minerals (CENTRUM SILVER ULTRA WOMENS) Tab Take 1 Tab by mouth every day.     • Multiple Vitamins-Minerals (PRESERVISION AREDS PO) Take 1 Tab by mouth 2 Times a Day.     • lovastatin (MEVACOR) 10 MG tablet Take 10 mg by mouth every day.     • pantoprazole (PROTONIX) 40 MG TBEC Take 40 mg by mouth every day.     • Biotin 10 MG CAPS Take 1 Cap by mouth every day.     • Bimatoprost (LUMIGAN) 0.01 % SOLN Place 1 Drop in both eyes every morning. Both eyes     • [DISCONTINUED] metoprolol (LOPRESSOR) 100 MG Tab Take 1 Tab by mouth 2 times a " "day. 60 Tab 0   • [DISCONTINUED] amiodarone (CORDARONE) 200 MG Tab Take 400 mg twice daily for 7 days, then 200 mg once daily 60 Tab 0     No facility-administered encounter medications on file as of 3/28/2019.      Review of Systems   Constitutional: Negative for malaise/fatigue and weight loss.   HENT: Positive for hearing loss (Due to congestion).    Respiratory: Negative for shortness of breath.    Cardiovascular: Negative for chest pain, palpitations, orthopnea, claudication, leg swelling and PND.   Neurological: Positive for focal weakness (Bilateral knees). Negative for dizziness and weakness.   All other systems reviewed and are negative.       Objective:   /70 (BP Location: Left arm, Patient Position: Sitting)   Pulse 76   Ht 1.727 m (5' 8\")   Wt 72.6 kg (160 lb)   SpO2 100%   BMI 24.33 kg/m²     Physical Exam   Constitutional: She is oriented to person, place, and time. She appears well-developed and well-nourished. No distress.   HENT:   Head: Normocephalic.   Eyes: EOM are normal.   Neck: No JVD present.   Cardiovascular: Normal rate, normal heart sounds and intact distal pulses.  An irregularly irregular rhythm present.   No murmur heard.  Pulmonary/Chest: Breath sounds normal. No respiratory distress.   Abdominal: Soft. Bowel sounds are normal. There is no tenderness.   Musculoskeletal: She exhibits no edema.   Neurological: She is alert and oriented to person, place, and time.   Skin: Skin is warm and dry.   Urostomy    Psychiatric: She has a normal mood and affect. Her behavior is normal.       Echocardiogram 3/15/19:  Normal left ventricular systolic function.  Left ventricular ejection fraction is visually estimated to be 70%.  Grade II diastolic dysfunction.  The right ventricle was normal in size and function.  Mild tricuspid regurgitation.  Estimated right ventricular systolic pressure is 45 mmHg.  Compared to the images of the prior study done 2/25/2014 -  there has been no " significant change.        Assessment:     1. Paroxysmal atrial fibrillation (HCC)     2. Small bowel obstruction (HCC)     3. Pulmonary hypertension (HCC)     4. HTN (hypertension), malignant  metoprolol (LOPRESSOR) 100 MG Tab   5. Mixed hyperlipidemia         Medical Decision Making:  Today's Assessment / Status / Plan:     PAF:  -EKG today showing atrial fibrillation, rate of 70.  -Patient is not interested in rhythm management strategy at this time due to her being asymptomatic.   -Discontinue amiodarone.  -No chronic anticoagulation due to recurrent bleeding from urostomy stoma.  -Continue ASA 81 mg daily and Lopressor 100 mg twice daily.  -Discussed with patient to call office if her rate becomes elevated or if she becomes symptomatic.    SBO:  -Resolved.    Pulmonary hypertension:  -RVSP per TTE on 3/15/19 was 45 mmHg.  -Patient is asymptomatic with no evidence of acute heart failure.    Hypertension:  -Stable.  -Continue metoprolol as above.     Hyperlipidemia: He  -Stable.  -LDL on 8/20/18 was 75.  -Continue lovastatin 10 mg daily.    Patient will follow up with Dr. Jonna Vega scheduled on 4/29/2019 or earlier if needed.  Encouraged patient to contact office should any questions or concerns arise in the meantime.  Patient understands and agrees with plan of care.    Future Appointments  Date Time Provider Department Center   4/29/2019 12:40 PM Jonna Vega M.D. [facility] None       Collaborating Provider: Dr. Crescencio Ivory

## 2019-04-26 ENCOUNTER — PATIENT OUTREACH (OUTPATIENT)
Dept: HEALTH INFORMATION MANAGEMENT | Facility: OTHER | Age: 84
End: 2019-04-26

## 2019-04-26 NOTE — PROGRESS NOTES
Dorie Wadsworth was admitted on 3/13/19 for small bowel obstruction, once treated she was discharged home on. [facility] patient advocate assisted with multiple discharge needs including 3 appointments, 1 Cardiology appointment, 2 Primary Care Physician appointments. Of the 3 appointments the patient kept 3. Patient is also scheduled for 2 appointments with, Cardiology 4/29, and Primary Care Physician 6/10. PPS screening was conducted and scored above 50%

## 2019-04-29 ENCOUNTER — OFFICE VISIT (OUTPATIENT)
Dept: CARDIOLOGY | Facility: MEDICAL CENTER | Age: 84
End: 2019-04-29
Payer: MEDICARE

## 2019-04-29 ENCOUNTER — TELEPHONE (OUTPATIENT)
Dept: CARDIOLOGY | Facility: MEDICAL CENTER | Age: 84
End: 2019-04-29

## 2019-04-29 VITALS
DIASTOLIC BLOOD PRESSURE: 62 MMHG | HEIGHT: 68 IN | HEART RATE: 70 BPM | OXYGEN SATURATION: 99 % | WEIGHT: 155.6 LBS | SYSTOLIC BLOOD PRESSURE: 124 MMHG | BODY MASS INDEX: 23.58 KG/M2

## 2019-04-29 DIAGNOSIS — Z79.899 ENCOUNTER FOR LONG-TERM (CURRENT) USE OF HIGH-RISK MEDICATION: ICD-10-CM

## 2019-04-29 DIAGNOSIS — I10 ESSENTIAL HYPERTENSION: ICD-10-CM

## 2019-04-29 DIAGNOSIS — I48.0 PAROXYSMAL ATRIAL FIBRILLATION (HCC): ICD-10-CM

## 2019-04-29 DIAGNOSIS — E78.5 DYSLIPIDEMIA: ICD-10-CM

## 2019-04-29 PROCEDURE — 99215 OFFICE O/P EST HI 40 MIN: CPT | Performed by: INTERNAL MEDICINE

## 2019-04-29 RX ORDER — FUROSEMIDE 20 MG/1
20 TABLET ORAL
Refills: 2 | Status: ON HOLD | COMMUNITY
Start: 2019-03-11 | End: 2020-07-19

## 2019-04-29 RX ORDER — TIMOLOL MALEATE 5 MG/ML
1 SOLUTION/ DROPS OPHTHALMIC 2 TIMES DAILY
Refills: 6 | COMMUNITY
Start: 2019-03-13

## 2019-04-29 RX ORDER — FLUTICASONE PROPIONATE 50 MCG
SPRAY, SUSPENSION (ML) NASAL
Refills: 0 | COMMUNITY
Start: 2019-02-08 | End: 2019-12-20

## 2019-04-29 RX ORDER — BENZONATATE 100 MG/1
CAPSULE ORAL
Refills: 1 | COMMUNITY
Start: 2019-04-01 | End: 2019-04-29

## 2019-04-29 RX ORDER — LEVOTHYROXINE SODIUM 112 UG/1
TABLET ORAL
COMMUNITY
Start: 2019-04-16 | End: 2019-04-29

## 2019-04-29 RX ORDER — LEVOTHYROXINE SODIUM 112 UG/1
TABLET ORAL
Refills: 1 | COMMUNITY
Start: 2019-04-16 | End: 2019-04-29

## 2019-04-29 RX ORDER — AZITHROMYCIN 250 MG/1
TABLET, FILM COATED ORAL
Refills: 0 | COMMUNITY
Start: 2019-02-08 | End: 2019-04-29

## 2019-04-29 RX ORDER — HYDROXYZINE 50 MG/1
TABLET, FILM COATED ORAL
Refills: 3 | COMMUNITY
Start: 2019-03-11 | End: 2019-04-29

## 2019-04-29 RX ORDER — LATANOPROST 50 UG/ML
1 SOLUTION/ DROPS OPHTHALMIC EVERY EVENING
Refills: 6 | COMMUNITY
Start: 2019-03-13

## 2019-04-29 ASSESSMENT — ENCOUNTER SYMPTOMS
PND: 0
ORTHOPNEA: 0
ABDOMINAL PAIN: 0
DIZZINESS: 0
PALPITATIONS: 0
SHORTNESS OF BREATH: 0
DEPRESSION: 0
LOSS OF CONSCIOUSNESS: 0
FALLS: 0

## 2019-04-29 NOTE — PROGRESS NOTES
Chief Complaint   Patient presents with   • Atrial Fibrillation       Subjective:   Dorie Wadsworth is a 83 y.o. female who presents today to follow-up on her atrial fibrillation.    Pertinent history:  Paroxysmal atrial fibrillation  History of TIA  Hypertension  Hyperlipidemia  Hypothyroidism    In March 2019 patient was admitted to the hospital with small bowel obstruction and had atrial fibrillation with rapid ventricular response.  She was started on amiodarone and her other medications were titrated.  She was discharged off of anticoagulation due to a history of bleeding from her stoma.  She has previously undergone a cystectomy with an ileal conduit.     Since her discharge from the hospital, patient has been feeling well overall.  Denies any palpitations or dizziness.  She is currently only on aspirin.  She has not had any bleeding issues from her stoma for at least 5 months now.    Her blood pressures have been mostly well controlled in the 120s systolic.  For hyperlipidemia she continues to be on lovastatin which is tolerating well.    Past Medical History:   Diagnosis Date   • A-fib (HCC)    • Ailment     urostomy   • Arthritis     fingers   • Atrial fibrillation (HCC) 2/2009    A FIB X2,[ resolved on own][   • Atrial fibrillation with rapid ventricular response (HCC) 3/18/2019   • Atrial fibrillation, rapid (HCC) 1/31/2014   • Back pain    • Bladder disease 2011    bladder removed   • Bladder problem     Chronic bladder infection for the past 17months   • CAD (coronary artery disease)    • Cancer (HCC)     skin   • CATARACT     freddie surgery complete   • CHEST PAIN 3/11/2011   • Glaucoma    • History of hematuria     3/15/10: with Plavix.   • HTN (hypertension), malignant 3/18/2019   • HTN (hypertension), malignant 3/18/2019   • Hyperlipidemia 2/24/2011   • Hypertension    • Hypothyroid 2/24/2011   • Indigestion    • Intrinsic eczema 10/31/2017   • Other specified pre-operative examination  1/28/2014   • Paroxysmal atrial fibrillation (HCC)    • Sepsis urinary source 1/23/2012   • Stroke (HCC)     2 TIAs 2/2010, no stroke, 2/2013   • T.I.A.    • TIA (transient ischemic attack) 3/11/2011   • Unspecified disorder of thyroid    • Unspecified hemorrhagic conditions     not sure when (2009)  blood in urin    • Urinary bladder disorder     urinary incontinence   • UTI (urinary tract infection) 2/24/2011   • Vaginal bleeding 10/22/2012     Past Surgical History:   Procedure Laterality Date   • PARASTOMAL HERNIA REPAIR   1/28/2014    Performed by Nicol Dorman M.D. at SURGERY [facility]   • BIOPSY GENERAL  10/22/2012    Performed by Jennifer Lincoln M.D. at SURGERY [facility]   • CYSTECTOMY  9/6/2011    Performed by JENNIFER LINCOLN at [facility]   • ILEO LOOP DIVERSION  9/6/2011    Performed by JENNIFER LINCOLN at SURGERY [facility]   • RECTOCELE REPAIR  11/3/2009    Performed by ZEKE HARRINGTON at SURGERY [facility]   • BLADDER BIOPSY WITH CYSTOSCOPY  10/12/2009    Performed by JENNIFER LINCOLN at SURGERY [facility]   • PELVIC EXAM UNDER ANESTHESIA  10/12/2009    Performed by JENNIFER LINCOLN at SURGERY [facility]   • BLADDER BIOPSY WITH CYSTOSCOPY  6/15/2009    Performed by CARLTON LUNA at SURGERY [facility]   • BLADDER BIOPSY WITH CYSTOSCOPY  10/10/08    Performed by CARLTON LUNA at SURGERY [facility]   • PYELOGRAM  10/10/08    Performed by CARLTON LUNA at SURGERY [facility]   • RETROGRADES  10/10/08    Performed by CARLTON LUNA at SURGERY [facility]   • CHOLECYSTECTOMY  1994   • CHOLECYSTECTOMY  1994   • GYN SURGERY      hysterectomy   • OTHER      TONSILLECTOMY AS TEENAGER   • OTHER ABDOMINAL SURGERY      appendectomy, pancratitis      Family History   Problem Relation Age of Onset   • Stroke Mother 81   • Stroke Father 77     Social History     Social History   • Marital status:      Spouse  "name: N/A   • Number of children: N/A   • Years of education: N/A     Occupational History   • Not on file.     Social History Main Topics   • Smoking status: Never Smoker   • Smokeless tobacco: Never Used   • Alcohol use No   • Drug use: No   • Sexual activity: Not on file     Other Topics Concern   • Not on file     Social History Narrative   • No narrative on file     Allergies   Allergen Reactions   • Cardizem Swelling   • Doxycycline      Swollen lips.   • Sulfa Drugs      Makes tongue swell, severely   • Zyvox Swelling     \"Whole mouth swelled up\"    • Codeine      Does not remember the reaction       Outpatient Encounter Prescriptions as of 4/29/2019   Medication Sig Dispense Refill   • latanoprost (XALATAN) 0.005 % Solution   6   • timolol (TIMOPTIC) 0.5 % Solution   6   • metoprolol (LOPRESSOR) 100 MG Tab Take 1 Tab by mouth 2 times a day. 60 Tab 11   • aspirin EC 81 MG EC tablet Take 1 Tab by mouth every day. 30 Tab 0   • levothyroxine (SYNTHROID) 25 MCG Tab Take 25 mcg by mouth Every morning on an empty stomach.     • Brimonidine Tartrate-Timolol (COMBIGAN) 0.2-0.5 % Solution Place 1 Drop in both eyes 2 Times a Day.     • Multiple Vitamins-Minerals (CENTRUM SILVER ULTRA WOMENS) Tab Take 1 Tab by mouth every day.     • Multiple Vitamins-Minerals (PRESERVISION AREDS PO) Take 1 Tab by mouth 2 Times a Day.     • lovastatin (MEVACOR) 10 MG tablet Take 10 mg by mouth every day.     • pantoprazole (PROTONIX) 40 MG TBEC Take 40 mg by mouth every day.     • Biotin 10 MG CAPS Take 1 Cap by mouth every day.     • Bimatoprost (LUMIGAN) 0.01 % SOLN Place 1 Drop in both eyes every morning. Both eyes     • azithromycin (ZITHROMAX) 250 MG Tab   0   • fluticasone (FLONASE) 50 MCG/ACT nasal spray   0   • furosemide (LASIX) 20 MG Tab   2   • [DISCONTINUED] benzonatate (TESSALON) 100 MG Cap   1   • [DISCONTINUED] hydrOXYzine HCl (ATARAX) 50 MG Tab   3   • [DISCONTINUED] levothyroxine (SYNTHROID) 112 MCG Tab      • " "[DISCONTINUED] levothyroxine (SYNTHROID) 112 MCG Tab   1     No facility-administered encounter medications on file as of 4/29/2019.      Review of Systems   Constitutional: Negative for malaise/fatigue.   Respiratory: Negative for shortness of breath.    Cardiovascular: Negative for chest pain, palpitations, orthopnea, leg swelling and PND.   Gastrointestinal: Negative for abdominal pain.   Musculoskeletal: Negative for falls.   Neurological: Negative for dizziness and loss of consciousness.   Psychiatric/Behavioral: Negative for depression.   All other systems reviewed and are negative.       Objective:   /62 (BP Location: Left arm, Patient Position: Sitting, BP Cuff Size: Adult)   Pulse 70   Ht 1.727 m (5' 8\")   Wt 70.6 kg (155 lb 9.6 oz)   SpO2 99%   BMI 23.66 kg/m²     Physical Exam   Constitutional: She is oriented to person, place, and time. She appears well-developed and well-nourished. No distress.   HENT:   Head: Normocephalic and atraumatic.   Eyes: Conjunctivae are normal. No scleral icterus.   Neck: Normal range of motion. Neck supple.   Cardiovascular: Normal rate, regular rhythm and normal heart sounds.  Exam reveals no gallop and no friction rub.    No murmur heard.  Pulmonary/Chest: Effort normal and breath sounds normal. No respiratory distress. She has no wheezes. She has no rales.   Abdominal: Soft. She exhibits no distension. There is no tenderness.   Musculoskeletal: She exhibits no edema.   Neurological: She is alert and oriented to person, place, and time.   Skin: Skin is warm and dry. She is not diaphoretic.   Psychiatric: She has a normal mood and affect. Her behavior is normal.   Nursing note and vitals reviewed.    Labs performed in April 2019 were reviewed and showed creatinine 1.42, stable.  Hemoglobin 12.0.  LDL 65.    Assessment:     1. Paroxysmal atrial fibrillation (HCC)     2. Essential hypertension     3. Dyslipidemia         Medical Decision Making:  Today's Assessment " / Status / Plan:     Paroxysmal atrial fibrillation:  History of TIA:  History of bleeding at the stoma site:    Patient's CHADS-Vasc score is at least 6 given her history of TIA.  Ideally she should be on lifelong anticoagulation however she is currently not on any anticoagulation due to her history of bleeding at the stoma site.  She has only ever been tried on Eliquis.  Patient states that she was unable to afford Eliquis.  I will request Dr. Gracia's opinion about Coumadin and his thoughts on its safety in regards to her history of bleeding at the stoma site.  Patient is agreeable to starting Coumadin if okay per Dr. Garcia.  For now continue aspirin.  Continue metoprolol at current dose.    Hypertension: Blood pressure is at goal.  Continue metoprolol at current dose.    Hyperlipidemia: LDL at goal.  Continue lovastatin at current dose.    Return to clinic in 6 months or earlier if needed.    Thank you for allowing me to participate in the care of this patient. Please do not hesitate to contact me with any questions.    Jonna Vega MD  Cardiologist  [facility]      PLEASE NOTE: This dictation was created using voice recognition software.

## 2019-04-29 NOTE — Clinical Note
Jaelyn Madrid,   This is the patient I spoke to you about. Can you call Dr. Garcia's office regarding anticoagulation. See A/P below.   Thanks AA

## 2019-04-29 NOTE — TELEPHONE ENCOUNTER
Left message for Dr. Portillo's MA to call back regarding OK for pt to start anticoagulation. Provided direct number.

## 2019-04-29 NOTE — LETTER
[facility]   [address], [address]  [place] [address]  Phone: [phone]  Fax: [phone]              Dorie Wadsworth  1935    Encounter Date: 4/29/2019    Jonna Vega M.D.          PROGRESS NOTE:  Chief Complaint   Patient presents with   • Atrial Fibrillation       Subjective:   Dorie Wadsworth is a 83 y.o. female who presents today to follow-up on her atrial fibrillation.    Pertinent history:  Paroxysmal atrial fibrillation  History of TIA  Hypertension  Hyperlipidemia  Hypothyroidism    In March 2019 patient was admitted to the hospital with small bowel obstruction and had atrial fibrillation with rapid ventricular response.  She was started on amiodarone and her other medications were titrated.  She was discharged off of anticoagulation due to a history of bleeding from her stoma.  She has previously undergone a cystectomy with an ileal conduit.     Since her discharge from the hospital, patient has been feeling well overall.  Denies any palpitations or dizziness.  She is currently only on aspirin.  She has not had any bleeding issues from her stoma for at least 5 months now.    Her blood pressures have been mostly well controlled in the 120s systolic.  For hyperlipidemia she continues to be on lovastatin which is tolerating well.    Past Medical History:   Diagnosis Date   • A-fib (HCC)    • Ailment     urostomy   • Arthritis     fingers   • Atrial fibrillation (HCC) 2/2009    A FIB X2,[ resolved on own][   • Atrial fibrillation with rapid ventricular response ([facility]) 3/18/2019   • Atrial fibrillation, rapid ([facility]) 1/31/2014   • Back pain    • Bladder disease 2011    bladder removed   • Bladder problem     Chronic bladder infection for the past 17months   • CAD (coronary artery disease)    • Cancer (HCC)     skin   • CATARACT     freddie surgery complete   • CHEST PAIN 3/11/2011   • Glaucoma    • History of hematuria     3/15/10: with  Plavix.   • HTN (hypertension), malignant 3/18/2019   • HTN (hypertension), malignant 3/18/2019   • Hyperlipidemia 2/24/2011   • Hypertension    • Hypothyroid 2/24/2011   • Indigestion    • Intrinsic eczema 10/31/2017   • Other specified pre-operative examination 1/28/2014   • Paroxysmal atrial fibrillation (HCC)    • Sepsis urinary source 1/23/2012   • Stroke (HCC)     2 TIAs 2/2010, no stroke, 2/2013   • T.I.A.    • TIA (transient ischemic attack) 3/11/2011   • Unspecified disorder of thyroid    • Unspecified hemorrhagic conditions     not sure when (2009)  blood in urin    • Urinary bladder disorder     urinary incontinence   • UTI (urinary tract infection) 2/24/2011   • Vaginal bleeding 10/22/2012     Past Surgical History:   Procedure Laterality Date   • PARASTOMAL HERNIA REPAIR   1/28/2014    Performed by Nicol Dorman M.D. at SURGERY [facility]   • BIOPSY GENERAL  10/22/2012    Performed by Jennifer Lincoln M.D. at SURGERY [facility]   • CYSTECTOMY  9/6/2011    Performed by JENNIFER LINCOLN at [facility]   • ILEO LOOP DIVERSION  9/6/2011    Performed by JENNIFER LINCOLN at SURGERY [facility]   • RECTOCELE REPAIR  11/3/2009    Performed by ZEKE HARRINGTON at SURGERY SAME DAY [facility]   • BLADDER BIOPSY WITH CYSTOSCOPY  10/12/2009    Performed by JENNIFER LINCOLN at [facility]   • PELVIC EXAM UNDER ANESTHESIA  10/12/2009    Performed by JENNIFER LINCOLN at SURGERY [facility]   • BLADDER BIOPSY WITH CYSTOSCOPY  6/15/2009    Performed by CARLTON LUNA at SURGERY [facility]   • BLADDER BIOPSY WITH CYSTOSCOPY  10/10/08    Performed by CARLTON LUNA at SURGERY [facility]   • PYELOGRAM  10/10/08    Performed by CARLTON LUNA at SURGERY [facility]   • RETROGRADES  10/10/08    Performed by CARLTON LUNA at [facility]   • CHOLECYSTECTOMY  1994   • CHOLECYSTECTOMY  1994   • GYN SURGERY      hysterectomy   •  "OTHER      TONSILLECTOMY AS TEENAGER   • OTHER ABDOMINAL SURGERY      appendectomy, pancratitis      Family History   Problem Relation Age of Onset   • Stroke Mother 81   • Stroke Father 77     Social History     Social History   • Marital status:      Spouse name: N/A   • Number of children: N/A   • Years of education: N/A     Occupational History   • Not on file.     Social History Main Topics   • Smoking status: Never Smoker   • Smokeless tobacco: Never Used   • Alcohol use No   • Drug use: No   • Sexual activity: Not on file     Other Topics Concern   • Not on file     Social History Narrative   • No narrative on file     Allergies   Allergen Reactions   • Cardizem Swelling   • Doxycycline      Swollen lips.   • Sulfa Drugs      Makes tongue swell, severely   • Zyvox Swelling     \"Whole mouth swelled up\"    • Codeine      Does not remember the reaction       Outpatient Encounter Prescriptions as of 4/29/2019   Medication Sig Dispense Refill   • latanoprost (XALATAN) 0.005 % Solution   6   • timolol (TIMOPTIC) 0.5 % Solution   6   • metoprolol (LOPRESSOR) 100 MG Tab Take 1 Tab by mouth 2 times a day. 60 Tab 11   • aspirin EC 81 MG EC tablet Take 1 Tab by mouth every day. 30 Tab 0   • levothyroxine (SYNTHROID) 25 MCG Tab Take 25 mcg by mouth Every morning on an empty stomach.     • Brimonidine Tartrate-Timolol (COMBIGAN) 0.2-0.5 % Solution Place 1 Drop in both eyes 2 Times a Day.     • Multiple Vitamins-Minerals (CENTRUM SILVER ULTRA WOMENS) Tab Take 1 Tab by mouth every day.     • Multiple Vitamins-Minerals (PRESERVISION AREDS PO) Take 1 Tab by mouth 2 Times a Day.     • lovastatin (MEVACOR) 10 MG tablet Take 10 mg by mouth every day.     • pantoprazole (PROTONIX) 40 MG TBEC Take 40 mg by mouth every day.     • Biotin 10 MG CAPS Take 1 Cap by mouth every day.     • Bimatoprost (LUMIGAN) 0.01 % SOLN Place 1 Drop in both eyes every morning. Both eyes     • azithromycin (ZITHROMAX) 250 MG Tab   0   • " "fluticasone (FLONASE) 50 MCG/ACT nasal spray   0   • furosemide (LASIX) 20 MG Tab   2   • [DISCONTINUED] benzonatate (TESSALON) 100 MG Cap   1   • [DISCONTINUED] hydrOXYzine HCl (ATARAX) 50 MG Tab   3   • [DISCONTINUED] levothyroxine (SYNTHROID) 112 MCG Tab      • [DISCONTINUED] levothyroxine (SYNTHROID) 112 MCG Tab   1     No facility-administered encounter medications on file as of 4/29/2019.      Review of Systems   Constitutional: Negative for malaise/fatigue.   Respiratory: Negative for shortness of breath.    Cardiovascular: Negative for chest pain, palpitations, orthopnea, leg swelling and PND.   Gastrointestinal: Negative for abdominal pain.   Musculoskeletal: Negative for falls.   Neurological: Negative for dizziness and loss of consciousness.   Psychiatric/Behavioral: Negative for depression.   All other systems reviewed and are negative.       Objective:   /62 (BP Location: Left arm, Patient Position: Sitting, BP Cuff Size: Adult)   Pulse 70   Ht 1.727 m (5' 8\")   Wt 70.6 kg (155 lb 9.6 oz)   SpO2 99%   BMI 23.66 kg/m²      Physical Exam   Constitutional: She is oriented to person, place, and time. She appears well-developed and well-nourished. No distress.   HENT:   Head: Normocephalic and atraumatic.   Eyes: Conjunctivae are normal. No scleral icterus.   Neck: Normal range of motion. Neck supple.   Cardiovascular: Normal rate, regular rhythm and normal heart sounds.  Exam reveals no gallop and no friction rub.    No murmur heard.  Pulmonary/Chest: Effort normal and breath sounds normal. No respiratory distress. She has no wheezes. She has no rales.   Abdominal: Soft. She exhibits no distension. There is no tenderness.   Musculoskeletal: She exhibits no edema.   Neurological: She is alert and oriented to person, place, and time.   Skin: Skin is warm and dry. She is not diaphoretic.   Psychiatric: She has a normal mood and affect. Her behavior is normal.   Nursing note and vitals " reviewed.    Labs performed in April 2019 were reviewed and showed creatinine 1.42, stable.  Hemoglobin 12.0.  LDL 65.    Assessment:     1. Paroxysmal atrial fibrillation (HCC)     2. Essential hypertension     3. Dyslipidemia         Medical Decision Making:  Today's Assessment / Status / Plan:     Paroxysmal atrial fibrillation:  History of TIA:  History of bleeding at the stoma site:    Patient's CHADS-Vasc score is at least 6 given her history of TIA.  Ideally she should be on lifelong anticoagulation however she is currently not on any anticoagulation due to her history of bleeding at the stoma site.  She has only ever been tried on Eliquis.  Patient states that she was unable to afford Eliquis.  I will request Dr. Garcia's opinion about Coumadin and his thoughts on its safety in regards to her history of bleeding at the stoma site.  Patient is agreeable to starting Coumadin if okay per Dr. Garcia.  For now continue aspirin.  Continue metoprolol at current dose.    Hypertension: Blood pressure is at goal.  Continue metoprolol at current dose.    Hyperlipidemia: LDL at goal.  Continue lovastatin at current dose.    Return to clinic in 6 months or earlier if needed.    Thank you for allowing me to participate in the care of this patient. Please do not hesitate to contact me with any questions.    Jonna Vega MD  Cardiologist  [facility]      PLEASE NOTE: This dictation was created using voice recognition software.         Coreen Cisse M.D.  [address] [address]  VIA Facsimile: [phone]

## 2019-04-29 NOTE — TELEPHONE ENCOUNTER
----- Message from Jonna Vega M.D. sent at 4/29/2019  1:14 PM PDT -----  Jaelyn Madrid,     This is the patient I spoke to you about. Can you call Dr. Garcia's office regarding anticoagulation. See A/P below.     Thanks  AA

## 2019-05-22 ENCOUNTER — HOSPITAL ENCOUNTER (OUTPATIENT)
Dept: LAB | Facility: MEDICAL CENTER | Age: 84
End: 2019-05-22
Attending: INTERNAL MEDICINE
Payer: MEDICARE

## 2019-05-22 LAB
ALBUMIN SERPL BCP-MCNC: 3.3 G/DL (ref 3.2–4.9)
ALBUMIN/GLOB SERPL: 0.7 G/DL
ALP SERPL-CCNC: 135 U/L (ref 30–99)
ALT SERPL-CCNC: 19 U/L (ref 2–50)
ANION GAP SERPL CALC-SCNC: 5 MMOL/L (ref 0–11.9)
AST SERPL-CCNC: 36 U/L (ref 12–45)
BASOPHILS # BLD AUTO: 0 % (ref 0–1.8)
BASOPHILS # BLD: 0 K/UL (ref 0–0.12)
BILIRUB SERPL-MCNC: 0.6 MG/DL (ref 0.1–1.5)
BUN SERPL-MCNC: 14 MG/DL (ref 8–22)
CALCIUM SERPL-MCNC: 8.8 MG/DL (ref 8.5–10.5)
CHLORIDE SERPL-SCNC: 106 MMOL/L (ref 96–112)
CO2 SERPL-SCNC: 25 MMOL/L (ref 20–33)
CREAT SERPL-MCNC: 1.05 MG/DL (ref 0.5–1.4)
EOSINOPHIL # BLD AUTO: 0.16 K/UL (ref 0–0.51)
EOSINOPHIL NFR BLD: 1.7 % (ref 0–6.9)
ERYTHROCYTE [DISTWIDTH] IN BLOOD BY AUTOMATED COUNT: 63.7 FL (ref 35.9–50)
FERRITIN SERPL-MCNC: 58.6 NG/ML (ref 10–291)
GGT SERPL-CCNC: 81 U/L (ref 7–34)
GLOBULIN SER CALC-MCNC: 4.5 G/DL (ref 1.9–3.5)
GLUCOSE SERPL-MCNC: 80 MG/DL (ref 65–99)
HBV CORE AB SERPL QL IA: NEGATIVE
HBV SURFACE AB SERPL IA-ACNC: <3.1 MIU/ML (ref 0–10)
HBV SURFACE AG SER QL: NEGATIVE
HCT VFR BLD AUTO: 39 % (ref 37–47)
HCV AB SER QL: NEGATIVE
HGB BLD-MCNC: 11.6 G/DL (ref 12–16)
INR PPP: 1.17 (ref 0.87–1.13)
IRON SATN MFR SERPL: 12 % (ref 15–55)
IRON SERPL-MCNC: 42 UG/DL (ref 40–170)
LYMPHOCYTES # BLD AUTO: 2.34 K/UL (ref 1–4.8)
LYMPHOCYTES NFR BLD: 25.2 % (ref 22–41)
MANUAL DIFF BLD: NORMAL
MCH RBC QN AUTO: 24.8 PG (ref 27–33)
MCHC RBC AUTO-ENTMCNC: 29.7 G/DL (ref 33.6–35)
MCV RBC AUTO: 83.5 FL (ref 81.4–97.8)
MONOCYTES # BLD AUTO: 1.29 K/UL (ref 0–0.85)
MONOCYTES NFR BLD AUTO: 13.9 % (ref 0–13.4)
MORPHOLOGY BLD-IMP: NORMAL
MORPHOLOGY BLD-IMP: NORMAL
NEUTROPHILS # BLD AUTO: 5.51 K/UL (ref 2–7.15)
NEUTROPHILS NFR BLD: 58.3 % (ref 44–72)
NEUTS BAND NFR BLD MANUAL: 0.9 % (ref 0–10)
NRBC # BLD AUTO: 0 K/UL
NRBC BLD-RTO: 0 /100 WBC
PLATELET # BLD AUTO: 164 K/UL (ref 164–446)
PLATELET # BLD AUTO: 166 K/UL (ref 164–446)
PLATELET BLD QL SMEAR: NORMAL
PMV BLD AUTO: 12.3 FL (ref 9–12.9)
POTASSIUM SERPL-SCNC: 4.1 MMOL/L (ref 3.6–5.5)
PROT SERPL-MCNC: 7.8 G/DL (ref 6–8.2)
PROTHROMBIN TIME: 15 SEC (ref 12–14.6)
RBC # BLD AUTO: 4.67 M/UL (ref 4.2–5.4)
RBC BLD AUTO: PRESENT
SODIUM SERPL-SCNC: 136 MMOL/L (ref 135–145)
TIBC SERPL-MCNC: 339 UG/DL (ref 250–450)
TSH SERPL DL<=0.005 MIU/L-ACNC: 0.58 UIU/ML (ref 0.38–5.33)
VARIANT LYMPHS BLD QL SMEAR: NORMAL
WBC # BLD AUTO: 9.3 K/UL (ref 4.8–10.8)

## 2019-05-22 PROCEDURE — 84460 ALANINE AMINO (ALT) (SGPT): CPT

## 2019-05-22 PROCEDURE — 86803 HEPATITIS C AB TEST: CPT

## 2019-05-22 PROCEDURE — 87340 HEPATITIS B SURFACE AG IA: CPT

## 2019-05-22 PROCEDURE — 85610 PROTHROMBIN TIME: CPT

## 2019-05-22 PROCEDURE — 82947 ASSAY GLUCOSE BLOOD QUANT: CPT

## 2019-05-22 PROCEDURE — 83516 IMMUNOASSAY NONANTIBODY: CPT | Mod: 91

## 2019-05-22 PROCEDURE — 85027 COMPLETE CBC AUTOMATED: CPT

## 2019-05-22 PROCEDURE — 82977 ASSAY OF GGT: CPT

## 2019-05-22 PROCEDURE — 85007 BL SMEAR W/DIFF WBC COUNT: CPT

## 2019-05-22 PROCEDURE — 84443 ASSAY THYROID STIM HORMONE: CPT

## 2019-05-22 PROCEDURE — 86704 HEP B CORE ANTIBODY TOTAL: CPT

## 2019-05-22 PROCEDURE — 83550 IRON BINDING TEST: CPT

## 2019-05-22 PROCEDURE — 82728 ASSAY OF FERRITIN: CPT

## 2019-05-22 PROCEDURE — 86708 HEPATITIS A ANTIBODY: CPT

## 2019-05-22 PROCEDURE — 36415 COLL VENOUS BLD VENIPUNCTURE: CPT

## 2019-05-22 PROCEDURE — 86706 HEP B SURFACE ANTIBODY: CPT

## 2019-05-22 PROCEDURE — 86039 ANTINUCLEAR ANTIBODIES (ANA): CPT

## 2019-05-22 PROCEDURE — 83540 ASSAY OF IRON: CPT

## 2019-05-22 PROCEDURE — 85049 AUTOMATED PLATELET COUNT: CPT | Mod: XU

## 2019-05-22 PROCEDURE — 84450 TRANSFERASE (AST) (SGOT): CPT

## 2019-05-22 PROCEDURE — 82103 ALPHA-1-ANTITRYPSIN TOTAL: CPT

## 2019-05-22 PROCEDURE — 80053 COMPREHEN METABOLIC PANEL: CPT

## 2019-05-22 PROCEDURE — 86038 ANTINUCLEAR ANTIBODIES: CPT

## 2019-05-23 LAB
A1AT SERPL-MCNC: 197 MG/DL (ref 90–200)
HAV AB SER QL IA: POSITIVE

## 2019-05-24 ENCOUNTER — OFFICE VISIT (OUTPATIENT)
Dept: URGENT CARE | Facility: PHYSICIAN GROUP | Age: 84
End: 2019-05-24
Payer: MEDICARE

## 2019-05-24 VITALS
OXYGEN SATURATION: 99 % | HEART RATE: 67 BPM | BODY MASS INDEX: 23.87 KG/M2 | WEIGHT: 157 LBS | SYSTOLIC BLOOD PRESSURE: 132 MMHG | TEMPERATURE: 96.8 F | RESPIRATION RATE: 16 BRPM | DIASTOLIC BLOOD PRESSURE: 76 MMHG

## 2019-05-24 DIAGNOSIS — J32.9 RHINOSINUSITIS: ICD-10-CM

## 2019-05-24 LAB
MITOCHONDRIA M2 IGG SER-ACNC: 73.7 UNITS (ref 0–20)
NUCLEAR IGG SER QL IA: DETECTED
SMA IGG SER-ACNC: 18 UNITS (ref 0–19)

## 2019-05-24 PROCEDURE — 99214 OFFICE O/P EST MOD 30 MIN: CPT | Performed by: FAMILY MEDICINE

## 2019-05-24 RX ORDER — AMOXICILLIN AND CLAVULANATE POTASSIUM 875; 125 MG/1; MG/1
1 TABLET, FILM COATED ORAL 2 TIMES DAILY
Qty: 20 TAB | Refills: 0 | Status: SHIPPED | OUTPATIENT
Start: 2019-05-24 | End: 2019-06-03

## 2019-05-24 RX ORDER — FLUTICASONE PROPIONATE 50 MCG
2 SPRAY, SUSPENSION (ML) NASAL DAILY
Qty: 1 BOTTLE | Refills: 0 | Status: SHIPPED
Start: 2019-05-24 | End: 2019-12-20

## 2019-05-24 ASSESSMENT — ENCOUNTER SYMPTOMS
EYE REDNESS: 0
WEIGHT LOSS: 0
CHILLS: 0
MYALGIAS: 0
VOMITING: 0
HEADACHES: 0
NAUSEA: 0
EYE DISCHARGE: 0

## 2019-05-24 NOTE — PROGRESS NOTES
Subjective:      Dorie Wadsworth is a 83 y.o. female who presents with Runny Nose (affecting ears and changes taste in mouth x5 weeks)            5 weeks waxing and waning rhinorrhea and congestion. No fever. Has tried multiple OTC medications but no flonase. She has tried abx with minimal improvement. Cough has resolved. Ears remain plugged. No other aggravating or alleviating factors.          Review of Systems   Constitutional: Negative for chills, malaise/fatigue and weight loss.   HENT: Negative for ear discharge.    Eyes: Negative for discharge and redness.   Gastrointestinal: Negative for nausea and vomiting.   Musculoskeletal: Negative for joint pain and myalgias.   Skin: Negative for itching and rash.   Neurological: Negative for headaches.   .  Medications, Allergies, and current problem list reviewed today in Epic  No PMH seasonal allergies       Objective:     /76   Pulse 67   Temp 36 °C (96.8 °F)   Resp 16   Wt 71.2 kg (157 lb)   SpO2 99%   BMI 23.87 kg/m²      Physical Exam   Constitutional: She is oriented to person, place, and time. She appears well-developed and well-nourished. No distress.   HENT:   Head: Normocephalic and atraumatic.   Right Ear: External ear normal.   Left Ear: External ear normal.   Purulent appearing rhinorrhea and PND   Eyes: Conjunctivae are normal.   Neck: Neck supple.   Cardiovascular: Normal rate, regular rhythm and normal heart sounds.    No murmur heard.  Pulmonary/Chest: Effort normal and breath sounds normal. She has no wheezes.   Lymphadenopathy:     She has no cervical adenopathy.   Neurological: She is alert and oriented to person, place, and time.   Skin: Skin is warm and dry. No rash noted.               Assessment/Plan:     1. Rhinosinusitis  fluticasone (FLONASE) 50 MCG/ACT nasal spray    amoxicillin-clavulanate (AUGMENTIN) 875-125 MG Tab     Differential diagnosis, natural history, supportive care, and indications for immediate follow-up  discussed at length.     Nasal saline, decongestant, nasal CS

## 2019-05-26 LAB
ANA INTERPRETIVE COMMENT Q5143: NORMAL
ANTINUCLEAR ANTIBODY (ANA), HEP-2, IGG Q5142: NORMAL
CYTOPLASMIC PATTERN TITER Q5148: ABNORMAL

## 2019-05-29 LAB
ALT SERPL-CCNC: 24 U/L (ref 5–40)
ANNOTATION COMMENT IMP: ABNORMAL
AST SERPL-CCNC: 41 U/L (ref 9–40)
FERRITIN SERPL-MCNC: 70 NG/ML (ref 18–340)
FIBROSIS STAGE SERPL QL: ABNORMAL
GLUCOSE SERPL-MCNC: 84 MG/DL
LIVER FIBR SCORE SERPL CALC.FIBROMETER: 0.74
PATHOLOGY STUDY: ABNORMAL

## 2019-09-19 ENCOUNTER — NON-PROVIDER VISIT (OUTPATIENT)
Dept: WOUND CARE | Facility: MEDICAL CENTER | Age: 84
End: 2019-09-19
Attending: PHYSICIAN ASSISTANT
Payer: MEDICARE

## 2019-09-19 PROCEDURE — 99211 OFF/OP EST MAY X REQ PHY/QHP: CPT

## 2019-09-19 NOTE — CERTIFICATION
"Advanced Wound Care  [facility]  [address]  [place] [address]  [phone] Fax#    Initial Ostomy Evaluation  For 90 Day Certification Period: 9/19/19-12/19/19     Referring Provider:  Giuliana Kenney  Primary Provider: Coreen Cisse MD  Consulting Providers: Dr. Garcia  Surgeon: Jennyfer Lincoln MD      Start of Care: 9/1919  Reason for referral: peristomal skin assessment and evaluation       SUBJECTIVE:  \"I have skin issues around my stoma at times and I'm changing the pouch about every 2 days.\"    HPI:  83 year old pt here for urostomy peristomal assessment and evaluation.  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  She has been admitted to the hospital for stomal bleeding a few times but this has resolved after blood thinners were discontinued.  Pt has been managing the pouch changes on her own since 2011 and recently developed peristomal skin issues.  She is changing the wafer every 2 days.      Past Medical Hx:   Past Medical History:   Diagnosis Date   • A-fib (HCC)    • Ailment     urostomy   • Arthritis     fingers   • Atrial fibrillation (HCC) 2/2009    A FIB X2,[ resolved on own][   • Atrial fibrillation with rapid ventricular response (HCC) 3/18/2019   • Atrial fibrillation, rapid (HCC) 1/31/2014   • Back pain    • Bladder disease 2011    bladder removed   • Bladder problem     Chronic bladder infection for the past 17months   • CAD (coronary artery disease)    • Cancer (HCC)     skin   • CATARACT     freddie surgery complete   • CHEST PAIN 3/11/2011   • Glaucoma    • History of hematuria     3/15/10: with Plavix.   • HTN (hypertension), malignant 3/18/2019   • HTN (hypertension), malignant 3/18/2019   • Hyperlipidemia 2/24/2011   • Hypertension    • Hypothyroid 2/24/2011   • Indigestion    • Intrinsic eczema 10/31/2017   • Other specified pre-operative examination 1/28/2014   • Paroxysmal atrial " fibrillation (HCC)    • Sepsis urinary source 1/23/2012   • Stroke (HCC)     2 TIAs 2/2010, no stroke, 2/2013   • T.I.A.    • TIA (transient ischemic attack) 3/11/2011   • Unspecified disorder of thyroid    • Unspecified hemorrhagic conditions     not sure when (2009)  blood in urin    • Urinary bladder disorder     urinary incontinence   • UTI (urinary tract infection) 2/24/2011   • Vaginal bleeding 10/22/2012     Surgical Hx:   Past Surgical History:   Procedure Laterality Date   • PARASTOMAL HERNIA REPAIR   1/28/2014    Performed by Nicol Dorman M.D. at SURGERY [facility]   • BIOPSY GENERAL  10/22/2012    Performed by Jennifer Lincoln M.D. at SURGERY [facility]   • CYSTECTOMY  9/6/2011    Performed by JENNIFER LINCOLN at [facility]   • ILEO LOOP DIVERSION  9/6/2011    Performed by JENNIFER LINCOLN at [facility]   • RECTOCELE REPAIR  11/3/2009    Performed by ZEKE HARRINGTON at SURGERY [facility] DAY [facility]   • BLADDER BIOPSY WITH CYSTOSCOPY  10/12/2009    Performed by JENNIFER LINCOLN at SURGERY [facility]   • PELVIC EXAM UNDER ANESTHESIA  10/12/2009    Performed by JENNIFER LINCOLN at [facility]   • BLADDER BIOPSY WITH CYSTOSCOPY  6/15/2009    Performed by CARLTON LUNA at [facility]   • BLADDER BIOPSY WITH CYSTOSCOPY  10/10/08    Performed by CARLTON LUNA at SURGERY [facility]   • PYELOGRAM  10/10/08    Performed by CARLTON LUNA at SURGERY [facility]   • RETROGRADES  10/10/08    Performed by CARLTON LUNA at SURGERY [facility]   • CHOLECYSTECTOMY  1994   • CHOLECYSTECTOMY  1994   • GYN SURGERY      hysterectomy   • OTHER      TONSILLECTOMY AS TEENAGER   • OTHER ABDOMINAL SURGERY      appendectomy, pancratitis      Medications:   Current Outpatient Medications:   •  furosemide (LASIX) 20 MG Tab, , Disp: , Rfl: 2  •  latanoprost (XALATAN) 0.005 % Solution, , Disp: , Rfl: 6  •  timolol (TIMOPTIC)  0.5 % Solution, , Disp: , Rfl: 6  •  metoprolol (LOPRESSOR) 100 MG Tab, Take 1 Tab by mouth 2 times a day., Disp: 60 Tab, Rfl: 11  •  aspirin EC 81 MG EC tablet, Take 1 Tab by mouth every day., Disp: 30 Tab, Rfl: 0  •  levothyroxine (SYNTHROID) 25 MCG Tab, Take 25 mcg by mouth Every morning on an empty stomach., Disp: , Rfl:   •  Brimonidine Tartrate-Timolol (COMBIGAN) 0.2-0.5 % Solution, Place 1 Drop in both eyes 2 Times a Day., Disp: , Rfl:   •  Multiple Vitamins-Minerals (CENTRUM SILVER ULTRA WOMENS) Tab, Take 1 Tab by mouth every day., Disp: , Rfl:   •  Multiple Vitamins-Minerals (PRESERVISION AREDS PO), Take 1 Tab by mouth 2 Times a Day., Disp: , Rfl:   •  lovastatin (MEVACOR) 10 MG tablet, Take 10 mg by mouth every day., Disp: , Rfl:   •  pantoprazole (PROTONIX) 40 MG TBEC, Take 40 mg by mouth every day., Disp: , Rfl:   •  Biotin 10 MG CAPS, Take 1 Cap by mouth every day., Disp: , Rfl:   •  fluticasone (FLONASE) 50 MCG/ACT nasal spray, Spray 2 Sprays in nose every day. (Patient not taking: Reported on 9/19/2019), Disp: 1 Bottle, Rfl: 0  •  fluticasone (FLONASE) 50 MCG/ACT nasal spray, , Disp: , Rfl: 0  •  Bimatoprost (LUMIGAN) 0.01 % SOLN, Place 1 Drop in both eyes every morning. Both eyes, Disp: , Rfl:   Allergies: Cardizem; Doxycycline; Sulfa drugs; Zyvox; and Codeine  Social Hx:   Social History     Socioeconomic History   • Marital status:      Spouse name: Not on file   • Number of children: Not on file   • Years of education: Not on file   • Highest education level: Not on file   Occupational History   • Not on file   Social Needs   • Financial resource strain: Not on file   • Food insecurity:     Worry: Not on file     Inability: Not on file   • Transportation needs:     Medical: Not on file     Non-medical: Not on file   Tobacco Use   • Smoking status: Never Smoker   • Smokeless tobacco: Never Used   Substance and Sexual Activity   • Alcohol use: No   • Drug use: No   • Sexual activity: Not on  "file   Lifestyle   • Physical activity:     Days per week: Not on file     Minutes per session: Not on file   • Stress: Not on file   Relationships   • Social connections:     Talks on phone: Not on file     Gets together: Not on file     Attends Restorationist service: Not on file     Active member of club or organization: Not on file     Attends meetings of clubs or organizations: Not on file     Relationship status: Not on file   • Intimate partner violence:     Fear of current or ex partner: Not on file     Emotionally abused: Not on file     Physically abused: Not on file     Forced sexual activity: Not on file   Other Topics Concern   • Not on file   Social History Narrative   • Not on file   Fall Risk Assessment (blas all that apply with an X): Pt is not a fall risk   X65 years or older     Fall within the last 2 years, uses   Ambulatory devices  Loss of protective sensation in feet,   Use of prostethic/orthotic, years    Presence of lower extremity/foot/toe amputation   XTaking medication that increases risk (per facility policy)    Interventions Recommended (if any of the above are selected):   Use of Assistive Device:________________________   Supervision with ambulation:  Caregiver   Assistance with ambulation:  Caregiver   XHome safety education:  Educational material provided    OBJECTIVE: pt arrived with pouch intact.  Pt agreeable to care            STOMA ASSESSMENT:   Type:  ____Ileostomy     ____Colostomy    __X__Urostomy    ____Other     Location:   [facility]   Size: 1 1/4\"   Shape: round   Color: red   Protrudes:      ___ >1 inch               __X_ <1 inch   [place]:  Central    Mucocutaneous Junction: intact    Skin indentations, creases or scar tissue: purple ring on direct peristomal skin and scar tissue   Irena-stomal Skin Problems:denudation at direct peristomal skin with small areas of pseudoverrucous lesions   Effluent / Flatus: pale yellow, appropriate for urostomy   Photo  _X___ Yes ____ No    Pouching " "Procedure:   Old appliance removed    Peristomal skin cleansed with: warm water and wash cloth   Peristomal skin treated with: stoma powder and no sting skin barrier first layer then crusting with stoma powder and medical adhesive spray for second layer   Ostomy appliance used:  Pt's own precut 1 1/4\" round appliance [place] 2 piece    Patient Education: Change urostomies every 3-5 days. Change appliance immediately if it is leaking or peristomal skin feels irritated, has itching, or  burning.   To change the appliance, remove previous appliance, cleanse peristomal skin with warm water/washcloth, pat dry, make an ostomy template or use cardboard measuring guide and trace ostomy shape onto back of barrier, cut out barrier, apply a paste ring around barrier opening and apply appliance. Empty pouches when no more than ½ full. Check contents every 2 hours or as needed. Do not leave soap residue on tissue and do not use baby wipes or skin prep wipes.     Apply 50% vinegar and distilled water soaks to stoma and peristomal skin for 15 mins at each appliance change.  Crust with stoma powder and skin barrier/medical adhesive.    Should you experience any significant changes in your condition, such as infection (redness, swelling, localized heat, increased pain, fever > 101 F, chills) or have any questions regarding your home care instructions, please contact the wound center at [phone]. If after hours, contact your primary care physician or go to the hospital emergency room.     Verbal/demonstration instructions of above pouching procedure provided to patient/family.      Response: Pt verbalized understanding      PLAN: Pt will soak with 50% vinegar/water soaks for 15 mins with each appliance change then crust (see education).   Pt to return to clinic in 2 weeks for follow up after her birthday trip out of town.      Supplies Needed:   Appliance type:    [place]              Other: 1 1/4\" round precut 2 piece, " pt's own     Accessories: medium stoma belt        Supplier: [employer]    Frequency:  Every 1-2 weeks      Professional Collaboration:  Evaluation notes forwarded to referring provider.      At the time of each visit, a thorough assessment of the patient is completed to assure appropriateness of our plan of care.  The plan of care may need to be adapted from the original plan of care to address any significant changes in patient status.    Clinician Signature:  _________________________________  Date:  ____________    Physician Signature:  ________________________________  Date:  ____________

## 2019-09-19 NOTE — PATIENT INSTRUCTIONS
Change urostomies and ileostomies every 3-5 days.  Change appliance immediately if it is leaking or peristomal skin feels irritated, has itching, or  burning.   To change the appliance, remove previous appliance, cleanse peristomal skin with warm water/washcloth, pat dry, make an ostomy template or use cardboard measuring guide and trace ostomy shape onto back of barrier, cut out barrier, apply a paste ring around barrier opening and apply appliance. Empty pouches when no more than ½ full. Check contents every 2 hours or as needed. Do not leave soap residue on tissue and do not use baby wipes or skin prep wipes.     Should you experience any significant changes in your condition, such as infection (redness, swelling, localized heat, increased pain, fever > 101 F, chills) or have any questions regarding your home care instructions, please contact the wound center at [phone]. If after hours, contact your primary care physician or go to the hospital emergency room.

## 2019-10-03 ENCOUNTER — APPOINTMENT (OUTPATIENT)
Dept: WOUND CARE | Facility: MEDICAL CENTER | Age: 84
End: 2019-10-03
Attending: PHYSICIAN ASSISTANT
Payer: MEDICARE

## 2019-10-16 ENCOUNTER — NON-PROVIDER VISIT (OUTPATIENT)
Dept: WOUND CARE | Facility: MEDICAL CENTER | Age: 84
End: 2019-10-16
Attending: PHYSICIAN ASSISTANT
Payer: MEDICARE

## 2019-10-16 PROCEDURE — 99211 OFF/OP EST MAY X REQ PHY/QHP: CPT

## 2019-10-16 NOTE — CERTIFICATION
"Advanced Wound Care  [facility]  [address]  [place] [address]  [phone] Fax#    Initial Ostomy Evaluation  For 90 Day Certification Period: 9/19/19-12/19/19     Referring Provider:  Giuliana Kenney  Primary Provider: Coreen Cisse MD  Consulting Providers: Dr. Garcia  Surgeon: Jennyfer Lincoln MD      Start of Care: 9/1919  Reason for referral: peristomal skin assessment and evaluation       SUBJECTIVE:  \"My DrRuel thinks I might have a hernia.\"    HPI:  84 year old pt here for urostomy peristomal assessment and evaluation.  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  She has been admitted to the hospital for stomal bleeding a few times but this has resolved after blood thinners were discontinued.  Pt has been managing the pouch changes on her own since 2011 and recently developed peristomal skin issues.  She is changing the wafer every 2 days.  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 Her problem list does not include these dx, and her lab work is also not indicative. Her sclera are very mildly icteric. I sent this note via fax to pt's PCP Dr. Coreen Cisse MD, and left a VM report for their MA.      Past Medical Hx:   Past Medical History:   Diagnosis Date   • A-fib (HCC)    • Ailment     urostomy   • Arthritis     fingers   • Atrial fibrillation (HCC) 2/2009    A FIB X2,[ resolved on own][   • Atrial fibrillation with rapid ventricular response (HCC) 3/18/2019   • Atrial fibrillation, rapid (HCC) 1/31/2014   • Back pain    • Bladder disease 2011    bladder removed   • Bladder problem     Chronic bladder infection for the past 17months   • CAD (coronary artery disease)    • Cancer (HCC)     skin   • CATARACT     freddie surgery complete   • CHEST PAIN 3/11/2011   • Glaucoma    • History of hematuria     3/15/10: with Plavix. "   • HTN (hypertension), malignant 3/18/2019   • HTN (hypertension), malignant 3/18/2019   • Hyperlipidemia 2/24/2011   • Hypertension    • Hypothyroid 2/24/2011   • Indigestion    • Intrinsic eczema 10/31/2017   • Other specified pre-operative examination 1/28/2014   • Paroxysmal atrial fibrillation (HCC)    • Sepsis urinary source 1/23/2012   • Stroke (HCC)     2 TIAs 2/2010, no stroke, 2/2013   • T.I.A.    • TIA (transient ischemic attack) 3/11/2011   • Unspecified disorder of thyroid    • Unspecified hemorrhagic conditions     not sure when (2009)  blood in urin    • Urinary bladder disorder     urinary incontinence   • UTI (urinary tract infection) 2/24/2011   • Vaginal bleeding 10/22/2012     Surgical Hx:   Past Surgical History:   Procedure Laterality Date   • PARASTOMAL HERNIA REPAIR   1/28/2014    Performed by Nicol Dorman M.D. at SURGERY [facility]   • BIOPSY GENERAL  10/22/2012    Performed by Jennifer Lincoln M.D. at SURGERY [facility]   • CYSTECTOMY  9/6/2011    Performed by JENNIFER LINCOLN at [facility]   • ILEO LOOP DIVERSION  9/6/2011    Performed by JENNIFER LINCOLN at SURGERY [facility]   • RECTOCELE REPAIR  11/3/2009    Performed by ZEKE HARRINGTON at SURGERY [facility] DAY [facility]   • BLADDER BIOPSY WITH CYSTOSCOPY  10/12/2009    Performed by JENNIFER LINCOLN at [facility]   • PELVIC EXAM UNDER ANESTHESIA  10/12/2009    Performed by JENNIFER LINCOLN at SURGERY [facility]   • BLADDER BIOPSY WITH CYSTOSCOPY  6/15/2009    Performed by CARLTON LUNA at SURGERY [facility]   • BLADDER BIOPSY WITH CYSTOSCOPY  10/10/08    Performed by CARLTON LUNA at SUR[facility]ERY [facility]   • PYELOGRAM  10/10/08    Performed by CARLTON LUNA at [facility]   • RETROGRADES  10/10/08    Performed by CARLTON LUNA at [facility]   • CHOLECYSTECTOMY  1994   • CHOLECYSTECTOMY  1994   • GYN SURGERY      hysterectomy    • OTHER      TONSILLECTOMY AS TEENAGER   • OTHER ABDOMINAL SURGERY      appendectomy, pancratitis      Medications:   Current Outpatient Medications:   •  fluticasone (FLONASE) 50 MCG/ACT nasal spray, Spray 2 Sprays in nose every day. (Patient not taking: Reported on 9/19/2019), Disp: 1 Bottle, Rfl: 0  •  fluticasone (FLONASE) 50 MCG/ACT nasal spray, , Disp: , Rfl: 0  •  furosemide (LASIX) 20 MG Tab, , Disp: , Rfl: 2  •  latanoprost (XALATAN) 0.005 % Solution, , Disp: , Rfl: 6  •  timolol (TIMOPTIC) 0.5 % Solution, , Disp: , Rfl: 6  •  metoprolol (LOPRESSOR) 100 MG Tab, Take 1 Tab by mouth 2 times a day., Disp: 60 Tab, Rfl: 11  •  aspirin EC 81 MG EC tablet, Take 1 Tab by mouth every day., Disp: 30 Tab, Rfl: 0  •  levothyroxine (SYNTHROID) 25 MCG Tab, Take 25 mcg by mouth Every morning on an empty stomach., Disp: , Rfl:   •  Brimonidine Tartrate-Timolol (COMBIGAN) 0.2-0.5 % Solution, Place 1 Drop in both eyes 2 Times a Day., Disp: , Rfl:   •  Multiple Vitamins-Minerals (CENTRUM SILVER ULTRA WOMENS) Tab, Take 1 Tab by mouth every day., Disp: , Rfl:   •  Multiple Vitamins-Minerals (PRESERVISION AREDS PO), Take 1 Tab by mouth 2 Times a Day., Disp: , Rfl:   •  lovastatin (MEVACOR) 10 MG tablet, Take 10 mg by mouth every day., Disp: , Rfl:   •  pantoprazole (PROTONIX) 40 MG TBEC, Take 40 mg by mouth every day., Disp: , Rfl:   •  Biotin 10 MG CAPS, Take 1 Cap by mouth every day., Disp: , Rfl:   •  Bimatoprost (LUMIGAN) 0.01 % SOLN, Place 1 Drop in both eyes every morning. Both eyes, Disp: , Rfl:   Allergies: Cardizem; Doxycycline; Sulfa drugs; Zyvox; and Codeine  Social Hx:   Social History     Socioeconomic History   • Marital status:      Spouse name: Not on file   • Number of children: Not on file   • Years of education: Not on file   • Highest education level: Not on file   Occupational History   • Not on file   Social Needs   • Financial resource strain: Not on file   • Food insecurity:     Worry: Not on  "file     Inability: Not on file   • Transportation needs:     Medical: Not on file     Non-medical: Not on file   Tobacco Use   • Smoking status: Never Smoker   • Smokeless tobacco: Never Used   Substance and Sexual Activity   • Alcohol use: No   • Drug use: No   • Sexual activity: Not on file   Lifestyle   • Physical activity:     Days per week: Not on file     Minutes per session: Not on file   • Stress: Not on file   Relationships   • Social connections:     Talks on phone: Not on file     Gets together: Not on file     Attends Mosque service: Not on file     Active member of club or organization: Not on file     Attends meetings of clubs or organizations: Not on file     Relationship status: Not on file   • Intimate partner violence:     Fear of current or ex partner: Not on file     Emotionally abused: Not on file     Physically abused: Not on file     Forced sexual activity: Not on file   Other Topics Concern   • Not on file   Social History Narrative   • Not on file   Fall Risk Assessment (blas all that apply with an X): Pt is not a fall risk   X65 years or older     Fall within the last 2 years, uses   Ambulatory devices  Loss of protective sensation in feet,   Use of prostethic/orthotic, years    Presence of lower extremity/foot/toe amputation   XTaking medication that increases risk (per facility policy)    Interventions Recommended (if any of the above are selected):   Use of Assistive Device:________________________   Supervision with ambulation:  Caregiver   Assistance with ambulation:  Caregiver   [place] safety education:  Educational material provided    OBJECTIVE: pt arrived with pouch intact.  Pt agreeable to care                STOMA ASSESSMENT:   Type:  ____Ileostomy     ____Colostomy    __X__Urostomy    ____Other     Location:   [facility]   Size:between 1 and  1 1/8\"   Shape: round, slightly oval    Color: red   Protrudes:      ___ >1 inch               __X_ <1 inch   [place]:  Central    Mucocutaneous " "Junction: intact    Skin indentations, creases or scar tissue: juan stomal caput medusae with palpable distended vessels. Pt has had to be hospitalized x 3 for cautery and suturing of this area.    Juan-stomal Skin Problems:denudation   / erosion at direct peristomal skin    Effluent / Flatus: pale yellow, appropriate for urostomy   Photo  _X___ Yes ____ No    Pouching Procedure:   Old appliance removed    Peristomal skin cleansed with: warm water and wash cloth   Peristomal skin treated with: stoma powder and no sting skin barrier first layer then crusting with stoma powder and medical adhesive spray for second layer   Ostomy appliance used:  Pt's own precut 1 1/4\" round appliance [place] 2 piece    Patient Education: Recommend flange 29652 (1 1/8\") instead of current 06460 (1 1/4\"). As the 1 1/4\" is too large for stoma. DO NOT USE CONVEXITY. Go to ER for any uncontrolled bleeding of stoma/juan stoma.     Change urostomies every 3-5 days. Change appliance immediately if it is leaking or peristomal skin feels irritated, has itching, or  burning.   To change the appliance, remove previous appliance, cleanse peristomal skin with warm water/washcloth, pat dry, make an ostomy template or use cardboard measuring guide and trace ostomy shape onto back of barrier, cut out barrier, apply a paste ring around barrier opening and apply appliance. Empty pouches when no more than ½ full. Check contents every 2 hours or as needed. Do not leave soap residue on tissue and do not use baby wipes or skin prep wipes.     Apply 50% vinegar and distilled water soaks to stoma and peristomal skin for 15 mins at each appliance change.  Crust with stoma powder and skin barrier/medical adhesive.    Should you experience any significant changes in your condition, such as infection (redness, swelling, localized heat, increased pain, fever > 101 F, chills) or have any questions regarding your home care instructions, please contact the wound " "[place] at [phone]. If after hours, contact your primary care physician or go to the hospital emergency room.     Verbal/demonstration instructions of above pouching procedure provided to patient/family.      Response: Pt verbalized understanding      PLAN: Pt refused hernia support belt.   Pt to return to clinic prn problems.   Supplies Needed:   Appliance type:    [place]              Other: 1 1/4\" round precut 2 piece, pt's own     Accessories: medium stoma belt        Supplier: [facility]    Frequency:  prn      Professional Collaboration:  Evaluation notes forwarded to referring provider.      At the time of each visit, a thorough assessment of the patient is completed to assure appropriateness of our plan of care.  The plan of care may need to be adapted from the original plan of care to address any significant changes in patient status.    Clinician Signature:  _________________________________  Date:  ____________    Physician Signature:  ________________________________  Date:  ____________       "

## 2019-10-17 NOTE — PATIENT INSTRUCTIONS
Change urostomies and ileostomies every 3-5 days. Change colostomies every 5-7 days. Change appliance immediately if it is leaking or peristomal skin feels irritated, has itching, or  burning.   To change the appliance, remove previous appliance, cleanse peristomal skin with warm water/washcloth, pat dry, make an ostomy template or use cardboard measuring guide and trace ostomy shape onto back of barrier, cut out barrier, apply a paste ring around barrier opening and apply appliance. Empty pouches when no more than ½ full. Check contents every 2 hours or as needed. Do not leave soap residue on tissue and do not use baby wipes or skin prep wipes.     Should you experience any significant changes in your condition, such as infection (redness, swelling, localized heat, increased pain, fever > 101 F, chills) or have any questions regarding your home care instructions, please contact the wound center at [phone]. If after hours, contact your primary care physician or go to the hospital emergency room.

## 2019-12-02 ENCOUNTER — NON-PROVIDER VISIT (OUTPATIENT)
Dept: WOUND CARE | Facility: MEDICAL CENTER | Age: 84
End: 2019-12-02
Attending: PHYSICIAN ASSISTANT
Payer: MEDICARE

## 2019-12-02 PROCEDURE — 99211 OFF/OP EST MAY X REQ PHY/QHP: CPT

## 2019-12-02 NOTE — WOUND TEAM
"[facility]  [facility]  [address]  [place] [address]  [phone] Fax#    Ostomy Encounter  For 90 Day Certification Period: 9/19/19-12/19/19     Referring Provider:  Giuliana Kenney  Primary Provider: Coreen Cisse MD  Consulting Providers: Dr. Garcia  Surgeon: Jennyfer Lincoln MD      Start of Care: 9/1919  Reason for referral: peristomal skin assessment and evaluation       SUBJECTIVE:  \"I had terrible pain across my abdomen earlier this week, but now it's gone.\"    HPI:  84 year old pt here for urostomy peristomal assessment and evaluation.  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  She has been admitted to the hospital for stomal bleeding a few times but this has resolved after blood thinners were discontinued.  Pt has been managing the pouch changes on her own since 2011 and recently developed peristomal skin issues.  She is changing the wafer every 2 days.    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 Her problem list does not include these dx, and her lab work is also not indicative. Her sclera are very mildly icteric. I (Luis A) sent this note via fax to pt's PCP Dr. Coreen Cisse MD, and left a VM report for their MA.      Past Medical Hx:   Past Medical History:   Diagnosis Date   • A-fib (HCC)    • Ailment     urostomy   • Arthritis     fingers   • Atrial fibrillation (HCC) 2/2009    A FIB X2,[ resolved on own][   • Atrial fibrillation with rapid ventricular response (HCC) 3/18/2019   • Atrial fibrillation, rapid (HCC) 1/31/2014   • Back pain    • Bladder disease 2011    bladder removed   • Bladder problem     Chronic bladder infection for the past 17months   • CAD (coronary artery disease)    • Cancer (HCC)     skin   • CATARACT     freddie surgery complete   • CHEST PAIN 3/11/2011   • " Glaucoma    • History of hematuria     3/15/10: with Plavix.   • HTN (hypertension), malignant 3/18/2019   • HTN (hypertension), malignant 3/18/2019   • Hyperlipidemia 2/24/2011   • Hypertension    • Hypothyroid 2/24/2011   • Indigestion    • Intrinsic eczema 10/31/2017   • Other specified pre-operative examination 1/28/2014   • Paroxysmal atrial fibrillation (HCC)    • Sepsis urinary source 1/23/2012   • Stroke (HCC)     2 TIAs 2/2010, no stroke, 2/2013   • T.I.A.    • TIA (transient ischemic attack) 3/11/2011   • Unspecified disorder of thyroid    • Unspecified hemorrhagic conditions     not sure when (2009)  blood in urin    • Urinary bladder disorder     urinary incontinence   • UTI (urinary tract infection) 2/24/2011   • Vaginal bleeding 10/22/2012     Surgical Hx:   Past Surgical History:   Procedure Laterality Date   • PARASTOMAL HERNIA REPAIR   1/28/2014    Performed by Nicol Dorman M.D. at SURGERY [facility]   • BIOPSY GENERAL  10/22/2012    Performed by Jennifer Lincoln M.D. at SURGERY [facility]   • CYSTECTOMY  9/6/2011    Performed by JENNIFER LINCOLN at [facility]   • ILEO LOOP DIVERSION  9/6/2011    Performed by JENNIFER LINCOLN at [facility]   • RECTOCELE REPAIR  11/3/2009    Performed by ZEKE HARRINGTON at SURGERY SAME DAY [facility]   • BLADDER BIOPSY WITH CYSTOSCOPY  10/12/2009    Performed by JENNIFER LINCOLN at [facility]   • PELVIC EXAM UNDER ANESTHESIA  10/12/2009    Performed by JENNIFER LINCOLN at [facility]   • BLADDER BIOPSY WITH CYSTOSCOPY  6/15/2009    Performed by CARLTON LUNA at [facility]   • BLADDER BIOPSY WITH CYSTOSCOPY  10/10/08    Performed by CARLTON LUNA at [facility]   • PYELOGRAM  10/10/08    Performed by CARLTON LUNA at [facility]   • RETROGRADES  10/10/08    Performed by CARLTON LUNA at [facility]   • CHOLECYSTECTOMY  1994    • CHOLECYSTECTOMY  1994   • GYN SURGERY      hysterectomy   • OTHER      TONSILLECTOMY AS TEENAGER   • OTHER ABDOMINAL SURGERY      appendectomy, pancratitis      Medications:   Current Outpatient Medications:   •  fluticasone (FLONASE) 50 MCG/ACT nasal spray, Spray 2 Sprays in nose every day. (Patient not taking: Reported on 9/19/2019), Disp: 1 Bottle, Rfl: 0  •  fluticasone (FLONASE) 50 MCG/ACT nasal spray, , Disp: , Rfl: 0  •  furosemide (LASIX) 20 MG Tab, , Disp: , Rfl: 2  •  latanoprost (XALATAN) 0.005 % Solution, , Disp: , Rfl: 6  •  timolol (TIMOPTIC) 0.5 % Solution, , Disp: , Rfl: 6  •  metoprolol (LOPRESSOR) 100 MG Tab, Take 1 Tab by mouth 2 times a day., Disp: 60 Tab, Rfl: 11  •  aspirin EC 81 MG EC tablet, Take 1 Tab by mouth every day., Disp: 30 Tab, Rfl: 0  •  levothyroxine (SYNTHROID) 25 MCG Tab, Take 25 mcg by mouth Every morning on an empty stomach., Disp: , Rfl:   •  Brimonidine Tartrate-Timolol (COMBIGAN) 0.2-0.5 % Solution, Place 1 Drop in both eyes 2 Times a Day., Disp: , Rfl:   •  Multiple Vitamins-Minerals (CENTRUM SILVER ULTRA WOMENS) Tab, Take 1 Tab by mouth every day., Disp: , Rfl:   •  Multiple Vitamins-Minerals (PRESERVISION AREDS PO), Take 1 Tab by mouth 2 Times a Day., Disp: , Rfl:   •  lovastatin (MEVACOR) 10 MG tablet, Take 10 mg by mouth every day., Disp: , Rfl:   •  pantoprazole (PROTONIX) 40 MG TBEC, Take 40 mg by mouth every day., Disp: , Rfl:   •  Biotin 10 MG CAPS, Take 1 Cap by mouth every day., Disp: , Rfl:   •  Bimatoprost (LUMIGAN) 0.01 % SOLN, Place 1 Drop in both eyes every morning. Both eyes, Disp: , Rfl:   Allergies: Cardizem; Doxycycline; Sulfa drugs; Zyvox; and Codeine  Social Hx:   Social History     Socioeconomic History   • Marital status:      Spouse name: Not on file   • Number of children: Not on file   • Years of education: Not on file   • Highest education level: Not on file   Occupational History   • Not on file   Social Needs   • Financial resource  "strain: Not on file   • Food insecurity:     Worry: Not on file     Inability: Not on file   • Transportation needs:     Medical: Not on file     Non-medical: Not on file   Tobacco Use   • Smoking status: Never Smoker   • Smokeless tobacco: Never Used   Substance and Sexual Activity   • Alcohol use: No   • Drug use: No   • Sexual activity: Not on file   Lifestyle   • Physical activity:     Days per week: Not on file     Minutes per session: Not on file   • Stress: Not on file   Relationships   • Social connections:     Talks on phone: Not on file     Gets together: Not on file     Attends Christian service: Not on file     Active member of club or organization: Not on file     Attends meetings of clubs or organizations: Not on file     Relationship status: Not on file   • Intimate partner violence:     Fear of current or ex partner: Not on file     Emotionally abused: Not on file     Physically abused: Not on file     Forced sexual activity: Not on file   Other Topics Concern   • Not on file   Social History Narrative   • Not on file   Fall Risk Assessment (blas all that apply with an X): Pt is not a fall risk     OBJECTIVE: pt arrived with pouch intact.  Pt agreeable to care                    STOMA ASSESSMENT:   Type:  ____Ileostomy     ____Colostomy    __X__Urostomy    ____Other     Location:   RLQ   Size:between 1 and  1 1/8\"   Shape: round, slightly oval    Color: red   Protrudes:      ___ >1 inch               __X_ <1 inch   [place]:  Central    Mucocutaneous Junction: intact    Skin indentations, creases or scar tissue: juan stomal caput medusae with palpable distended vessels. Pt has had to be hospitalized x 3 for cautery and suturing of this area.    Juan-stomal Skin Problems:denudation   / erosion at direct peristomal skin, maceration   Effluent / Flatus: pale yellow, appropriate for urostomy   Photo  _X___ Yes ____ No    Pouching Procedure:   Old appliance removed    Peristomal skin cleansed with: warm water " "and wash cloth   Peristomal skin treated with: stoma powder and no sting skin barrier x 2, pt refused medical adhesive spray. Then Fleming Slim barrier ring 0435   Ostomy appliance used:  Pt's own precut 1 1/4\" round appliance Fleming 2 piece    Patient Education: Instructed pt to watch stoma for signs of strangulation - dusky stoma, also abd pain and possibly reduced output and to go to ER; discussed hernia belt (pt refused belt and does not want another hernia repair surgery). Instructed to continue vinegar soaks 2x.week, rationale for paste ring as opening is too large and until she gets new supplied with smaller opening (pt states they are coming).     Recommend flange 09815 (1 1/8\") instead of current 75779 (1 1/4\"). As the 1 1/4\" is too large for stoma. DO NOT USE CONVEXITY. Go to ER for any uncontrolled bleeding of stoma/juan stoma.     Change urostomies every 3-5 days. Change appliance immediately if it is leaking or peristomal skin feels irritated, has itching, or  burning.   To change the appliance, remove previous appliance, cleanse peristomal skin with warm water/washcloth, pat dry, make an ostomy template or use cardboard measuring guide and trace ostomy shape onto back of barrier, cut out barrier, apply a paste ring around barrier opening and apply appliance. Empty pouches when no more than ½ full. Check contents every 2 hours or as needed. Do not leave soap residue on tissue and do not use baby wipes or skin prep wipes.     Apply 50% vinegar and distilled water soaks to stoma and peristomal skin for 15 mins at each appliance change.  Crust with stoma powder and skin barrier/medical adhesive.  Pt denies cirrhosis, does not know where that DX came from, denies drinking throughout life, stated that ER informed her PCP that she might have liver issues - PCP investigated, could not find anything wrong with her.     Should you experience any significant changes in your condition, such as infection " "(redness, swelling, localized heat, increased pain, fever > 101 F, chills) or have any questions regarding your home care instructions, please contact the wound center at [phone]. If after hours, contact your primary care physician or go to the hospital emergency room.     Verbal/demonstration instructions of above pouching procedure provided to patient/family.      Response: Pt verbalized understanding      PLAN: Pt refused hernia support belt.   Pt to return to clinic prn problems.   Supplies Needed:   Appliance type:    [place]              Other: 1 1/4\" round precut 2 piece, pt's own     Accessories: medium stoma belt        Supplier: [employer]    Frequency:  Pt will make appointment for next week but cancel if she does not need to come.       Professional Collaboration:  none      At the time of each visit, a thorough assessment of the patient is completed to assure appropriateness of our plan of care.  The plan of care may need to be adapted from the original plan of care to address any significant changes in patient status.    Clinician Signature:  _________________________________  Date:  ____________    Physician Signature:  ________________________________  Date:  ____________       "

## 2019-12-10 ENCOUNTER — HOSPITAL ENCOUNTER (OUTPATIENT)
Facility: MEDICAL CENTER | Age: 84
End: 2019-12-10
Attending: PHYSICIAN ASSISTANT
Payer: MEDICARE

## 2019-12-10 LAB — AMBIGUOUS DTTM AMBI4: NORMAL

## 2019-12-10 PROCEDURE — 87077 CULTURE AEROBIC IDENTIFY: CPT

## 2019-12-10 PROCEDURE — 87086 URINE CULTURE/COLONY COUNT: CPT

## 2019-12-10 PROCEDURE — 87186 SC STD MICRODIL/AGAR DIL: CPT

## 2019-12-20 ENCOUNTER — APPOINTMENT (OUTPATIENT)
Dept: RADIOLOGY | Facility: MEDICAL CENTER | Age: 84
DRG: 394 | End: 2019-12-20
Attending: HOSPITALIST
Payer: MEDICARE

## 2019-12-20 ENCOUNTER — HOSPITAL ENCOUNTER (INPATIENT)
Facility: MEDICAL CENTER | Age: 84
LOS: 12 days | DRG: 330 | End: 2020-01-01
Attending: HOSPITALIST | Admitting: HOSPITALIST
Payer: MEDICARE

## 2019-12-20 ENCOUNTER — HOSPITAL ENCOUNTER (INPATIENT)
Facility: MEDICAL CENTER | Age: 84
LOS: 1 days | DRG: 394 | End: 2019-12-20
Attending: EMERGENCY MEDICINE | Admitting: HOSPITALIST
Payer: MEDICARE

## 2019-12-20 ENCOUNTER — HOSPITAL ENCOUNTER (OUTPATIENT)
Facility: MEDICAL CENTER | Age: 84
DRG: 330 | End: 2019-12-20
Payer: MEDICARE

## 2019-12-20 ENCOUNTER — APPOINTMENT (OUTPATIENT)
Dept: RADIOLOGY | Facility: MEDICAL CENTER | Age: 84
DRG: 394 | End: 2019-12-20
Attending: EMERGENCY MEDICINE
Payer: MEDICARE

## 2019-12-20 VITALS
TEMPERATURE: 98.1 F | SYSTOLIC BLOOD PRESSURE: 122 MMHG | WEIGHT: 157.41 LBS | RESPIRATION RATE: 18 BRPM | HEART RATE: 74 BPM | DIASTOLIC BLOOD PRESSURE: 39 MMHG | OXYGEN SATURATION: 100 % | HEIGHT: 68 IN | BODY MASS INDEX: 23.86 KG/M2

## 2019-12-20 DIAGNOSIS — K31.89 GASTRIC WALL THICKENING: ICD-10-CM

## 2019-12-20 DIAGNOSIS — K56.609 SBO (SMALL BOWEL OBSTRUCTION) (HCC): ICD-10-CM

## 2019-12-20 PROBLEM — N18.9 ACUTE ON CHRONIC RENAL FAILURE (HCC): Status: ACTIVE | Noted: 2019-12-20

## 2019-12-20 PROBLEM — N17.9 ACUTE ON CHRONIC RENAL FAILURE (HCC): Status: ACTIVE | Noted: 2019-12-20

## 2019-12-20 LAB
ALBUMIN SERPL BCP-MCNC: 3.3 G/DL (ref 3.2–4.9)
ALBUMIN SERPL BCP-MCNC: 3.6 G/DL (ref 3.2–4.9)
ALBUMIN/GLOB SERPL: 0.8 G/DL
ALBUMIN/GLOB SERPL: 0.9 G/DL
ALP SERPL-CCNC: 111 U/L (ref 30–99)
ALP SERPL-CCNC: 142 U/L (ref 30–99)
ALT SERPL-CCNC: 16 U/L (ref 2–50)
ALT SERPL-CCNC: 18 U/L (ref 2–50)
ANION GAP SERPL CALC-SCNC: 16 MMOL/L (ref 0–11.9)
ANION GAP SERPL CALC-SCNC: 7 MMOL/L (ref 0–11.9)
AST SERPL-CCNC: 25 U/L (ref 12–45)
AST SERPL-CCNC: 32 U/L (ref 12–45)
BASOPHILS # BLD AUTO: 0.3 % (ref 0–1.8)
BASOPHILS # BLD AUTO: 0.4 % (ref 0–1.8)
BASOPHILS # BLD: 0.05 K/UL (ref 0–0.12)
BASOPHILS # BLD: 0.05 K/UL (ref 0–0.12)
BILIRUB SERPL-MCNC: 0.7 MG/DL (ref 0.1–1.5)
BILIRUB SERPL-MCNC: 0.7 MG/DL (ref 0.1–1.5)
BUN SERPL-MCNC: 41 MG/DL (ref 8–22)
BUN SERPL-MCNC: 50 MG/DL (ref 8–22)
CALCIUM SERPL-MCNC: 8 MG/DL (ref 8.5–10.5)
CALCIUM SERPL-MCNC: 8.5 MG/DL (ref 8.4–10.2)
CHLORIDE SERPL-SCNC: 103 MMOL/L (ref 96–112)
CHLORIDE SERPL-SCNC: 108 MMOL/L (ref 96–112)
CO2 SERPL-SCNC: 20 MMOL/L (ref 20–33)
CO2 SERPL-SCNC: 23 MMOL/L (ref 20–33)
CREAT SERPL-MCNC: 1.62 MG/DL (ref 0.5–1.4)
CREAT SERPL-MCNC: 1.7 MG/DL (ref 0.5–1.4)
EOSINOPHIL # BLD AUTO: 0.02 K/UL (ref 0–0.51)
EOSINOPHIL # BLD AUTO: 0.1 K/UL (ref 0–0.51)
EOSINOPHIL NFR BLD: 0.1 % (ref 0–6.9)
EOSINOPHIL NFR BLD: 0.8 % (ref 0–6.9)
ERYTHROCYTE [DISTWIDTH] IN BLOOD BY AUTOMATED COUNT: 50.5 FL (ref 35.9–50)
ERYTHROCYTE [DISTWIDTH] IN BLOOD BY AUTOMATED COUNT: 52 FL (ref 35.9–50)
GLOBULIN SER CALC-MCNC: 3.5 G/DL (ref 1.9–3.5)
GLOBULIN SER CALC-MCNC: 4.3 G/DL (ref 1.9–3.5)
GLUCOSE SERPL-MCNC: 118 MG/DL (ref 65–99)
GLUCOSE SERPL-MCNC: 94 MG/DL (ref 65–99)
HCT VFR BLD AUTO: 40.1 % (ref 37–47)
HCT VFR BLD AUTO: 43.6 % (ref 37–47)
HGB BLD-MCNC: 13.1 G/DL (ref 12–16)
HGB BLD-MCNC: 14.1 G/DL (ref 12–16)
IMM GRANULOCYTES # BLD AUTO: 0.04 K/UL (ref 0–0.11)
IMM GRANULOCYTES # BLD AUTO: 0.07 K/UL (ref 0–0.11)
IMM GRANULOCYTES NFR BLD AUTO: 0.3 % (ref 0–0.9)
IMM GRANULOCYTES NFR BLD AUTO: 0.4 % (ref 0–0.9)
LACTATE BLD-SCNC: 1.4 MMOL/L (ref 0.5–2)
LIPASE SERPL-CCNC: 64 U/L (ref 7–58)
LYMPHOCYTES # BLD AUTO: 1.59 K/UL (ref 1–4.8)
LYMPHOCYTES # BLD AUTO: 2.13 K/UL (ref 1–4.8)
LYMPHOCYTES NFR BLD: 17.9 % (ref 22–41)
LYMPHOCYTES NFR BLD: 9.1 % (ref 22–41)
MCH RBC QN AUTO: 27.9 PG (ref 27–33)
MCH RBC QN AUTO: 28.8 PG (ref 27–33)
MCHC RBC AUTO-ENTMCNC: 32.3 G/DL (ref 33.6–35)
MCHC RBC AUTO-ENTMCNC: 32.7 G/DL (ref 33.6–35)
MCV RBC AUTO: 86.2 FL (ref 81.4–97.8)
MCV RBC AUTO: 88.1 FL (ref 81.4–97.8)
MONOCYTES # BLD AUTO: 1.98 K/UL (ref 0–0.85)
MONOCYTES # BLD AUTO: 2.14 K/UL (ref 0–0.85)
MONOCYTES NFR BLD AUTO: 12.2 % (ref 0–13.4)
MONOCYTES NFR BLD AUTO: 16.6 % (ref 0–13.4)
NEUTROPHILS # BLD AUTO: 13.69 K/UL (ref 2–7.15)
NEUTROPHILS # BLD AUTO: 7.61 K/UL (ref 2–7.15)
NEUTROPHILS NFR BLD: 64 % (ref 44–72)
NEUTROPHILS NFR BLD: 77.9 % (ref 44–72)
NRBC # BLD AUTO: 0 K/UL
NRBC # BLD AUTO: 0 K/UL
NRBC BLD-RTO: 0 /100 WBC
NRBC BLD-RTO: 0 /100 WBC
PLATELET # BLD AUTO: 124 K/UL (ref 164–446)
PLATELET # BLD AUTO: 152 K/UL (ref 164–446)
PMV BLD AUTO: 10.8 FL (ref 9–12.9)
PMV BLD AUTO: 11.3 FL (ref 9–12.9)
POTASSIUM SERPL-SCNC: 4.5 MMOL/L (ref 3.6–5.5)
POTASSIUM SERPL-SCNC: 4.7 MMOL/L (ref 3.6–5.5)
PROT SERPL-MCNC: 6.8 G/DL (ref 6–8.2)
PROT SERPL-MCNC: 7.9 G/DL (ref 6–8.2)
RBC # BLD AUTO: 4.55 M/UL (ref 4.2–5.4)
RBC # BLD AUTO: 5.06 M/UL (ref 4.2–5.4)
SODIUM SERPL-SCNC: 138 MMOL/L (ref 135–145)
SODIUM SERPL-SCNC: 139 MMOL/L (ref 135–145)
WBC # BLD AUTO: 11.9 K/UL (ref 4.8–10.8)
WBC # BLD AUTO: 17.6 K/UL (ref 4.8–10.8)

## 2019-12-20 PROCEDURE — 304538 HCHG NG TUBE

## 2019-12-20 PROCEDURE — 700111 HCHG RX REV CODE 636 W/ 250 OVERRIDE (IP): Performed by: EMERGENCY MEDICINE

## 2019-12-20 PROCEDURE — 36415 COLL VENOUS BLD VENIPUNCTURE: CPT

## 2019-12-20 PROCEDURE — 80053 COMPREHEN METABOLIC PANEL: CPT

## 2019-12-20 PROCEDURE — 770006 HCHG ROOM/CARE - MED/SURG/GYN SEMI*

## 2019-12-20 PROCEDURE — 99285 EMERGENCY DEPT VISIT HI MDM: CPT

## 2019-12-20 PROCEDURE — A9270 NON-COVERED ITEM OR SERVICE: HCPCS | Performed by: HOSPITALIST

## 2019-12-20 PROCEDURE — 85025 COMPLETE CBC W/AUTO DIFF WBC: CPT | Mod: 91

## 2019-12-20 PROCEDURE — 99223 1ST HOSP IP/OBS HIGH 75: CPT | Mod: AI | Performed by: HOSPITALIST

## 2019-12-20 PROCEDURE — 83690 ASSAY OF LIPASE: CPT

## 2019-12-20 PROCEDURE — 700105 HCHG RX REV CODE 258: Performed by: EMERGENCY MEDICINE

## 2019-12-20 PROCEDURE — 74176 CT ABD & PELVIS W/O CONTRAST: CPT

## 2019-12-20 PROCEDURE — 83605 ASSAY OF LACTIC ACID: CPT

## 2019-12-20 PROCEDURE — 96374 THER/PROPH/DIAG INJ IV PUSH: CPT

## 2019-12-20 PROCEDURE — 74018 RADEX ABDOMEN 1 VIEW: CPT

## 2019-12-20 PROCEDURE — 700102 HCHG RX REV CODE 250 W/ 637 OVERRIDE(OP): Performed by: EMERGENCY MEDICINE

## 2019-12-20 PROCEDURE — 96375 TX/PRO/DX INJ NEW DRUG ADDON: CPT

## 2019-12-20 PROCEDURE — A9270 NON-COVERED ITEM OR SERVICE: HCPCS | Performed by: EMERGENCY MEDICINE

## 2019-12-20 PROCEDURE — 700102 HCHG RX REV CODE 250 W/ 637 OVERRIDE(OP): Performed by: HOSPITALIST

## 2019-12-20 PROCEDURE — 700105 HCHG RX REV CODE 258: Performed by: HOSPITALIST

## 2019-12-20 PROCEDURE — 80053 COMPREHEN METABOLIC PANEL: CPT | Mod: 91

## 2019-12-20 PROCEDURE — 85025 COMPLETE CBC W/AUTO DIFF WBC: CPT

## 2019-12-20 RX ORDER — AMOXICILLIN AND CLAVULANATE POTASSIUM 875; 125 MG/1; MG/1
1 TABLET, FILM COATED ORAL 2 TIMES DAILY
Status: ON HOLD | COMMUNITY
Start: 2019-12-13 | End: 2020-01-01 | Stop reason: SDUPTHER

## 2019-12-20 RX ORDER — TIMOLOL MALEATE 5 MG/ML
1 SOLUTION/ DROPS OPHTHALMIC 2 TIMES DAILY
Status: DISCONTINUED | OUTPATIENT
Start: 2019-12-21 | End: 2020-01-01 | Stop reason: HOSPADM

## 2019-12-20 RX ORDER — ONDANSETRON 4 MG/1
4 TABLET, ORALLY DISINTEGRATING ORAL EVERY 4 HOURS PRN
Status: DISCONTINUED | OUTPATIENT
Start: 2019-12-20 | End: 2020-01-01 | Stop reason: HOSPADM

## 2019-12-20 RX ORDER — HYDROMORPHONE HYDROCHLORIDE 1 MG/ML
0.25 INJECTION, SOLUTION INTRAMUSCULAR; INTRAVENOUS; SUBCUTANEOUS
Status: CANCELLED | OUTPATIENT
Start: 2019-12-20

## 2019-12-20 RX ORDER — OXYCODONE HYDROCHLORIDE 5 MG/1
5 TABLET ORAL
Status: DISCONTINUED | OUTPATIENT
Start: 2019-12-20 | End: 2019-12-20 | Stop reason: HOSPADM

## 2019-12-20 RX ORDER — SODIUM CHLORIDE, SODIUM LACTATE, POTASSIUM CHLORIDE, CALCIUM CHLORIDE 600; 310; 30; 20 MG/100ML; MG/100ML; MG/100ML; MG/100ML
INJECTION, SOLUTION INTRAVENOUS CONTINUOUS
Status: DISCONTINUED | OUTPATIENT
Start: 2019-12-20 | End: 2019-12-20 | Stop reason: HOSPADM

## 2019-12-20 RX ORDER — ACETAMINOPHEN 325 MG/1
650 TABLET ORAL EVERY 6 HOURS PRN
Status: DISCONTINUED | OUTPATIENT
Start: 2019-12-20 | End: 2019-12-25

## 2019-12-20 RX ORDER — OXYCODONE HYDROCHLORIDE 5 MG/1
2.5 TABLET ORAL
Status: DISCONTINUED | OUTPATIENT
Start: 2019-12-20 | End: 2019-12-20 | Stop reason: HOSPADM

## 2019-12-20 RX ORDER — SODIUM CHLORIDE, SODIUM LACTATE, POTASSIUM CHLORIDE, CALCIUM CHLORIDE 600; 310; 30; 20 MG/100ML; MG/100ML; MG/100ML; MG/100ML
INJECTION, SOLUTION INTRAVENOUS CONTINUOUS
Status: DISCONTINUED | OUTPATIENT
Start: 2019-12-20 | End: 2019-12-21

## 2019-12-20 RX ORDER — SODIUM CHLORIDE 9 MG/ML
INJECTION, SOLUTION INTRAVENOUS CONTINUOUS
Status: CANCELLED | OUTPATIENT
Start: 2019-12-20

## 2019-12-20 RX ORDER — HYDROMORPHONE HYDROCHLORIDE 1 MG/ML
0.25 INJECTION, SOLUTION INTRAMUSCULAR; INTRAVENOUS; SUBCUTANEOUS
Status: DISCONTINUED | OUTPATIENT
Start: 2019-12-20 | End: 2019-12-20 | Stop reason: HOSPADM

## 2019-12-20 RX ORDER — METOPROLOL TARTRATE 1 MG/ML
5 INJECTION, SOLUTION INTRAVENOUS EVERY 6 HOURS PRN
Status: CANCELLED | OUTPATIENT
Start: 2019-12-20

## 2019-12-20 RX ORDER — LATANOPROST 50 UG/ML
1 SOLUTION/ DROPS OPHTHALMIC EVERY EVENING
Status: CANCELLED | OUTPATIENT
Start: 2019-12-20

## 2019-12-20 RX ORDER — SODIUM CHLORIDE 9 MG/ML
INJECTION, SOLUTION INTRAVENOUS CONTINUOUS
Status: DISCONTINUED | OUTPATIENT
Start: 2019-12-20 | End: 2019-12-21

## 2019-12-20 RX ORDER — SODIUM CHLORIDE, SODIUM LACTATE, POTASSIUM CHLORIDE, CALCIUM CHLORIDE 600; 310; 30; 20 MG/100ML; MG/100ML; MG/100ML; MG/100ML
INJECTION, SOLUTION INTRAVENOUS CONTINUOUS
Status: CANCELLED | OUTPATIENT
Start: 2019-12-20

## 2019-12-20 RX ORDER — ONDANSETRON 4 MG/1
4 TABLET, ORALLY DISINTEGRATING ORAL EVERY 4 HOURS PRN
Status: DISCONTINUED | OUTPATIENT
Start: 2019-12-20 | End: 2019-12-20 | Stop reason: HOSPADM

## 2019-12-20 RX ORDER — ONDANSETRON 2 MG/ML
4 INJECTION INTRAMUSCULAR; INTRAVENOUS EVERY 4 HOURS PRN
Status: CANCELLED | OUTPATIENT
Start: 2019-12-20

## 2019-12-20 RX ORDER — OXYCODONE HYDROCHLORIDE 5 MG/1
2.5 TABLET ORAL
Status: DISCONTINUED | OUTPATIENT
Start: 2019-12-20 | End: 2019-12-24

## 2019-12-20 RX ORDER — LABETALOL HYDROCHLORIDE 5 MG/ML
10 INJECTION, SOLUTION INTRAVENOUS EVERY 4 HOURS PRN
Status: DISCONTINUED | OUTPATIENT
Start: 2019-12-20 | End: 2020-01-01 | Stop reason: HOSPADM

## 2019-12-20 RX ORDER — QUINIDINE GLUCONATE 324 MG
240 TABLET, EXTENDED RELEASE ORAL DAILY
COMMUNITY
End: 2020-08-23

## 2019-12-20 RX ORDER — OXYCODONE HYDROCHLORIDE 5 MG/1
5 TABLET ORAL
Status: CANCELLED | OUTPATIENT
Start: 2019-12-20

## 2019-12-20 RX ORDER — HYDROMORPHONE HYDROCHLORIDE 1 MG/ML
0.25 INJECTION, SOLUTION INTRAMUSCULAR; INTRAVENOUS; SUBCUTANEOUS
Status: DISCONTINUED | OUTPATIENT
Start: 2019-12-20 | End: 2019-12-24

## 2019-12-20 RX ORDER — METOPROLOL TARTRATE 50 MG/1
100 TABLET, FILM COATED ORAL 2 TIMES DAILY
Status: DISCONTINUED | OUTPATIENT
Start: 2019-12-20 | End: 2019-12-20 | Stop reason: HOSPADM

## 2019-12-20 RX ORDER — SODIUM CHLORIDE 9 MG/ML
INJECTION, SOLUTION INTRAVENOUS CONTINUOUS
Status: DISCONTINUED | OUTPATIENT
Start: 2019-12-20 | End: 2019-12-20 | Stop reason: HOSPADM

## 2019-12-20 RX ORDER — TIMOLOL MALEATE 5 MG/ML
1 SOLUTION/ DROPS OPHTHALMIC 2 TIMES DAILY
Status: CANCELLED | OUTPATIENT
Start: 2019-12-20

## 2019-12-20 RX ORDER — LABETALOL HYDROCHLORIDE 5 MG/ML
10 INJECTION, SOLUTION INTRAVENOUS EVERY 4 HOURS PRN
Status: DISCONTINUED | OUTPATIENT
Start: 2019-12-20 | End: 2019-12-20 | Stop reason: HOSPADM

## 2019-12-20 RX ORDER — ONDANSETRON 4 MG/1
4 TABLET, ORALLY DISINTEGRATING ORAL EVERY 4 HOURS PRN
Status: CANCELLED | OUTPATIENT
Start: 2019-12-20

## 2019-12-20 RX ORDER — ONDANSETRON 2 MG/ML
4 INJECTION INTRAMUSCULAR; INTRAVENOUS EVERY 4 HOURS PRN
Status: DISCONTINUED | OUTPATIENT
Start: 2019-12-20 | End: 2019-12-20 | Stop reason: HOSPADM

## 2019-12-20 RX ORDER — ACETAMINOPHEN 325 MG/1
650 TABLET ORAL EVERY 6 HOURS PRN
Status: CANCELLED | OUTPATIENT
Start: 2019-12-20

## 2019-12-20 RX ORDER — ONDANSETRON 2 MG/ML
4 INJECTION INTRAMUSCULAR; INTRAVENOUS ONCE
Status: COMPLETED | OUTPATIENT
Start: 2019-12-20 | End: 2019-12-20

## 2019-12-20 RX ORDER — LIDOCAINE HYDROCHLORIDE 20 MG/ML
JELLY TOPICAL
Status: DISCONTINUED
Start: 2019-12-20 | End: 2019-12-20 | Stop reason: HOSPADM

## 2019-12-20 RX ORDER — ONDANSETRON 2 MG/ML
4 INJECTION INTRAMUSCULAR; INTRAVENOUS EVERY 4 HOURS PRN
Status: DISCONTINUED | OUTPATIENT
Start: 2019-12-20 | End: 2020-01-01 | Stop reason: HOSPADM

## 2019-12-20 RX ORDER — OXYCODONE HYDROCHLORIDE 5 MG/1
5 TABLET ORAL
Status: DISCONTINUED | OUTPATIENT
Start: 2019-12-20 | End: 2019-12-24

## 2019-12-20 RX ORDER — OXYCODONE HYDROCHLORIDE 5 MG/1
2.5 TABLET ORAL
Status: CANCELLED | OUTPATIENT
Start: 2019-12-20

## 2019-12-20 RX ORDER — METOPROLOL TARTRATE 1 MG/ML
5 INJECTION, SOLUTION INTRAVENOUS EVERY 6 HOURS PRN
Status: DISCONTINUED | OUTPATIENT
Start: 2019-12-20 | End: 2019-12-20 | Stop reason: HOSPADM

## 2019-12-20 RX ORDER — MORPHINE SULFATE 4 MG/ML
2 INJECTION, SOLUTION INTRAMUSCULAR; INTRAVENOUS ONCE
Status: COMPLETED | OUTPATIENT
Start: 2019-12-20 | End: 2019-12-20

## 2019-12-20 RX ORDER — LEVOTHYROXINE SODIUM 112 UG/1
112 TABLET ORAL
Status: DISCONTINUED | OUTPATIENT
Start: 2019-12-21 | End: 2020-01-01 | Stop reason: HOSPADM

## 2019-12-20 RX ORDER — METOPROLOL TARTRATE 1 MG/ML
5 INJECTION, SOLUTION INTRAVENOUS EVERY 6 HOURS PRN
Status: DISCONTINUED | OUTPATIENT
Start: 2019-12-20 | End: 2019-12-20

## 2019-12-20 RX ORDER — LEVOTHYROXINE SODIUM 112 UG/1
112 TABLET ORAL
Status: DISCONTINUED | OUTPATIENT
Start: 2019-12-21 | End: 2019-12-20 | Stop reason: HOSPADM

## 2019-12-20 RX ORDER — METOPROLOL TARTRATE 50 MG/1
100 TABLET, FILM COATED ORAL 2 TIMES DAILY
Status: CANCELLED | OUTPATIENT
Start: 2019-12-20

## 2019-12-20 RX ORDER — ACETAMINOPHEN 325 MG/1
650 TABLET ORAL EVERY 6 HOURS PRN
Status: DISCONTINUED | OUTPATIENT
Start: 2019-12-20 | End: 2019-12-20 | Stop reason: HOSPADM

## 2019-12-20 RX ORDER — LATANOPROST 50 UG/ML
1 SOLUTION/ DROPS OPHTHALMIC EVERY EVENING
Status: DISCONTINUED | OUTPATIENT
Start: 2019-12-21 | End: 2020-01-01 | Stop reason: HOSPADM

## 2019-12-20 RX ORDER — LATANOPROST 50 UG/ML
1 SOLUTION/ DROPS OPHTHALMIC EVERY EVENING
Status: DISCONTINUED | OUTPATIENT
Start: 2019-12-20 | End: 2019-12-20 | Stop reason: HOSPADM

## 2019-12-20 RX ORDER — LABETALOL HYDROCHLORIDE 5 MG/ML
10 INJECTION, SOLUTION INTRAVENOUS EVERY 4 HOURS PRN
Status: CANCELLED | OUTPATIENT
Start: 2019-12-20

## 2019-12-20 RX ORDER — TIMOLOL MALEATE 5 MG/ML
1 SOLUTION/ DROPS OPHTHALMIC 2 TIMES DAILY
Status: DISCONTINUED | OUTPATIENT
Start: 2019-12-20 | End: 2019-12-20 | Stop reason: HOSPADM

## 2019-12-20 RX ORDER — LEVOTHYROXINE SODIUM 112 UG/1
112 TABLET ORAL
Status: CANCELLED | OUTPATIENT
Start: 2019-12-21

## 2019-12-20 RX ORDER — METOPROLOL TARTRATE 100 MG/1
100 TABLET ORAL 2 TIMES DAILY
Status: DISCONTINUED | OUTPATIENT
Start: 2019-12-21 | End: 2020-01-01 | Stop reason: HOSPADM

## 2019-12-20 RX ORDER — LIDOCAINE HYDROCHLORIDE 20 MG/ML
JELLY TOPICAL ONCE
Status: DISCONTINUED | OUTPATIENT
Start: 2019-12-20 | End: 2019-12-20 | Stop reason: HOSPADM

## 2019-12-20 RX ADMIN — ONDANSETRON 4 MG: 2 INJECTION INTRAMUSCULAR; INTRAVENOUS at 12:44

## 2019-12-20 RX ADMIN — MORPHINE SULFATE 2 MG: 4 INJECTION INTRAVENOUS at 12:43

## 2019-12-20 RX ADMIN — ACETAMINOPHEN 650 MG: 325 TABLET, FILM COATED ORAL at 20:51

## 2019-12-20 RX ADMIN — SODIUM CHLORIDE: 9 INJECTION, SOLUTION INTRAVENOUS at 12:44

## 2019-12-20 RX ADMIN — SODIUM CHLORIDE: 9 INJECTION, SOLUTION INTRAVENOUS at 23:16

## 2019-12-20 RX ADMIN — OXYCODONE HYDROCHLORIDE 5 MG: 5 TABLET ORAL at 19:15

## 2019-12-20 RX ADMIN — LIDOCAINE HYDROCHLORIDE 30 ML: 20 SOLUTION OROPHARYNGEAL at 12:44

## 2019-12-20 ASSESSMENT — LIFESTYLE VARIABLES
HAVE YOU EVER FELT YOU SHOULD CUT DOWN ON YOUR DRINKING: NO
CONSUMPTION TOTAL: NEGATIVE
HOW MANY TIMES IN THE PAST YEAR HAVE YOU HAD 5 OR MORE DRINKS IN A DAY: 0
ALCOHOL_USE: NO
HAVE PEOPLE ANNOYED YOU BY CRITICIZING YOUR DRINKING: NO
TOTAL SCORE: 0
DO YOU DRINK ALCOHOL: NO
TOTAL SCORE: 0
EVER_SMOKED: NEVER
TOTAL SCORE: 0
EVER HAD A DRINK FIRST THING IN THE MORNING TO STEADY YOUR NERVES TO GET RID OF A HANGOVER: NO
EVER FELT BAD OR GUILTY ABOUT YOUR DRINKING: NO
AVERAGE NUMBER OF DAYS PER WEEK YOU HAVE A DRINK CONTAINING ALCOHOL: 0
ON A TYPICAL DAY WHEN YOU DRINK ALCOHOL HOW MANY DRINKS DO YOU HAVE: 0

## 2019-12-20 ASSESSMENT — ENCOUNTER SYMPTOMS
TINGLING: 0
SHORTNESS OF BREATH: 0
DEPRESSION: 0
BLURRED VISION: 0
ABDOMINAL PAIN: 1
HEMOPTYSIS: 0
PND: 0
DIZZINESS: 0
ORTHOPNEA: 0
SENSORY CHANGE: 0
BACK PAIN: 0
NERVOUS/ANXIOUS: 0
PHOTOPHOBIA: 0
BLOOD IN STOOL: 0
VOMITING: 1
DOUBLE VISION: 0
NECK PAIN: 0
SPEECH CHANGE: 0
FEVER: 0
CLAUDICATION: 0
COUGH: 0
SORE THROAT: 0
HEADACHES: 0
MYALGIAS: 0
HEARTBURN: 0
EYE PAIN: 0
CHILLS: 0
CONSTIPATION: 0
STRIDOR: 0
NAUSEA: 1
SPUTUM PRODUCTION: 0
MEMORY LOSS: 0
PALPITATIONS: 0
WEAKNESS: 1
TREMORS: 0

## 2019-12-20 ASSESSMENT — PATIENT HEALTH QUESTIONNAIRE - PHQ9
2. FEELING DOWN, DEPRESSED, IRRITABLE, OR HOPELESS: NOT AT ALL
SUM OF ALL RESPONSES TO PHQ9 QUESTIONS 1 AND 2: 0
1. LITTLE INTEREST OR PLEASURE IN DOING THINGS: NOT AT ALL

## 2019-12-20 ASSESSMENT — COGNITIVE AND FUNCTIONAL STATUS - GENERAL
TURNING FROM BACK TO SIDE WHILE IN FLAT BAD: A LITTLE
DAILY ACTIVITIY SCORE: 21
WALKING IN HOSPITAL ROOM: A LITTLE
CLIMB 3 TO 5 STEPS WITH RAILING: A LITTLE
MOVING TO AND FROM BED TO CHAIR: A LITTLE
MOVING FROM LYING ON BACK TO SITTING ON SIDE OF FLAT BED: A LITTLE
MOBILITY SCORE: 18
TOILETING: A LITTLE
DRESSING REGULAR LOWER BODY CLOTHING: A LITTLE
SUGGESTED CMS G CODE MODIFIER DAILY ACTIVITY: CJ
STANDING UP FROM CHAIR USING ARMS: A LITTLE
HELP NEEDED FOR BATHING: A LITTLE
SUGGESTED CMS G CODE MODIFIER MOBILITY: CK

## 2019-12-20 NOTE — ASSESSMENT & PLAN NOTE
History of prior TIA, patient has an obviously high chads score of over 6  But as above consider anticoagulation in the future when clinically stable

## 2019-12-20 NOTE — ASSESSMENT & PLAN NOTE
Recurrent, patient has had multiple admissions  Unsure if her parastomal hernia is the cause of this, she has had a repair before  Surgery consulted, awaiting for further recommendations  NG tube inserted, on intermitant suction

## 2019-12-20 NOTE — ED NOTES
NG tube inserted per order, placement confirmed by 2 RNs. Pt tolerated procedure well, resting comfortably, no questions, will continue to monitor.

## 2019-12-20 NOTE — ED PROVIDER NOTES
ED Provider Note    CHIEF COMPLAINT  Chief Complaint   Patient presents with   • Hernia   • Sent by MD   • Vomiting       HPI  Dorie Wadsworth is a 84 y.o. female who presents to the Emergency Department * with h/o cholecystetomy, appendectomy, urostomy (for chronic UTI), hysterectomy, h/o bowel obstructions  In past and feels like she has one now, vomiting for 3 days and no bowel movements.  Recent admission for same.  No fever, positive chills, no blood in vomit or stool.  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  She does state she had significant gastritis last night was able unable to take her medication for it secondary to her vomiting.  She has been unable to keep her pills down secondary to her vomiting      REVIEW OF SYSTEMS  Positive for abdominal pain vomiting abdominal distention decreased bowel movement, Negative for fever or chills.  As above all other systems are negative.    PAST MEDICAL HISTORY   has a past medical history of A-fib ([facility]), Ailment, Arthritis, Atrial fibrillation ([facility]) (2/2009), Atrial fibrillation with rapid ventricular response ([facility]) (3/18/2019), Atrial fibrillation, rapid ([facility]) (1/31/2014), Back pain, Bladder disease (2011), Bladder problem, CAD (coronary artery disease), Cancer ([facility]), CATARACT, CHEST PAIN (3/11/2011), Glaucoma, History of hematuria, HTN (hypertension), malignant (3/18/2019), HTN (hypertension), malignant (3/18/2019), Hyperlipidemia (2/24/2011), Hypertension, Hypothyroid (2/24/2011), Indigestion, Intrinsic eczema (10/31/2017), Other specified pre-operative examination (1/28/2014), Paroxysmal atrial fibrillation ([facility]), Sepsis urinary source (1/23/2012), Stroke ([facility]), T.I.A., TIA (transient ischemic attack) (3/11/2011), Unspecified disorder of thyroid, Unspecified hemorrhagic conditions, Urinary bladder disorder, UTI (urinary tract infection) (2/24/2011), and Vaginal bleeding (10/22/2012). She also has no past medical history of  COPD, Fall, Heart murmur, Psychiatric disorder, or Seizure disorder (HCC).    FAMILY HISTORY  Family History   Problem Relation Age of Onset   • Stroke Mother 81   • Stroke Father 77        SOCIAL HISTORY  Social History     Tobacco Use   • Smoking status: Never Smoker   • Smokeless tobacco: Never Used   Substance and Sexual Activity   • Alcohol use: No   • Drug use: No   • Sexual activity: Not on file       SURGICAL HISTORY   has a past surgical history that includes bladder biopsy with cystoscopy (10/10/08); pyelogram (10/10/08); retrogrades (10/10/08); bladder biopsy with cystoscopy (6/15/2009); bladder biopsy with cystoscopy (10/12/2009); other abdominal surgery; cholecystectomy (1994); cholecystectomy (1994); gyn surgery; pelvic exam under anesthesia (10/12/2009); rectocele repair (11/3/2009); other; cystectomy (9/6/2011); ileo loop diversion (9/6/2011); biopsy general (10/22/2012); and parastomal hernia repair (1/28/2014).    CURRENT MEDICATIONS  Reviewed.  See Encounter Summary.  Include   Current Facility-Administered Medications:   •  NS infusion, , Intravenous, Continuous, Ramandeep Kinney M.D., Last Rate: 100 mL/hr at 12/20/19 1244    Current Outpatient Medications:   •  amoxicillin-clavulanate (AUGMENTIN) 875-125 MG Tab, Take 1 Tab by mouth 2 times a day. 7 DAYS, Disp: , Rfl:   •  ferrous gluconate (IRON 27) 240 (27 Fe) MG tablet, Take 240 mg by mouth every 7 days. SUNDAY, Disp: , Rfl:   •  furosemide (LASIX) 20 MG Tab, Take 20 mg by mouth 1 time daily as needed., Disp: , Rfl: 2  •  latanoprost (XALATAN) 0.005 % Solution, Place 1 Drop in both eyes every evening., Disp: , Rfl: 6  •  timolol (TIMOPTIC) 0.5 % Solution, Place 1 Drop in both eyes 2 times a day., Disp: , Rfl: 6  •  metoprolol (LOPRESSOR) 100 MG Tab, Take 1 Tab by mouth 2 times a day., Disp: 60 Tab, Rfl: 11  •  aspirin EC 81 MG EC tablet, Take 1 Tab by mouth every day., Disp: 30 Tab, Rfl: 0  •  levothyroxine (SYNTHROID) 112 MCG Tab, Take 112  "mcg by mouth Every morning on an empty stomach., Disp: , Rfl:   •  Multiple Vitamins-Minerals (CENTRUM SILVER ULTRA WOMENS) Tab, Take 1 Tab by mouth every day., Disp: , Rfl:   •  Multiple Vitamins-Minerals (PRESERVISION AREDS PO), Take 1 Tab by mouth 2 Times a Day., Disp: , Rfl:   •  lovastatin (MEVACOR) 10 MG tablet, Take 10 mg by mouth every day., Disp: , Rfl:   •  pantoprazole (PROTONIX) 40 MG TBEC, Take 40 mg by mouth every day., Disp: , Rfl:   •  Biotin 10 MG CAPS, Take 1 Cap by mouth every day., Disp: , Rfl:   order an NG tube    ALLERGIES  Allergies   Allergen Reactions   • Cardizem Swelling   • Doxycycline      Swollen lips.   • Sulfa Drugs      Makes tongue swell, severely   • Zyvox Swelling     \"Whole mouth swelled up\"    • Codeine      Does not remember the reaction         PHYSICAL EXAM  VITAL SIGNS: /69   Pulse 88   Temp 36.6 °C (97.8 °F) (Temporal)   Resp 18   Ht 1.727 m (5' 8\")   Wt 71.4 kg (157 lb 6.5 oz)   BMI 23.93 kg/m²   Constitutional: Uncomfortable appearing, able to answer questions  HENT: Nose is normal in appearance, external ears are normal,  moist mucous membranes  Eyes: Anicteric,  pupils are equal round and reactive, there is no conjunctival drainage or pallor   Neck: The trachea is midline, there is no obvious mass or meningeal signs  Cardiovascular: Good perfusion,  regular rate and rhythm without murmurs gallops or rubs  Thorax & Lungs: Respiratory rate and effort are normal. There is normal chest excursion with respiration  Abdomen: Abdomen is normal in appearance, no gross peritoneal signs urostomy right lower quadrant, abdomen is distended high-pitched bowel sounds noted normal bowel sounds, no pain with cough  : Perfectly no CVA tenderness to palpation  Musculoskeletal: No deformities noted in all 4 extremities.   Skin: Visualized skin is warm without rash.  Neurologic:  Cranial nerves II through XII are intact there is no focal abnormality noted.  Psychiatric: " Normal mood and mentation    RADIOLOGY/PROCEDURES  Imaging Studies:    CT-RENAL COLIC EVALUATION(A/P W/O)   Final Result      1.  Small bowel obstruction      2.  There is transition point in the right lower quadrant with exiting small bowel loop decompressed and entering peristomal hernia. This could be etiology of the small bowel obstruction            3.  Diffuse, marked gastric wall thickening consistent with inflammation or neoplasm      4.  Moderate size hiatal hernia      5.  Probable cirrhosis      6.  Prior cholecystectomy      7.  Bilateral renal scarring      8.  Moderately limited assessment due to lack of oral and IV contrast administration            Pertinent Labs   Results for orders placed or performed during the hospital encounter of 12/20/19   CBC WITH DIFFERENTIAL   Result Value Ref Range    WBC 17.6 (H) 4.8 - 10.8 K/uL    RBC 5.06 4.20 - 5.40 M/uL    Hemoglobin 14.1 12.0 - 16.0 g/dL    Hematocrit 43.6 37.0 - 47.0 %    MCV 86.2 81.4 - 97.8 fL    MCH 27.9 27.0 - 33.0 pg    MCHC 32.3 (L) 33.6 - 35.0 g/dL    RDW 50.5 (H) 35.9 - 50.0 fL    Platelet Count 152 (L) 164 - 446 K/uL    MPV 10.8 9.0 - 12.9 fL    Neutrophils-Polys 77.90 (H) 44.00 - 72.00 %    Lymphocytes 9.10 (L) 22.00 - 41.00 %    Monocytes 12.20 0.00 - 13.40 %    Eosinophils 0.10 0.00 - 6.90 %    Basophils 0.30 0.00 - 1.80 %    Immature Granulocytes 0.40 0.00 - 0.90 %    Nucleated RBC 0.00 /100 WBC    Neutrophils (Absolute) 13.69 (H) 2.00 - 7.15 K/uL    Lymphs (Absolute) 1.59 1.00 - 4.80 K/uL    Monos (Absolute) 2.14 (H) 0.00 - 0.85 K/uL    Eos (Absolute) 0.02 0.00 - 0.51 K/uL    Baso (Absolute) 0.05 0.00 - 0.12 K/uL    Immature Granulocytes (abs) 0.07 0.00 - 0.11 K/uL    NRBC (Absolute) 0.00 K/uL   COMP METABOLIC PANEL   Result Value Ref Range    Sodium 139 135 - 145 mmol/L    Potassium 4.7 3.6 - 5.5 mmol/L    Chloride 103 96 - 112 mmol/L    Co2 20 20 - 33 mmol/L    Anion Gap 16.0 (H) 0.0 - 11.9    Glucose 118 (H) 65 - 99 mg/dL    Bun 41  (H) 8 - 22 mg/dL    Creatinine 1.62 (H) 0.50 - 1.40 mg/dL    Calcium 8.5 8.4 - 10.2 mg/dL    AST(SGOT) 32 12 - 45 U/L    ALT(SGPT) 18 2 - 50 U/L    Alkaline Phosphatase 142 (H) 30 - 99 U/L    Total Bilirubin 0.7 0.1 - 1.5 mg/dL    Albumin 3.6 3.2 - 4.9 g/dL    Total Protein 7.9 6.0 - 8.2 g/dL    Globulin 4.3 (H) 1.9 - 3.5 g/dL    A-G Ratio 0.8 g/dL   LIPASE   Result Value Ref Range    Lipase 64 (H) 7 - 58 U/L   ESTIMATED GFR   Result Value Ref Range    GFR If  37 (A) >60 mL/min/1.73 m 2    GFR If Non African American 30 (A) >60 mL/min/1.73 m 2           COURSE & MEDICAL DECISION MAKING  Nursing notes and vital signs were reviewed. (See chart for details)  The patients  records were reviewed, history was obtained from the patient and ;     The patient presents wit abdominal pain vomiting reflux, no fevers or chills.  She feels dehydrated is unable to keep her medications down.  I am concerned for bowel obstruction.  C  Initial orders in the Emergency Department included CBC CMP lipase and initial treatment in the Emergency Department included GI cocktail for her reflux symptoms and the patient received IV morphine Zofran and IV fluids for hydration    ED testing reveals elevated white blood cell count at 17 her creatinine is elevated at 1.6 with a BUN of 41, CT is remarkable for small bowel obstruction with transition point noted as well as a diffuse marked gastric wall thickening with concern for inflammation versus neoplasm.    At this point she will be admitted to the hospitalist, NG tube will be placed and a surgical consult made for recurrent small bowel obstruction and question of gastric biopsy or endoscopy    FINAL IMPRESSION  1.  Small bowel obstruction     DISPOSITION  Admit  Electronically signed by: Ramandeep Kinney, 12/20/2019 12:20 PM

## 2019-12-20 NOTE — ED NOTES
Med rec complete per patient with medication list (returned)  Allergies reviewed    Patient is on a 7 day Augmentin course but stopped 3 days ago due to vomiting

## 2019-12-20 NOTE — ASSESSMENT & PLAN NOTE
Was previously on amiodarone, now on metoprolol  We will utilize IV metoprolol she is unable to tolerate p.o.  She is unable to tolerate anticoagulation at this time because of recent internal bleeding  We will resume aspirin 81 mg when she is able to tolerate p.o.

## 2019-12-20 NOTE — H&P
Hospital Medicine History & Physical Note    Date of Service  12/20/2019    Primary Care Physician  Coreen Cisse M.D.    Consultants  Surgery    Code Status  Full Code    Chief Complaint  Chief Complaint   Patient presents with   • Hernia   • Sent by MD   • Vomiting       History of Presenting Illness  Ermelinda is a very pleasant 84 y.o. female with a past medical history of total cystectomy with an ileal conduit that was done in 2011, history of a parastomal hernia repair 2014, history of atrial fibrillation not on anticoagulation because of prior history of internal bleeding for which she was recommended to take aspirin 81 mg daily, she has had a history of TIA, hypothyroidism, hypertension liver cirrhosis, has been admitted multiple times this year, last time was in March for small bowel obstruction at the time NG tube decompression conservative therapy was provided and she recovered.  However today patient presented to the emergency room on 12/20/2019 again with symptoms of small bowel obstruction.  Patient has been having nausea with vomiting over the past 3 days, she has had no bowel movements or no flatus.  She denies having any dysuria, fevers chills chest pain or shortness of breath.  She is unable to keep any of her medications down.     Review of Systems  Review of Systems   Constitutional: Positive for malaise/fatigue. Negative for chills and fever.   HENT: Negative for congestion, hearing loss, sore throat and tinnitus.    Eyes: Negative for blurred vision, double vision, photophobia and pain.   Respiratory: Negative for cough, hemoptysis, sputum production, shortness of breath and stridor.    Cardiovascular: Negative for chest pain, palpitations, orthopnea, claudication and PND.   Gastrointestinal: Positive for abdominal pain, nausea and vomiting. Negative for blood in stool, constipation, heartburn and melena.   Genitourinary: Negative for dysuria, frequency and urgency.   Musculoskeletal: Negative  for back pain, myalgias and neck pain.   Neurological: Positive for weakness. Negative for dizziness, tingling, tremors, sensory change, speech change and headaches.   Psychiatric/Behavioral: Negative for depression, memory loss and suicidal ideas. The patient is not nervous/anxious.    All other systems reviewed and are negative.      Past Medical History  Past Medical History:   Diagnosis Date   • HTN (hypertension), malignant 3/18/2019   • HTN (hypertension), malignant 3/18/2019   • Atrial fibrillation with rapid ventricular response (HCC) 3/18/2019   • Intrinsic eczema 10/31/2017   • Atrial fibrillation, rapid (HCC) 1/31/2014   • Other specified pre-operative examination 1/28/2014   • Vaginal bleeding 10/22/2012   • Sepsis urinary source 1/23/2012   • CHEST PAIN 3/11/2011   • TIA (transient ischemic attack) 3/11/2011   • Hyperlipidemia 2/24/2011   • UTI (urinary tract infection) 2/24/2011   • Hypothyroid 2/24/2011   • Bladder disease 2011    bladder removed   • Atrial fibrillation (HCC) 2/2009    A FIB X2,[ resolved on own][   • A-fib (HCC)    • Ailment     urostomy   • Arthritis     fingers   • Back pain    • Bladder problem     Chronic bladder infection for the past 17months   • CAD (coronary artery disease)    • Cancer (HCC)     skin   • CATARACT     freddie surgery complete   • Glaucoma    • History of hematuria     3/15/10: with Plavix.   • Hypertension    • Indigestion    • Paroxysmal atrial fibrillation (HCC)    • Stroke (HCC)     2 TIAs 2/2010, no stroke, 2/2013   • T.I.A.    • Unspecified disorder of thyroid    • Unspecified hemorrhagic conditions     not sure when (2009)  blood in urin    • Urinary bladder disorder     urinary incontinence       Surgical History   has a past surgical history that includes bladder biopsy with cystoscopy (10/10/08); pyelogram (10/10/08); retrogrades (10/10/08); bladder biopsy with cystoscopy (6/15/2009); bladder biopsy with cystoscopy (10/12/2009); other abdominal surgery;  "cholecystectomy (1994); cholecystectomy (1994); gyn surgery; pelvic exam under anesthesia (10/12/2009); rectocele repair (11/3/2009); other; cystectomy (9/6/2011); ileo loop diversion (9/6/2011); biopsy general (10/22/2012); and parastomal hernia repair  (1/28/2014).    Family History  family history includes Stroke (age of onset: 77) in her father; Stroke (age of onset: 81) in her mother.      Social History   reports that she has never smoked. She has never used smokeless tobacco. She reports that she does not drink alcohol or use drugs.    Allergies  Allergies   Allergen Reactions   • Cardizem Swelling   • Doxycycline      Swollen lips.   • Sulfa Drugs      Makes tongue swell, severely   • Zyvox Swelling     \"Whole mouth swelled up\"    • Codeine      Does not remember the reaction         Medications  Prior to Admission medications    Medication Sig Start Date End Date Taking? Authorizing Provider   amoxicillin-clavulanate (AUGMENTIN) 875-125 MG Tab Take 1 Tab by mouth 2 times a day. 7 DAYS 12/13/19  Yes Physician Outpatient   ferrous gluconate (IRON 27) 240 (27 Fe) MG tablet Take 240 mg by mouth every 7 days. SUNDAY   Yes Physician Outpatient   furosemide (LASIX) 20 MG Tab Take 20 mg by mouth 1 time daily as needed. 3/11/19   Physician Outpatient   latanoprost (XALATAN) 0.005 % Solution Place 1 Drop in both eyes every evening. 3/13/19   Physician Outpatient   timolol (TIMOPTIC) 0.5 % Solution Place 1 Drop in both eyes 2 times a day. 3/13/19   Physician Outpatient   metoprolol (LOPRESSOR) 100 MG Tab Take 1 Tab by mouth 2 times a day. 3/28/19   LISBETH Bae.   aspirin EC 81 MG EC tablet Take 1 Tab by mouth every day. 3/21/19   bAbi Booth M.D.   levothyroxine (SYNTHROID) 112 MCG Tab Take 112 mcg by mouth Every morning on an empty stomach.    Physician Outpatient   Multiple Vitamins-Minerals (CENTRUM SILVER ULTRA WOMENS) Tab Take 1 Tab by mouth every day.    Physician Outpatient   Multiple " Vitamins-Minerals (PRESERVISION AREDS PO) Take 1 Tab by mouth 2 Times a Day.    Physician Outpatient   lovastatin (MEVACOR) 10 MG tablet Take 10 mg by mouth every day.    Physician Outpatient   pantoprazole (PROTONIX) 40 MG TBEC Take 40 mg by mouth every day.    Physician Outpatient   Biotin 10 MG CAPS Take 1 Cap by mouth every day.    Physician Outpatient       Physical Exam  Temp:  [36.6 °C (97.8 °F)] 36.6 °C (97.8 °F)  Pulse:  [88] 88  Resp:  [16-18] 18  BP: (107)/(69) 107/69  Physical Exam   Constitutional: She is oriented to person, place, and time. She appears well-developed. No distress.   HENT:   Head: Normocephalic and atraumatic.   Mouth/Throat: No oropharyngeal exudate.   Mucous membranes moist   Eyes: Pupils are equal, round, and reactive to light. Conjunctivae are normal. Right eye exhibits no discharge. No scleral icterus.   Neck: Neck supple. No JVD present. No thyromegaly present.   Cardiovascular: Normal rate and intact distal pulses.   No murmur heard.  Pulmonary/Chest: Effort normal and breath sounds normal. No stridor. No respiratory distress. She has no wheezes. She has no rales.   Abdominal: Soft. She exhibits distension. She exhibits no mass. There is tenderness (Generalized tenderness). There is no rebound and no guarding.   Musculoskeletal: Normal range of motion.         General: No edema.   Neurological: She is alert and oriented to person, place, and time. No cranial nerve deficit.   Skin: Skin is warm. She is not diaphoretic. No erythema.   Psychiatric: Her behavior is normal. Thought content normal.   Nursing note and vitals reviewed.      Laboratory:  Recent Labs     12/20/19  1238   WBC 17.6*   RBC 5.06   HEMOGLOBIN 14.1   HEMATOCRIT 43.6   MCV 86.2   MCH 27.9   MCHC 32.3*   RDW 50.5*   PLATELETCT 152*   MPV 10.8     Recent Labs     12/20/19  1238   SODIUM 139   POTASSIUM 4.7   CHLORIDE 103   CO2 20   GLUCOSE 118*   BUN 41*   CREATININE 1.62*   CALCIUM 8.5     Recent Labs      12/20/19  1238   ALTSGPT 18   ASTSGOT 32   ALKPHOSPHAT 142*   TBILIRUBIN 0.7   LIPASE 64*   GLUCOSE 118*               Urinalysis:          Imaging:  CT-RENAL COLIC EVALUATION(A/P W/O)   Final Result      1.  Small bowel obstruction      2.  There is transition point in the right lower quadrant with exiting small bowel loop decompressed and entering peristomal hernia. This could be etiology of the small bowel obstruction            3.  Diffuse, marked gastric wall thickening consistent with inflammation or neoplasm      4.  Moderate size hiatal hernia      5.  Probable cirrhosis      6.  Prior cholecystectomy      7.  Bilateral renal scarring      8.  Moderately limited assessment due to lack of oral and IV contrast administration          Assessment/Plan:  I anticipate this patient will require at least two midnights for appropriate medical management, necessitating inpatient admission.    Small bowel obstruction (HCC)- (present on admission)  Assessment & Plan  Recurrent, patient has had multiple admissions  Unsure if her parastomal hernia is the cause of this, she has had a repair before  Surgery consulted, awaiting for further recommendations  NG tube inserted, on intermitant suction     Paroxysmal atrial fibrillation (HCC)- (present on admission)  Assessment & Plan  Was previously on amiodarone, now on metoprolol  We will utilize IV metoprolol she is unable to tolerate p.o.  She is unable to tolerate anticoagulation at this time because of recent internal bleeding  We will resume aspirin 81 mg when she is able to tolerate p.o.    Acute on chronic renal failure (HCC)  Assessment & Plan  Likely due to dehydration from poor p.o. intake  Fluids initiated, recheck BMP in the morning    TIA (transient ischemic attack)- (present on admission)  Assessment & Plan  History of prior TIA, patient has an obviously high chads score of over 6  But as above consider anticoagulation in the future when clinically  stable    Ileostomy in place (HCC)- (present on admission)  Assessment & Plan  Hx of total cystectomy and noted Ileal conduit done in 2011.  No issues      Leukocytosis- (present on admission)  Assessment & Plan  Suspect this is a leukemoid reaction from her small bowel obstruction and vomiting  Repeat CBC in the morning    Dyslipidemia- (present on admission)  Assessment & Plan  Can continue with lovastatin when tolerated    Acquired hypothyroidism- (present on admission)  Assessment & Plan  Resume home dose of Synthroid when able to tolerate p.o.      VTE prophylaxis: Prophylaxis: SCDs

## 2019-12-20 NOTE — ED TRIAGE NOTES
Pt to triage in wheelchair with   Chief Complaint   Patient presents with   • Hernia   • Sent by MD   • Vomiting   hx of bowel obstruction  Pt A & 0 x 4, speech clear  Pt back to room with tech

## 2019-12-20 NOTE — ED NOTES
Assumed care of patient. Pt AOx4, breathing regular and unlabored. Pt c/o abdominal distension and pain and N/V x 3 days. Pt w hx of hernia and bowel obstructions. Pt resting comfortably in room w  at bedside, no questions, will continue to monitor.

## 2019-12-20 NOTE — ASSESSMENT & PLAN NOTE
Suspect this is a leukemoid reaction from her small bowel obstruction and vomiting  Repeat CBC in the morning

## 2019-12-21 ENCOUNTER — APPOINTMENT (OUTPATIENT)
Dept: RADIOLOGY | Facility: MEDICAL CENTER | Age: 84
DRG: 330 | End: 2019-12-21
Attending: HOSPITALIST
Payer: MEDICARE

## 2019-12-21 LAB
ALBUMIN SERPL BCP-MCNC: 3 G/DL (ref 3.2–4.9)
ALBUMIN/GLOB SERPL: 0.9 G/DL
ALP SERPL-CCNC: 96 U/L (ref 30–99)
ALT SERPL-CCNC: 13 U/L (ref 2–50)
ANION GAP SERPL CALC-SCNC: 8 MMOL/L (ref 0–11.9)
AST SERPL-CCNC: 26 U/L (ref 12–45)
BASOPHILS # BLD AUTO: 0.4 % (ref 0–1.8)
BASOPHILS # BLD: 0.04 K/UL (ref 0–0.12)
BILIRUB SERPL-MCNC: 0.6 MG/DL (ref 0.1–1.5)
BUN SERPL-MCNC: 52 MG/DL (ref 8–22)
CALCIUM SERPL-MCNC: 7.9 MG/DL (ref 8.5–10.5)
CHLORIDE SERPL-SCNC: 110 MMOL/L (ref 96–112)
CO2 SERPL-SCNC: 20 MMOL/L (ref 20–33)
CREAT SERPL-MCNC: 1.75 MG/DL (ref 0.5–1.4)
EOSINOPHIL # BLD AUTO: 0.15 K/UL (ref 0–0.51)
EOSINOPHIL NFR BLD: 1.5 % (ref 0–6.9)
ERYTHROCYTE [DISTWIDTH] IN BLOOD BY AUTOMATED COUNT: 51.5 FL (ref 35.9–50)
GLOBULIN SER CALC-MCNC: 3.4 G/DL (ref 1.9–3.5)
GLUCOSE SERPL-MCNC: 91 MG/DL (ref 65–99)
HCT VFR BLD AUTO: 38.6 % (ref 37–47)
HGB BLD-MCNC: 12.5 G/DL (ref 12–16)
IMM GRANULOCYTES # BLD AUTO: 0.03 K/UL (ref 0–0.11)
IMM GRANULOCYTES NFR BLD AUTO: 0.3 % (ref 0–0.9)
LYMPHOCYTES # BLD AUTO: 1.74 K/UL (ref 1–4.8)
LYMPHOCYTES NFR BLD: 17.8 % (ref 22–41)
MCH RBC QN AUTO: 28.5 PG (ref 27–33)
MCHC RBC AUTO-ENTMCNC: 32.4 G/DL (ref 33.6–35)
MCV RBC AUTO: 87.9 FL (ref 81.4–97.8)
MONOCYTES # BLD AUTO: 1.89 K/UL (ref 0–0.85)
MONOCYTES NFR BLD AUTO: 19.4 % (ref 0–13.4)
NEUTROPHILS # BLD AUTO: 5.9 K/UL (ref 2–7.15)
NEUTROPHILS NFR BLD: 60.6 % (ref 44–72)
NRBC # BLD AUTO: 0 K/UL
NRBC BLD-RTO: 0 /100 WBC
PLATELET # BLD AUTO: 112 K/UL (ref 164–446)
PMV BLD AUTO: 11.9 FL (ref 9–12.9)
POTASSIUM SERPL-SCNC: 4.2 MMOL/L (ref 3.6–5.5)
PROT SERPL-MCNC: 6.4 G/DL (ref 6–8.2)
RBC # BLD AUTO: 4.39 M/UL (ref 4.2–5.4)
SODIUM SERPL-SCNC: 138 MMOL/L (ref 135–145)
WBC # BLD AUTO: 9.8 K/UL (ref 4.8–10.8)

## 2019-12-21 PROCEDURE — C9113 INJ PANTOPRAZOLE SODIUM, VIA: HCPCS | Performed by: NURSE PRACTITIONER

## 2019-12-21 PROCEDURE — A9270 NON-COVERED ITEM OR SERVICE: HCPCS | Performed by: NURSE PRACTITIONER

## 2019-12-21 PROCEDURE — 700102 HCHG RX REV CODE 250 W/ 637 OVERRIDE(OP): Performed by: HOSPITALIST

## 2019-12-21 PROCEDURE — 74245 DX-UPPER GI-SMALL BOWEL FOLLOW THRU: CPT

## 2019-12-21 PROCEDURE — 700117 HCHG RX CONTRAST REV CODE 255: Performed by: HOSPITALIST

## 2019-12-21 PROCEDURE — 85025 COMPLETE CBC W/AUTO DIFF WBC: CPT

## 2019-12-21 PROCEDURE — 770006 HCHG ROOM/CARE - MED/SURG/GYN SEMI*

## 2019-12-21 PROCEDURE — 700111 HCHG RX REV CODE 636 W/ 250 OVERRIDE (IP): Performed by: NURSE PRACTITIONER

## 2019-12-21 PROCEDURE — 80053 COMPREHEN METABOLIC PANEL: CPT

## 2019-12-21 PROCEDURE — 700105 HCHG RX REV CODE 258: Performed by: NURSE PRACTITIONER

## 2019-12-21 PROCEDURE — 36415 COLL VENOUS BLD VENIPUNCTURE: CPT

## 2019-12-21 PROCEDURE — 700102 HCHG RX REV CODE 250 W/ 637 OVERRIDE(OP): Performed by: NURSE PRACTITIONER

## 2019-12-21 PROCEDURE — 99233 SBSQ HOSP IP/OBS HIGH 50: CPT | Performed by: INTERNAL MEDICINE

## 2019-12-21 PROCEDURE — A9270 NON-COVERED ITEM OR SERVICE: HCPCS | Performed by: HOSPITALIST

## 2019-12-21 PROCEDURE — 700101 HCHG RX REV CODE 250: Performed by: HOSPITALIST

## 2019-12-21 RX ORDER — SODIUM CHLORIDE 9 MG/ML
INJECTION, SOLUTION INTRAVENOUS CONTINUOUS
Status: DISCONTINUED | OUTPATIENT
Start: 2019-12-21 | End: 2019-12-22

## 2019-12-21 RX ORDER — PANTOPRAZOLE SODIUM 40 MG/10ML
40 INJECTION, POWDER, LYOPHILIZED, FOR SOLUTION INTRAVENOUS DAILY
Status: DISCONTINUED | OUTPATIENT
Start: 2019-12-21 | End: 2019-12-21

## 2019-12-21 RX ORDER — OMEPRAZOLE 20 MG/1
20 CAPSULE, DELAYED RELEASE ORAL DAILY
Status: DISCONTINUED | OUTPATIENT
Start: 2019-12-21 | End: 2019-12-21

## 2019-12-21 RX ORDER — OMEPRAZOLE 20 MG/1
20 CAPSULE, DELAYED RELEASE ORAL DAILY
Status: DISCONTINUED | OUTPATIENT
Start: 2019-12-22 | End: 2020-01-01 | Stop reason: HOSPADM

## 2019-12-21 RX ADMIN — LATANOPROST 1 DROP: 50 SOLUTION OPHTHALMIC at 17:03

## 2019-12-21 RX ADMIN — OXYCODONE HYDROCHLORIDE 5 MG: 5 TABLET ORAL at 22:14

## 2019-12-21 RX ADMIN — PANTOPRAZOLE SODIUM 40 MG: 40 INJECTION, POWDER, LYOPHILIZED, FOR SOLUTION INTRAVENOUS at 10:22

## 2019-12-21 RX ADMIN — METOPROLOL TARTRATE 100 MG: 100 TABLET ORAL at 05:03

## 2019-12-21 RX ADMIN — SODIUM CHLORIDE: 9 INJECTION, SOLUTION INTRAVENOUS at 17:06

## 2019-12-21 RX ADMIN — SODIUM CHLORIDE: 9 INJECTION, SOLUTION INTRAVENOUS at 22:34

## 2019-12-21 RX ADMIN — ASPIRIN 81 MG: 81 TABLET, COATED ORAL at 17:03

## 2019-12-21 RX ADMIN — IOHEXOL 400 ML: 350 INJECTION, SOLUTION INTRAVENOUS at 08:20

## 2019-12-21 RX ADMIN — LEVOTHYROXINE SODIUM 112 MCG: 112 TABLET ORAL at 05:03

## 2019-12-21 RX ADMIN — OXYCODONE HYDROCHLORIDE 5 MG: 5 TABLET ORAL at 17:42

## 2019-12-21 RX ADMIN — METOPROLOL TARTRATE 100 MG: 100 TABLET ORAL at 17:03

## 2019-12-21 RX ADMIN — TIMOLOL MALEATE 1 DROP: 5 SOLUTION OPHTHALMIC at 05:01

## 2019-12-21 RX ADMIN — ACETAMINOPHEN 650 MG: 325 TABLET, FILM COATED ORAL at 03:05

## 2019-12-21 RX ADMIN — TIMOLOL MALEATE 1 DROP: 5 SOLUTION OPHTHALMIC at 17:03

## 2019-12-21 ASSESSMENT — ENCOUNTER SYMPTOMS
CHILLS: 0
MYALGIAS: 0
FEVER: 0
DIZZINESS: 0
PALPITATIONS: 0
HEARTBURN: 0
VOMITING: 0
ABDOMINAL PAIN: 1
NAUSEA: 0
NECK PAIN: 0
DIAPHORESIS: 0
CONSTIPATION: 0
DIARRHEA: 1
WEIGHT LOSS: 0
HEADACHES: 0
BLOOD IN STOOL: 0

## 2019-12-21 NOTE — PROGRESS NOTES
Patient arrived on floor. Oriented to floor. XR showed incorrect NG tube placement. Patient was on floor ~1hr when Dr. Frost decided to transfer the patient to [facility] for procedure be cause there are no radiologists until Monday at [facility]. While patient was on floor, repeat xr of tube placement was done but it is still incorrectly placed. Will notify oncoming shift. NAM organizing transfer.

## 2019-12-21 NOTE — DISCHARGE SUMMARY
Discharge Summary    CHIEF COMPLAINT ON ADMISSION  Chief Complaint   Patient presents with   • Hernia   • Sent by MD   • Vomiting       Reason for Admission  Abdominal Pain     CODE STATUS  Full Code    HPI & HOSPITAL COURSE  Ermelinda is a very pleasant 84 y.o. female with a past medical history of total cystectomy with an ileal conduit that was done in 2011, history of a parastomal hernia repair 2014, history of atrial fibrillation not on anticoagulation because of prior history of internal bleeding for which she was recommended to take aspirin 81 mg daily, she has had a history of TIA, hypothyroidism, hypertension liver cirrhosis, has been admitted multiple times this year, last time was in March for small bowel obstruction at the time NG tube decompression conservative therapy was provided and she recovered.  However today patient presented to the emergency room on 12/20/2019 again with symptoms of small bowel obstruction.  Patient has been having nausea with vomiting over the past 3 days, she has had no bowel movements or no flatus.  She denies having any dysuria, fevers chills chest pain or shortness of breath.  She is unable to keep any of her medications down. Discussed case with Dr. Chavarria surgery, he recommended for us to order a small bowel follow-through which is a standard of care in a situation for diagnostics however there is lack of radiology resources at [facility], as a result patient will be transferred to [facility] for this purpose in addition to a surgical consultation.

## 2019-12-21 NOTE — PROGRESS NOTES
· 2 RN skin check complete with SAI Oneill.  · Devices in place: NG tube with tape to right nasal nare, urostomy and urostomy bag to right lower quadrant, SCD sleeves.   · Skin assessed under devices: Yes.  · Confirmed pressure ulcers found on: N/A.  · Bilateral ears intact. Skin surrounding NG tube and under tape intact. Bilateral elbows intact. Skin surrounding urostomy intact. Patient's lower back has a small scab present, patient unsure of how she sustained this. Patient's sacrum has some mild discoloration that looks like bruising, skin is blanching. Bilateral heels intact.     · The following interventions in place: Education provided about repositioning while resting in bed to preserve skin integrity. Patient encouraged to reposition at least every 2 hours while resting in bed ot maintain skin integrity, patient verbalized understanding. Waffle mattress overlay in place, pillows in use for support and positioning.

## 2019-12-21 NOTE — PROGRESS NOTES
Patient stable AOx4 VSS, and getting transfered by EMMANUEL Reese) to [facility]. Called report to Maddi Rn on orthopedics, full report given and all questions answered. Patient ready for transfer. NG to DEBORAH linton and R MARIELA PIV still in place. No questions by  Patient and family. All questions answered by previous nurse.

## 2019-12-21 NOTE — ASSESSMENT & PLAN NOTE
-rates controlled today  -asymptomatic  -not on chronic anticoagulation due to history of bleeding - continue ASA

## 2019-12-21 NOTE — ASSESSMENT & PLAN NOTE
"-POD #7 \"Severe adhesions to a prior parastomal hernia mesh.  Incarcerated loop of small bowel causing obstruction\"  -full liquid diet  -Continued high outputs through LUCIA   -(+) flatus  -general surgery following  -ambulating    "

## 2019-12-21 NOTE — PROGRESS NOTES
Patient transferred from [facility] for a small bowel obstruction.  Small bowel follow through with gastrografin ordered and pending for the am.   was consulted and will evaluate patient.

## 2019-12-21 NOTE — PROGRESS NOTES
Hospital Medicine Daily Progress Note    Date of Service  12/21/2019    Chief Complaint  84 y.o. female admitted 12/20/2019 with SBO    Hospital Course      Admission Day Course  Ermelinda is a very pleasant 84 y.o. 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  However today patient presented to the emergency room on 12/20/2019 again with symptoms of small bowel obstruction.  Patient has been having nausea with vomiting over the past 3 days, she has had no bowel movements or no flatus.  She denies having any dysuria, fevers chills chest pain or shortness of breath.  She is unable to keep any of her medications down.     Interval Problem Update  12/21: Small bowel contrast series done today. Showed partial bowel obstruction; No significant fold thickening in the stomach to suggest an infiltrative process. Patient was started on clear liquids and NGT discontinued.         Consultants/Specialty  General Surgery     Code Status  Full code     Disposition  TBD , pending surgical recommendations     Review of Systems  Review of Systems   Constitutional: Positive for malaise/fatigue. Negative for chills, diaphoresis, fever and weight loss.   Cardiovascular: Negative for chest pain and palpitations.   Gastrointestinal: Positive for abdominal pain and diarrhea. Negative for blood in stool, constipation, heartburn, melena, nausea and vomiting.   Musculoskeletal: Negative for joint pain, myalgias and neck pain.    Neurological: Negative for dizziness and headaches.        Physical Exam  Temp:  [36.3 °C (97.4 °F)-37 °C (98.6 °F)] 36.3 °C (97.4 °F)  Pulse:  [] 67  Resp:  [15-18] 15  BP: (120-134)/(55-72) 125/55  SpO2:  [91 %-98 %] 98 %    Physical Exam  Vitals signs and nursing note reviewed.   Constitutional:       General: She is not in acute distress.     Appearance: She is normal weight. She is not ill-appearing.   Cardiovascular:      Rate and Rhythm: Normal rate. Rhythm irregular.      Pulses: Normal pulses.   Pulmonary:      Effort: Pulmonary effort is normal. No respiratory distress.      Breath sounds: Normal breath sounds.   Abdominal:      General: Bowel sounds are normal. There is no distension.      Palpations: Abdomen is soft.      Tenderness: There is tenderness. There is guarding. There is no rebound.      Comments: Urostomy to RLQ with yellow output, stoma pink and without signs of bleeding or infection    Skin:     General: Skin is warm and dry.      Coloration: Skin is not pale.      Findings: No erythema.   Neurological:      General: No focal deficit present.      Mental Status: She is alert and oriented to person, place, and time. Mental status is at baseline.         Fluids    Intake/Output Summary (Last 24 hours) at 12/21/2019 1455  Last data filed at 12/21/2019 0500  Gross per 24 hour   Intake --   Output 400 ml   Net -400 ml       Laboratory  Recent Labs     12/20/19  1238 12/20/19 2149 12/21/19  0426   WBC 17.6* 11.9* 9.8   RBC 5.06 4.55 4.39   HEMOGLOBIN 14.1 13.1 12.5   HEMATOCRIT 43.6 40.1 38.6   MCV 86.2 88.1 87.9   MCH 27.9 28.8 28.5   MCHC 32.3* 32.7* 32.4*   RDW 50.5* 52.0* 51.5*   PLATELETCT 152* 124* 112*   MPV 10.8 11.3 11.9     Recent Labs     12/20/19  1238 12/20/19  2149 12/21/19  0426   SODIUM 139 138 138   POTASSIUM 4.7 4.5 4.2   CHLORIDE 103 108 110   CO2 20 23 20   GLUCOSE 118* 94 91   BUN 41* 50* 52*   CREATININE 1.62* 1.70* 1.75*   CALCIUM 8.5 8.0* 7.9*                    Imaging  DX-UPPER GI-SMALL BOWEL FOLLOW THRU   Final Result      1.  Small hiatal hernia.      2.  No significant fold thickening in the stomach to suggest an infiltrative process.      3.  Distended small bowel loops as seen on recent CT are consistent with partial obstruction. Contrast reached the colon in 90 minutes.           Assessment/Plan  Small bowel obstruction (HCC)- (present on admission)  Assessment & Plan  - Recurrent, patient has had multiple admissions  - Unsure if her parastomal hernia is the cause of this, she has had a repair before  - Surgery consulted, small bowel series completed and awaiting further recommendations on surgery inpatient versus outpatient   - Continue clear liquids as tolerated   - Continue PPI    Acute on chronic renal failure (HCC)- (present on admission)  Assessment & Plan  - Likely d/t dehydration   - Continue IVF and monitor   - Avoid nephrotoxins and maintain hydration     Paroxysmal A-fib ([facility])- (present on admission)  Assessment & Plan  - Continue BB   - Not on anticoagulation d/t uncontrolled bleeding. Continue ASA    Ileostomy in place ([facility])- (present on admission)  Assessment & Plan  - Hx of total cystectomy and noted Ileal conduit done in 2011.  - No issues. Monitor.    Enteritis- (present on admission)  Assessment & Plan  - Continue daily PPI   - Per general surgery, will need follow-up EGD due to CT scan findings of a thickened gastric wall.    Acquired hypothyroidism- (present on admission)  Assessment & Plan  - Continue synthroid          VTE prophylaxis: SCDs at this time. No antigoagulation d/t recent h/o uncontrolled bleeding from stoma

## 2019-12-21 NOTE — CONSULTS
General Surgery Consult    CHIEF COMPLAINT: Nausea and vomiting.     HISTORY OF PRESENT ILLNESS: The patient is a 84 y.o. female, who presents with the above complaints.  This began approximately 3 days ago.  This became so severe that she presented to the emergency department where she underwent a work-up to include a CT scan of the abdomen concerning for small bowel obstruction.  The point of obstruction was concerning for a possible parastomal hernia at the site of a urostomy status post total cystectomy for chronic urinary tract infections.  She also has a significant medical history to include cirrhosis, TIA, hypothyroidism.  She has had a history of small bowel obstructions last treated approximately 6 months ago by Dr. Goyal.  She is also had a prior parastomal hernia repair.  General surgery was consulted due to her multiple admissions for small bowel obstruction.     PAST MEDICAL HISTORY:  has a past medical history of A-fib ([facility]), Ailment, Arthritis, Atrial fibrillation ([facility]) (2/2009), Atrial fibrillation with rapid ventricular response ([facility]) (3/18/2019), Atrial fibrillation, rapid ([facility]) (1/31/2014), Back pain, Bladder disease (2011), Bladder problem, CAD (coronary artery disease), Cancer ([facility]), CATARACT, CHEST PAIN (3/11/2011), Glaucoma, History of hematuria, HTN (hypertension), malignant (3/18/2019), HTN (hypertension), malignant (3/18/2019), Hyperlipidemia (2/24/2011), Hypertension, Hypothyroid (2/24/2011), Indigestion, Intrinsic eczema (10/31/2017), Other specified pre-operative examination (1/28/2014), Paroxysmal atrial fibrillation ([facility]), Sepsis urinary source (1/23/2012), Stroke ([facility]), T.I.A., TIA (transient ischemic attack) (3/11/2011), Unspecified disorder of thyroid, Unspecified hemorrhagic conditions, Urinary bladder disorder, UTI (urinary tract infection) (2/24/2011), and Vaginal bleeding (10/22/2012).     PAST SURGICAL HISTORY:  has a past surgical history that includes bladder biopsy with  "cystoscopy (10/10/08); pyelogram (10/10/08); retrogrades (10/10/08); bladder biopsy with cystoscopy (6/15/2009); bladder biopsy with cystoscopy (10/12/2009); other abdominal surgery; cholecystectomy (1994); cholecystectomy (1994); gyn surgery; pelvic exam under anesthesia (10/12/2009); rectocele repair (11/3/2009); other; cystectomy (9/6/2011); ileo loop diversion (9/6/2011); biopsy general (10/22/2012); and parastomal hernia repair (1/28/2014).     ALLERGIES:   Allergies   Allergen Reactions   • Cardizem Swelling   • Doxycycline      Swollen lips.   • Sulfa Drugs      Makes tongue swell, severely   • Zyvox Swelling     \"Whole mouth swelled up\"    • Codeine      Does not remember the reaction          CURRENT MEDICATIONS:   Home Medications    **Home medications have not yet been reviewed for this encounter**         FAMILY HISTORY:   Family History   Problem Relation Age of Onset   • Stroke Mother 81   • Stroke Father 77        SOCIAL HISTORY:   Social History     Tobacco Use   • Smoking status: Never Smoker   • Smokeless tobacco: Never Used   Substance and Sexual Activity   • Alcohol use: No   • Drug use: No   • Sexual activity: Not on file       REVIEW OF SYSTEMS: Comprehensive review of systems was negative aside from nausea and vomiting described in the above HPI    PHYSICAL EXAMINATION:     GENERAL: The patient is alert oriented and appropriate.  She was resting comfortably upon entry to the room.   VITAL SIGNS: /55   Pulse 79   Temp 36.6 °C (97.8 °F) (Temporal)   Resp 18   SpO2 92%   HEAD AND NECK: Demonstrates symmetric, reactive pupils. Extraocular muscles   are intact. Nares and oropharynx are clear.   NECK: Supple. No adenopathy.  CHEST:No respiratory distress.    CARDIOVASCULAR: Regular rate. The extremities are well perfused.   ABDOMEN: Thin abdomen.  Scarring consistent with surgical history.  Parastomal hernia which I reduced in the room today.  No guarding or rebound.   EXTREMITIES: " Examination of the upper and lower extremities demonstrates no cyanosis edema or clubbing.  NEUROLOGIC: Alert & oriented x 3, Normal motor function, Normal sensory function, No focal deficits noted.    LABORATORY VALUES:   Recent Labs     12/20/19  1238 12/20/19 2149 12/21/19  0426   WBC 17.6* 11.9* 9.8   RBC 5.06 4.55 4.39   HEMOGLOBIN 14.1 13.1 12.5   HEMATOCRIT 43.6 40.1 38.6   MCV 86.2 88.1 87.9   MCH 27.9 28.8 28.5   MCHC 32.3* 32.7* 32.4*   RDW 50.5* 52.0* 51.5*   PLATELETCT 152* 124* 112*   MPV 10.8 11.3 11.9     Recent Labs     12/20/19  1238 12/20/19 2149 12/21/19  0426   SODIUM 139 138 138   POTASSIUM 4.7 4.5 4.2   CHLORIDE 103 108 110   CO2 20 23 20   GLUCOSE 118* 94 91   BUN 41* 50* 52*   CREATININE 1.62* 1.70* 1.75*   CALCIUM 8.5 8.0* 7.9*     Recent Labs     12/20/19  1238 12/20/19 2149 12/21/19  0426   ASTSGOT 32 25 26   ALTSGPT 18 16 13   TBILIRUBIN 0.7 0.7 0.6   ALKPHOSPHAT 142* 111* 96   GLOBULIN 4.3* 3.5 3.4            IMAGING:   DX-UPPER GI-SMALL BOWEL FOLLOW THRU    (Results Pending)       IMPRESSION AND PLAN:     1.  Small bowel obstruction  2.  Multiple medical problems.    1.  Patient transferred to [facility] does not perform small bowel series on the weekends.  I have requested a small bowel series with water soluble contrast for today.  Should the contrast proceed to the colon her nasogastric tube can be removed and a clear liquid diet initiated.  Should the contrast stopped prior to her colon we will plan on operative intervention with a lysis of adhesions and parastomal hernia repair.    2.  Should she successfully complete conservative management, we will plan for an elective lysis of adhesions and parastomal hernia repair given her multiple admissions.    3.  Recommend gentle hydration due to mild renal failure.  This will be managed by the primary hospitalist team.    4.  Recommend proton pump inhibitor for gastritis.  She will need a follow-up EGD due to CT scan  findings of a thickened gastric wall.    ___________________________________   Len Chavarria M.D.    DD: 12/21/2019 DT: 6:53 AM

## 2019-12-21 NOTE — H&P
Hospital Medicine History & Physical Note    Date of Service  12/20/2019    Primary Care Physician  Coreen Cisse M.D.    Consultants   Surgery    Code Status  Full Code    Chief Complaint  Abdominal pain     History of Presenting Illness  Ermelinda is a very pleasant 84 y.o. female with a past medical history of total cystectomy with an ileal conduit that was done in 2011, history of a parastomal hernia repair 2014, history of atrial fibrillation not on anticoagulation because of prior history of internal bleeding for which she was recommended to take aspirin 81 mg daily, she has had a history of TIA, hypothyroidism, hypertension liver cirrhosis, has been admitted multiple times this year, last time was in March for small bowel obstruction at the time NG tube decompression conservative therapy was provided and she recovered.  However today patient presented to the emergency room on 12/20/2019 again with symptoms of small bowel obstruction.  Patient has been having nausea with vomiting over the past 3 days, she has had no bowel movements or no flatus.  She denies having any dysuria, fevers chills chest pain or shortness of breath.  She is unable to keep any of her medications down.     Review of Systems  ROS  Review of Systems   Constitutional: Positive for malaise/fatigue. Negative for chills and fever.   HENT: Negative for congestion, hearing loss, sore throat and tinnitus.    Eyes: Negative for blurred vision, double vision, photophobia and pain.   Respiratory: Negative for cough, hemoptysis, sputum production, shortness of breath and stridor.    Cardiovascular: Negative for chest pain, palpitations, orthopnea, claudication and PND.   Gastrointestinal: Positive for abdominal pain, nausea and vomiting. Negative for blood in stool, constipation, heartburn and melena.   Genitourinary: Negative for dysuria, frequency and urgency.   Musculoskeletal: Negative for back pain, myalgias and neck pain.    Neurological: Positive for weakness. Negative for dizziness, tingling, tremors, sensory change, speech change and headaches.   Psychiatric/Behavioral: Negative for depression, memory loss and suicidal ideas. The patient is not nervous/anxious.    All other systems reviewed and are negative.    Past Medical History  Past Medical History:   Diagnosis Date   • HTN (hypertension), malignant 3/18/2019   • HTN (hypertension), malignant 3/18/2019   • Atrial fibrillation with rapid ventricular response (HCC) 3/18/2019   • Intrinsic eczema 10/31/2017   • Atrial fibrillation, rapid (HCC) 1/31/2014   • Other specified pre-operative examination 1/28/2014   • Vaginal bleeding 10/22/2012   • Sepsis urinary source 1/23/2012   • CHEST PAIN 3/11/2011   • TIA (transient ischemic attack) 3/11/2011   • Hyperlipidemia 2/24/2011   • UTI (urinary tract infection) 2/24/2011   • Hypothyroid 2/24/2011   • Bladder disease 2011    bladder removed   • Atrial fibrillation (HCC) 2/2009    A FIB X2,[ resolved on own][   • A-fib (HCC)    • Ailment     urostomy   • Arthritis     fingers   • Back pain    • Bladder problem     Chronic bladder infection for the past 17months   • CAD (coronary artery disease)    • Cancer (HCC)     skin   • CATARACT     freddie surgery complete   • Glaucoma    • History of hematuria     3/15/10: with Plavix.   • Hypertension    • Indigestion    • Paroxysmal atrial fibrillation (HCC)    • Stroke (HCC)     2 TIAs 2/2010, no stroke, 2/2013   • T.I.A.    • Unspecified disorder of thyroid    • Unspecified hemorrhagic conditions     not sure when (2009)  blood in urin    • Urinary bladder disorder     urinary incontinence       Surgical History   has a past surgical history that includes bladder biopsy with cystoscopy (10/10/08); pyelogram (10/10/08); retrogrades (10/10/08); bladder biopsy with cystoscopy (6/15/2009); bladder biopsy with cystoscopy (10/12/2009); other abdominal surgery; cholecystectomy (1994); cholecystectomy  "(1994); gyn surgery; pelvic exam under anesthesia (10/12/2009); rectocele repair (11/3/2009); other; cystectomy (9/6/2011); ileo loop diversion (9/6/2011); biopsy general (10/22/2012); and parastomal hernia repair  (1/28/2014).    Family History  family history includes Stroke (age of onset: 77) in her father; Stroke (age of onset: 81) in her mother.      Social History   reports that she has never smoked. She has never used smokeless tobacco. She reports that she does not drink alcohol or use drugs.    Allergies  Allergies   Allergen Reactions   • Cardizem Swelling   • Doxycycline      Swollen lips.   • Sulfa Drugs      Makes tongue swell, severely   • Zyvox Swelling     \"Whole mouth swelled up\"    • Codeine      Does not remember the reaction         Medications  Prior to Admission medications    Medication Sig Start Date End Date Taking? Authorizing Provider   amoxicillin-clavulanate (AUGMENTIN) 875-125 MG Tab Take 1 Tab by mouth 2 times a day. 7 DAYS 12/13/19   Physician Outpatient   ferrous gluconate (IRON 27) 240 (27 Fe) MG tablet Take 240 mg by mouth every 7 days. SUNDAY    Physician Outpatient   furosemide (LASIX) 20 MG Tab Take 20 mg by mouth 1 time daily as needed. 3/11/19   Physician Outpatient   latanoprost (XALATAN) 0.005 % Solution Place 1 Drop in both eyes every evening. 3/13/19   Physician Outpatient   timolol (TIMOPTIC) 0.5 % Solution Place 1 Drop in both eyes 2 times a day. 3/13/19   Physician Outpatient   metoprolol (LOPRESSOR) 100 MG Tab Take 1 Tab by mouth 2 times a day. 3/28/19   LISBETH Bae.   aspirin EC 81 MG EC tablet Take 1 Tab by mouth every day. 3/21/19   Abbi Booth M.D.   levothyroxine (SYNTHROID) 112 MCG Tab Take 112 mcg by mouth Every morning on an empty stomach.    Physician Outpatient   Multiple Vitamins-Minerals (CENTRUM SILVER ULTRA WOMENS) Tab Take 1 Tab by mouth every day.    Physician Outpatient   Multiple Vitamins-Minerals (PRESERVISION AREDS PO) Take 1 Tab " by mouth 2 Times a Day.    Physician Outpatient   lovastatin (MEVACOR) 10 MG tablet Take 10 mg by mouth every day.    Physician Outpatient   pantoprazole (PROTONIX) 40 MG TBEC Take 40 mg by mouth every day.    Physician Outpatient   Biotin 10 MG CAPS Take 1 Cap by mouth every day.    Physician Outpatient       Physical Exam  Temp:  [37 °C (98.6 °F)] 37 °C (98.6 °F)  Pulse:  [117] 117  Resp:  [17] 17  BP: (134)/(72) 134/72  SpO2:  [91 %] 91 %  Physical Exam  Temp:  [36.6 °C (97.8 °F)] 36.6 °C (97.8 °F)  Pulse:  [88] 88  Resp:  [16-18] 18  BP: (107)/(69) 107/69  Physical Exam   Constitutional: She is oriented to person, place, and time. She appears well-developed. No distress.   HENT:   Head: Normocephalic and atraumatic.   Mouth/Throat: No oropharyngeal exudate.   Mucous membranes moist   Eyes: Pupils are equal, round, and reactive to light. Conjunctivae are normal. Right eye exhibits no discharge. No scleral icterus.   Neck: Neck supple. No JVD present. No thyromegaly present.   Cardiovascular: Normal rate and intact distal pulses.   No murmur heard.  Pulmonary/Chest: Effort normal and breath sounds normal. No stridor. No respiratory distress. She has no wheezes. She has no rales.   Abdominal: Soft. She exhibits distension. She exhibits no mass. There is tenderness (Generalized tenderness). There is no rebound and no guarding.   Musculoskeletal: Normal range of motion.         General: No edema.   Neurological: She is alert and oriented to person, place, and time. No cranial nerve deficit.   Skin: Skin is warm. She is not diaphoretic. No erythema.   Psychiatric: Her behavior is normal. Thought content normal.   Nursing note and vitals reviewed.    Laboratory:  Recent Labs     12/20/19  1238   WBC 17.6*   RBC 5.06   HEMOGLOBIN 14.1   HEMATOCRIT 43.6   MCV 86.2   MCH 27.9   MCHC 32.3*   RDW 50.5*   PLATELETCT 152*   MPV 10.8     Recent Labs     12/20/19  1238   SODIUM 139   POTASSIUM 4.7   CHLORIDE 103   CO2 20    GLUCOSE 118*   BUN 41*   CREATININE 1.62*   CALCIUM 8.5     Recent Labs     12/20/19  1238   ALTSGPT 18   ASTSGOT 32   ALKPHOSPHAT 142*   TBILIRUBIN 0.7   LIPASE 64*   GLUCOSE 118*               Urinalysis:          Imaging:  DX-UPPER GI-SMALL BOWEL FOLLOW THRU    (Results Pending)       Assessment/Plan:  I anticipate this patient will require at least two midnights for appropriate medical management, necessitating inpatient admission.    Small bowel obstruction (HCC)- (present on admission)  Assessment & Plan  Recurrent, patient has had multiple admissions  Unsure if her parastomal hernia is the cause of this, she has had a repair before  Surgery consulted, awaiting for further recommendations  NG tube inserted, on intermitant suction      Paroxysmal atrial fibrillation (HCC)- (present on admission)  Assessment & Plan  Was previously on amiodarone, now on metoprolol  We will utilize IV metoprolol she is unable to tolerate p.o.  She is unable to tolerate anticoagulation at this time because of recent internal bleeding  We will resume aspirin 81 mg when she is able to tolerate p.o.     Acute on chronic renal failure (HCC)  Assessment & Plan  Likely due to dehydration from poor p.o. 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  No issues        Leukocytosis- (present on admission)  Assessment & Plan  Suspect this is a leukemoid reaction from her small bowel obstruction and vomiting  Repeat CBC in the morning     Dyslipidemia- (present on admission)  Assessment & Plan  Can continue with lovastatin when tolerated     Acquired hypothyroidism- (present on  admission)  Assessment & Plan  Resume home dose of Synthroid when able to tolerate p.o.       VTE prophylaxis: Prophylaxis: SCDs

## 2019-12-21 NOTE — DISCHARGE INSTRUCTIONS
Discharge Instructions    Discharged to other by ambulance with escort. Discharged via ambulance, hospital escort: Yes.  Special equipment needed: NG tube    Be sure to schedule a follow-up appointment with your primary care doctor or any specialists as instructed.     Discharge Plan:        I understand that a diet low in cholesterol, fat, and sodium is recommended for good health. Unless I have been given specific instructions below for another diet, I accept this instruction as my diet prescription.   Other diet: NPO    Special Instructions: None    · Is patient discharged on Warfarin / Coumadin?   No     Depression / Suicide Risk    As you are discharged from this Critical access hospital facility, it is important to learn how to keep safe from harming yourself.    Recognize the warning signs:  · Abrupt changes in personality, positive or negative- including increase in energy   · Giving away possessions  · Change in eating patterns- significant weight changes-  positive or negative  · Change in sleeping patterns- unable to sleep or sleeping all the time   · Unwillingness or inability to communicate  · Depression  · Unusual sadness, discouragement and loneliness  · Talk of wanting to die  · Neglect of personal appearance   · Rebelliousness- reckless behavior  · Withdrawal from people/activities they love  · Confusion- inability to concentrate     If you or a loved one observes any of these behaviors or has concerns about self-harm, here's what you can do:  · Talk about it- your feelings and reasons for harming yourself  · Remove any means that you might use to hurt yourself (examples: pills, rope, extension cords, firearm)  · Get professional help from the community (Mental Health, Substance Abuse, psychological counseling)  · Do not be alone:Call your Safe Contact- someone whom you trust who will be there for you.  · Call your local CRISIS HOTLINE 680-0534 or 216-277-2113  · Call your local Children's Mobile Crisis  Response Team [facility] (228) 436-8713 or www.[employer]  · Call the toll free National Suicide Prevention Hotlines   · National Suicide Prevention Lifeline 381-931-JGGZ (9555)  · National Hope Line Network 800-SUICIDE (501-1390)      Small Bowel Obstruction  A small bowel obstruction means that something is blocking the small bowel. The small bowel is also called the small intestine. It is the long tube that connects the stomach to the colon. An obstruction will stop food and fluids from passing through the small bowel. Treatment depends on what is causing the problem and how bad the problem is.  Follow these instructions at home:  · Get a lot of rest.  · Follow your diet as told by your doctor. You may need to:  ¨ Only drink clear liquids until you start to get better.  ¨ Avoid solid foods as told by your doctor.  · Take over-the-counter and prescription medicines only as told by your doctor.  · Keep all follow-up visits as told by your doctor. This is important.  Contact a doctor if:  · You have a fever.  · You have chills.  Get help right away if:  · You have pain or cramps that get worse.  · You throw up (vomit) blood.  · You have a feeling of being sick to your stomach (nausea) that does not go away.  · You cannot stop throwing up.  · You cannot drink fluids.  · You feel confused.  · You feel dry or thirsty (dehydrated).  · Your belly gets more bloated.  · You feel weak or you pass out (faint).  This information is not intended to replace advice given to you by your health care provider. Make sure you discuss any questions you have with your health care provider.  Document Released: 01/25/2006 Document Revised: 08/14/2017 Document Reviewed: 02/11/2016  [employer] Interactive Patient Education © 2017 [employer]

## 2019-12-21 NOTE — CARE PLAN
Problem: Safety  Goal: Will remain free from falls  Outcome: PROGRESSING AS EXPECTED   Fall precautions in place. Patient encouraged to use call light to alert staff of needs and before getting out of bed. Patient verbalized understanding. Call light and personal belongings within reach. Hourly rounding in place.     Problem: Pain Management  Goal: Pain level will decrease to patient's comfort goal  Outcome: PROGRESSING AS EXPECTED   Discussed interventions available to manage pain. Patient verbalized understanding and rates pain using 0-10 pain rating scale. Patient medicated per MAR.

## 2019-12-22 LAB
ANION GAP SERPL CALC-SCNC: 8 MMOL/L (ref 0–11.9)
BUN SERPL-MCNC: 43 MG/DL (ref 8–22)
CALCIUM SERPL-MCNC: 7.9 MG/DL (ref 8.5–10.5)
CHLORIDE SERPL-SCNC: 112 MMOL/L (ref 96–112)
CO2 SERPL-SCNC: 20 MMOL/L (ref 20–33)
CREAT SERPL-MCNC: 1.23 MG/DL (ref 0.5–1.4)
ERYTHROCYTE [DISTWIDTH] IN BLOOD BY AUTOMATED COUNT: 53 FL (ref 35.9–50)
GLUCOSE SERPL-MCNC: 88 MG/DL (ref 65–99)
HCT VFR BLD AUTO: 39.9 % (ref 37–47)
HGB BLD-MCNC: 12.5 G/DL (ref 12–16)
MCH RBC QN AUTO: 28.5 PG (ref 27–33)
MCHC RBC AUTO-ENTMCNC: 31.3 G/DL (ref 33.6–35)
MCV RBC AUTO: 90.9 FL (ref 81.4–97.8)
PLATELET # BLD AUTO: 112 K/UL (ref 164–446)
PMV BLD AUTO: 11.4 FL (ref 9–12.9)
POTASSIUM SERPL-SCNC: 4.1 MMOL/L (ref 3.6–5.5)
RBC # BLD AUTO: 4.39 M/UL (ref 4.2–5.4)
SODIUM SERPL-SCNC: 140 MMOL/L (ref 135–145)
WBC # BLD AUTO: 7.5 K/UL (ref 4.8–10.8)

## 2019-12-22 PROCEDURE — 700102 HCHG RX REV CODE 250 W/ 637 OVERRIDE(OP): Performed by: HOSPITALIST

## 2019-12-22 PROCEDURE — A9270 NON-COVERED ITEM OR SERVICE: HCPCS | Performed by: HOSPITALIST

## 2019-12-22 PROCEDURE — 302146: Performed by: INTERNAL MEDICINE

## 2019-12-22 PROCEDURE — 80048 BASIC METABOLIC PNL TOTAL CA: CPT

## 2019-12-22 PROCEDURE — 85027 COMPLETE CBC AUTOMATED: CPT

## 2019-12-22 PROCEDURE — 700102 HCHG RX REV CODE 250 W/ 637 OVERRIDE(OP): Performed by: NURSE PRACTITIONER

## 2019-12-22 PROCEDURE — 36415 COLL VENOUS BLD VENIPUNCTURE: CPT

## 2019-12-22 PROCEDURE — 302152 K-PAD 12X17: Performed by: INTERNAL MEDICINE

## 2019-12-22 PROCEDURE — 770006 HCHG ROOM/CARE - MED/SURG/GYN SEMI*

## 2019-12-22 PROCEDURE — 99231 SBSQ HOSP IP/OBS SF/LOW 25: CPT | Performed by: INTERNAL MEDICINE

## 2019-12-22 PROCEDURE — A9270 NON-COVERED ITEM OR SERVICE: HCPCS | Performed by: NURSE PRACTITIONER

## 2019-12-22 RX ADMIN — LEVOTHYROXINE SODIUM 112 MCG: 112 TABLET ORAL at 03:58

## 2019-12-22 RX ADMIN — TIMOLOL MALEATE 1 DROP: 5 SOLUTION OPHTHALMIC at 07:48

## 2019-12-22 RX ADMIN — OXYCODONE HYDROCHLORIDE 5 MG: 5 TABLET ORAL at 22:31

## 2019-12-22 RX ADMIN — OXYCODONE HYDROCHLORIDE 5 MG: 5 TABLET ORAL at 03:58

## 2019-12-22 RX ADMIN — METOPROLOL TARTRATE 100 MG: 100 TABLET ORAL at 07:47

## 2019-12-22 RX ADMIN — OMEPRAZOLE 20 MG: 20 CAPSULE, DELAYED RELEASE ORAL at 03:59

## 2019-12-22 RX ADMIN — TIMOLOL MALEATE 1 DROP: 5 SOLUTION OPHTHALMIC at 17:08

## 2019-12-22 RX ADMIN — METOPROLOL TARTRATE 100 MG: 100 TABLET ORAL at 17:08

## 2019-12-22 RX ADMIN — LATANOPROST 1 DROP: 50 SOLUTION OPHTHALMIC at 17:08

## 2019-12-22 RX ADMIN — OXYCODONE HYDROCHLORIDE 5 MG: 5 TABLET ORAL at 13:40

## 2019-12-22 RX ADMIN — ASPIRIN 81 MG: 81 TABLET, COATED ORAL at 03:57

## 2019-12-22 ASSESSMENT — ENCOUNTER SYMPTOMS
CONSTIPATION: 0
DIAPHORESIS: 0
DIARRHEA: 1
HEADACHES: 0
NAUSEA: 0
CHILLS: 0
WEIGHT LOSS: 0
PALPITATIONS: 0
VOMITING: 0
ABDOMINAL PAIN: 1
BLOOD IN STOOL: 0
FEVER: 0
NECK PAIN: 0
MYALGIAS: 0
DIZZINESS: 0
HEARTBURN: 0

## 2019-12-22 NOTE — PROGRESS NOTES
Surgery  SBS noted  Having multiple bms  Still with pain  Full liquid diet  Plan robotic trista and parastomal hernia repair.  ? Tuesday.   Will try to schedule tomorrow.

## 2019-12-22 NOTE — PROGRESS NOTES
Hospital Medicine Daily Progress Note    Date of Service  12/22/2019    Chief Complaint  84 y.o. female admitted 12/20/2019 with SBO    Hospital Course      Admission Day Course  Ermelinda is a very pleasant 84 y.o. 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  However today patient presented to the emergency room on 12/20/2019 again with symptoms of small bowel obstruction.  Patient has been having nausea with vomiting over the past 3 days, she has had no bowel movements or no flatus.  She denies having any dysuria, fevers chills chest pain or shortness of breath.  She is unable to keep any of her medications down.     Interval Problem Update  12/21: Small bowel contrast series done today. Showed partial bowel obstruction; No significant fold thickening in the stomach to suggest an infiltrative process. Patient was started on clear liquids and NGT discontinued.   12/22: Still with generalized abdominal pain. Planning for surgery w/ Dr. Chavarria tomorrow or Tuesday. Continue on full liquid diet         Consultants/Specialty  General Surgery     Code Status  Full code     Disposition  TBD , pending surgical recommendations     Review of Systems  Review of Systems   Constitutional: Positive for malaise/fatigue. Negative for chills, diaphoresis, fever and weight loss.   Cardiovascular: Negative for chest pain and palpitations.   Gastrointestinal: Positive for abdominal pain and diarrhea. Negative for blood in stool,  constipation, heartburn, melena, nausea and vomiting.   Musculoskeletal: Negative for joint pain, myalgias and neck pain.   Neurological: Negative for dizziness and headaches.        Physical Exam  Temp:  [35.9 °C (96.7 °F)-36.4 °C (97.6 °F)] 36.4 °C (97.6 °F)  Pulse:  [65-90] 88  Resp:  [16-17] 17  BP: (112-119)/(50-66) 117/66  SpO2:  [95 %-97 %] 95 %    Physical Exam  Vitals signs and nursing note reviewed.   Constitutional:       General: She is not in acute distress.     Appearance: She is normal weight. She is not ill-appearing.   Cardiovascular:      Rate and Rhythm: Normal rate. Rhythm irregular.      Pulses: Normal pulses.   Pulmonary:      Effort: Pulmonary effort is normal. No respiratory distress.      Breath sounds: Normal breath sounds.   Abdominal:      General: Bowel sounds are normal. There is no distension.      Palpations: Abdomen is soft.      Tenderness: There is tenderness. There is guarding. There is no rebound.      Comments: Urostomy to RLQ with yellow output, stoma pink and without signs of bleeding or infection    Skin:     General: Skin is warm and dry.      Coloration: Skin is not pale.      Findings: No erythema.   Neurological:      General: No focal deficit present.      Mental Status: She is alert and oriented to person, place, and time. Mental status is at baseline.         Fluids    Intake/Output Summary (Last 24 hours) at 12/22/2019 1450  Last data filed at 12/22/2019 1200  Gross per 24 hour   Intake 3300 ml   Output 1525 ml   Net 1775 ml       Laboratory  Recent Labs     12/20/19 2149 12/21/19 0426 12/22/19  0537   WBC 11.9* 9.8 7.5   RBC 4.55 4.39 4.39   HEMOGLOBIN 13.1 12.5 12.5   HEMATOCRIT 40.1 38.6 39.9   MCV 88.1 87.9 90.9   MCH 28.8 28.5 28.5   MCHC 32.7* 32.4* 31.3*   RDW 52.0* 51.5* 53.0*   PLATELETCT 124* 112* 112*   MPV 11.3 11.9 11.4     Recent Labs     12/20/19 2149 12/21/19  0426 12/22/19  0537   SODIUM 138 138 140   POTASSIUM 4.5 4.2 4.1   CHLORIDE 108 110 112    CO2 23 20 20   GLUCOSE 94 91 88   BUN 50* 52* 43*   CREATININE 1.70* 1.75* 1.23   CALCIUM 8.0* 7.9* 7.9*                   Imaging  DX-UPPER GI-SMALL BOWEL FOLLOW THRU   Final Result      1.  Small hiatal hernia.      2.  No significant fold thickening in the stomach to suggest an infiltrative process.      3.  Distended small bowel loops as seen on recent CT are consistent with partial obstruction. Contrast reached the colon in 90 minutes.           Assessment/Plan  Small bowel obstruction (HCC)- (present on admission)  Assessment & Plan  - Recurrent, patient has had multiple admissions  - Unsure if her parastomal hernia is the cause of this, she has had a repair before  - Surgery consulted, small bowel series completed and will precede with definitive surgery tomorrow or Tuesday   - Continue full liquids as tolerated   - Continue PPI    Acute on chronic renal failure (HCC)- (present on admission)  Assessment & Plan  - Likely d/t dehydration   - Cr improved this morning. Will d/c fluids as patient tolerating PO intake  - Continue to monitor on BMP  - Avoid nephrotoxins and maintain hydration     Paroxysmal A-fib (HCC)- (present on admission)  Assessment & Plan  - Continue BB   - Not on anticoagulation d/t uncontrolled bleeding. 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  - No issues. Monitor.    Enteritis- (present on admission)  Assessment & Plan  - Continue daily PPI   - Per general surgery, will need follow-up EGD due to CT scan findings of a thickened gastric wall.    Acquired hypothyroidism- (present on admission)  Assessment & Plan  - Continue synthroid          VTE prophylaxis: SCDs at this time. No antigoagulation d/t recent h/o uncontrolled bleeding from stoma     I have performed a physical exam and reviewed and updated ROS and Plan today (12/22/2019). In review of yesterday's note (12/21/2019),  there are no changes except as documented above.

## 2019-12-22 NOTE — CARE PLAN
Patient medicated for pain to right lower quadrant rated at 8/10.  Patient able to rest comfortably.

## 2019-12-23 ENCOUNTER — APPOINTMENT (OUTPATIENT)
Dept: WOUND CARE | Facility: MEDICAL CENTER | Age: 84
End: 2019-12-23
Attending: PHYSICIAN ASSISTANT
Payer: MEDICARE

## 2019-12-23 LAB
ANION GAP SERPL CALC-SCNC: 6 MMOL/L (ref 0–11.9)
BUN SERPL-MCNC: 25 MG/DL (ref 8–22)
CALCIUM SERPL-MCNC: 8 MG/DL (ref 8.5–10.5)
CHLORIDE SERPL-SCNC: 110 MMOL/L (ref 96–112)
CO2 SERPL-SCNC: 21 MMOL/L (ref 20–33)
CREAT SERPL-MCNC: 1.01 MG/DL (ref 0.5–1.4)
ERYTHROCYTE [DISTWIDTH] IN BLOOD BY AUTOMATED COUNT: 50.2 FL (ref 35.9–50)
GLUCOSE SERPL-MCNC: 97 MG/DL (ref 65–99)
HCT VFR BLD AUTO: 37.6 % (ref 37–47)
HGB BLD-MCNC: 12.2 G/DL (ref 12–16)
MCH RBC QN AUTO: 28.5 PG (ref 27–33)
MCHC RBC AUTO-ENTMCNC: 32.4 G/DL (ref 33.6–35)
MCV RBC AUTO: 87.9 FL (ref 81.4–97.8)
PLATELET # BLD AUTO: 125 K/UL (ref 164–446)
PMV BLD AUTO: 11 FL (ref 9–12.9)
POTASSIUM SERPL-SCNC: 4 MMOL/L (ref 3.6–5.5)
RBC # BLD AUTO: 4.28 M/UL (ref 4.2–5.4)
SODIUM SERPL-SCNC: 137 MMOL/L (ref 135–145)
WBC # BLD AUTO: 7.7 K/UL (ref 4.8–10.8)

## 2019-12-23 PROCEDURE — A9270 NON-COVERED ITEM OR SERVICE: HCPCS | Performed by: HOSPITALIST

## 2019-12-23 PROCEDURE — 85027 COMPLETE CBC AUTOMATED: CPT

## 2019-12-23 PROCEDURE — 700102 HCHG RX REV CODE 250 W/ 637 OVERRIDE(OP): Performed by: HOSPITALIST

## 2019-12-23 PROCEDURE — 36415 COLL VENOUS BLD VENIPUNCTURE: CPT

## 2019-12-23 PROCEDURE — 80048 BASIC METABOLIC PNL TOTAL CA: CPT

## 2019-12-23 PROCEDURE — 700102 HCHG RX REV CODE 250 W/ 637 OVERRIDE(OP): Performed by: NURSE PRACTITIONER

## 2019-12-23 PROCEDURE — 770006 HCHG ROOM/CARE - MED/SURG/GYN SEMI*

## 2019-12-23 PROCEDURE — A9270 NON-COVERED ITEM OR SERVICE: HCPCS | Performed by: NURSE PRACTITIONER

## 2019-12-23 PROCEDURE — 99231 SBSQ HOSP IP/OBS SF/LOW 25: CPT | Performed by: INTERNAL MEDICINE

## 2019-12-23 RX ADMIN — OXYCODONE HYDROCHLORIDE 5 MG: 5 TABLET ORAL at 22:41

## 2019-12-23 RX ADMIN — ASPIRIN 81 MG: 81 TABLET, COATED ORAL at 04:39

## 2019-12-23 RX ADMIN — LATANOPROST 1 DROP: 50 SOLUTION OPHTHALMIC at 17:24

## 2019-12-23 RX ADMIN — TIMOLOL MALEATE 1 DROP: 5 SOLUTION OPHTHALMIC at 04:39

## 2019-12-23 RX ADMIN — OMEPRAZOLE 20 MG: 20 CAPSULE, DELAYED RELEASE ORAL at 04:38

## 2019-12-23 RX ADMIN — TIMOLOL MALEATE 1 DROP: 5 SOLUTION OPHTHALMIC at 17:24

## 2019-12-23 RX ADMIN — OXYCODONE HYDROCHLORIDE 5 MG: 5 TABLET ORAL at 14:59

## 2019-12-23 RX ADMIN — OXYCODONE HYDROCHLORIDE 5 MG: 5 TABLET ORAL at 03:09

## 2019-12-23 RX ADMIN — ACETAMINOPHEN 650 MG: 325 TABLET, FILM COATED ORAL at 14:59

## 2019-12-23 RX ADMIN — ACETAMINOPHEN 650 MG: 325 TABLET, FILM COATED ORAL at 03:09

## 2019-12-23 RX ADMIN — LEVOTHYROXINE SODIUM 112 MCG: 112 TABLET ORAL at 04:38

## 2019-12-23 RX ADMIN — METOPROLOL TARTRATE 100 MG: 100 TABLET ORAL at 17:24

## 2019-12-23 ASSESSMENT — ENCOUNTER SYMPTOMS
WEIGHT LOSS: 0
FEVER: 0
NAUSEA: 0
CHILLS: 0
CONSTIPATION: 0
DIARRHEA: 0
MYALGIAS: 0
PALPITATIONS: 0
NECK PAIN: 0
ABDOMINAL PAIN: 1
HEARTBURN: 0
DIAPHORESIS: 0
VOMITING: 0
DIZZINESS: 0
HEADACHES: 0
BLOOD IN STOOL: 0

## 2019-12-23 NOTE — PROGRESS NOTES
Surgery  OK to eat, not having surgery tomorrow.   SCHEDULING surgery tomorrow, likely Tuesday or Wednesday according to availability of the robot.

## 2019-12-23 NOTE — CARE PLAN
Problem: Safety  Goal: Will remain free from falls  Outcome: PROGRESSING AS EXPECTED   Fall precautions in place. Patient calls appropriately to alert staff of needs and before getting out of bed. Call light and personal belongings within reach. Hourly rounding in place.     Problem: Pain Management  Goal: Pain level will decrease to patient's comfort goal  Outcome: PROGRESSING AS EXPECTED   Patient using 0-10 pain rating scale. Patient medicated per MAR. Heat pad in use.     Problem: Respiratory:  Goal: Respiratory status will improve  Outcome: PROGRESSING AS EXPECTED   Education provided about incentive spirometer benefits. Patient encouraged to use incentive spirometer while awake. Patient verbalized understanding and provided effective return demonstration.

## 2019-12-23 NOTE — PROGRESS NOTES
Surgery  Plan for robotic assisted lysis of adhesions with para stomal hernia repair tomorrow at 10 am.  Pt counseled for surgery again this am  NPO at midnight

## 2019-12-23 NOTE — PROGRESS NOTES
Hospital Medicine Daily Progress Note    Date of Service  12/23/2019    Chief Complaint  84 y.o. female admitted 12/20/2019 with SBO    Hospital Course      Admission Day Course  Ermelinda is a very pleasant 84 y.o. 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  However today patient presented to the emergency room on 12/20/2019 again with symptoms of small bowel obstruction.  Patient has been having nausea with vomiting over the past 3 days, she has had no bowel movements or no flatus.  She denies having any dysuria, fevers chills chest pain or shortness of breath.  She is unable to keep any of her medications down.     Interval Problem Update  12/21: Small bowel contrast series done today. Showed partial bowel obstruction; No significant fold thickening in the stomach to suggest an infiltrative process. Patient was started on clear liquids and NGT discontinued.   12/22: Still with generalized abdominal pain. Planning for surgery w/ Dr. Chavarria tomorrow or Tuesday. Continue on full liquid diet   12/23: Stable. Still with significant pain. OR scheduled for tomorrow at 10am. NPO at midnight.         Consultants/Specialty  General Surgery     Code Status  Full code     Disposition  TBD , pending surgical recommendations     Review of Systems  Review of Systems   Constitutional: Positive for malaise/fatigue. Negative for chills, diaphoresis, fever and weight loss.   Cardiovascular: Negative for chest pain and  palpitations.   Gastrointestinal: Positive for abdominal pain. Negative for blood in stool, constipation, diarrhea, heartburn, melena, nausea and vomiting.   Musculoskeletal: Negative for joint pain, myalgias and neck pain.   Neurological: Negative for dizziness and headaches.        Physical Exam  Temp:  [36.1 °C (97 °F)-36.5 °C (97.7 °F)] 36.4 °C (97.5 °F)  Pulse:  [64-67] 64  Resp:  [16-18] 16  BP: (102-126)/(47-61) 114/51  SpO2:  [96 %-97 %] 97 %    Physical Exam  Vitals signs and nursing note reviewed.   Constitutional:       General: She is not in acute distress.     Appearance: She is normal weight. She is not ill-appearing.   Cardiovascular:      Rate and Rhythm: Normal rate. Rhythm irregular.      Pulses: Normal pulses.   Pulmonary:      Effort: Pulmonary effort is normal. No respiratory distress.      Breath sounds: Normal breath sounds.   Abdominal:      General: Bowel sounds are normal. There is no distension.      Palpations: Abdomen is soft.      Tenderness: There is tenderness. There is guarding. There is no rebound.      Comments: Urostomy to RLQ with yellow output, stoma pink and without signs of bleeding or infection    Skin:     General: Skin is warm and dry.      Coloration: Skin is not pale.      Findings: No erythema.   Neurological:      General: No focal deficit present.      Mental Status: She is alert and oriented to person, place, and time. Mental status is at baseline.         Fluids    Intake/Output Summary (Last 24 hours) at 12/23/2019 1218  Last data filed at 12/23/2019 0430  Gross per 24 hour   Intake --   Output 920 ml   Net -920 ml       Laboratory  Recent Labs     12/21/19  0426 12/22/19  0537 12/23/19  0419   WBC 9.8 7.5 7.7   RBC 4.39 4.39 4.28   HEMOGLOBIN 12.5 12.5 12.2   HEMATOCRIT 38.6 39.9 37.6   MCV 87.9 90.9 87.9   MCH 28.5 28.5 28.5   MCHC 32.4* 31.3* 32.4*   RDW 51.5* 53.0* 50.2*   PLATELETCT 112* 112* 125*   MPV 11.9 11.4 11.0     Recent Labs     12/21/19  0422  12/22/19  0537 12/23/19  0419   SODIUM 138 140 137   POTASSIUM 4.2 4.1 4.0   CHLORIDE 110 112 110   CO2 20 20 21   GLUCOSE 91 88 97   BUN 52* 43* 25*   CREATININE 1.75* 1.23 1.01   CALCIUM 7.9* 7.9* 8.0*                   Imaging  DX-UPPER GI-SMALL BOWEL FOLLOW THRU   Final Result      1.  Small hiatal hernia.      2.  No significant fold thickening in the stomach to suggest an infiltrative process.      3.  Distended small bowel loops as seen on recent CT are consistent with partial obstruction. Contrast reached the colon in 90 minutes.           Assessment/Plan  Small bowel obstruction (HCC)- (present on admission)  Assessment & Plan  - Recurrent, patient has had multiple admissions  - Unsure if her parastomal hernia is the cause of this, she has had a repair before  - Surgery consulted, small bowel series completed and will precede with definitive surgery tomorrow   - Continue full liquids as tolerated   - Continue PPI    Acute on chronic renal failure (HCC)- (present on admission)  Assessment & Plan  - Likely d/t dehydration   - Cr normal today.   - Continue to monitor on BMP  - Avoid nephrotoxins and maintain hydration     Paroxysmal A-fib (HCC)- (present on admission)  Assessment & Plan  - Continue BB   - Not on anticoagulation d/t uncontrolled bleeding. 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  - No issues. Monitor.    Enteritis- (present on admission)  Assessment & Plan  - Continue daily PPI   - Per general surgery, will need follow-up EGD due to CT scan findings of a thickened gastric wall.    Acquired hypothyroidism- (present on admission)  Assessment & Plan  - Continue synthroid          VTE prophylaxis: SCDs at this time. No antigoagulation d/t recent h/o uncontrolled bleeding from stoma     I have performed a physical exam and reviewed and updated ROS and Plan today (12/23/2019). In review of  yesterday's note (12/22/2019), there are no changes except as documented above.

## 2019-12-24 ENCOUNTER — ANESTHESIA EVENT (OUTPATIENT)
Dept: SURGERY | Facility: MEDICAL CENTER | Age: 84
DRG: 330 | End: 2019-12-24
Payer: MEDICARE

## 2019-12-24 ENCOUNTER — ANESTHESIA (OUTPATIENT)
Dept: SURGERY | Facility: MEDICAL CENTER | Age: 84
DRG: 330 | End: 2019-12-24
Payer: MEDICARE

## 2019-12-24 LAB — EKG IMPRESSION: NORMAL

## 2019-12-24 PROCEDURE — 93010 ELECTROCARDIOGRAM REPORT: CPT | Performed by: INTERNAL MEDICINE

## 2019-12-24 PROCEDURE — 160002 HCHG RECOVERY MINUTES (STAT): Performed by: SURGERY

## 2019-12-24 PROCEDURE — 501838 HCHG SUTURE GENERAL: Performed by: SURGERY

## 2019-12-24 PROCEDURE — 160031 HCHG SURGERY MINUTES - 1ST 30 MINS LEVEL 5: Performed by: SURGERY

## 2019-12-24 PROCEDURE — 700111 HCHG RX REV CODE 636 W/ 250 OVERRIDE (IP): Performed by: ANESTHESIOLOGY

## 2019-12-24 PROCEDURE — 99232 SBSQ HOSP IP/OBS MODERATE 35: CPT | Performed by: INTERNAL MEDICINE

## 2019-12-24 PROCEDURE — A4338 INDWELLING CATHETER LATEX: HCPCS | Performed by: SURGERY

## 2019-12-24 PROCEDURE — 160035 HCHG PACU - 1ST 60 MINS PHASE I: Performed by: SURGERY

## 2019-12-24 PROCEDURE — 160048 HCHG OR STATISTICAL LEVEL 1-5: Performed by: SURGERY

## 2019-12-24 PROCEDURE — 0DQ84ZZ REPAIR SMALL INTESTINE, PERCUTANEOUS ENDOSCOPIC APPROACH: ICD-10-PCS | Performed by: SURGERY

## 2019-12-24 PROCEDURE — 500868 HCHG NEEDLE, SURGI(VARES): Performed by: SURGERY

## 2019-12-24 PROCEDURE — 160009 HCHG ANES TIME/MIN: Performed by: SURGERY

## 2019-12-24 PROCEDURE — 700102 HCHG RX REV CODE 250 W/ 637 OVERRIDE(OP): Performed by: ANESTHESIOLOGY

## 2019-12-24 PROCEDURE — 700102 HCHG RX REV CODE 250 W/ 637 OVERRIDE(OP): Performed by: NURSE PRACTITIONER

## 2019-12-24 PROCEDURE — 93005 ELECTROCARDIOGRAM TRACING: CPT | Performed by: SURGERY

## 2019-12-24 PROCEDURE — A9270 NON-COVERED ITEM OR SERVICE: HCPCS | Performed by: ANESTHESIOLOGY

## 2019-12-24 PROCEDURE — 500002 HCHG ADHESIVE, DERMABOND: Performed by: SURGERY

## 2019-12-24 PROCEDURE — 0WUF4JZ SUPPLEMENT ABDOMINAL WALL WITH SYNTHETIC SUBSTITUTE, PERCUTANEOUS ENDOSCOPIC APPROACH: ICD-10-PCS | Performed by: SURGERY

## 2019-12-24 PROCEDURE — A9270 NON-COVERED ITEM OR SERVICE: HCPCS | Performed by: NURSE PRACTITIONER

## 2019-12-24 PROCEDURE — 700105 HCHG RX REV CODE 258: Performed by: NURSE PRACTITIONER

## 2019-12-24 PROCEDURE — 700102 HCHG RX REV CODE 250 W/ 637 OVERRIDE(OP): Performed by: HOSPITALIST

## 2019-12-24 PROCEDURE — 700105 HCHG RX REV CODE 258: Performed by: HOSPITALIST

## 2019-12-24 PROCEDURE — 700101 HCHG RX REV CODE 250: Performed by: SURGERY

## 2019-12-24 PROCEDURE — 700101 HCHG RX REV CODE 250

## 2019-12-24 PROCEDURE — C1781 MESH (IMPLANTABLE): HCPCS | Performed by: SURGERY

## 2019-12-24 PROCEDURE — 502714 HCHG ROBOTIC SURGERY SERVICES: Performed by: SURGERY

## 2019-12-24 PROCEDURE — 160036 HCHG PACU - EA ADDL 30 MINS PHASE I: Performed by: SURGERY

## 2019-12-24 PROCEDURE — 700101 HCHG RX REV CODE 250: Performed by: ANESTHESIOLOGY

## 2019-12-24 PROCEDURE — 770006 HCHG ROOM/CARE - MED/SURG/GYN SEMI*

## 2019-12-24 PROCEDURE — 8E0W4CZ ROBOTIC ASSISTED PROCEDURE OF TRUNK REGION, PERCUTANEOUS ENDOSCOPIC APPROACH: ICD-10-PCS | Performed by: SURGERY

## 2019-12-24 PROCEDURE — 160042 HCHG SURGERY MINUTES - EA ADDL 1 MIN LEVEL 5: Performed by: SURGERY

## 2019-12-24 PROCEDURE — A9270 NON-COVERED ITEM OR SERVICE: HCPCS | Performed by: HOSPITALIST

## 2019-12-24 DEVICE — MESH SURGICAL PHASIX SEPRA 7X10CM: Type: IMPLANTABLE DEVICE | Status: FUNCTIONAL

## 2019-12-24 RX ORDER — HALOPERIDOL 5 MG/ML
1 INJECTION INTRAMUSCULAR
Status: DISCONTINUED | OUTPATIENT
Start: 2019-12-24 | End: 2019-12-24 | Stop reason: HOSPADM

## 2019-12-24 RX ORDER — SODIUM CHLORIDE, SODIUM LACTATE, POTASSIUM CHLORIDE, CALCIUM CHLORIDE 600; 310; 30; 20 MG/100ML; MG/100ML; MG/100ML; MG/100ML
INJECTION, SOLUTION INTRAVENOUS CONTINUOUS
Status: DISCONTINUED | OUTPATIENT
Start: 2019-12-24 | End: 2019-12-25

## 2019-12-24 RX ORDER — MEPERIDINE HYDROCHLORIDE 25 MG/ML
12.5 INJECTION INTRAMUSCULAR; INTRAVENOUS; SUBCUTANEOUS
Status: DISCONTINUED | OUTPATIENT
Start: 2019-12-24 | End: 2019-12-24 | Stop reason: HOSPADM

## 2019-12-24 RX ORDER — OXYCODONE HCL 5 MG/5 ML
10 SOLUTION, ORAL ORAL
Status: COMPLETED | OUTPATIENT
Start: 2019-12-24 | End: 2019-12-24

## 2019-12-24 RX ORDER — SODIUM CHLORIDE, SODIUM LACTATE, POTASSIUM CHLORIDE, CALCIUM CHLORIDE 600; 310; 30; 20 MG/100ML; MG/100ML; MG/100ML; MG/100ML
INJECTION, SOLUTION INTRAVENOUS CONTINUOUS
Status: DISCONTINUED | OUTPATIENT
Start: 2019-12-24 | End: 2019-12-24 | Stop reason: HOSPADM

## 2019-12-24 RX ORDER — HYDROMORPHONE HYDROCHLORIDE 1 MG/ML
0.2 INJECTION, SOLUTION INTRAMUSCULAR; INTRAVENOUS; SUBCUTANEOUS
Status: DISCONTINUED | OUTPATIENT
Start: 2019-12-24 | End: 2019-12-24 | Stop reason: HOSPADM

## 2019-12-24 RX ORDER — OXYCODONE HYDROCHLORIDE 5 MG/1
5 TABLET ORAL
Status: DISCONTINUED | OUTPATIENT
Start: 2019-12-24 | End: 2020-01-01 | Stop reason: HOSPADM

## 2019-12-24 RX ORDER — PHENYLEPHRINE HYDROCHLORIDE 10 MG/ML
INJECTION, SOLUTION INTRAMUSCULAR; INTRAVENOUS; SUBCUTANEOUS PRN
Status: DISCONTINUED | OUTPATIENT
Start: 2019-12-24 | End: 2019-12-24 | Stop reason: SURG

## 2019-12-24 RX ORDER — MIDAZOLAM HYDROCHLORIDE 1 MG/ML
1 INJECTION INTRAMUSCULAR; INTRAVENOUS
Status: DISCONTINUED | OUTPATIENT
Start: 2019-12-24 | End: 2019-12-24 | Stop reason: HOSPADM

## 2019-12-24 RX ORDER — BUPIVACAINE HYDROCHLORIDE AND EPINEPHRINE 5; 5 MG/ML; UG/ML
INJECTION, SOLUTION EPIDURAL; INTRACAUDAL; PERINEURAL
Status: DISCONTINUED | OUTPATIENT
Start: 2019-12-24 | End: 2019-12-24 | Stop reason: HOSPADM

## 2019-12-24 RX ORDER — ONDANSETRON 2 MG/ML
INJECTION INTRAMUSCULAR; INTRAVENOUS PRN
Status: DISCONTINUED | OUTPATIENT
Start: 2019-12-24 | End: 2019-12-24 | Stop reason: SURG

## 2019-12-24 RX ORDER — OXYCODONE HYDROCHLORIDE 10 MG/1
10 TABLET ORAL
Status: DISCONTINUED | OUTPATIENT
Start: 2019-12-24 | End: 2019-12-29

## 2019-12-24 RX ORDER — METOPROLOL TARTRATE 1 MG/ML
1 INJECTION, SOLUTION INTRAVENOUS
Status: DISCONTINUED | OUTPATIENT
Start: 2019-12-24 | End: 2019-12-24 | Stop reason: HOSPADM

## 2019-12-24 RX ORDER — MIDAZOLAM HYDROCHLORIDE 1 MG/ML
INJECTION INTRAMUSCULAR; INTRAVENOUS PRN
Status: DISCONTINUED | OUTPATIENT
Start: 2019-12-24 | End: 2019-12-24 | Stop reason: HOSPADM

## 2019-12-24 RX ORDER — CEFOTETAN DISODIUM 2 G/20ML
INJECTION, POWDER, FOR SOLUTION INTRAMUSCULAR; INTRAVENOUS PRN
Status: DISCONTINUED | OUTPATIENT
Start: 2019-12-24 | End: 2019-12-24 | Stop reason: SURG

## 2019-12-24 RX ORDER — OXYCODONE HCL 5 MG/5 ML
5 SOLUTION, ORAL ORAL
Status: COMPLETED | OUTPATIENT
Start: 2019-12-24 | End: 2019-12-24

## 2019-12-24 RX ORDER — EPINEPHRINE 1 MG/ML(1)
AMPUL (ML) INJECTION PRN
Status: DISCONTINUED | OUTPATIENT
Start: 2019-12-24 | End: 2019-12-24 | Stop reason: SURG

## 2019-12-24 RX ORDER — HYDROMORPHONE HYDROCHLORIDE 1 MG/ML
0.4 INJECTION, SOLUTION INTRAMUSCULAR; INTRAVENOUS; SUBCUTANEOUS
Status: DISCONTINUED | OUTPATIENT
Start: 2019-12-24 | End: 2019-12-24 | Stop reason: HOSPADM

## 2019-12-24 RX ORDER — ONDANSETRON 2 MG/ML
4 INJECTION INTRAMUSCULAR; INTRAVENOUS
Status: DISCONTINUED | OUTPATIENT
Start: 2019-12-24 | End: 2019-12-24 | Stop reason: HOSPADM

## 2019-12-24 RX ORDER — HYDROMORPHONE HYDROCHLORIDE 1 MG/ML
0.5 INJECTION, SOLUTION INTRAMUSCULAR; INTRAVENOUS; SUBCUTANEOUS
Status: DISCONTINUED | OUTPATIENT
Start: 2019-12-24 | End: 2019-12-27

## 2019-12-24 RX ORDER — KETAMINE HYDROCHLORIDE 50 MG/ML
INJECTION, SOLUTION INTRAMUSCULAR; INTRAVENOUS PRN
Status: DISCONTINUED | OUTPATIENT
Start: 2019-12-24 | End: 2019-12-24 | Stop reason: SURG

## 2019-12-24 RX ORDER — HYDRALAZINE HYDROCHLORIDE 20 MG/ML
5 INJECTION INTRAMUSCULAR; INTRAVENOUS
Status: DISCONTINUED | OUTPATIENT
Start: 2019-12-24 | End: 2019-12-24 | Stop reason: HOSPADM

## 2019-12-24 RX ORDER — DEXAMETHASONE SODIUM PHOSPHATE 4 MG/ML
INJECTION, SOLUTION INTRA-ARTICULAR; INTRALESIONAL; INTRAMUSCULAR; INTRAVENOUS; SOFT TISSUE PRN
Status: DISCONTINUED | OUTPATIENT
Start: 2019-12-24 | End: 2019-12-24 | Stop reason: SURG

## 2019-12-24 RX ORDER — MAGNESIUM SULFATE HEPTAHYDRATE 40 MG/ML
INJECTION, SOLUTION INTRAVENOUS PRN
Status: DISCONTINUED | OUTPATIENT
Start: 2019-12-24 | End: 2019-12-24 | Stop reason: SURG

## 2019-12-24 RX ORDER — SODIUM CHLORIDE 9 MG/ML
500 INJECTION, SOLUTION INTRAVENOUS ONCE
Status: COMPLETED | OUTPATIENT
Start: 2019-12-24 | End: 2019-12-24

## 2019-12-24 RX ORDER — HYDROMORPHONE HYDROCHLORIDE 1 MG/ML
0.1 INJECTION, SOLUTION INTRAMUSCULAR; INTRAVENOUS; SUBCUTANEOUS
Status: DISCONTINUED | OUTPATIENT
Start: 2019-12-24 | End: 2019-12-24 | Stop reason: HOSPADM

## 2019-12-24 RX ADMIN — SODIUM CHLORIDE 500 ML: 9 INJECTION, SOLUTION INTRAVENOUS at 23:33

## 2019-12-24 RX ADMIN — LEVOTHYROXINE SODIUM 112 MCG: 112 TABLET ORAL at 06:17

## 2019-12-24 RX ADMIN — OXYCODONE HYDROCHLORIDE 5 MG: 5 SOLUTION ORAL at 14:00

## 2019-12-24 RX ADMIN — KETAMINE HYDROCHLORIDE 25 MG: 50 INJECTION INTRAMUSCULAR; INTRAVENOUS at 09:52

## 2019-12-24 RX ADMIN — LATANOPROST 1 DROP: 50 SOLUTION OPHTHALMIC at 17:27

## 2019-12-24 RX ADMIN — SODIUM CHLORIDE, POTASSIUM CHLORIDE, SODIUM LACTATE AND CALCIUM CHLORIDE: 600; 310; 30; 20 INJECTION, SOLUTION INTRAVENOUS at 09:11

## 2019-12-24 RX ADMIN — EPINEPHRINE 12.5 MCG: 1 INJECTION INTRAMUSCULAR; INTRAVENOUS; SUBCUTANEOUS at 11:23

## 2019-12-24 RX ADMIN — ROCURONIUM BROMIDE 10 MG: 10 INJECTION, SOLUTION INTRAVENOUS at 10:01

## 2019-12-24 RX ADMIN — PHENYLEPHRINE HYDROCHLORIDE 250 MCG: 10 INJECTION INTRAVENOUS at 10:38

## 2019-12-24 RX ADMIN — SODIUM CHLORIDE, POTASSIUM CHLORIDE, SODIUM LACTATE AND CALCIUM CHLORIDE: 600; 310; 30; 20 INJECTION, SOLUTION INTRAVENOUS at 10:51

## 2019-12-24 RX ADMIN — EPINEPHRINE 12.5 MCG: 1 INJECTION INTRAMUSCULAR; INTRAVENOUS; SUBCUTANEOUS at 10:38

## 2019-12-24 RX ADMIN — CEFOTETAN DISODIUM 2 G: 2 INJECTION, POWDER, FOR SOLUTION INTRAMUSCULAR; INTRAVENOUS at 09:28

## 2019-12-24 RX ADMIN — EPINEPHRINE 12.5 MCG: 1 INJECTION INTRAMUSCULAR; INTRAVENOUS; SUBCUTANEOUS at 10:20

## 2019-12-24 RX ADMIN — SUGAMMADEX 200 MG: 100 INJECTION, SOLUTION INTRAVENOUS at 11:31

## 2019-12-24 RX ADMIN — DEXAMETHASONE SODIUM PHOSPHATE 4 MG: 4 INJECTION, SOLUTION INTRA-ARTICULAR; INTRALESIONAL; INTRAMUSCULAR; INTRAVENOUS; SOFT TISSUE at 09:27

## 2019-12-24 RX ADMIN — MIDAZOLAM 1 MG: 1 INJECTION INTRAMUSCULAR; INTRAVENOUS at 09:27

## 2019-12-24 RX ADMIN — HYDROMORPHONE HYDROCHLORIDE 0.2 MG: 1 INJECTION, SOLUTION INTRAMUSCULAR; INTRAVENOUS; SUBCUTANEOUS at 12:57

## 2019-12-24 RX ADMIN — PHENYLEPHRINE HYDROCHLORIDE 250 MCG: 10 INJECTION INTRAVENOUS at 11:23

## 2019-12-24 RX ADMIN — OXYCODONE HYDROCHLORIDE 10 MG: 10 TABLET ORAL at 17:27

## 2019-12-24 RX ADMIN — EPINEPHRINE 12.5 MCG: 1 INJECTION INTRAMUSCULAR; INTRAVENOUS; SUBCUTANEOUS at 10:09

## 2019-12-24 RX ADMIN — OXYCODONE HYDROCHLORIDE 5 MG: 5 TABLET ORAL at 03:02

## 2019-12-24 RX ADMIN — SODIUM CHLORIDE, POTASSIUM CHLORIDE, SODIUM LACTATE AND CALCIUM CHLORIDE: 600; 310; 30; 20 INJECTION, SOLUTION INTRAVENOUS at 23:15

## 2019-12-24 RX ADMIN — ROCURONIUM BROMIDE 40 MG: 10 INJECTION, SOLUTION INTRAVENOUS at 09:27

## 2019-12-24 RX ADMIN — MAGNESIUM SULFATE IN WATER 1 G: 40 INJECTION, SOLUTION INTRAVENOUS at 09:58

## 2019-12-24 RX ADMIN — ACETAMINOPHEN 650 MG: 325 TABLET, FILM COATED ORAL at 21:07

## 2019-12-24 RX ADMIN — OMEPRAZOLE 20 MG: 20 CAPSULE, DELAYED RELEASE ORAL at 06:18

## 2019-12-24 RX ADMIN — FENTANYL CITRATE 50 MCG: 50 INJECTION, SOLUTION INTRAMUSCULAR; INTRAVENOUS at 09:27

## 2019-12-24 RX ADMIN — ONDANSETRON 4 MG: 2 INJECTION INTRAMUSCULAR; INTRAVENOUS at 09:27

## 2019-12-24 RX ADMIN — EPHEDRINE SULFATE 10 MG: 50 INJECTION INTRAVENOUS at 12:03

## 2019-12-24 RX ADMIN — PROPOFOL 140 MG: 10 INJECTION, EMULSION INTRAVENOUS at 09:27

## 2019-12-24 RX ADMIN — TIMOLOL MALEATE 1 DROP: 5 SOLUTION OPHTHALMIC at 17:27

## 2019-12-24 RX ADMIN — PROPOFOL 200 MCG/KG/MIN: 10 INJECTION, EMULSION INTRAVENOUS at 09:27

## 2019-12-24 RX ADMIN — PHENYLEPHRINE HYDROCHLORIDE 250 MCG: 10 INJECTION INTRAVENOUS at 10:20

## 2019-12-24 RX ADMIN — ROCURONIUM BROMIDE 6 MG: 10 INJECTION, SOLUTION INTRAVENOUS at 10:44

## 2019-12-24 RX ADMIN — PHENYLEPHRINE HYDROCHLORIDE 250 MCG: 10 INJECTION INTRAVENOUS at 10:09

## 2019-12-24 RX ADMIN — TIMOLOL MALEATE 1 DROP: 5 SOLUTION OPHTHALMIC at 06:19

## 2019-12-24 RX ADMIN — PHENYLEPHRINE HYDROCHLORIDE 250 MCG: 10 INJECTION INTRAVENOUS at 09:33

## 2019-12-24 RX ADMIN — METOPROLOL TARTRATE 100 MG: 100 TABLET ORAL at 06:17

## 2019-12-24 RX ADMIN — EPINEPHRINE 12.5 MCG: 1 INJECTION INTRAMUSCULAR; INTRAVENOUS; SUBCUTANEOUS at 09:33

## 2019-12-24 RX ADMIN — METOPROLOL TARTRATE 100 MG: 100 TABLET ORAL at 17:27

## 2019-12-24 ASSESSMENT — ENCOUNTER SYMPTOMS
PALPITATIONS: 0
COUGH: 0
MUSCULOSKELETAL NEGATIVE: 1
SHORTNESS OF BREATH: 0
NAUSEA: 0
VOMITING: 0
NEUROLOGICAL NEGATIVE: 1
ABDOMINAL PAIN: 1
PSYCHIATRIC NEGATIVE: 1

## 2019-12-24 ASSESSMENT — PAIN SCALES - GENERAL: PAIN_LEVEL: 2

## 2019-12-24 NOTE — ANESTHESIA PROCEDURE NOTES
Peripheral IV  Date/Time: 12/24/2019 9:29 AM  Performed by: Hector Gutierrez M.D.  Authorized by: Hector Gutierrez M.D.     Size:  22 G  Laterality:  Right (WRIST)  Technique:  Direct puncture  Attempts:  1

## 2019-12-24 NOTE — ANESTHESIA QCDR
2019 Qualified Clinical Data Registry (for Quality Improvement)     Postoperative nausea/vomiting risk protocol (Adult = 18 yrs and Pediatric 3-17 yrs)- (430 and 463)  General inhalation anesthetic (NOT TIVA) with PONV risk factors: No  Provision of anti-emetic therapy with at least 2 different classes of agents: N/A  Patient DID NOT receive anti-emetic therapy and reason is documented in Medical Record: N/A    Multimodal Pain Management- (AQI59)  Patient undergoing Elective Surgery (i.e. Outpatient, or ASC, or Prescheduled Surgery prior to Hospital Admission): Yes  Use of Multimodal Pain Management, two or more drugs and/or interventions, NOT including systemic opioids: Yes   Exception: Documented allergy to multiple classes of analgesics:  N/A    PACU assessment of acute postoperative pain prior to Anesthesia Care End- Applies to Patients Age = 18- (ABG7)  Initial PACU pain score is which of the following: < 7/10  Patient unable to report pain score: N/A    Post-anesthetic transfer of care checklist/protocol to PACU/ICU- (426 and 427)  Upon conclusion of case, patient transferred to which of the following locations: PACU/Non-ICU  Use of transfer checklist/protocol: Yes  Exclusion: Service Performed in Patient Hospital Room (and thus did not require transfer): N/A    PACU Reintubation- (AQI31)  General anesthesia requiring endotracheal intubation (ETT) along with subsequent extubation in OR or PACU: Yes  Required reintubation in the PACU: No   Extubation was a planned trial documented in the medical record prior to removal of the original airway device:  N/A    Unplanned admission to ICU related to anesthesia service up through end of PACU care- (MD51)  Unplanned admission to ICU (not initially anticipated at anesthesia start time):

## 2019-12-24 NOTE — ANESTHESIA PROCEDURE NOTES
Airway  Date/Time: 12/24/2019 9:29 AM  Performed by: Hector Gutierrez M.D.  Authorized by: Hector Gutierrez M.D.     Location:  OR  Urgency:  Elective  Indications for Airway Management:  Anesthesia  Spontaneous Ventilation: absent    Sedation Level:  Deep  Preoxygenated: Yes    Patient Position:  Sniffing  Final Airway Type:  Endotracheal airway  Final Endotracheal Airway:  ETT  Cuffed: Yes    Technique Used for Successful ETT Placement:  Direct laryngoscopy  Insertion Site:  Oral  Blade Type:  Montes  Laryngoscope Blade/Videolaryngoscope Blade Size:  2  ETT Size (mm):  7.0  Measured from:  Lips  ETT to Lips (cm):  22  Placement Verified by: auscultation and capnometry    Cormack-Lehane Classification:  Grade IIa - partial view of glottis  Number of Attempts at Approach:  1

## 2019-12-24 NOTE — OR NURSING
Pt arrive to pacu. Reports rec'd. Surgical site x 3. Scant drainage to guaze at LUCIA site.     Dr Gutierrez gives  Verbal orders for ephedrine for hypotension.

## 2019-12-24 NOTE — PROGRESS NOTES
Patient back to floor est time 1500. Drowsy and in some pain but tolerable.  at bedside. No needs at this time. Call light given to pt and bed alarm plugged back in for safety.

## 2019-12-24 NOTE — ANESTHESIA TIME REPORT
Anesthesia Start and Stop Event Times     Date Time Event    12/24/2019 0920 Anesthesia Start     0921 Ready for Procedure        Responsible Staff  12/24/19    Name Role Begin End    Hector Gutierrez M.D. Anesth 0920         Preop Diagnosis (Free Text):  Pre-op Diagnosis             Preop Diagnosis (Codes):    Post op Diagnosis  Ventral hernia  PARASTOMAL HERNIA REPAIR    Premium Reason  Non-Premium    Comments:

## 2019-12-24 NOTE — CARE PLAN
Patient is compliant with the use of the call light and waits for staff assistance.  Frequent rounding.

## 2019-12-24 NOTE — OP REPORT
Post OP Note    PreOp Diagnosis: Parastomal hernia, bowel obstruction    PostOp Diagnosis: Incarcerated parastomal hernia causing a bowel obstruction    Procedure(s):  REPAIR, HERNIA, VENTRAL, ROBOT-ASSISTED, USING DA RUTHIE XI - PARASTOMAL HERNIA REPAIR WITH LYSIS OF ADHESIONS - Wound Class: Clean Contaminated  LAPAROSCOPY, DIAGNOSTIC, ROBOT-ASSISTED, USING DA RUTHIE XI - FOR LYSIS OF ADHESIONS  Repair of enterotomy    Surgeon(s):  Len Chavarria M.D.    Anesthesiologist/Type of Anesthesia:  Anesthesiologist: Hector Gutierrez M.D./General    Surgical Staff:  Circulator: Maritza Lr R.N.  Relief Scrub: Conchis Valentin  Scrub Person: Daniele Manrique  Count [place]: Len Malcolm R.N.    Specimens removed if any:  * No specimens in log *    Estimated Blood Loss: 100 cc    Findings: Severe adhesions to a prior parastomal hernia mesh.  Incarcerated loop of small bowel causing obstruction    Complications: Enterotomy during dissection    Indications: Patient is an 84-year-old female who presented with a bowel obstruction caused by a parastomal hernia.  She was initially treated conservatively but was still having significant pain.  She is also had multiple bowel obstructions and strongly desired repair of the hernia.  The patient was counseled extensively as of the risk first benefits of surgery and agreed to proceed fully informed.    Description:    The patient was prepped and draped in the standard sterile surgical fashion after induction of general anesthesia.  An appropriate timeout was performed and antibiotics delivered.  I began with a left upper quadrant varus insertion.  The abdomen was insufflated to 15 mmHg of CO2.  3 robotic trochars were placed under direct visualization.  The robot was then docked and I took my position at the console.  I began by dissecting the midline adhesions very carefully using sharp dissection.  This was mainly omentum.  When I reached the prior parastomal hernia repair there  was significant adhesions anteriorly to the mesh.  This was small bowel directly.    I inserted a Rodriguez into the urostomy to make it easy to identify.  I then did a very careful sharp lysis of adhesions.  The bowel was intimately stuck to the Ethibond suture.  During this dissection I did make a small enterotomy in the small intestine sharply.  I used a 2-0 Vicryl suture to close the enterotomy.    I then carefully reduce the hernia.  It was very stuck and clearly the cause of the obstruction.  I was able to carefully reduce it without injuring the urostomy.  Once that was accomplished I used a 2 oh Stratus fix suture to close the defect leaving adequate space for the urostomy.  I then elected to use a phasix ST mesh due to the enterotomy.  I fashioned this in a keyhole leaving room for the urostomy.  This was sutured circumferentially using 2 oh Stratus fix suture.    Once that was accomplished I used a robotic suction  and irrigated the abdomen until clear.  I ran the intestine and saw no further enterotomies.  Hemostasis was achieved.  I did place a 10 Upper sorbian drain in the pelvis.  I sutured this in with a nylon suture.  The robot was undocked.  The sponge and instrument count was correct.  There were 2 remaining incisions were closed using Monocryl suture.    The patient was then extubated, to recovery in satisfactory condition.    Disposition:    The patient will be observed as an inpatient until she regains bowel function.        12/24/2019 11:33 AM Len Chavarria M.D.

## 2019-12-24 NOTE — PROGRESS NOTES
"Hospital Medicine Daily Progress Note    Date of Service  12/24/2019    Chief Complaint  Nausea vomiting and abdominal pain    Hospital Course   This is an 84 year old female with PMH significant for total cystectomy with ileal conduit 2011, PAF not on chronic anticoagulation due to history of bleeding, rate controlled with Metoprolol, TIA, HTN, and hypothyroidism who presented 12/20/19 with abdominal pain.  She has been admitted multiple times this year for same requiring NG tube decompression and conservative therapy.  Day of presentation she presented with nausea and vomiting x3 days.  CT consistent with small bowel obstruction and thickened gastric wall.  General surgery was consulted and recommended PPI for gastritis, surgical intervention for SBO and follow-up EGD.  She was also found to be in ARF which resolved with IV fluid hydration.     Interval Problem Update  -Seen and examined after surgery.  She is drowsy but easily arousable and oriented x3.  She reports \"at least\" 6/10 abdominal pain.  -Ileostomy with pink stoma draining clear yellow urine.  Hemodynamically stable    Consultants/Specialty  -General Surgery    Code Status  FULL    Disposition  TBD    Review of Systems  Review of Systems   Constitutional: Positive for malaise/fatigue.   HENT: Negative.    Respiratory: Negative for cough and shortness of breath.    Cardiovascular: Negative for chest pain and palpitations.   Gastrointestinal: Positive for abdominal pain. Negative for nausea and vomiting.   Genitourinary: Negative.    Musculoskeletal: Negative.    Neurological: Negative.    Psychiatric/Behavioral: Negative.    All other systems reviewed and are negative.       Physical Exam  Temp:  [36 °C (96.8 °F)-37.1 °C (98.8 °F)] 36.2 °C (97.1 °F)  Pulse:  [57-79] 67  Resp:  [11-22] 18  BP: ()/(37-78) 129/78  SpO2:  [93 %-100 %] 100 %    Physical Exam  Vitals signs and nursing note reviewed.   Constitutional:       Appearance: Normal appearance. " She is not ill-appearing or toxic-appearing.      Interventions: Nasal cannula in place.   HENT:      Head: Normocephalic.      Right Ear: Hearing normal.      Left Ear: Hearing normal.      Nose: Nose normal.      Mouth/Throat:      Mouth: Mucous membranes are dry.   Eyes:      General: Lids are normal.   Cardiovascular:      Rate and Rhythm: Normal rate and regular rhythm.      Pulses: Normal pulses.      Heart sounds: Murmur present.   Pulmonary:      Effort: Pulmonary effort is normal.      Breath sounds: Normal breath sounds. No wheezing.   Abdominal:      General: Bowel sounds are decreased.      Comments: Ileostomy with pink stoma  2 surgical stab wounds with Dermabond  1 surgical site with LUCIA drain with dressing CDI   Genitourinary:     Comments: Ileostomy with clear yellow urine  Musculoskeletal:      Right lower leg: No edema.      Left lower leg: No edema.      Comments: Lysed weakness   Skin:     General: Skin is warm.   Neurological:      General: No focal deficit present.      Mental Status: She is oriented to person, place, and time and easily aroused. She is lethargic.   Psychiatric:         Attention and Perception: Attention normal.         Behavior: Behavior is cooperative.         Cognition and Memory: Cognition normal.         Judgment: Judgment normal.         Fluids    Intake/Output Summary (Last 24 hours) at 12/24/2019 1455  Last data filed at 12/24/2019 1420  Gross per 24 hour   Intake 1527.93 ml   Output 1325 ml   Net 202.93 ml       Laboratory  Recent Labs     12/22/19  0537 12/23/19  0419   WBC 7.5 7.7   RBC 4.39 4.28   HEMOGLOBIN 12.5 12.2   HEMATOCRIT 39.9 37.6   MCV 90.9 87.9   MCH 28.5 28.5   MCHC 31.3* 32.4*   RDW 53.0* 50.2*   PLATELETCT 112* 125*   MPV 11.4 11.0     Recent Labs     12/22/19  0537 12/23/19  0419   SODIUM 140 137   POTASSIUM 4.1 4.0   CHLORIDE 112 110   CO2 20 21   GLUCOSE 88 97   BUN 43* 25*   CREATININE 1.23 1.01   CALCIUM 7.9* 8.0*                    Imaging  DX-UPPER GI-SMALL BOWEL FOLLOW THRU   Final Result      1.  Small hiatal hernia.      2.  No significant fold thickening in the stomach to suggest an infiltrative process.      3.  Distended small bowel loops as seen on recent CT are consistent with partial obstruction. Contrast reached the colon in 90 minutes.           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 I have made every reasonable attempt to correct obvious errors, but there may be errors of grammar and possibly content that I did not discover before finalizing the note.    LISBETH Isabel.

## 2019-12-24 NOTE — PROGRESS NOTES
Assessment complete no issue findings aside from tenderness to RLQ. Patient off the floor to surgery.

## 2019-12-24 NOTE — ANESTHESIA POSTPROCEDURE EVALUATION
Patient: Dorie Wadsworth    Procedure Summary     Date:  12/24/19 Room / Location:  [facility] 11 / SURGERY [facility]    Anesthesia Start:  0920 Anesthesia Stop:  1147    Procedures:       REPAIR, HERNIA, VENTRAL, ROBOT-ASSISTED, USING DA RUTHIE XI - PARASTOMAL HERNIA REPAIR WITH LYSIS OF ADHESIONS (Abdomen)      LAPAROSCOPY, DIAGNOSTIC, ROBOT-ASSISTED, USING DA RUTHIE XI - FOR LYSIS OF ADHESIONS Diagnosis:      Surgeon:  Len Chavarria M.D. Responsible Provider:  Hector Gutierrez M.D.    Anesthesia Type:  general ASA Status:  2          Final Anesthesia Type: general  Last vitals  BP   Blood Pressure : 134/52    Temp   36.4 °C (97.5 °F)    Pulse   Pulse: 64   Resp   16    SpO2   96 %      Anesthesia Post Evaluation    Patient location during evaluation: PACU  Patient participation: complete - patient participated  Level of consciousness: awake and alert  Pain score: 2    Airway patency: patent  Anesthetic complications: no  Cardiovascular status: hemodynamically stable  Respiratory status: acceptable  Hydration status: euvolemic    PONV: none           Nurse Pain Score: 3 (NPRS)

## 2019-12-24 NOTE — ANESTHESIA PREPROCEDURE EVALUATION
Relevant Problems   NEURO   (+) History of hematuria   (+) TIA (transient ischemic attack)      CARDIAC   (+) Essential hypertension   (+) Paroxysmal A-fib (HCC)         (+) Acute on chronic renal failure (HCC)   (+) CKD (chronic kidney disease), stage II   (+) Cirrhosis (HCC)      ENDO   (+) Acquired hypothyroidism       Physical Exam    Airway   Mallampati: II  TM distance: >3 FB  Neck ROM: full       Cardiovascular - normal exam  Rhythm: regular  Rate: normal  (-) murmur     Dental - normal exam           Pulmonary - normal exam  Breath sounds clear to auscultation     Abdominal    Neurological - normal exam                 Anesthesia Plan    ASA 2       Plan - general       Airway plan will be ETT        Induction: intravenous    Postoperative Plan: Postoperative administration of opioids is intended.    Pertinent diagnostic labs and testing reviewed    Informed Consent:    Anesthetic plan and risks discussed with patient.

## 2019-12-25 ENCOUNTER — APPOINTMENT (OUTPATIENT)
Dept: RADIOLOGY | Facility: MEDICAL CENTER | Age: 84
DRG: 330 | End: 2019-12-25
Attending: INTERNAL MEDICINE
Payer: MEDICARE

## 2019-12-25 LAB
ANION GAP SERPL CALC-SCNC: 8 MMOL/L (ref 0–11.9)
APPEARANCE UR: CLEAR
BASOPHILS # BLD AUTO: 0.3 % (ref 0–1.8)
BASOPHILS # BLD: 0.05 K/UL (ref 0–0.12)
BILIRUB UR QL STRIP.AUTO: NEGATIVE
BUN SERPL-MCNC: 21 MG/DL (ref 8–22)
CALCIUM SERPL-MCNC: 7.8 MG/DL (ref 8.5–10.5)
CHLORIDE SERPL-SCNC: 109 MMOL/L (ref 96–112)
CO2 SERPL-SCNC: 18 MMOL/L (ref 20–33)
COLOR UR: YELLOW
CREAT SERPL-MCNC: 1.36 MG/DL (ref 0.5–1.4)
EOSINOPHIL # BLD AUTO: 0.01 K/UL (ref 0–0.51)
EOSINOPHIL NFR BLD: 0.1 % (ref 0–6.9)
ERYTHROCYTE [DISTWIDTH] IN BLOOD BY AUTOMATED COUNT: 51.8 FL (ref 35.9–50)
GLUCOSE SERPL-MCNC: 130 MG/DL (ref 65–99)
GLUCOSE UR STRIP.AUTO-MCNC: NEGATIVE MG/DL
GRAM STN SPEC: NORMAL
HCT VFR BLD AUTO: 36.4 % (ref 37–47)
HGB BLD-MCNC: 11.4 G/DL (ref 12–16)
IMM GRANULOCYTES # BLD AUTO: 0.11 K/UL (ref 0–0.11)
IMM GRANULOCYTES NFR BLD AUTO: 0.6 % (ref 0–0.9)
KETONES UR STRIP.AUTO-MCNC: NEGATIVE MG/DL
LEUKOCYTE ESTERASE UR QL STRIP.AUTO: ABNORMAL
LYMPHOCYTES # BLD AUTO: 1.93 K/UL (ref 1–4.8)
LYMPHOCYTES NFR BLD: 10 % (ref 22–41)
MCH RBC QN AUTO: 28.5 PG (ref 27–33)
MCHC RBC AUTO-ENTMCNC: 31.3 G/DL (ref 33.6–35)
MCV RBC AUTO: 91 FL (ref 81.4–97.8)
MICRO URNS: ABNORMAL
MONOCYTES # BLD AUTO: 1.86 K/UL (ref 0–0.85)
MONOCYTES NFR BLD AUTO: 9.7 % (ref 0–13.4)
NEUTROPHILS # BLD AUTO: 15.31 K/UL (ref 2–7.15)
NEUTROPHILS NFR BLD: 79.3 % (ref 44–72)
NITRITE UR QL STRIP.AUTO: NEGATIVE
NRBC # BLD AUTO: 0 K/UL
NRBC BLD-RTO: 0 /100 WBC
PH UR STRIP.AUTO: 6 [PH] (ref 5–8)
PLATELET # BLD AUTO: 133 K/UL (ref 164–446)
PMV BLD AUTO: 11.8 FL (ref 9–12.9)
POTASSIUM SERPL-SCNC: 4.7 MMOL/L (ref 3.6–5.5)
PROT UR QL STRIP: NEGATIVE MG/DL
RBC # BLD AUTO: 4 M/UL (ref 4.2–5.4)
RBC # URNS HPF: ABNORMAL /HPF
RBC UR QL AUTO: ABNORMAL
SIGNIFICANT IND 70042: NORMAL
SITE SITE: NORMAL
SODIUM SERPL-SCNC: 135 MMOL/L (ref 135–145)
SOURCE SOURCE: NORMAL
SP GR UR STRIP.AUTO: 1.01
UROBILINOGEN UR STRIP.AUTO-MCNC: 0.2 MG/DL
WBC # BLD AUTO: 19.3 K/UL (ref 4.8–10.8)
WBC #/AREA URNS HPF: ABNORMAL /HPF

## 2019-12-25 PROCEDURE — 87040 BLOOD CULTURE FOR BACTERIA: CPT | Mod: 91

## 2019-12-25 PROCEDURE — 700102 HCHG RX REV CODE 250 W/ 637 OVERRIDE(OP): Performed by: NURSE PRACTITIONER

## 2019-12-25 PROCEDURE — 85025 COMPLETE CBC W/AUTO DIFF WBC: CPT

## 2019-12-25 PROCEDURE — 770006 HCHG ROOM/CARE - MED/SURG/GYN SEMI*

## 2019-12-25 PROCEDURE — A9270 NON-COVERED ITEM OR SERVICE: HCPCS | Performed by: SURGERY

## 2019-12-25 PROCEDURE — 87070 CULTURE OTHR SPECIMN AEROBIC: CPT

## 2019-12-25 PROCEDURE — 700102 HCHG RX REV CODE 250 W/ 637 OVERRIDE(OP): Performed by: HOSPITALIST

## 2019-12-25 PROCEDURE — A9270 NON-COVERED ITEM OR SERVICE: HCPCS | Performed by: HOSPITALIST

## 2019-12-25 PROCEDURE — 700111 HCHG RX REV CODE 636 W/ 250 OVERRIDE (IP): Performed by: NURSE PRACTITIONER

## 2019-12-25 PROCEDURE — 87205 SMEAR GRAM STAIN: CPT

## 2019-12-25 PROCEDURE — 700105 HCHG RX REV CODE 258: Performed by: INTERNAL MEDICINE

## 2019-12-25 PROCEDURE — 700111 HCHG RX REV CODE 636 W/ 250 OVERRIDE (IP): Performed by: HOSPITALIST

## 2019-12-25 PROCEDURE — 700102 HCHG RX REV CODE 250 W/ 637 OVERRIDE(OP): Performed by: INTERNAL MEDICINE

## 2019-12-25 PROCEDURE — 71045 X-RAY EXAM CHEST 1 VIEW: CPT

## 2019-12-25 PROCEDURE — 700105 HCHG RX REV CODE 258: Performed by: NURSE PRACTITIONER

## 2019-12-25 PROCEDURE — 87086 URINE CULTURE/COLONY COUNT: CPT

## 2019-12-25 PROCEDURE — 99233 SBSQ HOSP IP/OBS HIGH 50: CPT | Performed by: INTERNAL MEDICINE

## 2019-12-25 PROCEDURE — A9270 NON-COVERED ITEM OR SERVICE: HCPCS | Performed by: NURSE PRACTITIONER

## 2019-12-25 PROCEDURE — 700111 HCHG RX REV CODE 636 W/ 250 OVERRIDE (IP): Performed by: INTERNAL MEDICINE

## 2019-12-25 PROCEDURE — 80048 BASIC METABOLIC PNL TOTAL CA: CPT

## 2019-12-25 PROCEDURE — 81001 URINALYSIS AUTO W/SCOPE: CPT

## 2019-12-25 PROCEDURE — 36415 COLL VENOUS BLD VENIPUNCTURE: CPT

## 2019-12-25 PROCEDURE — A9270 NON-COVERED ITEM OR SERVICE: HCPCS | Performed by: INTERNAL MEDICINE

## 2019-12-25 PROCEDURE — 87076 CULTURE ANAEROBE IDENT EACH: CPT

## 2019-12-25 PROCEDURE — 700102 HCHG RX REV CODE 250 W/ 637 OVERRIDE(OP): Performed by: SURGERY

## 2019-12-25 RX ORDER — IBUPROFEN 400 MG/1
400 TABLET ORAL EVERY 8 HOURS
Status: DISCONTINUED | OUTPATIENT
Start: 2019-12-25 | End: 2019-12-25

## 2019-12-25 RX ORDER — HEPARIN SODIUM 5000 [USP'U]/ML
5000 INJECTION, SOLUTION INTRAVENOUS; SUBCUTANEOUS EVERY 12 HOURS
Status: DISCONTINUED | OUTPATIENT
Start: 2019-12-26 | End: 2020-01-01 | Stop reason: HOSPADM

## 2019-12-25 RX ORDER — SUCRALFATE 1 G/1
1 TABLET ORAL EVERY 6 HOURS
Status: COMPLETED | OUTPATIENT
Start: 2019-12-25 | End: 2019-12-25

## 2019-12-25 RX ORDER — SODIUM CHLORIDE 9 MG/ML
INJECTION, SOLUTION INTRAVENOUS CONTINUOUS
Status: DISCONTINUED | OUTPATIENT
Start: 2019-12-25 | End: 2019-12-27

## 2019-12-25 RX ORDER — FAMOTIDINE 20 MG/1
20 TABLET, FILM COATED ORAL DAILY
Status: DISCONTINUED | OUTPATIENT
Start: 2019-12-25 | End: 2020-01-01 | Stop reason: HOSPADM

## 2019-12-25 RX ORDER — SODIUM CHLORIDE 9 MG/ML
500 INJECTION, SOLUTION INTRAVENOUS ONCE
Status: COMPLETED | OUTPATIENT
Start: 2019-12-25 | End: 2019-12-25

## 2019-12-25 RX ORDER — ACETAMINOPHEN 325 MG/1
650 TABLET ORAL EVERY 6 HOURS
Status: DISCONTINUED | OUTPATIENT
Start: 2019-12-25 | End: 2020-01-01 | Stop reason: HOSPADM

## 2019-12-25 RX ADMIN — OXYCODONE HYDROCHLORIDE 5 MG: 5 TABLET ORAL at 14:39

## 2019-12-25 RX ADMIN — ACETAMINOPHEN 650 MG: 325 TABLET, FILM COATED ORAL at 15:44

## 2019-12-25 RX ADMIN — LEVOTHYROXINE SODIUM 112 MCG: 112 TABLET ORAL at 05:59

## 2019-12-25 RX ADMIN — ENOXAPARIN SODIUM 40 MG: 100 INJECTION SUBCUTANEOUS at 10:09

## 2019-12-25 RX ADMIN — SUCRALFATE 1 G: 1 TABLET ORAL at 11:58

## 2019-12-25 RX ADMIN — OMEPRAZOLE 20 MG: 20 CAPSULE, DELAYED RELEASE ORAL at 06:00

## 2019-12-25 RX ADMIN — LATANOPROST 1 DROP: 50 SOLUTION OPHTHALMIC at 17:26

## 2019-12-25 RX ADMIN — TIMOLOL MALEATE 1 DROP: 5 SOLUTION OPHTHALMIC at 06:00

## 2019-12-25 RX ADMIN — SODIUM CHLORIDE: 900 INJECTION INTRAVENOUS at 14:28

## 2019-12-25 RX ADMIN — FAMOTIDINE 20 MG: 20 TABLET ORAL at 11:58

## 2019-12-25 RX ADMIN — CEFTRIAXONE SODIUM 1 G: 1 INJECTION, POWDER, FOR SOLUTION INTRAMUSCULAR; INTRAVENOUS at 14:27

## 2019-12-25 RX ADMIN — ACETAMINOPHEN 650 MG: 325 TABLET, FILM COATED ORAL at 10:10

## 2019-12-25 RX ADMIN — SODIUM CHLORIDE, POTASSIUM CHLORIDE, SODIUM LACTATE AND CALCIUM CHLORIDE: 600; 310; 30; 20 INJECTION, SOLUTION INTRAVENOUS at 11:53

## 2019-12-25 RX ADMIN — ACETAMINOPHEN 650 MG: 325 TABLET, FILM COATED ORAL at 20:45

## 2019-12-25 RX ADMIN — ONDANSETRON 4 MG: 2 INJECTION INTRAMUSCULAR; INTRAVENOUS at 01:19

## 2019-12-25 RX ADMIN — TIMOLOL MALEATE 1 DROP: 5 SOLUTION OPHTHALMIC at 17:26

## 2019-12-25 RX ADMIN — SODIUM CHLORIDE 500 ML: 9 INJECTION, SOLUTION INTRAVENOUS at 11:56

## 2019-12-25 RX ADMIN — OXYCODONE HYDROCHLORIDE 5 MG: 5 TABLET ORAL at 17:23

## 2019-12-25 RX ADMIN — OXYCODONE HYDROCHLORIDE 5 MG: 5 TABLET ORAL at 10:17

## 2019-12-25 RX ADMIN — ASPIRIN 81 MG: 81 TABLET, COATED ORAL at 07:13

## 2019-12-25 RX ADMIN — SUCRALFATE 1 G: 1 TABLET ORAL at 23:50

## 2019-12-25 RX ADMIN — HYDROMORPHONE HYDROCHLORIDE 0.5 MG: 1 INJECTION, SOLUTION INTRAMUSCULAR; INTRAVENOUS; SUBCUTANEOUS at 06:54

## 2019-12-25 RX ADMIN — SUCRALFATE 1 G: 1 TABLET ORAL at 17:23

## 2019-12-25 RX ADMIN — ACETAMINOPHEN 650 MG: 325 TABLET, FILM COATED ORAL at 03:33

## 2019-12-25 ASSESSMENT — COGNITIVE AND FUNCTIONAL STATUS - GENERAL
CLIMB 3 TO 5 STEPS WITH RAILING: A LITTLE
MOVING TO AND FROM BED TO CHAIR: A LITTLE
WALKING IN HOSPITAL ROOM: A LITTLE
DRESSING REGULAR UPPER BODY CLOTHING: A LITTLE
MOBILITY SCORE: 18
SUGGESTED CMS G CODE MODIFIER MOBILITY: CK
STANDING UP FROM CHAIR USING ARMS: A LITTLE
DAILY ACTIVITIY SCORE: 21
SUGGESTED CMS G CODE MODIFIER DAILY ACTIVITY: CJ
MOVING FROM LYING ON BACK TO SITTING ON SIDE OF FLAT BED: A LITTLE
DRESSING REGULAR LOWER BODY CLOTHING: A LITTLE
TURNING FROM BACK TO SIDE WHILE IN FLAT BAD: A LITTLE
HELP NEEDED FOR BATHING: A LITTLE

## 2019-12-25 ASSESSMENT — ENCOUNTER SYMPTOMS
FOCAL WEAKNESS: 0
PALPITATIONS: 0
MEMORY LOSS: 0
DIZZINESS: 0
NERVOUS/ANXIOUS: 0
MUSCULOSKELETAL NEGATIVE: 1
COUGH: 0
NAUSEA: 0
HEADACHES: 0
INSOMNIA: 0
SHORTNESS OF BREATH: 0
VOMITING: 0
ABDOMINAL PAIN: 1

## 2019-12-25 NOTE — PROGRESS NOTES
Surgery  POD#1  Difficulty with pain control this am with low bp  BP better, pain well controlled now  Wounds ok  LUCIA serosanguinous  Mobilize  Awaiting bowel function

## 2019-12-25 NOTE — PROGRESS NOTES
"Hospital Medicine Daily Progress Note    Date of Service  12/25/2019    Chief Complaint  Abdominal Pain    Hospital Course   This is an 84 year old female with PMH significant for total cystectomy with ileal conduit 2011, PAF not on chronic anticoagulation due to history of bleeding, rate controlled with Metoprolol, TIA, HTN, and hypothyroidism who presented 12/20/19 with abdominal pain.  She has been admitted multiple times this year for same requiring NG tube decompression and conservative therapy.  Day of presentation she presented with nausea and vomiting x3 days.  CT consistent with small bowel obstruction and thickened gastric wall.  General surgery was consulted and recommended PPI for gastritis, surgical intervention for SBO and follow-up EGD.  She was also found to be in ARF which resolved with IV fluid hydration.      Interval Problem Update  12/25 - worsening MARSHALL. Episode of hypotension overnight requiring NS bolus. I've increased her IVF today.  -leukocytosis - afebrile. checking blood cultures, UA, chest xray  -pain management    12/24 - Seen and examined after surgery.  She is drowsy but easily arousable and oriented x3.  She reports \"at least\" 6/10 abdominal pain.  -Ileostomy with pink stoma draining clear yellow urine.  Hemodynamically stable    Consultants/Specialty  -General Surgery    Code Status  FULL    Disposition  TBD    Review of Systems  Review of Systems   Constitutional: Positive for malaise/fatigue.   HENT: Negative.    Respiratory: Negative for cough and shortness of breath.    Cardiovascular: Negative for chest pain and palpitations.   Gastrointestinal: Positive for abdominal pain. Negative for nausea and vomiting.   Genitourinary: Negative.    Musculoskeletal: Negative.    Neurological: Negative for dizziness, focal weakness and headaches.   Psychiatric/Behavioral: Negative for memory loss. The patient is not nervous/anxious and does not have insomnia.    All other systems reviewed and " are negative.       Physical Exam  Temp:  [36.1 °C (97 °F)-37.1 °C (98.8 °F)] 37.1 °C (98.8 °F)  Pulse:  [53-81] 74  Resp:  [11-22] 18  BP: ()/(35-78) 102/50  SpO2:  [93 %-100 %] 93 %    Physical Exam  Vitals signs and nursing note reviewed.   Constitutional:       Appearance: Normal appearance. She is not ill-appearing or toxic-appearing.      Interventions: Nasal cannula in place.   HENT:      Head: Normocephalic.      Right Ear: Hearing normal.      Left Ear: Hearing normal.      Nose: Nose normal.      Mouth/Throat:      Mouth: Mucous membranes are dry.   Eyes:      General: Lids are normal.   Cardiovascular:      Rate and Rhythm: Normal rate and regular rhythm.      Pulses: Normal pulses.      Heart sounds: Normal heart sounds.   Pulmonary:      Effort: Pulmonary effort is normal.      Breath sounds: Normal breath sounds. No rhonchi or rales.   Abdominal:      General: Bowel sounds are decreased.      Comments: Ileostomy with pink stoma  2 surgical stab wounds with Dermabond  1 surgical site with LUCIA drain with dressing CDI - LUCIA drainage serosanguinous     Genitourinary:     Comments: Ileostomy with clear yellow urine  Musculoskeletal:      Right lower leg: No edema.      Left lower leg: No edema.   Skin:     General: Skin is warm.   Neurological:      General: No focal deficit present.      Mental Status: She is oriented to person, place, and time and easily aroused. She is lethargic.   Psychiatric:         Attention and Perception: Attention normal.         Behavior: Behavior is cooperative.         Cognition and Memory: Cognition normal.         Judgment: Judgment normal.         Fluids    Intake/Output Summary (Last 24 hours) at 12/25/2019 1057  Last data filed at 12/25/2019 0810  Gross per 24 hour   Intake 2007.93 ml   Output 1060 ml   Net 947.93 ml       Laboratory  Recent Labs     12/23/19  0419 12/25/19  0144   WBC 7.7 19.3*   RBC 4.28 4.00*   HEMOGLOBIN 12.2 11.4*   HEMATOCRIT 37.6 36.4*   MCV  "87.9 91.0   MCH 28.5 28.5   MCHC 32.4* 31.3*   RDW 50.2* 51.8*   PLATELETCT 125* 133*   MPV 11.0 11.8     Recent Labs     12/23/19  0419 12/25/19  0144   SODIUM 137 135   POTASSIUM 4.0 4.7   CHLORIDE 110 109   CO2 21 18*   GLUCOSE 97 130*   BUN 25* 21   CREATININE 1.01 1.36   CALCIUM 8.0* 7.8*                   Imaging  DX-CHEST-LIMITED (1 VIEW)   Final Result      New mild left basilar opacity is more likely from atelectasis than consolidation      Otherwise stable chest with right hemidiaphragm elevation, adjacent atelectasis or scarring. This also could obscure consolidation      DX-UPPER GI-SMALL BOWEL FOLLOW THRU   Final Result      1.  Small hiatal hernia.      2.  No significant fold thickening in the stomach to suggest an infiltrative process.      3.  Distended small bowel loops as seen on recent CT are consistent with partial obstruction. Contrast reached the colon in 90 minutes.           Assessment/Plan  Small bowel obstruction (HCC)- (present on admission)  Assessment & Plan  -POD #1 \"Severe adhesions to a prior parastomal hernia mesh.  Incarcerated loop of small bowel causing obstruction\"  -clear liquid diet  -pain management  -general surgery following      Acute on chronic renal failure (HCC)- (present on admission)  Assessment & Plan  -trending back up today  -episode of hypotension requiring fluid bolus overnight. I've ordered her to have another 500 mL bolus today  -I've increased her IVF to 125/hr  -avoid nephrotoxins  -renally dose medications as indicated  -follow BMP      Paroxysmal A-fib (HCC)- (present on admission)  Assessment & Plan  -rate controlled with Metoprolol  -not on chronic anticoagulation due to history of bleeding - continue ASA      Ileostomy in place (HCC)- (present on admission)  Assessment & Plan  -History of total cystectomy with ileal conduit 2011      Leukocytosis- (present on admission)  Assessment & Plan  -likely reactive from surgery yesterday  -no acute indications of " infection  -afebrile  -episode of hypotension overnight requiring fluid bolus  -checking UA and blood cultures  -checking chest x-ray  -follow CBC    Enteritis- (present on admission)  Assessment & Plan  -Continue daily PPI  -Per general surgery recommendations, patient will need follow-up EGD for CT scan findings of thickened gastric wall      Acquired hypothyroidism- (present on admission)  Assessment & Plan  -continue Levothyroxine         VTE prophylaxis: SCD    Please note that this dictation was created using voice recognition software. I have made every reasonable attempt to correct obvious errors, but there may be errors of grammar and possibly content that I did not discover before finalizing the note.    LISBETH Isabel.

## 2019-12-25 NOTE — CARE PLAN
Patient on IV fluids after surgery.  Patient did need a 500 cc bolus. Patient has good urine output.

## 2019-12-25 NOTE — ASSESSMENT & PLAN NOTE
-stable  -clinically shows no indications of infection  -incentive spirometry  -ambulation/mobility  -afebrile   -(+) wound culture - ID following - recommend Anticipate a 5-day course total. Can transition to p.o. Augmentin at discharge - Stop date 01/2/2020  -follow CBC

## 2019-12-25 NOTE — PROGRESS NOTES
Patient in a lot of pain this morning.  Blood pressure was ok to give Dilaudid.  Dr Stewart changed parameters for narcotic medications since patient's blood pressure improved. Kept parameters for hypertensive medications. Asked rapid nurse to look at patient and she suggested an x-ray.  Dr Chavarria was notified. New orders for Tylenol and Ibuprofen were given. An x-ray is not necessary at this time. Patient feeling better.

## 2019-12-25 NOTE — CARE PLAN
Patient compliant with use of call light. Patient not out of bed due to pain, low blood pressure.  Frequent rounding.

## 2019-12-25 NOTE — PROGRESS NOTES
Checked with Dr Stewart about the increase in patient's WBC count. Vitals are stable after bolus with blood pressure increasing. No temperature. No new orders.

## 2019-12-25 NOTE — PROGRESS NOTES
Dr Stewart notified for blood pressure of 79/47. Patient's mentation intact. 500 cc bolus was ordered. Patient's blood pressure up to 91/56. Output in LUCIA drain for 6 1/2 hours was 90. Called the lab and asked them to do labs early to monitor patient's status.   Post incision and drainage with bossing of osteomyelitic bone on 5/31/19. Dressing changed at bedside, no soi. NWB right foot. Pending possible SNF placement? Pending ID consultation for osteomyelitis right foot. Bone cultures NGTD. Wound c&s grew E.Coli.

## 2019-12-26 PROBLEM — Z93.6 PRESENCE OF UROSTOMY (HCC): Status: ACTIVE | Noted: 2019-03-13

## 2019-12-26 LAB
25(OH)D3 SERPL-MCNC: 31 NG/ML (ref 30–100)
ANION GAP SERPL CALC-SCNC: 6 MMOL/L (ref 0–11.9)
BASOPHILS # BLD AUTO: 0.3 % (ref 0–1.8)
BASOPHILS # BLD: 0.06 K/UL (ref 0–0.12)
BUN SERPL-MCNC: 19 MG/DL (ref 8–22)
CALCIUM SERPL-MCNC: 8 MG/DL (ref 8.5–10.5)
CHLORIDE SERPL-SCNC: 108 MMOL/L (ref 96–112)
CO2 SERPL-SCNC: 21 MMOL/L (ref 20–33)
CORTIS SERPL-MCNC: 9.7 UG/DL (ref 0–23)
CREAT SERPL-MCNC: 1.17 MG/DL (ref 0.5–1.4)
EOSINOPHIL # BLD AUTO: 0.2 K/UL (ref 0–0.51)
EOSINOPHIL NFR BLD: 1.1 % (ref 0–6.9)
ERYTHROCYTE [DISTWIDTH] IN BLOOD BY AUTOMATED COUNT: 50.6 FL (ref 35.9–50)
GLUCOSE SERPL-MCNC: 107 MG/DL (ref 65–99)
HCT VFR BLD AUTO: 36.6 % (ref 37–47)
HGB BLD-MCNC: 11.8 G/DL (ref 12–16)
IMM GRANULOCYTES # BLD AUTO: 0.17 K/UL (ref 0–0.11)
IMM GRANULOCYTES NFR BLD AUTO: 0.9 % (ref 0–0.9)
LYMPHOCYTES # BLD AUTO: 1.8 K/UL (ref 1–4.8)
LYMPHOCYTES NFR BLD: 10.1 % (ref 22–41)
MCH RBC QN AUTO: 28.4 PG (ref 27–33)
MCHC RBC AUTO-ENTMCNC: 32.2 G/DL (ref 33.6–35)
MCV RBC AUTO: 88.2 FL (ref 81.4–97.8)
MONOCYTES # BLD AUTO: 2.47 K/UL (ref 0–0.85)
MONOCYTES NFR BLD AUTO: 13.8 % (ref 0–13.4)
NEUTROPHILS # BLD AUTO: 13.21 K/UL (ref 2–7.15)
NEUTROPHILS NFR BLD: 73.8 % (ref 44–72)
NRBC # BLD AUTO: 0 K/UL
NRBC BLD-RTO: 0 /100 WBC
PLATELET # BLD AUTO: 143 K/UL (ref 164–446)
PMV BLD AUTO: 11.6 FL (ref 9–12.9)
POTASSIUM SERPL-SCNC: 4.5 MMOL/L (ref 3.6–5.5)
RBC # BLD AUTO: 4.15 M/UL (ref 4.2–5.4)
SODIUM SERPL-SCNC: 135 MMOL/L (ref 135–145)
TSH SERPL DL<=0.005 MIU/L-ACNC: 3.43 UIU/ML (ref 0.38–5.33)
VIT B12 SERPL-MCNC: 639 PG/ML (ref 211–911)
WBC # BLD AUTO: 17.9 K/UL (ref 4.8–10.8)

## 2019-12-26 PROCEDURE — 700105 HCHG RX REV CODE 258: Performed by: NURSE PRACTITIONER

## 2019-12-26 PROCEDURE — 700102 HCHG RX REV CODE 250 W/ 637 OVERRIDE(OP): Performed by: NURSE PRACTITIONER

## 2019-12-26 PROCEDURE — 99232 SBSQ HOSP IP/OBS MODERATE 35: CPT | Performed by: INTERNAL MEDICINE

## 2019-12-26 PROCEDURE — 700102 HCHG RX REV CODE 250 W/ 637 OVERRIDE(OP): Performed by: SURGERY

## 2019-12-26 PROCEDURE — 80048 BASIC METABOLIC PNL TOTAL CA: CPT

## 2019-12-26 PROCEDURE — 700102 HCHG RX REV CODE 250 W/ 637 OVERRIDE(OP): Performed by: INTERNAL MEDICINE

## 2019-12-26 PROCEDURE — 700111 HCHG RX REV CODE 636 W/ 250 OVERRIDE (IP): Performed by: INTERNAL MEDICINE

## 2019-12-26 PROCEDURE — 82607 VITAMIN B-12: CPT

## 2019-12-26 PROCEDURE — A9270 NON-COVERED ITEM OR SERVICE: HCPCS | Performed by: INTERNAL MEDICINE

## 2019-12-26 PROCEDURE — 770006 HCHG ROOM/CARE - MED/SURG/GYN SEMI*

## 2019-12-26 PROCEDURE — A9270 NON-COVERED ITEM OR SERVICE: HCPCS | Performed by: SURGERY

## 2019-12-26 PROCEDURE — 82533 TOTAL CORTISOL: CPT

## 2019-12-26 PROCEDURE — A9270 NON-COVERED ITEM OR SERVICE: HCPCS | Performed by: NURSE PRACTITIONER

## 2019-12-26 PROCEDURE — 700105 HCHG RX REV CODE 258: Performed by: INTERNAL MEDICINE

## 2019-12-26 PROCEDURE — 84443 ASSAY THYROID STIM HORMONE: CPT

## 2019-12-26 PROCEDURE — 700102 HCHG RX REV CODE 250 W/ 637 OVERRIDE(OP): Performed by: HOSPITALIST

## 2019-12-26 PROCEDURE — 36415 COLL VENOUS BLD VENIPUNCTURE: CPT

## 2019-12-26 PROCEDURE — A9270 NON-COVERED ITEM OR SERVICE: HCPCS | Performed by: HOSPITALIST

## 2019-12-26 PROCEDURE — 85025 COMPLETE CBC W/AUTO DIFF WBC: CPT

## 2019-12-26 PROCEDURE — 82306 VITAMIN D 25 HYDROXY: CPT

## 2019-12-26 RX ADMIN — OMEPRAZOLE 20 MG: 20 CAPSULE, DELAYED RELEASE ORAL at 05:13

## 2019-12-26 RX ADMIN — OXYCODONE HYDROCHLORIDE 5 MG: 5 TABLET ORAL at 23:09

## 2019-12-26 RX ADMIN — HEPARIN SODIUM 5000 UNITS: 5000 INJECTION, SOLUTION INTRAVENOUS; SUBCUTANEOUS at 17:41

## 2019-12-26 RX ADMIN — CEFTRIAXONE SODIUM 1 G: 1 INJECTION, POWDER, FOR SOLUTION INTRAMUSCULAR; INTRAVENOUS at 05:14

## 2019-12-26 RX ADMIN — AMPICILLIN SODIUM AND SULBACTAM SODIUM 3 G: 2; 1 INJECTION, POWDER, FOR SOLUTION INTRAMUSCULAR; INTRAVENOUS at 23:12

## 2019-12-26 RX ADMIN — ACETAMINOPHEN 650 MG: 325 TABLET, FILM COATED ORAL at 05:13

## 2019-12-26 RX ADMIN — TIMOLOL MALEATE 1 DROP: 5 SOLUTION OPHTHALMIC at 17:48

## 2019-12-26 RX ADMIN — LEVOTHYROXINE SODIUM 112 MCG: 112 TABLET ORAL at 05:15

## 2019-12-26 RX ADMIN — ACETAMINOPHEN 650 MG: 325 TABLET, FILM COATED ORAL at 17:40

## 2019-12-26 RX ADMIN — METOPROLOL TARTRATE 100 MG: 100 TABLET ORAL at 05:13

## 2019-12-26 RX ADMIN — OXYCODONE HYDROCHLORIDE 5 MG: 5 TABLET ORAL at 17:39

## 2019-12-26 RX ADMIN — SODIUM CHLORIDE: 900 INJECTION INTRAVENOUS at 23:12

## 2019-12-26 RX ADMIN — TIMOLOL MALEATE 1 DROP: 5 SOLUTION OPHTHALMIC at 05:14

## 2019-12-26 RX ADMIN — METOPROLOL TARTRATE 100 MG: 100 TABLET ORAL at 17:39

## 2019-12-26 RX ADMIN — ACETAMINOPHEN 650 MG: 325 TABLET, FILM COATED ORAL at 09:35

## 2019-12-26 RX ADMIN — SODIUM CHLORIDE: 900 INJECTION INTRAVENOUS at 01:06

## 2019-12-26 RX ADMIN — Medication 1 PACKET: at 08:15

## 2019-12-26 RX ADMIN — SODIUM CHLORIDE: 900 INJECTION INTRAVENOUS at 09:39

## 2019-12-26 RX ADMIN — AMPICILLIN SODIUM AND SULBACTAM SODIUM 3 G: 2; 1 INJECTION, POWDER, FOR SOLUTION INTRAMUSCULAR; INTRAVENOUS at 11:45

## 2019-12-26 RX ADMIN — FAMOTIDINE 20 MG: 20 TABLET ORAL at 05:13

## 2019-12-26 RX ADMIN — HEPARIN SODIUM 5000 UNITS: 5000 INJECTION, SOLUTION INTRAVENOUS; SUBCUTANEOUS at 05:13

## 2019-12-26 RX ADMIN — ACETAMINOPHEN 650 MG: 325 TABLET, FILM COATED ORAL at 22:17

## 2019-12-26 RX ADMIN — OXYCODONE HYDROCHLORIDE 5 MG: 5 TABLET ORAL at 09:36

## 2019-12-26 RX ADMIN — AMPICILLIN SODIUM AND SULBACTAM SODIUM 3 G: 2; 1 INJECTION, POWDER, FOR SOLUTION INTRAMUSCULAR; INTRAVENOUS at 17:43

## 2019-12-26 RX ADMIN — ASPIRIN 81 MG: 81 TABLET, COATED ORAL at 05:13

## 2019-12-26 RX ADMIN — LATANOPROST 1 DROP: 50 SOLUTION OPHTHALMIC at 17:49

## 2019-12-26 ASSESSMENT — ENCOUNTER SYMPTOMS
ABDOMINAL PAIN: 1
COUGH: 0
NAUSEA: 0
VOMITING: 0
MEMORY LOSS: 0
NERVOUS/ANXIOUS: 0
HEADACHES: 0
MUSCULOSKELETAL NEGATIVE: 1
FOCAL WEAKNESS: 0
INSOMNIA: 0
PALPITATIONS: 0
DIZZINESS: 0
SHORTNESS OF BREATH: 0

## 2019-12-26 NOTE — CARE PLAN
Problem: Bowel/Gastric:  Goal: Normal bowel function is maintained or improved  Outcome: PROGRESSING AS EXPECTED  Note:   Patient reports more abdominal bloating this AM. MD assessed patient and wasn't currently concerned. Patient abd is soft, BS Hypoactive, pain improved, no gas at the moment. Encouraged ambulation and oral hydration. Patient aiming for 2 long walks today. She completed one this morning with the NP. Continue to monitor. Hoping for BM today.      Problem: Pain Management  Goal: Pain level will decrease to patient's comfort goal  Outcome: PROGRESSING AS EXPECTED  Note:   Patient pain is well controlled. She went for a long walk this morning and asked for pain medications afterwards. 5 mg PO Oxy and tylenol administered. Patient resting comfortably after 45 minutes. Will continue to monitor and treat pain.

## 2019-12-26 NOTE — CARE PLAN
Problem: Infection  Goal: Will remain free from infection  Outcome: PROGRESSING AS EXPECTED  Note:   Patient has WBC elevated and low BP. Infectious workup, Chest xray, UA & culture, drain fluid culture. Results pending. IV abx started.      Problem: Bowel/Gastric:  Goal: Normal bowel function is maintained or improved  Outcome: PROGRESSING AS EXPECTED  Note:   Abdominal pain improved with scheduled medications. BS audible but hypoactive. Patient reports some flatus but not much. No nausea or vomitting, no BM.

## 2019-12-26 NOTE — PROGRESS NOTES
"Hospital Medicine Daily Progress Note    Date of Service  12/26/2019    Chief Complaint  Abdominal Pain    Hospital Course   This is an 84 year old female with PMH significant for total cystectomy with ileal conduit 2011, PAF not on chronic anticoagulation due to history of bleeding, rate controlled with Metoprolol, TIA, HTN, and hypothyroidism who presented 12/20/19 with abdominal pain.  She has been admitted multiple times this year for same requiring NG tube decompression and conservative therapy.  Day of presentation she presented with nausea and vomiting x3 days.  CT consistent with small bowel obstruction and thickened gastric wall.  General surgery was consulted and recommended PPI for gastritis, surgical intervention for SBO and follow-up EGD.  She was also found to be in ARF which resolved with IV fluid hydration.      Interval Problem Update  12/26 - MARSHALL improving. UA indicating infection. Temp 100.4 today, ongoing leukocytosis. IV ABX.   -denies flatus. Hypoactive bowel sounds BLQ. Ambulating in the ozuna with walker. Reports good pain control.    12/25 - worsening MARSHALL. Episode of hypotension overnight requiring NS bolus. I've increased her IVF today.  -leukocytosis - afebrile. checking blood cultures, UA, chest xray  -pain management     12/24 - Seen and examined after surgery.  She is drowsy but easily arousable and oriented x3.  She reports \"at least\" 6/10 abdominal pain.  -Ileostomy with pink stoma draining clear yellow urine.  Hemodynamically stable    Consultants/Specialty  -General Surgery    Code Status  FULL    Disposition  TBD    Review of Systems  Review of Systems   Constitutional: Positive for malaise/fatigue.   HENT: Negative.    Respiratory: Negative for cough and shortness of breath.    Cardiovascular: Negative for chest pain and palpitations.   Gastrointestinal: Positive for abdominal pain (\"better today\"). Negative for nausea and vomiting.   Genitourinary: Negative.    Musculoskeletal: " Negative.    Neurological: Negative for dizziness, focal weakness and headaches.   Psychiatric/Behavioral: Negative for memory loss. The patient is not nervous/anxious and does not have insomnia.    All other systems reviewed and are negative.       Physical Exam  Temp:  [36.3 °C (97.3 °F)-38 °C (100.4 °F)] 36.3 °C (97.3 °F)  Pulse:  [67-91] 67  Resp:  [17-18] 17  BP: (117-135)/(47-66) 129/63  SpO2:  [91 %-95 %] 92 %    Physical Exam  Vitals signs and nursing note reviewed.   Constitutional:       Appearance: Normal appearance. She is not ill-appearing or toxic-appearing.      Interventions: Nasal cannula in place.   HENT:      Head: Normocephalic.      Right Ear: Hearing normal.      Left Ear: Hearing normal.      Nose: Nose normal.      Mouth/Throat:      Mouth: Mucous membranes are dry.   Eyes:      General: Lids are normal.   Cardiovascular:      Rate and Rhythm: Normal rate and regular rhythm.      Pulses: Normal pulses.      Heart sounds: Normal heart sounds.   Pulmonary:      Effort: Pulmonary effort is normal.      Breath sounds: Normal breath sounds. No rhonchi or rales.   Abdominal:      General: Bowel sounds are decreased.      Comments: Ostomy with pink stoma  2 surgical stab wounds with Dermabond  1 surgical site with LUCIA drain with dressing CDI - LUCIA drainage serosanguinous    Hypoactive BLQ  Left-sided abdomen (surgical incision areas) more distended than right. Minimally tender     Genitourinary:     Comments: Urostomy with clear yellow urine  Musculoskeletal:      Right lower leg: No edema.      Left lower leg: No edema.      Comments: Ambulating in the ozuna with walker   Skin:     General: Skin is warm.   Neurological:      General: No focal deficit present.      Mental Status: She is alert, oriented to person, place, and time and easily aroused.      Coordination: Coordination is intact.      Gait: Gait is intact.   Psychiatric:         Attention and Perception: Attention normal.         Mood and  "Affect: Mood normal.         Behavior: Behavior is cooperative.         Cognition and Memory: Cognition normal.         Judgment: Judgment normal.         Fluids    Intake/Output Summary (Last 24 hours) at 12/26/2019 1222  Last data filed at 12/26/2019 0900  Gross per 24 hour   Intake 240 ml   Output 1150 ml   Net -910 ml       Laboratory  Recent Labs     12/25/19 0144 12/26/19  0035   WBC 19.3* 17.9*   RBC 4.00* 4.15*   HEMOGLOBIN 11.4* 11.8*   HEMATOCRIT 36.4* 36.6*   MCV 91.0 88.2   MCH 28.5 28.4   MCHC 31.3* 32.2*   RDW 51.8* 50.6*   PLATELETCT 133* 143*   MPV 11.8 11.6     Recent Labs     12/25/19 0144 12/26/19 0035   SODIUM 135 135   POTASSIUM 4.7 4.5   CHLORIDE 109 108   CO2 18* 21   GLUCOSE 130* 107*   BUN 21 19   CREATININE 1.36 1.17   CALCIUM 7.8* 8.0*                   Imaging  DX-CHEST-LIMITED (1 VIEW)   Final Result      New mild left basilar opacity is more likely from atelectasis than consolidation      Otherwise stable chest with right hemidiaphragm elevation, adjacent atelectasis or scarring. This also could obscure consolidation      DX-UPPER GI-SMALL BOWEL FOLLOW THRU   Final Result      1.  Small hiatal hernia.      2.  No significant fold thickening in the stomach to suggest an infiltrative process.      3.  Distended small bowel loops as seen on recent CT are consistent with partial obstruction. Contrast reached the colon in 90 minutes.           Assessment/Plan  Small bowel obstruction (HCC)- (present on admission)  Assessment & Plan  -POD #2 \"Severe adhesions to a prior parastomal hernia mesh.  Incarcerated loop of small bowel causing obstruction\"  -clear liquid diet  -denies flatus. I do hear hypoactive bowel sounds BLQ  -pain management  -general surgery following      Acute on chronic renal failure (HCC)- (present on admission)  Assessment & Plan  -labs improving  -hemodynamically stable. I will decrease IVF today since she is taking more clear liquids  -avoid nephrotoxins  -renally " dose medications as indicated  -follow BMP      Paroxysmal A-fib (HCC)- (present on admission)  Assessment & Plan  -rate controlled with Metoprolol  -not on chronic anticoagulation due to history of bleeding - continue ASA      Presence of urostomy (HCC)- (present on admission)  Assessment & Plan  -History of total cystectomy with ileal conduit 2011      Leukocytosis- (present on admission)  Assessment & Plan  -trending down  -temp 100.4 this morning  -blood cultures negative to date  -LUCIA drainage culture negative to date  -UA indicating infection - culture pending. IV ABX. Deescalate as clinically approrpriate  -follow CBC    Enteritis- (present on admission)  Assessment & Plan  -Continue daily PPI  -Per general surgery recommendations, patient will need follow-up EGD for CT scan findings of thickened gastric wall      Hypothyroid- (present on admission)  Assessment & Plan  -continue Levothyroxine         VTE prophylaxis: Heparin    Please note that this dictation was created using voice recognition software. I have made every reasonable attempt to correct obvious errors, but there may be errors of grammar and possibly content that I did not discover before finalizing the note.    LISBETH Isabel.

## 2019-12-26 NOTE — PROGRESS NOTES
Pt c/o pain and drainage around LUCIA site. Old dressing removed and site assessed. Drainage observed around sutures. New dressings applied and reinforced. CN aware. Will monitor and follow up.

## 2019-12-26 NOTE — PROGRESS NOTES
Surgery  POD#2  Pain better today.  Minimal flatus.  No bm but feels like she's going to have one  Wounds ok  Urostomy functioning  LUCIA serosanguinous and clearing  Dressing saturated  Mobilize  Awaiting return of bowel function, soft diet after bm  Trend WBC, slightly lower but still elevated

## 2019-12-26 NOTE — CARE PLAN
Problem: Communication  Goal: The ability to communicate needs accurately and effectively will improve  Outcome: PROGRESSING AS EXPECTED  Pt uses her call light and expresses needs appropriately.     Problem: Knowledge Deficit  Goal: Knowledge of disease process/condition, treatment plan, diagnostic tests, and medications will improve  Outcome: PROGRESSING AS EXPECTED  Pt's questions regarding plan of care were answered.

## 2019-12-27 LAB
ANION GAP SERPL CALC-SCNC: 7 MMOL/L (ref 0–11.9)
BACTERIA UR CULT: NORMAL
BASOPHILS # BLD AUTO: 0.4 % (ref 0–1.8)
BASOPHILS # BLD: 0.08 K/UL (ref 0–0.12)
BUN SERPL-MCNC: 16 MG/DL (ref 8–22)
CALCIUM SERPL-MCNC: 7.9 MG/DL (ref 8.5–10.5)
CHLORIDE SERPL-SCNC: 110 MMOL/L (ref 96–112)
CO2 SERPL-SCNC: 20 MMOL/L (ref 20–33)
CREAT SERPL-MCNC: 0.89 MG/DL (ref 0.5–1.4)
EOSINOPHIL # BLD AUTO: 0.15 K/UL (ref 0–0.51)
EOSINOPHIL NFR BLD: 0.8 % (ref 0–6.9)
ERYTHROCYTE [DISTWIDTH] IN BLOOD BY AUTOMATED COUNT: 52.6 FL (ref 35.9–50)
GLUCOSE SERPL-MCNC: 90 MG/DL (ref 65–99)
HCT VFR BLD AUTO: 39.2 % (ref 37–47)
HGB BLD-MCNC: 12.4 G/DL (ref 12–16)
IMM GRANULOCYTES # BLD AUTO: 0.36 K/UL (ref 0–0.11)
IMM GRANULOCYTES NFR BLD AUTO: 1.9 % (ref 0–0.9)
LYMPHOCYTES # BLD AUTO: 2.24 K/UL (ref 1–4.8)
LYMPHOCYTES NFR BLD: 11.7 % (ref 22–41)
MAGNESIUM SERPL-MCNC: 1.7 MG/DL (ref 1.5–2.5)
MCH RBC QN AUTO: 28.4 PG (ref 27–33)
MCHC RBC AUTO-ENTMCNC: 31.6 G/DL (ref 33.6–35)
MCV RBC AUTO: 89.7 FL (ref 81.4–97.8)
MONOCYTES # BLD AUTO: 2.38 K/UL (ref 0–0.85)
MONOCYTES NFR BLD AUTO: 12.5 % (ref 0–13.4)
NEUTROPHILS # BLD AUTO: 13.87 K/UL (ref 2–7.15)
NEUTROPHILS NFR BLD: 72.7 % (ref 44–72)
NRBC # BLD AUTO: 0 K/UL
NRBC BLD-RTO: 0 /100 WBC
PHOSPHATE SERPL-MCNC: 2.6 MG/DL (ref 2.5–4.5)
PLATELET # BLD AUTO: 189 K/UL (ref 164–446)
PMV BLD AUTO: 12.1 FL (ref 9–12.9)
POTASSIUM SERPL-SCNC: 4.2 MMOL/L (ref 3.6–5.5)
PROCALCITONIN SERPL-MCNC: 4.32 NG/ML
RBC # BLD AUTO: 4.37 M/UL (ref 4.2–5.4)
SIGNIFICANT IND 70042: NORMAL
SITE SITE: NORMAL
SODIUM SERPL-SCNC: 137 MMOL/L (ref 135–145)
SOURCE SOURCE: NORMAL
WBC # BLD AUTO: 19.1 K/UL (ref 4.8–10.8)

## 2019-12-27 PROCEDURE — 700102 HCHG RX REV CODE 250 W/ 637 OVERRIDE(OP): Performed by: NURSE PRACTITIONER

## 2019-12-27 PROCEDURE — A9270 NON-COVERED ITEM OR SERVICE: HCPCS | Performed by: SURGERY

## 2019-12-27 PROCEDURE — A9270 NON-COVERED ITEM OR SERVICE: HCPCS | Performed by: INTERNAL MEDICINE

## 2019-12-27 PROCEDURE — 700102 HCHG RX REV CODE 250 W/ 637 OVERRIDE(OP): Performed by: SURGERY

## 2019-12-27 PROCEDURE — 84100 ASSAY OF PHOSPHORUS: CPT

## 2019-12-27 PROCEDURE — 36415 COLL VENOUS BLD VENIPUNCTURE: CPT

## 2019-12-27 PROCEDURE — A9270 NON-COVERED ITEM OR SERVICE: HCPCS | Performed by: NURSE PRACTITIONER

## 2019-12-27 PROCEDURE — 700105 HCHG RX REV CODE 258: Performed by: INTERNAL MEDICINE

## 2019-12-27 PROCEDURE — 84145 PROCALCITONIN (PCT): CPT

## 2019-12-27 PROCEDURE — 700111 HCHG RX REV CODE 636 W/ 250 OVERRIDE (IP): Performed by: INTERNAL MEDICINE

## 2019-12-27 PROCEDURE — 700102 HCHG RX REV CODE 250 W/ 637 OVERRIDE(OP): Performed by: INTERNAL MEDICINE

## 2019-12-27 PROCEDURE — 99232 SBSQ HOSP IP/OBS MODERATE 35: CPT | Performed by: INTERNAL MEDICINE

## 2019-12-27 PROCEDURE — 700102 HCHG RX REV CODE 250 W/ 637 OVERRIDE(OP): Performed by: HOSPITALIST

## 2019-12-27 PROCEDURE — 80048 BASIC METABOLIC PNL TOTAL CA: CPT

## 2019-12-27 PROCEDURE — 85025 COMPLETE CBC W/AUTO DIFF WBC: CPT

## 2019-12-27 PROCEDURE — A9270 NON-COVERED ITEM OR SERVICE: HCPCS | Performed by: HOSPITALIST

## 2019-12-27 PROCEDURE — 83735 ASSAY OF MAGNESIUM: CPT

## 2019-12-27 PROCEDURE — 770006 HCHG ROOM/CARE - MED/SURG/GYN SEMI*

## 2019-12-27 RX ORDER — MAGNESIUM SULFATE HEPTAHYDRATE 40 MG/ML
2 INJECTION, SOLUTION INTRAVENOUS ONCE
Status: DISCONTINUED | OUTPATIENT
Start: 2019-12-27 | End: 2019-12-27

## 2019-12-27 RX ADMIN — Medication 1 PACKET: at 05:17

## 2019-12-27 RX ADMIN — TIMOLOL MALEATE 1 DROP: 5 SOLUTION OPHTHALMIC at 18:13

## 2019-12-27 RX ADMIN — ACETAMINOPHEN 650 MG: 325 TABLET, FILM COATED ORAL at 23:22

## 2019-12-27 RX ADMIN — Medication 400 MG: at 10:49

## 2019-12-27 RX ADMIN — TIMOLOL MALEATE 1 DROP: 5 SOLUTION OPHTHALMIC at 05:18

## 2019-12-27 RX ADMIN — HEPARIN SODIUM 5000 UNITS: 5000 INJECTION, SOLUTION INTRAVENOUS; SUBCUTANEOUS at 05:17

## 2019-12-27 RX ADMIN — OXYCODONE HYDROCHLORIDE 5 MG: 5 TABLET ORAL at 10:51

## 2019-12-27 RX ADMIN — METOPROLOL TARTRATE 100 MG: 100 TABLET ORAL at 05:16

## 2019-12-27 RX ADMIN — ACETAMINOPHEN 650 MG: 325 TABLET, FILM COATED ORAL at 10:49

## 2019-12-27 RX ADMIN — OMEPRAZOLE 20 MG: 20 CAPSULE, DELAYED RELEASE ORAL at 05:16

## 2019-12-27 RX ADMIN — OXYCODONE HYDROCHLORIDE 10 MG: 10 TABLET ORAL at 19:17

## 2019-12-27 RX ADMIN — FAMOTIDINE 20 MG: 20 TABLET ORAL at 05:16

## 2019-12-27 RX ADMIN — HEPARIN SODIUM 5000 UNITS: 5000 INJECTION, SOLUTION INTRAVENOUS; SUBCUTANEOUS at 18:13

## 2019-12-27 RX ADMIN — Medication 400 MG: at 18:12

## 2019-12-27 RX ADMIN — METOPROLOL TARTRATE 100 MG: 100 TABLET ORAL at 18:14

## 2019-12-27 RX ADMIN — ACETAMINOPHEN 650 MG: 325 TABLET, FILM COATED ORAL at 05:16

## 2019-12-27 RX ADMIN — ACETAMINOPHEN 650 MG: 325 TABLET, FILM COATED ORAL at 18:13

## 2019-12-27 RX ADMIN — ASPIRIN 81 MG: 81 TABLET, COATED ORAL at 05:16

## 2019-12-27 RX ADMIN — AMPICILLIN SODIUM AND SULBACTAM SODIUM 3 G: 2; 1 INJECTION, POWDER, FOR SOLUTION INTRAMUSCULAR; INTRAVENOUS at 05:16

## 2019-12-27 RX ADMIN — LATANOPROST 1 DROP: 50 SOLUTION OPHTHALMIC at 18:13

## 2019-12-27 RX ADMIN — LEVOTHYROXINE SODIUM 112 MCG: 112 TABLET ORAL at 05:18

## 2019-12-27 ASSESSMENT — ENCOUNTER SYMPTOMS
MUSCULOSKELETAL NEGATIVE: 1
PALPITATIONS: 0
VOMITING: 0
MEMORY LOSS: 0
FOCAL WEAKNESS: 0
INSOMNIA: 0
DIZZINESS: 0
NAUSEA: 0
HEADACHES: 0
NERVOUS/ANXIOUS: 0
ABDOMINAL PAIN: 0
SHORTNESS OF BREATH: 0
COUGH: 0

## 2019-12-27 NOTE — PROGRESS NOTES
"Hospital Medicine Daily Progress Note    Date of Service  12/27/2019    Chief Complaint  Abdominal Pain    Hospital Course   This is an 84 year old female with PMH significant for total cystectomy with ileal conduit 2011, PAF not on chronic anticoagulation due to history of bleeding, rate controlled with Metoprolol, TIA, HTN, and hypothyroidism who presented 12/20/19 with abdominal pain.  She has been admitted multiple times this year for same requiring NG tube decompression and conservative therapy.  Day of presentation she presented with nausea and vomiting x3 days.  CT consistent with small bowel obstruction and thickened gastric wall.  General surgery was consulted and recommended PPI for gastritis, surgical intervention for SBO and follow-up EGD. She was also found to be in ARF which resolved with IV fluid hydration. Post-op course was complicated by leukocytosis. Tmax 100.4. Blood cultures (-), chest xray showed atelectasis without evidence of pneumonia. LUCIA drain cultures (-). UA indicated possible infection and she was treated with IV ABX. Urine culture (-).      Interval Problem Update  12/27 - MARSHALL resolved. DC IVF  -ongoing leukocytosis. Afebrile overnight. Pan cultures all negative to date.  -passing flatus, no BM. Advancing diet to Full Liquid.      12/26 - MARSHALL improving. UA indicating infection. Temp 100.4 today, ongoing leukocytosis. IV ABX.   -denies flatus. Hypoactive bowel sounds BLQ. Ambulating in the ozuna with walker. Reports good pain control.     12/25 - worsening MARSHALL. Episode of hypotension overnight requiring NS bolus. I've increased her IVF today.  -leukocytosis - afebrile. checking blood cultures, UA, chest xray  -pain management     12/24 - Seen and examined after surgery.  She is drowsy but easily arousable and oriented x3.  She reports \"at least\" 6/10 abdominal pain.  -Ileostomy with pink stoma draining clear yellow urine.  Hemodynamically stable    Consultants/Specialty  -General " "Surgery    Code Status  FULL    Disposition  TBD    Review of Systems  Review of Systems   Constitutional: Negative for malaise/fatigue.   HENT: Negative.    Respiratory: Negative for cough and shortness of breath.    Cardiovascular: Negative for chest pain and palpitations.   Gastrointestinal: Negative for abdominal pain (\"I have no pain\"), nausea and vomiting.   Genitourinary: Negative.    Musculoskeletal: Negative.    Neurological: Negative for dizziness, focal weakness and headaches.   Psychiatric/Behavioral: Negative for memory loss. The patient is not nervous/anxious and does not have insomnia.    All other systems reviewed and are negative.       Physical Exam  Temp:  [36.1 °C (97 °F)-36.4 °C (97.5 °F)] 36.2 °C (97.1 °F)  Pulse:  [69-81] 69  Resp:  [15-17] 16  BP: (108-133)/(45-64) 120/62  SpO2:  [90 %-96 %] 96 %    Physical Exam  Vitals signs and nursing note reviewed.   Constitutional:       Appearance: Normal appearance. She is not ill-appearing or toxic-appearing.      Interventions: Nasal cannula in place.   HENT:      Head: Normocephalic.      Right Ear: Hearing normal.      Left Ear: Hearing normal.      Nose: Nose normal.      Mouth/Throat:      Mouth: Mucous membranes are dry.   Eyes:      General: Lids are normal.   Cardiovascular:      Rate and Rhythm: Normal rate and regular rhythm.      Pulses: Normal pulses.      Heart sounds: Normal heart sounds.   Pulmonary:      Effort: Pulmonary effort is normal.      Breath sounds: Normal breath sounds. No rhonchi or rales.   Abdominal:      General: Bowel sounds are normal.      Comments: Ostomy with pink stoma  2 surgical stab wounds with Dermabond  1 surgical site with LUCIA drain with dressing CDI - LUCIA drainage serosanguinous    Left-sided abdomen (surgical incision areas) more distended than right. Minimally tender    (+) flatus  Small BM today     Genitourinary:     Comments: Urostomy with clear yellow urine  Musculoskeletal:      Right lower leg: No " edema.      Left lower leg: No edema.      Comments: Ambulating in the ozuna with walker   Skin:     General: Skin is warm.   Neurological:      General: No focal deficit present.      Mental Status: She is alert, oriented to person, place, and time and easily aroused.      Coordination: Coordination is intact.      Gait: Gait is intact.   Psychiatric:         Attention and Perception: Attention normal.         Mood and Affect: Mood normal.         Speech: Speech normal.         Behavior: Behavior is cooperative.         Thought Content: Thought content normal.         Cognition and Memory: Cognition normal.         Judgment: Judgment normal.         Fluids    Intake/Output Summary (Last 24 hours) at 12/27/2019 1255  Last data filed at 12/27/2019 0546  Gross per 24 hour   Intake --   Output 1180 ml   Net -1180 ml       Laboratory  Recent Labs     12/25/19 0144 12/26/19 0035 12/27/19  0430   WBC 19.3* 17.9* 19.1*   RBC 4.00* 4.15* 4.37   HEMOGLOBIN 11.4* 11.8* 12.4   HEMATOCRIT 36.4* 36.6* 39.2   MCV 91.0 88.2 89.7   MCH 28.5 28.4 28.4   MCHC 31.3* 32.2* 31.6*   RDW 51.8* 50.6* 52.6*   PLATELETCT 133* 143* 189   MPV 11.8 11.6 12.1     Recent Labs     12/25/19 0144 12/26/19 0035 12/27/19  0430   SODIUM 135 135 137   POTASSIUM 4.7 4.5 4.2   CHLORIDE 109 108 110   CO2 18* 21 20   GLUCOSE 130* 107* 90   BUN 21 19 16   CREATININE 1.36 1.17 0.89   CALCIUM 7.8* 8.0* 7.9*                   Imaging  DX-CHEST-LIMITED (1 VIEW)   Final Result      New mild left basilar opacity is more likely from atelectasis than consolidation      Otherwise stable chest with right hemidiaphragm elevation, adjacent atelectasis or scarring. This also could obscure consolidation      DX-UPPER GI-SMALL BOWEL FOLLOW THRU   Final Result      1.  Small hiatal hernia.      2.  No significant fold thickening in the stomach to suggest an infiltrative process.      3.  Distended small bowel loops as seen on recent CT are consistent with partial  "obstruction. Contrast reached the colon in 90 minutes.           Assessment/Plan  Small bowel obstruction (HCC)- (present on admission)  Assessment & Plan  -POD #3 \"Severe adhesions to a prior parastomal hernia mesh.  Incarcerated loop of small bowel causing obstruction\"  -clear liquid diet  -denies flatus. I do hear hypoactive bowel sounds BLQ  -pain management  -general surgery following      Acute on chronic renal failure (HCC)- (present on admission)  Assessment & Plan  -resolved  -DC IVF  -follow BMP      Paroxysmal A-fib (HCC)- (present on admission)  Assessment & Plan  -rate controlled with Metoprolol  -not on chronic anticoagulation due to history of bleeding - continue ASA      Presence of urostomy (HCC)- (present on admission)  Assessment & Plan  -History of total cystectomy with ileal conduit 2011      Leukocytosis- (present on admission)  Assessment & Plan  -trending back up  -clinically shows no indications of infection  -incentive spirometry  -afebrile 24 hours  -pan cultures negative to date  -follow CBC    Enteritis- (present on admission)  Assessment & Plan  -Continue daily PPI  -Per general surgery recommendations, patient will need follow-up EGD for CT scan findings of thickened gastric wall      Hypothyroid- (present on admission)  Assessment & Plan  -continue Levothyroxine         VTE prophylaxis: Heparin & Ambulation     Please note that this dictation was created using voice recognition software. I have made every reasonable attempt to correct obvious errors, but there may be errors of grammar and possibly content that I did not discover before finalizing the note.     LISBETH Isabel.      "

## 2019-12-27 NOTE — PROGRESS NOTES
Surgery  POD#3  Looks and feels much better.  Passing copious gas.  No bm.  No pain  Wounds healing well.  Drain serosanguinous, clearing  Abdomen soft.    WBC persistent at 19  A/P  1.  HLIV, full liquid diet  2.  Mobilize  3.  Too soon for useful scan and clinically improving.    Trend WBC

## 2019-12-27 NOTE — CARE PLAN
Problem: Communication  Goal: The ability to communicate needs accurately and effectively will improve  Outcome: PROGRESSING AS EXPECTED  Intervention: [place] patient and significant other/support system to call light to alert staff of needs  Note:   Patient is able to communicate needs effectively. She uses call light appropriately. Has been updated in regards to plan of care for this shift including medications to be administered and pain control process. All questions answered accordingly, patient verbalizes understanding.      Problem: Bowel/Gastric:  Goal: Normal bowel function is maintained or improved  Outcome: PROGRESSING AS EXPECTED  Intervention: Educate patient and significant other/support system about signs and symptoms of constipation and interventions to implement  Note:   Patient now reporting she has been passing gas throughout the day, she states to feel less abdominal bloating however still needs to have a bowel movement to advance her diet.

## 2019-12-28 ENCOUNTER — APPOINTMENT (OUTPATIENT)
Dept: RADIOLOGY | Facility: MEDICAL CENTER | Age: 84
DRG: 330 | End: 2019-12-28
Attending: INTERNAL MEDICINE
Payer: MEDICARE

## 2019-12-28 LAB
25(OH)D3 SERPL-MCNC: 29 NG/ML (ref 30–100)
ANION GAP SERPL CALC-SCNC: 7 MMOL/L (ref 0–11.9)
BACTERIA WND AEROBE CULT: ABNORMAL
BACTERIA WND AEROBE CULT: ABNORMAL
BASOPHILS # BLD AUTO: 0.6 % (ref 0–1.8)
BASOPHILS # BLD: 0.09 K/UL (ref 0–0.12)
BUN SERPL-MCNC: 23 MG/DL (ref 8–22)
CALCIUM SERPL-MCNC: 7.5 MG/DL (ref 8.5–10.5)
CHLORIDE SERPL-SCNC: 110 MMOL/L (ref 96–112)
CO2 SERPL-SCNC: 19 MMOL/L (ref 20–33)
CREAT SERPL-MCNC: 0.99 MG/DL (ref 0.5–1.4)
EKG IMPRESSION: NORMAL
EOSINOPHIL # BLD AUTO: 0.35 K/UL (ref 0–0.51)
EOSINOPHIL NFR BLD: 2.3 % (ref 0–6.9)
ERYTHROCYTE [DISTWIDTH] IN BLOOD BY AUTOMATED COUNT: 52.4 FL (ref 35.9–50)
GLUCOSE SERPL-MCNC: 93 MG/DL (ref 65–99)
GRAM STN SPEC: ABNORMAL
HCT VFR BLD AUTO: 36.2 % (ref 37–47)
HGB BLD-MCNC: 11.4 G/DL (ref 12–16)
IMM GRANULOCYTES # BLD AUTO: 0.34 K/UL (ref 0–0.11)
IMM GRANULOCYTES NFR BLD AUTO: 2.2 % (ref 0–0.9)
LYMPHOCYTES # BLD AUTO: 2.14 K/UL (ref 1–4.8)
LYMPHOCYTES NFR BLD: 14 % (ref 22–41)
MAGNESIUM SERPL-MCNC: 1.6 MG/DL (ref 1.5–2.5)
MCH RBC QN AUTO: 28 PG (ref 27–33)
MCHC RBC AUTO-ENTMCNC: 31.5 G/DL (ref 33.6–35)
MCV RBC AUTO: 88.9 FL (ref 81.4–97.8)
MONOCYTES # BLD AUTO: 2.16 K/UL (ref 0–0.85)
MONOCYTES NFR BLD AUTO: 14.1 % (ref 0–13.4)
NEUTROPHILS # BLD AUTO: 10.26 K/UL (ref 2–7.15)
NEUTROPHILS NFR BLD: 66.8 % (ref 44–72)
NRBC # BLD AUTO: 0 K/UL
NRBC BLD-RTO: 0 /100 WBC
PHOSPHATE SERPL-MCNC: 2.3 MG/DL (ref 2.5–4.5)
PLATELET # BLD AUTO: 213 K/UL (ref 164–446)
PMV BLD AUTO: 11.7 FL (ref 9–12.9)
POTASSIUM SERPL-SCNC: 4.2 MMOL/L (ref 3.6–5.5)
RBC # BLD AUTO: 4.07 M/UL (ref 4.2–5.4)
SIGNIFICANT IND 70042: ABNORMAL
SITE SITE: ABNORMAL
SODIUM SERPL-SCNC: 136 MMOL/L (ref 135–145)
SOURCE SOURCE: ABNORMAL
WBC # BLD AUTO: 15.3 K/UL (ref 4.8–10.8)

## 2019-12-28 PROCEDURE — 93010 ELECTROCARDIOGRAM REPORT: CPT | Performed by: INTERNAL MEDICINE

## 2019-12-28 PROCEDURE — 700102 HCHG RX REV CODE 250 W/ 637 OVERRIDE(OP): Performed by: NURSE PRACTITIONER

## 2019-12-28 PROCEDURE — 700102 HCHG RX REV CODE 250 W/ 637 OVERRIDE(OP): Performed by: HOSPITALIST

## 2019-12-28 PROCEDURE — A9270 NON-COVERED ITEM OR SERVICE: HCPCS | Performed by: NURSE PRACTITIONER

## 2019-12-28 PROCEDURE — A9270 NON-COVERED ITEM OR SERVICE: HCPCS | Performed by: HOSPITALIST

## 2019-12-28 PROCEDURE — A9270 NON-COVERED ITEM OR SERVICE: HCPCS | Performed by: INTERNAL MEDICINE

## 2019-12-28 PROCEDURE — 80048 BASIC METABOLIC PNL TOTAL CA: CPT

## 2019-12-28 PROCEDURE — 700101 HCHG RX REV CODE 250: Performed by: INTERNAL MEDICINE

## 2019-12-28 PROCEDURE — 770006 HCHG ROOM/CARE - MED/SURG/GYN SEMI*

## 2019-12-28 PROCEDURE — A9270 NON-COVERED ITEM OR SERVICE: HCPCS | Performed by: SURGERY

## 2019-12-28 PROCEDURE — 700102 HCHG RX REV CODE 250 W/ 637 OVERRIDE(OP): Performed by: INTERNAL MEDICINE

## 2019-12-28 PROCEDURE — 700111 HCHG RX REV CODE 636 W/ 250 OVERRIDE (IP): Performed by: NURSE PRACTITIONER

## 2019-12-28 PROCEDURE — 84100 ASSAY OF PHOSPHORUS: CPT

## 2019-12-28 PROCEDURE — 93005 ELECTROCARDIOGRAM TRACING: CPT | Performed by: INTERNAL MEDICINE

## 2019-12-28 PROCEDURE — 82306 VITAMIN D 25 HYDROXY: CPT

## 2019-12-28 PROCEDURE — 83735 ASSAY OF MAGNESIUM: CPT

## 2019-12-28 PROCEDURE — 85025 COMPLETE CBC W/AUTO DIFF WBC: CPT

## 2019-12-28 PROCEDURE — 36415 COLL VENOUS BLD VENIPUNCTURE: CPT

## 2019-12-28 PROCEDURE — 99232 SBSQ HOSP IP/OBS MODERATE 35: CPT | Performed by: INTERNAL MEDICINE

## 2019-12-28 PROCEDURE — 700105 HCHG RX REV CODE 258: Performed by: INTERNAL MEDICINE

## 2019-12-28 PROCEDURE — 700102 HCHG RX REV CODE 250 W/ 637 OVERRIDE(OP): Performed by: SURGERY

## 2019-12-28 PROCEDURE — 700111 HCHG RX REV CODE 636 W/ 250 OVERRIDE (IP): Performed by: INTERNAL MEDICINE

## 2019-12-28 RX ORDER — THIAMINE MONONITRATE (VIT B1) 100 MG
100 TABLET ORAL DAILY
Status: COMPLETED | OUTPATIENT
Start: 2019-12-28 | End: 2019-12-29

## 2019-12-28 RX ORDER — MAGNESIUM SULFATE HEPTAHYDRATE 40 MG/ML
4 INJECTION, SOLUTION INTRAVENOUS ONCE
Status: COMPLETED | OUTPATIENT
Start: 2019-12-28 | End: 2019-12-28

## 2019-12-28 RX ORDER — IBUPROFEN 200 MG
500 CAPSULE ORAL 2 TIMES DAILY WITH MEALS
Status: DISCONTINUED | OUTPATIENT
Start: 2019-12-28 | End: 2020-01-01 | Stop reason: HOSPADM

## 2019-12-28 RX ORDER — METOPROLOL TARTRATE 100 MG/1
100 TABLET ORAL ONCE
Status: COMPLETED | OUTPATIENT
Start: 2019-12-28 | End: 2019-12-28

## 2019-12-28 RX ADMIN — ASPIRIN 81 MG: 81 TABLET, COATED ORAL at 04:42

## 2019-12-28 RX ADMIN — LEVOTHYROXINE SODIUM 112 MCG: 112 TABLET ORAL at 04:43

## 2019-12-28 RX ADMIN — Medication 1 PACKET: at 04:33

## 2019-12-28 RX ADMIN — LATANOPROST 1 DROP: 50 SOLUTION OPHTHALMIC at 17:23

## 2019-12-28 RX ADMIN — OXYCODONE HYDROCHLORIDE 10 MG: 10 TABLET ORAL at 09:57

## 2019-12-28 RX ADMIN — Medication 400 MG: at 04:42

## 2019-12-28 RX ADMIN — METOPROLOL TARTRATE 100 MG: 100 TABLET ORAL at 11:15

## 2019-12-28 RX ADMIN — HEPARIN SODIUM 5000 UNITS: 5000 INJECTION, SOLUTION INTRAVENOUS; SUBCUTANEOUS at 17:16

## 2019-12-28 RX ADMIN — METOPROLOL TARTRATE 100 MG: 100 TABLET ORAL at 04:42

## 2019-12-28 RX ADMIN — ACETAMINOPHEN 650 MG: 325 TABLET, FILM COATED ORAL at 22:29

## 2019-12-28 RX ADMIN — METOPROLOL TARTRATE 100 MG: 100 TABLET ORAL at 17:17

## 2019-12-28 RX ADMIN — ACETAMINOPHEN 650 MG: 325 TABLET, FILM COATED ORAL at 04:42

## 2019-12-28 RX ADMIN — CALCIUM 500 MG: 500 TABLET ORAL at 08:57

## 2019-12-28 RX ADMIN — Medication 100 MG: at 08:57

## 2019-12-28 RX ADMIN — DIBASIC SODIUM PHOSPHATE, MONOBASIC POTASSIUM PHOSPHATE AND MONOBASIC SODIUM PHOSPHATE 1 TABLET: 852; 155; 130 TABLET ORAL at 22:29

## 2019-12-28 RX ADMIN — HEPARIN SODIUM 5000 UNITS: 5000 INJECTION, SOLUTION INTRAVENOUS; SUBCUTANEOUS at 04:43

## 2019-12-28 RX ADMIN — POTASSIUM PHOSPHATE, MONOBASIC AND POTASSIUM PHOSPHATE, DIBASIC 15 MMOL: 224; 236 INJECTION, SOLUTION, CONCENTRATE INTRAVENOUS at 17:18

## 2019-12-28 RX ADMIN — Medication 400 MG: at 17:17

## 2019-12-28 RX ADMIN — ACETAMINOPHEN 650 MG: 325 TABLET, FILM COATED ORAL at 16:00

## 2019-12-28 RX ADMIN — DIBASIC SODIUM PHOSPHATE, MONOBASIC POTASSIUM PHOSPHATE AND MONOBASIC SODIUM PHOSPHATE 1 TABLET: 852; 155; 130 TABLET ORAL at 14:13

## 2019-12-28 RX ADMIN — OXYCODONE HYDROCHLORIDE 5 MG: 5 TABLET ORAL at 19:24

## 2019-12-28 RX ADMIN — FAMOTIDINE 20 MG: 20 TABLET ORAL at 04:42

## 2019-12-28 RX ADMIN — CALCIUM 500 MG: 500 TABLET ORAL at 17:17

## 2019-12-28 RX ADMIN — DIBASIC SODIUM PHOSPHATE, MONOBASIC POTASSIUM PHOSPHATE AND MONOBASIC SODIUM PHOSPHATE 1 TABLET: 852; 155; 130 TABLET ORAL at 17:17

## 2019-12-28 RX ADMIN — TIMOLOL MALEATE 1 DROP: 5 SOLUTION OPHTHALMIC at 04:43

## 2019-12-28 RX ADMIN — TIMOLOL MALEATE 1 DROP: 5 SOLUTION OPHTHALMIC at 17:23

## 2019-12-28 RX ADMIN — OMEPRAZOLE 20 MG: 20 CAPSULE, DELAYED RELEASE ORAL at 04:42

## 2019-12-28 RX ADMIN — ACETAMINOPHEN 650 MG: 325 TABLET, FILM COATED ORAL at 09:57

## 2019-12-28 RX ADMIN — MAGNESIUM SULFATE IN WATER 4 G: 40 INJECTION, SOLUTION INTRAVENOUS at 11:12

## 2019-12-28 ASSESSMENT — ENCOUNTER SYMPTOMS
CHILLS: 0
WHEEZING: 0
NAUSEA: 0
FLANK PAIN: 0
FEVER: 0
EYES NEGATIVE: 1
PALPITATIONS: 0
CONSTIPATION: 0
COUGH: 0
HEADACHES: 0
FALLS: 0
DIZZINESS: 0
SPUTUM PRODUCTION: 0
DIARRHEA: 0
ABDOMINAL PAIN: 1
VOMITING: 0

## 2019-12-28 NOTE — CARE PLAN
Problem: Bowel/Gastric:  Goal: Normal bowel function is maintained or improved  Outcome: PROGRESSING AS EXPECTED  Note:   Patient had a BM today!! Per verbal from Aura, advanced patient to GI Soft.      Problem: Pain Management  Goal: Pain level will decrease to patient's comfort goal  Outcome: PROGRESSING AS EXPECTED  Note:   Patient Ambulated 150' x 2 today. Pain is well controlled.

## 2019-12-28 NOTE — PROGRESS NOTES
Stable  Benign abdomen  Tolerating po  jose cruz with serous fluid  WBC decreased  Home anytime from surgery standpoint.

## 2019-12-28 NOTE — PROGRESS NOTES
Hospital Medicine Daily Progress Note    Date of Service  12/28/2019    Chief Complaint  Abdominal pain, nausea, vomiting    Hospital Course   This is an 84 year old female with PMH significant for total cystectomy with ileal conduit 2011, PAF not on chronic anticoagulation due to history of bleeding, rate controlled with Metoprolol, TIA, HTN, and hypothyroidism who presented 12/20/19 with abdominal pain.  She has been admitted multiple times this year for same requiring NG tube decompression and conservative therapy.  Day of presentation she presented with nausea and vomiting x3 days.  CT consistent with small bowel obstruction and thickened gastric wall.  General surgery was consulted and recommended PPI for gastritis, surgical intervention for SBO and follow-up EGD. She was also found to be in ARF which resolved with IV fluid hydration. Post-op course was complicated by leukocytosis. Tmax 100.4. Blood cultures (-), chest xray showed atelectasis without evidence of pneumonia. LUCIA drain cultures (-). UA indicated possible infection and she was treated with IV ABX. Urine culture (-).      Interval Problem Update  12/28 - isolated episode of tachycardia (120's) overnight. /67 at that time. Patient asymptomatic. Scheduled Metoprolol administered early. HR's 80's this am. 12-lead EKG shows AFIB rates 130's (history of PAF). Afebrile. Leukocytosis improving.   -mag low at 1.6. Ordered IV replacement yesterday, however patient refused. Ordered IV Mag again this morning. Patient agreeable.     12/27 - MARSHALL resolved. DC IVF  -ongoing leukocytosis. Afebrile overnight. Pan cultures all negative to date.  -passing flatus, no BM. Advancing diet to Full Liquid.       12/26 - MARSHALL improving. UA indicating infection. Temp 100.4 today, ongoing leukocytosis. IV ABX.   -denies flatus. Hypoactive bowel sounds BLQ. Ambulating in the ozuna with walker. Reports good pain control.     12/25 - worsening MARSHALL. Episode of hypotension  "overnight requiring NS bolus. I've increased her IVF today.  -leukocytosis - afebrile. checking blood cultures, UA, chest xray  -pain management     12/24 - Seen and examined after surgery.  She is drowsy but easily arousable and oriented x3.  She reports \"at least\" 6/10 abdominal pain.  -Ileostomy with pink stoma draining clear yellow urine.  Hemodynamically stable    Consultants/Specialty  -General Surgery    Code Status  FULL    Disposition  TBD    Review of Systems  Review of Systems   Constitutional: Negative for chills and fever.   HENT: Positive for hearing loss.    Eyes: Negative.    Respiratory: Negative for cough, sputum production and wheezing.    Cardiovascular: Negative for chest pain and palpitations.   Gastrointestinal: Positive for abdominal pain (3/10 after getting up to BR. 0/10 at rest). Negative for constipation, diarrhea, nausea and vomiting.   Genitourinary: Negative for flank pain and hematuria.   Musculoskeletal: Negative for falls and joint pain.   Neurological: Negative for dizziness and headaches.   All other systems reviewed and are negative.       Physical Exam  Temp:  [36.6 °C (97.8 °F)-36.7 °C (98.1 °F)] 36.6 °C (97.8 °F)  Pulse:  [] 76  Resp:  [16-20] 16  BP: ()/(54-68) 113/66  SpO2:  [93 %-98 %] 98 %    Physical Exam  Vitals signs and nursing note reviewed.   Constitutional:       Appearance: Normal appearance. She is not ill-appearing or toxic-appearing.   HENT:      Head: Normocephalic.      Right Ear: Hearing normal.      Left Ear: Hearing normal.      Nose: Nose normal.      Mouth/Throat:      Mouth: Mucous membranes are dry.   Eyes:      General: Lids are normal.   Cardiovascular:      Rate and Rhythm: Tachycardia present. Rhythm irregularly irregular.      Pulses: Normal pulses.      Heart sounds: Normal heart sounds.   Pulmonary:      Effort: Pulmonary effort is normal.      Breath sounds: Normal breath sounds. No rhonchi or rales.   Abdominal:      General: Bowel " sounds are normal.      Comments: Ostomy with pink stoma  2 surgical stab wounds with Dermabond  1 surgical site with LUCIA drain with dressing CDI - LUCIA drainage thin, watery fluid  Dressing saturated with pink, watery fluid after activity. Reinforced by RN.       (+) flatus  Small BM yesterday     Genitourinary:     Comments: Urostomy with clear yellow urine  Musculoskeletal:      Right lower leg: No edema.      Left lower leg: No edema.      Comments: Ambulating to BR with walker   Skin:     General: Skin is warm.   Neurological:      General: No focal deficit present.      Mental Status: She is alert and oriented to person, place, and time.      Coordination: Coordination is intact.      Gait: Gait is intact.   Psychiatric:         Attention and Perception: Attention normal.         Mood and Affect: Mood normal.         Speech: Speech normal.         Behavior: Behavior is cooperative.         Thought Content: Thought content normal.         Cognition and Memory: Cognition normal.         Judgment: Judgment normal.         Fluids    Intake/Output Summary (Last 24 hours) at 12/28/2019 1144  Last data filed at 12/28/2019 0900  Gross per 24 hour   Intake 600 ml   Output 70 ml   Net 530 ml       Laboratory  Recent Labs     12/26/19  0035 12/27/19  0430 12/28/19 0227   WBC 17.9* 19.1* 15.3*   RBC 4.15* 4.37 4.07*   HEMOGLOBIN 11.8* 12.4 11.4*   HEMATOCRIT 36.6* 39.2 36.2*   MCV 88.2 89.7 88.9   MCH 28.4 28.4 28.0   MCHC 32.2* 31.6* 31.5*   RDW 50.6* 52.6* 52.4*   PLATELETCT 143* 189 213   MPV 11.6 12.1 11.7     Recent Labs     12/26/19  0035 12/27/19  0430 12/28/19 0227   SODIUM 135 137 136   POTASSIUM 4.5 4.2 4.2   CHLORIDE 108 110 110   CO2 21 20 19*   GLUCOSE 107* 90 93   BUN 19 16 23*   CREATININE 1.17 0.89 0.99   CALCIUM 8.0* 7.9* 7.5*                   Imaging  DX-CHEST-LIMITED (1 VIEW)   Final Result      New mild left basilar opacity is more likely from atelectasis than consolidation      Otherwise stable chest  "with right hemidiaphragm elevation, adjacent atelectasis or scarring. This also could obscure consolidation      DX-UPPER GI-SMALL BOWEL FOLLOW THRU   Final Result      1.  Small hiatal hernia.      2.  No significant fold thickening in the stomach to suggest an infiltrative process.      3.  Distended small bowel loops as seen on recent CT are consistent with partial obstruction. Contrast reached the colon in 90 minutes.           Assessment/Plan  Small bowel obstruction (HCC)- (present on admission)  Assessment & Plan  -POD #4 \"Severe adhesions to a prior parastomal hernia mesh.  Incarcerated loop of small bowel causing obstruction\"  -full liquid diet  -(+) flatus  -small BM yesterday  -pain 3/10 this morning (after activity) 0/10 at rest  -general surgery following      Acute on chronic renal failure (HCC)- (present on admission)  Assessment & Plan  -resolved  -follow BMP      Hypomagnesemia  Assessment & Plan  -replacing  -follow lab    Paroxysmal A-fib (HCC)- (present on admission)  Assessment & Plan  -with episodes of RVR up to 130's. 12-lead EKG showing AFIB  -mag low - replacing IV today  -remains asymptomatic  -not on chronic anticoagulation due to history of bleeding - continue ASA      Presence of urostomy (HCC)- (present on admission)  Assessment & Plan  -History of total cystectomy with ileal conduit 2011      Leukocytosis- (present on admission)  Assessment & Plan  -trending back down  -clinically shows no indications of infection  -incentive spirometry  -ambulation/mobility  -afebrile   -pan cultures negative to date  -follow CBC    Enteritis- (present on admission)  Assessment & Plan  -Continue daily PPI  -Per general surgery recommendations, patient will need follow-up EGD for CT scan findings of thickened gastric wall      Hypothyroid- (present on admission)  Assessment & Plan  -continue Levothyroxine         VTE prophylaxis: Heparin & Ambulation     Please note that this dictation was created " using voice recognition software. I have made every reasonable attempt to correct obvious errors, but there may be errors of grammar and possibly content that I did not discover before finalizing the note.     LISBETH Isabel.

## 2019-12-28 NOTE — CARE PLAN
Problem: Communication  Goal: The ability to communicate needs accurately and effectively will improve  Outcome: PROGRESSING AS EXPECTED     Patient able to communicate needs effectively. Uses call light to alert staff of needs.    Problem: Bowel/Gastric:  Goal: Normal bowel function is maintained or improved  Outcome: PROGRESSING AS EXPECTED  Patient had a bowel movement today, diet has been advanced per MD orders. She it tolerating it well and currently has no complaints of nausea. Will continue to monitor.     Problem: Pain Management  Goal: Pain level will decrease to patient's comfort goal  Outcome: PROGRESSING AS EXPECTED  Patient aware of pharmacological interventions for pain management and asks for it appropriately. Pain is fairly well controlled.

## 2019-12-28 NOTE — PROGRESS NOTES
Reassessed patients pulse, she is now sitting 110-115, Continues to be asymptomatic, will continue to monitor.

## 2019-12-28 NOTE — PROGRESS NOTES
Received notice from ELIANA Nicolas that patients heart rate was jumping between 110-140's without a set rate. Places pulse ox on patient, pulse continued to jump around. Patient is asymptomatic all other vital signs are within limits. Paged MD to update about patient condition, spoke with MD Tomas. Received verbal orders to administer morning dose of metoprolol now, metoprolol administered per MAR. Will continue to monitor.

## 2019-12-29 ENCOUNTER — APPOINTMENT (OUTPATIENT)
Dept: RADIOLOGY | Facility: MEDICAL CENTER | Age: 84
DRG: 330 | End: 2019-12-29
Attending: NURSE PRACTITIONER
Payer: MEDICARE

## 2019-12-29 PROBLEM — E55.9 VITAMIN D DEFICIENCY: Status: ACTIVE | Noted: 2019-12-29

## 2019-12-29 PROBLEM — N17.9 ACUTE ON CHRONIC RENAL FAILURE (HCC): Status: RESOLVED | Noted: 2019-12-20 | Resolved: 2019-12-29

## 2019-12-29 PROBLEM — N18.9 ACUTE ON CHRONIC RENAL FAILURE (HCC): Status: RESOLVED | Noted: 2019-12-20 | Resolved: 2019-12-29

## 2019-12-29 LAB
ANION GAP SERPL CALC-SCNC: 7 MMOL/L (ref 0–11.9)
BASOPHILS # BLD AUTO: 0 % (ref 0–1.8)
BASOPHILS # BLD: 0 K/UL (ref 0–0.12)
BUN SERPL-MCNC: 21 MG/DL (ref 8–22)
BURR CELLS BLD QL SMEAR: NORMAL
CALCIUM SERPL-MCNC: 7.6 MG/DL (ref 8.5–10.5)
CHLORIDE SERPL-SCNC: 109 MMOL/L (ref 96–112)
CO2 SERPL-SCNC: 20 MMOL/L (ref 20–33)
CREAT SERPL-MCNC: 0.92 MG/DL (ref 0.5–1.4)
EOSINOPHIL # BLD AUTO: 0.28 K/UL (ref 0–0.51)
EOSINOPHIL NFR BLD: 1.8 % (ref 0–6.9)
ERYTHROCYTE [DISTWIDTH] IN BLOOD BY AUTOMATED COUNT: 52.2 FL (ref 35.9–50)
GLUCOSE SERPL-MCNC: 86 MG/DL (ref 65–99)
HCT VFR BLD AUTO: 33.8 % (ref 37–47)
HGB BLD-MCNC: 10.8 G/DL (ref 12–16)
LYMPHOCYTES # BLD AUTO: 1.07 K/UL (ref 1–4.8)
LYMPHOCYTES NFR BLD: 7 % (ref 22–41)
MAGNESIUM SERPL-MCNC: 2.1 MG/DL (ref 1.5–2.5)
MANUAL DIFF BLD: NORMAL
MCH RBC QN AUTO: 28.1 PG (ref 27–33)
MCHC RBC AUTO-ENTMCNC: 32 G/DL (ref 33.6–35)
MCV RBC AUTO: 88 FL (ref 81.4–97.8)
MONOCYTES # BLD AUTO: 1.88 K/UL (ref 0–0.85)
MONOCYTES NFR BLD AUTO: 12.3 % (ref 0–13.4)
MORPHOLOGY BLD-IMP: NORMAL
NEUTROPHILS # BLD AUTO: 12.07 K/UL (ref 2–7.15)
NEUTROPHILS NFR BLD: 78.9 % (ref 44–72)
NRBC # BLD AUTO: 0 K/UL
NRBC BLD-RTO: 0 /100 WBC
PLATELET # BLD AUTO: 227 K/UL (ref 164–446)
PLATELET BLD QL SMEAR: NORMAL
PMV BLD AUTO: 11 FL (ref 9–12.9)
POIKILOCYTOSIS BLD QL SMEAR: NORMAL
POTASSIUM SERPL-SCNC: 4.2 MMOL/L (ref 3.6–5.5)
RBC # BLD AUTO: 3.84 M/UL (ref 4.2–5.4)
RBC BLD AUTO: PRESENT
SODIUM SERPL-SCNC: 136 MMOL/L (ref 135–145)
WBC # BLD AUTO: 15.3 K/UL (ref 4.8–10.8)

## 2019-12-29 PROCEDURE — 85027 COMPLETE CBC AUTOMATED: CPT

## 2019-12-29 PROCEDURE — 83735 ASSAY OF MAGNESIUM: CPT

## 2019-12-29 PROCEDURE — 700105 HCHG RX REV CODE 258: Performed by: INTERNAL MEDICINE

## 2019-12-29 PROCEDURE — 700102 HCHG RX REV CODE 250 W/ 637 OVERRIDE(OP): Performed by: NURSE PRACTITIONER

## 2019-12-29 PROCEDURE — 74177 CT ABD & PELVIS W/CONTRAST: CPT

## 2019-12-29 PROCEDURE — 700102 HCHG RX REV CODE 250 W/ 637 OVERRIDE(OP): Performed by: SURGERY

## 2019-12-29 PROCEDURE — A9270 NON-COVERED ITEM OR SERVICE: HCPCS | Performed by: NURSE PRACTITIONER

## 2019-12-29 PROCEDURE — 700102 HCHG RX REV CODE 250 W/ 637 OVERRIDE(OP): Performed by: INTERNAL MEDICINE

## 2019-12-29 PROCEDURE — 99231 SBSQ HOSP IP/OBS SF/LOW 25: CPT | Performed by: INTERNAL MEDICINE

## 2019-12-29 PROCEDURE — A9270 NON-COVERED ITEM OR SERVICE: HCPCS | Performed by: SURGERY

## 2019-12-29 PROCEDURE — 85007 BL SMEAR W/DIFF WBC COUNT: CPT

## 2019-12-29 PROCEDURE — 80048 BASIC METABOLIC PNL TOTAL CA: CPT

## 2019-12-29 PROCEDURE — 36415 COLL VENOUS BLD VENIPUNCTURE: CPT

## 2019-12-29 PROCEDURE — 700117 HCHG RX CONTRAST REV CODE 255: Performed by: NURSE PRACTITIONER

## 2019-12-29 PROCEDURE — 700105 HCHG RX REV CODE 258

## 2019-12-29 PROCEDURE — 700111 HCHG RX REV CODE 636 W/ 250 OVERRIDE (IP): Performed by: INTERNAL MEDICINE

## 2019-12-29 PROCEDURE — 99223 1ST HOSP IP/OBS HIGH 75: CPT | Performed by: INTERNAL MEDICINE

## 2019-12-29 PROCEDURE — A9270 NON-COVERED ITEM OR SERVICE: HCPCS | Performed by: INTERNAL MEDICINE

## 2019-12-29 PROCEDURE — 700105 HCHG RX REV CODE 258: Performed by: NURSE PRACTITIONER

## 2019-12-29 PROCEDURE — 700102 HCHG RX REV CODE 250 W/ 637 OVERRIDE(OP): Performed by: HOSPITALIST

## 2019-12-29 PROCEDURE — A9270 NON-COVERED ITEM OR SERVICE: HCPCS | Performed by: HOSPITALIST

## 2019-12-29 PROCEDURE — 770006 HCHG ROOM/CARE - MED/SURG/GYN SEMI*

## 2019-12-29 RX ORDER — SODIUM CHLORIDE 9 MG/ML
INJECTION, SOLUTION INTRAVENOUS
Status: COMPLETED
Start: 2019-12-29 | End: 2019-12-29

## 2019-12-29 RX ORDER — SODIUM CHLORIDE 9 MG/ML
1000 INJECTION, SOLUTION INTRAVENOUS ONCE
Status: COMPLETED | OUTPATIENT
Start: 2019-12-29 | End: 2019-12-29

## 2019-12-29 RX ADMIN — AMPICILLIN SODIUM AND SULBACTAM SODIUM 3 G: 2; 1 INJECTION, POWDER, FOR SOLUTION INTRAMUSCULAR; INTRAVENOUS at 17:01

## 2019-12-29 RX ADMIN — Medication 400 MG: at 17:00

## 2019-12-29 RX ADMIN — SODIUM CHLORIDE 1000 ML: 9 INJECTION, SOLUTION INTRAVENOUS at 14:30

## 2019-12-29 RX ADMIN — HEPARIN SODIUM 5000 UNITS: 5000 INJECTION, SOLUTION INTRAVENOUS; SUBCUTANEOUS at 05:34

## 2019-12-29 RX ADMIN — OMEPRAZOLE 20 MG: 20 CAPSULE, DELAYED RELEASE ORAL at 05:35

## 2019-12-29 RX ADMIN — HEPARIN SODIUM 5000 UNITS: 5000 INJECTION, SOLUTION INTRAVENOUS; SUBCUTANEOUS at 17:02

## 2019-12-29 RX ADMIN — MELATONIN 1000 UNITS: at 08:01

## 2019-12-29 RX ADMIN — METOPROLOL TARTRATE 100 MG: 100 TABLET ORAL at 17:00

## 2019-12-29 RX ADMIN — OXYCODONE HYDROCHLORIDE 5 MG: 5 TABLET ORAL at 21:19

## 2019-12-29 RX ADMIN — OXYCODONE HYDROCHLORIDE 5 MG: 5 TABLET ORAL at 02:46

## 2019-12-29 RX ADMIN — Medication 100 MG: at 05:33

## 2019-12-29 RX ADMIN — CALCIUM 500 MG: 500 TABLET ORAL at 08:01

## 2019-12-29 RX ADMIN — SODIUM CHLORIDE 1000 ML: 9 INJECTION, SOLUTION INTRAVENOUS at 12:20

## 2019-12-29 RX ADMIN — ACETAMINOPHEN 650 MG: 325 TABLET, FILM COATED ORAL at 21:19

## 2019-12-29 RX ADMIN — DIBASIC SODIUM PHOSPHATE, MONOBASIC POTASSIUM PHOSPHATE AND MONOBASIC SODIUM PHOSPHATE 1 TABLET: 852; 155; 130 TABLET ORAL at 17:01

## 2019-12-29 RX ADMIN — ACETAMINOPHEN 650 MG: 325 TABLET, FILM COATED ORAL at 17:01

## 2019-12-29 RX ADMIN — DIBASIC SODIUM PHOSPHATE, MONOBASIC POTASSIUM PHOSPHATE AND MONOBASIC SODIUM PHOSPHATE 1 TABLET: 852; 155; 130 TABLET ORAL at 11:05

## 2019-12-29 RX ADMIN — AMPICILLIN SODIUM AND SULBACTAM SODIUM 3 G: 2; 1 INJECTION, POWDER, FOR SOLUTION INTRAMUSCULAR; INTRAVENOUS at 11:05

## 2019-12-29 RX ADMIN — TIMOLOL MALEATE 1 DROP: 5 SOLUTION OPHTHALMIC at 17:03

## 2019-12-29 RX ADMIN — DIBASIC SODIUM PHOSPHATE, MONOBASIC POTASSIUM PHOSPHATE AND MONOBASIC SODIUM PHOSPHATE 1 TABLET: 852; 155; 130 TABLET ORAL at 05:33

## 2019-12-29 RX ADMIN — METOPROLOL TARTRATE 100 MG: 100 TABLET ORAL at 05:34

## 2019-12-29 RX ADMIN — AMPICILLIN SODIUM AND SULBACTAM SODIUM 3 G: 2; 1 INJECTION, POWDER, FOR SOLUTION INTRAMUSCULAR; INTRAVENOUS at 23:16

## 2019-12-29 RX ADMIN — ACETAMINOPHEN 650 MG: 325 TABLET, FILM COATED ORAL at 11:05

## 2019-12-29 RX ADMIN — IOHEXOL 100 ML: 350 INJECTION, SOLUTION INTRAVENOUS at 17:55

## 2019-12-29 RX ADMIN — Medication 400 MG: at 05:34

## 2019-12-29 RX ADMIN — FAMOTIDINE 20 MG: 20 TABLET ORAL at 05:34

## 2019-12-29 RX ADMIN — CALCIUM 500 MG: 500 TABLET ORAL at 17:01

## 2019-12-29 RX ADMIN — LEVOTHYROXINE SODIUM 112 MCG: 112 TABLET ORAL at 05:33

## 2019-12-29 RX ADMIN — OXYCODONE HYDROCHLORIDE 5 MG: 5 TABLET ORAL at 17:00

## 2019-12-29 RX ADMIN — TIMOLOL MALEATE 1 DROP: 5 SOLUTION OPHTHALMIC at 05:35

## 2019-12-29 RX ADMIN — ASPIRIN 81 MG: 81 TABLET, COATED ORAL at 05:34

## 2019-12-29 RX ADMIN — ACETAMINOPHEN 650 MG: 325 TABLET, FILM COATED ORAL at 05:33

## 2019-12-29 RX ADMIN — LATANOPROST 1 DROP: 50 SOLUTION OPHTHALMIC at 17:02

## 2019-12-29 ASSESSMENT — ENCOUNTER SYMPTOMS
ABDOMINAL PAIN: 1
NERVOUS/ANXIOUS: 0
WHEEZING: 0
VOMITING: 0
CHILLS: 0
FEVER: 0
FALLS: 0
SPUTUM PRODUCTION: 0
CONSTIPATION: 0
INSOMNIA: 0
PALPITATIONS: 0
EYES NEGATIVE: 1
DIARRHEA: 0
HEADACHES: 0
DIZZINESS: 0
NAUSEA: 0
FLANK PAIN: 0
COUGH: 0

## 2019-12-29 NOTE — CARE PLAN
Problem: Infection  Goal: Will remain free from infection  Outcome: PROGRESSING AS EXPECTED     Problem: Pain Management  Goal: Pain level will decrease to patient's comfort goal  Outcome: PROGRESSING AS EXPECTED     Problem: Communication  Goal: The ability to communicate needs accurately and effectively will improve  Outcome: MET     Problem: Respiratory:  Goal: Respiratory status will improve  Outcome: MET

## 2019-12-29 NOTE — CONSULTS
"DATE OF SERVICE:  12/29/2019    INFECTIOUS DISEASES CONSULTATION    REASON FOR CONSULT:  Abdominal abscess.    CONSULTING PHYSICIAN:  Tremaine Arnett MD    HISTORY OF PRESENT ILLNESS:  This is an 84-year-old white female with prior   cystectomy with ileal conduit, atrial fibrillation, hypothyroidism who was   originally admitted to the hospital on 12/20/2019 due to increasing abdominal   pain.  She has had problems over the previous year with partial small bowel   obstructions requiring NG tube decompression and had been successfully managed   conservatively.  On the day of admission, she had had nausea and vomiting for   the previous 3 days, again with the significant abdominal pain.  CT scan   showed small-bowel obstruction with thickened gastric wall, so general surgery   was consulted.  She was started on a proton pump inhibitor.  She was noted to   be in acute renal failure, so she was given IV fluids.  She underwent a   ventral hernia repair using the robotic da Javad device with lysis of   adhesions.  During the dissection of the adhesions, a small enterotomy   occurred in the small intestine, which was sutured.  The hernia was noted to   be the cause of her obstruction.  This was done on 12/24/2019.  LUCIA drain   cultures are now growing Bacteroides fragilis, so infectious disease is   consulted for antibiotic recommendations and management.  The patient is not   currently on any antibiotics.  She was given 2 doses of ceftriaxone postop and   less than 48 hours of Unasyn postop.    REVIEW OF SYSTEMS:  Notable for improvement in her overall abdominal pain.    She is tolerating p.o. without nausea or vomiting.  She is hopeful to go home   soon.  All other systems are reviewed and are negative.    ALLERGIES:  She actually denies any current allergies.  She states all her   allergies to medications were \"years ago.\"  SHE HAD SWELLING WITH CARDIZEM,   SWOLLEN LIMBS WITH DOXYCYCLINE, TONGUE SWELLING WITH SULFA, " AND ORAL SWELLING   WITH ZYVOX, AND AN UNSPECIFIED REACTION TO CODEINE.    PAST MEDICAL HISTORY:  Hypertension, atrial fibrillation, TIA, hypothyroidism,   cystectomy with ileal conduit placement, recurrent bowel obstruction.    FAMILY HISTORY:  Stroke.    SOCIAL HISTORY:  She has never smoked, drank or used illicit drugs.  She has   no recent travel.  No new animal exposures.    PHYSICAL EXAMINATION:  GENERAL:  She is a well-nourished, well-developed female, in no acute   distress, pleasant and cooperative.  VITAL SIGNS:  Her last fever was on 12/26 of 100.4, current temperature is   97.3, blood pressure 99/62, pulse 66, respiratory rate 16, oxygen saturation   96% on room air.  HEENT:  Head is normocephalic, atraumatic.  Pupils are equal, round, reactive   to light.  Extraocular movements intact.  She has anicteric sclerae.  She has   no conjunctivitis.  NECK:  Supple.  There is no JVD or stridor.  CARDIOVASCULAR:  Irregular rate and rhythm.  CHEST:  Clear to auscultation bilaterally, unlabored.  There is no wheezing or   rhonchi.  She has decreased breath sounds at the bases.  No chest tenderness.  ABDOMEN:  Soft, nontender, nondistended.  There is no rebound or guarding.    She has a LUCIA drain in place with copious serosanguineous drainage.  Her   dressing is saturated.  She has an ileostomy in place on the right side.  EXTREMITIES:  Show no cyanosis, clubbing, or edema.  NEUROLOGIC:  She is awake, alert and oriented.  Speech is fluent without   dysarthria.  Cranial nerves are intact.  She is moving all extremities.  PSYCHIATRIC:  Behavior normal, affect normal.    LABORATORY DATA:  Current labs show white blood cell count of 15.3, H and H of   10.8 and 33.8, platelets of 227.  Sodium 136, potassium 4.2, chloride 109,   bicarbonate 20, glucose 86, BUN 21, creatinine 0.92, her prior admitting   creatinine was 1.75.  Urinalysis on 12/25 showed small leukocyte esterase,   10-20 wbc's.  She is immune to hepatitis A.   Hep B and C testing were   negative.  Blood cultures x2 on 10/25 were negative.  Urine culture on 12/25   was negative.  LUCIA drain culture from the wound is showing Bacteroides   fragilis.    DIAGNOSTIC DATA:  CT scan done on 12/20 shows a right lower lobe granuloma,   probable cirrhosis of the liver.  Spleen, pancreas, adrenals were normal.    Scarring of the kidneys were noted.  Parastomal hernia was noted.  Diffuse   gastric wall thickening.  Small bowel diffusely dilated consistent with   small-bowel obstruction and diffuse marked gastric wall thickening, moderate   hiatal hernia, probable cirrhosis.  Chest x-ray on 12/25 showed no pneumonia.    EKG on 12/28 showed a QTc of 460, atrial fibrillation.  She had prior TUTU   studies and rheumatologic studies that were positive.    ASSESSMENT AND PLAN:  An 84-year-old female with complicated past medical   history, admitted with a small-bowel obstruction who is status post lysis of   adhesions and repair of hernia, complicated by enterotomy.  LUCIA drain is in   place.  Fluid is serosanguineous and does not appear infected; however,   cultures were positive for Bacteroides fragilis.  I do recommend repeat CT   scan to verify no development of abscess.  Continue to follow up culture   results.  I have started on Unasyn pending final culture results.  She is   currently afebrile, but she does have persistent leukocytosis.  Her previous   acute kidney injury has improved.  We will need to monitor closely and dose   adjust the antibiotics accordingly.  She has had intermittent episodes of   hypotension that have improved with IV fluids.  She has not required pressor   therapy.  Her initial CT scan did show significant abnormalities in her   stomach, would consider GI evaluation as she also has evidence of cirrhosis.    She may need EGD as well as biopsy.  Discussed with Dr. Arnett.  Further recommendations per culture results and clinical course.    Thank you and we will  follow with you.       ____________________________________     MD ANNIE NELSON / CADE    DD:  12/29/2019 12:23:10  DT:  12/29/2019 15:46:20    D#:  [id number]  Job#:  [id number]

## 2019-12-29 NOTE — PROGRESS NOTES
Doing well  No pain  Benign  Exam  appetite relatively poor.  Ok to discharge anytime from surgery jayda

## 2019-12-29 NOTE — CARE PLAN
Problem: Safety  Goal: Will remain free from injury  Outcome: PROGRESSING AS EXPECTED     Problem: Infection  Goal: Will remain free from infection  Outcome: PROGRESSING AS EXPECTED     Problem: Fluid Volume:  Goal: Will maintain balanced intake and output  Outcome: PROGRESSING AS EXPECTED     Problem: Pain Management  Goal: Pain level will decrease to patient's comfort goal  Outcome: PROGRESSING AS EXPECTED     Problem: Venous Thromboembolism (VTW)/Deep Vein Thrombosis (DVT) Prevention:  Goal: Patient will participate in Venous Thrombosis (VTE)/Deep Vein Thrombosis (DVT)Prevention Measures  Outcome: PROGRESSING SLOWER THAN EXPECTED  Note:   Pt refusing SCDs, ambulating to bathroom     Problem: Bowel/Gastric:  Goal: Normal bowel function is maintained or improved  Outcome: PROGRESSING SLOWER THAN EXPECTED

## 2019-12-30 LAB
ANION GAP SERPL CALC-SCNC: 7 MMOL/L (ref 0–11.9)
BACTERIA BLD CULT: NORMAL
BACTERIA BLD CULT: NORMAL
BASOPHILS # BLD AUTO: 0 % (ref 0–1.8)
BASOPHILS # BLD: 0 K/UL (ref 0–0.12)
BUN SERPL-MCNC: 17 MG/DL (ref 8–22)
CALCIUM SERPL-MCNC: 7.6 MG/DL (ref 8.5–10.5)
CHLORIDE SERPL-SCNC: 110 MMOL/L (ref 96–112)
CO2 SERPL-SCNC: 21 MMOL/L (ref 20–33)
CREAT SERPL-MCNC: 0.82 MG/DL (ref 0.5–1.4)
EOSINOPHIL # BLD AUTO: 0.92 K/UL (ref 0–0.51)
EOSINOPHIL NFR BLD: 5.3 % (ref 0–6.9)
ERYTHROCYTE [DISTWIDTH] IN BLOOD BY AUTOMATED COUNT: 52.4 FL (ref 35.9–50)
GLUCOSE SERPL-MCNC: 91 MG/DL (ref 65–99)
HCT VFR BLD AUTO: 35.5 % (ref 37–47)
HGB BLD-MCNC: 11.6 G/DL (ref 12–16)
LYMPHOCYTES # BLD AUTO: 1.37 K/UL (ref 1–4.8)
LYMPHOCYTES NFR BLD: 7.9 % (ref 22–41)
MAGNESIUM SERPL-MCNC: 1.9 MG/DL (ref 1.5–2.5)
MANUAL DIFF BLD: NORMAL
MCH RBC QN AUTO: 28.6 PG (ref 27–33)
MCHC RBC AUTO-ENTMCNC: 32.7 G/DL (ref 33.6–35)
MCV RBC AUTO: 87.7 FL (ref 81.4–97.8)
MONOCYTES # BLD AUTO: 2.58 K/UL (ref 0–0.85)
MONOCYTES NFR BLD AUTO: 14.9 % (ref 0–13.4)
MORPHOLOGY BLD-IMP: NORMAL
NEUTROPHILS # BLD AUTO: 12.44 K/UL (ref 2–7.15)
NEUTROPHILS NFR BLD: 71.9 % (ref 44–72)
NRBC # BLD AUTO: 0 K/UL
NRBC BLD-RTO: 0 /100 WBC
PLATELET # BLD AUTO: 265 K/UL (ref 164–446)
PLATELET BLD QL SMEAR: NORMAL
PMV BLD AUTO: 11.1 FL (ref 9–12.9)
POTASSIUM SERPL-SCNC: 4.5 MMOL/L (ref 3.6–5.5)
RBC # BLD AUTO: 4.05 M/UL (ref 4.2–5.4)
RBC BLD AUTO: NORMAL
SIGNIFICANT IND 70042: NORMAL
SIGNIFICANT IND 70042: NORMAL
SITE SITE: NORMAL
SITE SITE: NORMAL
SODIUM SERPL-SCNC: 138 MMOL/L (ref 135–145)
SOURCE SOURCE: NORMAL
SOURCE SOURCE: NORMAL
WBC # BLD AUTO: 17.3 K/UL (ref 4.8–10.8)

## 2019-12-30 PROCEDURE — 80048 BASIC METABOLIC PNL TOTAL CA: CPT

## 2019-12-30 PROCEDURE — A9270 NON-COVERED ITEM OR SERVICE: HCPCS | Performed by: HOSPITALIST

## 2019-12-30 PROCEDURE — A9270 NON-COVERED ITEM OR SERVICE: HCPCS | Performed by: NURSE PRACTITIONER

## 2019-12-30 PROCEDURE — 700105 HCHG RX REV CODE 258: Performed by: INTERNAL MEDICINE

## 2019-12-30 PROCEDURE — 700102 HCHG RX REV CODE 250 W/ 637 OVERRIDE(OP): Performed by: SURGERY

## 2019-12-30 PROCEDURE — 36415 COLL VENOUS BLD VENIPUNCTURE: CPT

## 2019-12-30 PROCEDURE — 99232 SBSQ HOSP IP/OBS MODERATE 35: CPT | Performed by: INTERNAL MEDICINE

## 2019-12-30 PROCEDURE — 85027 COMPLETE CBC AUTOMATED: CPT

## 2019-12-30 PROCEDURE — 83735 ASSAY OF MAGNESIUM: CPT

## 2019-12-30 PROCEDURE — 700102 HCHG RX REV CODE 250 W/ 637 OVERRIDE(OP): Performed by: NURSE PRACTITIONER

## 2019-12-30 PROCEDURE — 85007 BL SMEAR W/DIFF WBC COUNT: CPT

## 2019-12-30 PROCEDURE — 700111 HCHG RX REV CODE 636 W/ 250 OVERRIDE (IP): Performed by: INTERNAL MEDICINE

## 2019-12-30 PROCEDURE — A9270 NON-COVERED ITEM OR SERVICE: HCPCS | Performed by: INTERNAL MEDICINE

## 2019-12-30 PROCEDURE — A9270 NON-COVERED ITEM OR SERVICE: HCPCS | Performed by: SURGERY

## 2019-12-30 PROCEDURE — 700102 HCHG RX REV CODE 250 W/ 637 OVERRIDE(OP): Performed by: HOSPITALIST

## 2019-12-30 PROCEDURE — 770006 HCHG ROOM/CARE - MED/SURG/GYN SEMI*

## 2019-12-30 PROCEDURE — 700102 HCHG RX REV CODE 250 W/ 637 OVERRIDE(OP): Performed by: INTERNAL MEDICINE

## 2019-12-30 RX ADMIN — HEPARIN SODIUM 5000 UNITS: 5000 INJECTION, SOLUTION INTRAVENOUS; SUBCUTANEOUS at 17:54

## 2019-12-30 RX ADMIN — ACETAMINOPHEN 650 MG: 325 TABLET, FILM COATED ORAL at 17:54

## 2019-12-30 RX ADMIN — OMEPRAZOLE 20 MG: 20 CAPSULE, DELAYED RELEASE ORAL at 04:31

## 2019-12-30 RX ADMIN — OXYCODONE HYDROCHLORIDE 5 MG: 5 TABLET ORAL at 17:53

## 2019-12-30 RX ADMIN — OXYCODONE HYDROCHLORIDE 5 MG: 5 TABLET ORAL at 04:30

## 2019-12-30 RX ADMIN — AMPICILLIN SODIUM AND SULBACTAM SODIUM 3 G: 2; 1 INJECTION, POWDER, FOR SOLUTION INTRAMUSCULAR; INTRAVENOUS at 11:11

## 2019-12-30 RX ADMIN — METOPROLOL TARTRATE 100 MG: 100 TABLET ORAL at 17:54

## 2019-12-30 RX ADMIN — CALCIUM 500 MG: 500 TABLET ORAL at 07:52

## 2019-12-30 RX ADMIN — ACETAMINOPHEN 650 MG: 325 TABLET, FILM COATED ORAL at 11:11

## 2019-12-30 RX ADMIN — ACETAMINOPHEN 650 MG: 325 TABLET, FILM COATED ORAL at 04:31

## 2019-12-30 RX ADMIN — HEPARIN SODIUM 5000 UNITS: 5000 INJECTION, SOLUTION INTRAVENOUS; SUBCUTANEOUS at 04:30

## 2019-12-30 RX ADMIN — LEVOTHYROXINE SODIUM 112 MCG: 112 TABLET ORAL at 04:31

## 2019-12-30 RX ADMIN — ACETAMINOPHEN 650 MG: 325 TABLET, FILM COATED ORAL at 21:05

## 2019-12-30 RX ADMIN — Medication 400 MG: at 17:54

## 2019-12-30 RX ADMIN — TIMOLOL MALEATE 1 DROP: 5 SOLUTION OPHTHALMIC at 04:30

## 2019-12-30 RX ADMIN — TIMOLOL MALEATE 1 DROP: 5 SOLUTION OPHTHALMIC at 17:54

## 2019-12-30 RX ADMIN — AMPICILLIN SODIUM AND SULBACTAM SODIUM 3 G: 2; 1 INJECTION, POWDER, FOR SOLUTION INTRAMUSCULAR; INTRAVENOUS at 04:31

## 2019-12-30 RX ADMIN — METOPROLOL TARTRATE 100 MG: 100 TABLET ORAL at 04:31

## 2019-12-30 RX ADMIN — ASPIRIN 81 MG: 81 TABLET, COATED ORAL at 04:31

## 2019-12-30 RX ADMIN — MELATONIN 1000 UNITS: at 04:31

## 2019-12-30 RX ADMIN — FAMOTIDINE 20 MG: 20 TABLET ORAL at 04:31

## 2019-12-30 RX ADMIN — CALCIUM 500 MG: 500 TABLET ORAL at 17:54

## 2019-12-30 RX ADMIN — AMPICILLIN SODIUM AND SULBACTAM SODIUM 3 G: 2; 1 INJECTION, POWDER, FOR SOLUTION INTRAMUSCULAR; INTRAVENOUS at 23:20

## 2019-12-30 RX ADMIN — LATANOPROST 1 DROP: 50 SOLUTION OPHTHALMIC at 17:53

## 2019-12-30 RX ADMIN — Medication 400 MG: at 04:31

## 2019-12-30 RX ADMIN — AMPICILLIN SODIUM AND SULBACTAM SODIUM 3 G: 2; 1 INJECTION, POWDER, FOR SOLUTION INTRAMUSCULAR; INTRAVENOUS at 17:54

## 2019-12-30 ASSESSMENT — ENCOUNTER SYMPTOMS
CONSTIPATION: 0
FEVER: 0
WHEEZING: 0
FLANK PAIN: 0
DIZZINESS: 0
FALLS: 0
SPUTUM PRODUCTION: 0
EYES NEGATIVE: 1
NAUSEA: 0
HEADACHES: 0
INSOMNIA: 0
VOMITING: 0
ABDOMINAL PAIN: 0
CHILLS: 0
NERVOUS/ANXIOUS: 0
SHORTNESS OF BREATH: 0
PALPITATIONS: 0
DIARRHEA: 0
ABDOMINAL PAIN: 1
COUGH: 0

## 2019-12-30 NOTE — PROGRESS NOTES
Infectious Disease Progress Note    Author: Angela Garcia M.D. Date & Time of service: 2019  11:56 AM    Chief Complaint:  Follow-up for abdominal abscess    Interval History:  83-year-old woman with prior cystectomy with ileal conduit, atrial fibrillation admitted for increasing abdominal pain.  Patient found to have an incarcerated parastomal hernia causing a bowel obstruction status post hernia repair and diagnostic laparoscopy and lysis of adhesions on 2019.  Postoperative course complicated by an intra-abdominal abscess.     afebrile, WBC 17.3.  Patient has ongoing abdominal pain but slightly improved today.  She states her LUCIA drain continues to fill up with fluid.  She has no nausea or vomiting.    Labs Reviewed, Medications Reviewed and Wound Reviewed.    Review of Systems:  Review of Systems   Constitutional: Negative for chills and fever.   Respiratory: Negative for cough and shortness of breath.    Cardiovascular: Negative for chest pain.   Gastrointestinal: Positive for abdominal pain. Negative for nausea and vomiting.   Neurological: Negative for dizziness and headaches.   All other systems reviewed and are negative.      Hemodynamics:  Temp (24hrs), Av.7 °C (98 °F), Min:36.2 °C (97.1 °F), Max:37.1 °C (98.7 °F)  Temperature: 36.7 °C (98 °F)  Pulse  Av.9  Min: 53  Max: 124   Blood Pressure : 120/65       Physical Exam:  Physical Exam  Constitutional:       Appearance: Normal appearance.   HENT:      Mouth/Throat:      Mouth: Mucous membranes are moist.      Pharynx: No oropharyngeal exudate.   Eyes:      Extraocular Movements: Extraocular movements intact.      Conjunctiva/sclera: Conjunctivae normal.      Pupils: Pupils are equal, round, and reactive to light.   Neck:      Musculoskeletal: Normal range of motion and neck supple.   Cardiovascular:      Rate and Rhythm: Normal rate and regular rhythm.   Pulmonary:      Effort: Pulmonary effort is normal.      Breath sounds:  Normal breath sounds.   Abdominal:      General: There is distension.      Palpations: Abdomen is soft.      Tenderness: There is tenderness.      Comments: Left lower quadrant LUCIA drain+ serosanguineous fluid    Right lower quadrant ileostomy   Musculoskeletal:         General: No swelling.   Skin:     General: Skin is warm.   Neurological:      General: No focal deficit present.      Mental Status: She is alert and oriented to person, place, and time.      Cranial Nerves: No cranial nerve deficit.   Psychiatric:         Mood and Affect: Mood normal.         Behavior: Behavior normal.         Meds:    Current Facility-Administered Medications:   •  vitamin D  •  ampicillin-sulbactam (UNASYN) IV  •  calcium carbonate  •  magnesium oxide  •  psyllium  •  acetaminophen  •  famotidine  •  heparin  •  Notify provider if pain remains uncontrolled **AND** Use the numeric rating scale (NRS-11) on regular floors and Critical-Care Pain Observation Tool (CPOT) on ICUs/Trauma to assess pain **AND** Pulse Ox (Oximetry) **AND** Pharmacy Consult Request **AND** If patient difficult to arouse and/or has respiratory depression, stop any opiates that are currently infusing and call a Rapid Response. **AND** oxyCODONE immediate-release **AND** [DISCONTINUED] oxyCODONE immediate-release **AND** [DISCONTINUED] HYDROmorphone  •  omeprazole  •  aspirin EC  •  labetalol  •  ondansetron  •  ondansetron  •  latanoprost  •  levothyroxine  •  metoprolol  •  timolol    Labs:  Recent Labs     12/28/19  0227 12/29/19  0456 12/30/19  0819   WBC 15.3* 15.3* 17.3*   RBC 4.07* 3.84* 4.05*   HEMOGLOBIN 11.4* 10.8* 11.6*   HEMATOCRIT 36.2* 33.8* 35.5*   MCV 88.9 88.0 87.7   MCH 28.0 28.1 28.6   RDW 52.4* 52.2* 52.4*   PLATELETCT 213 227 265   MPV 11.7 11.0 11.1   NEUTSPOLYS 66.80 78.90* 71.90   LYMPHOCYTES 14.00* 7.00* 7.90*   MONOCYTES 14.10* 12.30 14.90*   EOSINOPHILS 2.30 1.80 5.30   BASOPHILS 0.60 0.00 0.00   RBCMORPHOLO  --  Present Normal      Recent Labs     12/28/19  0227 12/29/19  0456 12/30/19  0819   SODIUM 136 136 138   POTASSIUM 4.2 4.2 4.5   CHLORIDE 110 109 110   CO2 19* 20 21   GLUCOSE 93 86 91   BUN 23* 21 17     Recent Labs     12/28/19  0227 12/29/19  0456 12/30/19  0819   CREATININE 0.99 0.92 0.82       Imaging:  Ct-abdomen-pelvis With    Result Date: 12/29/2019 12/29/2019 5:41 PM HISTORY/REASON FOR EXAM:  Abdominal infection suspected; POD #5. TECHNIQUE/EXAM DESCRIPTION:   CT scan of the abdomen and pelvis with contrast. Contrast-enhanced helical scanning was obtained from the diaphragmatic domes through the pubic symphysis following the bolus administration of nonionic contrast without complication. 100 mL of Omnipaque 350 nonionic contrast was administered without complication. Low dose optimization technique was utilized for this CT exam including automated exposure control and adjustment of the mA and/or kV according to patient size. COMPARISON: 12/20/2019 FINDINGS: CT Abdomen: Visualized lung bases show small bilateral pleural effusions with associated atelectasis, worse on the RIGHT. Fluid present in the upper abdomen. Liver is heterogeneous with nodular margins.  Recanalization of umbilical vein. Portal, splenic and superior mesenteric veins are patent. Spleen is top normal in size. Adrenal glands are unremarkable. Both kidneys show lobular contour.  Collecting systems are dilated bilaterally, more apparent on the RIGHT. The pancreas is unremarkable. Gallbladder is absent. Moderate hiatal hernia. CT Pelvis: Bladder is absent.  RIGHT lower quadrant urinary diversion present. Colonic diverticula noted. Diffuse mesenteric edema. Wall thickening of ascending and proximal transverse colon. Wall thickening of small bowel loops in the mid abdomen. Moderate atherosclerotic calcification of abdominal aorta. No pneumoperitoneum. Drain present in the pelvis anteriorly. Anastomotic suture line in the lower abdomen anteriorly. RIGHT lower  quadrant stoma.     1.  Postoperative change from cystectomy and urinary diversion. 2.  Moderate volume peritoneal fluid with small amount of hyperdense fluid in the pelvis possibly indicating hemorrhagic component. 3.  Wall thickening of proximal colon and abdominal small bowel loops suggesting enterocolitis. 4.  Bilateral mild to moderate hydronephrosis, worse on the RIGHT. 5.  Hepatic cirrhosis with portal hypertension. 6.  Small bilateral pleural effusions with associated atelectasis.    Ct-renal Colic Evaluation(a/p W/o)    Result Date: 12/20/2019 12/20/2019 12:42 PM HISTORY/REASON FOR EXAM:  CT abd pelvis no contrast ro kidney stone (renal colic protocol). Hernia, vomiting, evaluate for bowel obstruction TECHNIQUE/EXAM DESCRIPTION AND NUMBER OF VIEWS: CT scan renal/colic without contrast. 5 mm helical images of the abdomen and pelvis were obtained from the diaphragmatic domes through the pubic symphysis. Low dose optimization technique was utilized for this CT exam including automated exposure control and adjustment of the mA and/or kV according to patient size. COMPARISON: 1 view abdomen 3/20/2019 and CT abdomen and pelvis 3/13/2019 FINDINGS: 12/20/2019 12:42 PM HISTORY/REASON FOR EXAM:  CT abd pelvis no contrast ro kidney stone (renal colic protocol). TECHNIQUE/EXAM DESCRIPTION AND NUMBER OF VIEWS: CT scan renal/colic without contrast. 5 mm helical images of the abdomen and pelvis were obtained from the diaphragmatic domes through the pubic symphysis. Low dose optimization technique was utilized for this CT exam including automated exposure control and adjustment of the mA and/or kV according to patient size. COMPARISON: None. FINDINGS: LUNGS: There is a right lower lobe calcification consistent with a granuloma. There is minimal linear density consistent with atelectasis or scar. Lungs are otherwise clear. OSSEOUS structures:There are spinal degenerative changes. Abdomen: There is a moderate-sized hiatal  hernia. LIVER: Probable cirrhosis. No focal abnormality identified within limitations of noncontrast scanning.. GALLBLADDER and BILIARY: There is no biliary dilation. SPLEEN: The spleen is normal in noncontrast appearance. PANCREAS: The pancreas is normal in noncontrast appearance. ADRENALS: Both adrenal glands are normal in appearance. KIDNEYS: Both kidney show focal volume loss consistent with scarring. Both ureter are dilated. There appears to be a urinary diversion which accounts for this finding. There is a peristomal hernia. There are no renal or ureteral calculi. There is no hydronephrosis. BOWEL: There is diffuse, marked gastric wall thickening consistent with inflammation or neoplasm. Small bowel is diffusely dilated with transition point distally consistent with small bowel obstruction. Assessment without oral or IV contrast is limited. There is change in caliber of the loop exiting the most dilated small bowel loop and which enters the peristomal hernia. This could be cause of obstruction. AORTA: is normal in appearance. APPENDIX: Absent No inflammatory process is identified within the abdomen or pelvis. Pelvis: Prior hysterectomy.     1.  Small bowel obstruction 2.  There is transition point in the right lower quadrant with exiting small bowel loop decompressed and entering peristomal hernia. This could be etiology of the small bowel obstruction 3.  Diffuse, marked gastric wall thickening consistent with inflammation or neoplasm 4.  Moderate size hiatal hernia 5.  Probable cirrhosis 6.  Prior cholecystectomy 7.  Bilateral renal scarring 8.  Moderately limited assessment due to lack of oral and IV contrast administration    [id number] View    Result Date: 12/20/2019 12/20/2019 5:58 PM HISTORY/REASON FOR EXAM:  Tube evaluation. TECHNIQUE/EXAM DESCRIPTION AND NUMBER OF VIEWS:  2 view(s) of the abdomen. COMPARISON:  CT 12/20/2019. FINDINGS: Limited single view of the abdomen performed primarily to  evaluate enteric tube position. The tip projects over the expected area of the distal esophagus/hiatal hernia. The bowel gas pattern is within normal limits.     Gastric drainage tube loops in the hiatal hernia with tip projecting over the expected area of the hiatal hernia.    Dx-chest-limited (1 View)    Result Date: 12/25/2019 12/25/2019 8:21 AM HISTORY/REASON FOR EXAM: leucocytosis. TECHNIQUE/EXAM DESCRIPTION AND NUMBER OF VIEWS: Single portable view of the chest. COMPARISON: 3/15/2019. FINDINGS: Cardiomediastinal contours are stable, enlarged Lungs demonstrate stable right hemidiaphragm elevation with adjacent linear opacity. There is new left costophrenic sulcus mild opacity No pneumothorax is seen.     New mild left basilar opacity is more likely from atelectasis than consolidation Otherwise stable chest with right hemidiaphragm elevation, adjacent atelectasis or scarring. This also could obscure consolidation    Dx-upper Gi-small Bowel Follow Thru    Result Date: 12/21/2019 12/21/2019 8:10 AM HISTORY/REASON FOR EXAM:  Small bowel obstruction. TECHNIQUE/EXAM DESCRIPTION AND NUMBER OF VIEWS: Single contrast upper GI series was performed with small bowel follow-through. Multiple fluoroscopic and overhead images were obtained before and after contrast administration. 12 fluoroscopic images obtained. Total fluoroscopy time: 1.5 minutes. COMPARISON:  CT abdomen and pelvis 12/20/2019 FINDINGS: Esophagus: NG tube is in place. Limited retrograde filling of the esophagus did not demonstrate any definite stricture or mucosal abnormality. Gastroesophageal junction:  There is a small hiatal hernia. Stomach:  Normal contours and mucosal pattern on single-contrast imaging. Duodenum:  Bulb and sweep are normal.  Duodenal-jejunal junction is slightly lower than the expected position. Proximal jejunal loops are in the left upper quadrant. Small bowel:   Distended jejunal and ileal loops are again noted as on the recent CT.   No contrast extravasation is identified. No filling defects are identified. Contrast reached the colon in 90 minutes.     1.  Small hiatal hernia. 2.  No significant fold thickening in the stomach to suggest an infiltrative process. 3.  Distended small bowel loops as seen on recent CT are consistent with partial obstruction. Contrast reached the colon in 90 minutes.    Ir-us Guided Piv    Result Date: 12/28/2019  EXAMINATION:                                                                    HISTORY/REASON FOR EXAM:  Ultrasound Guided PIV.  TECHNIQUE/EXAM DESCRIPTION AND NUMBER OF VIEWS:  Peripheral IV insertion with ultrasound guidance.  The procedure was prepared using maximal sterile barrier technique including sterile gown, mask, cap, and donning of sterile gloves following appropriate hand hygiene and/or sterile scrub. Patient skin site was prepped with 2% Chlorhexidine solution.   FINDINGS: Peripheral IV insertion with Ultrasound Guidance was performed by qualified imaging nursing staff without the assistance of a Radiologist.      Ultrasound-guided PERIPHERAL IV INSERTION performed by qualified nursing staff as above.     Dx-abdomen For Tube Placement    Result Date: 12/20/2019 12/20/2019 3:55 PM HISTORY/REASON FOR EXAM:  Line evaluation. TECHNIQUE/EXAM DESCRIPTION AND NUMBER OF VIEWS:  2 view(s) of the abdomen. COMPARISON:  3/20/2019 x-ray, CT same day. FINDINGS: Enteric tube has been placed. The tip projects over the esophagus distal to the karel. The NG tube loops over the distal mediastinum where there is a medium size hiatal hernia. The bowel gas pattern is within normal limits.     NG tube terminates over the approximate location of the GE junction in this patient with a known medium-sized hiatal hernia in which it coils. The tip may be in the distal esophagus Page pending for the referring service at 1611      Micro:  Results     Procedure Component Value Units Date/Time    CULTURE WOUND W/ GRAM  "STAIN [[id number]]  (Abnormal) Collected:  12/25/19 0900    Order Status:  Completed Specimen:  Wound Updated:  12/28/19 1354     Significant Indicator POS     Source WND     Site LUCIA Drain     Culture Result Growth noted after further incubation, see below for  organism identification.       Gram Stain Result Many WBCs.  No organisms seen.       Culture Result Bacteroides fragilis  Isolated from enrichment broth only, please correlate with  clinical condition.      Narrative:       Collected By:[id number] DEBRA MCINTOSH  Drainage from LUCIA catheter  Collected By:[id number] DEBRA CMINTOSH    URINE CULTURE(NEW) [[id number]] Collected:  12/25/19 0900    Order Status:  Completed Specimen:  Urine, Clean Catch Updated:  12/27/19 0751     Significant Indicator NEG     Source UR     Site URINE, CLEAN CATCH     Culture Result No growth at 48 hours.    Narrative:       Collected By:[id number] DEBRA MCINTOSH  Indication for culture:->Unexplained new onset of Flank pain  and/or Costovertebral angle tenderness  Collected By:[id number] DEBRA MCINTOSH    Blood Culture [[id number]] Collected:  12/25/19 1047    Order Status:  Completed Specimen:  Blood from Peripheral Updated:  12/26/19 0851     Significant Indicator NEG     Source BLD     Site PERIPHERAL     Culture Result No Growth  Note: Blood cultures are incubated for 5 days and  are monitored continuously.Positive blood cultures  are called to the RN and reported as soon as  they are identified.      Narrative:       Per Hospital Policy: Only change Specimen Src: to \"Line\" if  specified by physician order.  No site indicated    Blood Culture [[id number]] Collected:  12/25/19 1047    Order Status:  Completed Specimen:  Blood from Peripheral Updated:  12/26/19 0851     Significant Indicator NEG     Source BLD     Site PERIPHERAL     Culture Result No Growth  Note: Blood cultures are incubated for 5 days and  are monitored continuously.Positive blood cultures  are called to the RN and reported as soon " "as  they are identified.      Narrative:       Per Hospital Policy: Only change Specimen Src: to \"Line\" if  specified by physician order.  Left Hand    GRAM STAIN [[id number]] Collected:  12/25/19 0900    Order Status:  Completed Specimen:  Wound Updated:  12/25/19 1837     Significant Indicator .     Source WND     Site LUCIA Drain     Gram Stain Result Many WBCs.  No organisms seen.      Narrative:       Collected By:[id number] DEBRA MCINTOSH  Drainage from LUCIA catheter  Collected By:[id number] DEBRA MCINTOSH    URINALYSIS [[id number]]  (Abnormal) Collected:  12/25/19 0859    Order Status:  Completed Specimen:  Urine, Clean Catch Updated:  12/25/19 0937     Color Yellow     Character Clear     Specific Gravity 1.015     Ph 6.0     Glucose Negative mg/dL      Ketones Negative mg/dL      Protein Negative mg/dL      Bilirubin Negative     Urobilinogen, Urine 0.2     Nitrite Negative     Leukocyte Esterase Small     Occult Blood Trace     Micro Urine Req Microscopic    Narrative:       Collected By:[id number] DEBRA MCINTOSH  Drainage from LUCIA catheter    FLUID CULTURE W/GRAM STAIN [[id number]] Collected:  12/25/19 0900    Order Status:  Canceled Specimen:  Other Body Fluid           Assessment:  Active Hospital Problems    Diagnosis   • Small bowel obstruction (HCC) [K56.609]   • Hypomagnesemia [E83.42]   • CKD (chronic kidney disease) stage 3, GFR 30-59 ml/min (HCC) [N18.3]   • Paroxysmal A-fib (HCC) [I48.0]   • Presence of urostomy (HCC) [Z93.6]   • Leukocytosis [D72.829]   • Enteritis [K52.9]   • Hypothyroid [E03.9]   • Vitamin D deficiency [E55.9]       Plan:  Positive drain cultures  Status post recent small bowel obstruction status post lysis of adhesions, repair of hernia and enterotomy on 12/24/2019 by Dr. Chavarria  LUCIA drain cultures+ Bacteroides fragilis  CT scan -no abscess however there is wall thickening of proximal colon and abdominal small bowel loops suggesting enterocolitis  Agree with Unasyn for now  Anticipate a 5-day course " total  Can transition to p.o. Augmentin at discharge  Stop date 01/2/2020    Acute kidney injury  Overall improving  Avoid nephrotoxins  Monitor    Leukocytosis, persistent  Multifactorial (recent surgery, possible enterocolitis noted on CT scan)  On antibiotics above  Monitor    Discussed with internal medicine.

## 2019-12-30 NOTE — PROGRESS NOTES
Hospital Medicine Daily Progress Note    Date of Service  12/30/2019    Chief Complaint  Abdominal pain, nausea, vomiting    Hospital Course    Ms. Wadsworth is an 84 year old female with PMH significant for total cystectomy with ileal conduit 2011, PAF not on chronic anticoagulation due to history of bleeding, rate controlled with Metoprolol, TIA, HTN, and hypothyroidism who presented 12/20/19 with abdominal pain.  She has been admitted multiple times this year for same requiring NG tube decompression and conservative therapy.  Day of presentation she presented with nausea and vomiting x3 days.  CT consistent with small bowel obstruction and thickened gastric wall.  General surgery was consulted and recommended PPI for gastritis, surgical intervention for SBO and follow-up EGD. She was also found to be in ARF which resolved with IV fluid hydration. Post-op course was complicated by leukocytosis. Tmax 100.4. Blood cultures (-), chest xray showed atelectasis without evidence of pneumonia. LUCIA drain cultures (-). UA indicated possible infection and she was treated with IV ABX. Urine culture (-).  CT abdomen pelvis done POD #5 showed moderate amount of peritoneal fluid within the pelvis, wall thickening of proximal colon and abdominal wall small bowel loops consistent with enterocolitis and moderate bilateral hydronephrosis.  She will need to follow-up with GI for EGD and urology for hydronephrosis.      Interval Problem Update  12/30 - LUCIA still putting out moderate amount watery fluid.   -She reports good pain control    12/29 - wound culture (+) Bacteroides fragilis. Sensitivities pending. ID consulted. Recommend repeat CT - I have ordered this.  -tachycardia resolved. Mag normalized    12/28 - isolated episode of tachycardia (120's) overnight. /67 at that time. Patient asymptomatic. Scheduled Metoprolol administered early. HR's 80's this am. 12-lead EKG shows AFIB rates 130's (history of PAF). Afebrile.  "Leukocytosis improving.   -mag low at 1.6. Ordered IV replacement yesterday, however patient refused. Ordered IV Mag again this morning. Patient agreeable.   12/27 - MARSHALL resolved. DC IVF  -ongoing leukocytosis. Afebrile overnight. Pan cultures all negative to date.  -passing flatus, no BM. Advancing diet to Full Liquid.    12/26 - MARSHALL improving. UA indicating infection. Temp 100.4 today, ongoing leukocytosis. IV ABX.   -denies flatus. Hypoactive bowel sounds BLQ. Ambulating in the ozuna with walker. Reports good pain control.  12/25 - worsening MARSHLAL. Episode of hypotension overnight requiring NS bolus. I've increased her IVF today.  -leukocytosis - afebrile. checking blood cultures, UA, chest xray  -pain management  12/24 - Seen and examined after surgery.  She is drowsy but easily arousable and oriented x3.  She reports \"at least\" 6/10 abdominal pain.  -Ileostomy with pink stoma draining clear yellow urine.  Hemodynamically stable    Consultants/Specialty  -General Surgery  -Infectious Disease    Code Status  FULL    Disposition  Likely to DC tomorrow if OK with Dr. Chavarria    Review of Systems  Review of Systems   Constitutional: Negative for chills and fever.   HENT: Positive for hearing loss.    Eyes: Negative.    Respiratory: Negative for cough, sputum production and wheezing.    Cardiovascular: Negative for chest pain and palpitations.   Gastrointestinal: Negative for abdominal pain (\"I have none right now\"), constipation, diarrhea, nausea and vomiting.   Genitourinary: Negative for flank pain and hematuria.   Musculoskeletal: Negative for falls and joint pain.   Neurological: Negative for dizziness and headaches.   Psychiatric/Behavioral: The patient is not nervous/anxious and does not have insomnia.    All other systems reviewed and are negative.       Physical Exam  Temp:  [36.2 °C (97.1 °F)-37.1 °C (98.7 °F)] 36.7 °C (98 °F)  Pulse:  [67-82] 67  Resp:  [16-20] 16  BP: (115-137)/(57-77) 120/65  SpO2:  [95 %-98 " %] 95 %    Physical Exam  Vitals signs and nursing note reviewed.   Constitutional:       Appearance: Normal appearance. She is not ill-appearing or toxic-appearing.   HENT:      Head: Normocephalic.      Right Ear: Hearing normal.      Left Ear: Hearing normal.      Nose: Nose normal.      Mouth/Throat:      Mouth: Mucous membranes are dry.   Eyes:      General: Lids are normal.   Cardiovascular:      Rate and Rhythm: Normal rate and regular rhythm.      Pulses: Normal pulses.      Heart sounds: Normal heart sounds.   Pulmonary:      Effort: Pulmonary effort is normal.      Breath sounds: Normal breath sounds. No wheezing or rhonchi.   Abdominal:      General: Bowel sounds are normal.      Comments: Ostomy with pink stoma  2 surgical stab wounds with Dermabond  1 surgical site with LUCIA drain with dressing CDI - LUCIA drainage thin, watery fluid    LUCIA drain with watery pink output    (+) flatus       Genitourinary:     Comments: Urostomy with clear yellow urine  Musculoskeletal:      Right lower leg: No edema.      Left lower leg: No edema.      Comments: Ambulating to BR and up in halls with walker   Skin:     General: Skin is warm.   Neurological:      General: No focal deficit present.      Mental Status: She is alert and oriented to person, place, and time.      Coordination: Coordination is intact.      Gait: Gait is intact.   Psychiatric:         Attention and Perception: Attention normal.         Mood and Affect: Mood normal.         Speech: Speech normal.         Behavior: Behavior is cooperative.         Thought Content: Thought content normal.         Cognition and Memory: Cognition normal.         Judgment: Judgment normal.         Fluids    Intake/Output Summary (Last 24 hours) at 12/30/2019 1446  Last data filed at 12/30/2019 1357  Gross per 24 hour   Intake 3093.33 ml   Output 2640 ml   Net 453.33 ml       Laboratory  Recent Labs     12/28/19  0227 12/29/19  0456 12/30/19  0819   WBC 15.3* 15.3* 17.3*  "  RBC 4.07* 3.84* 4.05*   HEMOGLOBIN 11.4* 10.8* 11.6*   HEMATOCRIT 36.2* 33.8* 35.5*   MCV 88.9 88.0 87.7   MCH 28.0 28.1 28.6   MCHC 31.5* 32.0* 32.7*   RDW 52.4* 52.2* 52.4*   PLATELETCT 213 227 265   MPV 11.7 11.0 11.1     Recent Labs     12/28/19  0227 12/29/19  0456 12/30/19  0819   SODIUM 136 136 138   POTASSIUM 4.2 4.2 4.5   CHLORIDE 110 109 110   CO2 19* 20 21   GLUCOSE 93 86 91   BUN 23* 21 17   CREATININE 0.99 0.92 0.82   CALCIUM 7.5* 7.6* 7.6*                   Imaging  CT-ABDOMEN-PELVIS WITH   Final Result      1.  Postoperative change from cystectomy and urinary diversion.   2.  Moderate volume peritoneal fluid with small amount of hyperdense fluid in the pelvis possibly indicating hemorrhagic component.   3.  Wall thickening of proximal colon and abdominal small bowel loops suggesting enterocolitis.   4.  Bilateral mild to moderate hydronephrosis, worse on the RIGHT.   5.  Hepatic cirrhosis with portal hypertension.   6.  Small bilateral pleural effusions with associated atelectasis.      IR-US GUIDED PIV   Final Result    Ultrasound-guided PERIPHERAL IV INSERTION performed by    qualified nursing staff as above.            DX-CHEST-LIMITED (1 VIEW)   Final Result      New mild left basilar opacity is more likely from atelectasis than consolidation      Otherwise stable chest with right hemidiaphragm elevation, adjacent atelectasis or scarring. This also could obscure consolidation      DX-UPPER GI-SMALL BOWEL FOLLOW THRU   Final Result      1.  Small hiatal hernia.      2.  No significant fold thickening in the stomach to suggest an infiltrative process.      3.  Distended small bowel loops as seen on recent CT are consistent with partial obstruction. Contrast reached the colon in 90 minutes.           Assessment/Plan  Small bowel obstruction (HCC)- (present on admission)  Assessment & Plan  -POD #6 \"Severe adhesions to a prior parastomal hernia mesh.  Incarcerated loop of small bowel causing " "obstruction\"  -full liquid diet  -(+) flatus  -general surgery following  -ambulating      Hypomagnesemia- (present on admission)  Assessment & Plan  -BID PO replacement  -follow lab    CKD (chronic kidney disease) stage 3, GFR 30-59 ml/min ([facility])- (present on admission)  Assessment & Plan  -chronic and stable at her baseline  -avoid nephrotoxins  -renally dose medications as indicated  -follow labs    Vitamin D deficiency  Assessment & Plan  -started her on daily supplementation today  -OP FU     Paroxysmal A-fib (HCC)- (present on admission)  Assessment & Plan  -rates controlled today  -asymptomatic  -not on chronic anticoagulation due to history of bleeding - continue ASA      Presence of urostomy (HCC)- (present on admission)  Assessment & Plan  -History of total cystectomy with ileal conduit 2011      Leukocytosis- (present on admission)  Assessment & Plan  -stable  -clinically shows no indications of infection  -incentive spirometry  -ambulation/mobility  -afebrile   -(+) wound culture - ID following - recommend Anticipate a 5-day course total. Can transition to p.o. Augmentin at discharge - Stop date 01/2/2020  -follow CBC    Enteritis- (present on admission)  Assessment & Plan  -Continue daily PPI  -Per general surgery recommendations, patient will need follow-up EGD for CT scan findings of thickened gastric wall      Hypothyroid- (present on admission)  Assessment & Plan  -continue Levothyroxine         VTE prophylaxis: Heparin & Ambulation     Please note that this dictation was created using voice recognition software. I have made every reasonable attempt to correct obvious errors, but there may be errors of grammar and possibly content that I did not discover before finalizing the note.     Dana Gaffney A.P.R.N.      "

## 2019-12-30 NOTE — PROGRESS NOTES
Gave patient Dilaudid on Har[place] morning for pain rated at 10.  Spoke to Dr Stewart first and blood pressure was improved enough to give.  Wasted Dilaudid in the room with RN Mary Kay who was giving bedside report for [address] bed 2. There was nobody available in the hallway at the time.  Forgot to waste at [facility] afterwards due to rapid nurse coming to see patient, calling surgeon, and giving report on my patient's.

## 2019-12-31 LAB
ANION GAP SERPL CALC-SCNC: 6 MMOL/L (ref 0–11.9)
BUN SERPL-MCNC: 16 MG/DL (ref 8–22)
CALCIUM SERPL-MCNC: 7.8 MG/DL (ref 8.5–10.5)
CHLORIDE SERPL-SCNC: 111 MMOL/L (ref 96–112)
CO2 SERPL-SCNC: 23 MMOL/L (ref 20–33)
CREAT SERPL-MCNC: 0.85 MG/DL (ref 0.5–1.4)
ERYTHROCYTE [DISTWIDTH] IN BLOOD BY AUTOMATED COUNT: 54.1 FL (ref 35.9–50)
GLUCOSE SERPL-MCNC: 94 MG/DL (ref 65–99)
HCT VFR BLD AUTO: 37.5 % (ref 37–47)
HGB BLD-MCNC: 12 G/DL (ref 12–16)
MCH RBC QN AUTO: 28.5 PG (ref 27–33)
MCHC RBC AUTO-ENTMCNC: 32 G/DL (ref 33.6–35)
MCV RBC AUTO: 89.1 FL (ref 81.4–97.8)
PLATELET # BLD AUTO: 257 K/UL (ref 164–446)
PMV BLD AUTO: 10.8 FL (ref 9–12.9)
POTASSIUM SERPL-SCNC: 4.3 MMOL/L (ref 3.6–5.5)
RBC # BLD AUTO: 4.21 M/UL (ref 4.2–5.4)
SODIUM SERPL-SCNC: 140 MMOL/L (ref 135–145)
WBC # BLD AUTO: 16.7 K/UL (ref 4.8–10.8)

## 2019-12-31 PROCEDURE — A9270 NON-COVERED ITEM OR SERVICE: HCPCS | Performed by: SURGERY

## 2019-12-31 PROCEDURE — A9270 NON-COVERED ITEM OR SERVICE: HCPCS | Performed by: NURSE PRACTITIONER

## 2019-12-31 PROCEDURE — 700102 HCHG RX REV CODE 250 W/ 637 OVERRIDE(OP): Performed by: SURGERY

## 2019-12-31 PROCEDURE — 700105 HCHG RX REV CODE 258: Performed by: INTERNAL MEDICINE

## 2019-12-31 PROCEDURE — 770006 HCHG ROOM/CARE - MED/SURG/GYN SEMI*

## 2019-12-31 PROCEDURE — 700111 HCHG RX REV CODE 636 W/ 250 OVERRIDE (IP): Performed by: INTERNAL MEDICINE

## 2019-12-31 PROCEDURE — 700102 HCHG RX REV CODE 250 W/ 637 OVERRIDE(OP): Performed by: NURSE PRACTITIONER

## 2019-12-31 PROCEDURE — 36415 COLL VENOUS BLD VENIPUNCTURE: CPT

## 2019-12-31 PROCEDURE — 700102 HCHG RX REV CODE 250 W/ 637 OVERRIDE(OP): Performed by: HOSPITALIST

## 2019-12-31 PROCEDURE — A9270 NON-COVERED ITEM OR SERVICE: HCPCS | Performed by: INTERNAL MEDICINE

## 2019-12-31 PROCEDURE — A9270 NON-COVERED ITEM OR SERVICE: HCPCS | Performed by: HOSPITALIST

## 2019-12-31 PROCEDURE — 700102 HCHG RX REV CODE 250 W/ 637 OVERRIDE(OP): Performed by: INTERNAL MEDICINE

## 2019-12-31 PROCEDURE — 80048 BASIC METABOLIC PNL TOTAL CA: CPT

## 2019-12-31 PROCEDURE — 99232 SBSQ HOSP IP/OBS MODERATE 35: CPT | Performed by: INTERNAL MEDICINE

## 2019-12-31 PROCEDURE — 99233 SBSQ HOSP IP/OBS HIGH 50: CPT | Performed by: INTERNAL MEDICINE

## 2019-12-31 PROCEDURE — 85027 COMPLETE CBC AUTOMATED: CPT

## 2019-12-31 RX ADMIN — METOPROLOL TARTRATE 100 MG: 100 TABLET ORAL at 17:37

## 2019-12-31 RX ADMIN — FAMOTIDINE 20 MG: 20 TABLET ORAL at 04:34

## 2019-12-31 RX ADMIN — TIMOLOL MALEATE 1 DROP: 5 SOLUTION OPHTHALMIC at 17:40

## 2019-12-31 RX ADMIN — OXYCODONE HYDROCHLORIDE 5 MG: 5 TABLET ORAL at 21:53

## 2019-12-31 RX ADMIN — AMPICILLIN SODIUM AND SULBACTAM SODIUM 3 G: 2; 1 INJECTION, POWDER, FOR SOLUTION INTRAMUSCULAR; INTRAVENOUS at 17:38

## 2019-12-31 RX ADMIN — LATANOPROST 1 DROP: 50 SOLUTION OPHTHALMIC at 17:42

## 2019-12-31 RX ADMIN — Medication 400 MG: at 17:38

## 2019-12-31 RX ADMIN — HEPARIN SODIUM 5000 UNITS: 5000 INJECTION, SOLUTION INTRAVENOUS; SUBCUTANEOUS at 04:33

## 2019-12-31 RX ADMIN — MELATONIN 1000 UNITS: at 04:34

## 2019-12-31 RX ADMIN — LEVOTHYROXINE SODIUM 112 MCG: 112 TABLET ORAL at 04:34

## 2019-12-31 RX ADMIN — HEPARIN SODIUM 5000 UNITS: 5000 INJECTION, SOLUTION INTRAVENOUS; SUBCUTANEOUS at 17:38

## 2019-12-31 RX ADMIN — AMPICILLIN SODIUM AND SULBACTAM SODIUM 3 G: 2; 1 INJECTION, POWDER, FOR SOLUTION INTRAMUSCULAR; INTRAVENOUS at 10:58

## 2019-12-31 RX ADMIN — ASPIRIN 81 MG: 81 TABLET, COATED ORAL at 04:34

## 2019-12-31 RX ADMIN — CALCIUM 500 MG: 500 TABLET ORAL at 17:37

## 2019-12-31 RX ADMIN — ACETAMINOPHEN 650 MG: 325 TABLET, FILM COATED ORAL at 17:37

## 2019-12-31 RX ADMIN — ACETAMINOPHEN 650 MG: 325 TABLET, FILM COATED ORAL at 10:58

## 2019-12-31 RX ADMIN — METOPROLOL TARTRATE 100 MG: 100 TABLET ORAL at 04:34

## 2019-12-31 RX ADMIN — ACETAMINOPHEN 650 MG: 325 TABLET, FILM COATED ORAL at 04:34

## 2019-12-31 RX ADMIN — Medication 400 MG: at 04:34

## 2019-12-31 RX ADMIN — OXYCODONE HYDROCHLORIDE 5 MG: 5 TABLET ORAL at 17:37

## 2019-12-31 RX ADMIN — OMEPRAZOLE 20 MG: 20 CAPSULE, DELAYED RELEASE ORAL at 04:34

## 2019-12-31 RX ADMIN — TIMOLOL MALEATE 1 DROP: 5 SOLUTION OPHTHALMIC at 04:33

## 2019-12-31 RX ADMIN — OXYCODONE HYDROCHLORIDE 5 MG: 5 TABLET ORAL at 00:45

## 2019-12-31 RX ADMIN — ACETAMINOPHEN 650 MG: 325 TABLET, FILM COATED ORAL at 21:53

## 2019-12-31 RX ADMIN — AMPICILLIN SODIUM AND SULBACTAM SODIUM 3 G: 2; 1 INJECTION, POWDER, FOR SOLUTION INTRAMUSCULAR; INTRAVENOUS at 04:34

## 2019-12-31 RX ADMIN — OXYCODONE HYDROCHLORIDE 5 MG: 5 TABLET ORAL at 09:49

## 2019-12-31 RX ADMIN — CALCIUM 500 MG: 500 TABLET ORAL at 08:09

## 2019-12-31 ASSESSMENT — ENCOUNTER SYMPTOMS
INSOMNIA: 0
ABDOMINAL PAIN: 0
WHEEZING: 0
CHILLS: 0
DIZZINESS: 0
VOMITING: 0
COUGH: 0
EYE DISCHARGE: 0
FLANK PAIN: 0
HEADACHES: 0
ABDOMINAL PAIN: 1
NERVOUS/ANXIOUS: 0
SHORTNESS OF BREATH: 0
FEVER: 0
EYE PAIN: 0
FALLS: 0
NAUSEA: 0

## 2019-12-31 NOTE — PROGRESS NOTES
Surgery  Pt doing well, pain minimal, stooling  Wounds ok  LUCIA serous, clear  A/p  1.  Diet as tolerated  2.  D/cJP drain  3.  Ok to d/c when cleared by medicine/ID.  4.  Follow up in 1-2 weeks for repeat cbc

## 2019-12-31 NOTE — PROGRESS NOTES
Hospital Medicine Daily Progress Note    Date of Service  12/31/2019    Chief Complaint  Abdominal pain, nausea, vomiting    Hospital Course    Ms. Wadsworth is an 84 year old female with PMH significant for total cystectomy with ileal conduit 2011, PAF not on chronic anticoagulation due to history of bleeding, rate controlled with Metoprolol, TIA, HTN, and hypothyroidism who presented 12/20/19 with abdominal pain.  She has been admitted multiple times this year for same requiring NG tube decompression and conservative therapy.  Day of presentation she presented with nausea and vomiting x3 days.  CT consistent with small bowel obstruction and thickened gastric wall.  General surgery was consulted and recommended PPI for gastritis, surgical intervention for SBO and follow-up EGD. She was also found to be in ARF which resolved with IV fluid hydration. Post-op course was complicated by leukocytosis. Tmax 100.4. Blood cultures (-), chest xray showed atelectasis without evidence of pneumonia. LUCIA drain cultures (-). UA indicated possible infection and she was treated with IV ABX. Urine culture (-).  CT abdomen pelvis done POD #5 showed moderate amount of peritoneal fluid within the pelvis, wall thickening of proximal colon and abdominal wall small bowel loops consistent with enterocolitis and moderate bilateral hydronephrosis.  She will need to follow-up with GI for EGD and urology for hydronephrosis. Culture from LUCIA drain showed Bacteroides fragilis, ID consulted and recommended 5 day course of Unasyn while in hospital with transition to oral Augmentin at discharge.          Interval Problem Update  12/31- Patient continues to have significant output through LUCIA drain, overnight charting shows approximately 550ml. Surgery to evaluate patient today, will watch overnight to ensure output has decreased prior to discharging. Patient states no pain at this time, and no further needs. Continue with antibiotic therapy per ID      Consultants/Specialty  -General Surgery  -Infectious Disease    Code Status  FULL    Disposition  Likely to DC tomorrow     Review of Systems  Review of Systems   Constitutional: Negative for chills and fever.   HENT: Positive for hearing loss. Negative for congestion.    Eyes: Negative for pain and discharge.   Respiratory: Negative for cough and wheezing.    Cardiovascular: Negative for chest pain and leg swelling.   Gastrointestinal: Negative for abdominal pain, nausea and vomiting.   Genitourinary: Negative for flank pain and hematuria.   Musculoskeletal: Negative for falls and joint pain.   Skin: Negative for rash.   Neurological: Negative for dizziness and headaches.   Psychiatric/Behavioral: The patient is not nervous/anxious and does not have insomnia.         Physical Exam  Temp:  [36.2 °C (97.2 °F)-37.2 °C (98.9 °F)] 36.7 °C (98 °F)  Pulse:  [72-87] 87  Resp:  [17-18] 17  BP: (112-131)/(61-70) 117/70  SpO2:  [94 %-97 %] 96 %    Physical Exam  Vitals signs and nursing note reviewed.   Constitutional:       General: She is awake.      Appearance: Normal appearance. She is not ill-appearing.   HENT:      Head: Normocephalic.      Right Ear: Hearing normal.      Left Ear: Hearing normal.      Nose: Nose normal.      Mouth/Throat:      Lips: Pink.      Mouth: Mucous membranes are dry.   Eyes:      General: Lids are normal.   Cardiovascular:      Rate and Rhythm: Normal rate.      Heart sounds: Normal heart sounds. No friction rub.   Pulmonary:      Effort: Pulmonary effort is normal.      Breath sounds: Normal breath sounds. No decreased breath sounds or wheezing.   Abdominal:      General: Bowel sounds are normal.      Comments: Ostomy with pink stoma  2 surgical stab wounds with Dermabond  1 surgical site with LUCIA drain with dressing CDI - LUCIA drainage thin, serous fluid- overnight was approximately 550ml drainage per chart review          Genitourinary:     Comments: Urostomy with clear yellow  urine  Musculoskeletal:      Comments: Ambulating to BR and up in halls with walker   Skin:     General: Skin is warm and dry.   Neurological:      General: No focal deficit present.      Mental Status: She is alert and oriented to person, place, and time.      Gait: Gait is intact.   Psychiatric:         Attention and Perception: Attention normal.         Mood and Affect: Mood normal.         Speech: Speech normal.         Behavior: Behavior is cooperative.         Thought Content: Thought content normal.         Cognition and Memory: Cognition normal.         Judgment: Judgment normal.         Fluids    Intake/Output Summary (Last 24 hours) at 12/31/2019 1453  Last data filed at 12/31/2019 1355  Gross per 24 hour   Intake 980 ml   Output 1615 ml   Net -635 ml       Laboratory  Recent Labs     12/29/19 0456 12/30/19  0819 12/31/19  0816   WBC 15.3* 17.3* 16.7*   RBC 3.84* 4.05* 4.21   HEMOGLOBIN 10.8* 11.6* 12.0   HEMATOCRIT 33.8* 35.5* 37.5   MCV 88.0 87.7 89.1   MCH 28.1 28.6 28.5   MCHC 32.0* 32.7* 32.0*   RDW 52.2* 52.4* 54.1*   PLATELETCT 227 265 257   MPV 11.0 11.1 10.8     Recent Labs     12/29/19  0456 12/30/19  0819 12/31/19  0816   SODIUM 136 138 140   POTASSIUM 4.2 4.5 4.3   CHLORIDE 109 110 111   CO2 20 21 23   GLUCOSE 86 91 94   BUN 21 17 16   CREATININE 0.92 0.82 0.85   CALCIUM 7.6* 7.6* 7.8*                   Imaging  CT-ABDOMEN-PELVIS WITH   Final Result      1.  Postoperative change from cystectomy and urinary diversion.   2.  Moderate volume peritoneal fluid with small amount of hyperdense fluid in the pelvis possibly indicating hemorrhagic component.   3.  Wall thickening of proximal colon and abdominal small bowel loops suggesting enterocolitis.   4.  Bilateral mild to moderate hydronephrosis, worse on the RIGHT.   5.  Hepatic cirrhosis with portal hypertension.   6.  Small bilateral pleural effusions with associated atelectasis.      IR-US GUIDED PIV   Final Result    Ultrasound-guided  "PERIPHERAL IV INSERTION performed by    qualified nursing staff as above.            DX-CHEST-LIMITED (1 VIEW)   Final Result      New mild left basilar opacity is more likely from atelectasis than consolidation      Otherwise stable chest with right hemidiaphragm elevation, adjacent atelectasis or scarring. This also could obscure consolidation      DX-UPPER GI-SMALL BOWEL FOLLOW THRU   Final Result      1.  Small hiatal hernia.      2.  No significant fold thickening in the stomach to suggest an infiltrative process.      3.  Distended small bowel loops as seen on recent CT are consistent with partial obstruction. Contrast reached the colon in 90 minutes.           Assessment/Plan  Small bowel obstruction (HCC)- (present on admission)  Assessment & Plan  -POD #7 \"Severe adhesions to a prior parastomal hernia mesh.  Incarcerated loop of small bowel causing obstruction\"  -full liquid diet  -Continued high outputs through LUCIA   -(+) flatus  -general surgery following  -ambulating      Hypomagnesemia- (present on admission)  Assessment & Plan  -BID PO replacement  -follow lab    CKD (chronic kidney disease) stage 3, GFR 30-59 ml/min (HCC)- (present on admission)  Assessment & Plan  -chronic and stable at her baseline  -avoid nephrotoxins  -renally dose medications as indicated  -follow labs    Vitamin D deficiency  Assessment & Plan  -Daily supplementation   -OP FU with PCP     Paroxysmal A-fib (HCC)- (present on admission)  Assessment & Plan  -rates controlled today  -asymptomatic  -not on chronic anticoagulation due to history of bleeding - continue ASA      Presence of urostomy (HCC)- (present on admission)  Assessment & Plan  -History of total cystectomy with ileal conduit 2011      Leukocytosis- (present on admission)  Assessment & Plan  -stable  -clinically shows no indications of infection  -incentive spirometry  -ambulation/mobility  -afebrile   -(+) wound culture - ID following - recommend Anticipate a 5-day " course total. Can transition to p.o. Augmentin at discharge - Stop date 01/2/2020  -follow CBC    Enteritis- (present on admission)  Assessment & Plan  -Continue daily PPI  -Per general surgery recommendations, patient will need follow-up EGD for CT scan findings of thickened gastric wall      Hypothyroid- (present on admission)  Assessment & Plan  -continue Levothyroxine         VTE prophylaxis: Heparin & Ambulation

## 2019-12-31 NOTE — PROGRESS NOTES
Infectious Disease Progress Note    Author: Angela Garcia M.D. Date & Time of service: 2019  11:44 AM    Chief Complaint:  Follow-up for abdominal abscess    Interval History:  83-year-old woman with prior cystectomy with ileal conduit, atrial fibrillation admitted for increasing abdominal pain.  Patient found to have an incarcerated parastomal hernia causing a bowel obstruction status post hernia repair and diagnostic laparoscopy and lysis of adhesions on 2019.  Postoperative course complicated by an intra-abdominal abscess.     afebrile, WBC 17.3.  Patient has ongoing abdominal pain but slightly improved today.  She states her LUCIA drain continues to fill up with fluid.  She has no nausea or vomiting.   afebrile patient resting comfortably.  She states her lower abdominal pain is worse at night.  She is anxious to be seen by Dr. Chavarria and is eager to go home today.    Labs Reviewed, Medications Reviewed and Wound Reviewed.    Review of Systems:  Review of Systems   Constitutional: Negative for chills and fever.   Respiratory: Negative for cough and shortness of breath.    Cardiovascular: Negative for chest pain.   Gastrointestinal: Positive for abdominal pain. Negative for nausea and vomiting.   Neurological: Negative for dizziness and headaches.   All other systems reviewed and are negative.      Hemodynamics:  Temp (24hrs), Av.7 °C (98 °F), Min:36.2 °C (97.2 °F), Max:37.2 °C (98.9 °F)  Temperature: 36.7 °C (98 °F)  Pulse  Av.2  Min: 53  Max: 124   Blood Pressure : 117/70       Physical Exam:  Physical Exam  Constitutional:       Appearance: Normal appearance.   HENT:      Mouth/Throat:      Mouth: Mucous membranes are moist.      Pharynx: No oropharyngeal exudate.   Eyes:      Extraocular Movements: Extraocular movements intact.      Conjunctiva/sclera: Conjunctivae normal.      Pupils: Pupils are equal, round, and reactive to light.   Neck:      Musculoskeletal: Normal range  of motion and neck supple.   Cardiovascular:      Rate and Rhythm: Normal rate and regular rhythm.   Pulmonary:      Effort: Pulmonary effort is normal.      Breath sounds: Normal breath sounds.   Abdominal:      General: There is distension.      Palpations: Abdomen is soft.      Tenderness: There is tenderness.      Comments: Left lower quadrant LUCIA drain+ serosanguineous fluid    Right lower quadrant ileostomy   Musculoskeletal:         General: No swelling.   Skin:     General: Skin is warm.   Neurological:      General: No focal deficit present.      Mental Status: She is alert and oriented to person, place, and time.      Cranial Nerves: No cranial nerve deficit.   Psychiatric:         Mood and Affect: Mood normal.         Behavior: Behavior normal.      Comments: Pleasant         Meds:    Current Facility-Administered Medications:   •  vitamin D  •  ampicillin-sulbactam (UNASYN) IV  •  calcium carbonate  •  magnesium oxide  •  psyllium  •  acetaminophen  •  famotidine  •  heparin  •  Notify provider if pain remains uncontrolled **AND** Use the numeric rating scale (NRS-11) on regular floors and Critical-Care Pain Observation Tool (CPOT) on ICUs/Trauma to assess pain **AND** Pulse Ox (Oximetry) **AND** Pharmacy Consult Request **AND** If patient difficult to arouse and/or has respiratory depression, stop any opiates that are currently infusing and call a Rapid Response. **AND** oxyCODONE immediate-release **AND** [DISCONTINUED] oxyCODONE immediate-release **AND** [DISCONTINUED] HYDROmorphone  •  omeprazole  •  aspirin EC  •  labetalol  •  ondansetron  •  ondansetron  •  latanoprost  •  levothyroxine  •  metoprolol  •  timolol    Labs:  Recent Labs     12/29/19  0456 12/30/19  0819 12/31/19  0816   WBC 15.3* 17.3* 16.7*   RBC 3.84* 4.05* 4.21   HEMOGLOBIN 10.8* 11.6* 12.0   HEMATOCRIT 33.8* 35.5* 37.5   MCV 88.0 87.7 89.1   MCH 28.1 28.6 28.5   RDW 52.2* 52.4* 54.1*   PLATELETCT 227 265 257   MPV 11.0 11.1 10.8    NEUTSPOLYS 78.90* 71.90  --    LYMPHOCYTES 7.00* 7.90*  --    MONOCYTES 12.30 14.90*  --    EOSINOPHILS 1.80 5.30  --    BASOPHILS 0.00 0.00  --    RBCMORPHOLO Present Normal  --      Recent Labs     12/29/19  0456 12/30/19  0819 12/31/19  0816   SODIUM 136 138 140   POTASSIUM 4.2 4.5 4.3   CHLORIDE 109 110 111   CO2 20 21 23   GLUCOSE 86 91 94   BUN 21 17 16     Recent Labs     12/29/19  0456 12/30/19  0819 12/31/19  0816   CREATININE 0.92 0.82 0.85       Imaging:  Ct-abdomen-pelvis With    Result Date: 12/29/2019 12/29/2019 5:41 PM HISTORY/REASON FOR EXAM:  Abdominal infection suspected; POD #5. TECHNIQUE/EXAM DESCRIPTION:   CT scan of the abdomen and pelvis with contrast. Contrast-enhanced helical scanning was obtained from the diaphragmatic domes through the pubic symphysis following the bolus administration of nonionic contrast without complication. 100 mL of Omnipaque 350 nonionic contrast was administered without complication. Low dose optimization technique was utilized for this CT exam including automated exposure control and adjustment of the mA and/or kV according to patient size. COMPARISON: 12/20/2019 FINDINGS: CT Abdomen: Visualized lung bases show small bilateral pleural effusions with associated atelectasis, worse on the RIGHT. Fluid present in the upper abdomen. Liver is heterogeneous with nodular margins.  Recanalization of umbilical vein. Portal, splenic and superior mesenteric veins are patent. Spleen is top normal in size. Adrenal glands are unremarkable. Both kidneys show lobular contour.  Collecting systems are dilated bilaterally, more apparent on the RIGHT. The pancreas is unremarkable. Gallbladder is absent. Moderate hiatal hernia. CT Pelvis: Bladder is absent.  RIGHT lower quadrant urinary diversion present. Colonic diverticula noted. Diffuse mesenteric edema. Wall thickening of ascending and proximal transverse colon. Wall thickening of small bowel loops in the mid abdomen. Moderate  atherosclerotic calcification of abdominal aorta. No pneumoperitoneum. Drain present in the pelvis anteriorly. Anastomotic suture line in the lower abdomen anteriorly. RIGHT lower quadrant stoma.     1.  Postoperative change from cystectomy and urinary diversion. 2.  Moderate volume peritoneal fluid with small amount of hyperdense fluid in the pelvis possibly indicating hemorrhagic component. 3.  Wall thickening of proximal colon and abdominal small bowel loops suggesting enterocolitis. 4.  Bilateral mild to moderate hydronephrosis, worse on the RIGHT. 5.  Hepatic cirrhosis with portal hypertension. 6.  Small bilateral pleural effusions with associated atelectasis.    Ct-renal Colic Evaluation(a/p W/o)    Result Date: 12/20/2019 12/20/2019 12:42 PM HISTORY/REASON FOR EXAM:  CT abd pelvis no contrast ro kidney stone (renal colic protocol). Hernia, vomiting, evaluate for bowel obstruction TECHNIQUE/EXAM DESCRIPTION AND NUMBER OF VIEWS: CT scan renal/colic without contrast. 5 mm helical images of the abdomen and pelvis were obtained from the diaphragmatic domes through the pubic symphysis. Low dose optimization technique was utilized for this CT exam including automated exposure control and adjustment of the mA and/or kV according to patient size. COMPARISON: 1 view abdomen 3/20/2019 and CT abdomen and pelvis 3/13/2019 FINDINGS: 12/20/2019 12:42 PM HISTORY/REASON FOR EXAM:  CT abd pelvis no contrast ro kidney stone (renal colic protocol). TECHNIQUE/EXAM DESCRIPTION AND NUMBER OF VIEWS: CT scan renal/colic without contrast. 5 mm helical images of the abdomen and pelvis were obtained from the diaphragmatic domes through the pubic symphysis. Low dose optimization technique was utilized for this CT exam including automated exposure control and adjustment of the mA and/or kV according to patient size. COMPARISON: None. FINDINGS: LUNGS: There is a right lower lobe calcification consistent with a granuloma. There is minimal  linear density consistent with atelectasis or scar. Lungs are otherwise clear. OSSEOUS structures:There are spinal degenerative changes. Abdomen: There is a moderate-sized hiatal hernia. LIVER: Probable cirrhosis. No focal abnormality identified within limitations of noncontrast scanning.. GALLBLADDER and BILIARY: There is no biliary dilation. SPLEEN: The spleen is normal in noncontrast appearance. PANCREAS: The pancreas is normal in noncontrast appearance. ADRENALS: Both adrenal glands are normal in appearance. KIDNEYS: Both kidney show focal volume loss consistent with scarring. Both ureter are dilated. There appears to be a urinary diversion which accounts for this finding. There is a peristomal hernia. There are no renal or ureteral calculi. There is no hydronephrosis. BOWEL: There is diffuse, marked gastric wall thickening consistent with inflammation or neoplasm. Small bowel is diffusely dilated with transition point distally consistent with small bowel obstruction. Assessment without oral or IV contrast is limited. There is change in caliber of the loop exiting the most dilated small bowel loop and which enters the peristomal hernia. This could be cause of obstruction. AORTA: is normal in appearance. APPENDIX: Absent No inflammatory process is identified within the abdomen or pelvis. Pelvis: Prior hysterectomy.     1.  Small bowel obstruction 2.  There is transition point in the right lower quadrant with exiting small bowel loop decompressed and entering peristomal hernia. This could be etiology of the small bowel obstruction 3.  Diffuse, marked gastric wall thickening consistent with inflammation or neoplasm 4.  Moderate size hiatal hernia 5.  Probable cirrhosis 6.  Prior cholecystectomy 7.  Bilateral renal scarring 8.  Moderately limited assessment due to lack of oral and IV contrast administration    [id number] View    Result Date: 12/20/2019 12/20/2019 5:58 PM HISTORY/REASON FOR EXAM:  Tube evaluation.  TECHNIQUE/EXAM DESCRIPTION AND NUMBER OF VIEWS:  2 view(s) of the abdomen. COMPARISON:  CT 12/20/2019. FINDINGS: Limited single view of the abdomen performed primarily to evaluate enteric tube position. The tip projects over the expected area of the distal esophagus/hiatal hernia. The bowel gas pattern is within normal limits.     Gastric drainage tube loops in the hiatal hernia with tip projecting over the expected area of the hiatal hernia.    Dx-chest-limited (1 View)    Result Date: 12/25/2019 12/25/2019 8:21 AM HISTORY/REASON FOR EXAM: leucocytosis. TECHNIQUE/EXAM DESCRIPTION AND NUMBER OF VIEWS: Single portable view of the chest. COMPARISON: 3/15/2019. FINDINGS: Cardiomediastinal contours are stable, enlarged Lungs demonstrate stable right hemidiaphragm elevation with adjacent linear opacity. There is new left costophrenic sulcus mild opacity No pneumothorax is seen.     New mild left basilar opacity is more likely from atelectasis than consolidation Otherwise stable chest with right hemidiaphragm elevation, adjacent atelectasis or scarring. This also could obscure consolidation    Dx-upper Gi-small Bowel Follow Thru    Result Date: 12/21/2019 12/21/2019 8:10 AM HISTORY/REASON FOR EXAM:  Small bowel obstruction. TECHNIQUE/EXAM DESCRIPTION AND NUMBER OF VIEWS: Single contrast upper GI series was performed with small bowel follow-through. Multiple fluoroscopic and overhead images were obtained before and after contrast administration. 12 fluoroscopic images obtained. Total fluoroscopy time: 1.5 minutes. COMPARISON:  CT abdomen and pelvis 12/20/2019 FINDINGS: Esophagus: NG tube is in place. Limited retrograde filling of the esophagus did not demonstrate any definite stricture or mucosal abnormality. Gastroesophageal junction:  There is a small hiatal hernia. Stomach:  Normal contours and mucosal pattern on single-contrast imaging. Duodenum:  Bulb and sweep are normal.  Duodenal-jejunal junction is slightly  lower than the expected position. Proximal jejunal loops are in the left upper quadrant. Small bowel:   Distended jejunal and ileal loops are again noted as on the recent CT.  No contrast extravasation is identified. No filling defects are identified. Contrast reached the colon in 90 minutes.     1.  Small hiatal hernia. 2.  No significant fold thickening in the stomach to suggest an infiltrative process. 3.  Distended small bowel loops as seen on recent CT are consistent with partial obstruction. Contrast reached the colon in 90 minutes.    Ir-us Guided Piv    Result Date: 12/28/2019  EXAMINATION:                                                                    HISTORY/REASON FOR EXAM:  Ultrasound Guided PIV.  TECHNIQUE/EXAM DESCRIPTION AND NUMBER OF VIEWS:  Peripheral IV insertion with ultrasound guidance.  The procedure was prepared using maximal sterile barrier technique including sterile gown, mask, cap, and donning of sterile gloves following appropriate hand hygiene and/or sterile scrub. Patient skin site was prepped with 2% Chlorhexidine solution.   FINDINGS: Peripheral IV insertion with Ultrasound Guidance was performed by qualified imaging nursing staff without the assistance of a Radiologist.      Ultrasound-guided PERIPHERAL IV INSERTION performed by qualified nursing staff as above.     Dx-abdomen For Tube Placement    Result Date: 12/20/2019 12/20/2019 3:55 PM HISTORY/REASON FOR EXAM:  Line evaluation. TECHNIQUE/EXAM DESCRIPTION AND NUMBER OF VIEWS:  2 view(s) of the abdomen. COMPARISON:  3/20/2019 x-ray, CT same day. FINDINGS: Enteric tube has been placed. The tip projects over the esophagus distal to the karel. The NG tube loops over the distal mediastinum where there is a medium size hiatal hernia. The bowel gas pattern is within normal limits.     NG tube terminates over the approximate location of the GE junction in this patient with a known medium-sized hiatal hernia in which it coils. The  "tip may be in the distal esophagus Page pending for the referring service at 1611      Micro:  Results     Procedure Component Value Units Date/Time    Blood Culture [[id number]] Collected:  12/25/19 1047    Order Status:  Completed Specimen:  Blood from Peripheral Updated:  12/30/19 1300     Significant Indicator NEG     Source BLD     Site PERIPHERAL     Culture Result No growth after 5 days of incubation.    Narrative:       Per Hospital Policy: Only change Specimen Src: to \"Line\" if  specified by physician order.  No site indicated    Blood Culture [[id number]] Collected:  12/25/19 1047    Order Status:  Completed Specimen:  Blood from Peripheral Updated:  12/30/19 1300     Significant Indicator NEG     Source BLD     Site PERIPHERAL     Culture Result No growth after 5 days of incubation.    Narrative:       Per Hospital Policy: Only change Specimen Src: to \"Line\" if  specified by physician order.  Left Hand    CULTURE WOUND W/ GRAM STAIN [[id number]]  (Abnormal) Collected:  12/25/19 0900    Order Status:  Completed Specimen:  Wound Updated:  12/28/19 1354     Significant Indicator POS     Source WND     Site LUCIA Drain     Culture Result Growth noted after further incubation, see below for  organism identification.       Gram Stain Result Many WBCs.  No organisms seen.       Culture Result Bacteroides fragilis  Isolated from enrichment broth only, please correlate with  clinical condition.      Narrative:       Collected By:[id number] DEBRA MCINTOSH  Drainage from LUCIA catheter  Collected By:[id number] DEBRA MCINTOSH    URINE CULTURE(NEW) [[id number]] Collected:  12/25/19 0900    Order Status:  Completed Specimen:  Urine, Clean Catch Updated:  12/27/19 0751     Significant Indicator NEG     Source UR     Site URINE, CLEAN CATCH     Culture Result No growth at 48 hours.    Narrative:       Collected By:[id number] DEBRA MCINTOSH  Indication for culture:->Unexplained new onset of Flank pain  and/or Costovertebral angle " tenderness  Collected By:[id number] DEBRA MCINTOSH    GRAM STAIN [[id number]] Collected:  12/25/19 0900    Order Status:  Completed Specimen:  Wound Updated:  12/25/19 1837     Significant Indicator .     Source WND     Site LUCIA Drain     Gram Stain Result Many WBCs.  No organisms seen.      Narrative:       Collected By:[id number] DEBRA MCINTOSH  Drainage from LUCIA catheter  Collected By:[id number] DEBRA MCINTOSH    URINALYSIS [[id number]]  (Abnormal) Collected:  12/25/19 0859    Order Status:  Completed Specimen:  Urine, Clean Catch Updated:  12/25/19 0937     Color Yellow     Character Clear     Specific Gravity 1.015     Ph 6.0     Glucose Negative mg/dL      Ketones Negative mg/dL      Protein Negative mg/dL      Bilirubin Negative     Urobilinogen, Urine 0.2     Nitrite Negative     Leukocyte Esterase Small     Occult Blood Trace     Micro Urine Req Microscopic    Narrative:       Collected By:[id number] DEBRA MCINTOSH  Drainage from LUCIA catheter    FLUID CULTURE W/GRAM STAIN [[id number]] Collected:  12/25/19 0900    Order Status:  Canceled Specimen:  Other Body Fluid           Assessment:  Active Hospital Problems    Diagnosis   • Small bowel obstruction (HCC) [K56.609]   • Hypomagnesemia [E83.42]   • CKD (chronic kidney disease) stage 3, GFR 30-59 ml/min (HCC) [N18.3]   • Paroxysmal A-fib (HCC) [I48.0]   • Presence of urostomy (HCC) [Z93.6]   • Leukocytosis [D72.829]   • Enteritis [K52.9]   • Hypothyroid [E03.9]   • Vitamin D deficiency [E55.9]       Plan:  Positive drain cultures  Status post recent small bowel obstruction status post lysis of adhesions, repair of hernia and enterotomy on 12/24/2019 by Dr. Chavarria  LUCIA drain cultures+ Bacteroides fragilis  CT scan -no abscess however there is wall thickening of proximal colon and abdominal small bowel loops suggesting enterocolitis  Agree with Unasyn for now  Anticipate a 5-day course total  Can transition to p.o. Augmentin 875 mg twice daily at discharge  Stop date 01/2/2020    Acute  kidney injury.  Resolved    Leukocytosis, persistent  Multifactorial (recent surgery, possible enterocolitis noted on CT scan)  On antibiotics above  Overall improving  Monitor    I have performed a physical exam and reviewed and updated ROS and plan today 12/31/2019.  In review of yesterday's note 12/30/2019, there are no changes except as documented above.    Okay to discharge patient from ID standpoint.    No ID clinic follow-up needed    Plan of care discussed with internal medicine/Dr. Gomez.  Will sign off.

## 2019-12-31 NOTE — CARE PLAN
Problem: Pain Management  Goal: Pain level will decrease to patient's comfort goal  Outcome: PROGRESSING AS EXPECTED     Problem: Fluid Volume:  Goal: Will maintain balanced intake and output  Outcome: PROGRESSING SLOWER THAN EXPECTED  Note:   Pt still with large output through LUCIA drain

## 2019-12-31 NOTE — CARE PLAN
Problem: Safety  Goal: Will remain free from injury  Outcome: PROGRESSING AS EXPECTED     Problem: Infection  Goal: Will remain free from infection  Outcome: PROGRESSING AS EXPECTED     Problem: Discharge Barriers/Planning  Goal: Patient's continuum of care needs will be met  Outcome: PROGRESSING SLOWER THAN EXPECTED     Problem: Fluid Volume:  Goal: Will maintain balanced intake and output  Outcome: PROGRESSING SLOWER THAN EXPECTED  Note:   LUCIA drained emptied every couple hours overnight     Problem: Pain Management  Goal: Pain level will decrease to patient's comfort goal  Outcome: PROGRESSING SLOWER THAN EXPECTED

## 2020-01-01 VITALS
HEART RATE: 72 BPM | SYSTOLIC BLOOD PRESSURE: 104 MMHG | DIASTOLIC BLOOD PRESSURE: 63 MMHG | TEMPERATURE: 97 F | BODY MASS INDEX: 23.87 KG/M2 | OXYGEN SATURATION: 98 % | RESPIRATION RATE: 16 BRPM | WEIGHT: 156.97 LBS

## 2020-01-01 PROBLEM — E83.42 HYPOMAGNESEMIA: Status: RESOLVED | Noted: 2019-03-17 | Resolved: 2020-01-01

## 2020-01-01 PROBLEM — K56.609 SMALL BOWEL OBSTRUCTION (HCC): Status: RESOLVED | Noted: 2019-03-13 | Resolved: 2020-01-01

## 2020-01-01 LAB
ANION GAP SERPL CALC-SCNC: 6 MMOL/L (ref 0–11.9)
BUN SERPL-MCNC: 16 MG/DL (ref 8–22)
CALCIUM SERPL-MCNC: 7.7 MG/DL (ref 8.5–10.5)
CHLORIDE SERPL-SCNC: 109 MMOL/L (ref 96–112)
CO2 SERPL-SCNC: 22 MMOL/L (ref 20–33)
CREAT SERPL-MCNC: 0.99 MG/DL (ref 0.5–1.4)
ERYTHROCYTE [DISTWIDTH] IN BLOOD BY AUTOMATED COUNT: 53.1 FL (ref 35.9–50)
GLUCOSE SERPL-MCNC: 88 MG/DL (ref 65–99)
HCT VFR BLD AUTO: 36.6 % (ref 37–47)
HGB BLD-MCNC: 11.8 G/DL (ref 12–16)
MCH RBC QN AUTO: 28.4 PG (ref 27–33)
MCHC RBC AUTO-ENTMCNC: 32.2 G/DL (ref 33.6–35)
MCV RBC AUTO: 88.2 FL (ref 81.4–97.8)
PLATELET # BLD AUTO: 256 K/UL (ref 164–446)
PMV BLD AUTO: 10.9 FL (ref 9–12.9)
POTASSIUM SERPL-SCNC: 4.5 MMOL/L (ref 3.6–5.5)
RBC # BLD AUTO: 4.15 M/UL (ref 4.2–5.4)
SODIUM SERPL-SCNC: 137 MMOL/L (ref 135–145)
WBC # BLD AUTO: 15.9 K/UL (ref 4.8–10.8)

## 2020-01-01 PROCEDURE — A9270 NON-COVERED ITEM OR SERVICE: HCPCS | Performed by: NURSE PRACTITIONER

## 2020-01-01 PROCEDURE — A9270 NON-COVERED ITEM OR SERVICE: HCPCS | Performed by: SURGERY

## 2020-01-01 PROCEDURE — 85027 COMPLETE CBC AUTOMATED: CPT

## 2020-01-01 PROCEDURE — 700111 HCHG RX REV CODE 636 W/ 250 OVERRIDE (IP): Performed by: INTERNAL MEDICINE

## 2020-01-01 PROCEDURE — 700102 HCHG RX REV CODE 250 W/ 637 OVERRIDE(OP): Performed by: SURGERY

## 2020-01-01 PROCEDURE — A9270 NON-COVERED ITEM OR SERVICE: HCPCS | Performed by: HOSPITALIST

## 2020-01-01 PROCEDURE — 99239 HOSP IP/OBS DSCHRG MGMT >30: CPT | Performed by: INTERNAL MEDICINE

## 2020-01-01 PROCEDURE — 700102 HCHG RX REV CODE 250 W/ 637 OVERRIDE(OP): Performed by: NURSE PRACTITIONER

## 2020-01-01 PROCEDURE — 700102 HCHG RX REV CODE 250 W/ 637 OVERRIDE(OP): Performed by: INTERNAL MEDICINE

## 2020-01-01 PROCEDURE — A9270 NON-COVERED ITEM OR SERVICE: HCPCS | Performed by: INTERNAL MEDICINE

## 2020-01-01 PROCEDURE — 700102 HCHG RX REV CODE 250 W/ 637 OVERRIDE(OP): Performed by: HOSPITALIST

## 2020-01-01 PROCEDURE — 700105 HCHG RX REV CODE 258: Performed by: INTERNAL MEDICINE

## 2020-01-01 PROCEDURE — 80048 BASIC METABOLIC PNL TOTAL CA: CPT

## 2020-01-01 PROCEDURE — 36415 COLL VENOUS BLD VENIPUNCTURE: CPT

## 2020-01-01 RX ORDER — AMOXICILLIN AND CLAVULANATE POTASSIUM 875; 125 MG/1; MG/1
1 TABLET, FILM COATED ORAL 2 TIMES DAILY
Qty: 4 TAB | Refills: 0 | Status: SHIPPED | OUTPATIENT
Start: 2020-01-01 | End: 2020-01-03

## 2020-01-01 RX ADMIN — ASPIRIN 81 MG: 81 TABLET, COATED ORAL at 05:27

## 2020-01-01 RX ADMIN — ACETAMINOPHEN 650 MG: 325 TABLET, FILM COATED ORAL at 09:36

## 2020-01-01 RX ADMIN — OXYCODONE HYDROCHLORIDE 5 MG: 5 TABLET ORAL at 15:58

## 2020-01-01 RX ADMIN — HEPARIN SODIUM 5000 UNITS: 5000 INJECTION, SOLUTION INTRAVENOUS; SUBCUTANEOUS at 05:30

## 2020-01-01 RX ADMIN — AMPICILLIN SODIUM AND SULBACTAM SODIUM 3 G: 2; 1 INJECTION, POWDER, FOR SOLUTION INTRAMUSCULAR; INTRAVENOUS at 13:13

## 2020-01-01 RX ADMIN — AMPICILLIN SODIUM AND SULBACTAM SODIUM 3 G: 2; 1 INJECTION, POWDER, FOR SOLUTION INTRAMUSCULAR; INTRAVENOUS at 05:30

## 2020-01-01 RX ADMIN — LEVOTHYROXINE SODIUM 112 MCG: 112 TABLET ORAL at 05:28

## 2020-01-01 RX ADMIN — OMEPRAZOLE 20 MG: 20 CAPSULE, DELAYED RELEASE ORAL at 05:28

## 2020-01-01 RX ADMIN — FAMOTIDINE 20 MG: 20 TABLET ORAL at 05:27

## 2020-01-01 RX ADMIN — Medication 400 MG: at 05:28

## 2020-01-01 RX ADMIN — ACETAMINOPHEN 650 MG: 325 TABLET, FILM COATED ORAL at 15:58

## 2020-01-01 RX ADMIN — MELATONIN 1000 UNITS: at 05:27

## 2020-01-01 RX ADMIN — AMPICILLIN SODIUM AND SULBACTAM SODIUM 3 G: 2; 1 INJECTION, POWDER, FOR SOLUTION INTRAMUSCULAR; INTRAVENOUS at 00:42

## 2020-01-01 RX ADMIN — OXYCODONE HYDROCHLORIDE 5 MG: 5 TABLET ORAL at 05:26

## 2020-01-01 RX ADMIN — Medication 1 PACKET: at 05:28

## 2020-01-01 RX ADMIN — TIMOLOL MALEATE 1 DROP: 5 SOLUTION OPHTHALMIC at 05:30

## 2020-01-01 RX ADMIN — CALCIUM 500 MG: 500 TABLET ORAL at 09:36

## 2020-01-01 RX ADMIN — ACETAMINOPHEN 650 MG: 325 TABLET, FILM COATED ORAL at 05:26

## 2020-01-01 RX ADMIN — METOPROLOL TARTRATE 100 MG: 100 TABLET ORAL at 05:29

## 2020-01-01 NOTE — DISCHARGE INSTRUCTIONS
Discharge Instructions per LISBETH Mcelroy.    Please follow up with PCP, Dr. Chavarria, GI and Urology as outpatient   Take all medications as prescribed   Stay well hydrated   DIET: GI soft advance as tolerated   ACTIVITY: As tolerated   DIAGNOSIS: Abdominal  infection   Return to ER if increased drainage, chills, shortness of breath, abdominal pain, or high fevers        Discharge Instructions    Discharged to home by car with relative. Discharged via wheelchair, hospital escort: Yes.  Special equipment needed: Not Applicable    Be sure to schedule a follow-up appointment with your primary care doctor or any specialists as instructed.     Discharge Plan:   Influenza Vaccine Indication: Not indicated: Previously immunized this influenza season and > 8 years of age    I understand that a diet low in cholesterol, fat, and sodium is recommended for good health. Unless I have been given specific instructions below for another diet, I accept this instruction as my diet prescription.   Other diet: GI soft      Special Instructions: Sepsis, Adult  Sepsis is a serious infection of your blood or tissues that affects your whole body. The infection that causes sepsis may be bacterial, viral, fungal, or parasitic. Sepsis may be life threatening. Sepsis can cause your blood pressure to drop. This may result in shock. Shock causes your central nervous system and your organs to stop working correctly.  What increases the risk?  Sepsis can happen in anyone, but it is more likely to happen in people who have weakened immune systems.  What are the signs or symptoms?  Symptoms of sepsis can include:  · Fever or low body temperature (hypothermia).  · Rapid breathing (hyperventilation).  · Chills.  · Rapid heartbeat (tachycardia).  · Confusion or light-headedness.  · Trouble breathing.  · Urinating much less than usual.  · Cool, clammy skin or red, flushed skin.  · Other problems with the heart, kidneys, or brain.  How is  this diagnosed?  Your health care provider will likely do tests to look for an infection, to see if the infection has spread to your blood, and to see how serious your condition is. Tests can include:  · Blood tests, including cultures of your blood.  · Cultures of other fluids from your body, such as:  ¨ Urine.  ¨ Pus from wounds.  ¨ Mucus coughed up from your lungs.  · Urine tests other than cultures.  · X-ray exams or other imaging tests.  How is this treated?  Treatment will begin with elimination of the source of infection. If your sepsis is likely caused by a bacterial or fungal infection, you will be given antibiotic or antifungal medicines.  You may also receive:  · Oxygen.  · Fluids through an IV tube.  · Medicines to increase your blood pressure.  · A machine to clean your blood (dialysis) if your kidneys fail.  · A machine to help you breathe if your lungs fail.  Get help right away if:  You get an infection or develop any of the signs and symptoms of sepsis after surgery or a hospitalization.  This information is not intended to replace advice given to you by your health care provider. Make sure you discuss any questions you have with your health care provider.  Document Released: 09/15/2004 Document Revised: 05/25/2017 Document Reviewed: 08/25/2014  [employer] Patient Education © 2017 [employer]      · Is patient discharged on Warfarin / Coumadin?   No         Depression / Suicide Risk    As you are discharged from this [facility] facility, it is important to learn how to keep safe from harming yourself.    Recognize the warning signs:  · Abrupt changes in personality, positive or negative- including increase in energy   · Giving away possessions  · Change in eating patterns- significant weight changes-  positive or negative  · Change in sleeping patterns- unable to sleep or sleeping all the time   · Unwillingness or inability to communicate  · Depression  · Unusual sadness,  discouragement and loneliness  · Talk of wanting to die  · Neglect of personal appearance   · Rebelliousness- reckless behavior  · Withdrawal from people/activities they love  · Confusion- inability to concentrate     If you or a loved one observes any of these behaviors or has concerns about self-harm, here's what you can do:  · Talk about it- your feelings and reasons for harming yourself  · Remove any means that you might use to hurt yourself (examples: pills, rope, extension cords, firearm)  · Get professional help from the community (Mental Health, Substance Abuse, psychological counseling)  · Do not be alone:Call your Safe Contact- someone whom you trust who will be there for you.  · Call your local CRISIS HOTLINE 062-7199 or 960-416-6411  · Call your local Children's Mobile Crisis Response Team [place] (359) 447-1588 or www.[employer]  · Call the toll free National Suicide Prevention Hotlines   · National Suicide Prevention Lifeline 712-213-HJTA (8400)  · National Hope Line Network 800-SUICIDE (867-6833)            Hernia Repair  Care After  These instructions give you information on caring for yourself after your procedure. Your doctor may also give you more specific instructions. Call your doctor if you have any problems or questions after your procedure.  HOME CARE   · You may have changes in your poops (bowel movements).  · You may have loose or watery poop (diarrhea).  · You may be not able to poop.  · Your bowels will slowly get back to normal.  · Do not eat any food that makes you sick to your stomach (nauseous). Eat small meals 4 to 6 times a day instead of 3 large ones.  · Do not drink pop. It will give you gas.  · Do not drink alcohol.  · Do not lift anything heavier than 10 pounds. This is about the weight of a gallon of milk.  · Do not do anything that makes you very tired for at least 6 weeks.  · Do not get your wound wet for 2 days.  · You may take a sponge bath during this  time.  · After 2 days you may take a shower. Gently pat your surgical cut (incision) dry with a towel. Do not rub it.  GET HELP RIGHT AWAY IF:  · You have watery poop, or cannot poop for more than 3 days.  · You feel sick to your stomach or throw up (vomit) more than 2 or 3 times.  · You have temperature by mouth above 102° F (38.9° C).  · You see redness or puffiness (swelling) around your wound.  · You see yellowish white fluid (pus) coming from your wound.  · You see a bulge or bump in your lower belly (abdomen) or near your groin.  · You develop a rash, trouble breathing, or any other symptoms from medicines taken.  MAKE SURE YOU:  · Understand these instructions.  · Will watch your condition.  · Will get help right away if your are not doing well or get worse.  Document Released: 11/30/2009 Document Revised: 03/11/2013 Document Reviewed: 11/30/2009  [employer]® Patient Information ©2014 [employer].

## 2020-01-01 NOTE — DISCHARGE SUMMARY
Discharge Summary    CHIEF COMPLAINT ON ADMISSION  No chief complaint on file.      Reason for Admission  Small Bowel Obstruction     Admission Date  12/20/2019    CODE STATUS  Full Code    HPI & HOSPITAL COURSE     Ms. Wadsworth is an 84 year old female with PMH significant for total cystectomy with ileal conduit 2011, PAF not on chronic anticoagulation due to history of bleeding, rate controlled with Metoprolol, TIA, HTN, and hypothyroidism who presented 12/20/19 with abdominal pain.  She has been admitted multiple times this year for same requiring NG tube decompression and conservative therapy.  Day of presentation she presented with nausea and vomiting x3 days.  CT consistent with small bowel obstruction and thickened gastric wall.  General surgery was consulted and recommended PPI for gastritis, surgical intervention for SBO and follow-up EGD. She was also found to be in ARF which resolved with IV fluid hydration. Post-op course was complicated by leukocytosis. Tmax 100.4. Blood cultures (-), chest xray showed atelectasis without evidence of pneumonia. LUCIA drain cultures (-). UA indicated possible infection and she was treated with IV ABX. Urine culture (-).  CT abdomen pelvis done POD #5 showed moderate amount of peritoneal fluid within the pelvis, wall thickening of proximal colon and abdominal wall small bowel loops consistent with enterocolitis and moderate bilateral hydronephrosis.  She will need to follow-up with GI for EGD and urology for hydronephrosis. Culture from LUCIA drain showed Bacteroides fragilis, ID consulted and recommended 5 day course of Unasyn while in hospital with transition to oral Augmentin at discharge.  Patient continued to improve and LUCIA drain was removed. Patient anxious to discharge, stating she feels very well and would like to return home. Discussed need for hospital follow up with Dr. Chavarria, PCP, GI, and Urology. Patient to continue with 2 additional days of Augmentin to complete  antibiotic therapy.  Patient's vital signs are stable and she is cleared for discharge at this time.       Therefore, she is discharged in good and stable condition to home with close outpatient follow-up.    The patient met 2-midnight criteria for an inpatient stay at the time of discharge.    Discharge Date  1/1//20    FOLLOW UP ITEMS POST DISCHARGE  Please follow up with PCP, Dr. Chavarria, GI and Urology as outpatient   Take all medications as prescribed   Stay well hydrated     DISCHARGE DIAGNOSES  Active Problems:    Small bowel obstruction (HCC) POA: Yes    CKD (chronic kidney disease) stage 3, GFR 30-59 ml/min (HCC) POA: Yes    Hypomagnesemia POA: Yes    Vitamin D deficiency POA: No    Hypothyroid POA: Yes    Enteritis POA: Yes    Leukocytosis POA: Yes    Presence of urostomy (HCC) POA: Yes    Paroxysmal A-fib (HCC) POA: Yes  Resolved Problems:    Acute on chronic renal failure (HCC) POA: Yes      FOLLOW UP  Future Appointments   Date Time Provider Department Center   4/1/2020  1:30 PM Jonna Vega M.D. [facility] None     Coreen Cisse M.D.  [address] [facility] [address]  [phone]    In 2 weeks  for hospital follow up and referral to GI and Urology     Len Chavarria M.D.  [address] [facility] [address]  [facility] [address]  [phone]    Schedule an appointment as soon as possible for a visit in 1 week  for follow up and repeat labs       MEDICATIONS ON DISCHARGE     Medication List      START taking these medications      Instructions   Cholecalciferol 1000 UNIT Tabs  Start taking on:  January 2, 2020  Commonly known as:  VITAMIND D3   Take 1 Tab by mouth every day.  Dose:  1,000 Units     Psyllium 28 % packet  Commonly known as:  METAMUCIL   Take 1 Packet by mouth every day.  Dose:  1 Packet        CHANGE how you take these medications      Instructions   amoxicillin-clavulanate 875-125 MG Tabs  What changed:  additional instructions  Commonly known as:  AUGMENTIN   Take 1 Tab by mouth 2 times a  "day for 2 days.  Dose:  1 Tab        CONTINUE taking these medications      Instructions   aspirin 81 MG EC tablet   Take 1 Tab by mouth every day.  Dose:  81 mg     Biotin 10 MG Caps   Take 1 Cap by mouth every day.  Dose:  1 Cap     furosemide 20 MG Tabs  Commonly known as:  LASIX   Take 20 mg by mouth 1 time daily as needed.  Dose:  20 mg     IRON 27 240 (27 Fe) MG tablet  Generic drug:  ferrous gluconate   Take 240 mg by mouth every 7 days. SUNDAY  Dose:  240 mg     latanoprost 0.005 % Soln  Commonly known as:  XALATAN   Place 1 Drop in both eyes every evening.  Dose:  1 Drop     levothyroxine 112 MCG Tabs  Commonly known as:  SYNTHROID   Take 112 mcg by mouth Every morning on an empty stomach.  Dose:  112 mcg     lovastatin 10 MG tablet  Commonly known as:  MEVACOR   Take 10 mg by mouth every day.  Dose:  10 mg     metoprolol 100 MG Tabs  Commonly known as:  LOPRESSOR   Take 1 Tab by mouth 2 times a day.  Dose:  100 mg     pantoprazole 40 MG Tbec  Commonly known as:  PROTONIX   Take 40 mg by mouth every day.  Dose:  40 mg     * PRESERVISION AREDS PO   Take 1 Tab by mouth 2 Times a Day.  Dose:  1 Tab     * CENTRUM SILVER ULTRA WOMENS Tabs   Take 1 Tab by mouth every day.  Dose:  1 Tab     timolol 0.5 % Soln  Commonly known as:  TIMOPTIC   Place 1 Drop in both eyes 2 times a day.  Dose:  1 Drop         * This list has 2 medication(s) that are the same as other medications prescribed for you. Read the directions carefully, and ask your doctor or other care provider to review them with you.                Allergies  Allergies   Allergen Reactions   • Cardizem Swelling   • Doxycycline      Swollen lips.   • Sulfa Drugs      Makes tongue swell, severely   • Zyvox Swelling     \"Whole mouth swelled up\"    • Codeine      Does not remember the reaction         DIET  Orders Placed This Encounter   Procedures   • Diet Order Low Fiber(GI Soft)     Standing Status:   Standing     Number of Occurrences:   1     Order Specific " Question:   Diet:     Answer:   Low Fiber(GI Soft) [2]       ACTIVITY  As tolerated.  Weight bearing as tolerated    CONSULTATIONS  Surgery     PROCEDURES  REPAIR, HERNIA, VENTRAL, ROBOT-ASSISTED, USING DA RUTHIE XI - PARASTOMAL HERNIA REPAIR WITH LYSIS OF ADHESIONS -     LABORATORY  Lab Results   Component Value Date    SODIUM 137 01/01/2020    POTASSIUM 4.5 01/01/2020    CHLORIDE 109 01/01/2020    CO2 22 01/01/2020    GLUCOSE 88 01/01/2020    BUN 16 01/01/2020    CREATININE 0.99 01/01/2020    CREATININE 1.2 02/10/2009        Lab Results   Component Value Date    WBC 15.9 (H) 01/01/2020    HEMOGLOBIN 11.8 (L) 01/01/2020    HEMATOCRIT 36.6 (L) 01/01/2020    PLATELETCT 256 01/01/2020        Total time of the discharge process exceeds 38 minutes.

## 2020-01-01 NOTE — PROGRESS NOTES
Received in bedside report from day shift nurse to not discontinue LUCIA drain per day APRN due to frequent drainage. Conflicting information viewed in chart. Will reassess in the morning.

## 2020-01-01 NOTE — CARE PLAN
Problem: Infection  Goal: Will remain free from infection  Outcome: PROGRESSING AS EXPECTED  Note:   IV abx administered     Problem: Fluid Volume:  Goal: Will maintain balanced intake and output  Outcome: PROGRESSING AS EXPECTED  Note:   Large LUCIA drain output

## 2020-01-01 NOTE — CARE PLAN
Problem: Knowledge Deficit  Goal: Knowledge of disease process/condition, treatment plan, diagnostic tests, and medications will improve  Outcome: PROGRESSING AS EXPECTED   Patient demonstrates adequate knowledge of care plan at this time. Will continue to monitor and provide education as needed.     Problem: Skin Integrity  Goal: Risk for impaired skin integrity will decrease  Outcome: PROGRESSING AS EXPECTED   Patient able to turn self from side to side. Turns encouraged every two hours. Will continue to monitor.

## 2020-01-28 NOTE — DOCUMENTATION QUERY
"                                                                         [facility]                                                                       Query Response Note      PATIENT:               BERNARD BAER  ACCT #:                  [id number]  MRN:                     [id number]  :                      1935  ADMIT DATE:       2019 8:25 PM  DISCH DATE:        2020 5:16 PM  RESPONDING  PROVIDER #:        [id number]           QUERY TEXT:    Pt has documented \"intra-abdominal abscess\" by Dr. Garcia but no further documentation from other Providers.  Please clarify status of this condition:    NOTE:  If an appropriate response is not listed below, please respond with a new note.         The patient's Clinical Indicators include:   Incarcerated parastomal hernia causing a bowel obstruction s/p ventral hernia repair, lysis of adhesions, parastomal hernia repair.    Dr. Membreno: Reason for consult: abdominal abscess. In Assessment & Plan: MD documents: I do recommend repeat CT scan to verify no development of abscess.     CT abd/pelvis: Moderate volume peritoneal fluid with small amount of hyperdense fluid in the pelvis possibly indicating hemorrhagic component. Wall thickening of proximal colon and abdominal small bowel loops suggesting enterocolitis.   Dr. Garcia documents: Postoperative course complicated by an intra-abdominal abscess. No further mention of \"abscess\" in rest of documentation.     Treatment: CT scan abdomen,   Rocephin IV x 2 doses started on   Unasyn IV from      Risks: Age, total cystectomy w/ ileal conduit, parastomal hernia repair, A Fib.  Pt has documented \"Intra-abdominal abscess\".  Please clarify status of this condition:  Options provided:   -- \"Intra-abdominal abscess\" is ruled in   -- \"Intra-abdominal abscess\" is ruled out   -- Unable to determine      Query created by: Eda Quinones on 2020 1:21 PM    RESPONSE " "TEXT:    \"Intra-abdominal abscess\" is ruled out          Electronically signed by:  GONZALES JAIMES MD 1/28/2020 9:27 AM              "

## 2020-02-27 ENCOUNTER — ANESTHESIA EVENT (OUTPATIENT)
Dept: SURGERY | Facility: MEDICAL CENTER | Age: 85
DRG: 857 | End: 2020-02-27
Payer: MEDICARE

## 2020-02-27 ENCOUNTER — APPOINTMENT (OUTPATIENT)
Dept: RADIOLOGY | Facility: MEDICAL CENTER | Age: 85
DRG: 857 | End: 2020-02-27
Attending: EMERGENCY MEDICINE
Payer: MEDICARE

## 2020-02-27 ENCOUNTER — ANESTHESIA (OUTPATIENT)
Dept: SURGERY | Facility: MEDICAL CENTER | Age: 85
DRG: 857 | End: 2020-02-27
Payer: MEDICARE

## 2020-02-27 ENCOUNTER — HOSPITAL ENCOUNTER (INPATIENT)
Facility: MEDICAL CENTER | Age: 85
LOS: 5 days | DRG: 857 | End: 2020-03-03
Attending: EMERGENCY MEDICINE | Admitting: HOSPITALIST
Payer: MEDICARE

## 2020-02-27 DIAGNOSIS — K65.1 INTRA-ABDOMINAL ABSCESS (HCC): ICD-10-CM

## 2020-02-27 DIAGNOSIS — L02.91 ABSCESS: ICD-10-CM

## 2020-02-27 PROBLEM — R94.31 ABNORMAL EKG: Status: ACTIVE | Noted: 2020-02-27

## 2020-02-27 LAB
ALBUMIN SERPL BCP-MCNC: 3.3 G/DL (ref 3.2–4.9)
ALBUMIN/GLOB SERPL: 0.7 G/DL
ALP SERPL-CCNC: 136 U/L (ref 30–99)
ALT SERPL-CCNC: 14 U/L (ref 2–50)
ANION GAP SERPL CALC-SCNC: 6 MMOL/L (ref 0–11.9)
APTT PPP: 29 SEC (ref 24.7–36)
AST SERPL-CCNC: 28 U/L (ref 12–45)
BASOPHILS # BLD AUTO: 0.6 % (ref 0–1.8)
BASOPHILS # BLD: 0.09 K/UL (ref 0–0.12)
BILIRUB SERPL-MCNC: 0.6 MG/DL (ref 0.1–1.5)
BUN SERPL-MCNC: 20 MG/DL (ref 8–22)
CALCIUM SERPL-MCNC: 8.7 MG/DL (ref 8.5–10.5)
CHLORIDE SERPL-SCNC: 105 MMOL/L (ref 96–112)
CO2 SERPL-SCNC: 22 MMOL/L (ref 20–33)
CREAT SERPL-MCNC: 0.98 MG/DL (ref 0.5–1.4)
EKG IMPRESSION: NORMAL
EOSINOPHIL # BLD AUTO: 0.34 K/UL (ref 0–0.51)
EOSINOPHIL NFR BLD: 2.1 % (ref 0–6.9)
ERYTHROCYTE [DISTWIDTH] IN BLOOD BY AUTOMATED COUNT: 52.4 FL (ref 35.9–50)
GLOBULIN SER CALC-MCNC: 4.5 G/DL (ref 1.9–3.5)
GLUCOSE SERPL-MCNC: 99 MG/DL (ref 65–99)
GRAM STN SPEC: NORMAL
HCT VFR BLD AUTO: 37.6 % (ref 37–47)
HGB BLD-MCNC: 11.6 G/DL (ref 12–16)
IMM GRANULOCYTES # BLD AUTO: 0.12 K/UL (ref 0–0.11)
IMM GRANULOCYTES NFR BLD AUTO: 0.8 % (ref 0–0.9)
INR PPP: 1.18 (ref 0.87–1.13)
LACTATE BLD-SCNC: 1.1 MMOL/L (ref 0.5–2)
LYMPHOCYTES # BLD AUTO: 2.3 K/UL (ref 1–4.8)
LYMPHOCYTES NFR BLD: 14.4 % (ref 22–41)
MCH RBC QN AUTO: 27 PG (ref 27–33)
MCHC RBC AUTO-ENTMCNC: 30.9 G/DL (ref 33.6–35)
MCV RBC AUTO: 87.6 FL (ref 81.4–97.8)
MONOCYTES # BLD AUTO: 2.22 K/UL (ref 0–0.85)
MONOCYTES NFR BLD AUTO: 13.9 % (ref 0–13.4)
NEUTROPHILS # BLD AUTO: 10.86 K/UL (ref 2–7.15)
NEUTROPHILS NFR BLD: 68.2 % (ref 44–72)
NRBC # BLD AUTO: 0 K/UL
NRBC BLD-RTO: 0 /100 WBC
PLATELET # BLD AUTO: 237 K/UL (ref 164–446)
PMV BLD AUTO: 10.9 FL (ref 9–12.9)
POTASSIUM SERPL-SCNC: 4.4 MMOL/L (ref 3.6–5.5)
PROT SERPL-MCNC: 7.8 G/DL (ref 6–8.2)
PROTHROMBIN TIME: 15.3 SEC (ref 12–14.6)
RBC # BLD AUTO: 4.29 M/UL (ref 4.2–5.4)
SIGNIFICANT IND 70042: NORMAL
SITE SITE: NORMAL
SODIUM SERPL-SCNC: 133 MMOL/L (ref 135–145)
SOURCE SOURCE: NORMAL
WBC # BLD AUTO: 15.9 K/UL (ref 4.8–10.8)

## 2020-02-27 PROCEDURE — 85730 THROMBOPLASTIN TIME PARTIAL: CPT

## 2020-02-27 PROCEDURE — 87077 CULTURE AEROBIC IDENTIFY: CPT

## 2020-02-27 PROCEDURE — 99223 1ST HOSP IP/OBS HIGH 75: CPT | Performed by: HOSPITALIST

## 2020-02-27 PROCEDURE — 83605 ASSAY OF LACTIC ACID: CPT

## 2020-02-27 PROCEDURE — 700111 HCHG RX REV CODE 636 W/ 250 OVERRIDE (IP): Performed by: HOSPITALIST

## 2020-02-27 PROCEDURE — 700105 HCHG RX REV CODE 258: Performed by: HOSPITALIST

## 2020-02-27 PROCEDURE — 96365 THER/PROPH/DIAG IV INF INIT: CPT

## 2020-02-27 PROCEDURE — 501445 HCHG STAPLER, SKIN DISP: Performed by: SURGERY

## 2020-02-27 PROCEDURE — 160028 HCHG SURGERY MINUTES - 1ST 30 MINS LEVEL 3: Performed by: SURGERY

## 2020-02-27 PROCEDURE — 87070 CULTURE OTHR SPECIMN AEROBIC: CPT

## 2020-02-27 PROCEDURE — 96375 TX/PRO/DX INJ NEW DRUG ADDON: CPT

## 2020-02-27 PROCEDURE — 700111 HCHG RX REV CODE 636 W/ 250 OVERRIDE (IP): Performed by: EMERGENCY MEDICINE

## 2020-02-27 PROCEDURE — 93005 ELECTROCARDIOGRAM TRACING: CPT | Performed by: EMERGENCY MEDICINE

## 2020-02-27 PROCEDURE — 700111 HCHG RX REV CODE 636 W/ 250 OVERRIDE (IP): Performed by: ANESTHESIOLOGY

## 2020-02-27 PROCEDURE — 700111 HCHG RX REV CODE 636 W/ 250 OVERRIDE (IP)

## 2020-02-27 PROCEDURE — 700117 HCHG RX CONTRAST REV CODE 255: Performed by: EMERGENCY MEDICINE

## 2020-02-27 PROCEDURE — 87040 BLOOD CULTURE FOR BACTERIA: CPT

## 2020-02-27 PROCEDURE — 160002 HCHG RECOVERY MINUTES (STAT): Performed by: SURGERY

## 2020-02-27 PROCEDURE — 160035 HCHG PACU - 1ST 60 MINS PHASE I: Performed by: SURGERY

## 2020-02-27 PROCEDURE — 36415 COLL VENOUS BLD VENIPUNCTURE: CPT

## 2020-02-27 PROCEDURE — 71045 X-RAY EXAM CHEST 1 VIEW: CPT

## 2020-02-27 PROCEDURE — A4314 CATH W/DRAINAGE 2-WAY LATEX: HCPCS | Performed by: SURGERY

## 2020-02-27 PROCEDURE — 80053 COMPREHEN METABOLIC PANEL: CPT

## 2020-02-27 PROCEDURE — A9270 NON-COVERED ITEM OR SERVICE: HCPCS | Performed by: ANESTHESIOLOGY

## 2020-02-27 PROCEDURE — 501452 HCHG STAPLES, GIA MULTIFIRE 60/80: Performed by: SURGERY

## 2020-02-27 PROCEDURE — 0W9G0ZZ DRAINAGE OF PERITONEAL CAVITY, OPEN APPROACH: ICD-10-PCS | Performed by: SURGERY

## 2020-02-27 PROCEDURE — 96376 TX/PRO/DX INJ SAME DRUG ADON: CPT

## 2020-02-27 PROCEDURE — 500452 HCHG DRESSING, WOUND VAC MED.: Performed by: SURGERY

## 2020-02-27 PROCEDURE — 87075 CULTR BACTERIA EXCEPT BLOOD: CPT

## 2020-02-27 PROCEDURE — 700101 HCHG RX REV CODE 250: Performed by: ANESTHESIOLOGY

## 2020-02-27 PROCEDURE — 85610 PROTHROMBIN TIME: CPT

## 2020-02-27 PROCEDURE — 770006 HCHG ROOM/CARE - MED/SURG/GYN SEMI*

## 2020-02-27 PROCEDURE — 700102 HCHG RX REV CODE 250 W/ 637 OVERRIDE(OP): Performed by: ANESTHESIOLOGY

## 2020-02-27 PROCEDURE — 160039 HCHG SURGERY MINUTES - EA ADDL 1 MIN LEVEL 3: Performed by: SURGERY

## 2020-02-27 PROCEDURE — 99291 CRITICAL CARE FIRST HOUR: CPT

## 2020-02-27 PROCEDURE — 700101 HCHG RX REV CODE 250: Performed by: HOSPITALIST

## 2020-02-27 PROCEDURE — 87205 SMEAR GRAM STAIN: CPT

## 2020-02-27 PROCEDURE — 501838 HCHG SUTURE GENERAL: Performed by: SURGERY

## 2020-02-27 PROCEDURE — 501428 HCHG STAPLER, CURVED: Performed by: SURGERY

## 2020-02-27 PROCEDURE — 700105 HCHG RX REV CODE 258: Performed by: EMERGENCY MEDICINE

## 2020-02-27 PROCEDURE — 87186 SC STD MICRODIL/AGAR DIL: CPT

## 2020-02-27 PROCEDURE — 501433 HCHG STAPLER, GIA MULTIFIRE 60/80: Performed by: SURGERY

## 2020-02-27 PROCEDURE — 700102 HCHG RX REV CODE 250 W/ 637 OVERRIDE(OP): Performed by: HOSPITALIST

## 2020-02-27 PROCEDURE — 160048 HCHG OR STATISTICAL LEVEL 1-5: Performed by: SURGERY

## 2020-02-27 PROCEDURE — 160009 HCHG ANES TIME/MIN: Performed by: SURGERY

## 2020-02-27 PROCEDURE — A6550 NEG PRES WOUND THER DRSG SET: HCPCS | Performed by: SURGERY

## 2020-02-27 PROCEDURE — 160036 HCHG PACU - EA ADDL 30 MINS PHASE I: Performed by: SURGERY

## 2020-02-27 PROCEDURE — A9270 NON-COVERED ITEM OR SERVICE: HCPCS | Performed by: HOSPITALIST

## 2020-02-27 PROCEDURE — 85025 COMPLETE CBC W/AUTO DIFF WBC: CPT

## 2020-02-27 PROCEDURE — 74177 CT ABD & PELVIS W/CONTRAST: CPT

## 2020-02-27 PROCEDURE — 700101 HCHG RX REV CODE 250: Performed by: SURGERY

## 2020-02-27 RX ORDER — LATANOPROST 50 UG/ML
1 SOLUTION/ DROPS OPHTHALMIC EVERY EVENING
Status: DISCONTINUED | OUTPATIENT
Start: 2020-02-27 | End: 2020-03-03 | Stop reason: HOSPADM

## 2020-02-27 RX ORDER — HYDROMORPHONE HYDROCHLORIDE 1 MG/ML
0.1 INJECTION, SOLUTION INTRAMUSCULAR; INTRAVENOUS; SUBCUTANEOUS
Status: DISCONTINUED | OUTPATIENT
Start: 2020-02-27 | End: 2020-02-27 | Stop reason: HOSPADM

## 2020-02-27 RX ORDER — KETOROLAC TROMETHAMINE 30 MG/ML
30 INJECTION, SOLUTION INTRAMUSCULAR; INTRAVENOUS ONCE
Status: DISCONTINUED | OUTPATIENT
Start: 2020-02-27 | End: 2020-02-27

## 2020-02-27 RX ORDER — ROCURONIUM BROMIDE 10 MG/ML
INJECTION, SOLUTION INTRAVENOUS PRN
Status: DISCONTINUED | OUTPATIENT
Start: 2020-02-27 | End: 2020-02-27 | Stop reason: SURG

## 2020-02-27 RX ORDER — SODIUM CHLORIDE 9 MG/ML
INJECTION, SOLUTION INTRAVENOUS CONTINUOUS
Status: DISCONTINUED | OUTPATIENT
Start: 2020-02-27 | End: 2020-03-01

## 2020-02-27 RX ORDER — AMOXICILLIN 250 MG
2 CAPSULE ORAL 2 TIMES DAILY
Status: DISCONTINUED | OUTPATIENT
Start: 2020-02-27 | End: 2020-03-03

## 2020-02-27 RX ORDER — DIPHENHYDRAMINE HYDROCHLORIDE 50 MG/ML
12.5 INJECTION INTRAMUSCULAR; INTRAVENOUS
Status: DISCONTINUED | OUTPATIENT
Start: 2020-02-27 | End: 2020-02-27 | Stop reason: HOSPADM

## 2020-02-27 RX ORDER — PHENYLEPHRINE HYDROCHLORIDE 10 MG/ML
INJECTION, SOLUTION INTRAMUSCULAR; INTRAVENOUS; SUBCUTANEOUS PRN
Status: DISCONTINUED | OUTPATIENT
Start: 2020-02-27 | End: 2020-02-27 | Stop reason: SURG

## 2020-02-27 RX ORDER — PANTOPRAZOLE SODIUM 40 MG/1
40 TABLET, DELAYED RELEASE ORAL DAILY
Status: DISCONTINUED | OUTPATIENT
Start: 2020-02-27 | End: 2020-02-27

## 2020-02-27 RX ORDER — SODIUM CHLORIDE, SODIUM LACTATE, POTASSIUM CHLORIDE, AND CALCIUM CHLORIDE .6; .31; .03; .02 G/100ML; G/100ML; G/100ML; G/100ML
30 INJECTION, SOLUTION INTRAVENOUS
Status: DISCONTINUED | OUTPATIENT
Start: 2020-02-27 | End: 2020-03-03

## 2020-02-27 RX ORDER — LOVASTATIN 20 MG/1
10 TABLET ORAL DAILY
Status: DISCONTINUED | OUTPATIENT
Start: 2020-02-28 | End: 2020-03-03 | Stop reason: HOSPADM

## 2020-02-27 RX ORDER — HYDROMORPHONE HYDROCHLORIDE 1 MG/ML
0.2 INJECTION, SOLUTION INTRAMUSCULAR; INTRAVENOUS; SUBCUTANEOUS
Status: DISCONTINUED | OUTPATIENT
Start: 2020-02-27 | End: 2020-02-27 | Stop reason: HOSPADM

## 2020-02-27 RX ORDER — ONDANSETRON 2 MG/ML
INJECTION INTRAMUSCULAR; INTRAVENOUS PRN
Status: DISCONTINUED | OUTPATIENT
Start: 2020-02-27 | End: 2020-02-27 | Stop reason: SURG

## 2020-02-27 RX ORDER — OMEPRAZOLE 20 MG/1
20 CAPSULE, DELAYED RELEASE ORAL DAILY
Status: DISCONTINUED | OUTPATIENT
Start: 2020-02-28 | End: 2020-03-03 | Stop reason: HOSPADM

## 2020-02-27 RX ORDER — HYDRALAZINE HYDROCHLORIDE 20 MG/ML
5 INJECTION INTRAMUSCULAR; INTRAVENOUS
Status: DISCONTINUED | OUTPATIENT
Start: 2020-02-27 | End: 2020-02-27 | Stop reason: HOSPADM

## 2020-02-27 RX ORDER — HYDROMORPHONE HYDROCHLORIDE 1 MG/ML
0.4 INJECTION, SOLUTION INTRAMUSCULAR; INTRAVENOUS; SUBCUTANEOUS
Status: DISCONTINUED | OUTPATIENT
Start: 2020-02-27 | End: 2020-02-27 | Stop reason: HOSPADM

## 2020-02-27 RX ORDER — KETOROLAC TROMETHAMINE 30 MG/ML
15 INJECTION, SOLUTION INTRAMUSCULAR; INTRAVENOUS EVERY 6 HOURS PRN
Status: DISCONTINUED | OUTPATIENT
Start: 2020-02-27 | End: 2020-02-28

## 2020-02-27 RX ORDER — HYDROMORPHONE HYDROCHLORIDE 1 MG/ML
1 INJECTION, SOLUTION INTRAMUSCULAR; INTRAVENOUS; SUBCUTANEOUS
Status: DISCONTINUED | OUTPATIENT
Start: 2020-02-27 | End: 2020-03-03

## 2020-02-27 RX ORDER — MEPERIDINE HYDROCHLORIDE 25 MG/ML
6.25 INJECTION INTRAMUSCULAR; INTRAVENOUS; SUBCUTANEOUS
Status: DISCONTINUED | OUTPATIENT
Start: 2020-02-27 | End: 2020-02-27 | Stop reason: HOSPADM

## 2020-02-27 RX ORDER — METOPROLOL TARTRATE 1 MG/ML
1 INJECTION, SOLUTION INTRAVENOUS
Status: DISCONTINUED | OUTPATIENT
Start: 2020-02-27 | End: 2020-02-27 | Stop reason: HOSPADM

## 2020-02-27 RX ORDER — KETOROLAC TROMETHAMINE 30 MG/ML
30 INJECTION, SOLUTION INTRAMUSCULAR; INTRAVENOUS ONCE
Status: COMPLETED | OUTPATIENT
Start: 2020-02-27 | End: 2020-02-27

## 2020-02-27 RX ORDER — ONDANSETRON 2 MG/ML
4 INJECTION INTRAMUSCULAR; INTRAVENOUS
Status: DISCONTINUED | OUTPATIENT
Start: 2020-02-27 | End: 2020-02-27 | Stop reason: HOSPADM

## 2020-02-27 RX ORDER — LIDOCAINE HYDROCHLORIDE 20 MG/ML
INJECTION, SOLUTION EPIDURAL; INFILTRATION; INTRACAUDAL; PERINEURAL PRN
Status: DISCONTINUED | OUTPATIENT
Start: 2020-02-27 | End: 2020-02-27 | Stop reason: SURG

## 2020-02-27 RX ORDER — TIMOLOL MALEATE 5 MG/ML
1 SOLUTION/ DROPS OPHTHALMIC 2 TIMES DAILY
Status: DISCONTINUED | OUTPATIENT
Start: 2020-02-27 | End: 2020-03-03 | Stop reason: HOSPADM

## 2020-02-27 RX ORDER — BUPIVACAINE HYDROCHLORIDE AND EPINEPHRINE 5; 5 MG/ML; UG/ML
INJECTION, SOLUTION EPIDURAL; INTRACAUDAL; PERINEURAL
Status: DISCONTINUED | OUTPATIENT
Start: 2020-02-27 | End: 2020-02-27 | Stop reason: HOSPADM

## 2020-02-27 RX ORDER — OXYCODONE HCL 5 MG/5 ML
5 SOLUTION, ORAL ORAL
Status: COMPLETED | OUTPATIENT
Start: 2020-02-27 | End: 2020-02-27

## 2020-02-27 RX ORDER — ONDANSETRON 2 MG/ML
4 INJECTION INTRAMUSCULAR; INTRAVENOUS ONCE
Status: COMPLETED | OUTPATIENT
Start: 2020-02-27 | End: 2020-02-27

## 2020-02-27 RX ORDER — OXYCODONE HCL 5 MG/5 ML
10 SOLUTION, ORAL ORAL
Status: COMPLETED | OUTPATIENT
Start: 2020-02-27 | End: 2020-02-27

## 2020-02-27 RX ORDER — METOPROLOL TARTRATE 100 MG/1
100 TABLET ORAL 2 TIMES DAILY
Status: DISCONTINUED | OUTPATIENT
Start: 2020-02-27 | End: 2020-03-03 | Stop reason: HOSPADM

## 2020-02-27 RX ORDER — CEFOTETAN DISODIUM 2 G/20ML
INJECTION, POWDER, FOR SOLUTION INTRAMUSCULAR; INTRAVENOUS PRN
Status: DISCONTINUED | OUTPATIENT
Start: 2020-02-27 | End: 2020-02-27 | Stop reason: SURG

## 2020-02-27 RX ORDER — LEVOTHYROXINE SODIUM 112 UG/1
112 TABLET ORAL
Status: DISCONTINUED | OUTPATIENT
Start: 2020-02-28 | End: 2020-03-03 | Stop reason: HOSPADM

## 2020-02-27 RX ORDER — SODIUM CHLORIDE 9 MG/ML
500 INJECTION, SOLUTION INTRAVENOUS ONCE
Status: COMPLETED | OUTPATIENT
Start: 2020-02-27 | End: 2020-02-27

## 2020-02-27 RX ORDER — POLYETHYLENE GLYCOL 3350 17 G/17G
1 POWDER, FOR SOLUTION ORAL
Status: DISCONTINUED | OUTPATIENT
Start: 2020-02-27 | End: 2020-03-03

## 2020-02-27 RX ORDER — BISACODYL 10 MG
10 SUPPOSITORY, RECTAL RECTAL
Status: DISCONTINUED | OUTPATIENT
Start: 2020-02-27 | End: 2020-03-03

## 2020-02-27 RX ORDER — HALOPERIDOL 5 MG/ML
1 INJECTION INTRAMUSCULAR
Status: DISCONTINUED | OUTPATIENT
Start: 2020-02-27 | End: 2020-02-27 | Stop reason: HOSPADM

## 2020-02-27 RX ORDER — SUCCINYLCHOLINE CHLORIDE 20 MG/ML
INJECTION INTRAMUSCULAR; INTRAVENOUS PRN
Status: DISCONTINUED | OUTPATIENT
Start: 2020-02-27 | End: 2020-02-27 | Stop reason: SURG

## 2020-02-27 RX ADMIN — PROPOFOL 150 MG: 10 INJECTION, EMULSION INTRAVENOUS at 12:02

## 2020-02-27 RX ADMIN — PIPERACILLIN AND TAZOBACTAM 4.5 G: 4; .5 INJECTION, POWDER, LYOPHILIZED, FOR SOLUTION INTRAVENOUS; PARENTERAL at 07:07

## 2020-02-27 RX ADMIN — FENTANYL CITRATE 50 MCG: 50 INJECTION, SOLUTION INTRAMUSCULAR; INTRAVENOUS at 11:58

## 2020-02-27 RX ADMIN — SODIUM CHLORIDE: 9 INJECTION, SOLUTION INTRAVENOUS at 09:52

## 2020-02-27 RX ADMIN — ONDANSETRON 4 MG: 2 INJECTION INTRAMUSCULAR; INTRAVENOUS at 05:17

## 2020-02-27 RX ADMIN — ONDANSETRON 4 MG: 2 INJECTION INTRAMUSCULAR; INTRAVENOUS at 12:27

## 2020-02-27 RX ADMIN — SODIUM CHLORIDE: 9 INJECTION, SOLUTION INTRAVENOUS at 18:30

## 2020-02-27 RX ADMIN — METOPROLOL TARTRATE 100 MG: 100 TABLET ORAL at 18:29

## 2020-02-27 RX ADMIN — SUGAMMADEX 150 MG: 100 INJECTION, SOLUTION INTRAVENOUS at 12:24

## 2020-02-27 RX ADMIN — CEFOTETAN DISODIUM 2 G: 2 INJECTION, POWDER, FOR SOLUTION INTRAMUSCULAR; INTRAVENOUS at 12:06

## 2020-02-27 RX ADMIN — SENNOSIDES AND DOCUSATE SODIUM 2 TABLET: 8.6; 5 TABLET ORAL at 18:28

## 2020-02-27 RX ADMIN — OXYCODONE HYDROCHLORIDE 10 MG: 5 SOLUTION ORAL at 16:13

## 2020-02-27 RX ADMIN — FENTANYL CITRATE 50 MCG: 50 INJECTION, SOLUTION INTRAMUSCULAR; INTRAVENOUS at 12:35

## 2020-02-27 RX ADMIN — LIDOCAINE HYDROCHLORIDE 20 MG: 20 INJECTION, SOLUTION EPIDURAL; INFILTRATION; INTRACAUDAL at 12:02

## 2020-02-27 RX ADMIN — FENTANYL CITRATE 50 MCG: 0.05 INJECTION, SOLUTION INTRAMUSCULAR; INTRAVENOUS at 05:17

## 2020-02-27 RX ADMIN — IOHEXOL 80 ML: 350 INJECTION, SOLUTION INTRAVENOUS at 05:56

## 2020-02-27 RX ADMIN — PHENYLEPHRINE HYDROCHLORIDE 50 MCG: 10 INJECTION INTRAVENOUS at 12:18

## 2020-02-27 RX ADMIN — KETOROLAC TROMETHAMINE 15 MG: 30 INJECTION, SOLUTION INTRAMUSCULAR at 23:43

## 2020-02-27 RX ADMIN — SUCCINYLCHOLINE CHLORIDE 150 MG: 20 INJECTION, SOLUTION INTRAMUSCULAR; INTRAVENOUS at 12:03

## 2020-02-27 RX ADMIN — SODIUM CHLORIDE 500 ML: 9 INJECTION, SOLUTION INTRAVENOUS at 05:25

## 2020-02-27 RX ADMIN — LATANOPROST 1 DROP: 50 SOLUTION OPHTHALMIC at 18:29

## 2020-02-27 RX ADMIN — ROCURONIUM BROMIDE 25 MG: 10 INJECTION, SOLUTION INTRAVENOUS at 12:06

## 2020-02-27 RX ADMIN — PHENYLEPHRINE HYDROCHLORIDE 50 MCG: 10 INJECTION INTRAVENOUS at 12:06

## 2020-02-27 RX ADMIN — TIMOLOL MALEATE 1 DROP: 5 SOLUTION OPHTHALMIC at 18:29

## 2020-02-27 RX ADMIN — FENTANYL CITRATE 50 MCG: 0.05 INJECTION, SOLUTION INTRAMUSCULAR; INTRAVENOUS at 07:19

## 2020-02-27 RX ADMIN — PIPERACILLIN SODIUM AND TAZOBACTAM SODIUM 3.38 G: 3; .375 INJECTION, POWDER, LYOPHILIZED, FOR SOLUTION INTRAVENOUS at 21:24

## 2020-02-27 RX ADMIN — KETOROLAC TROMETHAMINE 30 MG: 30 INJECTION, SOLUTION INTRAMUSCULAR at 07:45

## 2020-02-27 RX ADMIN — MIDAZOLAM HYDROCHLORIDE 1 MG: 1 INJECTION, SOLUTION INTRAMUSCULAR; INTRAVENOUS at 11:58

## 2020-02-27 ASSESSMENT — LIFESTYLE VARIABLES
CONSUMPTION TOTAL: NEGATIVE
DO YOU DRINK ALCOHOL: NO
ALCOHOL_USE: NO
HAVE YOU EVER FELT YOU SHOULD CUT DOWN ON YOUR DRINKING: NO
EVER HAD A DRINK FIRST THING IN THE MORNING TO STEADY YOUR NERVES TO GET RID OF A HANGOVER: NO
HAVE PEOPLE ANNOYED YOU BY CRITICIZING YOUR DRINKING: NO
TOTAL SCORE: 0
AVERAGE NUMBER OF DAYS PER WEEK YOU HAVE A DRINK CONTAINING ALCOHOL: 0
TOTAL SCORE: 0
SUBSTANCE_ABUSE: 0
ON A TYPICAL DAY WHEN YOU DRINK ALCOHOL HOW MANY DRINKS DO YOU HAVE: 0
TOTAL SCORE: 0
EVER FELT BAD OR GUILTY ABOUT YOUR DRINKING: NO
HOW MANY TIMES IN THE PAST YEAR HAVE YOU HAD 5 OR MORE DRINKS IN A DAY: 0
EVER_SMOKED: NEVER

## 2020-02-27 ASSESSMENT — ENCOUNTER SYMPTOMS
TREMORS: 0
HEADACHES: 0
BACK PAIN: 0
MYALGIAS: 0
PALPITATIONS: 0
CHILLS: 0
DEPRESSION: 0
WEIGHT LOSS: 0
PHOTOPHOBIA: 0
NECK PAIN: 0
TINGLING: 0
COUGH: 0
DOUBLE VISION: 0
HALLUCINATIONS: 0
FEVER: 0
ABDOMINAL PAIN: 1
ORTHOPNEA: 0
BLURRED VISION: 0
DIZZINESS: 0
HEMOPTYSIS: 0
NAUSEA: 1

## 2020-02-27 ASSESSMENT — COPD QUESTIONNAIRES
HAVE YOU SMOKED AT LEAST 100 CIGARETTES IN YOUR ENTIRE LIFE: NO/DON'T KNOW
COPD SCREENING SCORE: 2
DURING THE PAST 4 WEEKS HOW MUCH DID YOU FEEL SHORT OF BREATH: NONE/LITTLE OF THE TIME
IN THE PAST 12 MONTHS DO YOU DO LESS THAN YOU USED TO BECAUSE OF YOUR BREATHING PROBLEMS: DISAGREE/UNSURE
DO YOU EVER COUGH UP ANY MUCUS OR PHLEGM?: NO/ONLY WITH OCCASIONAL COLDS OR INFECTIONS

## 2020-02-27 ASSESSMENT — COGNITIVE AND FUNCTIONAL STATUS - GENERAL
DAILY ACTIVITIY SCORE: 24
MOBILITY SCORE: 24
SUGGESTED CMS G CODE MODIFIER DAILY ACTIVITY: CH
SUGGESTED CMS G CODE MODIFIER MOBILITY: CH

## 2020-02-27 ASSESSMENT — PATIENT HEALTH QUESTIONNAIRE - PHQ9
1. LITTLE INTEREST OR PLEASURE IN DOING THINGS: NOT AT ALL
SUM OF ALL RESPONSES TO PHQ9 QUESTIONS 1 AND 2: 0
2. FEELING DOWN, DEPRESSED, IRRITABLE, OR HOPELESS: NOT AT ALL

## 2020-02-27 ASSESSMENT — PAIN SCALES - GENERAL: PAIN_LEVEL: 0

## 2020-02-27 NOTE — ANESTHESIA QCDR
2019 [facility] Clinical Data Registry (for Quality Improvement)     Postoperative nausea/vomiting risk protocol (Adult = 18 yrs and Pediatric 3-17 yrs)- (430 and 463)  General inhalation anesthetic (NOT TIVA) with PONV risk factors: Yes  Provision of anti-emetic therapy with at least 2 different classes of agents: Yes   Patient DID NOT receive anti-emetic therapy and reason is documented in Medical Record:  N/A    Multimodal Pain Management- (477)  Non-emergent surgery AND patient age >= 18: No  Use of Multimodal Pain Management, two or more drugs and/or interventions, NOT including systemic opioids:   Exception: Documented allergy to multiple classes of analgesics:     Smoking Abstinence (404)  Patient is current smoker (cigarette, pipe, e-cig, marijuanna): No  Elective Surgery:   Abstinence instructions provided prior to day of surgery:   Patient abstained from smoking on day of surgery:     Pre-Op Beta-Blocker in Isolated CABG (44)  Isolated CABG AND patient age >= 18: No  Beta-blocker admin within 24 hours of surgical incision:   Exception:of medical reason(s) for not administering beta blocker within 24 hours prior to surgical incision (e.g., not  indicated,other medical reason):     PACU assessment of acute postoperative pain prior to Anesthesia Care End- Applies to Patients Age = 18- (ABG7)  Initial PACU pain score is which of the following: < 7/10  Patient unable to report pain score: N/A    Post-anesthetic transfer of care checklist/protocol to PACU/ICU- (426 and 427)  Upon conclusion of case, patient transferred to which of the following locations: PACU/Non-ICU  Use of transfer checklist/protocol: Yes  Exclusion: Service Performed in Patient Hospital Room (and thus did not require transfer): N/A  Unplanned admission to ICU related to anesthesia service up through end of PACU care- (MD51)  Unplanned admission to ICU (not initially anticipated at anesthesia start time): No

## 2020-02-27 NOTE — OR NURSING
PT from OR w/ RN/ANES, PT w/ abdominal incision w/ wound vac placement, CDI, wound vac  to 125mm HG w/ sanguinous drainage noted.  PT denies pain/nausea, tolerated water, sprite and fruit cup.  PT  Garrett updated on status and in PACU to see PT due to extended stay in PACU, advised of room number.  PT on room air, sats > 94%.  Two bags of belongings at bedside w/ PT.  PT to be transferred to [facility] once report given.

## 2020-02-27 NOTE — ED NOTES
PIV placed by nursing student with RN supervision. Blood cultures x2 and lactic acid drawn by RN from two separate sites, sent to lab on ice.   Zosyn started (see MAR).   ERP back to bedside to discuss POC.     Report given to Kenyetta GIBBS.

## 2020-02-27 NOTE — ANESTHESIA POSTPROCEDURE EVALUATION
Patient: Dorie Wadsworth    Procedure Summary     Date:  02/27/20 Room / Location:  [place] [address] / SURGERY [facility] ORS    Anesthesia Start:  1158 Anesthesia Stop:  1243    Procedure:  IRRIGATION AND DEBRIDEMENT, WOUND - ABDOMINAL, PSB. WOUND VAC (N/A Abdomen) Diagnosis:  (abdominal pain, absecss)    Surgeon:  Len Chavarria M.D. Responsible Provider:  Kathy Marino M.D.    Anesthesia Type:  general ASA Status:  3 - Emergent          Final Anesthesia Type: general  Last vitals  BP   Blood Pressure : 111/50    Temp   36.4 °C (97.5 °F)    Pulse   Pulse: 78   Resp   19    SpO2   98 %      Anesthesia Post Evaluation    Patient location during evaluation: PACU  Patient participation: complete - patient participated  Level of consciousness: awake and alert  Pain score: 0    Airway patency: patent  Anesthetic complications: no  Cardiovascular status: hemodynamically stable  Respiratory status: acceptable  Hydration status: euvolemic    PONV: none           Nurse Pain Score: 0 (NPRS)

## 2020-02-27 NOTE — ED TRIAGE NOTES
Chief Complaint   Patient presents with   • Abdominal Pain     LLQ since monday     Pt to triage via wheelchair. Pt reporting LLQ abdominal pain. Pt has known hernia and was seen by MD on Monday and a CT was ordered, but she has been unable to get it done yet. Pt also reports intermittent nausea. Pt has a urostomy. Pt appears uncomfortable in triage.  at bedside.

## 2020-02-27 NOTE — ED NOTES
Upon almost administering dilaudid as Dr Yoon requested, he states do not give the dilaudid and instead give toradol. Called pharmacy to verify toradol

## 2020-02-27 NOTE — CONSULTS
General Surgery Consult    CHIEF COMPLAINT: Abdominal pain.     HISTORY OF PRESENT ILLNESS: The patient is a 84 y.o. female, who presents with abdominal pain.  Patient well-known to me status post robotic assisted repair of a parastomal hernia with repair of an enterotomy 3 months ago.  I saw her in clinic 2 days ago with a painful bulge that was concerning for a seroma.  I ordered a CT scan however her pain became too severe to tolerate and she presented to the emergency room.  Here she will underwent a work-up to include an elevated white blood cell count as well as a CT scan concerning for an abscess with a air within the abscess concerning for possible fistula.  General surgery was consulted for evaluation and management.  She describes pain at the bulge.     PAST MEDICAL HISTORY:  has a past medical history of A-fib ([facility]), Ailment, Arthritis, Atrial fibrillation ([facility]) (2/2009), Atrial fibrillation with rapid ventricular response ([facility]) (3/18/2019), Atrial fibrillation, rapid ([facility]) (1/31/2014), Back pain, Bladder disease (2011), Bladder problem, CAD (coronary artery disease), Cancer ([facility]), CATARACT, CHEST PAIN (3/11/2011), Glaucoma, History of hematuria, HTN (hypertension), malignant (3/18/2019), HTN (hypertension), malignant (3/18/2019), Hyperlipidemia (2/24/2011), Hypertension, Hypothyroid (2/24/2011), Indigestion, Intrinsic eczema (10/31/2017), Other specified pre-operative examination (1/28/2014), Paroxysmal atrial fibrillation ([facility]), Sepsis urinary source (1/23/2012), Stroke ([facility]), T.I.A., TIA (transient ischemic attack) (3/11/2011), Unspecified disorder of thyroid, Unspecified hemorrhagic conditions, Urinary bladder disorder, UTI (urinary tract infection) (2/24/2011), and Vaginal bleeding (10/22/2012).     PAST SURGICAL HISTORY:  has a past surgical history that includes bladder biopsy with cystoscopy (10/10/08); pyelogram (10/10/08); retrogrades (10/10/08); bladder biopsy with cystoscopy (6/15/2009);  "bladder biopsy with cystoscopy (10/12/2009); other abdominal surgery; cholecystectomy (1994); cholecystectomy (1994); gyn surgery; pelvic exam under anesthesia (10/12/2009); rectocele repair (11/3/2009); other; cystectomy (9/6/2011); ileo loop diversion (9/6/2011); biopsy general (10/22/2012); parastomal hernia repair (1/28/2014); and ventral hernia repair robotic xi (12/24/2019).     ALLERGIES:   Allergies   Allergen Reactions   • Cardizem Swelling   • Doxycycline      Swollen lips.   • Sulfa Drugs      Makes tongue swell, severely   • Zyvox Swelling     \"Whole mouth swelled up\"    • Codeine      Does not remember the reaction          CURRENT MEDICATIONS:   Home Medications     Reviewed by Jus Prado (Pharmacy Tech) on 02/27/20 at 0722  Med List Status: Complete   Medication Last Dose Status   aspirin EC 81 MG EC tablet 2/27/2020 Active   BIOTIN PO 2/27/2020 Active   ferrous gluconate (IRON 27) 240 (27 Fe) MG tablet 2/27/2020 Active   furosemide (LASIX) 20 MG Tab 2/24/2020 Active   latanoprost (XALATAN) 0.005 % Solution 2/26/2020 Active   levothyroxine (SYNTHROID) 112 MCG Tab 2/27/2020 Active   lovastatin (MEVACOR) 10 MG tablet 2/27/2020 Active   metoprolol (LOPRESSOR) 100 MG Tab 2/27/2020 Active   Multiple Vitamins-Minerals (CENTRUM SILVER ULTRA WOMENS) Tab 2/27/2020 Active   Multiple Vitamins-Minerals (PRESERVISION AREDS PO) 2/27/2020 Active   pantoprazole (PROTONIX) 40 MG TBEC 2/27/2020 Active   timolol (TIMOPTIC) 0.5 % Solution 2/27/2020 Active                FAMILY HISTORY:   Family History   Problem Relation Age of Onset   • Stroke Mother 81   • Stroke Father 77        SOCIAL HISTORY:   Social History     Tobacco Use   • Smoking status: Never Smoker   • Smokeless tobacco: Never Used   Substance and Sexual Activity   • Alcohol use: No   • Drug use: No   • Sexual activity: Not on file       REVIEW OF SYSTEMS: Comprehensive review of systems was negative aside from abdominal pain described in the above " "HPI    PHYSICAL EXAMINATION:     GENERAL: The patient is in no acute distress.   VITAL SIGNS: /50   Pulse 61   Temp 36.4 °C (97.5 °F) (Temporal)   Resp 18   Ht 1.727 m (5' 8\")   Wt 71.1 kg (156 lb 12 oz)   SpO2 97%   HEAD AND NECK: Demonstrates symmetric, reactive pupils. Extraocular muscles   are intact. Nares and oropharynx are clear.   NECK: Supple. No adenopathy.  CHEST:No respiratory distress.    CARDIOVASCULAR: Regular rate. The extremities are well perfused.   ABDOMEN: Bulge in the midline away from her surgical incisions that is tender.  Well-healed incisions.  Urostomy functioning.   EXTREMITIES: Examination of the upper and lower extremities demonstrates no cyanosis edema or clubbing.  NEUROLOGIC: Alert & oriented x 3, Normal motor function, Normal sensory function, No focal deficits noted.    LABORATORY VALUES:   Recent Labs     02/27/20  0507   WBC 15.9*   RBC 4.29   HEMOGLOBIN 11.6*   HEMATOCRIT 37.6   MCV 87.6   MCH 27.0   MCHC 30.9*   RDW 52.4*   PLATELETCT 237   MPV 10.9     Recent Labs     02/27/20  0507   SODIUM 133*   POTASSIUM 4.4   CHLORIDE 105   CO2 22   GLUCOSE 99   BUN 20   CREATININE 0.98   CALCIUM 8.7     Recent Labs     02/27/20  0507   ASTSGOT 28   ALTSGPT 14   TBILIRUBIN 0.6   ALKPHOSPHAT 136*   GLOBULIN 4.5*   INR 1.18*     Recent Labs     02/27/20  0507   APTT 29.0   INR 1.18*        IMAGING:   CT-ABDOMEN-PELVIS WITH   Final Result         1.  Enhancing fluid collection in the anterior abdominal wall, appearance most compatible with abscess. Consider bowel fistula given foci of air.   2.  Enhancing fluid collection within the abdomen and along the intra-abdominal wall, appearance most compatible with abscess. Foci of air suggests possible fistula with adjacent loops of bowel.   3.  Nodular hepatic contour, appearance compatible with changes of cirrhosis.   4.  Recanalization of the umbilical vein and splenomegaly, compatible with changes of portal hypertension   5.  " Diverticulosis   6.  Atherosclerosis and atherosclerotic coronary artery disease.      DX-CHEST-PORTABLE (1 VIEW)   Final Result         1.  Pulmonary vascular prominence suggests pulmonary vascular congestion   2.  Cardiomegaly   3.  Atherosclerosis          IMPRESSION AND PLAN:     1.  Abscess with possible fistula.  2.  Multiple medical problems.    1.  My goal today is to control the infection and possible fistula in the operating theater.  Plan for an incision and drainage with washout.  Should it appear her abdomen will not be hostile I will plan for a small bowel resection.  Otherwise given the time since her original operation bowel resection may not be possible and may in fact cause more bowel injuries.  I will err on the side of safety today.  Should I be unable to control the infection or secretions we will plan an interval bowel resection.    2.  The patient will be taken to the operating room for an incision and drainage with washout, possible VAC placement, possible bowel resection. The surgical conduct was explained. Potential complications including but not limited to infection, bleeding, damage to adjacent structures, anesthetic complications were discussed in detail. Questions were elicited and answered to her satisfaction. She understands the rationale for surgery and elects to proceed.  Operative consent signed.    3.  The patient was admitted to the medicine service for her multiple medical problems.      ___________________________________   Len Chavarria M.D.    DD: 2/27/2020 DT: 10:53 AM

## 2020-02-27 NOTE — ASSESSMENT & PLAN NOTE
2/27.20  IRRIGATION AND DEBRIDEMENT, WOUND - ABDOMINAL, PSB. WOUND VAC  LAPAROTOMY, EXPLORATORY  EXCISION, INTESTINE - BOWEL RESECTION  Wound dirty  Wound vac in place, will be dc home with HH Wound care (face-to-face and order in)  Cultures growing E. Coli and Strep Viridans and bacteroides   Continue IV Zosyn, will likely dc on augmentin  Surgery signed off  Pain control

## 2020-02-27 NOTE — ANESTHESIA PROCEDURE NOTES
Airway  Date/Time: 2/27/2020 12:03 PM  Performed by: Kathy Marino M.D.  Authorized by: Kathy Marino M.D.     Location:  OR  Urgency:  Elective  Indications for Airway Management:  Anesthesia  Spontaneous Ventilation: absent    Sedation Level:  Deep  Preoxygenated: Yes    Patient Position:  Sniffing  Final Airway Type:  Endotracheal airway  Final Endotracheal Airway:  ETT  Cuffed: Yes    Technique Used for Successful ETT Placement:  Direct laryngoscopy  Insertion Site:  Oral  Blade Type:  Angelito  Laryngoscope Blade/Videolaryngoscope Blade Size:  3  ETT Size (mm):  3.0  Measured from:  Gums  ETT to Gums (cm):  20  Placement Verified by: auscultation and capnometry    Cormack-Lehane Classification:  Grade I - full view of glottis  Number of Attempts at Approach:  1  Ventilation Between Attempts:  None  Number of Other Approaches Attempted:  0   RSI with cricoid

## 2020-02-27 NOTE — ED PROVIDER NOTES
ED Provider Note    CHIEF COMPLAINT  Chief Complaint   Patient presents with   • Abdominal Pain     LLQ since monday       HPI  Dorie Wadsworth is a 84 y.o. female who presents with abdominal pain.  History of hernia repair with Dr. Chavarria on 24 December.  Developed a different kind of pain about 2 weeks ago..  Worse over the last 4 days.  Seen by her surgeon who ordered a CT scan this is scheduled in March.  Pain was too severe today and therefore comes to the ER.  She vomited a couple days ago and then once yesterday.  Still having bowel movements.  Still having output from her urostomy.  She has not had a fever or chills.  The pain is severe and sharp with intermittent stabbing pain.  No modifying factors.    REVIEW OF SYSTEMS  As per HPI, otherwise a 10 point review of systems is negative    PAST MEDICAL HISTORY  Past Medical History:   Diagnosis Date   • A-fib (HCC)    • Ailment     urostomy   • Arthritis     fingers   • Atrial fibrillation (HCC) 2/2009    A FIB X2,[ resolved on own][   • Atrial fibrillation with rapid ventricular response (HCC) 3/18/2019   • Atrial fibrillation, rapid (HCC) 1/31/2014   • Back pain    • Bladder disease 2011    bladder removed   • Bladder problem     Chronic bladder infection for the past 17months   • CAD (coronary artery disease)    • Cancer (HCC)     skin   • CATARACT     freddie surgery complete   • CHEST PAIN 3/11/2011   • Glaucoma    • History of hematuria     3/15/10: with Plavix.   • HTN (hypertension), malignant 3/18/2019   • HTN (hypertension), malignant 3/18/2019   • Hyperlipidemia 2/24/2011   • Hypertension    • Hypothyroid 2/24/2011   • Indigestion    • Intrinsic eczema 10/31/2017   • Other specified pre-operative examination 1/28/2014   • Paroxysmal atrial fibrillation (HCC)    • Sepsis urinary source 1/23/2012   • Stroke (HCC)     2 TIAs 2/2010, no stroke, 2/2013   • T.I.A.    • TIA (transient ischemic attack) 3/11/2011   • Unspecified disorder of thyroid    •  Unspecified hemorrhagic conditions     not sure when (2009)  blood in urin    • Urinary bladder disorder     urinary incontinence   • UTI (urinary tract infection) 2/24/2011   • Vaginal bleeding 10/22/2012       SOCIAL HISTORY  Social History     Tobacco Use   • Smoking status: Never Smoker   • Smokeless tobacco: Never Used   Substance Use Topics   • Alcohol use: No   • Drug use: No       SURGICAL HISTORY  Past Surgical History:   Procedure Laterality Date   • VENTRAL HERNIA REPAIR ROBOTIC XI  12/24/2019    Procedure: REPAIR, HERNIA, VENTRAL, ROBOT-ASSISTED, USING DA RUTHIE XI - PARASTOMAL HERNIA REPAIR WITH LYSIS OF ADHESIONS;  Surgeon: Len Chavarria M.D.;  Location: SURGERY [facility];  Service: General   • PARASTOMAL HERNIA REPAIR   1/28/2014    Performed by Nicol Dorman M.D. at SURGERY [facility]   • BIOPSY GENERAL  10/22/2012    Performed by Jennifer Lincoln M.D. at [facility]   • CYSTECTOMY  9/6/2011    Performed by JENNIFER LINCOLN at SURGERY [facility]   • ILEO LOOP DIVERSION  9/6/2011    Performed by JENNIFER LINCOLN at [facility]   • RECTOCELE REPAIR  11/3/2009    Performed by ZEKE HARRINGTON at SURGERY [facility]   • BLADDER BIOPSY WITH CYSTOSCOPY  10/12/2009    Performed by JENNIFER LINCOLN at SURGERY [facility]   • PELVIC EXAM UNDER ANESTHESIA  10/12/2009    Performed by JENNIFER LINCOLN at SURGERY [facility]   • BLADDER BIOPSY WITH CYSTOSCOPY  6/15/2009    Performed by CARLTON LUNA at SURGERY [facility]   • BLADDER BIOPSY WITH CYSTOSCOPY  10/10/08    Performed by CARLTON LUNA at SURGERY [facility]   • PYELOGRAM  10/10/08    Performed by CARLTON LUNA at SURGERY [facility]   • RETROGRADES  10/10/08    Performed by CARLTON LUNA at [facility]   • CHOLECYSTECTOMY  1994   • CHOLECYSTECTOMY  1994   • GYN SURGERY      hysterectomy   • OTHER      TONSILLECTOMY AS TEENAGER   • OTHER  "ABDOMINAL SURGERY      appendectomy, pancratitis        CURRENT MEDICATIONS  Home Medications     Reviewed by Mychal Ponce R.N. (Registered Nurse) on 02/27/20 at 0452  Med List Status: Not Addressed   Medication Last Dose Status   aspirin EC 81 MG EC tablet  Active   Biotin 10 MG CAPS  Active   ferrous gluconate (IRON 27) 240 (27 Fe) MG tablet  Active   furosemide (LASIX) 20 MG Tab  Active   latanoprost (XALATAN) 0.005 % Solution  Active   levothyroxine (SYNTHROID) 112 MCG Tab  Active   lovastatin (MEVACOR) 10 MG tablet  Active   metoprolol (LOPRESSOR) 100 MG Tab  Active   Multiple Vitamins-Minerals (CENTRUM SILVER ULTRA WOMENS) Tab  Active   Multiple Vitamins-Minerals (PRESERVISION AREDS PO)  Active   pantoprazole (PROTONIX) 40 MG TBEC  Active   Psyllium (METAMUCIL) 28 % packet  Active   timolol (TIMOPTIC) 0.5 % Solution  Active   vitamin D (VITAMIND D3) 1000 UNIT Tab  Active                ALLERGIES  Allergies   Allergen Reactions   • Cardizem Swelling   • Doxycycline      Swollen lips.   • Sulfa Drugs      Makes tongue swell, severely   • Zyvox Swelling     \"Whole mouth swelled up\"    • Codeine      Does not remember the reaction         PHYSICAL EXAM  VITAL SIGNS: /72   Pulse 74   Temp 36.4 °C (97.5 °F) (Temporal)   Resp (!) 22   Ht 1.727 m (5' 8\")   Wt 71.1 kg (156 lb 12 oz)   SpO2 96%   BMI 23.83 kg/m²    Constitutional: Awake and alert.  Intermittently cries out in severe pain.  Obvious distress  HENT:  Atraumatic, Normocephalic.Oropharynx dry mucus membranes, Nose normal inspection.   Eyes: Normal inspection  Neck: Supple  Cardiovascular: Normal heart rate, Normal rhythm.  Symmetric peripheral pulses.   Thorax & Lungs: No respiratory distress, No wheezing, No rales, No rhonchi, No chest tenderness.   Abdomen: Urostomy right lower abdomen.  Healed surgical scar left lower quadrant.  Superior to this is a firm tender mass with overlying skin redness.  No abdominal distention otherwise.  Skin: " Skin redness left lower abdomen as above  Extremities: Mild pedal edema  Neurologic: Grossly normal   Psychiatric: Anxious appearing    RADIOLOGY/PROCEDURES  CT-ABDOMEN-PELVIS WITH   Final Result         1.  Enhancing fluid collection in the anterior abdominal wall, appearance most compatible with abscess. Consider bowel fistula given foci of air.   2.  Enhancing fluid collection within the abdomen and along the intra-abdominal wall, appearance most compatible with abscess. Foci of air suggests possible fistula with adjacent loops of bowel.   3.  Nodular hepatic contour, appearance compatible with changes of cirrhosis.   4.  Recanalization of the umbilical vein and splenomegaly, compatible with changes of portal hypertension   5.  Diverticulosis   6.  Atherosclerosis and atherosclerotic coronary artery disease.      DX-CHEST-PORTABLE (1 VIEW)   Final Result         1.  Pulmonary vascular prominence suggests pulmonary vascular congestion   2.  Cardiomegaly   3.  Atherosclerosis           Imaging is interpreted by radiologist    Labs:  Results for orders placed or performed during the hospital encounter of 02/27/20   CBC WITH DIFFERENTIAL   Result Value Ref Range    WBC 15.9 (H) 4.8 - 10.8 K/uL    RBC 4.29 4.20 - 5.40 M/uL    Hemoglobin 11.6 (L) 12.0 - 16.0 g/dL    Hematocrit 37.6 37.0 - 47.0 %    MCV 87.6 81.4 - 97.8 fL    MCH 27.0 27.0 - 33.0 pg    MCHC 30.9 (L) 33.6 - 35.0 g/dL    RDW 52.4 (H) 35.9 - 50.0 fL    Platelet Count 237 164 - 446 K/uL    MPV 10.9 9.0 - 12.9 fL    Neutrophils-Polys 68.20 44.00 - 72.00 %    Lymphocytes 14.40 (L) 22.00 - 41.00 %    Monocytes 13.90 (H) 0.00 - 13.40 %    Eosinophils 2.10 0.00 - 6.90 %    Basophils 0.60 0.00 - 1.80 %    Immature Granulocytes 0.80 0.00 - 0.90 %    Nucleated RBC 0.00 /100 WBC    Neutrophils (Absolute) 10.86 (H) 2.00 - 7.15 K/uL    Lymphs (Absolute) 2.30 1.00 - 4.80 K/uL    Monos (Absolute) 2.22 (H) 0.00 - 0.85 K/uL    Eos (Absolute) 0.34 0.00 - 0.51 K/uL    Baso  (Absolute) 0.09 0.00 - 0.12 K/uL    Immature Granulocytes (abs) 0.12 (H) 0.00 - 0.11 K/uL    NRBC (Absolute) 0.00 K/uL   COMP METABOLIC PANEL   Result Value Ref Range    Sodium 133 (L) 135 - 145 mmol/L    Potassium 4.4 3.6 - 5.5 mmol/L    Chloride 105 96 - 112 mmol/L    Co2 22 20 - 33 mmol/L    Anion Gap 6.0 0.0 - 11.9    Glucose 99 65 - 99 mg/dL    Bun 20 8 - 22 mg/dL    Creatinine 0.98 0.50 - 1.40 mg/dL    Calcium 8.7 8.5 - 10.5 mg/dL    AST(SGOT) 28 12 - 45 U/L    ALT(SGPT) 14 2 - 50 U/L    Alkaline Phosphatase 136 (H) 30 - 99 U/L    Total Bilirubin 0.6 0.1 - 1.5 mg/dL    Albumin 3.3 3.2 - 4.9 g/dL    Total Protein 7.8 6.0 - 8.2 g/dL    Globulin 4.5 (H) 1.9 - 3.5 g/dL    A-G Ratio 0.7 g/dL   APTT   Result Value Ref Range    APTT 29.0 24.7 - 36.0 sec   PROTHROMBIN TIME (INR)   Result Value Ref Range    PT 15.3 (H) 12.0 - 14.6 sec    INR 1.18 (H) 0.87 - 1.13   ESTIMATED GFR   Result Value Ref Range    GFR If African American >60 >60 mL/min/1.73 m 2    GFR If Non African American 54 (A) >60 mL/min/1.73 m 2   LACTIC ACID   Result Value Ref Range    Lactic Acid 1.1 0.5 - 2.0 mmol/L   EKG   Result Value Ref Range    Report       [facility] Emergency Dept.    Test Date:  2020  Pt Name:    BERNARD BAER                Department: ER  MRN:        [id number]                      Room:       RD 11  Gender:     Female                       Technician: [id number]  :        [id number]                   Requested By:GREY MARTINEZ  Order #:    [id number]                    Reading MD: GREY MARTINEZ MD    Measurements  Intervals                                Axis  Rate:       73                           P:          55  MD:         160                          QRS:        35  QRSD:       82                           T:          216  QT:         448  QTc:        494    Interpretive Statements  SINUS RHYTHM  ABNORMAL T, CONSIDER ISCHEMIA, DIFFUSE LEADS  BORDERLINE PROLONGED QT INTERVAL  Compared to ECG  12/28/2019 08:29:18  T-wave abnormality now present  Possible ischemia now present  Atrial fibrillation no longer present  ST (T wave) deviation no longer present  Electronically Signed On 2-  7:34:07 PST by GREY MARTINEZ MD         Medications   lactated ringers infusion (BOLUS): BMI less than or equal to 30 (has no administration in time range)   piperacillin-tazobactam (ZOSYN) 4.5 g in  mL IVPB (4.5 g Intravenous New Bag 2/27/20 0707)   piperacillin-tazobactam (ZOSYN) 3.375 g in  mL IVPB (has no administration in time range)     And   piperacillin-tazobactam (ZOSYN) 3.375 g in  mL IVPB (has no administration in time range)   NS infusion 500 mL (0 mL Intravenous Stopped 2/27/20 0605)   fentaNYL (SUBLIMAZE) injection 50 mcg (50 mcg Intravenous Given 2/27/20 0517)   ondansetron (ZOFRAN) syringe/vial injection 4 mg (4 mg Intravenous Given 2/27/20 0517)   iohexol (OMNIPAQUE) 350 mg/mL (80 mL Intravenous Given 2/27/20 0556)   fentaNYL (SUBLIMAZE) injection 50 mcg (50 mcg Intravenous Given 2/27/20 0719)       Hydration: Patient was given IV fluids because of nausea vomiting.  Possible dehydration and n.p.o. status for surgery.  Stable on recheck    COURSE & MEDICAL DECISION MAKING  Patient presents with abdominal pain.  Has a palpable abdominal mass with overlying redness.  Ordered laboratory data.  Medicated for pain with fentanyl and Zofran.  Had improved symptoms.    Data returned as above with leukocytosis.  Has abdominal abscess possible fistula.  Paged Dr. Chavarria.  Ordered Zosyn.  Sepsis protocol was initiated.    Discussed with Dr. Chavarria.  He requested admission to the hospitalist service.  He will see the patient.  Plan for operative intervention this evening.    Consulted Dr. Yoon for admission.      FINAL IMPRESSION  1.  Abdominal wall abscess with possible fistula      This dictation was created using voice recognition software. The accuracy of the dictation is limited to the  abilities of the software.  The nursing notes were reviewed and certain aspects of this information were incorporated into this note.      Electronically signed by: Damon Pena M.D., 2/27/2020 5:16 AM

## 2020-02-27 NOTE — ANESTHESIA PREPROCEDURE EVALUATION
Relevant Problems   NEURO   (+) History of hematuria   (+) TIA (transient ischemic attack)      CARDIAC   (+) Essential hypertension   (+) Paroxysmal A-fib (HCC)         (+) CKD (chronic kidney disease) stage 3, GFR 30-59 ml/min (HCC)   (+) CKD (chronic kidney disease), stage II   (+) Cirrhosis (HCC)      ENDO   (+) Hypothyroid       Physical Exam    Airway   Mallampati: II  TM distance: >3 FB  Neck ROM: full       Cardiovascular - normal exam  Rhythm: regular  Rate: normal  (-) murmur     Dental - normal exam         Pulmonary - normal exam  Breath sounds clear to auscultation     Abdominal    Neurological - normal exam                 Anesthesia Plan    ASA 3- EMERGENT       Plan - general       Airway plan will be ETT        Induction: intravenous    Postoperative Plan: Postoperative administration of opioids is intended.    Pertinent diagnostic labs and testing reviewed    Informed Consent:    Anesthetic plan and risks discussed with patient.    Use of blood products discussed with: patient whom.

## 2020-02-27 NOTE — ASSESSMENT & PLAN NOTE
May represent repolarization due to LVH  Last stress test 2011, last echo 3/2019  No chest pain or SOB  Repeat ecg similar, patient preferred to follow up with cardiologist next month, did recommend she call cardiologist to discuss and see if she wants to bring her in sooner  No chest pain, SOB or nausea during ambulation  CTM closely

## 2020-02-27 NOTE — ED NOTES
Pt wheeled to Red 11 by triage RN. Pt appears to be in a significant amount of lower abdominal pain. Pt's lower abdomen, distended, tender and hard to touch. PIV placed, blood drawn and tubed to lab. Vitals obtained. ERP notified of pt's discomfort. ERP to bedside.

## 2020-02-27 NOTE — ED NOTES
Fentanyl and Zofran administered (see MAR).   NS bolus infusing.   Will reassess patients pain and comfort shortly.

## 2020-02-27 NOTE — H&P
Hospital Medicine History & Physical Note    Date of Service  2/27/2020    Primary Care Physician  Coreen Cisse M.D.    Consultants  [facility] surgery    Code Status  full    Chief Complaint  Abdominal pain    History of Presenting Illness  84 y.o. female who presented 2/27/2020 with past medical history of atrial fibrillation, hypertension, TIA underwent a hernia repair December 24th 2019.  The patient has been having abdominal pain ongoing for last 2 weeks but over the last 4 days is gotten progressively worse.. It is now constant pain in the center lower abdomen intensity 9 out of 10.  It is worse with any palpation.  She is noted her abdomen becoming more distended in that area.  She came to the emergency department for evaluation and a CT showed evidence of a abdominal abscess.  The surgeon is aware will take the patient to surgery today.     Patient does have a right lower quadrant ostomy      Review of Systems  Review of Systems   Constitutional: Positive for malaise/fatigue. Negative for chills, fever and weight loss.   HENT: Negative for ear discharge, ear pain, hearing loss and tinnitus.    Eyes: Negative for blurred vision, double vision and photophobia.   Respiratory: Negative for cough and hemoptysis.    Cardiovascular: Negative for chest pain, palpitations and orthopnea.   Gastrointestinal: Positive for abdominal pain and nausea.   Genitourinary: Negative for dysuria, frequency and urgency.   Musculoskeletal: Negative for back pain, myalgias and neck pain.   Neurological: Negative for dizziness, tingling, tremors and headaches.   Psychiatric/Behavioral: Negative for depression, hallucinations, substance abuse and suicidal ideas.   All other systems reviewed and are negative.      Past Medical History   has a past medical history of A-fib ([facility]), Ailment, Arthritis, Atrial fibrillation (HCC) (2/2009), Atrial fibrillation with rapid ventricular response (HCC) (3/18/2019), Atrial fibrillation, rapid (HCC)  "(1/31/2014), Back pain, Bladder disease (2011), Bladder problem, CAD (coronary artery disease), Cancer (HCC), CATARACT, CHEST PAIN (3/11/2011), Glaucoma, History of hematuria, HTN (hypertension), malignant (3/18/2019), HTN (hypertension), malignant (3/18/2019), Hyperlipidemia (2/24/2011), Hypertension, Hypothyroid (2/24/2011), Indigestion, Intrinsic eczema (10/31/2017), Other specified pre-operative examination (1/28/2014), Paroxysmal atrial fibrillation ([facility]), Sepsis urinary source (1/23/2012), Stroke ([facility]), T.I.A., TIA (transient ischemic attack) (3/11/2011), Unspecified disorder of thyroid, Unspecified hemorrhagic conditions, Urinary bladder disorder, UTI (urinary tract infection) (2/24/2011), and Vaginal bleeding (10/22/2012). She also has no past medical history of COPD, Fall, Heart murmur, Psychiatric disorder, or Seizure disorder ([facility]).    Surgical History   has a past surgical history that includes bladder biopsy with cystoscopy (10/10/08); pyelogram (10/10/08); retrogrades (10/10/08); bladder biopsy with cystoscopy (6/15/2009); bladder biopsy with cystoscopy (10/12/2009); other abdominal surgery; cholecystectomy (1994); cholecystectomy (1994); gyn surgery; pelvic exam under anesthesia (10/12/2009); rectocele repair (11/3/2009); other; cystectomy (9/6/2011); ileo loop diversion (9/6/2011); biopsy general (10/22/2012); parastomal hernia repair  (1/28/2014); and ventral hernia repair robotic xi (12/24/2019).     Family History  family history includes Stroke (age of onset: 77) in her father; Stroke (age of onset: 81) in her mother.     Social History   reports that she has never smoked. She has never used smokeless tobacco. She reports that she does not drink alcohol or use drugs.    Allergies  Allergies   Allergen Reactions   • Cardizem Swelling   • Doxycycline      Swollen lips.   • Sulfa Drugs      Makes tongue swell, severely   • Zyvox Swelling     \"Whole mouth swelled up\"    • Codeine      Does not " remember the reaction         Medications  Prior to Admission Medications   Prescriptions Last Dose Informant Patient Reported? Taking?   BIOTIN PO 2/27/2020 at AM Patient Yes No   Sig: Take 1 Cap by mouth every day.   Multiple Vitamins-Minerals (CENTRUM SILVER ULTRA WOMENS) Tab 2/27/2020 at AM Patient Yes No   Sig: Take 1 Tab by mouth every day.   Multiple Vitamins-Minerals (PRESERVISION AREDS PO) 2/27/2020 at AM Patient Yes No   Sig: Take 1 Tab by mouth 2 Times a Day.   aspirin EC 81 MG EC tablet 2/27/2020 at AM Patient No No   Sig: Take 1 Tab by mouth every day.   ferrous gluconate (IRON 27) 240 (27 Fe) MG tablet 2/27/2020 at AM Patient Yes No   Sig: Take 240 mg by mouth every day.   furosemide (LASIX) 20 MG Tab 2/24/2020 at PM Patient Yes No   Sig: Take 20 mg by mouth 1 time daily as needed.   latanoprost (XALATAN) 0.005 % Solution 2/26/2020 at PM Patient Yes No   Sig: Place 1 Drop in both eyes every evening.   levothyroxine (SYNTHROID) 112 MCG Tab 2/27/2020 at AM Patient Yes No   Sig: Take 112 mcg by mouth Every morning on an empty stomach.   lovastatin (MEVACOR) 10 MG tablet 2/27/2020 at AM Patient Yes No   Sig: Take 10 mg by mouth every day.   metoprolol (LOPRESSOR) 100 MG Tab 2/27/2020 at AM Patient No No   Sig: Take 1 Tab by mouth 2 times a day.   pantoprazole (PROTONIX) 40 MG TBEC 2/27/2020 at AM Patient Yes No   Sig: Take 40 mg by mouth every day.   timolol (TIMOPTIC) 0.5 % Solution 2/27/2020 at AM Patient Yes No   Sig: Place 1 Drop in both eyes 2 times a day.      Facility-Administered Medications: None       Physical Exam  Temp:  [36.4 °C (97.5 °F)-36.9 °C (98.5 °F)] 36.4 °C (97.5 °F)  Pulse:  [60-99] 83  Resp:  [15-22] 19  BP: (103-158)/(39-98) 112/63  SpO2:  [88 %-100 %] 88 %    Physical Exam  Constitutional:       General: She is in acute distress.      Appearance: Normal appearance. She is ill-appearing and diaphoretic.   HENT:      Head: Normocephalic and atraumatic.      Nose: No congestion or  rhinorrhea.      Mouth/Throat:      Pharynx: No oropharyngeal exudate or posterior oropharyngeal erythema.   Eyes:      General: No scleral icterus.        Left eye: No discharge.   Neck:      Musculoskeletal: Normal range of motion. No neck rigidity or muscular tenderness.      Vascular: No carotid bruit.   Cardiovascular:      Rate and Rhythm: Normal rate.      Pulses: Normal pulses.      Heart sounds: Normal heart sounds. No murmur. No friction rub. No gallop.    Pulmonary:      Effort: Pulmonary effort is normal. No respiratory distress.      Breath sounds: No stridor. No wheezing, rhonchi or rales.   Chest:      Chest wall: No tenderness.   Abdominal:      Tenderness: There is abdominal tenderness (Lower mid abdomen, swollen tender mass protruding from the lower mid abdomen).      Hernia: No hernia is present.      Comments: Right lower quadrant ostomy   Musculoskeletal:         General: No swelling, tenderness, deformity or signs of injury.      Right lower leg: No edema.      Left lower leg: No edema.   Lymphadenopathy:      Cervical: No cervical adenopathy.   Skin:     General: Skin is warm.      Capillary Refill: Capillary refill takes 2 to 3 seconds.      Coloration: Skin is not jaundiced or pale.      Findings: No bruising, erythema, lesion or rash.   Neurological:      General: No focal deficit present.      Mental Status: She is alert.      Cranial Nerves: No cranial nerve deficit.      Sensory: No sensory deficit.      Motor: No weakness.      Coordination: Coordination normal.      Gait: Gait normal.   Psychiatric:         Mood and Affect: Mood normal.         Laboratory:  Recent Labs     02/27/20  0507   WBC 15.9*   RBC 4.29   HEMOGLOBIN 11.6*   HEMATOCRIT 37.6   MCV 87.6   MCH 27.0   MCHC 30.9*   RDW 52.4*   PLATELETCT 237   MPV 10.9     Recent Labs     02/27/20  0507   SODIUM 133*   POTASSIUM 4.4   CHLORIDE 105   CO2 22   GLUCOSE 99   BUN 20   CREATININE 0.98   CALCIUM 8.7     Recent Labs      02/27/20  0507   ALTSGPT 14   ASTSGOT 28   ALKPHOSPHAT 136*   TBILIRUBIN 0.6   GLUCOSE 99     Recent Labs     02/27/20  0507   APTT 29.0   INR 1.18*     No results for input(s): NTPROBNP in the last 72 hours.      No results for input(s): TROPONINT in the last 72 hours.    Urinalysis:    No results found     Imaging:  CT-ABDOMEN-PELVIS WITH   Final Result         1.  Enhancing fluid collection in the anterior abdominal wall, appearance most compatible with abscess. Consider bowel fistula given foci of air.   2.  Enhancing fluid collection within the abdomen and along the intra-abdominal wall, appearance most compatible with abscess. Foci of air suggests possible fistula with adjacent loops of bowel.   3.  Nodular hepatic contour, appearance compatible with changes of cirrhosis.   4.  Recanalization of the umbilical vein and splenomegaly, compatible with changes of portal hypertension   5.  Diverticulosis   6.  Atherosclerosis and atherosclerotic coronary artery disease.      DX-CHEST-PORTABLE (1 VIEW)   Final Result         1.  Pulmonary vascular prominence suggests pulmonary vascular congestion   2.  Cardiomegaly   3.  Atherosclerosis        EKG read by me shows normal rhythm rate of 73    T wave inversions noted in 2 3 aVF V4 V5 V3 V6      Assessment/Plan:  I anticipate this patient will require at least two midnights for appropriate medical management, necessitating inpatient admission.    * Intra-abdominal abscess (HCC)- (present on admission)  Assessment & Plan  IV Zosyn    Pain control with IV Toradol and IV Dilaudid    sHe is n.p.o. for surgery        CKD (chronic kidney disease) stage 3, GFR 30-59 ml/min (HCC)- (present on admission)  Assessment & Plan  Renal dose all medication    Monitor BMP    Abnormal EKG- (present on admission)  Assessment & Plan  May represent repolarization due to LVH    Check echocardiogram    Check troponin in a.m.    Essential hypertension- (present on admission)  Assessment &  Plan  PRN hydralazine    She is currently n.p.o.    Paroxysmal A-fib (HCC)- (present on admission)  Assessment & Plan  Currently normal sinus rhythm    Heart rate is controlled    Continue Lopressor when no longer n.p.o.    Presence of urostomy (HCC)- (present on admission)  Assessment & Plan  It is currently functioning     Continue to monitor output    Dyslipidemia- (present on admission)  Assessment & Plan  Resume statin when no longer n.p.o.    Hypothyroid- (present on admission)  Assessment & Plan  Continue Synthroid no longer n.p.o.      VTE prophylaxis: scd    A.m. CBC BMP

## 2020-02-27 NOTE — ED NOTES
Provided both patient and  with a blanket. Reports pain is still under control, NAD noted. Updated still awaiting bed, aware of NPO status

## 2020-02-27 NOTE — ANESTHESIA TIME REPORT
Anesthesia Start and Stop Event Times     Date Time Event    2/27/2020 11:50 AM Ready for Procedure    2/27/2020 11:58 AM Anesthesia Start    2/27/2020 12:43 PM Anesthesia Stop        Responsible Staff  02/27/20    Name Role Begin End    Kathy Marino M.D. Anesthesiologist 02/27/20 11:58 AM 02/27/20 12:43 PM        Preop Diagnosis (Free Text):  Pre-op Diagnosis     abdominal pain, absecss        Preop Diagnosis (Codes):    Post op Diagnosis  Abdominal pain      Premium Reason  Non-Premium    Comments:

## 2020-02-27 NOTE — OP REPORT
Post OP Note    PreOp Diagnosis: Complicated postoperative wound infection with possible fistula    PostOp Diagnosis: Same    Procedure(s):  IRRIGATION AND DEBRIDEMENT, WOUND - ABDOMINAL, PSB. WOUND VAC  LAPAROTOMY, EXPLORATORY  EXCISION, INTESTINE - BOWEL RESECTION  Wound dirty    Surgeon(s):  Len Chavarria M.D.    Anesthesiologist/Type of Anesthesia:  Anesthesiologist: Kathy Marino M.D./* No anesthesia type entered *    Surgical Staff:  Circulator: Bernardino Heard R.N.  Scrub Person: Jillian Bhat    Specimens removed if any:  Cultures    Estimated Blood Loss: 20 cc    Findings: Purulence within the wound cavity of the midline incision.  No bile noted    Complications: No complications were noted    Indications: Patient is an 84-year-old female now 3 months out from a robotic assisted repair of a parastomal hernia with phasic smash and repair of an enterotomy.  She tolerated this well however recently developed a painful bulge in the region of the midline incision.  CT scan done in the ER was consistent with an abscess with possible fistula.  Patient was counseled extensively as the risk first benefits of surgery and agreed to proceed fully informed.    Description:    The patient was prepped and draped in the standard sterile surgical fashion after induction of general anesthesia.  An appropriate timeout was performed and antibiotics delivered.  I began with a midline incision which I took down to the wound cavity.    I was immediately rewarded with approximately 50 cc of nichole purulence.  This was cultured and sent to the lab.  I then copiously irrigated the wound until clear.  There was no evidence of bile or a biliary fistula.  The base of the wound was granulation tissue.  It was difficult to tell if the bowel was involved with the base of the wound.  I did not feel like proceeding with a laparotomy would be a safe option at this point from her prior surgery.    I elected to place a white foam  at the base of the incision followed by a small black foam and applied a wound VAC.  I did this once hemostasis was achieved.  Once that was accomplished I injected local anesthetic surrounding the wound.  The wound VAC was applied which held good suction.    The patient was then extubated, to recovery in satisfactory condition.  We will continue wound VAC care via the wound care service.  She will be monitored closely for biliary drainage.    Disposition: To the briones for recovery.  We will continue antibiotic coverage and tailor this to the cultures.        2/27/2020 12:27 PM Len Chavarria M.D.

## 2020-02-28 PROBLEM — N17.9 AKI (ACUTE KIDNEY INJURY) (HCC): Status: ACTIVE | Noted: 2019-03-16

## 2020-02-28 LAB
ANION GAP SERPL CALC-SCNC: 6 MMOL/L (ref 0–11.9)
BASOPHILS # BLD AUTO: 0.7 % (ref 0–1.8)
BASOPHILS # BLD: 0.07 K/UL (ref 0–0.12)
BUN SERPL-MCNC: 18 MG/DL (ref 8–22)
CALCIUM SERPL-MCNC: 7.9 MG/DL (ref 8.5–10.5)
CHLORIDE SERPL-SCNC: 107 MMOL/L (ref 96–112)
CO2 SERPL-SCNC: 21 MMOL/L (ref 20–33)
CREAT SERPL-MCNC: 1.34 MG/DL (ref 0.5–1.4)
EOSINOPHIL # BLD AUTO: 0.45 K/UL (ref 0–0.51)
EOSINOPHIL NFR BLD: 4.8 % (ref 0–6.9)
ERYTHROCYTE [DISTWIDTH] IN BLOOD BY AUTOMATED COUNT: 53.4 FL (ref 35.9–50)
GLUCOSE SERPL-MCNC: 106 MG/DL (ref 65–99)
HCT VFR BLD AUTO: 32.8 % (ref 37–47)
HGB BLD-MCNC: 10 G/DL (ref 12–16)
IMM GRANULOCYTES # BLD AUTO: 0.06 K/UL (ref 0–0.11)
IMM GRANULOCYTES NFR BLD AUTO: 0.6 % (ref 0–0.9)
LYMPHOCYTES # BLD AUTO: 2.05 K/UL (ref 1–4.8)
LYMPHOCYTES NFR BLD: 21.8 % (ref 22–41)
MCH RBC QN AUTO: 27.5 PG (ref 27–33)
MCHC RBC AUTO-ENTMCNC: 30.5 G/DL (ref 33.6–35)
MCV RBC AUTO: 90.4 FL (ref 81.4–97.8)
MONOCYTES # BLD AUTO: 1.3 K/UL (ref 0–0.85)
MONOCYTES NFR BLD AUTO: 13.8 % (ref 0–13.4)
NEUTROPHILS # BLD AUTO: 5.46 K/UL (ref 2–7.15)
NEUTROPHILS NFR BLD: 58.3 % (ref 44–72)
NRBC # BLD AUTO: 0 K/UL
NRBC BLD-RTO: 0 /100 WBC
PLATELET # BLD AUTO: 147 K/UL (ref 164–446)
PMV BLD AUTO: 10.7 FL (ref 9–12.9)
POTASSIUM SERPL-SCNC: 4.6 MMOL/L (ref 3.6–5.5)
RBC # BLD AUTO: 3.63 M/UL (ref 4.2–5.4)
SODIUM SERPL-SCNC: 134 MMOL/L (ref 135–145)
WBC # BLD AUTO: 9.4 K/UL (ref 4.8–10.8)

## 2020-02-28 PROCEDURE — A9270 NON-COVERED ITEM OR SERVICE: HCPCS | Performed by: HOSPITALIST

## 2020-02-28 PROCEDURE — 80048 BASIC METABOLIC PNL TOTAL CA: CPT

## 2020-02-28 PROCEDURE — 770006 HCHG ROOM/CARE - MED/SURG/GYN SEMI*

## 2020-02-28 PROCEDURE — 99233 SBSQ HOSP IP/OBS HIGH 50: CPT | Performed by: INTERNAL MEDICINE

## 2020-02-28 PROCEDURE — 700111 HCHG RX REV CODE 636 W/ 250 OVERRIDE (IP): Performed by: HOSPITALIST

## 2020-02-28 PROCEDURE — 85025 COMPLETE CBC W/AUTO DIFF WBC: CPT

## 2020-02-28 PROCEDURE — 36415 COLL VENOUS BLD VENIPUNCTURE: CPT

## 2020-02-28 PROCEDURE — 700105 HCHG RX REV CODE 258: Performed by: HOSPITALIST

## 2020-02-28 PROCEDURE — 700102 HCHG RX REV CODE 250 W/ 637 OVERRIDE(OP): Performed by: HOSPITALIST

## 2020-02-28 RX ORDER — HEPARIN SODIUM 5000 [USP'U]/ML
5000 INJECTION, SOLUTION INTRAVENOUS; SUBCUTANEOUS EVERY 8 HOURS
Status: DISCONTINUED | OUTPATIENT
Start: 2020-02-28 | End: 2020-03-01

## 2020-02-28 RX ADMIN — TIMOLOL MALEATE 1 DROP: 5 SOLUTION OPHTHALMIC at 05:18

## 2020-02-28 RX ADMIN — SENNOSIDES AND DOCUSATE SODIUM 2 TABLET: 8.6; 5 TABLET ORAL at 05:04

## 2020-02-28 RX ADMIN — PIPERACILLIN SODIUM AND TAZOBACTAM SODIUM 3.38 G: 3; .375 INJECTION, POWDER, LYOPHILIZED, FOR SOLUTION INTRAVENOUS at 14:58

## 2020-02-28 RX ADMIN — OMEPRAZOLE 20 MG: 20 CAPSULE, DELAYED RELEASE ORAL at 05:12

## 2020-02-28 RX ADMIN — SENNOSIDES AND DOCUSATE SODIUM 2 TABLET: 8.6; 5 TABLET ORAL at 18:20

## 2020-02-28 RX ADMIN — HYDROMORPHONE HYDROCHLORIDE 1 MG: 1 INJECTION, SOLUTION INTRAMUSCULAR; INTRAVENOUS; SUBCUTANEOUS at 19:54

## 2020-02-28 RX ADMIN — PIPERACILLIN SODIUM AND TAZOBACTAM SODIUM 3.38 G: 3; .375 INJECTION, POWDER, LYOPHILIZED, FOR SOLUTION INTRAVENOUS at 05:16

## 2020-02-28 RX ADMIN — METOPROLOL TARTRATE 100 MG: 100 TABLET ORAL at 18:20

## 2020-02-28 RX ADMIN — LOVASTATIN 10 MG: 20 TABLET ORAL at 05:13

## 2020-02-28 RX ADMIN — LATANOPROST 1 DROP: 50 SOLUTION OPHTHALMIC at 18:20

## 2020-02-28 RX ADMIN — TIMOLOL MALEATE 1 DROP: 5 SOLUTION OPHTHALMIC at 18:20

## 2020-02-28 RX ADMIN — PIPERACILLIN SODIUM AND TAZOBACTAM SODIUM 3.38 G: 3; .375 INJECTION, POWDER, LYOPHILIZED, FOR SOLUTION INTRAVENOUS at 19:55

## 2020-02-28 RX ADMIN — HYDROMORPHONE HYDROCHLORIDE 1 MG: 1 INJECTION, SOLUTION INTRAMUSCULAR; INTRAVENOUS; SUBCUTANEOUS at 11:08

## 2020-02-28 RX ADMIN — SODIUM CHLORIDE: 9 INJECTION, SOLUTION INTRAVENOUS at 14:59

## 2020-02-28 RX ADMIN — LEVOTHYROXINE SODIUM 112 MCG: 112 TABLET ORAL at 05:12

## 2020-02-28 ASSESSMENT — ENCOUNTER SYMPTOMS
SHORTNESS OF BREATH: 0
DEPRESSION: 0
NERVOUS/ANXIOUS: 0
NECK PAIN: 0
EYE DISCHARGE: 0
FEVER: 0
PALPITATIONS: 0
EYE PAIN: 0
NAUSEA: 0
MYALGIAS: 0
HEARTBURN: 0
FOCAL WEAKNESS: 0
DIZZINESS: 0
BLURRED VISION: 0
DIARRHEA: 0
COUGH: 0
WEIGHT LOSS: 0
HEADACHES: 0
CHILLS: 0
SEIZURES: 0
SPUTUM PRODUCTION: 0
STRIDOR: 0
VOMITING: 0
EYE REDNESS: 0
BACK PAIN: 0
INSOMNIA: 0
ORTHOPNEA: 0
ABDOMINAL PAIN: 1

## 2020-02-28 NOTE — CARE PLAN
Problem: Communication  Goal: The ability to communicate needs accurately and effectively will improve  Outcome: PROGRESSING AS EXPECTED  Note: Discussed plan of care with patient. All patient's needs met at this time.All patient questions answered at this time.          Problem: Safety  Goal: Will remain free from injury  Outcome: PROGRESSING AS EXPECTED  Note:       This RN reinforced patient using call light appropriately. Patient does call appropriately.Patient wearing non skid socks, patient's bed locked and in lowest position. Call light and belongings in reach. Rounding in place to check on patient's well being

## 2020-02-28 NOTE — PROGRESS NOTES
Received report from night shift nurse.   Assumed care of patient.   Patient A&O x    4.   Patient denies SOB or Chest pain.   Patient's respirations are even and unlabored .  Patient tolerating full liquid diet, will advance to regular diet, okay per MD  Last BM PTA, - flatlence, Bowel sounds hypoactive x 4 quadrants.   Patient denies any pain this AM.   Patient's mobility is  Standby assist and steady.   Pulses 2+ x 4 extremities.   Skin: RLQ urostomy with appliance intact and skin is intact.   Small midline incision with wound vac in place, to 125 mmHg suction.     Discussed POC with patient and answered any questions that the patient had.   Reinforced use of call light. Bed locked and in lowest position. Belongings and call light in reach. Hourly rounding in place to check on patient's well being.

## 2020-02-28 NOTE — PROGRESS NOTES
Patient transported from the floor from PACU.     Assessment completed.   Patient A&O x  4  .   Patient denies SOB or Chest pain.   Patient's respirations are even and unlabored .  Patient on a clear liquid diet and diet order is to advance as tolerated , Last BM PTA, Bowel sounds Hypoactive x 4 quadrants.    Patient denies any pain at this time.   Patient's mobility is  Stand by assist. Steady but has some generalized   Pulses 2+ x 4 extremities.   Skin: small midline incision with wound vac in place. RLQ urostomy in place. All CDI.       Discussed POC with patient and answered any questions that the patient had.   Reinforced use of call light. Bed locked and in lowest position. Belongings and call light in reach. Hourly rounding in place to check on patient's well being.

## 2020-02-28 NOTE — PROGRESS NOTES
2 RN SKIN CHECK COMPLETED:     All skin over bony prominences are intact.  Patient has small bruise over sacrum. But no redness.   Small midline incision with wound vac in place. CDI.   RLQ urostomy in place. Skin intact. Appliance intact. No drainage.     Skin assessed under all devices: intact.

## 2020-02-28 NOTE — PROGRESS NOTES
Surgery  POD#1  Feels much better  WBC normal  Vac in place  Needs outpatient wound care for vac changes  Wound care consulted for vac change  Mobilize

## 2020-02-28 NOTE — CARE PLAN
Problem: Communication  Goal: The ability to communicate needs accurately and effectively will improve  Outcome: PROGRESSING AS EXPECTED     Problem: Safety  Goal: Will remain free from injury  Outcome: PROGRESSING AS EXPECTED  Goal: Will remain free from falls  Outcome: PROGRESSING AS EXPECTED     Problem: Infection  Goal: Will remain free from infection  Outcome: PROGRESSING AS EXPECTED     Problem: Venous Thromboembolism (VTW)/Deep Vein Thrombosis (DVT) Prevention:  Goal: Patient will participate in Venous Thrombosis (VTE)/Deep Vein Thrombosis (DVT)Prevention Measures  Outcome: PROGRESSING AS EXPECTED     Problem: Bowel/Gastric:  Goal: Normal bowel function is maintained or improved  Outcome: PROGRESSING AS EXPECTED  Goal: Will not experience complications related to bowel motility  Outcome: PROGRESSING AS EXPECTED     Problem: Knowledge Deficit  Goal: Knowledge of disease process/condition, treatment plan, diagnostic tests, and medications will improve  Outcome: PROGRESSING AS EXPECTED  Goal: Knowledge of the prescribed therapeutic regimen will improve  Outcome: PROGRESSING AS EXPECTED     Problem: Discharge Barriers/Planning  Goal: Patient's continuum of care needs will be met  Outcome: PROGRESSING AS EXPECTED     Problem: Urinary Elimination:  Goal: Ability to reestablish a normal urinary elimination pattern will improve  Outcome: PROGRESSING AS EXPECTED

## 2020-02-28 NOTE — WOUND TEAM
Wound consult received for NPWT dressing changes. Wound team to begin 3 x weekly NPWT dressing changes on 2/29/2020.

## 2020-02-28 NOTE — DIETARY
"Nutrition services: Day 1 of admit.  Dorie Wadsworth is a 84 y.o. female with admitting DX of abdominal wall abscess.    Consult received for wt loss (14-23 lbs in 3 months), MST score of 2. Of note, admit screen negative for poor PO.  Pt appeared well nourished.  Pt reports a good appetite. Pt states possible wt loss recently d/t not eating well following previous surgery in December, though unsure of UBW. Pt declined snacks or supplements at this time.    Assessment:  Height: 172.7 cm (5' 8\")  Weight: 71.1 kg (156 lb 12 oz)  Body mass index is 23.83 kg/m²., BMI classification: normal  Diet/Intake: Regular; PO % of meals    Evaluation:   1. Parastomal hernia repair with lysis of adhesions 12/24/19.    Malnutrition Risk: Does not meet criteria per ASPEN guidelines at this time.    Recommendations/Plan:  1. Encourage intake of meals.  2. Document intake of all meals as % taken in ADLs to provide interdisciplinary communication across all shifts.   3. Monitor weight.  4. Nutrition rep will continue to see patient for ongoing meal and snack preferences.     RD will screen pt weekly. Consult RD as needed.        "

## 2020-02-28 NOTE — PROGRESS NOTES
Hospital Medicine Daily Progress Note    Date of Service  2/28/2020    Chief Complaint  84 y.o. female admitted 2/27/2020 with abdominal pain    Hospital Course    84 y.o. female who presented 2/27/2020 with past medical history of atrial fibrillation, hypertension, TIA underwent a hernia repair December 24th 2019.  The patient has been having abdominal pain ongoing for last 2 weeks but over the last 4 days is gotten progressively worse.. It is now constant pain in the center lower abdomen intensity 9 out of 10.  It is worse with any palpation.  She is noted her abdomen becoming more distended in that area.  She came to the emergency department for evaluation and a CT showed evidence of a abdominal abscess      Interval Problem Update  Postop day 1 today.  Abdominal pain is improving.  Patient is on clear liquid diet.  Surgery is following.  Denies nausea and vomiting. Patient was seen and examined by me today. Case was discussed with RN and multidisplinary team during rounds.       Consultants/Specialty  surgery    Code Status  full    Disposition  Remain on the floor    Review of Systems  Review of Systems   Constitutional: Negative for chills, fever and weight loss.   HENT: Negative for congestion and nosebleeds.    Eyes: Negative for blurred vision, pain, discharge and redness.   Respiratory: Negative for cough, sputum production, shortness of breath and stridor.    Cardiovascular: Negative for chest pain, palpitations and orthopnea.   Gastrointestinal: Positive for abdominal pain. Negative for diarrhea, heartburn, nausea and vomiting.   Genitourinary: Negative for dysuria, frequency and urgency.   Musculoskeletal: Negative for back pain, myalgias and neck pain.   Skin: Negative for itching and rash.   Neurological: Negative for dizziness, focal weakness, seizures and headaches.   Psychiatric/Behavioral: Negative for depression. The patient is not nervous/anxious and does not have insomnia.         Physical  Exam  Temp:  [36.3 °C (97.4 °F)-37 °C (98.6 °F)] 36.9 °C (98.4 °F)  Pulse:  [58-87] 81  Resp:  [15-19] 15  BP: ()/(48-72) 106/56  SpO2:  [89 %-100 %] 99 %    Physical Exam  Vitals signs reviewed.   Constitutional:       General: She is not in acute distress.     Appearance: Normal appearance.   HENT:      Head: Normocephalic and atraumatic.      Nose: No congestion or rhinorrhea.   Eyes:      Extraocular Movements: Extraocular movements intact.      Pupils: Pupils are equal, round, and reactive to light.   Neck:      Musculoskeletal: Normal range of motion and neck supple.   Cardiovascular:      Rate and Rhythm: Normal rate and regular rhythm.      Pulses: Normal pulses.   Pulmonary:      Effort: Pulmonary effort is normal. No respiratory distress.      Breath sounds: Normal breath sounds.   Abdominal:      General: Bowel sounds are normal. There is no distension.      Palpations: Abdomen is soft.      Tenderness: There is no abdominal tenderness.      Comments: Abdominal drainage noticed     Musculoskeletal:         General: No swelling or tenderness.   Skin:     General: Skin is warm.      Findings: No erythema.   Neurological:      General: No focal deficit present.      Mental Status: She is alert and oriented to person, place, and time.         Fluids    Intake/Output Summary (Last 24 hours) at 2/28/2020 0813  Last data filed at 2/28/2020 0655  Gross per 24 hour   Intake 2179.58 ml   Output 670 ml   Net 1509.58 ml       Laboratory  Recent Labs     02/27/20  0507 02/28/20  0626   WBC 15.9* 9.4   RBC 4.29 3.63*   HEMOGLOBIN 11.6* 10.0*   HEMATOCRIT 37.6 32.8*   MCV 87.6 90.4   MCH 27.0 27.5   MCHC 30.9* 30.5*   RDW 52.4* 53.4*   PLATELETCT 237 147*   MPV 10.9 10.7     Recent Labs     02/27/20  0507 02/28/20  0626   SODIUM 133* 134*   POTASSIUM 4.4 4.6   CHLORIDE 105 107   CO2 22 21   GLUCOSE 99 106*   BUN 20 18   CREATININE 0.98 1.34   CALCIUM 8.7 7.9*     Recent Labs     02/27/20  0507   APTT 29.0   INR  1.18*               Imaging  CT-ABDOMEN-PELVIS WITH   Final Result         1.  Enhancing fluid collection in the anterior abdominal wall, appearance most compatible with abscess. Consider bowel fistula given foci of air.   2.  Enhancing fluid collection within the abdomen and along the intra-abdominal wall, appearance most compatible with abscess. Foci of air suggests possible fistula with adjacent loops of bowel.   3.  Nodular hepatic contour, appearance compatible with changes of cirrhosis.   4.  Recanalization of the umbilical vein and splenomegaly, compatible with changes of portal hypertension   5.  Diverticulosis   6.  Atherosclerosis and atherosclerotic coronary artery disease.      DX-CHEST-PORTABLE (1 VIEW)   Final Result         1.  Pulmonary vascular prominence suggests pulmonary vascular congestion   2.  Cardiomegaly   3.  Atherosclerosis           Assessment/Plan  * Intra-abdominal abscess (HCC)- (present on admission)  Assessment & Plan  POD 1   IRRIGATION AND DEBRIDEMENT, WOUND - ABDOMINAL, PSB. WOUND VAC  LAPAROTOMY, EXPLORATORY  EXCISION, INTESTINE - BOWEL RESECTION  Wound dirty  Continue IV Zosyn  Follow cultures  Surgery following  Pain control        MARSHALL (acute kidney injury) (HCC)- (present on admission)  Assessment & Plan  Likely related to prerenal  On IV fluid  Avoid nephrotoxic drugs  Follow CMP in the morning    Abnormal EKG- (present on admission)  Assessment & Plan  May represent repolarization due to LVH  Echo pending    Essential hypertension- (present on admission)  Assessment & Plan  Continue outpatient medications    Paroxysmal A-fib (HCC)- (present on admission)  Assessment & Plan  Currently normal sinus rhythm  Heart rate is controlled        Presence of urostomy (HCC)- (present on admission)  Assessment & Plan  It is currently functioning   Continue to monitor output    Dyslipidemia- (present on admission)  Assessment & Plan  On statin    Hypothyroid- (present on  admission)  Assessment & Plan  Continue Synthroid no longer n.p.o.       VTE prophylaxis: heparin

## 2020-02-28 NOTE — PROGRESS NOTES
Report received from day shift RN  Patient AAO x 4, Calls appropriately  VSS, RA, Afebrile  Rating pain as 4/10. PRN toradol administered  Tolerating current diet. Denies N/V this shift.  Patient up to bathroom standby assist.  Patient has two 20g PIVS in R arm. NS running at 125.  Patient has wound vac at -125 to midline wound. Dressing CDI.  Patient also has a urostomy that she is able to manage independently.     Reviewed plan of care with patient. Call light within reach. All needs met at this time.

## 2020-02-29 LAB
ALBUMIN SERPL BCP-MCNC: 2.4 G/DL (ref 3.2–4.9)
ALBUMIN/GLOB SERPL: 0.6 G/DL
ALP SERPL-CCNC: 128 U/L (ref 30–99)
ALT SERPL-CCNC: 10 U/L (ref 2–50)
ANION GAP SERPL CALC-SCNC: 5 MMOL/L (ref 0–11.9)
AST SERPL-CCNC: 20 U/L (ref 12–45)
BACTERIA WND AEROBE CULT: ABNORMAL
BASOPHILS # BLD AUTO: 0.7 % (ref 0–1.8)
BASOPHILS # BLD: 0.06 K/UL (ref 0–0.12)
BILIRUB SERPL-MCNC: 0.3 MG/DL (ref 0.1–1.5)
BUN SERPL-MCNC: 17 MG/DL (ref 8–22)
BURR CELLS BLD QL SMEAR: NORMAL
CALCIUM SERPL-MCNC: 7.9 MG/DL (ref 8.5–10.5)
CHLORIDE SERPL-SCNC: 109 MMOL/L (ref 96–112)
CO2 SERPL-SCNC: 22 MMOL/L (ref 20–33)
COMMENT 1642: NORMAL
CREAT SERPL-MCNC: 1.19 MG/DL (ref 0.5–1.4)
EOSINOPHIL # BLD AUTO: 0.42 K/UL (ref 0–0.51)
EOSINOPHIL NFR BLD: 4.6 % (ref 0–6.9)
ERYTHROCYTE [DISTWIDTH] IN BLOOD BY AUTOMATED COUNT: 53.1 FL (ref 35.9–50)
GLOBULIN SER CALC-MCNC: 3.9 G/DL (ref 1.9–3.5)
GLUCOSE SERPL-MCNC: 111 MG/DL (ref 65–99)
GRAM STN SPEC: ABNORMAL
HCT VFR BLD AUTO: 34.8 % (ref 37–47)
HGB BLD-MCNC: 10.3 G/DL (ref 12–16)
HYPOCHROMIA BLD QL SMEAR: ABNORMAL
IMM GRANULOCYTES # BLD AUTO: 0.07 K/UL (ref 0–0.11)
IMM GRANULOCYTES NFR BLD AUTO: 0.8 % (ref 0–0.9)
LYMPHOCYTES # BLD AUTO: 1.84 K/UL (ref 1–4.8)
LYMPHOCYTES NFR BLD: 20.1 % (ref 22–41)
MCH RBC QN AUTO: 26.9 PG (ref 27–33)
MCHC RBC AUTO-ENTMCNC: 29.6 G/DL (ref 33.6–35)
MCV RBC AUTO: 90.9 FL (ref 81.4–97.8)
MONOCYTES # BLD AUTO: 1.34 K/UL (ref 0–0.85)
MONOCYTES NFR BLD AUTO: 14.6 % (ref 0–13.4)
MORPHOLOGY BLD-IMP: NORMAL
NEUTROPHILS # BLD AUTO: 5.42 K/UL (ref 2–7.15)
NEUTROPHILS NFR BLD: 59.2 % (ref 44–72)
NRBC # BLD AUTO: 0 K/UL
NRBC BLD-RTO: 0 /100 WBC
OVALOCYTES BLD QL SMEAR: NORMAL
PLATELET # BLD AUTO: 177 K/UL (ref 164–446)
PLATELET BLD QL SMEAR: NORMAL
PMV BLD AUTO: 10.6 FL (ref 9–12.9)
POIKILOCYTOSIS BLD QL SMEAR: NORMAL
POTASSIUM SERPL-SCNC: 4.4 MMOL/L (ref 3.6–5.5)
PROT SERPL-MCNC: 6.3 G/DL (ref 6–8.2)
RBC # BLD AUTO: 3.83 M/UL (ref 4.2–5.4)
RBC BLD AUTO: PRESENT
SIGNIFICANT IND 70042: ABNORMAL
SITE SITE: ABNORMAL
SODIUM SERPL-SCNC: 136 MMOL/L (ref 135–145)
SOURCE SOURCE: ABNORMAL
WBC # BLD AUTO: 9.2 K/UL (ref 4.8–10.8)

## 2020-02-29 PROCEDURE — 700111 HCHG RX REV CODE 636 W/ 250 OVERRIDE (IP): Performed by: HOSPITALIST

## 2020-02-29 PROCEDURE — 80053 COMPREHEN METABOLIC PANEL: CPT

## 2020-02-29 PROCEDURE — 99233 SBSQ HOSP IP/OBS HIGH 50: CPT | Performed by: INTERNAL MEDICINE

## 2020-02-29 PROCEDURE — 85025 COMPLETE CBC W/AUTO DIFF WBC: CPT

## 2020-02-29 PROCEDURE — 36415 COLL VENOUS BLD VENIPUNCTURE: CPT

## 2020-02-29 PROCEDURE — 306372 DRESSING,VAC SIMPLACE MED: Performed by: SURGERY

## 2020-02-29 PROCEDURE — 700105 HCHG RX REV CODE 258: Performed by: HOSPITALIST

## 2020-02-29 PROCEDURE — A9270 NON-COVERED ITEM OR SERVICE: HCPCS | Performed by: INTERNAL MEDICINE

## 2020-02-29 PROCEDURE — 700102 HCHG RX REV CODE 250 W/ 637 OVERRIDE(OP): Performed by: INTERNAL MEDICINE

## 2020-02-29 PROCEDURE — A9270 NON-COVERED ITEM OR SERVICE: HCPCS | Performed by: HOSPITALIST

## 2020-02-29 PROCEDURE — 97605 NEG PRS WND THER DME<=50SQCM: CPT

## 2020-02-29 PROCEDURE — 700102 HCHG RX REV CODE 250 W/ 637 OVERRIDE(OP): Performed by: HOSPITALIST

## 2020-02-29 PROCEDURE — 770006 HCHG ROOM/CARE - MED/SURG/GYN SEMI*

## 2020-02-29 PROCEDURE — 700105 HCHG RX REV CODE 258

## 2020-02-29 PROCEDURE — 700111 HCHG RX REV CODE 636 W/ 250 OVERRIDE (IP): Performed by: INTERNAL MEDICINE

## 2020-02-29 RX ORDER — SODIUM CHLORIDE 9 MG/ML
INJECTION, SOLUTION INTRAVENOUS
Status: COMPLETED
Start: 2020-02-29 | End: 2020-02-29

## 2020-02-29 RX ORDER — OXYCODONE HYDROCHLORIDE 5 MG/1
5-10 TABLET ORAL EVERY 4 HOURS PRN
Status: DISCONTINUED | OUTPATIENT
Start: 2020-02-29 | End: 2020-03-03 | Stop reason: HOSPADM

## 2020-02-29 RX ADMIN — LEVOTHYROXINE SODIUM 112 MCG: 112 TABLET ORAL at 05:06

## 2020-02-29 RX ADMIN — HYDROMORPHONE HYDROCHLORIDE 1 MG: 1 INJECTION, SOLUTION INTRAMUSCULAR; INTRAVENOUS; SUBCUTANEOUS at 10:41

## 2020-02-29 RX ADMIN — SODIUM CHLORIDE 1000 ML: 9 INJECTION, SOLUTION INTRAVENOUS at 21:45

## 2020-02-29 RX ADMIN — TIMOLOL MALEATE 1 DROP: 5 SOLUTION OPHTHALMIC at 05:07

## 2020-02-29 RX ADMIN — PIPERACILLIN SODIUM AND TAZOBACTAM SODIUM 3.38 G: 3; .375 INJECTION, POWDER, LYOPHILIZED, FOR SOLUTION INTRAVENOUS at 05:04

## 2020-02-29 RX ADMIN — METOPROLOL TARTRATE 100 MG: 100 TABLET ORAL at 05:05

## 2020-02-29 RX ADMIN — OXYCODONE HYDROCHLORIDE 10 MG: 5 TABLET ORAL at 09:30

## 2020-02-29 RX ADMIN — HEPARIN SODIUM 5000 UNITS: 5000 INJECTION, SOLUTION INTRAVENOUS; SUBCUTANEOUS at 21:30

## 2020-02-29 RX ADMIN — SODIUM CHLORIDE: 9 INJECTION, SOLUTION INTRAVENOUS at 14:40

## 2020-02-29 RX ADMIN — TIMOLOL MALEATE 1 DROP: 5 SOLUTION OPHTHALMIC at 17:54

## 2020-02-29 RX ADMIN — SODIUM CHLORIDE: 9 INJECTION, SOLUTION INTRAVENOUS at 22:26

## 2020-02-29 RX ADMIN — PIPERACILLIN SODIUM AND TAZOBACTAM SODIUM 3.38 G: 3; .375 INJECTION, POWDER, LYOPHILIZED, FOR SOLUTION INTRAVENOUS at 21:30

## 2020-02-29 RX ADMIN — OXYCODONE HYDROCHLORIDE 5 MG: 5 TABLET ORAL at 21:30

## 2020-02-29 RX ADMIN — PIPERACILLIN SODIUM AND TAZOBACTAM SODIUM 3.38 G: 3; .375 INJECTION, POWDER, LYOPHILIZED, FOR SOLUTION INTRAVENOUS at 14:40

## 2020-02-29 RX ADMIN — LOVASTATIN 10 MG: 20 TABLET ORAL at 05:05

## 2020-02-29 RX ADMIN — LATANOPROST 1 DROP: 50 SOLUTION OPHTHALMIC at 18:12

## 2020-02-29 RX ADMIN — METOPROLOL TARTRATE 100 MG: 100 TABLET ORAL at 17:54

## 2020-02-29 RX ADMIN — OMEPRAZOLE 20 MG: 20 CAPSULE, DELAYED RELEASE ORAL at 05:05

## 2020-02-29 RX ADMIN — HEPARIN SODIUM 5000 UNITS: 5000 INJECTION, SOLUTION INTRAVENOUS; SUBCUTANEOUS at 14:30

## 2020-02-29 ASSESSMENT — ENCOUNTER SYMPTOMS
HEARTBURN: 0
BLURRED VISION: 0
MYALGIAS: 0
DIARRHEA: 0
COUGH: 0
EYE PAIN: 0
EYE REDNESS: 0
NERVOUS/ANXIOUS: 0
DEPRESSION: 0
CHILLS: 0
NAUSEA: 0
WEIGHT LOSS: 0
ABDOMINAL PAIN: 1
FOCAL WEAKNESS: 0
PALPITATIONS: 0
SHORTNESS OF BREATH: 0
VOMITING: 0
BACK PAIN: 0
FEVER: 0
SEIZURES: 0
ORTHOPNEA: 0
DIZZINESS: 0
STRIDOR: 0
NECK PAIN: 0
SPUTUM PRODUCTION: 0
HEADACHES: 0
INSOMNIA: 0
EYE DISCHARGE: 0

## 2020-02-29 NOTE — PROGRESS NOTES
Report received from day shift RN  Patient AAO x 4, Calls appropriately  VSS, RA, Afebrile  Rating pain as 7/10. PRN dilaudid administered  Tolerating current diet. Denies N/V this shift.  Patient up to bathroom standby assist.  Patient has 20g PIV in R arm. NS running at 125.  Patient has wound vac at -125 to midline wound. Dressing CDI.  Patient also has a urostomy that she is able to manage independently.     Reviewed plan of care with patient. Call light within reach. All needs met at this time.

## 2020-02-29 NOTE — CARE PLAN
Problem: Safety  Goal: Will remain free from injury  Outcome: PROGRESSING AS EXPECTED  Environment is clean, belongings in reach, patient educated to use call light, bed in lowest position and locked.  Goal: Will remain free from falls  Outcome: PROGRESSING AS EXPECTED     Problem: Venous Thromboembolism (VTW)/Deep Vein Thrombosis (DVT) Prevention:  Goal: Patient will participate in Venous Thrombosis (VTE)/Deep Vein Thrombosis (DVT)Prevention Measures  Outcome: PROGRESSING AS EXPECTED  Scd prophylaxis, heparin, ambulation

## 2020-02-29 NOTE — CARE PLAN
Problem: Communication  Goal: The ability to communicate needs accurately and effectively will improve  Outcome: PROGRESSING AS EXPECTED     Problem: Safety  Goal: Will remain free from injury  Outcome: PROGRESSING AS EXPECTED  Goal: Will remain free from falls  Outcome: PROGRESSING AS EXPECTED     Problem: Infection  Goal: Will remain free from infection  Outcome: PROGRESSING AS EXPECTED     Problem: Venous Thromboembolism (VTW)/Deep Vein Thrombosis (DVT) Prevention:  Goal: Patient will participate in Venous Thrombosis (VTE)/Deep Vein Thrombosis (DVT)Prevention Measures  Outcome: PROGRESSING AS EXPECTED     Problem: Bowel/Gastric:  Goal: Normal bowel function is maintained or improved  Outcome: PROGRESSING AS EXPECTED  Goal: Will not experience complications related to bowel motility  Outcome: PROGRESSING AS EXPECTED     Problem: Knowledge Deficit  Goal: Knowledge of disease process/condition, treatment plan, diagnostic tests, and medications will improve  Outcome: PROGRESSING AS EXPECTED  Goal: Knowledge of the prescribed therapeutic regimen will improve  Outcome: PROGRESSING AS EXPECTED     Problem: Discharge Barriers/Planning  Goal: Patient's continuum of care needs will be met  Outcome: PROGRESSING AS EXPECTED     Problem: Urinary Elimination:  Goal: Ability to reestablish a normal urinary elimination pattern will improve  Outcome: PROGRESSING AS EXPECTED     Problem: Pain Management  Goal: Pain level will decrease to patient's comfort goal  Outcome: PROGRESSING AS EXPECTED

## 2020-02-29 NOTE — WOUND TEAM
[facility]  Inpatient Services  Initial Wound and Skin Care Evaluation    Admission Date: 2/27/2020     Last order of IP CONSULT TO WOUND CARE was found on 2/27/2020 from Hospital Encounter on 2/27/2020       HPI, PMH, SH: Reviewed    Unit where seen by Wound Team: [id number]     WOUND CONSULT RELATED TO:  Scheduled change NPWT dressing and ostomy care (see below)     Self Report / Pain Level:  Premedicated with PO pain medication and painful with dressing removal so IV pain medication given with good effect; possibly can manage with PO only next visit       OBJECTIVE:  Pt lying in bed with NPWT dressing in place. Preventable measures in place  .    WOUND TYPE, LOCATION, CHARACTERISTICS (Pressure Injuries: location, stage, POA or date identified)  Wound 02/27/20 Full Thickness Wound Abdomen Midline open surgical wound midline abdomen (Active)   Wound Image   2/29/2020 11:00 AM   Site Assessment Clean;Red 2/29/2020 11:00 AM   Periwound Assessment Clean;Intact 2/29/2020 11:00 AM   Margins Attached edges 2/29/2020 11:00 AM   Closure Secondary intention 2/29/2020 11:00 AM   Drainage Amount Small 2/29/2020 11:00 AM   Drainage Description Serosanguineous 2/29/2020 11:00 AM   Treatments Cleansed 2/29/2020 11:00 AM   Wound Cleansing Approved Wound Cleanser 2/29/2020 11:00 AM   Periwound Protectant Skin Protectant Wipes to Periwound;Drape 2/29/2020 11:00 AM   Dressing Cleansing/Solutions Not Applicable 2/29/2020 11:00 AM   Dressing Options Wound Vac 2/29/2020 11:00 AM   Dressing Changed Changed 2/29/2020 11:00 AM   Dressing Status Intact 2/29/2020 11:00 AM   Dressing Change/Treatment Frequency Monday, Wednesday, Friday, and As Needed 2/29/2020 11:00 AM   NEXT Dressing Change/Treatment Date 03/02/20 2/29/2020 11:00 AM   NEXT Weekly Photo (Inpatient Only) 03/06/20 2/29/2020 11:00 AM   Non-staged Wound Description Full thickness 2/29/2020 11:00 AM   Wound Length (cm) 7 cm 2/29/2020 11:00 AM   Wound Width (cm) 2.5  cm 2/29/2020 11:00 AM   Wound Depth (cm) 3.7 cm 2/29/2020 11:00 AM   Wound Surface Area (cm^2) 17.5 cm^2 2/29/2020 11:00 AM   Wound Volume (cm^3) 64.75 cm^3 2/29/2020 11:00 AM   Wound Bed Granulation (%) 80 % 2/29/2020 11:00 AM   Wound Bed Slough (%) 0 % 2/29/2020 11:00 AM   Wound Bed Eschar (%) 0 % 2/29/2020 11:00 AM   Tunneling (cm) 0 cm 2/29/2020 11:00 AM   Undermining of Wound, 1st Location From 1 o'clock;To 3 o'clock 2/29/2020 11:00 AM   Wound Odor None 2/29/2020 11:00 AM   Exposed Structures None 2/29/2020 11:00 AM   WOUND NURSE ONLY - Time Spent with Patient (mins) 60 2/29/2020 11:00 AM   Number of days: 2      Negative Pressure Wound Therapy 02/27/20 Surgical (Active)   NPWT Pump Mode / Pressure Setting Continuous;125 mmHg 2/29/2020 11:00 AM   Dressing Type Medium;Black Foam (Regular);White Foam 2/29/2020 11:00 AM   Canister Changed No 2/29/2020 11:00 AM   Output (mL) 0 mL 2/29/2020 11:20 AM   NEXT Dressing Change/Treatment Date 03/02/20 2/29/2020 11:00 AM       Vascular:  Not related    KAMAR:   No results found.      Lab Values:    Lab Results   Component Value Date/Time    WBC 9.2 02/29/2020 05:51 AM    RBC 3.83 (L) 02/29/2020 05:51 AM    HEMOGLOBIN 10.3 (L) 02/29/2020 05:51 AM    HEMATOCRIT 34.8 (L) 02/29/2020 05:51 AM    CREACTPROT 5.82 (H) 03/20/2019 02:18 AM    SEDRATEWES 38 (H) 03/20/2019 02:18 AM    HBA1C 6.1 (H) 06/07/2011 12:11 PM          Culture:   Obtained 2/27/2020  Culture Results show:  Recent Results (from the past 720 hour(s))   CULTURE WOUND W/ GRAM STAIN    Collection Time: 02/27/20 12:14 PM   Result Value Ref Range    Significant Indicator POS (POS)     Source WND     Site Abdominal abscess     Culture Result - (A)     Gram Stain Result       Rare WBCs.  Few Gram positive cocci.  Few Gram negative rods.      Culture Result Escherichia coli  Moderate growth   (A)     Culture Result (A)      Viridans Streptococcus  Light growth  Mixed morphologies         Susceptibility    Escherichia coli  - MICKI     Ampicillin >16 Resistant mcg/mL     Ceftriaxone <=1 Sensitive mcg/mL     Ceftazidime <=1 Sensitive mcg/mL     Cefotaxime <=2 Sensitive mcg/mL     Cefazolin 4 Sensitive mcg/mL     Ciprofloxacin >2 Resistant mcg/mL     Ampicillin/sulbactam 16/8 Intermediate mcg/mL     Cefepime <=2 Sensitive mcg/mL     Tobramycin <=4 Sensitive mcg/mL     Cefotetan <=16 Sensitive mcg/mL     Ertapenem <=0.5 Sensitive mcg/mL     Gentamicin <=4 Sensitive mcg/mL     Pip/Tazobactam <=16 Sensitive mcg/mL     Trimeth/Sulfa <=2/38 Sensitive mcg/mL         INTERVENTIONS BY WOUND TEAM:  Met with patient and discussed POC.  Removed old dressings completely with no retained foam.  Cleansed wound with wound cleanser and measurements and photo taken.  Skin prep and drape to juan wound skin.  Placed piece white foam x 1 at base of wound and then filled wound with black foam x 1 piece.  Secured with drape and trac pad placed over black foam button and resumed NPWT at 125mmHg continuously.  Total black foam=2 pieces and white foam=1 piece.  Discussed with staff RN.    Interdisciplinary consultation: Patient, Bedside RN     EVALUATION: clean appearing wound that should progress with NPWT to decrease size and promote granulation; white foam at base for prevention of possible fistula formation.  Will see MWF due to possible DC Monday and C ordered    Goals: Steady decrease in wound area and depth weekly.    NURSING PLAN OF CARE ORDERS (X):    Dressing changes: See Dressing Care orders: X  Skin care: See Skin Care orders:   Rectal tube care: See Rectal Tube Care orders:   Other orders:    RSKIN:   CURRENTLY IN PLACE (X), APPLIED THIS VISIT (A), ORDERED (O):   Q shift Grayson:  X  Q shift pressure point assessments:  X  Pressure redistribution mattress      X      Low Airloss          Bariatric MALIHA         Bariatric foam           Heel float boots     Heel Silicone dressing        Float Heels off Bed with Pillows  X             Barrier wipes        "  Barrier Cream         Barrier paste          Sacral silicone dressing     X    Silicone O2 tubing         Anchorfast         Cannula fixation Device (Tender )          Gray Foam Ear protectors           Trach with Optifoam split foam                 Waffle cushion        Waffle Overlay         Rectal tube or BMS    Purwick/Condom Cath          Antifungal tx      Interdry          Reposition q 2 hours    X    Up to chair        Ambulate      PT/OT        Dietician        Diabetes Education      PO X    TF     TPN     NPO   # days   Other        WOUND TEAM PLAN OF CARE   Dressing changes by wound team:          Follow up 1-2 times weekly:               Follow up 3 times weekly:                NPWT change 3 times weekly:   X MWF  Follow up as needed:       Other (explain):     Anticipated discharge plans:  LTACH:        SNF/Rehab:                  Home Care:   X        Outpatient Wound Center:            Self Care:              [facility]   Inpatient Services   Established Ostomy Management/ troubleshooting  HPI: Reviewed  PMH: Reviewed   SH: Reviewed   Reason for Ostomy nurse consult:  Change appliance    Subjective: \"I usually change it every 3 days or so\"    Objective: patient independent with ostomy at home x 11 years  Ostomy type:  urostomy  Stoma location:  RLQ  Stoma assessment:    Appearance:  red   Size: 1\"   Protrusion: well budded   Output: moderate clear yellow urine   MC jxn: mild chronic epithelium    Peristomal skin: intact    Ostomy Appliance (type and size):  2 1/4\" two piece with paste ring     Interventions and Education: Met with patient who is knowledgeable and just needs assistance post op.  Removed old appliance and cleansed peristomal skin with water.  Cut barrier to fit and applied paste ring to barrier opening and then applied to peristomal skin.  Snapped on pouch.     Evaluation: established urostomy with no discharge needs    Plan: set of ostomy supplies left at bedside " for patient to change PRN with staff assistance    Anticipated discharge needs:  None with ostomy

## 2020-02-29 NOTE — DISCHARGE PLANNING
Care Transition Team Assessment    Met with pt at bedside to complete assessment. Confirmed information listed on facesheet. Pt report she lives with her  in a single story home. Prior to admission pt didn't require any assistive devices and is independent with ADL's and IADL's. Pt is anticipated to d/c home with assistance from spouse.     Information Source  Information Given By: Patient  Informant's Name: Dorie          Elopement Risk  Legal Hold: No  Ambulatory or Self Mobile in Wheelchair: Yes  Disoriented: No  Psychiatric Symptoms: None  History of Wandering: No  Elopement this Admit: No  Vocalizing Wanting to Leave: No  Displays Behaviors, Body Language Wanting to Leave: No-Not at Risk for Elopement  Elopement Risk: Not at Risk for Elopement    Interdisciplinary Discharge Planning  Does Admitting Nurse Feel This Could be a Complex Discharge?: No  Primary Care Physician: Coreen Gomez  Lives with - Patient's Self Care Capacity: Spouse  Patient or legal guardian wants to designate a caregiver (see row info): No  Support Systems: Family Member(s), Spouse / Significant Other  Housing / Facility: [address] [facility]  Do You Take your Prescribed Medications Regularly: Yes  Able to Return to Previous ADL's: Yes  Mobility Issues: No  Prior Services: None  Assistance Needed: No  Durable Medical Equipment: Not Applicable    Discharge Preparedness  What is your plan after discharge?: Home with help  What are your discharge supports?: Spouse  Prior Functional Level: Ambulatory, Independent with Activities of Daily Living, Independent with Medication Management  Difficulity with ADLs: None  Difficulity with IADLs: None    Functional Assesment  Prior Functional Level: Ambulatory, Independent with Activities of Daily Living, Independent with Medication Management    Finances  Financial Barriers to Discharge: No  Prescription Coverage: Yes    Vision / Hearing Impairment  Vision Impairment : Yes  Right Eye Vision: Impaired,  Wears Glasses  Left Eye Vision: Impaired, Wears Glasses  Hearing Impairment : Yes  Hearing Impairment: Hearing Device Not Available, Both Ears  Does Pt Need Special Equipment for the Hearing Impaired?: No              Domestic Abuse  Have you ever been the victim of abuse or violence?: No  Physical Abuse or Sexual Abuse: No  Verbal Abuse or Emotional Abuse: No  Possible Abuse Reported to:: Not Applicable    Psychological Assessment  History of Substance Abuse: None  History of Psychiatric Problems: No  Non-compliant with Treatment: No  Newly Diagnosed Illness: Yes    Discharge Risks or Barriers  Discharge risks or barriers?: No    Anticipated Discharge Information  Anticipated discharge disposition: Home  Discharge Address: [address] [place] [id number]  Discharge Contact Phone Number: [phone]

## 2020-02-29 NOTE — PROGRESS NOTES
Hospital Medicine Daily Progress Note    Date of Service  2/29/2020    Chief Complaint  84 y.o. female admitted 2/27/2020 with abdominal pain    Hospital Course    84 y.o. female who presented 2/27/2020 with past medical history of total cystectomy with an ileal conduit (2011), atrial fibrillation, hypertension, TIA underwent a hernia repair December 24th 2019.  The patient has been having abdominal pain ongoing for last 2 weeks but over the last 4 days is gotten progressively worse.. It is now constant pain in the center lower abdomen intensity 9 out of 10.  It is worse with any palpation.  She is noted her abdomen becoming more distended in that area.  She came to the emergency department for evaluation and a CT showed evidence of a abdominal abscess      Interval Problem Update  - afebrile  - having a lot of pain during wound vac change  - Wound cultures growing E. coli and strep viridans, on zosyn  -Tolerating p.o.  - Having bowel movements     Consultants/Specialty  surgery    Code Status  full    Disposition  Remain on the floor      Review of Systems  Review of Systems   Constitutional: Negative for chills, fever and weight loss.   HENT: Negative for congestion and nosebleeds.    Eyes: Negative for blurred vision, pain, discharge and redness.   Respiratory: Negative for cough, sputum production, shortness of breath and stridor.    Cardiovascular: Negative for chest pain, palpitations and orthopnea.   Gastrointestinal: Positive for abdominal pain. Negative for diarrhea, heartburn, nausea and vomiting.   Genitourinary: Negative for dysuria, frequency and urgency.   Musculoskeletal: Negative for back pain, myalgias and neck pain.   Skin: Negative for itching and rash.   Neurological: Negative for dizziness, focal weakness, seizures and headaches.   Psychiatric/Behavioral: Negative for depression. The patient is not nervous/anxious and does not have insomnia.         Physical Exam  Temp:  [35.9 °C (96.7 °F)-37.2  °C (98.9 °F)] 37.2 °C (98.9 °F)  Pulse:  [71-88] 85  Resp:  [15-18] 17  BP: (117-139)/(55-76) 125/68  SpO2:  [96 %-99 %] 97 %    Physical Exam  Vitals signs reviewed.   Constitutional:       General: She is in acute distress (during wound vac change 2/2 pain).      Appearance: Normal appearance.   HENT:      Head: Normocephalic and atraumatic.      Nose: No congestion or rhinorrhea.   Eyes:      Extraocular Movements: Extraocular movements intact.      Pupils: Pupils are equal, round, and reactive to light.   Neck:      Musculoskeletal: Normal range of motion and neck supple.   Cardiovascular:      Rate and Rhythm: Normal rate and regular rhythm.      Pulses: Normal pulses.   Pulmonary:      Effort: Pulmonary effort is normal. No respiratory distress.      Breath sounds: Normal breath sounds.   Abdominal:      General: Bowel sounds are normal. There is no distension.      Palpations: Abdomen is soft.      Tenderness: There is no abdominal tenderness.      Comments: Wound vac in place  Urostomy in place      Musculoskeletal:         General: No swelling or tenderness.   Skin:     General: Skin is warm.      Findings: No erythema.   Neurological:      General: No focal deficit present.      Mental Status: She is alert and oriented to person, place, and time.   Psychiatric:         Mood and Affect: Mood normal.         Behavior: Behavior normal.          Fluids    Intake/Output Summary (Last 24 hours) at 2/29/2020 1437  Last data filed at 2/29/2020 1120  Gross per 24 hour   Intake --   Output 1500 ml   Net -1500 ml       Laboratory  Recent Labs     02/27/20  0507 02/28/20  0626 02/29/20  0551   WBC 15.9* 9.4 9.2   RBC 4.29 3.63* 3.83*   HEMOGLOBIN 11.6* 10.0* 10.3*   HEMATOCRIT 37.6 32.8* 34.8*   MCV 87.6 90.4 90.9   MCH 27.0 27.5 26.9*   MCHC 30.9* 30.5* 29.6*   RDW 52.4* 53.4* 53.1*   PLATELETCT 237 147* 177   MPV 10.9 10.7 10.6     Recent Labs     02/27/20  0507 02/28/20  0626 02/29/20  0551   SODIUM 133* 134* 136    POTASSIUM 4.4 4.6 4.4   CHLORIDE 105 107 109   CO2 22 21 22   GLUCOSE 99 106* 111*   BUN 20 18 17   CREATININE 0.98 1.34 1.19   CALCIUM 8.7 7.9* 7.9*     Recent Labs     02/27/20  0507   APTT 29.0   INR 1.18*              Results     Procedure Component Value Units Date/Time    CULTURE WOUND W/ GRAM STAIN [[id number]]  (Abnormal)  (Susceptibility) Collected:  02/27/20 1214    Order Status:  Completed Specimen:  Wound Updated:  02/29/20 1110     Significant Indicator POS     Source WND     Site Abdominal abscess     Culture Result -     Gram Stain Result Rare WBCs.  Few Gram positive cocci.  Few Gram negative rods.       Culture Result Escherichia coli  Moderate growth        Viridans Streptococcus  Light growth  Mixed morphologies      Narrative:       Surgery - swabs received    Susceptibility     Escherichia coli (1)     Antibiotic Interpretation Microscan Method Status    Ampicillin Resistant >16 mcg/mL MICKI Final    Ceftriaxone Sensitive <=1 mcg/mL MICKI Final    Ceftazidime Sensitive <=1 mcg/mL MICKI Final    Cefotaxime Sensitive <=2 mcg/mL MICKI Final    Cefazolin Sensitive 4 mcg/mL MICKI Final    Ciprofloxacin Resistant >2 mcg/mL MICKI Final    Ampicillin/sulbactam Intermediate 16/8 mcg/mL MICKI Final    Cefepime Sensitive <=2 mcg/mL MICKI Final    Tobramycin Sensitive <=4 mcg/mL MICKI Final    Cefotetan Sensitive <=16 mcg/mL MICKI Final    Ertapenem Sensitive <=0.5 mcg/mL MICKI Final    Gentamicin Sensitive <=4 mcg/mL MICKI Final    Pip/Tazobactam Sensitive <=16 mcg/mL MICKI Final    Trimeth/Sulfa Sensitive <=2/38 mcg/mL MICKI Final                   Anaerobic Culture [[id number]] Collected:  02/27/20 1214    Order Status:  Completed Specimen:  Wound Updated:  02/29/20 1110     Significant Indicator NEG     Source WND     Site Abdominal abscess     Culture Result Culture in progress.    Narrative:       Surgery - swabs received    BLOOD CULTURE [[id number]] Collected:  02/27/20 0703    Order Status:  Completed Specimen:  Blood from  "Peripheral Updated:  02/28/20 0728     Significant Indicator NEG     Source BLD     Site PERIPHERAL     Culture Result No Growth  Note: Blood cultures are incubated for 5 days and  are monitored continuously.Positive blood cultures  are called to the RN and reported as soon as  they are identified.      Narrative:       1 of 2 for Blood Culture x 2 sites order. Per Hospital  Policy: Only change Specimen Src: to \"Line\" if specified by  physician order.  No site indicated    BLOOD CULTURE [[id number]] Collected:  02/27/20 0645    Order Status:  Completed Specimen:  Blood from Peripheral Updated:  02/28/20 0728     Significant Indicator NEG     Source BLD     Site PERIPHERAL     Culture Result No Growth  Note: Blood cultures are incubated for 5 days and  are monitored continuously.Positive blood cultures  are called to the RN and reported as soon as  they are identified.      Narrative:       2 of 2 blood culture x2  Sites order. Per Hospital Policy:  Only change Specimen Src: to \"Line\" if specified by physician  order.  No site indicated    GRAM STAIN [[id number]] Collected:  02/27/20 1214    Order Status:  Completed Specimen:  Wound Updated:  02/27/20 2120     Significant Indicator .     Source WND     Site Abdominal abscess     Gram Stain Result Rare WBCs.  Few Gram positive cocci.  Few Gram negative rods.      Narrative:       Surgery - swabs received          Imaging  CT-ABDOMEN-PELVIS WITH   Final Result         1.  Enhancing fluid collection in the anterior abdominal wall, appearance most compatible with abscess. Consider bowel fistula given foci of air.   2.  Enhancing fluid collection within the abdomen and along the intra-abdominal wall, appearance most compatible with abscess. Foci of air suggests possible fistula with adjacent loops of bowel.   3.  Nodular hepatic contour, appearance compatible with changes of cirrhosis.   4.  Recanalization of the umbilical vein and splenomegaly, compatible with changes of " portal hypertension   5.  Diverticulosis   6.  Atherosclerosis and atherosclerotic coronary artery disease.      DX-CHEST-PORTABLE (1 VIEW)   Final Result         1.  Pulmonary vascular prominence suggests pulmonary vascular congestion   2.  Cardiomegaly   3.  Atherosclerosis           Assessment/Plan  * Intra-abdominal abscess (HCC)- (present on admission)  Assessment & Plan  POD 2  IRRIGATION AND DEBRIDEMENT, WOUND - ABDOMINAL, PSB. WOUND VAC  LAPAROTOMY, EXPLORATORY  EXCISION, INTESTINE - BOWEL RESECTION  Wound dirty  Wound vac in place will need outpatient wound care  Cultures growing E. Coli and Strep Viridans  Continue IV Zosyn  Surgery following  Pain control        MARSHALL (acute kidney injury) (HCC)- (present on admission)  Assessment & Plan  Improving  Likely related to prerenal  On IV fluid  Avoid nephrotoxic drugs  CTM    Abnormal EKG- (present on admission)  Assessment & Plan  May represent repolarization due to LVH  Echo pending    Essential hypertension- (present on admission)  Assessment & Plan  Continue outpatient medications    Paroxysmal A-fib (HCC)- (present on admission)  Assessment & Plan  Currently normal sinus rhythm  Heart rate is controlled        Presence of urostomy (HCC)- (present on admission)  Assessment & Plan  It is currently functioning   Continue to monitor output    Dyslipidemia- (present on admission)  Assessment & Plan  On statin    Hypothyroid- (present on admission)  Assessment & Plan  Continue Synthroid no longer n.p.o.       VTE prophylaxis: heparin

## 2020-02-29 NOTE — PROGRESS NOTES
Surgical Progress Note    Author: Kasi Hernandez M.D. Date & Time created: 2020   6:41 AM     Interval Events:  S/p I and D abdominal wall infection--no fistula found  ROS  Hemodynamics:  Temp (24hrs), Av.6 °C (97.8 °F), Min:35.9 °C (96.7 °F), Max:36.9 °C (98.4 °F)  Temperature: 35.9 °C (96.7 °F)  Pulse  Av.8  Min: 58  Max: 99   Blood Pressure : 134/73     Respiratory:    Respiration: 16, Pulse Oximetry: 97 %           Neuro:  GCS       Fluids:    Intake/Output Summary (Last 24 hours) at 2020 0641  Last data filed at 2020 0413  Gross per 24 hour   Intake 1100 ml   Output 1480 ml   Net -380 ml        Current Diet Order   Procedures   • Diet Order Regular     Physical Exam  Labs:  Recent Results (from the past 24 hour(s))   CBC WITH DIFFERENTIAL    Collection Time: 20  5:51 AM   Result Value Ref Range    WBC 9.2 4.8 - 10.8 K/uL    RBC 3.83 (L) 4.20 - 5.40 M/uL    Hemoglobin 10.3 (L) 12.0 - 16.0 g/dL    Hematocrit 34.8 (L) 37.0 - 47.0 %    MCV 90.9 81.4 - 97.8 fL    MCH 26.9 (L) 27.0 - 33.0 pg    MCHC 29.6 (L) 33.6 - 35.0 g/dL    RDW 53.1 (H) 35.9 - 50.0 fL    Platelet Count 177 164 - 446 K/uL    MPV 10.6 9.0 - 12.9 fL    Neutrophils-Polys 59.20 44.00 - 72.00 %    Lymphocytes 20.10 (L) 22.00 - 41.00 %    Monocytes 14.60 (H) 0.00 - 13.40 %    Eosinophils 4.60 0.00 - 6.90 %    Basophils 0.70 0.00 - 1.80 %    Immature Granulocytes 0.80 0.00 - 0.90 %    Nucleated RBC 0.00 /100 WBC    Neutrophils (Absolute) 5.42 2.00 - 7.15 K/uL    Lymphs (Absolute) 1.84 1.00 - 4.80 K/uL    Monos (Absolute) 1.34 (H) 0.00 - 0.85 K/uL    Eos (Absolute) 0.42 0.00 - 0.51 K/uL    Baso (Absolute) 0.06 0.00 - 0.12 K/uL    Immature Granulocytes (abs) 0.07 0.00 - 0.11 K/uL    NRBC (Absolute) 0.00 K/uL    Hypochromia 2+    Comp Metabolic Panel    Collection Time: 20  5:51 AM   Result Value Ref Range    Sodium 136 135 - 145 mmol/L    Potassium 4.4 3.6 - 5.5 mmol/L    Chloride 109 96 - 112 mmol/L    Co2 22 20 -  33 mmol/L    Anion Gap 5.0 0.0 - 11.9    Glucose 111 (H) 65 - 99 mg/dL    Bun 17 8 - 22 mg/dL    Creatinine 1.19 0.50 - 1.40 mg/dL    Calcium 7.9 (L) 8.5 - 10.5 mg/dL    AST(SGOT) 20 12 - 45 U/L    ALT(SGPT) 10 2 - 50 U/L    Alkaline Phosphatase 128 (H) 30 - 99 U/L    Total Bilirubin 0.3 0.1 - 1.5 mg/dL    Albumin 2.4 (L) 3.2 - 4.9 g/dL    Total Protein 6.3 6.0 - 8.2 g/dL    Globulin 3.9 (H) 1.9 - 3.5 g/dL    A-G Ratio 0.6 g/dL   ESTIMATED GFR    Collection Time: 02/29/20  5:51 AM   Result Value Ref Range    GFR If  52 (A) >60 mL/min/1.73 m 2    GFR If Non  43 (A) >60 mL/min/1.73 m 2   PERIPHERAL SMEAR REVIEW    Collection Time: 02/29/20  5:51 AM   Result Value Ref Range    Peripheral Smear Review see below    PLATELET ESTIMATE    Collection Time: 02/29/20  5:51 AM   Result Value Ref Range    Plt Estimation Normal    MORPHOLOGY    Collection Time: 02/29/20  5:51 AM   Result Value Ref Range    RBC Morphology Present     Poikilocytosis 1+     Ovalocytes 1+     Echinocytes 1+    DIFFERENTIAL COMMENT    Collection Time: 02/29/20  5:51 AM   Result Value Ref Range    Comments-Diff see below      Medical Decision Making, by Problem:  Active Hospital Problems    Diagnosis   • MARSHALL (acute kidney injury) (HCC) [N17.9]     Priority: Medium   • Paroxysmal A-fib (HCC) [I48.0]     Priority: Low   • Presence of urostomy (HCC) [Z93.6]     Priority: Low   • Dyslipidemia [E78.5]     Priority: Low   • Hypothyroid [E03.9]     Priority: Low   • Intra-abdominal abscess (HCC) [K65.1]   • Abnormal EKG [R94.31]   • Essential hypertension [I10]     Plan:  Home as per Medicine with abs and wound vac    Quality Measures:  Quality-Core Measures

## 2020-02-29 NOTE — PROGRESS NOTES
aaox4  Pt reported pain 7/10 abdomen, drain site.  Pt was in bathroom, needed assistance getting back to bed.  PEÑA, no numbness/tingling  Pt on room air  IS +1500  Normoactive bowel sounds x4  Tolerating regular diet.  + flatus  +bm  +void via urostomy draining clear yellow urine.   Wound vac dressing in place over abdominal incision, dressing intact, suction on  PIV infusing  Updated pt on POC, call light in reach, bedside and belongings within reach. All needs met at this time.

## 2020-03-01 LAB
ANION GAP SERPL CALC-SCNC: 6 MMOL/L (ref 0–11.9)
BACTERIA SPEC ANAEROBE CULT: ABNORMAL
BACTERIA SPEC ANAEROBE CULT: ABNORMAL
BUN SERPL-MCNC: 15 MG/DL (ref 8–22)
CALCIUM SERPL-MCNC: 7.9 MG/DL (ref 8.5–10.5)
CHLORIDE SERPL-SCNC: 110 MMOL/L (ref 96–112)
CO2 SERPL-SCNC: 18 MMOL/L (ref 20–33)
CREAT SERPL-MCNC: 1.02 MG/DL (ref 0.5–1.4)
GLUCOSE SERPL-MCNC: 84 MG/DL (ref 65–99)
POTASSIUM SERPL-SCNC: 4.3 MMOL/L (ref 3.6–5.5)
SIGNIFICANT IND 70042: ABNORMAL
SITE SITE: ABNORMAL
SODIUM SERPL-SCNC: 134 MMOL/L (ref 135–145)
SOURCE SOURCE: ABNORMAL
TROPONIN T SERPL-MCNC: 14 NG/L (ref 6–19)

## 2020-03-01 PROCEDURE — 84484 ASSAY OF TROPONIN QUANT: CPT

## 2020-03-01 PROCEDURE — 770020 HCHG ROOM/CARE - TELE (206)

## 2020-03-01 PROCEDURE — A9270 NON-COVERED ITEM OR SERVICE: HCPCS | Performed by: HOSPITALIST

## 2020-03-01 PROCEDURE — 99233 SBSQ HOSP IP/OBS HIGH 50: CPT | Performed by: INTERNAL MEDICINE

## 2020-03-01 PROCEDURE — 36415 COLL VENOUS BLD VENIPUNCTURE: CPT

## 2020-03-01 PROCEDURE — A9270 NON-COVERED ITEM OR SERVICE: HCPCS | Performed by: INTERNAL MEDICINE

## 2020-03-01 PROCEDURE — 700111 HCHG RX REV CODE 636 W/ 250 OVERRIDE (IP): Performed by: INTERNAL MEDICINE

## 2020-03-01 PROCEDURE — 700102 HCHG RX REV CODE 250 W/ 637 OVERRIDE(OP): Performed by: INTERNAL MEDICINE

## 2020-03-01 PROCEDURE — 80048 BASIC METABOLIC PNL TOTAL CA: CPT

## 2020-03-01 PROCEDURE — 700111 HCHG RX REV CODE 636 W/ 250 OVERRIDE (IP): Performed by: HOSPITALIST

## 2020-03-01 PROCEDURE — 700105 HCHG RX REV CODE 258: Performed by: HOSPITALIST

## 2020-03-01 PROCEDURE — 93005 ELECTROCARDIOGRAM TRACING: CPT | Performed by: INTERNAL MEDICINE

## 2020-03-01 PROCEDURE — 700102 HCHG RX REV CODE 250 W/ 637 OVERRIDE(OP): Performed by: HOSPITALIST

## 2020-03-01 RX ADMIN — LOVASTATIN 10 MG: 20 TABLET ORAL at 05:56

## 2020-03-01 RX ADMIN — HEPARIN SODIUM 5000 UNITS: 5000 INJECTION, SOLUTION INTRAVENOUS; SUBCUTANEOUS at 05:58

## 2020-03-01 RX ADMIN — OMEPRAZOLE 20 MG: 20 CAPSULE, DELAYED RELEASE ORAL at 05:54

## 2020-03-01 RX ADMIN — TIMOLOL MALEATE 1 DROP: 5 SOLUTION OPHTHALMIC at 16:55

## 2020-03-01 RX ADMIN — OXYCODONE HYDROCHLORIDE 10 MG: 5 TABLET ORAL at 21:53

## 2020-03-01 RX ADMIN — TIMOLOL MALEATE 1 DROP: 5 SOLUTION OPHTHALMIC at 05:59

## 2020-03-01 RX ADMIN — SODIUM CHLORIDE: 9 INJECTION, SOLUTION INTRAVENOUS at 06:06

## 2020-03-01 RX ADMIN — OXYCODONE HYDROCHLORIDE 10 MG: 5 TABLET ORAL at 05:54

## 2020-03-01 RX ADMIN — LATANOPROST 1 DROP: 50 SOLUTION OPHTHALMIC at 17:46

## 2020-03-01 RX ADMIN — METOPROLOL TARTRATE 100 MG: 100 TABLET ORAL at 17:46

## 2020-03-01 RX ADMIN — METOPROLOL TARTRATE 100 MG: 100 TABLET ORAL at 05:54

## 2020-03-01 RX ADMIN — PIPERACILLIN SODIUM AND TAZOBACTAM SODIUM 3.38 G: 3; .375 INJECTION, POWDER, LYOPHILIZED, FOR SOLUTION INTRAVENOUS at 05:58

## 2020-03-01 RX ADMIN — PIPERACILLIN SODIUM AND TAZOBACTAM SODIUM 3.38 G: 3; .375 INJECTION, POWDER, LYOPHILIZED, FOR SOLUTION INTRAVENOUS at 18:28

## 2020-03-01 RX ADMIN — LEVOTHYROXINE SODIUM 112 MCG: 112 TABLET ORAL at 05:54

## 2020-03-01 ASSESSMENT — ENCOUNTER SYMPTOMS
EYE PAIN: 0
STRIDOR: 0
VOMITING: 0
HEADACHES: 0
CHILLS: 0
NAUSEA: 0
BLURRED VISION: 0
HEARTBURN: 0
SPUTUM PRODUCTION: 0
EYE REDNESS: 0
NECK PAIN: 0
MYALGIAS: 0
SHORTNESS OF BREATH: 0
BACK PAIN: 0
ABDOMINAL PAIN: 1
DEPRESSION: 0
FEVER: 0
WEIGHT LOSS: 0
NERVOUS/ANXIOUS: 0
EYE DISCHARGE: 0
COUGH: 0
DIZZINESS: 0
ORTHOPNEA: 0
SEIZURES: 0
INSOMNIA: 0
FOCAL WEAKNESS: 0
DIARRHEA: 0
PALPITATIONS: 0

## 2020-03-01 NOTE — PROGRESS NOTES
Bedside report received.  Assessment complete.  A&O x 4. Patient calls appropriately.  Patient ambulates with standby assist.    Patient has 7/10 pain. Pt requesting medication with next med administration. Declines other interventions at this time.  Denies N&V. Tolerating regular diet.  Midline wound vac, CDI. No leaks noted at this time. CDI.  + void, urostomy, + flatus, + BM, loose.  Patient denies SOB.  Patient pleasant with staff and resting in bed.  Review plan with of care with patient. Call light and personal belongings with in reach. Hourly rounding in place. All needs met at this time.

## 2020-03-01 NOTE — CARE PLAN
Problem: Communication  Goal: The ability to communicate needs accurately and effectively will improve  Outcome: PROGRESSING AS EXPECTED     Problem: Safety  Goal: Will remain free from injury  Outcome: PROGRESSING AS EXPECTED     Problem: Knowledge Deficit  Goal: Knowledge of disease process/condition, treatment plan, diagnostic tests, and medications will improve  Outcome: PROGRESSING AS EXPECTED     Problem: Pain Management  Goal: Pain level will decrease to patient's comfort goal  Outcome: PROGRESSING AS EXPECTED     Problem: Mobility  Goal: Risk for activity intolerance will decrease  Outcome: PROGRESSING AS EXPECTED

## 2020-03-01 NOTE — PROGRESS NOTES
Received report from night shift nurse  Assumed care of patient  AAOx4  No chest pain or SOB  +mild nausea, provided emesis bag. Declines intervention at this time.   Denies pain  20g PIV RAC,   +void, +flatus  Wound vac site to midline abdomen is intact  No swelling in all extremities.  Patient is pleasant with staff and is resting in bed.   Call light and personal belongings within reach. Hourly roundings in place. All needs met at this time.

## 2020-03-01 NOTE — CARE PLAN
Problem: Communication  Goal: The ability to communicate needs accurately and effectively will improve  Outcome: PROGRESSING AS EXPECTED   Pt calls appropriately     Problem: Safety  Goal: Will remain free from injury  Outcome: PROGRESSING AS EXPECTED  Goal: Will remain free from falls  Outcome: PROGRESSING AS EXPECTED  SBA, educated on fall precautions     Problem: Venous Thromboembolism (VTW)/Deep Vein Thrombosis (DVT) Prevention:  Goal: Patient will participate in Venous Thrombosis (VTE)/Deep Vein Thrombosis (DVT)Prevention Measures  Outcome: PROGRESSING AS EXPECTED  Patient educated to wear SCDs and to ambulate at least 3 times a day

## 2020-03-01 NOTE — FACE TO FACE
Face to Face Supporting Documentation - Home Health    The encounter with this patient was in whole or in part the primary reason for home health admission.    Date of encounter:   Patient:                    MRN:                       YOB: 2018  Dorie Wadsworth  [id number]  1935     Home health to see patient for:  Wound Care and Comment: VAC    Skilled need for:  Exacerbation of Chronic Disease State     Skilled nursing interventions to include:  Wound Care    Homebound status evidenced by:  Have a condition such that leaving his or her home is medically contraindicated. Leaving home requires a considerable and taxing effort. There is a normal inability to leave the home.    Community Physician to provide follow up care: Coreen Cisse M.D.     Optional Interventions? No      I certify the face to face encounter for this home health care referral meets the CMS requirements and the encounter/clinical assessment with the patient was, in whole, or in part, for the medical condition(s) listed above, which is the primary reason for home health care. Based on my clinical findings: the service(s) are medically necessary, support the need for home health care, and the homebound criteria are met.  I certify that this patient has had a face to face encounter by myself.  Kasi Hernandez M.D. - NPI: [id number]

## 2020-03-02 LAB
ANION GAP SERPL CALC-SCNC: 7 MMOL/L (ref 0–11.9)
BUN SERPL-MCNC: 11 MG/DL (ref 8–22)
CALCIUM SERPL-MCNC: 8.2 MG/DL (ref 8.5–10.5)
CHLORIDE SERPL-SCNC: 109 MMOL/L (ref 96–112)
CO2 SERPL-SCNC: 20 MMOL/L (ref 20–33)
CREAT SERPL-MCNC: 0.86 MG/DL (ref 0.5–1.4)
EKG IMPRESSION: NORMAL
GLUCOSE SERPL-MCNC: 94 MG/DL (ref 65–99)
MAGNESIUM SERPL-MCNC: 1.6 MG/DL (ref 1.5–2.5)
POTASSIUM SERPL-SCNC: 3.8 MMOL/L (ref 3.6–5.5)
SODIUM SERPL-SCNC: 136 MMOL/L (ref 135–145)

## 2020-03-02 PROCEDURE — 99232 SBSQ HOSP IP/OBS MODERATE 35: CPT | Performed by: INTERNAL MEDICINE

## 2020-03-02 PROCEDURE — 97605 NEG PRS WND THER DME<=50SQCM: CPT

## 2020-03-02 PROCEDURE — 83735 ASSAY OF MAGNESIUM: CPT

## 2020-03-02 PROCEDURE — 700102 HCHG RX REV CODE 250 W/ 637 OVERRIDE(OP): Performed by: INTERNAL MEDICINE

## 2020-03-02 PROCEDURE — 770006 HCHG ROOM/CARE - MED/SURG/GYN SEMI*

## 2020-03-02 PROCEDURE — A9270 NON-COVERED ITEM OR SERVICE: HCPCS | Performed by: INTERNAL MEDICINE

## 2020-03-02 PROCEDURE — 700102 HCHG RX REV CODE 250 W/ 637 OVERRIDE(OP): Performed by: HOSPITALIST

## 2020-03-02 PROCEDURE — 700105 HCHG RX REV CODE 258: Performed by: HOSPITALIST

## 2020-03-02 PROCEDURE — 93010 ELECTROCARDIOGRAM REPORT: CPT | Performed by: INTERNAL MEDICINE

## 2020-03-02 PROCEDURE — A9270 NON-COVERED ITEM OR SERVICE: HCPCS | Performed by: HOSPITALIST

## 2020-03-02 PROCEDURE — 36415 COLL VENOUS BLD VENIPUNCTURE: CPT

## 2020-03-02 PROCEDURE — 80048 BASIC METABOLIC PNL TOTAL CA: CPT

## 2020-03-02 PROCEDURE — 700111 HCHG RX REV CODE 636 W/ 250 OVERRIDE (IP): Performed by: HOSPITALIST

## 2020-03-02 RX ADMIN — OXYCODONE HYDROCHLORIDE 5 MG: 5 TABLET ORAL at 12:03

## 2020-03-02 RX ADMIN — LOVASTATIN 10 MG: 20 TABLET ORAL at 05:56

## 2020-03-02 RX ADMIN — OMEPRAZOLE 20 MG: 20 CAPSULE, DELAYED RELEASE ORAL at 05:56

## 2020-03-02 RX ADMIN — PIPERACILLIN SODIUM AND TAZOBACTAM SODIUM 3.38 G: 3; .375 INJECTION, POWDER, LYOPHILIZED, FOR SOLUTION INTRAVENOUS at 12:03

## 2020-03-02 RX ADMIN — METOPROLOL TARTRATE 100 MG: 100 TABLET ORAL at 05:56

## 2020-03-02 RX ADMIN — LATANOPROST 1 DROP: 50 SOLUTION OPHTHALMIC at 18:52

## 2020-03-02 RX ADMIN — OXYCODONE HYDROCHLORIDE 10 MG: 5 TABLET ORAL at 20:28

## 2020-03-02 RX ADMIN — PIPERACILLIN SODIUM AND TAZOBACTAM SODIUM 3.38 G: 3; .375 INJECTION, POWDER, LYOPHILIZED, FOR SOLUTION INTRAVENOUS at 03:09

## 2020-03-02 RX ADMIN — SENNOSIDES AND DOCUSATE SODIUM 2 TABLET: 8.6; 5 TABLET ORAL at 05:56

## 2020-03-02 RX ADMIN — TIMOLOL MALEATE 1 DROP: 5 SOLUTION OPHTHALMIC at 18:34

## 2020-03-02 RX ADMIN — TIMOLOL MALEATE 1 DROP: 5 SOLUTION OPHTHALMIC at 05:55

## 2020-03-02 RX ADMIN — OXYCODONE HYDROCHLORIDE 5 MG: 5 TABLET ORAL at 13:43

## 2020-03-02 RX ADMIN — METOPROLOL TARTRATE 100 MG: 100 TABLET ORAL at 18:34

## 2020-03-02 RX ADMIN — OXYCODONE HYDROCHLORIDE 10 MG: 5 TABLET ORAL at 03:09

## 2020-03-02 RX ADMIN — LEVOTHYROXINE SODIUM 112 MCG: 112 TABLET ORAL at 05:56

## 2020-03-02 RX ADMIN — PIPERACILLIN SODIUM AND TAZOBACTAM SODIUM 3.38 G: 3; .375 INJECTION, POWDER, LYOPHILIZED, FOR SOLUTION INTRAVENOUS at 18:34

## 2020-03-02 ASSESSMENT — ENCOUNTER SYMPTOMS
SHORTNESS OF BREATH: 0
BLURRED VISION: 0
HEARTBURN: 0
DIZZINESS: 0
HEADACHES: 0
NERVOUS/ANXIOUS: 0
MYALGIAS: 0
NAUSEA: 0
SEIZURES: 0
ORTHOPNEA: 0
WEIGHT LOSS: 0
DIARRHEA: 0
CHILLS: 0
NECK PAIN: 0
EYE REDNESS: 0
BACK PAIN: 0
ABDOMINAL PAIN: 1
FEVER: 0
INSOMNIA: 0
STRIDOR: 0
EYE DISCHARGE: 0
EYE PAIN: 0
DEPRESSION: 0
VOMITING: 0
COUGH: 0
SPUTUM PRODUCTION: 0
PALPITATIONS: 0
FOCAL WEAKNESS: 0

## 2020-03-02 NOTE — CARE PLAN
Problem: Venous Thromboembolism (VTW)/Deep Vein Thrombosis (DVT) Prevention:  Goal: Patient will participate in Venous Thrombosis (VTE)/Deep Vein Thrombosis (DVT)Prevention Measures  Outcome: PROGRESSING AS EXPECTED  SCDs in place. Pt mobilizing frequently.      Problem: Pain Management  Goal: Pain level will decrease to patient's comfort goal  Outcome: PROGRESSING AS EXPECTED  Pain managed with oxycodone 5-10mg PRN

## 2020-03-02 NOTE — PROGRESS NOTES
Bedside report received.  Assessment complete.  A&O x 4. Patient calls appropriately.  Patient ambulates with standby assist.    Patient has 0/10 pain.   Denies N&V. Tolerating regular diet.  Midline wound vac, CDI. No leaks noted at this time. CDI.  + void, ureterostomy, + flatus, + BM, loose.  Patient denies SOB.  Patient pleasant with staff and resting in bed.  Review plan with of care with patient. Call light and personal belongings with in reach. Hourly rounding in place. All needs met at this time.

## 2020-03-02 NOTE — PROGRESS NOTES
Hospital Medicine Daily Progress Note    Date of Service  3/1/2020    Chief Complaint  84 y.o. female admitted 2/27/2020 with abdominal pain    Hospital Course    84 y.o. female who presented 2/27/2020 with past medical history of total cystectomy with an ileal conduit (2011), atrial fibrillation, hypertension, TIA underwent a hernia repair December 24th 2019.  The patient has been having abdominal pain ongoing for last 2 weeks but over the last 4 days is gotten progressively worse.. It is now constant pain in the center lower abdomen intensity 9 out of 10.  It is worse with any palpation.  She is noted her abdomen becoming more distended in that area.  She came to the emergency department for evaluation and a CT showed evidence of a abdominal abscess.       Interval Problem Update  - afebrile  - doing well  - ambulating halls  - tolerating PO  - having BMs  - pain controlled  - waiting for wound vac supplies, will get HH for wound care    Consultants/Specialty  surgery    Code Status  full    Disposition  Likely home with HH for wound care when supplies arrive     Review of Systems  Review of Systems   Constitutional: Negative for chills, fever and weight loss.   HENT: Negative for congestion and nosebleeds.    Eyes: Negative for blurred vision, pain, discharge and redness.   Respiratory: Negative for cough, sputum production, shortness of breath and stridor.    Cardiovascular: Negative for chest pain, palpitations and orthopnea.   Gastrointestinal: Positive for abdominal pain. Negative for diarrhea, heartburn, nausea and vomiting.   Genitourinary: Negative for dysuria, frequency and urgency.   Musculoskeletal: Negative for back pain, myalgias and neck pain.   Skin: Negative for itching and rash.   Neurological: Negative for dizziness, focal weakness, seizures and headaches.   Psychiatric/Behavioral: Negative for depression. The patient is not nervous/anxious and does not have insomnia.         Physical Exam  Temp:   [36.2 °C (97.1 °F)-36.8 °C (98.3 °F)] 36.2 °C (97.1 °F)  Pulse:  [74-87] 87  Resp:  [15-17] 17  BP: (112-155)/(50-91) 155/91  SpO2:  [94 %-97 %] 97 %    Physical Exam  Vitals signs and nursing note reviewed.   Constitutional:       General: She is not in acute distress.     Appearance: Normal appearance. She is not ill-appearing, toxic-appearing or diaphoretic.   HENT:      Head: Normocephalic and atraumatic.      Nose: No congestion or rhinorrhea.   Eyes:      Extraocular Movements: Extraocular movements intact.      Pupils: Pupils are equal, round, and reactive to light.   Neck:      Musculoskeletal: Normal range of motion and neck supple.   Cardiovascular:      Rate and Rhythm: Normal rate and regular rhythm.      Pulses: Normal pulses.   Pulmonary:      Effort: Pulmonary effort is normal. No respiratory distress.      Breath sounds: Normal breath sounds.   Abdominal:      General: Bowel sounds are normal. There is no distension.      Palpations: Abdomen is soft.      Tenderness: There is abdominal tenderness.      Comments: Wound vac in place  Urostomy in place      Musculoskeletal:         General: No swelling or tenderness.   Skin:     General: Skin is warm.      Findings: No erythema.   Neurological:      General: No focal deficit present.      Mental Status: She is alert and oriented to person, place, and time.   Psychiatric:         Mood and Affect: Mood normal.         Behavior: Behavior normal.          Fluids    Intake/Output Summary (Last 24 hours) at 3/1/2020 1607  Last data filed at 3/1/2020 1307  Gross per 24 hour   Intake 1280 ml   Output 1875 ml   Net -595 ml       Laboratory  Recent Labs     02/28/20  0626 02/29/20  0551   WBC 9.4 9.2   RBC 3.63* 3.83*   HEMOGLOBIN 10.0* 10.3*   HEMATOCRIT 32.8* 34.8*   MCV 90.4 90.9   MCH 27.5 26.9*   MCHC 30.5* 29.6*   RDW 53.4* 53.1*   PLATELETCT 147* 177   MPV 10.7 10.6     Recent Labs     02/28/20  0626 02/29/20  0551 03/01/20  0719   SODIUM 134* 136 134*    POTASSIUM 4.6 4.4 4.3   CHLORIDE 107 109 110   CO2 21 22 18*   GLUCOSE 106* 111* 84   BUN 18 17 15   CREATININE 1.34 1.19 1.02   CALCIUM 7.9* 7.9* 7.9*                  Results     Procedure Component Value Units Date/Time    CULTURE WOUND W/ GRAM STAIN [[id number]]  (Abnormal)  (Susceptibility) Collected:  02/27/20 1214    Order Status:  Completed Specimen:  Wound Updated:  03/01/20 1352     Significant Indicator POS     Source WND     Site Abdominal abscess     Culture Result -     Gram Stain Result Rare WBCs.  Few Gram positive cocci.  Few Gram negative rods.       Culture Result Escherichia coli  Moderate growth        Viridans Streptococcus  Light growth  Mixed morphologies      Narrative:       Surgery - swabs received    Susceptibility     Escherichia coli (1)     Antibiotic Interpretation Microscan Method Status    Ampicillin Resistant >16 mcg/mL MICKI Final    Amoxicillin/CA Sensitive <=8/4 mcg/mL MICKI Final    Ceftriaxone Sensitive <=1 mcg/mL MICKI Final    Ceftazidime Sensitive <=1 mcg/mL MICKI Final    Cefotaxime Sensitive <=2 mcg/mL MICKI Final    Cefazolin Sensitive 4 mcg/mL MICKI Final    Ampicillin/sulbactam Intermediate 16/8 mcg/mL MICKI Final    Ciprofloxacin Resistant >2 mcg/mL MICKI Final    Tobramycin Sensitive <=4 mcg/mL MICKI Final    Cefepime Sensitive <=2 mcg/mL MICKI Final    Cefotetan Sensitive <=16 mcg/mL MICKI Final    Ertapenem Sensitive <=0.5 mcg/mL MICKI Final    Gentamicin Sensitive <=4 mcg/mL MICKI Final    Pip/Tazobactam Sensitive <=16 mcg/mL MICKI Final    Trimeth/Sulfa Sensitive <=2/38 mcg/mL MICKI Final                   Anaerobic Culture [[id number]]  (Abnormal) Collected:  02/27/20 1214    Order Status:  Completed Specimen:  Wound Updated:  03/01/20 1352     Significant Indicator POS     Source WND     Site Abdominal abscess     Culture Result Heavy growth mixed anaerobic jing predominantly      Bacteroides fragilis Group    Narrative:       Surgery - swabs received    GRAM STAIN [[id number]] Collected:   "02/27/20 1214    Order Status:  Completed Specimen:  Wound Updated:  03/01/20 1352     Significant Indicator .     Source WND     Site Abdominal abscess     Gram Stain Result Rare WBCs.  Few Gram positive cocci.  Few Gram negative rods.      Narrative:       Surgery - swabs received    BLOOD CULTURE [[id number]] Collected:  02/27/20 0703    Order Status:  Completed Specimen:  Blood from Peripheral Updated:  02/28/20 0728     Significant Indicator NEG     Source BLD     Site PERIPHERAL     Culture Result No Growth  Note: Blood cultures are incubated for 5 days and  are monitored continuously.Positive blood cultures  are called to the RN and reported as soon as  they are identified.      Narrative:       1 of 2 for Blood Culture x 2 sites order. Per Hospital  Policy: Only change Specimen Src: to \"Line\" if specified by  physician order.  No site indicated    BLOOD CULTURE [[id number]] Collected:  02/27/20 0645    Order Status:  Completed Specimen:  Blood from Peripheral Updated:  02/28/20 0728     Significant Indicator NEG     Source BLD     Site PERIPHERAL     Culture Result No Growth  Note: Blood cultures are incubated for 5 days and  are monitored continuously.Positive blood cultures  are called to the RN and reported as soon as  they are identified.      Narrative:       2 of 2 blood culture x2  Sites order. Per Hospital Policy:  Only change Specimen Src: to \"Line\" if specified by physician  order.  No site indicated          Imaging  CT-ABDOMEN-PELVIS WITH   Final Result         1.  Enhancing fluid collection in the anterior abdominal wall, appearance most compatible with abscess. Consider bowel fistula given foci of air.   2.  Enhancing fluid collection within the abdomen and along the intra-abdominal wall, appearance most compatible with abscess. Foci of air suggests possible fistula with adjacent loops of bowel.   3.  Nodular hepatic contour, appearance compatible with changes of cirrhosis.   4.  Recanalization " of the umbilical vein and splenomegaly, compatible with changes of portal hypertension   5.  Diverticulosis   6.  Atherosclerosis and atherosclerotic coronary artery disease.      DX-CHEST-PORTABLE (1 VIEW)   Final Result         1.  Pulmonary vascular prominence suggests pulmonary vascular congestion   2.  Cardiomegaly   3.  Atherosclerosis           Assessment/Plan  * Intra-abdominal abscess (HCC)- (present on admission)  Assessment & Plan  POD 3  IRRIGATION AND DEBRIDEMENT, WOUND - ABDOMINAL, PSB. WOUND VAC  LAPAROTOMY, EXPLORATORY  EXCISION, INTESTINE - BOWEL RESECTION  Wound dirty  Wound vac in place, will be dc home with  Wound care  Cultures growing E. Coli and Strep Viridans and bacteroides   Continue IV Zosyn, will likely dc on augmentin  Surgery following  Pain control        MARSHALL (acute kidney injury) (HCC)- (present on admission)  Assessment & Plan  Improving  Likely related to prerenal  On IV fluid  Avoid nephrotoxic drugs  CTM    Abnormal EKG- (present on admission)  Assessment & Plan  May represent repolarization due to LVH  Last stress test 2011, last echo 3/2019  No chest pain or SOB  Did have some nausea this am but improved  Repeat ecg and consider echo vs stress test vs outpatient stress test    Essential hypertension- (present on admission)  Assessment & Plan  Continue outpatient medications    Paroxysmal A-fib (HCC)- (present on admission)  Assessment & Plan  Currently normal sinus rhythm  Heart rate is controlled  Not on AC d/t hematuria      Presence of urostomy (HCC)- (present on admission)  Assessment & Plan  It is currently functioning   Continue to monitor output    Dyslipidemia- (present on admission)  Assessment & Plan  On statin    Hypothyroid- (present on admission)  Assessment & Plan  Continue Synthroid no longer n.p.o.       VTE prophylaxis: Ambulatory

## 2020-03-02 NOTE — PROGRESS NOTES
Patient is A&Ox4.   Medicated per MAR for wound vac change.   PEÑA, CMS intact, denies numbness and tingling.   Ambulatory with SBA. Educated to call for assistance.   On RA, denies SOB or chest pain  Normoactive BS x 4. Tolerating regular diet. Denies N&V.   + flatus + BM 3/2. Pt reporting loose stools.   + void via urostomy, draining clear, yellow urine; appliance is intact.   Wound vac in place to midline abdomen - WOCN at bedside to change dressing.   PIV running IV Zosyn at this time.   Updated on POC. Belongings and call light within reach. All needs met at this time.

## 2020-03-02 NOTE — PROGRESS NOTES
Surgery  Looks and feels better  Cultures reviewed  Continue wound vac changes  Home when vac changes arranged at outpatient  Call with questions, surgery signing off.

## 2020-03-02 NOTE — WOUND TEAM
[facility]  Inpatient Services  Initial Wound and Skin Care Evaluation    Admission Date: 2/27/2020     Last order of IP CONSULT TO WOUND CARE was found on 2/27/2020 from Hospital Encounter on 2/27/2020       HPI, PMH, SH: Reviewed    Unit where seen by Wound Team: [id number]     WOUND CONSULT RELATED TO:  Scheduled change NPWT dressing     Self Report / Pain Level:  Premedicated with PO pain medication states 7/10 on 0-10 pain scale    OBJECTIVE:  Pt lying in bed with NPWT dressing in place.    WOUND TYPE, LOCATION, CHARACTERISTICS (Pressure Injuries: location, stage, POA or date identified)    Negative Pressure Wound Therapy 02/27/20 Surgical (Active)   NPWT Pump Mode / Pressure Setting 125 mmHg;Continuous    Dressing Type Small;Black Foam (Regular);White Foam    Number of Foam Pieces Used 3    Canister Changed No    Output (mL) 50 mL    NEXT Dressing Change/Treatment Date 03/04/20            Wound 02/27/20 Full Thickness Wound Abdomen Midline open surgical wound midline abdomen (Active)   Wound Image   2/29/2020 11:00 AM   Site Assessment Pink;Red    Periwound Assessment Clean;Dry;Intact    Margins Attached edges;Defined edges    Closure Secondary intention    Drainage Amount Scant    Drainage Description Serosanguineous    Treatments Cleansed    Wound Cleansing Normal Saline Irrigation    Periwound Protectant Skin Protectant Wipes to Periwound;Drape    Dressing Cleansing/Solutions Not Applicable    Dressing Options Wound Vac    Dressing Changed Changed    Dressing Status Clean;Dry;Intact    Dressing Change/Treatment Frequency Monday, Wednesday, Friday, and As Needed    NEXT Dressing Change/Treatment Date 03/04/20    NEXT Weekly Photo (Inpatient Only) 03/06/20    Wound Length (cm) 7 cm 2/29/2020 11:00 AM   Wound Width (cm) 2.5 cm 2/29/2020 11:00 AM   Wound Depth (cm) 3.7 cm 2/29/2020 11:00 AM   Wound Surface Area (cm^2) 17.5 cm^2 2/29/2020 11:00 AM   Wound Volume (cm^3) 64.75 cm^3 2/29/2020 11:00 AM    Wound Bed Granulation (%) 80 % 2/29/2020 11:00 AM   Wound Bed Slough (%) 0 % 2/29/2020 11:00 AM   Wound Bed Eschar (%) 0 % 2/29/2020 11:00 AM   Tunneling (cm) 0 cm 2/29/2020 11:00 AM   Undermining of Wound, 1st Location From 1 o'clock;To 3 o'clock 2/29/2020 11:00 AM   Wound Odor None    Exposed Structures None           Vascular:  Not related    KAMAR:   No results found.      Lab Values:    Lab Results   Component Value Date/Time    WBC 9.2 02/29/2020 05:51 AM    RBC 3.83 (L) 02/29/2020 05:51 AM    HEMOGLOBIN 10.3 (L) 02/29/2020 05:51 AM    HEMATOCRIT 34.8 (L) 02/29/2020 05:51 AM    CREACTPROT 5.82 (H) 03/20/2019 02:18 AM    SEDRATEWES 38 (H) 03/20/2019 02:18 AM    HBA1C 6.1 (H) 06/07/2011 12:11 PM          Culture:   Obtained 2/27/2020  Culture Results show:  Recent Results (from the past 720 hour(s))   CULTURE WOUND W/ GRAM STAIN    Collection Time: 02/27/20 12:14 PM   Result Value Ref Range    Significant Indicator POS (POS)     Source WND     Site Abdominal abscess     Culture Result - (A)     Gram Stain Result       Rare WBCs.  Few Gram positive cocci.  Few Gram negative rods.      Culture Result Escherichia coli  Moderate growth   (A)     Culture Result (A)      Viridans Streptococcus  Light growth  Mixed morphologies         Susceptibility    Escherichia coli - MICKI     Ampicillin >16 Resistant mcg/mL     Amoxicillin/CA <=8/4 Sensitive mcg/mL     Ceftriaxone <=1 Sensitive mcg/mL     Ceftazidime <=1 Sensitive mcg/mL     Cefotaxime <=2 Sensitive mcg/mL     Cefazolin 4 Sensitive mcg/mL     Ampicillin/sulbactam 16/8 Intermediate mcg/mL     Ciprofloxacin >2 Resistant mcg/mL     Tobramycin <=4 Sensitive mcg/mL     Cefepime <=2 Sensitive mcg/mL     Cefotetan <=16 Sensitive mcg/mL     Ertapenem <=0.5 Sensitive mcg/mL     Gentamicin <=4 Sensitive mcg/mL     Pip/Tazobactam <=16 Sensitive mcg/mL     Trimeth/Sulfa <=2/38 Sensitive mcg/mL         INTERVENTIONS BY WOUND TEAM:  Removed old dressing completely with no  retained foam noted upon examination.  Cleansed wound with NS and gauze.  Skin prep and drape to juan wound skin.  Placed piece white foam x 1 at base of wound and then filled wound with black foam x 1 piece, added one small piece of black foam over top for button to accommodate trac pad.  Secured with drape and trac pad placed over black foam button and resumed NPWT at 125mmHg continuously.  Total black foam=2 pieces and white foam=1 piece.  Discussed with staff RN Cathleen. Ostomy appliance intact, patient able to care for self and changed yesterday. Left more ostomy supplies at bedside for patient to utilize as needed.    Interdisciplinary consultation: Patient, Bedside RN     EVALUATION: Clean, granular wound bed, should continue to improve with NPWT to promote granular tissue formation and manage exudate.    Goals: Steady decrease in wound area and depth weekly.    NURSING PLAN OF CARE ORDERS (X):    Dressing changes: See Dressing Care orders: X  Skin care: See Skin Care orders:   Rectal tube care: See Rectal Tube Care orders:   Other orders:    WOUND TEAM PLAN OF CARE   Dressing changes by wound team:          Follow up 1-2 times weekly:               Follow up 3 times weekly:                NPWT change 3 times weekly:   X MWF  Follow up as needed:       Other (explain):     Anticipated discharge plans:  LTACH:        SNF/Rehab:                  Home Care:   X        Outpatient Wound Center:            Self Care:

## 2020-03-03 ENCOUNTER — PATIENT OUTREACH (OUTPATIENT)
Dept: HEALTH INFORMATION MANAGEMENT | Facility: OTHER | Age: 85
End: 2020-03-03

## 2020-03-03 VITALS
BODY MASS INDEX: 23.76 KG/M2 | HEIGHT: 68 IN | RESPIRATION RATE: 18 BRPM | SYSTOLIC BLOOD PRESSURE: 114 MMHG | DIASTOLIC BLOOD PRESSURE: 79 MMHG | WEIGHT: 156.75 LBS | HEART RATE: 98 BPM | TEMPERATURE: 97.3 F | OXYGEN SATURATION: 95 %

## 2020-03-03 PROBLEM — N17.9 AKI (ACUTE KIDNEY INJURY) (HCC): Status: RESOLVED | Noted: 2019-03-16 | Resolved: 2020-03-03

## 2020-03-03 PROCEDURE — 700111 HCHG RX REV CODE 636 W/ 250 OVERRIDE (IP): Performed by: HOSPITALIST

## 2020-03-03 PROCEDURE — 99223 1ST HOSP IP/OBS HIGH 75: CPT | Performed by: INTERNAL MEDICINE

## 2020-03-03 PROCEDURE — 700102 HCHG RX REV CODE 250 W/ 637 OVERRIDE(OP): Performed by: INTERNAL MEDICINE

## 2020-03-03 PROCEDURE — A9270 NON-COVERED ITEM OR SERVICE: HCPCS | Performed by: INTERNAL MEDICINE

## 2020-03-03 PROCEDURE — A9270 NON-COVERED ITEM OR SERVICE: HCPCS | Performed by: HOSPITALIST

## 2020-03-03 PROCEDURE — 700105 HCHG RX REV CODE 258: Performed by: HOSPITALIST

## 2020-03-03 PROCEDURE — 99239 HOSP IP/OBS DSCHRG MGMT >30: CPT | Performed by: INTERNAL MEDICINE

## 2020-03-03 PROCEDURE — 700102 HCHG RX REV CODE 250 W/ 637 OVERRIDE(OP): Performed by: HOSPITALIST

## 2020-03-03 RX ORDER — AMOXICILLIN AND CLAVULANATE POTASSIUM 875; 125 MG/1; MG/1
1 TABLET, FILM COATED ORAL EVERY 12 HOURS
Qty: 20 TAB | Refills: 0 | Status: SHIPPED | OUTPATIENT
Start: 2020-03-03 | End: 2020-03-13

## 2020-03-03 RX ORDER — OXYCODONE HYDROCHLORIDE 5 MG/1
2.5-5 TABLET ORAL EVERY 4 HOURS PRN
Qty: 10 TAB | Refills: 0 | Status: SHIPPED | OUTPATIENT
Start: 2020-03-03 | End: 2020-03-08

## 2020-03-03 RX ORDER — AMOXICILLIN AND CLAVULANATE POTASSIUM 875; 125 MG/1; MG/1
1 TABLET, FILM COATED ORAL EVERY 12 HOURS
Status: DISCONTINUED | OUTPATIENT
Start: 2020-03-03 | End: 2020-03-03 | Stop reason: HOSPADM

## 2020-03-03 RX ADMIN — LEVOTHYROXINE SODIUM 112 MCG: 112 TABLET ORAL at 05:25

## 2020-03-03 RX ADMIN — METOPROLOL TARTRATE 100 MG: 100 TABLET ORAL at 05:25

## 2020-03-03 RX ADMIN — LOVASTATIN 10 MG: 20 TABLET ORAL at 05:25

## 2020-03-03 RX ADMIN — PIPERACILLIN SODIUM AND TAZOBACTAM SODIUM 3.38 G: 3; .375 INJECTION, POWDER, LYOPHILIZED, FOR SOLUTION INTRAVENOUS at 12:16

## 2020-03-03 RX ADMIN — OXYCODONE HYDROCHLORIDE 5 MG: 5 TABLET ORAL at 13:19

## 2020-03-03 RX ADMIN — OMEPRAZOLE 20 MG: 20 CAPSULE, DELAYED RELEASE ORAL at 05:25

## 2020-03-03 RX ADMIN — TIMOLOL MALEATE 1 DROP: 5 SOLUTION OPHTHALMIC at 05:27

## 2020-03-03 RX ADMIN — PIPERACILLIN SODIUM AND TAZOBACTAM SODIUM 3.38 G: 3; .375 INJECTION, POWDER, LYOPHILIZED, FOR SOLUTION INTRAVENOUS at 05:27

## 2020-03-03 ASSESSMENT — ENCOUNTER SYMPTOMS
ABDOMINAL PAIN: 1
FOCAL WEAKNESS: 0
COUGH: 0
FEVER: 0
BLURRED VISION: 0
MYALGIAS: 0
CHILLS: 0
SHORTNESS OF BREATH: 0
NERVOUS/ANXIOUS: 0
DOUBLE VISION: 0
SPUTUM PRODUCTION: 0
HEADACHES: 0
DIARRHEA: 1
CONSTIPATION: 0
VOMITING: 0

## 2020-03-03 NOTE — CONSULTS
Consults   INFECTIOUS DISEASES INPATIENT CONSULT NOTE     Date of Service: 3/3/2020    Consult Requested By: Abbi Booth M.D.    Reason for Consultation: Abdominal wall abscess    History of Present Illness:   Dorie Wadsworth is a 84 y.o.  admitted 2/27/2020. Pt has a past medical history of cystectomy with ileal conduit, A. fib and hypothyroidism who was seen before by ID in late December 2019.  She had abdominal pain and was found to have an incarcerated parastomal hernia causing bowel obstruction.  She edgardo to the OR for repair on 12/24/2019.  The course was complicated by an intra-abdominal abscess which was drained. Drain was placed with fluid cultures and grew Bacteroides fragilis.  ID recommended Unasyn and then transitioned her to Augmentin with a stop date of 1/2/2020.     She then came into the ED on 2/27 complaining of pain x2 weeks in the left lower abdomen, described as 9 out of 10 and worsened with palpation, as well as distention.    Hospital Course:   On arrival the patient had a leukocytosis of 15.9 and elevated creatinine.  CT abdomen/pelvis with enhancing fluid collection in the anterior abdominal wall compatible with abscess, possible bowel fistula given foci of air and adjacent loops of bowel.  She went to the OR for I&D of the wall abscess.  She has been continued on Zosyn since admit.    Review Of Systems:  Review of Systems   Constitutional: Negative for chills, fever and malaise/fatigue.   HENT: Positive for hearing loss.    Eyes: Negative for blurred vision and double vision.   Respiratory: Negative for cough, sputum production and shortness of breath.    Cardiovascular: Negative for chest pain and leg swelling.   Gastrointestinal: Positive for abdominal pain and diarrhea. Negative for constipation and vomiting.   Musculoskeletal: Negative for joint pain and myalgias.   Skin: Negative for rash.   Neurological: Negative for focal weakness and headaches.   Psychiatric/Behavioral: The  patient is not nervous/anxious.          PMH:   Past Medical History:   Diagnosis Date   • A-fib (HCC)    • Ailment     urostomy   • Arthritis     fingers   • Atrial fibrillation (HCC) 2/2009    A FIB X2,[ resolved on own][   • Atrial fibrillation with rapid ventricular response (HCC) 3/18/2019   • Atrial fibrillation, rapid (HCC) 1/31/2014   • Back pain    • Bladder disease 2011    bladder removed   • Bladder problem     Chronic bladder infection for the past 17months   • CAD (coronary artery disease)    • Cancer (HCC)     skin   • CATARACT     freddie surgery complete   • CHEST PAIN 3/11/2011   • Glaucoma    • History of hematuria     3/15/10: with Plavix.   • HTN (hypertension), malignant 3/18/2019   • HTN (hypertension), malignant 3/18/2019   • Hyperlipidemia 2/24/2011   • Hypertension    • Hypothyroid 2/24/2011   • Indigestion    • Intrinsic eczema 10/31/2017   • Other specified pre-operative examination 1/28/2014   • Paroxysmal atrial fibrillation (HCC)    • Sepsis urinary source 1/23/2012   • Stroke (HCC)     2 TIAs 2/2010, no stroke, 2/2013   • T.I.A.    • TIA (transient ischemic attack) 3/11/2011   • Unspecified disorder of thyroid    • Unspecified hemorrhagic conditions     not sure when (2009)  blood in urin    • Urinary bladder disorder     urinary incontinence   • UTI (urinary tract infection) 2/24/2011   • Vaginal bleeding 10/22/2012       PSH:  Past Surgical History:   Procedure Laterality Date   • IRRIGATION & DEBRIDEMENT GENERAL N/A 2/27/2020    Procedure: IRRIGATION AND DEBRIDEMENT, WOUND - ABDOMINAL, PSB. WOUND VAC;  Surgeon: Len Chavarria M.D.;  Location: SURGERY [facility];  Service: General   • VENTRAL HERNIA REPAIR ROBOTIC XI  12/24/2019    Procedure: REPAIR, HERNIA, VENTRAL, ROBOT-ASSISTED, USING DA RUTHIE XI - PARASTOMAL HERNIA REPAIR WITH LYSIS OF ADHESIONS;  Surgeon: Len Chavarria M.D.;  Location: SURGERY [facility];  Service: General   • PARASTOMAL HERNIA REPAIR   1/28/2014     Performed by Nicol Dorman M.D. at SURGERY [facility] ORS   • BIOPSY GENERAL  10/22/2012    Performed by Jennifer Lincoln M.D. at SURGERY [facility] ORS   • CYSTECTOMY  9/6/2011    Performed by JENNIFER LINCOLN at SURGERY [facility] ORS   • ILEO LOOP DIVERSION  9/6/2011    Performed by JENNIFER LINCOLN at SURGERY [facility] ORS   • RECTOCELE REPAIR  11/3/2009    Performed by ZEKE HARRINGTON at SURGERY SAME DAY [facility]   • BLADDER BIOPSY WITH CYSTOSCOPY  10/12/2009    Performed by JENNIFER LINCOLN at SURGERY [facility] ORS   • PELVIC EXAM UNDER ANESTHESIA  10/12/2009    Performed by JENNIFER LINCOLN at SURGERY [facility] ORS   • BLADDER BIOPSY WITH CYSTOSCOPY  6/15/2009    Performed by CARLTON LUNA at SURGERY [facility] ORS   • BLADDER BIOPSY WITH CYSTOSCOPY  10/10/08    Performed by CARLTON LUNA at SURGERY [facility] ORS   • PYELOGRAM  10/10/08    Performed by CARLTON LUNA at SURGERY [facility]   • RETROGRADES  10/10/08    Performed by CARLTON LUNA at SURGERY [facility] ORS   • CHOLECYSTECTOMY  1994   • CHOLECYSTECTOMY  1994   • GYN SURGERY      hysterectomy   • OTHER      TONSILLECTOMY AS TEENAGER   • OTHER ABDOMINAL SURGERY      appendectomy, pancratitis        FAMILY HX:  Family History   Problem Relation Age of Onset   • Stroke Mother 81   • Stroke Father 77       SOCIAL HX:  Social History     Socioeconomic History   • Marital status:      Spouse name: Not on file   • Number of children: Not on file   • Years of education: Not on file   • Highest education level: Not on file   Occupational History   • Not on file   Social Needs   • Financial resource strain: Not on file   • Food insecurity     Worry: Not on file     Inability: Not on file   • Transportation needs     Medical: Not on file     Non-medical: Not on file   Tobacco Use   • Smoking status: Never Smoker   • Smokeless tobacco: Never Used   Substance and Sexual Activity   • Alcohol use: No  "  • Drug use: No   • Sexual activity: Not on file   Lifestyle   • Physical activity     Days per week: Not on file     Minutes per session: Not on file   • Stress: Not on file   Relationships   • Social connections     Talks on phone: Not on file     Gets together: Not on file     Attends Islam service: Not on file     Active member of club or organization: Not on file     Attends meetings of clubs or organizations: Not on file     Relationship status: Not on file   • Intimate partner violence     Fear of current or ex partner: Not on file     Emotionally abused: Not on file     Physically abused: Not on file     Forced sexual activity: Not on file   Other Topics Concern   • Not on file   Social History Narrative   • Not on file     Social History     Tobacco Use   Smoking Status Never Smoker   Smokeless Tobacco Never Used     Social History     Substance and Sexual Activity   Alcohol Use No       Allergies/Intolerances:  Allergies   Allergen Reactions   • Cardizem Swelling   • Doxycycline      Swollen lips.   • Sulfa Drugs      Makes tongue swell, severely   • Zyvox Swelling     \"Whole mouth swelled up\"    • Codeine      Does not remember the reaction         History reviewed with the patient     Other Current Medications:    Current Facility-Administered Medications:   •  oxyCODONE immediate-release (ROXICODONE) tablet 5-10 mg, 5-10 mg, Oral, Q4HRS PRN, Marry Grace M.D., 10 mg at 03/02/20 2028  •  lactated ringers infusion (BOLUS): BMI less than or equal to 30, 30 mL/kg, Intravenous, Once PRN, Damon Pena M.D.  •  [DISCONTINUED] piperacillin-tazobactam (ZOSYN) 3.375 g in  mL IVPB, 3.375 g, Intravenous, Once **AND** piperacillin-tazobactam (ZOSYN) 3.375 g in  mL IVPB, 3.375 g, Intravenous, Q8HRS, Gregg Yoon M.D., Stopped at 03/03/20 0927  •  HYDROmorphone pf (DILAUDID) injection 1 mg, 1 mg, Intravenous, Q3HRS PRN, Gregg Yoon M.D., 1 mg at 02/29/20 1041  •  senna-docusate " "(PERICOLACE or SENOKOT S) 8.6-50 MG per tablet 2 Tab, 2 Tab, Oral, BID, Stopped at 20 0600 **AND** polyethylene glycol/lytes (MIRALAX) PACKET 1 Packet, 1 Packet, Oral, QDAY PRN **AND** magnesium hydroxide (MILK OF MAGNESIA) suspension 30 mL, 30 mL, Oral, QDAY PRN **AND** bisacodyl (DULCOLAX) suppository 10 mg, 10 mg, Rectal, QDAY PRN, Gregg Yoon M.D.  •  latanoprost (XALATAN) 0.005 % ophthalmic solution 1 Drop, 1 Drop, Both Eyes, Q EVENING, Gregg Yoon M.D., 1 Drop at 20 1852  •  levothyroxine (SYNTHROID) tablet 112 mcg, 112 mcg, Oral, AM ES, Gregg Yoon M.D., 112 mcg at 20 0525  •  lovastatin (MEVACOR) tablet 10 mg, 10 mg, Oral, DAILY, Gregg Yoon M.D., 10 mg at 20 0525  •  metoprolol (LOPRESSOR) tablet 100 mg, 100 mg, Oral, BID, Gregg Yoon M.D., 100 mg at 20 0525  •  timolol (TIMOPTIC) 0.5 % ophthalmic solution 1 Drop, 1 Drop, Both Eyes, BID, Gregg Yoon M.D., 1 Drop at 20 0527  •  omeprazole (PRILOSEC) capsule 20 mg, 20 mg, Oral, DAILY, Gregg Yoon M.D., 20 mg at 20 0525  [unfilled]    Most Recent Vital Signs:  /79   Pulse 98   Temp 36.3 °C (97.3 °F) (Temporal)   Resp 18   Ht 1.727 m (5' 8\")   Wt 71.1 kg (156 lb 12 oz)   SpO2 95%   BMI 23.83 kg/m²   Temp  Av.6 °C (97.8 °F)  Min: 35.9 °C (96.7 °F)  Max: 37.2 °C (98.9 °F)    Physical Exam:  Physical Exam   Constitutional: She is oriented to person, place, and time and well-developed, well-nourished, and in no distress.   HENT:   Head: Normocephalic and atraumatic.   Right Ear: External ear normal.   Left Ear: External ear normal.   Eyes: Pupils are equal, round, and reactive to light. Conjunctivae and EOM are normal.   Cardiovascular: Normal rate and normal heart sounds.   Irregularly irregular   Pulmonary/Chest: Effort normal and breath sounds normal.   Abdominal: Soft. Bowel sounds are normal. She exhibits no distension. There is abdominal tenderness. There " "is no rebound and no guarding.   Diffuse abdominal tenderness, wound VAC in place   Musculoskeletal: Normal range of motion.   Neurological: She is alert and oriented to person, place, and time.   Skin: Skin is warm and dry.   Psychiatric: Affect and judgment normal.             Pertinent Lab Results:  No results for input(s): WBC, HGB in the last 72 hours.    Invalid input(s): PLATELET, ABSOLUTENEUT           Recent Labs     03/01/20  0719 03/02/20  0506   SODIUM 134* 136   POTASSIUM 4.3 3.8   CHLORIDE 110 109   CO2 18* 20   CREATININE 1.02 0.86        No results for input(s): ALBUMIN in the last 72 hours.    Invalid input(s): AST, ALT, ALKPHOS, BILITOT, TOTALBILIRUB, BILIRUBINTOT, BILIRUBINDIR, BILIRUBININD, ALKALINEPHOS     Pertinent Micro:  Results     Procedure Component Value Units Date/Time    BLOOD CULTURE [[id number]] Collected:  02/27/20 0703    Order Status:  Completed Specimen:  Blood from Peripheral Updated:  03/03/20 0900     Significant Indicator NEG     Source BLD     Site PERIPHERAL     Culture Result No growth after 5 days of incubation.    Narrative:       1 of 2 for Blood Culture x 2 sites order. Per Hospital  Policy: Only change Specimen Src: to \"Line\" if specified by  physician order.  No site indicated    BLOOD CULTURE [[id number]] Collected:  02/27/20 0645    Order Status:  Completed Specimen:  Blood from Peripheral Updated:  03/03/20 0900     Significant Indicator NEG     Source BLD     Site PERIPHERAL     Culture Result No growth after 5 days of incubation.    Narrative:       2 of 2 blood culture x2  Sites order. Per Hospital Policy:  Only change Specimen Src: to \"Line\" if specified by physician  order.  No site indicated    CULTURE WOUND W/ GRAM STAIN [[id number]]  (Abnormal)  (Susceptibility) Collected:  02/27/20 1214    Order Status:  Completed Specimen:  Wound Updated:  03/01/20 1352     Significant Indicator POS     Source WND     Site Abdominal abscess     Culture Result -     Gram " Stain Result Rare WBCs.  Few Gram positive cocci.  Few Gram negative rods.       Culture Result Escherichia coli  Moderate growth        Viridans Streptococcus  Light growth  Mixed morphologies      Narrative:       Surgery - swabs received    Susceptibility     Escherichia coli (1)     Antibiotic Interpretation Microscan Method Status    Ampicillin Resistant >16 mcg/mL MICKI Final    Amoxicillin/CA Sensitive <=8/4 mcg/mL MICKI Final    Ceftriaxone Sensitive <=1 mcg/mL MICKI Final    Ceftazidime Sensitive <=1 mcg/mL MICKI Final    Cefotaxime Sensitive <=2 mcg/mL MICKI Final    Cefazolin Sensitive 4 mcg/mL MICKI Final    Ampicillin/sulbactam Intermediate 16/8 mcg/mL MICKI Final    Ciprofloxacin Resistant >2 mcg/mL MICKI Final    Tobramycin Sensitive <=4 mcg/mL MICKI Final    Cefepime Sensitive <=2 mcg/mL MICKI Final    Cefotetan Sensitive <=16 mcg/mL MICKI Final    Ertapenem Sensitive <=0.5 mcg/mL MICKI Final    Gentamicin Sensitive <=4 mcg/mL MICKI Final    Pip/Tazobactam Sensitive <=16 mcg/mL MICKI Final    Trimeth/Sulfa Sensitive <=2/38 mcg/mL MICKI Final                   Anaerobic Culture [[id number]]  (Abnormal) Collected:  02/27/20 1214    Order Status:  Completed Specimen:  Wound Updated:  03/01/20 1352     Significant Indicator POS     Source WND     Site Abdominal abscess     Culture Result Heavy growth mixed anaerobic jing predominantly      Bacteroides fragilis Group    Narrative:       Surgery - swabs received    GRAM STAIN [[id number]] Collected:  02/27/20 1214    Order Status:  Completed Specimen:  Wound Updated:  03/01/20 1352     Significant Indicator .     Source WND     Site Abdominal abscess     Gram Stain Result Rare WBCs.  Few Gram positive cocci.  Few Gram negative rods.      Narrative:       Surgery - swabs received        Blood Culture   Date Value Ref Range Status   03/13/2019 No growth after 5 days of incubation.  Final     Blood Culture Hold   Date Value Ref Range Status   07/30/2018 Collected  Final         Studies:  Ct-abdomen-pelvis With    Result Date: 2/27/2020 2/27/2020 5:50 AM HISTORY/REASON FOR EXAM: Hernia, complicated TECHNIQUE/EXAM DESCRIPTION:  CT abdomen and pelvis with contrast. Sequential axial CT images were obtained from lung bases to the proximal femurs after the uneventful administration of 80 cc Omnipaque 350 intravenous contrast. Low dose optimization technique was utilized for this CT exam including automated exposure control and adjustment of the mA and/or kV according to patient size. COMPARISON: December 29, 2019 CT ABDOMEN FINDINGS: Linear densities are seen within the lung bases, appearance favors atelectasis. Calcified granuloma in the right lung base is seen. Nodular hepatic contour is observed. The liver is normal in size. No intrahepatic biliary ductal dilatation is noted. Recanalization of the umbilical vein is seen. The gallbladder is surgically absent. Splenomegaly is identified. The pancreas is grossly normal. Bilateral adrenal glands appear within normal limits. The kidneys are unremarkable. Mild hydronephrosis is seen. Postsurgical changes of urinary diversion procedure are seen. Scattered colonic diverticula are seen. The small bowel is unremarkable. The bony structures are age appropriate. Atherosclerotic calcifications including atherosclerotic coronary artery calcifications are seen. Enhancing fluid collection in the anterior abdominal wall is seen measuring 4.8 x 4.6 cm with nondependent foci of air. There appears to be intra-abdominal enhancing fluid collection along the anterior abdominal wall measuring 5.4 x 1.0 cm containing foci of air. CT PELVIS FINDINGS: The bladder is within normal limits.     1.  Enhancing fluid collection in the anterior abdominal wall, appearance most compatible with abscess. Consider bowel fistula given foci of air. 2.  Enhancing fluid collection within the abdomen and along the intra-abdominal wall, appearance most compatible with abscess. Foci  of air suggests possible fistula with adjacent loops of bowel. 3.  Nodular hepatic contour, appearance compatible with changes of cirrhosis. 4.  Recanalization of the umbilical vein and splenomegaly, compatible with changes of portal hypertension 5.  Diverticulosis 6.  Atherosclerosis and atherosclerotic coronary artery disease.    Dx-chest-portable (1 View)    Result Date: 2/27/2020 2/27/2020 5:25 AM HISTORY/REASON FOR EXAM: preop TECHNIQUE/EXAM DESCRIPTION:  Single AP view of the chest. COMPARISON: December 25, 2019 FINDINGS: Overlying cardiac leads are present. Cardiomegaly is observed.  Postsurgical changes of sternotomy are noted. The mediastinal contour appears within normal limits.  The central  pulmonary vasculature appears prominent and indistinct. Bilateral lung volumes are diminished.  Bilateral lungs are clear. No significant pleural effusions are identified. The bony structures appear age-appropriate.     1.  Pulmonary vascular prominence suggests pulmonary vascular congestion 2.  Cardiomegaly 3.  Atherosclerosis      ASSESSMENT/PLAN:     84 y.o.  admitted 2/27/2020. Pt has a past medical history of cystectomy with ileal conduit in 2011 due to recurrent UTIs, A. fib and hypothyroidism. She was seen by ID in late December 2019.  At the time she was admitted for incarcerated parastomal hernia causing bowel obstruction.  She went to the OR for repair on 12/24/2019.  The course was complicated by an intra-abdominal abscess which was drained. Drainage fluid cultures grew Bacteroides fragilis.  ID recommended Unasyn and then transitioned her to Augmentin with a stop date of 1/2/2020.     She came into the ED on 2/27 complaining of pain x2 weeks in the left lower abdomen.     Hospital Course:   On arrival the patient had a leukocytosis of 15.9 and elevated creatinine.  CT abdomen/pelvis with enhancing fluid collection in the anterior abdominal wall compatible with abscess, possible bowel fistula given foci  of air and adjacent loops of bowel.  She went to the OR for I&D of the wall abscess.  She has been continued on Zosyn since admit.    Assessment and plan    Anterior abdominal wall abscess, likely sequelae of prior surgery w/ known enterotomy and prior abscess   - CT abdomen/pelvis with enhancing fluid collection in the anterior abdominal wall compatible with abscess, possible bowel fistula given foci of air and adjacent loops of bowel.   -She went to the OR with Dr. Chavarria on 2/27.  Per op note approximately 50 cc of nichole purulence was drained and culture sent.  No evidence of bile or biliary fistula.  Difficult to see if bowel was involved at the base of the wound, surgery deferred laparotomy is felt to be unsafe.  -OR cultures with E. Coli, viridans Streptococcus and Bacteroides fragilis    --- The main question is whether the patient has a fistula biliary leakage that caused the abdominal wall abscess, not found in OR.  Recommend follow-up with surgery and repeat CT/abdomen pelvis with contrast at a later date, particularly if she has any new concerning symptoms.   --- Continue Zosyn while inpatient, on discharge can transition to Augmentin 875/125 for a total antibiotic course of 14 days from OR (end 3/12/20)  -noted the  E. coli resistance to Unasyn but is sensitive to Augmentin and this will also provide good coverage for the viridans streptococcus as well as the Bacteroides    History of incarcerated hernia with bowel obstruction, repaired on 12/24/2019 and course complicated by intra-abdominal abscess  -Status post drainage and antibiotic    Hx of cystectomy and ileal conduit   -Due to recurrent episodes of cystitis    Nodular liver on CT, compatible with cirrhosis  Splenomegaly, likely portal hypertension per CT     Antibiotic allergies: Doxycycline reported swollen lips, sulfa drugs reported tongue swelling-severe, and Zyvox with whole mouth swelled up        Plan of care discussed with JUSTUS Booth,  M.D..  ID will sign off.    Lori Shafer M.D.

## 2020-03-03 NOTE — DISCHARGE PLANNING
Received Choice form at 0814  Agency/Facility Name: [facility]  Referral sent per Choice form @ 0764

## 2020-03-03 NOTE — DISCHARGE PLANNING
Agency/Facility Name: [facility]  Spoke To: Angie  Outcome: Patient accepted.

## 2020-03-03 NOTE — FACE TO FACE
Face to Face Supporting Documentation - Home Health    The encounter with this patient was in whole or in part the primary reason for home health admission.    Date of encounter:   Patient:                    MRN:                       YOB: 2018  Dorie Wadsworth  [id number]  1935     Home health to see patient for:  Wound Care    Skilled need for:  Surgical Aftercare Wound care    Skilled nursing interventions to include:  Wound Care    Homebound status evidenced by:  Need the aid of supportive devices such as crutches, canes, wheelchairs or walkers. Leaving home requires a considerable and taxing effort. There is a normal inability to leave the home.    Community Physician to provide follow up care: Coreen Cisse M.D.     Optional Interventions? No      I certify the face to face encounter for this home health care referral meets the CMS requirements and the encounter/clinical assessment with the patient was, in whole, or in part, for the medical condition(s) listed above, which is the primary reason for home health care. Based on my clinical findings: the service(s) are medically necessary, support the need for home health care, and the homebound criteria are met.  I certify that this patient has had a face to face encounter by myself.  Marry Grace M.D. - NPI: [id number]

## 2020-03-03 NOTE — CARE PLAN
Problem: Safety  Goal: Will remain free from injury  Outcome: PROGRESSING AS EXPECTED     Problem: Discharge Barriers/Planning  Goal: Patient's continuum of care needs will be met  Outcome: PROGRESSING AS EXPECTED     Problem: Pain Management  Goal: Pain level will decrease to patient's comfort goal  Outcome: PROGRESSING AS EXPECTED     Problem: Mobility  Goal: Risk for activity intolerance will decrease  Outcome: PROGRESSING AS EXPECTED

## 2020-03-03 NOTE — DISCHARGE PLANNING
Agency/Facility Name: [facility]  Spoke To: Angie  Outcome: Notified patient to d/c today, patient will be seen tomorrow.

## 2020-03-03 NOTE — DISCHARGE PLANNING
Anticipated Discharge Disposition: Home with C and Home Wound Vac.    Action: LSW faxed insurance information to Nella at [facility], per her request.  Per Nella, the Home Wound Vac is approved and will be delivered this afternoon to the patient's room.    [facility] forwarded the [facility] Referral to [facility] as [facility] does not contract with this patient's insurance, per SW request.    Barriers to Discharge: None.    Plan: Awaiting update from [facility] and [facility].

## 2020-03-03 NOTE — DISCHARGE PLANNING
Received Choice form at 0800  Agency/Facility Name: [facility]  Referral sent per Choice form @ 0858

## 2020-03-03 NOTE — DISCHARGE SUMMARY
Discharge Summary    CHIEF COMPLAINT ON ADMISSION  Chief Complaint   Patient presents with   • Abdominal Pain     LLQ since monday       Reason for Admission  Abdominal Pain     Admission Date  2/27/2020    CODE STATUS  Full Code    HPI & HOSPITAL COURSE  This is a 84 y.o. female here with abdominal pain.    Patient with underlying history of atrial fibrillation, hypertension, history of TIA, not on any anticoagulation, history of hernia repair in December 2019 which was complicated by a postoperative fluid collection growing anaerobic organisms, now presented to the hospital with complaints of abdominal pain and with findings of enhancing intra-abdominal fluid collection consistent with an abdominal abscess.  Patient had a CT of the abdomen and pelvis with contrast obtained on February 20 7/2020 on presentation revealing an abdominal fluid collection consistent with an abscess.  Surgical team, Dr. Chavarria was consulted and patient was taken to the operating room on February 27, 2020 by Dr. Chavarria, irrigation and debridement of the abdominal wound with exploratory laparotomy, excision of intestine, bowel resection performed.  Postoperatively wound VAC placed.  Patient initiated on empiric antibiotic therapy, abdominal cultures have grown E. coli, Bacteroides fragilis and related and Streptococcus.  Patient seen by Dr. Shafer from infectious disease team, recommendations to de-escalate antibiotic therapy to oral Augmentin therapy at the time of discharge, cleared for discharge from ID and surgical perspective with recommendations to continue antibiotic therapy until March 12, 2020.    Patient seen and evaluated by me on March 3, 2020, patient is eager to discharge home.  She has an abdominal wound VAC in place, discussed with /case management, home health care and home wound VAC has been arranged.  Patient to continue antibiotic therapy at home, Augmentin prescribed.  Ongoing wound care at home per home  health care team.  In addition she will maintain outpatient follow-up with infectious disease, surgical team and obtain interval imaging in the outpatient setting per ID/surgical team.  Schedulers informed by me to arrange outpatient follow-up with infectious disease team.    Otherwise patient with MARSHALL on presentation was completely resolved.  Abnormal EKG noted, patient refused further evaluation, asymptomatic at this time.  No chest pain/shortness of breath.  Advised outpatient follow-up with cardiology to further discuss underlying EKG changes and consideration for anticoagulation in the setting of A. fib, previously intolerant to anticoagulation because of underlying urostomy and hematuria.    Short-term prescription for oxycodone prescribed, safe use of opioids discussed with the patient.    Patient eager to discharge home, home health care and home wound VAC has been arranged via schedulers have been informed by me to arrange outpatient follow-up with PCP and infectious disease team.          Therefore, she is discharged in good and stable condition to home with organized home healthcare and close outpatient follow-up.    The patient met 2-midnight criteria for an inpatient stay at the time of discharge.    Discharge Date  03/03/20    FOLLOW UP ITEMS POST DISCHARGE  F/U PCP and ID     DISCHARGE DIAGNOSES  Principal Problem:    Intra-abdominal abscess (HCC) POA: Yes  Active Problems:    Essential hypertension POA: Yes    Abnormal EKG POA: Yes    Hypothyroid POA: Yes    Dyslipidemia POA: Yes    Presence of urostomy (HCC) POA: Yes    Paroxysmal A-fib (HCC) POA: Yes  Resolved Problems:    MARSHALL (acute kidney injury) (HCC) POA: Yes      FOLLOW UP  Future Appointments   Date Time Provider Department Center   4/1/2020  1:30 PM Jonna Vega M.D. [facility] None     Ana Laura [address]  [facility] [address]  [phone]          MEDICATIONS ON DISCHARGE     Medication List      START taking these  medications      Instructions   amoxicillin-clavulanate 875-125 MG Tabs  Commonly known as:  AUGMENTIN   Take 1 Tab by mouth every 12 hours for 10 days.  Dose:  1 Tab     oxyCODONE immediate-release 5 MG Tabs  Commonly known as:  ROXICODONE   Take 0.5-1 Tabs by mouth every four hours as needed for Severe Pain for up to 5 days.  Dose:  2.5-5 mg        CONTINUE taking these medications      Instructions   aspirin 81 MG EC tablet   Take 1 Tab by mouth every day.  Dose:  81 mg     BIOTIN PO   Take 1 Cap by mouth every day.  Dose:  1 Cap     furosemide 20 MG Tabs  Commonly known as:  LASIX   Take 20 mg by mouth 1 time daily as needed.  Dose:  20 mg     Iron 27 240 (27 Fe) MG tablet  Generic drug:  ferrous gluconate   Take 240 mg by mouth every day.  Dose:  240 mg     latanoprost 0.005 % Soln  Commonly known as:  XALATAN   Place 1 Drop in both eyes every evening.  Dose:  1 Drop     levothyroxine 112 MCG Tabs  Commonly known as:  SYNTHROID   Take 112 mcg by mouth Every morning on an empty stomach.  Dose:  112 mcg     lovastatin 10 MG tablet  Commonly known as:  MEVACOR   Take 10 mg by mouth every day.  Dose:  10 mg     metoprolol 100 MG Tabs  Commonly known as:  LOPRESSOR   Take 1 Tab by mouth 2 times a day.  Dose:  100 mg     pantoprazole 40 MG Tbec  Commonly known as:  PROTONIX   Take 40 mg by mouth every day.  Dose:  40 mg     * PRESERVISION AREDS PO   Take 1 Tab by mouth 2 Times a Day.  Dose:  1 Tab     * Centrum Silver Ultra Womens Tabs   Take 1 Tab by mouth every day.  Dose:  1 Tab     timolol 0.5 % Soln  Commonly known as:  TIMOPTIC   Place 1 Drop in both eyes 2 times a day.  Dose:  1 Drop         * This list has 2 medication(s) that are the same as other medications prescribed for you. Read the directions carefully, and ask your doctor or other care provider to review them with you.                Allergies  Allergies   Allergen Reactions   • Cardizem Swelling   • Doxycycline      Swollen lips.   • Sulfa Drugs       "Makes tongue swell, severely   • Zyvox Swelling     \"Whole mouth swelled up\"    • Codeine      Does not remember the reaction         DIET  Orders Placed This Encounter   Procedures   • Diet Order Regular     Standing Status:   Standing     Number of Occurrences:   1     Order Specific Question:   Diet:     Answer:   Regular [1]       ACTIVITY  As tolerated.  Weight bearing as tolerated    CONSULTATIONS  General surgery   ID     PROCEDURES  As noted     LABORATORY  Lab Results   Component Value Date    SODIUM 136 03/02/2020    POTASSIUM 3.8 03/02/2020    CHLORIDE 109 03/02/2020    CO2 20 03/02/2020    GLUCOSE 94 03/02/2020    BUN 11 03/02/2020    CREATININE 0.86 03/02/2020    CREATININE 1.2 02/10/2009        Lab Results   Component Value Date    WBC 9.2 02/29/2020    HEMOGLOBIN 10.3 (L) 02/29/2020    HEMATOCRIT 34.8 (L) 02/29/2020    PLATELETCT 177 02/29/2020        Total time of the discharge process exceeds 33 minutes.  "

## 2020-03-03 NOTE — PROGRESS NOTES
Bedside report received.  Assessment complete.  A&O x 4. Patient calls appropriately.  Patient ambulates with standby assist.    Patient has 6/10 pain. Medicated per MAR.   Denies N&V. Tolerating regular diet.  Midline wound vac, CDI. No leaks noted at this time. CDI.  + void, ureterostomy, + flatus, + BM, loose.  Patient denies SOB.  Patient pleasant with staff and resting in bed.  Review plan with of care with patient. Call light and personal belongings with in reach. Hourly rounding in place. All needs met at this time.

## 2020-03-03 NOTE — DISCHARGE PLANNING
LSW faxed the [facility] Wound Vac Form and the clinical documentation to eNlla at [facility], GEMW notified Nella of the incoming referral via phone.

## 2020-03-03 NOTE — PROGRESS NOTES
Hospital Medicine Daily Progress Note    Date of Service  3/2/2020    Chief Complaint  84 y.o. female admitted 2/27/2020 with abdominal pain    Hospital Course    84 y.o. female who presented 2/27/2020 with past medical history of total cystectomy with an ileal conduit (2011), atrial fibrillation, hypertension, TIA underwent a hernia repair December 24th 2019.  The patient has been having abdominal pain ongoing for last 2 weeks but over the last 4 days is gotten progressively worse.. It is now constant pain in the center lower abdomen intensity 9 out of 10.  It is worse with any palpation.  She is noted her abdomen becoming more distended in that area.  She came to the emergency department for evaluation and a CT showed evidence of a abdominal abscess.  She underwent irrigation and debridement, laparotomy exploratory, bowel resection by a  on 2/27/20.  Wound vac left in place.  She was started on IV Zosyn, wound cultures grew E. coli, strep viridans and Bacteroides.      Interval Problem Update  - afebrile  - doing well  - ambulating halls  - tolerating PO  - having BMs  - pain controlled, but does have some pain with ambulation and wound vac changes  - waiting for wound vac supplies, will get HH for wound care    Consultants/Specialty  surgery    Code Status  full    Disposition  Likely home with HH for wound care when supplies arrive     Review of Systems  Review of Systems   Constitutional: Negative for chills, fever and weight loss.   HENT: Negative for congestion and nosebleeds.    Eyes: Negative for blurred vision, pain, discharge and redness.   Respiratory: Negative for cough, sputum production, shortness of breath and stridor.    Cardiovascular: Negative for chest pain, palpitations and orthopnea.   Gastrointestinal: Positive for abdominal pain. Negative for diarrhea, heartburn, nausea and vomiting.   Genitourinary: Negative for dysuria, frequency and urgency.   Musculoskeletal: Negative for back  pain, myalgias and neck pain.   Skin: Negative for itching and rash.   Neurological: Negative for dizziness, focal weakness, seizures and headaches.   Psychiatric/Behavioral: Negative for depression. The patient is not nervous/anxious and does not have insomnia.         Physical Exam  Temp:  [36.3 °C (97.4 °F)-36.9 °C (98.5 °F)] 36.4 °C (97.5 °F)  Pulse:  [58-85] 85  Resp:  [17] 17  BP: (123-149)/(66-85) 134/85  SpO2:  [95 %-99 %] 97 %    Physical Exam  Vitals signs and nursing note reviewed.   Constitutional:       General: She is not in acute distress.     Appearance: Normal appearance. She is not ill-appearing, toxic-appearing or diaphoretic.   HENT:      Head: Normocephalic and atraumatic.      Nose: No congestion or rhinorrhea.   Eyes:      Extraocular Movements: Extraocular movements intact.      Pupils: Pupils are equal, round, and reactive to light.   Neck:      Musculoskeletal: Normal range of motion and neck supple.   Cardiovascular:      Rate and Rhythm: Normal rate and regular rhythm.      Pulses: Normal pulses.   Pulmonary:      Effort: Pulmonary effort is normal. No respiratory distress.      Breath sounds: Normal breath sounds.   Abdominal:      General: Bowel sounds are normal. There is no distension.      Palpations: Abdomen is soft.      Tenderness: There is abdominal tenderness.      Comments: Wound vac in place  Urostomy in place      Musculoskeletal:         General: No swelling or tenderness.   Skin:     General: Skin is warm.      Findings: No erythema.   Neurological:      General: No focal deficit present.      Mental Status: She is alert and oriented to person, place, and time.   Psychiatric:         Mood and Affect: Mood normal.         Behavior: Behavior normal.          Fluids    Intake/Output Summary (Last 24 hours) at 3/2/2020 1832  Last data filed at 3/2/2020 1745  Gross per 24 hour   Intake 360 ml   Output 2350 ml   Net -1990 ml       Laboratory  Recent Labs     02/29/20  0551   WBC  9.2   RBC 3.83*   HEMOGLOBIN 10.3*   HEMATOCRIT 34.8*   MCV 90.9   MCH 26.9*   MCHC 29.6*   RDW 53.1*   PLATELETCT 177   MPV 10.6     Recent Labs     02/29/20  0551 03/01/20  0719 03/02/20  0506   SODIUM 136 134* 136   POTASSIUM 4.4 4.3 3.8   CHLORIDE 109 110 109   CO2 22 18* 20   GLUCOSE 111* 84 94   BUN 17 15 11   CREATININE 1.19 1.02 0.86   CALCIUM 7.9* 7.9* 8.2*                  Results     Procedure Component Value Units Date/Time    CULTURE WOUND W/ GRAM STAIN [[id number]]  (Abnormal)  (Susceptibility) Collected:  02/27/20 1214    Order Status:  Completed Specimen:  Wound Updated:  03/01/20 1352     Significant Indicator POS     Source WND     Site Abdominal abscess     Culture Result -     Gram Stain Result Rare WBCs.  Few Gram positive cocci.  Few Gram negative rods.       Culture Result Escherichia coli  Moderate growth        Viridans Streptococcus  Light growth  Mixed morphologies      Narrative:       Surgery - swabs received    Susceptibility     Escherichia coli (1)     Antibiotic Interpretation Microscan Method Status    Ampicillin Resistant >16 mcg/mL MICKI Final    Amoxicillin/CA Sensitive <=8/4 mcg/mL MICKI Final    Ceftriaxone Sensitive <=1 mcg/mL MICKI Final    Ceftazidime Sensitive <=1 mcg/mL MICKI Final    Cefotaxime Sensitive <=2 mcg/mL MICKI Final    Cefazolin Sensitive 4 mcg/mL MICKI Final    Ampicillin/sulbactam Intermediate 16/8 mcg/mL MICKI Final    Ciprofloxacin Resistant >2 mcg/mL MICKI Final    Tobramycin Sensitive <=4 mcg/mL MICKI Final    Cefepime Sensitive <=2 mcg/mL MICKI Final    Cefotetan Sensitive <=16 mcg/mL MICKI Final    Ertapenem Sensitive <=0.5 mcg/mL MICKI Final    Gentamicin Sensitive <=4 mcg/mL MICKI Final    Pip/Tazobactam Sensitive <=16 mcg/mL MICKI Final    Trimeth/Sulfa Sensitive <=2/38 mcg/mL MICKI Final                   Anaerobic Culture [[id number]]  (Abnormal) Collected:  02/27/20 1214    Order Status:  Completed Specimen:  Wound Updated:  03/01/20 1352     Significant Indicator POS     Source  "WND     Site Abdominal abscess     Culture Result Heavy growth mixed anaerobic jing predominantly      Bacteroides fragilis Group    Narrative:       Surgery - swabs received    GRAM STAIN [[id number]] Collected:  02/27/20 1214    Order Status:  Completed Specimen:  Wound Updated:  03/01/20 1352     Significant Indicator .     Source WND     Site Abdominal abscess     Gram Stain Result Rare WBCs.  Few Gram positive cocci.  Few Gram negative rods.      Narrative:       Surgery - swabs received    BLOOD CULTURE [[id number]] Collected:  02/27/20 0703    Order Status:  Completed Specimen:  Blood from Peripheral Updated:  02/28/20 0728     Significant Indicator NEG     Source BLD     Site PERIPHERAL     Culture Result No Growth  Note: Blood cultures are incubated for 5 days and  are monitored continuously.Positive blood cultures  are called to the RN and reported as soon as  they are identified.      Narrative:       1 of 2 for Blood Culture x 2 sites order. Per Hospital  Policy: Only change Specimen Src: to \"Line\" if specified by  physician order.  No site indicated    BLOOD CULTURE [[id number]] Collected:  02/27/20 0645    Order Status:  Completed Specimen:  Blood from Peripheral Updated:  02/28/20 0728     Significant Indicator NEG     Source BLD     Site PERIPHERAL     Culture Result No Growth  Note: Blood cultures are incubated for 5 days and  are monitored continuously.Positive blood cultures  are called to the RN and reported as soon as  they are identified.      Narrative:       2 of 2 blood culture x2  Sites order. Per Hospital Policy:  Only change Specimen Src: to \"Line\" if specified by physician  order.  No site indicated          Imaging  CT-ABDOMEN-PELVIS WITH   Final Result         1.  Enhancing fluid collection in the anterior abdominal wall, appearance most compatible with abscess. Consider bowel fistula given foci of air.   2.  Enhancing fluid collection within the abdomen and along the intra-abdominal " wall, appearance most compatible with abscess. Foci of air suggests possible fistula with adjacent loops of bowel.   3.  Nodular hepatic contour, appearance compatible with changes of cirrhosis.   4.  Recanalization of the umbilical vein and splenomegaly, compatible with changes of portal hypertension   5.  Diverticulosis   6.  Atherosclerosis and atherosclerotic coronary artery disease.      DX-CHEST-PORTABLE (1 VIEW)   Final Result         1.  Pulmonary vascular prominence suggests pulmonary vascular congestion   2.  Cardiomegaly   3.  Atherosclerosis           Assessment/Plan  * Intra-abdominal abscess (HCC)- (present on admission)  Assessment & Plan  2/27.20  IRRIGATION AND DEBRIDEMENT, WOUND - ABDOMINAL, PSB. WOUND VAC  LAPAROTOMY, EXPLORATORY  EXCISION, INTESTINE - BOWEL RESECTION  Wound dirty  Wound vac in place, will be dc home with  Wound care (face-to-face and order in)  Cultures growing E. Coli and Strep Viridans and bacteroides   Continue IV Zosyn, will likely dc on augmentin  Surgery signed off  Pain control        MARSHALL (acute kidney injury) (HCC)- (present on admission)  Assessment & Plan  Improving  Likely related to prerenal  On IV fluid  Avoid nephrotoxic drugs  CTM    Abnormal EKG- (present on admission)  Assessment & Plan  May represent repolarization due to LVH  Last stress test 2011, last echo 3/2019  No chest pain or SOB  Repeat ecg similar, patient preferred to follow up with cardiologist next month, did recommend she call cardiologist to discuss and see if she wants to bring her in sooner  No chest pain, SOB or nausea during ambulation  CTM closely    Essential hypertension- (present on admission)  Assessment & Plan  Continue outpatient medications    Paroxysmal A-fib (HCC)- (present on admission)  Assessment & Plan  Currently normal sinus rhythm  Heart rate is controlled  Not on AC d/t hematuria      Presence of urostomy (HCC)- (present on admission)  Assessment & Plan  It is currently  functioning   Continue to monitor output    Dyslipidemia- (present on admission)  Assessment & Plan  On statin    Hypothyroid- (present on admission)  Assessment & Plan  Continue Synthroid no longer n.p.o.       VTE prophylaxis: Ambulatory

## 2020-03-03 NOTE — DISCHARGE PLANNING
ATTN: Case Management  RE: Referral for Home Health    Reason for referral denial: We are not in network with patient insurance and we do not service the [facility].                Unfortunately, we are not able to accept this referral for the reason listed above. If further clarity is needed, our Transitional Care Specialists are available to discuss any barriers to service at [phone].      We look forward to collaborating with you in the future,  [facility]

## 2020-03-04 NOTE — PROGRESS NOTES
Discharging Patient home per physician order.  Discharged with  and home wound vac.  Demonstrated understanding of discharge instructions, follow up appointments, home medications, prescriptions, home care for surgical wound, and nursing care instructions for post-op I&D, home care, wound care and home health and follow up.  Ambulating without assistance, voiding without difficulty, pain well controlled, tolerating oral medications, oxygen saturation greater than 90% , tolerating diet.   Educational handouts given and discussed.  Verbalized understanding of discharge instructions and educational handouts.  All questions answered.  Belongings with patient at time of discharge.    Wound vac [id number] sent back to sterile processing with transport.

## 2020-03-04 NOTE — DISCHARGE INSTRUCTIONS
Discharge Instructions    Discharged to home by car with relative. Discharged via wheelchair, hospital escort: Yes.  Special equipment needed: Not Applicable    Be sure to schedule a follow-up appointment with your primary care doctor or any specialists as instructed.     Discharge Plan:     Wound care with wound vac and dressing changes as schedule.   Follow with Primary care      Diet Plan: Discussed  Activity Level: Discussed  Confirmed Follow up Appointment: Patient to Call and Schedule Appointment  Confirmed Symptoms Management: Discussed  Medication Reconciliation Updated: Yes  Influenza Vaccine Indication: Not indicated: Previously immunized this influenza season and > 8 years of age    I understand that a diet low in cholesterol, fat, and sodium is recommended for good health. Unless I have been given specific instructions below for another diet, I accept this instruction as my diet prescription.   Other diet: Regular    Special Instructions: None    · Is patient discharged on Warfarin / Coumadin?   No       Incision and Drainage  Incision and drainage is a surgical procedure to open and drain a fluid-filled sac. The sac may be filled with pus, mucus, or blood. Examples of fluid-filled sacs that may need surgical drainage include cysts, skin infections (abscesses), and red lumps that develop from a ruptured cyst or a small abscess (boils).  You may need this procedure if the affected area is large, painful, infected, or not healing well.  Tell a health care provider about:  · Any allergies you have.  · All medicines you are taking, including vitamins, herbs, eye drops, creams, and over-the-counter medicines.  · Any problems you or family members have had with anesthetic medicines.  · Any blood disorders you have.  · Any surgeries you have had.  · Any medical conditions you have.  · Whether you are pregnant or may be pregnant.  What are the risks?  Generally, this is a safe procedure. However, problems may  occur, including:  · Infection.  · Bleeding.  · Allergic reactions to medicines.  · Scarring.  What happens before the procedure?  · You may need an ultrasound or other imaging tests to see how large or deep the fluid-filled sac is.  · You may have blood tests to check for infection.  · You may get a tetanus shot.  · You may be given antibiotic medicine to help prevent infection.  · Follow instructions from your health care provider about eating or drinking restrictions.  · Ask your health care provider about:  ¨ Changing or stopping your regular medicines. This is especially important if you are taking diabetes medicines or blood thinners.  ¨ Taking medicines such as aspirin and ibuprofen. These medicines can thin your blood. Do not take these medicines before your procedure if your health care provider instructs you not to.  · Plan to have someone take you home after the procedure.  · If you will be going home right after the procedure, plan to have someone stay with you for 24 hours.  What happens during the procedure?  · To reduce your risk of infection:  ¨ Your health care team will wash or sanitize their hands.  ¨ Your skin will be washed with soap.  · You will be given one or more of the following:  ¨ A medicine to help you relax (sedative).  ¨ A medicine to numb the area (local anesthetic).  ¨ A medicine to make you fall asleep (general anesthetic).  · An incision will be made in the top of the fluid-filled sac.  · The contents of the sac may be squeezed out, or a syringe or tube (catheter) may be used to empty the sac.  · The catheter may be left in place for several weeks to drain any fluid. Or, your health care provider may stitch open the edges of the incision to make a long-term opening for drainage (marsupialization).  · The inside of the sac may be washed out (irrigated) with a sterile solution and packed with gauze before it is covered with a bandage (dressing).  The procedure may vary among health  care providers and hospitals.  What happens after the procedure?  · Your blood pressure, heart rate, breathing rate, and blood oxygen level will be monitored often until the medicines you were given have worn off.  · Do not drive for 24 hours if you received a sedative.  This information is not intended to replace advice given to you by your health care provider. Make sure you discuss any questions you have with your health care provider.  Document Released: 06/13/2002 Document Revised: 05/25/2017 Document Reviewed: 10/07/2016  [employer] Interactive Patient Education © 2017 [employer]      Vacuum-Assisted Closure Therapy Home Guide  Vacuum-assisted closure therapy (VAC therapy) is a device that helps wounds heal. It is used on wounds that cannot be closed with stitches. They often heal slowly. VAC therapy helps the wound stay clean and healthy while its edges slowly grow back together.  VAC therapy uses a bandage (dressing) that is made of foam. It is put inside the wound. Then, a drape is placed over the wound. This drape sticks to your skin (adhesive) to keep air out. A tube is hooked up to a small pump and is attached to the drape. The pump sucks fluid and germs from the wound. It can also decrease any bad smell that comes from the wound.  RISKS AND COMPLICATIONS  VAC therapy is usually safe to use at home. Your skin may get sore from the adhesive drape. That is the most common problem. However, more serious problems can develop, such as:   · Bleeding. This can happen if the dressing in the wound comes into contact with blood vessels. A little bleeding may occur when the dressing is being changed. This is normal now and then. Major bleeding can happen if a large blood vessel breaks. This is more likely if you are taking blood-thinning medicine. Emergency surgery may be needed.  · Infection. This can happen if the dressing has an air leak that is not repaired within a couple of hours.  · Dehydration. This can  happen if the pump sucks out too much body fluid.  DRESSING CHANGES  Your dressing will have to be changed. Sometimes this is needed once a day. Other times, a dressing change must be done 3 times a week. How often you change your dressing will depend on what your wound is like. A trained caregiver will most likely change the dressing. However, a family member or friend may be trained to change the dressing. Below are steps to change a dressing in order to prevent an infection. The steps apply to you or the person that changes your dressing.  · Wash your hands with soap and water before and after each dressing change.  · Wear gloves and protective clothing. This may include eye protection.  · Do not allow anyone to change your dressing if they have an infection or a skin condition. Even a small cut can be a problem.  To change the dressing:   · Turn off the pump.  · Take off the adhesive drape.  · Disconnect the tube from the dressing.  · Take out the dressing that is inside the wound. If the dressing sticks, use a germ-free (sterile), saltwater solution to wet the dressing. This helps it come out more easily. If it hurts when the dressing is changed, take pain medicine 30 minutes before the dressing change.  · Cleanse the wound with normal saline or sterile water.  · Apply a skin barrier film to the skin that will be covered with the drape. This will protect the skin.  · Put a new dressing into the wound.  · Apply a new drape and tube.  · Replace the container in the pump that collects fluid if it is full. Do this at least once per week.  · Turn the pump back on.  · Your doctor will decide what setting of suction is best. Do not change the settings on the machine without talking to your nurse or doctor.  HOME CARE INSTRUCTIONS   · The VAC pump has an alarm. It goes off if there are any problems such as a leak.  ¨ Ask your caregiver what to do if the alarm goes off.  ¨ Call your caregiver right away if the alarm  goes off and you cannot fix the problem.  · Do not turn off the pump for more than 2 hours.  · Check your wound carefully at each dressing change for signs of infection. Watch for redness, swelling, or any fluid leaking from the wound. If you develop an infection:  ¨ You may have to stop VAC therapy.  ¨ The wound will need to be cleaned and washed out.  ¨ You will have to take antibiotic medicine.  · Ask your caregiver what activities you should or should not do while you are getting VAC therapy. This will depend on your particular wound.  · Ask if it is okay to turn off the pump so you can take a shower. If it is okay, make sure the wound is covered with plastic. The wound area must stay dry.  · Drink enough fluids to keep your urine clear or pale yellow.  · Eat foods that contain a lot of protein. Examples are meat, poultry, seafood, eggs, nuts, beans, and peas. Protein can help your wound heal.  SEEK MEDICAL CARE IF:  · Your wound itches or hurts.  · Dressing changes are often painful or bleeding often occurs.  · You have a headache.  · You have diarrhea.  · You have a sore throat.  · You have a rash.  · You feel nauseous.  · You feel dizzy or weak.  SEEK IMMEDIATE MEDICAL CARE IF:   · You have very bad pain.  · You have bleeding that will not stop.  · Your wound smells bad.  · You have redness, swelling, or fluid leaking from your wound.  · Your alarm goes off and you do not know what to do.  · You have a fever.  This information is not intended to replace advice given to you by your health care provider. Make sure you discuss any questions you have with your health care provider.  Document Released: 03/11/2013 Document Reviewed: 09/22/2016  [employer] Interactive Patient Education © 2017 [employer]    Depression / Suicide Risk    [facility], it is important to learn how to keep safe from harming yourself.    Recognize the warning signs:  · Abrupt changes in  personality, positive or negative- including increase in energy   · Giving away possessions  · Change in eating patterns- significant weight changes-  positive or negative  · Change in sleeping patterns- unable to sleep or sleeping all the time   · Unwillingness or inability to communicate  · Depression  · Unusual sadness, discouragement and loneliness  · Talk of wanting to die  · Neglect of personal appearance   · Rebelliousness- reckless behavior  · Withdrawal from people/activities they love  · Confusion- inability to concentrate     If you or a loved one observes any of these behaviors or has concerns about self-harm, here's what you can do:  · Talk about it- your feelings and reasons for harming yourself  · Remove any means that you might use to hurt yourself (examples: pills, rope, extension cords, firearm)  · Get professional help from the community (Mental Health, Substance Abuse, psychological counseling)  · Do not be alone:Call your Safe Contact- someone whom you trust who will be there for you.  · Call your local CRISIS HOTLINE 896-2037 or 942-797-3726  · Call your local Children's Mobile Crisis Response Team [place] (054) 500-0102 or www.TeensSuccess  · Call the toll free National Suicide Prevention Hotlines   · National Suicide Prevention Lifeline 865-533-VEQM (8575)  · National Hope Line Network 800-SUICIDE (377-3997)

## 2020-05-17 ENCOUNTER — HOSPITAL ENCOUNTER (EMERGENCY)
Facility: MEDICAL CENTER | Age: 85
End: 2020-05-18
Attending: EMERGENCY MEDICINE
Payer: MEDICARE

## 2020-05-17 DIAGNOSIS — T81.41XA INFECTION OF SUPERFICIAL INCISIONAL SURGICAL SITE AFTER PROCEDURE, INITIAL ENCOUNTER: ICD-10-CM

## 2020-05-17 PROCEDURE — 83605 ASSAY OF LACTIC ACID: CPT

## 2020-05-17 PROCEDURE — 96365 THER/PROPH/DIAG IV INF INIT: CPT

## 2020-05-17 PROCEDURE — 99284 EMERGENCY DEPT VISIT MOD MDM: CPT

## 2020-05-17 PROCEDURE — 80053 COMPREHEN METABOLIC PANEL: CPT

## 2020-05-17 PROCEDURE — 85652 RBC SED RATE AUTOMATED: CPT

## 2020-05-17 PROCEDURE — 86140 C-REACTIVE PROTEIN: CPT

## 2020-05-17 PROCEDURE — 85025 COMPLETE CBC W/AUTO DIFF WBC: CPT

## 2020-05-17 ASSESSMENT — FIBROSIS 4 INDEX: FIB4 SCORE: 2.93

## 2020-05-18 ENCOUNTER — APPOINTMENT (OUTPATIENT)
Dept: RADIOLOGY | Facility: MEDICAL CENTER | Age: 85
End: 2020-05-18
Attending: EMERGENCY MEDICINE
Payer: MEDICARE

## 2020-05-18 VITALS
RESPIRATION RATE: 16 BRPM | OXYGEN SATURATION: 97 % | HEIGHT: 68 IN | BODY MASS INDEX: 22.19 KG/M2 | TEMPERATURE: 99.7 F | WEIGHT: 146.39 LBS | HEART RATE: 86 BPM | SYSTOLIC BLOOD PRESSURE: 137 MMHG | DIASTOLIC BLOOD PRESSURE: 59 MMHG

## 2020-05-18 LAB
ALBUMIN SERPL BCP-MCNC: 3.2 G/DL (ref 3.2–4.9)
ALBUMIN/GLOB SERPL: 0.7 G/DL
ALP SERPL-CCNC: 233 U/L (ref 30–99)
ALT SERPL-CCNC: 22 U/L (ref 2–50)
ANION GAP SERPL CALC-SCNC: 12 MMOL/L (ref 7–16)
ANISOCYTOSIS BLD QL SMEAR: ABNORMAL
AST SERPL-CCNC: 33 U/L (ref 12–45)
BASOPHILS # BLD AUTO: 0.5 % (ref 0–1.8)
BASOPHILS # BLD: 0.06 K/UL (ref 0–0.12)
BILIRUB SERPL-MCNC: 0.6 MG/DL (ref 0.1–1.5)
BUN SERPL-MCNC: 21 MG/DL (ref 8–22)
CALCIUM SERPL-MCNC: 8.6 MG/DL (ref 8.5–10.5)
CHLORIDE SERPL-SCNC: 103 MMOL/L (ref 96–112)
CO2 SERPL-SCNC: 22 MMOL/L (ref 20–33)
COMMENT 1642: NORMAL
CREAT SERPL-MCNC: 1.19 MG/DL (ref 0.5–1.4)
CRP SERPL HS-MCNC: 4.76 MG/DL (ref 0–0.75)
EOSINOPHIL # BLD AUTO: 0.19 K/UL (ref 0–0.51)
EOSINOPHIL NFR BLD: 1.6 % (ref 0–6.9)
ERYTHROCYTE [DISTWIDTH] IN BLOOD BY AUTOMATED COUNT: 68.8 FL (ref 35.9–50)
ERYTHROCYTE [SEDIMENTATION RATE] IN BLOOD BY WESTERGREN METHOD: 35 MM/HOUR (ref 0–30)
GLOBULIN SER CALC-MCNC: 4.7 G/DL (ref 1.9–3.5)
GLUCOSE SERPL-MCNC: 138 MG/DL (ref 65–99)
GRAM STN SPEC: NORMAL
HCT VFR BLD AUTO: 42.2 % (ref 37–47)
HGB BLD-MCNC: 13.1 G/DL (ref 12–16)
IMM GRANULOCYTES # BLD AUTO: 0.05 K/UL (ref 0–0.11)
IMM GRANULOCYTES NFR BLD AUTO: 0.4 % (ref 0–0.9)
LACTATE BLD-SCNC: 1.6 MMOL/L (ref 0.5–2)
LYMPHOCYTES # BLD AUTO: 1.97 K/UL (ref 1–4.8)
LYMPHOCYTES NFR BLD: 16.2 % (ref 22–41)
MACROCYTES BLD QL SMEAR: ABNORMAL
MCH RBC QN AUTO: 28.8 PG (ref 27–33)
MCHC RBC AUTO-ENTMCNC: 31 G/DL (ref 33.6–35)
MCV RBC AUTO: 92.7 FL (ref 81.4–97.8)
MONOCYTES # BLD AUTO: 1.99 K/UL (ref 0–0.85)
MONOCYTES NFR BLD AUTO: 16.3 % (ref 0–13.4)
MORPHOLOGY BLD-IMP: NORMAL
NEUTROPHILS # BLD AUTO: 7.93 K/UL (ref 2–7.15)
NEUTROPHILS NFR BLD: 65 % (ref 44–72)
NRBC # BLD AUTO: 0 K/UL
NRBC BLD-RTO: 0 /100 WBC
PLATELET # BLD AUTO: 143 K/UL (ref 164–446)
PLATELET BLD QL SMEAR: NORMAL
PMV BLD AUTO: 11.1 FL (ref 9–12.9)
POLYCHROMASIA BLD QL SMEAR: NORMAL
POTASSIUM SERPL-SCNC: 4.2 MMOL/L (ref 3.6–5.5)
PROT SERPL-MCNC: 7.9 G/DL (ref 6–8.2)
RBC # BLD AUTO: 4.55 M/UL (ref 4.2–5.4)
RBC BLD AUTO: PRESENT
SIGNIFICANT IND 70042: NORMAL
SITE SITE: NORMAL
SODIUM SERPL-SCNC: 137 MMOL/L (ref 135–145)
SOURCE SOURCE: NORMAL
WBC # BLD AUTO: 12.2 K/UL (ref 4.8–10.8)

## 2020-05-18 PROCEDURE — 87186 SC STD MICRODIL/AGAR DIL: CPT

## 2020-05-18 PROCEDURE — 700111 HCHG RX REV CODE 636 W/ 250 OVERRIDE (IP): Performed by: EMERGENCY MEDICINE

## 2020-05-18 PROCEDURE — 700105 HCHG RX REV CODE 258: Performed by: EMERGENCY MEDICINE

## 2020-05-18 PROCEDURE — 87040 BLOOD CULTURE FOR BACTERIA: CPT | Mod: 91

## 2020-05-18 PROCEDURE — 87077 CULTURE AEROBIC IDENTIFY: CPT | Mod: 91

## 2020-05-18 PROCEDURE — 87147 CULTURE TYPE IMMUNOLOGIC: CPT

## 2020-05-18 PROCEDURE — 700117 HCHG RX CONTRAST REV CODE 255: Performed by: EMERGENCY MEDICINE

## 2020-05-18 PROCEDURE — 87205 SMEAR GRAM STAIN: CPT

## 2020-05-18 PROCEDURE — 96365 THER/PROPH/DIAG IV INF INIT: CPT | Mod: XU

## 2020-05-18 PROCEDURE — 87070 CULTURE OTHR SPECIMN AEROBIC: CPT | Mod: XU

## 2020-05-18 PROCEDURE — 74177 CT ABD & PELVIS W/CONTRAST: CPT

## 2020-05-18 RX ORDER — AMOXICILLIN AND CLAVULANATE POTASSIUM 875; 125 MG/1; MG/1
1 TABLET, FILM COATED ORAL 2 TIMES DAILY
Qty: 20 TAB | Refills: 0 | Status: SHIPPED | OUTPATIENT
Start: 2020-05-18 | End: 2020-05-28

## 2020-05-18 RX ADMIN — IOHEXOL 75 ML: 350 INJECTION, SOLUTION INTRAVENOUS at 01:30

## 2020-05-18 RX ADMIN — PIPERACILLIN AND TAZOBACTAM 4.5 G: 4; .5 INJECTION, POWDER, LYOPHILIZED, FOR SOLUTION INTRAVENOUS; PARENTERAL at 01:49

## 2020-05-18 NOTE — ED TRIAGE NOTES
Dorie Pat Ermelinda  84 y.o. female  Chief Complaint   Patient presents with   • Wound Check     Pt states starting today she noticed her wound from surgery on Feb 26th started having purulent drainage and blood. Pt states she was on wound care until last week. + fever, chills        Pt amb to triage with steady gait for above complaint.   Pt is alert and oriented, speaking in full sentences, follows commands and responds appropriately to questions. Resp are even and unlabored. No behavioral indicators of pain.   Pt placed in lobby. Pt educated on triage process. Pt encouraged to alert staff for any changes.

## 2020-05-18 NOTE — ED PROVIDER NOTES
ED Provider Note    CHIEF COMPLAINT  Chief Complaint   Patient presents with   • Wound Check     Pt states starting today she noticed her wound from surgery on Feb 26th started having purulent drainage and blood. Pt states she was on wound care until last week. + fever, chills        HPI  Dorie Wadsworth is a 84 y.o. female who presents with discharge from an abdominal wound.  She states that she initially had treatment for a bowel obstruction in December.  She notes abscess was diagnosed in the surgical site in February and she had operative drainage at that time.  Since then, she has had home health to help with wound management.  She states that the wound has been closed for the past 5 days when she was discharged from home health care.  In the past day however, she noted some chills and fatigue and swelling overlying the surgical site that had just healed by secondary intention.  She notes that the area was hard to palpation.  She pressed on it and noted a significant amount of brownish to bloody drainage.  Currently notes serosanguineous drainage and surrounding erythema.  She notes that after the initial drainage of significant amount of fluid, the drainage has only been scant.  No active gross bleeding.  No vomiting.  No bloody stool or black stool.  No diarrhea.  No chest pain or trouble breathing.  Subjective fevers.    REVIEW OF SYSTEMS  See HPI for further details. All other systems are negative.     PAST MEDICAL HISTORY   has a past medical history of A-fib ([facility]), Ailment, Arthritis, Atrial fibrillation ([facility]) (2/2009), Atrial fibrillation with rapid ventricular response ([facility]) (3/18/2019), Atrial fibrillation, rapid ([facility]) (1/31/2014), Back pain, Bladder disease (2011), Bladder problem, CAD (coronary artery disease), Cancer ([facility]), CATARACT, CHEST PAIN (3/11/2011), Glaucoma, History of hematuria, HTN (hypertension), malignant (3/18/2019), HTN (hypertension), malignant (3/18/2019), Hyperlipidemia  (2/24/2011), Hypertension, Hypothyroid (2/24/2011), Indigestion, Intrinsic eczema (10/31/2017), Other specified pre-operative examination (1/28/2014), Paroxysmal atrial fibrillation (HCC), Sepsis urinary source (1/23/2012), Stroke (HCC), T.I.A., TIA (transient ischemic attack) (3/11/2011), Unspecified disorder of thyroid, Unspecified hemorrhagic conditions, Urinary bladder disorder, UTI (urinary tract infection) (2/24/2011), and Vaginal bleeding (10/22/2012).    SOCIAL HISTORY  Social History     Tobacco Use   • Smoking status: Never Smoker   • Smokeless tobacco: Never Used   Substance and Sexual Activity   • Alcohol use: No   • Drug use: No   • Sexual activity: Not on file       SURGICAL HISTORY   has a past surgical history that includes bladder biopsy with cystoscopy (10/10/08); pyelogram (10/10/08); retrogrades (10/10/08); bladder biopsy with cystoscopy (6/15/2009); bladder biopsy with cystoscopy (10/12/2009); other abdominal surgery; cholecystectomy (1994); cholecystectomy (1994); gyn surgery; pelvic exam under anesthesia (10/12/2009); rectocele repair (11/3/2009); other; cystectomy (9/6/2011); ileo loop diversion (9/6/2011); biopsy general (10/22/2012); parastomal hernia repair (1/28/2014); ventral hernia repair robotic xi (12/24/2019); and irrigation & debridement general (N/A, 2/27/2020).    CURRENT MEDICATIONS  Home Medications     Reviewed by Lexie Barnes R.N. (Registered Nurse) on 05/17/20 at 2259  Med List Status: Partial   Medication Last Dose Status   aspirin EC 81 MG EC tablet  Active   BIOTIN PO  Active   ferrous gluconate (IRON 27) 240 (27 Fe) MG tablet  Active   furosemide (LASIX) 20 MG Tab  Active   latanoprost (XALATAN) 0.005 % Solution  Active   levothyroxine (SYNTHROID) 112 MCG Tab  Active   lovastatin (MEVACOR) 10 MG tablet  Active   metoprolol (LOPRESSOR) 100 MG Tab  Active   Multiple Vitamins-Minerals (CENTRUM SILVER ULTRA WOMENS) Tab  Active   Multiple Vitamins-Minerals (PRESERVISION  "AREDS PO)  Active   pantoprazole (PROTONIX) 40 MG TBEC  Active   timolol (TIMOPTIC) 0.5 % Solution  Active                ALLERGIES  Allergies   Allergen Reactions   • Cardizem Swelling   • Doxycycline      Swollen lips.   • Sulfa Drugs      Makes tongue swell, severely   • Zyvox Swelling     \"Whole mouth swelled up\"    • Codeine      Does not remember the reaction         PHYSICAL EXAM  VITAL SIGNS: /78   Pulse 83   Temp 36.3 °C (97.4 °F) (Oral)   Resp 18   Ht 1.727 m (5' 8\")   Wt 66.4 kg (146 lb 6.2 oz)   SpO2 97%   BMI 22.26 kg/m²   Pulse ox interpretation: I interpret this pulse ox as normal.  Constitutional: Alert in no apparent distress.  HENT: No signs of trauma, Bilateral external ears normal, Nose normal.   Eyes: Pupils are equal and reactive, Conjunctiva normal, Non-icteric.   Neck: Normal range of motion, No tenderness, Supple, No stridor.   Cardiovascular: Regular rate and rhythm.   Thorax & Lungs: Normal breath sounds, No respiratory distress, No wheezing, No chest tenderness.   Abdomen: Bowel sounds normal, Soft, No tenderness, No masses, No pulsatile masses. No peritoneal signs.  2 cm area of wound dehiscence within a prior surgical scar.  Serosanguineous drainage.  No obvious feculent drainage.  Skin: Warm, Dry, No erythema, No rash.   Back: No bony tenderness, No CVA tenderness.   Extremities: Intact distal pulses, No edema, No tenderness, No cyanosis  Musculoskeletal: Good range of motion in all major joints. No tenderness to palpation or major deformities noted.   Neurologic: Alert, Normal motor function and gait, Normal sensory function, No focal deficits noted.       DIAGNOSTIC STUDIES / PROCEDURES      LABS  Labs Reviewed   CBC WITH DIFFERENTIAL - Abnormal; Notable for the following components:       Result Value    WBC 12.2 (*)     MCHC 31.0 (*)     RDW 68.8 (*)     Platelet Count 143 (*)     Lymphocytes 16.20 (*)     Monocytes 16.30 (*)     Neutrophils (Absolute) 7.93 (*)     " "Monos (Absolute) 1.99 (*)     All other components within normal limits   COMP METABOLIC PANEL - Abnormal; Notable for the following components:    Glucose 138 (*)     Alkaline Phosphatase 233 (*)     Globulin 4.7 (*)     All other components within normal limits   CRP QUANTITIVE (NON-CARDIAC) - Abnormal; Notable for the following components:    Stat C-Reactive Protein 4.76 (*)     All other components within normal limits   SED RATE - Abnormal; Notable for the following components:    Sed Rate Westergren 35 (*)     All other components within normal limits   ESTIMATED GFR - Abnormal; Notable for the following components:    GFR If  52 (*)     GFR If Non  43 (*)     All other components within normal limits   LACTIC ACID   BLOOD CULTURE    Narrative:     Per Hospital Policy: Only change Specimen Src: to \"Line\" if  specified by physician order.   BLOOD CULTURE    Narrative:     Per Hospital Policy: Only change Specimen Src: to \"Line\" if  specified by physician order.   CULTURE WOUND W/ GRAM STAIN   PERIPHERAL SMEAR REVIEW   PLATELET ESTIMATE   MORPHOLOGY   DIFFERENTIAL COMMENT         RADIOLOGY  CT-ABDOMEN-PELVIS WITH   Final Result         1. Small 2.1 cm fluid collection in the midline anterior abdominal wall with a small focus of gas, concerning for abscess. A small bowel loop is deep to this collection. Again, small bowel fistula is possible.      2. Unchanged additional 2.1 x 4.3 fluid collection in the right anterior abdominal wall muscle with gas, similar to prior. Several small bowel loops adjacent to this collection.      3. Postsurgical change from urostomy in the right lower quadrant. Severe right hydronephrosis and mild left hydronephrosis similar to prior.            COURSE & MEDICAL DECISION MAKING    Medications   iohexol (OMNIPAQUE) 350 mg/mL (75 mL Intravenous Given 5/18/20 0130)   piperacillin-tazobactam (ZOSYN) 4.5 g in  mL IVPB (0 g Intravenous Stopped 5/18/20 " "0211)       Pertinent Labs & Imaging studies reviewed. (See chart for details)  84 y.o. female presenting with discharge from the prior abdominal surgical wound.  Drainage appears to be serosanguineous in nature.  Has faint erythema surrounding this open wound.  No vomiting or active fevers.  Patient has normal vital signs with no hypotension or tachycardia or fever.  No signs of peritonitis.  Has slight leukocytosis at 12.2 and elevated inflammatory markers.     I spoke with Dr. Stubbs who is on-call for the patient's general surgeon, Dr. Chavarria.  I reviewed the patient's physical exam findings and imaging results from CT imaging today.  She is recommending outpatient management with prompt follow-up with Dr. Chavarria.  She states that she will contact Dr. Chavarria tomorrow with regards to these findings and help arrange prompt follow-up.  CT showing small fluid collections and again possible bowel fistula.    The patient was given a first dose of IV antibiotics here and was discharged home with Augmentin for continued outpatient management.  The patient is agreeable with this plan as she would prefer not to be admitted at this time.  The patient was given very strict and explicit precautions however to return immediately should she have fevers, worsening erythema, increasing discharge or feculent discharge, lightheadedness or worsening weakness, or any other concerning signs or symptoms.    The patient will not drink alcohol nor drive with prescribed medications.   The patient was instructed to follow-up with primary care physician for further management.  To return immediately for any worsening symptoms or development of any other concerning signs or symptoms. The patient verbalizes understanding in their own words.    /59   Pulse 86   Temp 37.6 °C (99.7 °F) (Oral)   Resp 16   Ht 1.727 m (5' 8\")   Wt 66.4 kg (146 lb 6.2 oz)   SpO2 97%   BMI 22.26 kg/m²     The patient was referred to primary care " where they will receive further BP management.      Coreen Cisse M.D.  [facility] [facility] [address] [phone]    Schedule an appointment as soon as possible for a visit       [facility], Emergency Dept  [address] [facility] [address] [phone]    As needed, If symptoms worsen      FINAL IMPRESSION  1. Infection of superficial incisional surgical site after procedure, initial encounter            Electronically signed by: Kasi Zhou M.D., 5/17/2020 11:50 PM

## 2020-05-18 NOTE — ED NOTES
Pt ambulated to RED 5.  Placed in gown, wound is assessed, prurulent drainage noted on dressing.  Cleansed wound and replaced dressing.  Small area of nichole red tissue at base of wound dehisceince..

## 2020-05-18 NOTE — ED NOTES
Blood Cultures sent.  Wound culture sent.  Reassessment of abdominal wound showed moderate amount of serous drainage to wound covering.  Dressing replaced.

## 2020-05-22 LAB
BACTERIA WND AEROBE CULT: ABNORMAL
GRAM STN SPEC: ABNORMAL
SIGNIFICANT IND 70042: ABNORMAL
SITE SITE: ABNORMAL
SOURCE SOURCE: ABNORMAL

## 2020-05-22 NOTE — ED NOTES
ED Positive Culture Follow-up/Notification Note:    Date: 5/22/20     Patient seen in the ED on 5/17/2020 for wound check. The pt reported discharge from an abdominal wound from previous surgical site infection that was treated in February. The pt is followed by home health for wound care. She reported chills, swelling, brownish/bloody drainage, and fatigue.    CT findings in ED: Small 2.1 cm fluid collection in the midline anterior abdominal wall with a small focus of gas, concerning for abscess. A small bowel loop is deep to this collection. Again, small bowel fistula is possible.    1. Infection of superficial incisional surgical site after procedure, initial encounter       Discharge Medication List as of 5/18/2020  2:26 AM      START taking these medications    Details   amoxicillin-clavulanate (AUGMENTIN) 875-125 MG Tab Take 1 Tab by mouth 2 times a day for 10 days., Disp-20 Tab,R-0, Print Rx Paper           Medications given in the ED:  -Zosyn 4.5 grams IV once    Allergies: Cardizem; Doxycycline; Sulfa drugs; Zyvox; and Codeine     Vitals:    05/18/20 0131 05/18/20 0201 05/18/20 0221 05/18/20 0223   BP: 127/72 137/59     Pulse: (!) 115 (!) 112 86    Resp:  16     Temp:    37.6 °C (99.7 °F)   TempSrc:    Oral   SpO2: 97% 97%     Weight:       Height:           Final cultures:   Results     Procedure Component Value Units Date/Time    CULTURE WOUND W/ GRAM STAIN [[id number]]  (Abnormal)  (Susceptibility) Collected:  05/18/20 0031    Order Status:  Completed Specimen:  Wound from Abdominal Updated:  05/20/20 0937     Significant Indicator POS     Source WND     Site ABDOMINAL     Culture Result -     Gram Stain Result Moderate WBCs.  No organisms seen.       Culture Result Staphylococcus aureus  Rare growth        Escherichia coli  Rare growth      Susceptibility     Staphylococcus aureus (1)     Antibiotic Interpretation Microscan Method Status    Azithromycin Sensitive <=2 mcg/mL MICKI Final    Clindamycin  "Sensitive <=0.5 mcg/mL MICKI Final    Cefazolin Sensitive <=8 mcg/mL MICKI Final    Ceftaroline Sensitive <=0.5 mcg/mL MICKI Final    Daptomycin Sensitive <=1 mcg/mL MICKI Final    Erythromycin Sensitive 0.5 mcg/mL MICKI Final    Oxacillin Sensitive <=0.25 mcg/mL MICKI Final    Pip/Tazobactam Sensitive <=4 mcg/mL MICKI Final    Trimeth/Sulfa Sensitive <=0.5/9.5 mcg/mL MICKI Final    Tetracycline Sensitive <=4 mcg/mL MICKI Final    Ampicillin/sulbactam Sensitive <=8/4 mcg/mL MICKI Final    Vancomycin Sensitive 1 mcg/mL MICKI Final          Escherichia coli (2)     Antibiotic Interpretation Microscan Method Status    Cefazolin Sensitive 4 mcg/mL MICKI Final    Pip/Tazobactam Sensitive <=16 mcg/mL MICKI Final    Trimeth/Sulfa Sensitive <=2/38 mcg/mL MICKI Final    Ampicillin/sulbactam Intermediate 16/8 mcg/mL MICKI Final    Tobramycin Sensitive <=4 mcg/mL MICKI Final    Ampicillin Resistant >16 mcg/mL MICKI Final    Ceftriaxone Sensitive <=1 mcg/mL MICKI Final    Ceftazidime Sensitive <=1 mcg/mL MICKI Final    Cefotaxime Sensitive <=2 mcg/mL MICKI Final    Ciprofloxacin Resistant >2 mcg/mL MICKI Final    Cefepime Sensitive <=2 mcg/mL MICKI Final    Cefotetan Sensitive <=16 mcg/mL MICKI Final    Ertapenem Sensitive <=0.5 mcg/mL MICKI Final    Gentamicin Sensitive <=4 mcg/mL MICKI Final                   BLOOD CULTURE x2 [[id number]] Collected:  05/18/20 0025    Order Status:  Completed Specimen:  Blood from Peripheral Updated:  05/19/20 0732     Significant Indicator NEG     Source BLD     Site PERIPHERAL     Culture Result No Growth  Note: Blood cultures are incubated for 5 days and  are monitored continuously.Positive blood cultures  are called to the RN and reported as soon as  they are identified.      Narrative:       Per Hospital Policy: Only change Specimen Src: to \"Line\" if  specified by physician order.  Left Hand    BLOOD CULTURE x2 [[id number]] Collected:  05/18/20 0025    Order Status:  Completed Specimen:  Blood from Peripheral Updated:  05/19/20 0732     " "Significant Indicator NEG     Source BLD     Site PERIPHERAL     Culture Result No Growth  Note: Blood cultures are incubated for 5 days and  are monitored continuously.Positive blood cultures  are called to the RN and reported as soon as  they are identified.      Narrative:       Per Hospital Policy: Only change Specimen Src: to \"Line\" if  specified by physician order.  Right AC    GRAM STAIN [[id number]] Collected:  05/18/20 0031    Order Status:  Completed Specimen:  Wound Updated:  05/18/20 0826     Significant Indicator .     Source WND     Site ABDOMINAL     Gram Stain Result Moderate WBCs.  No organisms seen.            Plan:   Organisms are susceptible to prescribed therapy, including E. Coli per conversation with micro.     ERP consulted the pt's surgeron in the ED to discuss CT findings and pt's case. It was decided that the pt would be discharged home with PO amox-clav and quick outpatient f/u with surgery.    I called and informed the pt of results. She reported that she was feeling good, and that she had followed up with her surgeon on Tuesday of this week. She is scheduled for repeat visit with surgeon on Tuesday of next week. I encouraged her to continue her antibiotics as prescribed, and to return to the ED if she experiences any fevers. She stated understanding and had no further questions.        Mustapha Alonso, PharmD     "

## 2020-07-07 ENCOUNTER — HOSPITAL ENCOUNTER (OUTPATIENT)
Facility: MEDICAL CENTER | Age: 85
End: 2020-07-08
Attending: EMERGENCY MEDICINE | Admitting: INTERNAL MEDICINE
Payer: MEDICARE

## 2020-07-07 DIAGNOSIS — R07.9 CHEST PAIN, UNSPECIFIED TYPE: ICD-10-CM

## 2020-07-07 PROCEDURE — 93005 ELECTROCARDIOGRAM TRACING: CPT | Performed by: EMERGENCY MEDICINE

## 2020-07-07 PROCEDURE — 93005 ELECTROCARDIOGRAM TRACING: CPT

## 2020-07-07 PROCEDURE — 99285 EMERGENCY DEPT VISIT HI MDM: CPT

## 2020-07-07 RX ORDER — MELOXICAM 7.5 MG/1
7.5 TABLET ORAL 2 TIMES DAILY
COMMUNITY
End: 2020-08-23

## 2020-07-08 ENCOUNTER — APPOINTMENT (OUTPATIENT)
Dept: RADIOLOGY | Facility: MEDICAL CENTER | Age: 85
End: 2020-07-08
Attending: EMERGENCY MEDICINE
Payer: MEDICARE

## 2020-07-08 ENCOUNTER — PATIENT OUTREACH (OUTPATIENT)
Dept: SCHEDULING | Facility: IMAGING CENTER | Age: 85
End: 2020-07-08

## 2020-07-08 ENCOUNTER — APPOINTMENT (OUTPATIENT)
Dept: RADIOLOGY | Facility: MEDICAL CENTER | Age: 85
End: 2020-07-08
Attending: STUDENT IN AN ORGANIZED HEALTH CARE EDUCATION/TRAINING PROGRAM
Payer: MEDICARE

## 2020-07-08 VITALS
RESPIRATION RATE: 20 BRPM | HEART RATE: 86 BPM | TEMPERATURE: 99.3 F | OXYGEN SATURATION: 98 % | WEIGHT: 155.2 LBS | DIASTOLIC BLOOD PRESSURE: 65 MMHG | HEIGHT: 68 IN | SYSTOLIC BLOOD PRESSURE: 135 MMHG | BODY MASS INDEX: 23.52 KG/M2

## 2020-07-08 PROBLEM — E78.5 HLD (HYPERLIPIDEMIA): Status: ACTIVE | Noted: 2020-07-08

## 2020-07-08 PROBLEM — I48.91 AF (ATRIAL FIBRILLATION) (HCC): Status: ACTIVE | Noted: 2020-07-08

## 2020-07-08 PROBLEM — R73.9 HYPERGLYCEMIA: Status: ACTIVE | Noted: 2020-07-08

## 2020-07-08 LAB
ANION GAP SERPL CALC-SCNC: 14 MMOL/L (ref 7–16)
BASOPHILS # BLD AUTO: 0.5 % (ref 0–1.8)
BASOPHILS # BLD: 0.04 K/UL (ref 0–0.12)
BUN SERPL-MCNC: 25 MG/DL (ref 8–22)
CALCIUM SERPL-MCNC: 8.5 MG/DL (ref 8.5–10.5)
CHLORIDE SERPL-SCNC: 110 MMOL/L (ref 96–112)
CHOLEST SERPL-MCNC: 106 MG/DL (ref 100–199)
CO2 SERPL-SCNC: 16 MMOL/L (ref 20–33)
CREAT SERPL-MCNC: 1.37 MG/DL (ref 0.5–1.4)
EKG IMPRESSION: NORMAL
EKG IMPRESSION: NORMAL
EOSINOPHIL # BLD AUTO: 0.2 K/UL (ref 0–0.51)
EOSINOPHIL NFR BLD: 2.7 % (ref 0–6.9)
ERYTHROCYTE [DISTWIDTH] IN BLOOD BY AUTOMATED COUNT: 50.7 FL (ref 35.9–50)
EST. AVERAGE GLUCOSE BLD GHB EST-MCNC: 100 MG/DL
GLUCOSE SERPL-MCNC: 146 MG/DL (ref 65–99)
HBA1C MFR BLD: 5.1 % (ref 0–5.6)
HCT VFR BLD AUTO: 37.1 % (ref 37–47)
HDLC SERPL-MCNC: 53 MG/DL
HGB BLD-MCNC: 11.9 G/DL (ref 12–16)
IMM GRANULOCYTES # BLD AUTO: 0.03 K/UL (ref 0–0.11)
IMM GRANULOCYTES NFR BLD AUTO: 0.4 % (ref 0–0.9)
LDLC SERPL CALC-MCNC: 43 MG/DL
LYMPHOCYTES # BLD AUTO: 1.88 K/UL (ref 1–4.8)
LYMPHOCYTES NFR BLD: 25.2 % (ref 22–41)
MAGNESIUM SERPL-MCNC: 1.7 MG/DL (ref 1.5–2.5)
MCH RBC QN AUTO: 30.3 PG (ref 27–33)
MCHC RBC AUTO-ENTMCNC: 32.1 G/DL (ref 33.6–35)
MCV RBC AUTO: 94.4 FL (ref 81.4–97.8)
MONOCYTES # BLD AUTO: 1.18 K/UL (ref 0–0.85)
MONOCYTES NFR BLD AUTO: 15.8 % (ref 0–13.4)
NEUTROPHILS # BLD AUTO: 4.13 K/UL (ref 2–7.15)
NEUTROPHILS NFR BLD: 55.4 % (ref 44–72)
NRBC # BLD AUTO: 0 K/UL
NRBC BLD-RTO: 0 /100 WBC
PLATELET # BLD AUTO: 115 K/UL (ref 164–446)
PMV BLD AUTO: 11 FL (ref 9–12.9)
POTASSIUM SERPL-SCNC: 4.4 MMOL/L (ref 3.6–5.5)
RBC # BLD AUTO: 3.93 M/UL (ref 4.2–5.4)
SODIUM SERPL-SCNC: 140 MMOL/L (ref 135–145)
TRIGL SERPL-MCNC: 48 MG/DL (ref 0–149)
TROPONIN T SERPL-MCNC: 7 NG/L (ref 6–19)
TROPONIN T SERPL-MCNC: 7 NG/L (ref 6–19)
TROPONIN T SERPL-MCNC: 8 NG/L (ref 6–19)
TSH SERPL DL<=0.005 MIU/L-ACNC: 0.47 UIU/ML (ref 0.38–5.33)
WBC # BLD AUTO: 7.5 K/UL (ref 4.8–10.8)

## 2020-07-08 PROCEDURE — 93005 ELECTROCARDIOGRAM TRACING: CPT | Performed by: EMERGENCY MEDICINE

## 2020-07-08 PROCEDURE — 84484 ASSAY OF TROPONIN QUANT: CPT

## 2020-07-08 PROCEDURE — 83735 ASSAY OF MAGNESIUM: CPT

## 2020-07-08 PROCEDURE — 93010 ELECTROCARDIOGRAM REPORT: CPT | Performed by: INTERNAL MEDICINE

## 2020-07-08 PROCEDURE — 700102 HCHG RX REV CODE 250 W/ 637 OVERRIDE(OP): Performed by: INTERNAL MEDICINE

## 2020-07-08 PROCEDURE — A9502 TC99M TETROFOSMIN: HCPCS

## 2020-07-08 PROCEDURE — 83036 HEMOGLOBIN GLYCOSYLATED A1C: CPT

## 2020-07-08 PROCEDURE — A9270 NON-COVERED ITEM OR SERVICE: HCPCS | Performed by: STUDENT IN AN ORGANIZED HEALTH CARE EDUCATION/TRAINING PROGRAM

## 2020-07-08 PROCEDURE — 99236 HOSP IP/OBS SAME DATE HI 85: CPT | Mod: GC | Performed by: INTERNAL MEDICINE

## 2020-07-08 PROCEDURE — 700102 HCHG RX REV CODE 250 W/ 637 OVERRIDE(OP): Performed by: STUDENT IN AN ORGANIZED HEALTH CARE EDUCATION/TRAINING PROGRAM

## 2020-07-08 PROCEDURE — 80061 LIPID PANEL: CPT

## 2020-07-08 PROCEDURE — A9270 NON-COVERED ITEM OR SERVICE: HCPCS | Performed by: EMERGENCY MEDICINE

## 2020-07-08 PROCEDURE — G0378 HOSPITAL OBSERVATION PER HR: HCPCS

## 2020-07-08 PROCEDURE — 71045 X-RAY EXAM CHEST 1 VIEW: CPT

## 2020-07-08 PROCEDURE — 96372 THER/PROPH/DIAG INJ SC/IM: CPT | Mod: XU

## 2020-07-08 PROCEDURE — A9270 NON-COVERED ITEM OR SERVICE: HCPCS | Performed by: INTERNAL MEDICINE

## 2020-07-08 PROCEDURE — 80048 BASIC METABOLIC PNL TOTAL CA: CPT

## 2020-07-08 PROCEDURE — 84443 ASSAY THYROID STIM HORMONE: CPT

## 2020-07-08 PROCEDURE — 700111 HCHG RX REV CODE 636 W/ 250 OVERRIDE (IP): Performed by: STUDENT IN AN ORGANIZED HEALTH CARE EDUCATION/TRAINING PROGRAM

## 2020-07-08 PROCEDURE — 85025 COMPLETE CBC W/AUTO DIFF WBC: CPT

## 2020-07-08 PROCEDURE — 700102 HCHG RX REV CODE 250 W/ 637 OVERRIDE(OP): Performed by: EMERGENCY MEDICINE

## 2020-07-08 PROCEDURE — 700111 HCHG RX REV CODE 636 W/ 250 OVERRIDE (IP)

## 2020-07-08 RX ORDER — VITAMIN B COMPLEX
1000 TABLET ORAL DAILY
Status: DISCONTINUED | OUTPATIENT
Start: 2020-07-08 | End: 2020-07-08 | Stop reason: HOSPADM

## 2020-07-08 RX ORDER — REGADENOSON 0.08 MG/ML
INJECTION, SOLUTION INTRAVENOUS
Status: COMPLETED
Start: 2020-07-08 | End: 2020-07-08

## 2020-07-08 RX ORDER — ENALAPRILAT 1.25 MG/ML
1.25 INJECTION INTRAVENOUS EVERY 6 HOURS PRN
Status: CANCELLED | OUTPATIENT
Start: 2020-07-08

## 2020-07-08 RX ORDER — LOVASTATIN 20 MG/1
10 TABLET ORAL DAILY
Status: DISCONTINUED | OUTPATIENT
Start: 2020-07-08 | End: 2020-07-08 | Stop reason: HOSPADM

## 2020-07-08 RX ORDER — LABETALOL HYDROCHLORIDE 5 MG/ML
10 INJECTION, SOLUTION INTRAVENOUS EVERY 4 HOURS PRN
Status: DISCONTINUED | OUTPATIENT
Start: 2020-07-08 | End: 2020-07-08 | Stop reason: HOSPADM

## 2020-07-08 RX ORDER — OMEPRAZOLE 20 MG/1
20 CAPSULE, DELAYED RELEASE ORAL DAILY
Status: DISCONTINUED | OUTPATIENT
Start: 2020-07-08 | End: 2020-07-08 | Stop reason: HOSPADM

## 2020-07-08 RX ORDER — CALCIUM CARBONATE/VITAMIN D3 500-10/5ML
1 LIQUID (ML) ORAL DAILY
Qty: 30 CAP | Refills: 1 | Status: SHIPPED
Start: 2020-07-08 | End: 2020-08-23

## 2020-07-08 RX ORDER — PROCHLORPERAZINE MALEATE 5 MG/1
5 TABLET ORAL EVERY 6 HOURS PRN
Status: CANCELLED | OUTPATIENT
Start: 2020-07-08

## 2020-07-08 RX ORDER — ASPIRIN 81 MG/1
324 TABLET, CHEWABLE ORAL ONCE
Status: COMPLETED | OUTPATIENT
Start: 2020-07-08 | End: 2020-07-08

## 2020-07-08 RX ORDER — LEVOTHYROXINE SODIUM 112 UG/1
112 TABLET ORAL
Status: DISCONTINUED | OUTPATIENT
Start: 2020-07-08 | End: 2020-07-08 | Stop reason: HOSPADM

## 2020-07-08 RX ORDER — PANTOPRAZOLE SODIUM 40 MG/1
40 TABLET, DELAYED RELEASE ORAL DAILY
Status: DISCONTINUED | OUTPATIENT
Start: 2020-07-08 | End: 2020-07-08

## 2020-07-08 RX ORDER — PROCHLORPERAZINE MALEATE 10 MG
10 TABLET ORAL EVERY 6 HOURS PRN
Status: DISCONTINUED | OUTPATIENT
Start: 2020-07-08 | End: 2020-07-08 | Stop reason: HOSPADM

## 2020-07-08 RX ADMIN — MELATONIN 1000 UNITS: at 13:28

## 2020-07-08 RX ADMIN — OMEPRAZOLE 20 MG: 20 CAPSULE, DELAYED RELEASE ORAL at 06:23

## 2020-07-08 RX ADMIN — ENOXAPARIN SODIUM 40 MG: 40 INJECTION SUBCUTANEOUS at 06:22

## 2020-07-08 RX ADMIN — LOVASTATIN 10 MG: 20 TABLET ORAL at 06:23

## 2020-07-08 RX ADMIN — ASPIRIN 81 MG 324 MG: 81 TABLET ORAL at 03:58

## 2020-07-08 RX ADMIN — LEVOTHYROXINE SODIUM 112 MCG: 112 TABLET ORAL at 06:23

## 2020-07-08 RX ADMIN — REGADENOSON 0.4 MG: 0.08 INJECTION, SOLUTION INTRAVENOUS at 09:20

## 2020-07-08 SDOH — ECONOMIC STABILITY: FOOD INSECURITY: WITHIN THE PAST 12 MONTHS, YOU WORRIED THAT YOUR FOOD WOULD RUN OUT BEFORE YOU GOT MONEY TO BUY MORE.: NEVER TRUE

## 2020-07-08 SDOH — HEALTH STABILITY: MENTAL HEALTH
STRESS IS WHEN SOMEONE FEELS TENSE, NERVOUS, ANXIOUS, OR CAN'T SLEEP AT NIGHT BECAUSE THEIR MIND IS TROUBLED. HOW STRESSED ARE YOU?: NOT AT ALL

## 2020-07-08 SDOH — HEALTH STABILITY: PHYSICAL HEALTH: ON AVERAGE, HOW MANY MINUTES DO YOU ENGAGE IN EXERCISE AT THIS LEVEL?: 60 MIN

## 2020-07-08 SDOH — SOCIAL STABILITY: SOCIAL NETWORK: HOW OFTEN DO YOU ATTENT MEETINGS OF THE CLUB OR ORGANIZATION YOU BELONG TO?: MORE THAN 4 TIMES PER YEAR

## 2020-07-08 SDOH — SOCIAL STABILITY: SOCIAL INSECURITY: WITHIN THE LAST YEAR, HAVE YOU BEEN AFRAID OF YOUR PARTNER OR EX-PARTNER?: NO

## 2020-07-08 SDOH — ECONOMIC STABILITY: FOOD INSECURITY: WITHIN THE PAST 12 MONTHS, THE FOOD YOU BOUGHT JUST DIDN'T LAST AND YOU DIDN'T HAVE MONEY TO GET MORE.: NEVER TRUE

## 2020-07-08 SDOH — SOCIAL STABILITY: SOCIAL NETWORK: HOW OFTEN DO YOU ATTEND CHURCH OR RELIGIOUS SERVICES?: MORE THAN 4 TIMES PER YEAR

## 2020-07-08 SDOH — ECONOMIC STABILITY: TRANSPORTATION INSECURITY
IN THE PAST 12 MONTHS, HAS THE LACK OF TRANSPORTATION KEPT YOU FROM MEDICAL APPOINTMENTS OR FROM GETTING MEDICATIONS?: NO

## 2020-07-08 SDOH — SOCIAL STABILITY: SOCIAL INSECURITY: WITHIN THE LAST YEAR, HAVE YOU BEEN HUMILIATED OR EMOTIONALLY ABUSED IN OTHER WAYS BY YOUR PARTNER OR EX-PARTNER?: NO

## 2020-07-08 SDOH — SOCIAL STABILITY: SOCIAL NETWORK
IN A TYPICAL WEEK, HOW MANY TIMES DO YOU TALK ON THE PHONE WITH FAMILY, FRIENDS, OR NEIGHBORS?: MORE THAN THREE TIMES A WEEK

## 2020-07-08 SDOH — SOCIAL STABILITY: SOCIAL NETWORK: ARE YOU MARRIED, WIDOWED, DIVORCED, SEPARATED, NEVER MARRIED, OR LIVING WITH A PARTNER?: MARRIED

## 2020-07-08 SDOH — ECONOMIC STABILITY: TRANSPORTATION INSECURITY
IN THE PAST 12 MONTHS, HAS LACK OF TRANSPORTATION KEPT YOU FROM MEETINGS, WORK, OR FROM GETTING THINGS NEEDED FOR DAILY LIVING?: NO

## 2020-07-08 SDOH — SOCIAL STABILITY: SOCIAL INSECURITY
WITHIN THE LAST YEAR, HAVE TO BEEN RAPED OR FORCED TO HAVE ANY KIND OF SEXUAL ACTIVITY BY YOUR PARTNER OR EX-PARTNER?: NO

## 2020-07-08 SDOH — SOCIAL STABILITY: SOCIAL INSECURITY
WITHIN THE LAST YEAR, HAVE YOU BEEN KICKED, HIT, SLAPPED, OR OTHERWISE PHYSICALLY HURT BY YOUR PARTNER OR EX-PARTNER?: NO

## 2020-07-08 SDOH — SOCIAL STABILITY: SOCIAL NETWORK
DO YOU BELONG TO ANY CLUBS OR ORGANIZATIONS SUCH AS CHURCH GROUPS UNIONS, FRATERNAL OR ATHLETIC GROUPS, OR SCHOOL GROUPS?: YES

## 2020-07-08 SDOH — SOCIAL STABILITY: SOCIAL NETWORK: HOW OFTEN DO YOU GET TOGETHER WITH FRIENDS OR RELATIVES?: MORE THAN THREE TIMES A WEEK

## 2020-07-08 SDOH — ECONOMIC STABILITY: INCOME INSECURITY: HOW HARD IS IT FOR YOU TO PAY FOR THE VERY BASICS LIKE FOOD, HOUSING, MEDICAL CARE, AND HEATING?: NOT HARD AT ALL

## 2020-07-08 SDOH — HEALTH STABILITY: PHYSICAL HEALTH: ON AVERAGE, HOW MANY DAYS PER WEEK DO YOU ENGAGE IN MODERATE TO STRENUOUS EXERCISE (LIKE A BRISK WALK)?: 7 DAYS

## 2020-07-08 ASSESSMENT — ENCOUNTER SYMPTOMS
VOMITING: 0
SPUTUM PRODUCTION: 0
WEAKNESS: 0
SHORTNESS OF BREATH: 0
LOSS OF CONSCIOUSNESS: 0
COUGH: 0
BACK PAIN: 0
TINGLING: 0
DIARRHEA: 0
PALPITATIONS: 1
DIAPHORESIS: 0
HEMOPTYSIS: 0
CHILLS: 0
HEADACHES: 0
BLURRED VISION: 0
ORTHOPNEA: 0
NECK PAIN: 0
NAUSEA: 0
FEVER: 0
DIZZINESS: 0
DOUBLE VISION: 0
MYALGIAS: 0
BRUISES/BLEEDS EASILY: 0
CONSTIPATION: 0
PALPITATIONS: 0
ABDOMINAL PAIN: 0
TREMORS: 0
PHOTOPHOBIA: 0

## 2020-07-08 ASSESSMENT — LIFESTYLE VARIABLES
EVER FELT BAD OR GUILTY ABOUT YOUR DRINKING: NO
SUBSTANCE_ABUSE: 0
HOW MANY TIMES IN THE PAST YEAR HAVE YOU HAD 5 OR MORE DRINKS IN A DAY: 0
ON A TYPICAL DAY WHEN YOU DRINK ALCOHOL HOW MANY DRINKS DO YOU HAVE: 0
DOES PATIENT WANT TO STOP DRINKING: NO
CONSUMPTION TOTAL: NEGATIVE
TOTAL SCORE: 0
TOTAL SCORE: 0
HAVE PEOPLE ANNOYED YOU BY CRITICIZING YOUR DRINKING: NO
EVER_SMOKED: NEVER
TOTAL SCORE: 0
AVERAGE NUMBER OF DAYS PER WEEK YOU HAVE A DRINK CONTAINING ALCOHOL: 0
HAVE YOU EVER FELT YOU SHOULD CUT DOWN ON YOUR DRINKING: NO
ALCOHOL_USE: NO
EVER HAD A DRINK FIRST THING IN THE MORNING TO STEADY YOUR NERVES TO GET RID OF A HANGOVER: NO

## 2020-07-08 ASSESSMENT — FIBROSIS 4 INDEX
FIB4 SCORE: 5.02

## 2020-07-08 ASSESSMENT — COPD QUESTIONNAIRES
DURING THE PAST 4 WEEKS HOW MUCH DID YOU FEEL SHORT OF BREATH: NONE/LITTLE OF THE TIME
DO YOU EVER COUGH UP ANY MUCUS OR PHLEGM?: NO/ONLY WITH OCCASIONAL COLDS OR INFECTIONS
IN THE PAST 12 MONTHS DO YOU DO LESS THAN YOU USED TO BECAUSE OF YOUR BREATHING PROBLEMS: DISAGREE/UNSURE
COPD SCREENING SCORE: 2
HAVE YOU SMOKED AT LEAST 100 CIGARETTES IN YOUR ENTIRE LIFE: NO/DON'T KNOW

## 2020-07-08 NOTE — DISCHARGE INSTRUCTIONS
Discharge Instructions    Discharged to home by car with relative. Discharged via wheelchair, hospital escort: Yes.  Special equipment needed: Not Applicable    Be sure to schedule a follow-up appointment with your primary care doctor or any specialists as instructed.     Discharge Plan:   Diet Plan: (P) Discussed  Activity Level: (P) Discussed  Confirmed Follow up Appointment: (P) Appointment Scheduled  Confirmed Symptoms Management: (P) Discussed  Medication Reconciliation Updated: (P) Yes    I understand that a diet low in cholesterol, fat, and sodium is recommended for good health. Unless I have been given specific instructions below for another diet, I accept this instruction as my diet prescription.   Other diet: Heart healthy    Special Instructions: None    · Is patient discharged on Warfarin / Coumadin?   No     Depression / Suicide Risk    As you are discharged from this [facility] facility, it is important to learn how to keep safe from harming yourself.    Recognize the warning signs:  · Abrupt changes in personality, positive or negative- including increase in energy   · Giving away possessions  · Change in eating patterns- significant weight changes-  positive or negative  · Change in sleeping patterns- unable to sleep or sleeping all the time   · Unwillingness or inability to communicate  · Depression  · Unusual sadness, discouragement and loneliness  · Talk of wanting to die  · Neglect of personal appearance   · Rebelliousness- reckless behavior  · Withdrawal from people/activities they love  · Confusion- inability to concentrate     If you or a loved one observes any of these behaviors or has concerns about self-harm, here's what you can do:  · Talk about it- your feelings and reasons for harming yourself  · Remove any means that you might use to hurt yourself (examples: pills, rope, extension cords, firearm)  · Get professional help from the community (Mental Health, Substance Abuse,  psychological counseling)  · Do not be alone:Call your Safe Contact- someone whom you trust who will be there for you.  · Call your local CRISIS HOTLINE 328-9652 or 564-975-9656  · Call your local Children's Mobile Crisis Response Team [place] (442) 091-0222 or www.[employer]  · Call the toll free National Suicide Prevention Hotlines   · National Suicide Prevention Lifeline 975-501-VDFG (1474)  · National Hope Line Network 800-SUICIDE (769-1731)

## 2020-07-08 NOTE — ED PROVIDER NOTES
"ER Provider Note     Scribed for Cameron Rodriguez M.D. by Arabella Diaz. 7/8/2020, 12:08 AM.    Primary Care Provider: Coreen Cisse M.D.  Means of Arrival: Walk-in   History obtained from: Patient  History limited by: None     CHIEF COMPLAINT  Chief Complaint   Patient presents with   • Chest Pressure     Started at 4am this morning. described as a \"tightness\" light headed and nauseous       HPI  Dorie Wadsworth is a 84 y.o. female, with a history of atrial fibrillation, who presents to the Emergency Department for acute, constant, mild chest pressure that began today at 4 AM and has not resolved since onset. The patient reports that she was at her baseline one day ago, however today the patient woke up with sudden, moderate chest pressure that she describes as \"tight\" in nature. The patient states that the pressure originates at the left side of her chest and radiates towards her back. No exacerbating or alleviating factors were reported. She states that she has additional symptoms of lightheadedness and nausea that began with the onset of her chest pressure. The patient does not report taking any over the counter medications for her current symptoms. She notes that she has a history of atrial fibrillation and notes that she has been monitoring her heart rhythm, via an mayra on her cell phone, and states that her heart rhythm has been at baseline, however her chest pressure has not resolved since onset which prompted her to visit the ED today for further evaluation of her condition. The patient does not report symptoms of vomiting, shortness of breath, diarrhea, fevers or chills. Her last stress test was a few years ago. She denies taking blood thinners.     PPE Note: I personally donned full PPE for all patient encounters during this visit, including being clean-shaven with an N95 respirator mask, gloves, gown, and goggles.     Scribe remained outside the patient's room and did not have any contact with the " patient for the duration of patient encounter.       REVIEW OF SYSTEMS  See HPI for further details. All other systems are negative.     PAST MEDICAL HISTORY   has a past medical history of A-fib (HCC), Ailment, Arthritis, Atrial fibrillation (HCC) (2/2009), Atrial fibrillation with rapid ventricular response (HCC) (3/18/2019), Atrial fibrillation, rapid (HCC) (1/31/2014), Back pain, Bladder disease (2011), Bladder problem, CAD (coronary artery disease), Cancer (HCC), CATARACT, CHEST PAIN (3/11/2011), Glaucoma, History of hematuria, HTN (hypertension), malignant (3/18/2019), HTN (hypertension), malignant (3/18/2019), Hyperlipidemia (2/24/2011), Hypertension, Hypothyroid (2/24/2011), Indigestion, Intrinsic eczema (10/31/2017), Other specified pre-operative examination (1/28/2014), Paroxysmal atrial fibrillation (HCC), Sepsis urinary source (1/23/2012), Stroke ([facility]), T.I.A., TIA (transient ischemic attack) (3/11/2011), Unspecified disorder of thyroid, Unspecified hemorrhagic conditions, Urinary bladder disorder, UTI (urinary tract infection) (2/24/2011), and Vaginal bleeding (10/22/2012).    SURGICAL HISTORY   has a past surgical history that includes bladder biopsy with cystoscopy (10/10/08); pyelogram (10/10/08); retrogrades (10/10/08); bladder biopsy with cystoscopy (6/15/2009); bladder biopsy with cystoscopy (10/12/2009); other abdominal surgery; cholecystectomy (1994); cholecystectomy (1994); gyn surgery; pelvic exam under anesthesia (10/12/2009); rectocele repair (11/3/2009); other; cystectomy (9/6/2011); ileo loop diversion (9/6/2011); biopsy general (10/22/2012); parastomal hernia repair (1/28/2014); ventral hernia repair robotic xi (12/24/2019); and irrigation & debridement general (N/A, 2/27/2020).    SOCIAL HISTORY  Social History     Tobacco Use   • Smoking status: Never Smoker   • Smokeless tobacco: Never Used   Substance Use Topics   • Alcohol use: No   • Drug use: No      Social History     Substance  "and Sexual Activity   Drug Use No       FAMILY HISTORY  Family History   Problem Relation Age of Onset   • Stroke Mother 81   • Stroke Father 77       CURRENT MEDICATIONS  Home Medications     Reviewed by Kasi David R.N. (Registered Nurse) on 07/07/20 at 2355  Med List Status: Partial   Medication Last Dose Status   aspirin EC 81 MG EC tablet  Active   BIOTIN PO  Active   ferrous gluconate (IRON 27) 240 (27 Fe) MG tablet  Active   furosemide (LASIX) 20 MG Tab  Active   latanoprost (XALATAN) 0.005 % Solution  Active   levothyroxine (SYNTHROID) 112 MCG Tab  Active   lovastatin (MEVACOR) 10 MG tablet  Active   meloxicam (MOBIC) 7.5 MG Tab  Active   metoprolol (LOPRESSOR) 100 MG Tab  Active   Multiple Vitamins-Minerals (CENTRUM SILVER ULTRA WOMENS) Tab  Active   Multiple Vitamins-Minerals (PRESERVISION AREDS PO)  Active   pantoprazole (PROTONIX) 40 MG TBEC  Active   timolol (TIMOPTIC) 0.5 % Solution  Active                ALLERGIES  Allergies   Allergen Reactions   • Cardizem Swelling   • Doxycycline      Swollen lips.   • Sulfa Drugs      Makes tongue swell, severely   • Zyvox Swelling     \"Whole mouth swelled up\"    • Codeine      Does not remember the reaction         PHYSICAL EXAM  VITAL SIGNS: /98   Pulse (!) 103   Temp 36.6 °C (97.8 °F) (Oral)   Resp 16   Ht 1.727 m (5' 8\")   SpO2 97%   BMI 22.26 kg/m²    Constitutional: Alert in no apparent distress.  HENT: No signs of trauma, Bilateral external ears normal, Nose normal.   Eyes: Pupils are equal and reactive, Conjunctiva normal, Non-icteric.  Cardiovascular: Irregularly regular rate and regular rhythm, no palpable thrill  Thorax & Lungs: No respiratory distress,  No chest tenderness.   Abdomen: Bowel sounds normal, Soft, No tenderness, No masses, No pulsatile masses. No peritoneal signs.  Skin: Warm, Dry, No erythema, No rash.   Neurologic: Alert , Normal motor function, Normal sensory function, No focal deficits noted. " "  Psychiatric: Affect normal, Judgment normal, Mood normal.       DIAGNOSTIC STUDIES / PROCEDURES    EKG Interpretation:  Interpreted by me    12 Lead EKG interpreted by me to show:  Normal sinus rhythm  Atrial fibrillation with inverted T waves and concern for minor T wave change from prior EKG.  This does represent what appears to be a potential ischemic EKG.    LABS  Labs Reviewed   CBC WITH DIFFERENTIAL - Abnormal; Notable for the following components:       Result Value    RBC 3.93 (*)     Hemoglobin 11.9 (*)     MCHC 32.1 (*)     RDW 50.7 (*)     Platelet Count 115 (*)     Monocytes 15.80 (*)     Monos (Absolute) 1.18 (*)     All other components within normal limits   BASIC METABOLIC PANEL - Abnormal; Notable for the following components:    Co2 16 (*)     Glucose 146 (*)     Bun 25 (*)     All other components within normal limits   ESTIMATED GFR - Abnormal; Notable for the following components:    GFR If  45 (*)     GFR If Non  37 (*)     All other components within normal limits   TROPONIN   TROPONIN     All labs reviewed by me.    RADIOLOGY  DX-CHEST-PORTABLE (1 VIEW)   Final Result      Stable cardiomegaly. Possible mild vascular congestion.      NM-CARDIAC STRESS TEST    (Results Pending)      The radiologist's interpretation of all radiological studies have been reviewed by me.    COURSE & MEDICAL DECISION MAKING  Pertinent Labs & Imaging studies reviewed. (See chart for details)    This is a 84 y.o. female, with a history of atrial fibrillation, who presents to the Emergency Department for acute, constant, mild chest pressure that began today at 4 AM and has not resolved since onset. The patient reports that she woke up with sudden, moderate chest pressure that she describes as \"tight\" in nature. The pressure originates at the left side of her chest and radiates towards her back. Associated symptoms include lightheadedness and nausea.     11:41 PM Ordered EKG    12:08 " AM - Patient seen and examined at bedside. Discussed plan of care with patient which includes obtaining lab work and diagnostic imaging for further evaluation of the patient's condition. She does not require any medications at this time. The patient was informed that if her lab work is concerning, she will likely need to be evaluated for hospitalization. Patient is agreeable to the plan of care. Ordered Troponin, BMP, CBC with differential, estimated GFR and Dx-chest.      1:05 AM Given the patient's current presentation, symptoms, past medical history and lab results, she will need to be hospitalized for further lab work-up and observation of her condition. At this time, Dr. Yang (Hospitalist) was paged.     1:07 AM Recheck. The patient was updated on her lab results. She was informed that her lab work and diagnostic imaging are not consistent with a myocardial infarction, however they are still concerning. The patient was informed that she will need to be hospitalized for further observation and evaluation of her condition. Patient verbalizes her understanding and agreement to the plan of hospitalization.    Patient was found to have stable cardiomegaly and a negative troponin.  The patient does have a mild anemia.  The patient has no significant electrolyte derangements but does have a mildly elevated BUN.  EKG does show some potential ischemic changes.  Make sure the patient has gotten aspirin and admit the patient to hospitalist for further testing.    1:20 AM I discussed the patient's case and the above findings with Dr. Yang (Hospitalist) who agrees to evaluate the patient for hospitalization and instructs consulting with internal medicine    1:30 AM I discussed the patient's case and the above findings with Dr. Perera (Internal Medicine) who agrees to consult on the patient's case.       DISPOSITION:  Patient will be hospitalized by Dr. Yang (Hospitalist) in guarded condition.      FINAL  IMPRESSION  1. Chest pain, unspecified type          I, Arabella Diaz (Scribbrando), am scribing for, and in the presence of, Cameron Rodriguez M.D..    Electronically signed by: Arabella Diaz (Kalyan), 7/8/2020    ICameron M.D. personally performed the services described in this documentation, as scribed by Arabella Diaz in my presence, and it is both accurate and complete.     C    The note accurately reflects work and decisions made by me.  Cameron Rodriguez M.D.  7/8/2020  2:46 AM

## 2020-07-08 NOTE — ASSESSMENT & PLAN NOTE
- recent laparotomy for intraabdominal abscess, has follow up with Gen Surgery on 7/9/20  - medically clear for discharge today

## 2020-07-08 NOTE — ASSESSMENT & PLAN NOTE
- previously on AC with Eliquis with CHADSVASC of 6 and HASBLED of 5, GIB on Eliquis, currently on ASA

## 2020-07-08 NOTE — PROGRESS NOTES
Triage officer note    83yo female with chest pressure/tightness, h/o afib  Has metabolic acidosis as well    Will have [facility] IM resident Dr. Burgess call Dr. Zhou for admission.

## 2020-07-08 NOTE — PROGRESS NOTES
Assumed pt care at 0700. Received report from Phuong GIBBS. A&O x4. Pt denies pain. Respirations even and unlabored on room air. Pt reports no chest pain, SOB, nausea, or palpitations.   Updated on POC, communication board updated. Bed locked and in lowest position. Call light and belongings within reach. Non-skid socks in place. Needs met, will continue to monitor.

## 2020-07-08 NOTE — DISCHARGE PLANNING
Care Transition Team Assessment    Spoke with patient at bedside. Lives with spouse in [place]. PCP Dr. Reece. Has [employer] insurance. Uses [facility] in [place]. Family will be ride @ D/C. Anticipate no needs @ present time.    Information Source  Information Given By: Patient    Readmission Evaluation  Is this a readmission?: No    Interdisciplinary Discharge Planning  Does Admitting Nurse Feel This Could be a Complex Discharge?: No  Primary Care Physician: NATY REECE  Lives with - Patient's Self Care Capacity: Spouse  Patient or legal guardian wants to designate a caregiver (see row info): Yes  Caregiver name: Len Gaffney  Caregiver relationship to patient: Spouse  Caregiver contact info: #[phone]  ([facility]) Authorization for Release of Health Information has been completed: Patient refused to sign  Support Systems: Children, Spouse / Significant Other  Housing / Facility: [address]  Do You Take your Prescribed Medications Regularly: Yes  Able to Return to Previous ADL's: Yes  Mobility Issues: No  Prior Services: Home-Independent  Patient Expects to be Discharged to:: Home  Assistance Needed: No  Durable Medical Equipment: Not Applicable    Discharge Preparedness  What are your discharge supports?: Spouse  Prior Functional Level: Ambulatory    Functional Assesment  Prior Functional Level: Ambulatory    Finances  Prescription Coverage: Yes    Anticipated Discharge Information  Anticipated discharge disposition: Home  Discharge Address: [address] [place] [address] [address]  Discharge Contact Phone Number: [phone]

## 2020-07-08 NOTE — H&P
"History & Physical Note    Date of Admission: 7/8/2020  Admission Status: Observation-Outpatient  [facility] Team: TORSTENR JUSTUS Prakash Team   Attending: Dr. Yang  Senior Resident: Dr. Aaron  Intern: Dr. Gillette  Contact Number: [phone]    Chief Complaint: Chest Pressure (Started at 4am this morning. described as a \"tightness\" light headed and nauseous)       History of Present Illness (HPI): Patient is an 84-year-old female with a past medical history of atrial fibrillation not on anticoagulation, and hypothyroidism, brought in by her  to the emergency department due to a 1 day course of chest discomfort.  States that her chest felt as though it was being squeezed intermittently since 4 AM the morning prior to her admission.  Course was intermittent, nonprogressive, and accompanied by symptoms of nausea, lightheadedness, and feelings as though \"my heart was going nutside.\"  She is seen by cardiology and has been recommended in the past to present to the hospital if symptoms persist for more than 45 minutes.  On this occasion her symptoms lasted more than 1 hour on the next there intermittent nature.  States that initial presentation at 4 AM was not not precipitated by movement, she was sleeping.  Did not take any medications for symptom relief.  Resting did not alleviate her chest discomfort.  She also tried deep breathing, which did not help.  Has experienced prior episodes that self resolved with breathing slowly.  She is not experiencing shortness of breath, cough, active chest pain at the time of my encounter.  Denies fever, chills.  Although having nausea, no vomiting or gastrointestinal symptoms.    Review of Systems: Review of Systems   Constitutional: Negative for chills and fever.   HENT: Negative for ear pain and tinnitus.    Eyes: Negative for double vision and photophobia.   Respiratory: Negative for hemoptysis and sputum production.    Cardiovascular: Positive for chest pain and palpitations. "   Gastrointestinal: Negative for abdominal pain and nausea.   Genitourinary: Negative for frequency and urgency.   Musculoskeletal: Negative for back pain and neck pain.   Skin: Negative for itching and rash.   Neurological: Negative for tingling and tremors.   Endo/Heme/Allergies: Negative for environmental allergies. Does not bruise/bleed easily.   Psychiatric/Behavioral: Negative for substance abuse and suicidal ideas.        Past Medical History:    has a past medical history of A-fib ([facility]), Ailment, Arthritis, Atrial fibrillation ([facility]) (2/2009), Atrial fibrillation with rapid ventricular response ([facility]) (3/18/2019), Atrial fibrillation, rapid ([facility]) (1/31/2014), Back pain, Bladder disease (2011), Bladder problem, CAD (coronary artery disease), Cancer ([facility]), CATARACT, CHEST PAIN (3/11/2011), Glaucoma, History of hematuria, HTN (hypertension), malignant (3/18/2019), HTN (hypertension), malignant (3/18/2019), Hyperlipidemia (2/24/2011), Hypertension, Hypothyroid (2/24/2011), Indigestion, Intrinsic eczema (10/31/2017), Other specified pre-operative examination (1/28/2014), Paroxysmal atrial fibrillation ([facility]), Sepsis urinary source (1/23/2012), Stroke ([facility]), T.I.A., TIA (transient ischemic attack) (3/11/2011), Unspecified disorder of thyroid, Unspecified hemorrhagic conditions, Urinary bladder disorder, UTI (urinary tract infection) (2/24/2011), and Vaginal bleeding (10/22/2012). She also has no past medical history of COPD, Fall, Heart murmur, Psychiatric disorder, or Seizure disorder ([facility]).    Past Surgical History:  has a past surgical history that includes bladder biopsy with cystoscopy (10/10/08); pyelogram (10/10/08); retrogrades (10/10/08); bladder biopsy with cystoscopy (6/15/2009); bladder biopsy with cystoscopy (10/12/2009); other abdominal surgery; cholecystectomy (1994); cholecystectomy (1994); gyn surgery; pelvic exam under anesthesia (10/12/2009); rectocele repair (11/3/2009); other; cystectomy  "(9/6/2011); ileo loop diversion (9/6/2011); biopsy general (10/22/2012); parastomal hernia repair  (1/28/2014); ventral hernia repair robotic xi (12/24/2019); and irrigation & debridement general (N/A, 2/27/2020).    Medications:   Prior to Admission Medications   Prescriptions Last Dose Informant Patient Reported? Taking?   BIOTIN PO  Patient Yes No   Sig: Take 1 Cap by mouth every day.   Multiple Vitamins-Minerals (CENTRUM SILVER ULTRA WOMENS) Tab  Patient Yes No   Sig: Take 1 Tab by mouth every day.   Multiple Vitamins-Minerals (PRESERVISION AREDS PO)  Patient Yes No   Sig: Take 1 Tab by mouth 2 Times a Day.   aspirin EC 81 MG EC tablet  Patient No No   Sig: Take 1 Tab by mouth every day.   ferrous gluconate (IRON 27) 240 (27 Fe) MG tablet  Patient Yes No   Sig: Take 240 mg by mouth every day.   furosemide (LASIX) 20 MG Tab  Patient Yes No   Sig: Take 20 mg by mouth 1 time daily as needed.   latanoprost (XALATAN) 0.005 % Solution  Patient Yes No   Sig: Place 1 Drop in both eyes every evening.   levothyroxine (SYNTHROID) 112 MCG Tab  Patient Yes No   Sig: Take 112 mcg by mouth Every morning on an empty stomach.   lovastatin (MEVACOR) 10 MG tablet  Patient Yes No   Sig: Take 10 mg by mouth every day.   meloxicam (MOBIC) 7.5 MG Tab   Yes Yes   Sig: Take 7.5 mg by mouth every day.   metoprolol (LOPRESSOR) 100 MG Tab  Patient No No   Sig: Take 1 Tab by mouth 2 times a day.   pantoprazole (PROTONIX) 40 MG TBEC  Patient Yes No   Sig: Take 40 mg by mouth every day.   timolol (TIMOPTIC) 0.5 % Solution  Patient Yes No   Sig: Place 1 Drop in both eyes 2 times a day.      Facility-Administered Medications: None        Allergies:   Allergies   Allergen Reactions   • Cardizem Swelling   • Doxycycline      Swollen lips.   • Sulfa Drugs      Makes tongue swell, severely   • Zyvox Swelling     \"Whole mouth swelled up\"    • Codeine      Does not remember the reaction         Family History:   family history includes Stroke (age of " onset: 77) in her father; Stroke (age of onset: 81) in her mother.     Social History:   Tobacco: No  Alcohol: No  Recreational drugs (illegal and prescription): No  Employment: Retired  Activity Level: Moderate  Living situation: Lives at home with   Recent travel: No  Primary Care Provider:  Coreen Cisse M.D.      Vitals:  Temp:  [36.4 °C (97.5 °F)-36.6 °C (97.8 °F)] 36.4 °C (97.5 °F)  Pulse:  [] 97  Resp:  [16-24] 16  BP: (123-167)/(70-98) 167/81  SpO2:  [95 %-98 %] 95 %    Physical Exam  Vitals signs and nursing note reviewed.   Constitutional:       General: She is not in acute distress.     Appearance: She is not toxic-appearing.   HENT:      Head: Normocephalic and atraumatic.      Right Ear: Tympanic membrane normal.      Left Ear: Tympanic membrane normal.      Nose: No congestion or rhinorrhea.      Mouth/Throat:      Mouth: Mucous membranes are moist.      Pharynx: No oropharyngeal exudate or posterior oropharyngeal erythema.   Eyes:      Extraocular Movements: Extraocular movements intact.      Pupils: Pupils are equal, round, and reactive to light.   Neck:      Musculoskeletal: No neck rigidity or muscular tenderness.   Cardiovascular:      Rate and Rhythm: Regular rhythm. Tachycardia present.      Heart sounds: No murmur. No friction rub.   Pulmonary:      Effort: No respiratory distress.      Breath sounds: No stridor. No wheezing.   Abdominal:      Tenderness: There is no abdominal tenderness. There is no guarding or rebound.      Comments: Occasional abdominal pain localized around cystostomy bag placement   Genitourinary:     Rectum: Guaiac result negative.   Musculoskeletal:         General: No swelling, tenderness or deformity.   Skin:     Coloration: Skin is not jaundiced or pale.      Findings: No bruising or erythema.   Neurological:      Mental Status: She is alert and oriented to person, place, and time. Mental status is at baseline.      Sensory: No sensory deficit.       Motor: No weakness.      Gait: Gait normal.         Labs:   Results for orders placed or performed during the hospital encounter of 20   CBC WITH DIFFERENTIAL   Result Value Ref Range    WBC 7.5 4.8 - 10.8 K/uL    RBC 3.93 (L) 4.20 - 5.40 M/uL    Hemoglobin 11.9 (L) 12.0 - 16.0 g/dL    Hematocrit 37.1 37.0 - 47.0 %    MCV 94.4 81.4 - 97.8 fL    MCH 30.3 27.0 - 33.0 pg    MCHC 32.1 (L) 33.6 - 35.0 g/dL    RDW 50.7 (H) 35.9 - 50.0 fL    Platelet Count 115 (L) 164 - 446 K/uL    MPV 11.0 9.0 - 12.9 fL    Neutrophils-Polys 55.40 44.00 - 72.00 %    Lymphocytes 25.20 22.00 - 41.00 %    Monocytes 15.80 (H) 0.00 - 13.40 %    Eosinophils 2.70 0.00 - 6.90 %    Basophils 0.50 0.00 - 1.80 %    Immature Granulocytes 0.40 0.00 - 0.90 %    Nucleated RBC 0.00 /100 WBC    Neutrophils (Absolute) 4.13 2.00 - 7.15 K/uL    Lymphs (Absolute) 1.88 1.00 - 4.80 K/uL    Monos (Absolute) 1.18 (H) 0.00 - 0.85 K/uL    Eos (Absolute) 0.20 0.00 - 0.51 K/uL    Baso (Absolute) 0.04 0.00 - 0.12 K/uL    Immature Granulocytes (abs) 0.03 0.00 - 0.11 K/uL    NRBC (Absolute) 0.00 K/uL   BASIC METABOLIC PANEL   Result Value Ref Range    Sodium 140 135 - 145 mmol/L    Potassium 4.4 3.6 - 5.5 mmol/L    Chloride 110 96 - 112 mmol/L    Co2 16 (L) 20 - 33 mmol/L    Glucose 146 (H) 65 - 99 mg/dL    Bun 25 (H) 8 - 22 mg/dL    Creatinine 1.37 0.50 - 1.40 mg/dL    Calcium 8.5 8.5 - 10.5 mg/dL    Anion Gap 14.0 7.0 - 16.0   TROPONIN   Result Value Ref Range    Troponin T 8 6 - 19 ng/L   ESTIMATED GFR   Result Value Ref Range    GFR If African American 45 (A) >60 mL/min/1.73 m 2    GFR If Non  37 (A) >60 mL/min/1.73 m 2   EKG (NOW)   Result Value Ref Range    Report       [facility] Emergency Dept.    Test Date:  2020  Pt Name:    BERNARD BAER                Department: ER  MRN:        [id number]                      Room:        15  Gender:     Female                       Technician: [id number]  :        [id number]                    Requested By:ER TRIAGE PROTOCOL  Order #:    [id number]                    Reading MD: Cameron Rodriguez MD    Measurements  Intervals                                Axis  Rate:       101                          P:  DC:                                      QRS:        2  QRSD:       74                           T:          162  QT:         348  QTc:        452    Interpretive Statements  ATRIAL FIBRILLATION  PROBABLE LVH WITH SECONDARY REPOL ABNRM  REPOL ABNRM, PROBABLE ISCHEMIA, ANT-LAT LEADS  Compared to ECG 2020 16:52:48  Sinus rhythm no longer present  Possible ischemia still present  Electronically Signed On 2020 1:03:29 PDT by Cameron Rodriguez MD          EKG:   Results for orders placed or performed during the hospital encounter of 20   EKG (NOW)   Result Value Ref Range    Report       [facility] Emergency Dept.    Test Date:  2020  Pt Name:    BERNARD BAER                Department: ER  MRN:        [id number]                      Room:       BL 15  Gender:     Female                       Technician: [id number]  :        1935                   Requested By:ER TRIAGE PROTOCOL  Order #:    [id number]                    Reading MD: Cameron Rodriguez MD    Measurements  Intervals                                Axis  Rate:       101                          P:  DC:                                      QRS:        2  QRSD:       74                           T:          162  QT:         348  QTc:        452    Interpretive Statements  ATRIAL FIBRILLATION  PROBABLE LVH WITH SECONDARY REPOL ABNRM  REPOL ABNRM, PROBABLE ISCHEMIA, ANT-LAT LEADS  Compared to ECG 2020 16:52:48  Sinus rhythm no longer present  Possible ischemia still present  Electronically Signed On 2020 1:03:29 PDT by Cameron Rodriguez MD          Imaging:   DX-CHEST-PORTABLE (1 VIEW)   Final Result      Stable cardiomegaly. Possible mild vascular congestion.      NM-CARDIAC STRESS TEST    (Results  "Pending)        Previous Data Review: Reviewed    AF (atrial fibrillation) ([facility])- (present on admission)  Assessment & Plan  Patient is an 84-year-old female with a past medical history of atrial fibrillation not on anticoagulation, and hypothyroidism, brought in by her  to the emergency department due to a 1 day course of chest discomfort.  States that her chest felt as though it was being squeezed intermittently since 4 AM the morning prior to her admission.  Course was intermittent, nonprogressive, and accompanied by symptoms of nausea, lightheadedness, and feelings as though \"my heart was going nuts.\"  She is seen by cardiology and has been recommended in the past to present to the hospital if symptoms persist for more than 45 minutes.      Ekg- afib, LVH with secondary repolarization abnormality probable ischemia of eduardo-lateral leads    Troponin on admission 8, repeat to follow at 6 a.m.  Last echo in 3/19 70 percent ef  Telemetry monitor.  Follow up TSH with reflex  BNP, BMP, Magnesium, EKG  Follow up electrolytes; replace as appropriate. Keep K >4 and Mag > 2  CHADVasc score 4. Patient is not on any anticoagulants at home  HAS-BLED 4  Continue home metoprolol    Hyperglycemia  Assessment & Plan  Gluc 146 on admission  Not on home meds  F/u hba1c  CTM glucose and if persistently elevated ,hyperglycemic agents,SSI    HLD (hyperlipidemia)  Assessment & Plan  PMH of HLD  Continue home statin  F/u lipids and a1c    Hypothyroidism  Assessment & Plan  PMH hypothyroidism  Continue home synthroid  F/u TSH       "

## 2020-07-08 NOTE — ED TRIAGE NOTES
"Dorie Wadsworth   84 y.o. female     Chief Complaint   Patient presents with   • Chest Pressure     Started at 4am this morning. described as a \"tightness\" light headed and nauseous      Pt amb to triage with steady gait for above complaint.     EKG done in triage.     Pt is alert and oriented, speaking in full sentences, follows commands and responds appropriately to questions. NAD. Resp are even and unlabored.   Pt placed in lobby. Pt educated on triage process. Pt encouraged to alert staff for any changes.    /98   Pulse (!) 103   Temp 36.6 °C (97.8 °F) (Oral)   Resp 16   Ht 1.727 m (5' 8\")   SpO2 97%   BMI 22.26 kg/m²     "

## 2020-07-08 NOTE — ED NOTES
Pt to B15 self ambulated with steady gait. Pt attached to monitor and changed into gown. Family at bedside

## 2020-07-08 NOTE — DISCHARGE SUMMARY
Internal Medicine Discharge Summary  Note Author: Damien Aaron M.D.       Name Dorie Wadsworth 1935   Age/Sex 84 y.o. female   MRN [id number]         Admit Date:  2020       Discharge Date:   2020    Service:   R Internal Medicine Gray Team  Attending Physician(s):   Alexandru Yang MD       Senior Resident(s):   Damien Aaron MD  [place] Resident(s):   Camden Richardson MD  PCP: Coreen Cisse M.D.      Primary Diagnosis:   Atypical chest pain    Secondary Diagnoses:                Active Problems:    AF (atrial fibrillation) (HCC) POA: Yes    Anticoagulant long-term use POA: Yes    Intra-abdominal abscess (HCC) POA: Yes    HLD (hyperlipidemia) POA: Unknown    Hyperglycemia POA: Unknown    Hypothyroidism POA: Unknown  Resolved Problems:    * No resolved hospital problems. *      Hospital Summary (Brief Narrative):       84 yr old female with PMHX of atrial fibrillation well rate controlled with Metropolol and not on anticoagulation due to prior bleeding (stomal bleeding, trialed prior to this on Eliquis and currently on ASA) with a CHADSVASC 6, HASBLED 5, HTN, DLD on statin, hypothyroidism, presented with precordial chest pain non radiating not exertional experienced while she was lying in bed last night. She came in to the ER and underwent EKG which showed no acute changes in J point/ ST segment (however she has a chronic T wave inversion going back as far as . She sees Cardiology as outpatient. She had negative troponins.    She was admitted for observation and underwent a nuclear stress test on  which was within normal parameters. She is ambulating well without recurrence of her chest pain an her rate is well controlled. She is thus being discharged in good and stable condition with advice to follow up with her PCP and with General surgery for follow up as scheduled for her recent laparotomy (for intraabdominal abscess).    Patient /Hospital Summary (Details -- Problem  Oriented) :          AF (atrial fibrillation) (HCC)  Assessment & Plan  Patient admitted for chest pain, atypical, negative troponins, normal tress test and rate controlled, CHADVASC 6, HASBLED 5, not on AC due to prior GI bleed (stomal).    Ekg- afib, LVH with secondary repolarization abnormality probable ischemia of eduardo-lateral leads  Continue home metoprolol    Hyperglycemia  Assessment & Plan  A1c 5, Avg     HLD (hyperlipidemia)  Assessment & Plan  PMH of HLD  Continue home statin    Intra-abdominal abscess (HCC)  Assessment & Plan  - recent laparotomy for intraabdominal abscess, has follow up with Gen Surgery on 7/9/20  - medically clear for discharge today    Anticoagulant long-term use  Assessment & Plan  - previously on AC with Eliquis with CHADSVASC of 6 and HASBLED of 5, GIB on Eliquis, currently on ASA    Hypothyroidism  Assessment & Plan  PMH hypothyroidism  Continue home synthroid      Consultants:     - Cardiology (stress test interpretation)    Procedures:        - 7/8/20 nuclear stress test (Lexiscan)    Imaging/ Testing:      NM-CARDIAC STRESS TEST   Final Result      DX-CHEST-PORTABLE (1 VIEW)   Final Result      Stable cardiomegaly. Possible mild vascular congestion.            Discharge Medications:         Medication Reconciliation: Completed       Medication List      START taking these medications      Instructions   Magnesium Oxide 400 MG Caps   Take 1 Cap by mouth every day.  Dose:  1 Cap        CONTINUE taking these medications      Instructions   aspirin 81 MG EC tablet   Take 1 Tab by mouth every day.  Dose:  81 mg     BIOTIN PO   Take 1 Cap by mouth every day.  Dose:  1 Cap     furosemide 20 MG Tabs  Commonly known as:  LASIX   Take 20 mg by mouth 1 time daily as needed.  Dose:  20 mg     Iron 27 240 (27 Fe) MG tablet  Generic drug:  ferrous gluconate   Take 240 mg by mouth every day.  Dose:  240 mg     latanoprost 0.005 % Soln  Commonly known as:  XALATAN   Place 1 Drop in both  eyes every evening.  Dose:  1 Drop     levothyroxine 112 MCG Tabs  Commonly known as:  SYNTHROID   Take 112 mcg by mouth Every morning on an empty stomach.  Dose:  112 mcg     lovastatin 10 MG tablet  Commonly known as:  MEVACOR   Take 10 mg by mouth every day.  Dose:  10 mg     meloxicam 7.5 MG Tabs  Commonly known as:  MOBIC   Take 7.5 mg by mouth every day.  Dose:  7.5 mg     metoprolol 100 MG Tabs  Commonly known as:  LOPRESSOR   Take 1 Tab by mouth 2 times a day.  Dose:  100 mg     pantoprazole 40 MG Tbec  Commonly known as:  PROTONIX   Take 40 mg by mouth every day.  Dose:  40 mg     * PRESERVISION AREDS PO   Take 1 Tab by mouth 2 Times a Day.  Dose:  1 Tab     * Centrum Silver Ultra Womens Tabs   Take 1 Tab by mouth every day.  Dose:  1 Tab     timolol 0.5 % Soln  Commonly known as:  TIMOPTIC   Place 1 Drop in both eyes 2 times a day.  Dose:  1 Drop         * This list has 2 medication(s) that are the same as other medications prescribed for you. Read the directions carefully, and ask your doctor or other care provider to review them with you.                  Disposition:   medically clear for discharge to home    Diet:  As tolerated    Activity:   As tolerated    Instructions:       The patient was instructed to return to the ER in the event of worsening symptoms. I have counseled the patient on the importance of compliance and the patient has agreed to proceed with all medical recommendations and follow up plan indicated above.   The patient understands that all medications come with benefits and risks. Risks may include permanent injury or death and these risks can be minimized with close reassessment and monitoring.        Primary Care Provider:    Coreen Cisse M.D.  Discharge summary faxed to primary care provider:  Deferred  Copy of discharge summary given to the patient: Deferred      Follow up appointment details :      No future appointments.  Ramiro Arita MD  [address] [facility]  [id number]  [phone]  Go on 7/17/2020  Please check in at 3:15pm for a hospital follow up at 3:30pm. Thank you!       Pending Studies:        N/A    Time spent on discharge day patient visit, preparing discharge paperwork and arranging for patient follow up.    Summary of follow up issues:   - PCP follow up for ongoing medical issues  - Surgery follow up for recent surgery    Discharge Time (Minutes) :    35 minutes  Hospital Course Type: Observation Stay      Condition on Discharge    ______________________________________________________________________    Interval history/exam for day of discharge:     - patient seen and examined at bedside this AM, stable, NAD, no AEON, no new complaints, no chest pain, patient counseled about her stress test being normal, ambulating well, medically clear for discharge.      Most Recent Labs:    Lab Results   Component Value Date/Time    WBC 7.5 07/08/2020 12:14 AM    RBC 3.93 (L) 07/08/2020 12:14 AM    HEMOGLOBIN 11.9 (L) 07/08/2020 12:14 AM    HEMATOCRIT 37.1 07/08/2020 12:14 AM    MCV 94.4 07/08/2020 12:14 AM    MCH 30.3 07/08/2020 12:14 AM    MCHC 32.1 (L) 07/08/2020 12:14 AM    MPV 11.0 07/08/2020 12:14 AM    NEUTSPOLYS 55.40 07/08/2020 12:14 AM    LYMPHOCYTES 25.20 07/08/2020 12:14 AM    MONOCYTES 15.80 (H) 07/08/2020 12:14 AM    EOSINOPHILS 2.70 07/08/2020 12:14 AM    BASOPHILS 0.50 07/08/2020 12:14 AM    HYPOCHROMIA 2+ 02/29/2020 05:51 AM    ANISOCYTOSIS 1+ 05/17/2020 11:40 PM      Lab Results   Component Value Date/Time    SODIUM 140 07/08/2020 12:14 AM    POTASSIUM 4.4 07/08/2020 12:14 AM    CHLORIDE 110 07/08/2020 12:14 AM    CO2 16 (L) 07/08/2020 12:14 AM    GLUCOSE 146 (H) 07/08/2020 12:14 AM    BUN 25 (H) 07/08/2020 12:14 AM    CREATININE 1.37 07/08/2020 12:14 AM    CREATININE 1.2 02/10/2009 03:50 PM      Lab Results   Component Value Date/Time    ALTSGPT 22 05/17/2020 11:40 PM    ASTSGOT 33 05/17/2020 11:40 PM    ALKPHOSPHAT 233 (H) 05/17/2020 11:40 PM     TBILIRUBIN 0.6 05/17/2020 11:40 PM    DBILIRUBIN 0.1 09/11/2013 08:38 AM    LIPASE 64 (H) 12/20/2019 12:38 PM    ALBUMIN 3.2 05/17/2020 11:40 PM    GLOBULIN 4.7 (H) 05/17/2020 11:40 PM    PREALBUMIN 10.7 (L) 09/12/2011 05:19 AM    INR 1.18 (H) 02/27/2020 05:07 AM    MACROCYTOSIS 1+ 05/17/2020 11:40 PM     Lab Results   Component Value Date/Time    PROTHROMBTM 15.3 (H) 02/27/2020 05:07 AM    INR 1.18 (H) 02/27/2020 05:07 AM

## 2020-07-08 NOTE — THERAPY
Physical Therapy Contact Note    Patient Name: Dorie Wadsworth  Age:  84 y.o., Sex:  female  Medical Record #: [id number]  Today's Date: 7/8/2020    Discussed missed therapy with RN. PT consult received, attempted PT evaluation. Pt reports no concerns with functional mobility or return home. Pt politely declines evaluation and therapy services at this time. Will sign-off. Please re-order should needs/POC change.

## 2020-07-08 NOTE — ASSESSMENT & PLAN NOTE
Patient admitted for chest pain, atypical, negative troponins, normal tress test and rate controlled, CHADVASC 6, HASBLED 5, not on AC due to prior GI bleed (stomal).    Ekg- afib, LVH with secondary repolarization abnormality probable ischemia of eduardo-lateral leads  Continue home metoprolol

## 2020-07-08 NOTE — ED NOTES
Pt rounded on, resting in bed, on the monitor, respirations even and unlabored, repositioning self as needed, updated on POC. Waiting for CXR.

## 2020-07-08 NOTE — SENIOR ADMIT NOTE
Senior Admit Note    Dorie Wadsworth is a 84 y.o. female with a history of hypothryoidism, atrial fibrillation not on anticoagulation, who presents to the hospital with chest pressure.  Patient states that early on the morning of July 8, she was laying down in bed when she started to have a squeezing pressure in the center of her chest.  States that it was constant and lasted more than an hour.  It was nonradiating.  She had no other associated symptoms.  Denied nausea, vomiting, diaphoresis.    Emergency department, the patient had a normal troponin and nonspecific T wave changes on her EKG.      Pertinent physical exam:  General: Resting comfortably in no acute distress  Heart: Irregular rate, irregular rhythm  Chest: Chest pain not reproducible with palpation.  Lungs: Crackles bilaterally at the bases  Abdomen: Soft, nontender.  Normal bowel sounds.  Neuro: Alert and oriented x4.  No focal deficits.    Labs  Troponin: 8    EKG:   New T wave inversions in AVL; Deepening of T waves in V5, V6.    Assessment and plan:  #Chest Pain  --Patient declines oral anticoagulants due to hx of previous bleeds.     --T wave inversions on EKG  --Pain may be secondary to discomfort/palpitations from atrial fibrillation.  Plan:  --Admit to telemetry  --Trend troponins and EKGs.  --Stress test in AM    #Atrial Fibrillation   -CHADSVASC: 4  --HASBLED: 4  --Consider discussion of risks and benefits of anticoagulation.    Please see Dr. Mota's H&P for full details.    Josh Burgess D.O., R Internal Medicine Resident

## 2020-07-08 NOTE — THERAPY
Missed Therapy     Patient Name: Dorie Wadsworth  Age:  84 y.o., Sex:  female  Medical Record #: [id number]  Today's Date: 7/8/2020    OT consult received, attempted.  Pt was educated on OT role in this setting, pt reports she has no concerns and declined formal assessment. Will cancel orders, please re-consult should there be a change function. RN aware of recommendations.

## 2020-07-08 NOTE — PROGRESS NOTES
Patient presents to NM suite for cardiac stress test with MPI. Nursing goals identified: knowledge deficit, potential for anxiety r/t stress test, potential for compromised cardiac output. Care plan includes patient education and reassurance, access to Aminophylline and access to ACLS cart/team. Labs and ECG reviewed. No caffeine consumption by patient and NPO (except water) confirmed. Resting images attained and patient prepped for pharmacological stress study. Lexiscan given while patient ambulated on TM x 2 min. Reported symptoms and observed signs include: SOB, tired, heavy legs. Caffeinated beverage provided. Symptoms resolved. Patient tolerated procedure well.

## 2020-07-09 NOTE — PROGRESS NOTES
"Daily Progress Note:     Date of Service: 7/8/2020  Primary Team: [facility] Team   Attending: No att. providers found   Senior Resident: Dr. Aaron  Intern: Dr. Richardson  Contact:  [phone]    Chief Complaint:   Chest Pressure (Started at 4am this morning. described as a \"tightness\" light headed and nauseous)    Subjective:   Patient is an 84-year-old female with a past medical history of atrial fibrillation not on anticoagulation, and hypothyroidism, brought in by her  to the emergency department due to a 1 day course of chest discomfort. EKG afib with t wave inversions in eduardo-lateral leads consistent with prior EKG's, showing no new signs of ischemia/infarct. Troponins were wnl. Patient is not anticoagulation due to a history of chronic bladder infections that lead to removal of her bladder and creation of a stoma that predisposed her to overt bleeding. During encounter today patient is asymptomatic, denies chest pain, SOB, lightheadedness, headaches, N/V. Patient will undergo nuclear stress testing to further evaluate cardiac etiology for admitting presentation. Otherwise no complaints at this time.    Review of Systems:    Review of Systems   Constitutional: Negative for chills, diaphoresis and fever.   HENT: Negative for hearing loss.    Eyes: Negative for blurred vision and double vision.   Respiratory: Negative for cough and shortness of breath.    Cardiovascular: Negative for chest pain, palpitations, orthopnea and leg swelling.   Gastrointestinal: Negative for abdominal pain, constipation, diarrhea, nausea and vomiting.   Genitourinary: Negative for dysuria and hematuria.   Musculoskeletal: Negative for joint pain and myalgias.   Skin: Negative for rash.   Neurological: Negative for dizziness, loss of consciousness, weakness and headaches.       Objective Data:   Physical Exam:   Vitals:   Temp:  [36.4 °C (97.5 °F)-37.4 °C (99.3 °F)] 37.4 °C (99.3 °F)  Pulse:  [] 86  Resp:  [16-24] " 20  BP: (123-167)/(65-98) 135/65  SpO2:  [95 %-98 %] 98 %    Physical Exam  Vitals signs reviewed.   Constitutional:       Appearance: Normal appearance.   HENT:      Head: Normocephalic and atraumatic.      Mouth/Throat:      Mouth: Mucous membranes are moist.   Eyes:      Conjunctiva/sclera: Conjunctivae normal.      Pupils: Pupils are equal, round, and reactive to light.   Neck:      Musculoskeletal: Neck supple.   Cardiovascular:      Rate and Rhythm: Normal rate and regular rhythm.      Pulses: Normal pulses.      Heart sounds: Normal heart sounds. No murmur.   Pulmonary:      Effort: Pulmonary effort is normal.      Breath sounds: Normal breath sounds.   Abdominal:      General: There is no distension.      Palpations: Abdomen is soft.      Tenderness: There is no abdominal tenderness.   Musculoskeletal:      Left lower leg: No edema.   Skin:     General: Skin is warm and dry.      Capillary Refill: Capillary refill takes less than 2 seconds.   Neurological:      General: No focal deficit present.      Mental Status: She is alert and oriented to person, place, and time.   Psychiatric:         Mood and Affect: Mood normal.           Labs:   Recent Labs     07/08/20  0014   WBC 7.5   RBC 3.93*   HEMOGLOBIN 11.9*   HEMATOCRIT 37.1   MCV 94.4   MCH 30.3   RDW 50.7*   PLATELETCT 115*   MPV 11.0   NEUTSPOLYS 55.40   LYMPHOCYTES 25.20   MONOCYTES 15.80*   EOSINOPHILS 2.70   BASOPHILS 0.50      Recent Labs     07/08/20  0014   SODIUM 140   POTASSIUM 4.4   CHLORIDE 110   CO2 16*   GLUCOSE 146*   BUN 25*      Recent Labs     07/08/20 0014   CREATININE 1.37        Imaging:   NM-CARDIAC STRESS TEST   Final Result      DX-CHEST-PORTABLE (1 VIEW)   Final Result      Stable cardiomegaly. Possible mild vascular congestion.           AF (atrial fibrillation) (HCC)- (present on admission)  Assessment & Plan  Patient admitted for chest pain, atypical, negative troponins. Ekg- afib, LVH with secondary repolarization abnormality  probable ischemia of eduardo-lateral leads  -Nuclear stress test today, pending results - if negative, will consider discharge today  -Continue home Metoprolol for rate control  -CHADVASC 6, HASBLED 5, not on AC due to prior GI bleed (stomal).    Hyperglycemia  Assessment & Plan  A1c 5, Avg   -Will CTM BG's     HLD (hyperlipidemia)  Assessment & Plan  PMH of HLD  -Continue home statin    Intra-abdominal abscess (HCC)- (present on admission)  Assessment & Plan  -Recent laparotomy for intraabdominal abscess  -F/u outpatient with Gen Surgery outpatient    Anticoagulant long-term use- (present on admission)  Assessment & Plan  Previously on AC with Eliquis with CHADSVASC of 6 and HASBLED of 5, GIB on Eliquis  -Currently on ASA    Hypothyroidism  Assessment & Plan  PMH hypothyroidism  -Continue home synthroid

## 2020-07-15 ENCOUNTER — HOSPITAL ENCOUNTER (OUTPATIENT)
Dept: HOSPITAL 8 - WOUND | Age: 85
Discharge: HOME | End: 2020-07-15
Attending: INTERNAL MEDICINE
Payer: MEDICARE

## 2020-07-15 DIAGNOSIS — I48.91: ICD-10-CM

## 2020-07-15 DIAGNOSIS — E78.5: ICD-10-CM

## 2020-07-15 DIAGNOSIS — Z79.82: ICD-10-CM

## 2020-07-15 DIAGNOSIS — E03.9: ICD-10-CM

## 2020-07-15 DIAGNOSIS — K12.1: Primary | ICD-10-CM

## 2020-07-15 DIAGNOSIS — Z90.49: ICD-10-CM

## 2020-07-15 DIAGNOSIS — I10: ICD-10-CM

## 2020-07-15 DIAGNOSIS — Z90.710: ICD-10-CM

## 2020-07-17 ENCOUNTER — APPOINTMENT (OUTPATIENT)
Dept: RADIOLOGY | Facility: MEDICAL CENTER | Age: 85
DRG: 872 | End: 2020-07-17
Attending: EMERGENCY MEDICINE
Payer: MEDICARE

## 2020-07-17 ENCOUNTER — HOSPITAL ENCOUNTER (INPATIENT)
Facility: MEDICAL CENTER | Age: 85
LOS: 2 days | DRG: 872 | End: 2020-07-19
Attending: EMERGENCY MEDICINE | Admitting: HOSPITALIST
Payer: MEDICARE

## 2020-07-17 DIAGNOSIS — N12 PYELONEPHRITIS: ICD-10-CM

## 2020-07-17 LAB
ALBUMIN SERPL BCP-MCNC: 3.7 G/DL (ref 3.2–4.9)
ALBUMIN/GLOB SERPL: 0.9 G/DL
ALP SERPL-CCNC: 183 U/L (ref 30–99)
ALT SERPL-CCNC: 20 U/L (ref 2–50)
ANION GAP SERPL CALC-SCNC: 14 MMOL/L (ref 7–16)
ANISOCYTOSIS BLD QL SMEAR: ABNORMAL
APPEARANCE UR: ABNORMAL
AST SERPL-CCNC: 29 U/L (ref 12–45)
BACTERIA #/AREA URNS HPF: ABNORMAL /HPF
BASOPHILS # BLD AUTO: 0 % (ref 0–1.8)
BASOPHILS # BLD: 0 K/UL (ref 0–0.12)
BILIRUB SERPL-MCNC: 1.2 MG/DL (ref 0.1–1.5)
BILIRUB UR QL STRIP.AUTO: NEGATIVE
BUN SERPL-MCNC: 20 MG/DL (ref 8–22)
CALCIUM SERPL-MCNC: 9 MG/DL (ref 8.5–10.5)
CHLORIDE SERPL-SCNC: 101 MMOL/L (ref 96–112)
CO2 SERPL-SCNC: 20 MMOL/L (ref 20–33)
COLOR UR: YELLOW
COVID ORDER STATUS COVID19: NORMAL
CREAT SERPL-MCNC: 1.72 MG/DL (ref 0.5–1.4)
EKG IMPRESSION: NORMAL
EOSINOPHIL # BLD AUTO: 0 K/UL (ref 0–0.51)
EOSINOPHIL NFR BLD: 0 % (ref 0–6.9)
EPI CELLS #/AREA URNS HPF: NEGATIVE /HPF
ERYTHROCYTE [DISTWIDTH] IN BLOOD BY AUTOMATED COUNT: 49 FL (ref 35.9–50)
GLOBULIN SER CALC-MCNC: 4.2 G/DL (ref 1.9–3.5)
GLUCOSE BLD-MCNC: 101 MG/DL (ref 65–99)
GLUCOSE SERPL-MCNC: 107 MG/DL (ref 65–99)
GLUCOSE UR STRIP.AUTO-MCNC: NEGATIVE MG/DL
HCT VFR BLD AUTO: 41 % (ref 37–47)
HGB BLD-MCNC: 13.1 G/DL (ref 12–16)
HYALINE CASTS #/AREA URNS LPF: ABNORMAL /LPF
INR PPP: 1.18 (ref 0.87–1.13)
KETONES UR STRIP.AUTO-MCNC: NEGATIVE MG/DL
LACTATE BLD-SCNC: 1.7 MMOL/L (ref 0.5–2)
LEUKOCYTE ESTERASE UR QL STRIP.AUTO: ABNORMAL
LYMPHOCYTES # BLD AUTO: 1.8 K/UL (ref 1–4.8)
LYMPHOCYTES NFR BLD: 10.3 % (ref 22–41)
MACROCYTES BLD QL SMEAR: ABNORMAL
MANUAL DIFF BLD: NORMAL
MCH RBC QN AUTO: 30 PG (ref 27–33)
MCHC RBC AUTO-ENTMCNC: 32 G/DL (ref 33.6–35)
MCV RBC AUTO: 93.8 FL (ref 81.4–97.8)
MICRO URNS: ABNORMAL
MICROCYTES BLD QL SMEAR: ABNORMAL
MONOCYTES # BLD AUTO: 2.12 K/UL (ref 0–0.85)
MONOCYTES NFR BLD AUTO: 12.1 % (ref 0–13.4)
MORPHOLOGY BLD-IMP: NORMAL
NEUTROPHILS # BLD AUTO: 13.58 K/UL (ref 2–7.15)
NEUTROPHILS NFR BLD: 77.6 % (ref 44–72)
NITRITE UR QL STRIP.AUTO: POSITIVE
NRBC # BLD AUTO: 0 K/UL
NRBC BLD-RTO: 0 /100 WBC
PH UR STRIP.AUTO: 8 [PH] (ref 5–8)
PLATELET # BLD AUTO: 115 K/UL (ref 164–446)
PLATELET BLD QL SMEAR: NORMAL
PMV BLD AUTO: 11.4 FL (ref 9–12.9)
POTASSIUM SERPL-SCNC: 4.8 MMOL/L (ref 3.6–5.5)
PROT SERPL-MCNC: 7.9 G/DL (ref 6–8.2)
PROT UR QL STRIP: 100 MG/DL
PROTHROMBIN TIME: 15.4 SEC (ref 12–14.6)
RBC # BLD AUTO: 4.37 M/UL (ref 4.2–5.4)
RBC # URNS HPF: ABNORMAL /HPF
RBC BLD AUTO: PRESENT
RBC UR QL AUTO: ABNORMAL
SODIUM SERPL-SCNC: 135 MMOL/L (ref 135–145)
SP GR UR STRIP.AUTO: 1.01
UROBILINOGEN UR STRIP.AUTO-MCNC: 0.2 MG/DL
WBC # BLD AUTO: 17.5 K/UL (ref 4.8–10.8)
WBC #/AREA URNS HPF: ABNORMAL /HPF

## 2020-07-17 PROCEDURE — 93005 ELECTROCARDIOGRAM TRACING: CPT | Performed by: HOSPITALIST

## 2020-07-17 PROCEDURE — 770006 HCHG ROOM/CARE - MED/SURG/GYN SEMI*

## 2020-07-17 PROCEDURE — 700105 HCHG RX REV CODE 258: Performed by: HOSPITALIST

## 2020-07-17 PROCEDURE — 700102 HCHG RX REV CODE 250 W/ 637 OVERRIDE(OP): Performed by: HOSPITALIST

## 2020-07-17 PROCEDURE — 87086 URINE CULTURE/COLONY COUNT: CPT

## 2020-07-17 PROCEDURE — 700111 HCHG RX REV CODE 636 W/ 250 OVERRIDE (IP): Performed by: HOSPITALIST

## 2020-07-17 PROCEDURE — 96365 THER/PROPH/DIAG IV INF INIT: CPT

## 2020-07-17 PROCEDURE — 87077 CULTURE AEROBIC IDENTIFY: CPT

## 2020-07-17 PROCEDURE — 700111 HCHG RX REV CODE 636 W/ 250 OVERRIDE (IP): Performed by: EMERGENCY MEDICINE

## 2020-07-17 PROCEDURE — 85610 PROTHROMBIN TIME: CPT

## 2020-07-17 PROCEDURE — 87040 BLOOD CULTURE FOR BACTERIA: CPT | Mod: 91

## 2020-07-17 PROCEDURE — A9270 NON-COVERED ITEM OR SERVICE: HCPCS | Performed by: HOSPITALIST

## 2020-07-17 PROCEDURE — 82962 GLUCOSE BLOOD TEST: CPT

## 2020-07-17 PROCEDURE — 85027 COMPLETE CBC AUTOMATED: CPT

## 2020-07-17 PROCEDURE — C9803 HOPD COVID-19 SPEC COLLECT: HCPCS | Performed by: HOSPITALIST

## 2020-07-17 PROCEDURE — 700101 HCHG RX REV CODE 250: Performed by: HOSPITALIST

## 2020-07-17 PROCEDURE — 700105 HCHG RX REV CODE 258: Performed by: EMERGENCY MEDICINE

## 2020-07-17 PROCEDURE — 85007 BL SMEAR W/DIFF WBC COUNT: CPT

## 2020-07-17 PROCEDURE — U0003 INFECTIOUS AGENT DETECTION BY NUCLEIC ACID (DNA OR RNA); SEVERE ACUTE RESPIRATORY SYNDROME CORONAVIRUS 2 (SARS-COV-2) (CORONAVIRUS DISEASE [COVID-19]), AMPLIFIED PROBE TECHNIQUE, MAKING USE OF HIGH THROUGHPUT TECHNOLOGIES AS DESCRIBED BY CMS-2020-01-R: HCPCS

## 2020-07-17 PROCEDURE — 99223 1ST HOSP IP/OBS HIGH 75: CPT | Mod: AI | Performed by: HOSPITALIST

## 2020-07-17 PROCEDURE — 87186 SC STD MICRODIL/AGAR DIL: CPT

## 2020-07-17 PROCEDURE — 99285 EMERGENCY DEPT VISIT HI MDM: CPT

## 2020-07-17 PROCEDURE — 93010 ELECTROCARDIOGRAM REPORT: CPT | Performed by: INTERNAL MEDICINE

## 2020-07-17 PROCEDURE — 81001 URINALYSIS AUTO W/SCOPE: CPT

## 2020-07-17 PROCEDURE — 71045 X-RAY EXAM CHEST 1 VIEW: CPT

## 2020-07-17 PROCEDURE — 80053 COMPREHEN METABOLIC PANEL: CPT

## 2020-07-17 PROCEDURE — 83605 ASSAY OF LACTIC ACID: CPT

## 2020-07-17 RX ORDER — TIMOLOL MALEATE 5 MG/ML
1 SOLUTION/ DROPS OPHTHALMIC 2 TIMES DAILY
Status: DISCONTINUED | OUTPATIENT
Start: 2020-07-17 | End: 2020-07-19 | Stop reason: HOSPADM

## 2020-07-17 RX ORDER — ACETAMINOPHEN 325 MG/1
650 TABLET ORAL EVERY 6 HOURS PRN
Status: DISCONTINUED | OUTPATIENT
Start: 2020-07-17 | End: 2020-07-19 | Stop reason: HOSPADM

## 2020-07-17 RX ORDER — HEPARIN SODIUM 5000 [USP'U]/ML
5000 INJECTION, SOLUTION INTRAVENOUS; SUBCUTANEOUS EVERY 8 HOURS
Status: DISCONTINUED | OUTPATIENT
Start: 2020-07-17 | End: 2020-07-19 | Stop reason: HOSPADM

## 2020-07-17 RX ORDER — POLYETHYLENE GLYCOL 3350 17 G/17G
1 POWDER, FOR SOLUTION ORAL
Status: DISCONTINUED | OUTPATIENT
Start: 2020-07-17 | End: 2020-07-19 | Stop reason: HOSPADM

## 2020-07-17 RX ORDER — SODIUM CHLORIDE, SODIUM LACTATE, POTASSIUM CHLORIDE, AND CALCIUM CHLORIDE .6; .31; .03; .02 G/100ML; G/100ML; G/100ML; G/100ML
1000 INJECTION, SOLUTION INTRAVENOUS ONCE
Status: COMPLETED | OUTPATIENT
Start: 2020-07-17 | End: 2020-07-17

## 2020-07-17 RX ORDER — SODIUM CHLORIDE, SODIUM LACTATE, POTASSIUM CHLORIDE, AND CALCIUM CHLORIDE .6; .31; .03; .02 G/100ML; G/100ML; G/100ML; G/100ML
30 INJECTION, SOLUTION INTRAVENOUS
Status: DISCONTINUED | OUTPATIENT
Start: 2020-07-17 | End: 2020-07-19 | Stop reason: HOSPADM

## 2020-07-17 RX ORDER — LEVOTHYROXINE SODIUM 112 UG/1
112 TABLET ORAL
Status: DISCONTINUED | OUTPATIENT
Start: 2020-07-17 | End: 2020-07-19 | Stop reason: HOSPADM

## 2020-07-17 RX ORDER — LATANOPROST 50 UG/ML
1 SOLUTION/ DROPS OPHTHALMIC EVERY EVENING
Status: DISCONTINUED | OUTPATIENT
Start: 2020-07-17 | End: 2020-07-19 | Stop reason: HOSPADM

## 2020-07-17 RX ORDER — METOPROLOL TARTRATE 50 MG/1
100 TABLET, FILM COATED ORAL 2 TIMES DAILY
Status: DISCONTINUED | OUTPATIENT
Start: 2020-07-17 | End: 2020-07-19 | Stop reason: HOSPADM

## 2020-07-17 RX ORDER — BISACODYL 10 MG
10 SUPPOSITORY, RECTAL RECTAL
Status: DISCONTINUED | OUTPATIENT
Start: 2020-07-17 | End: 2020-07-19 | Stop reason: HOSPADM

## 2020-07-17 RX ORDER — IBUPROFEN 400 MG/1
400 TABLET ORAL EVERY 6 HOURS PRN
Status: DISCONTINUED | OUTPATIENT
Start: 2020-07-17 | End: 2020-07-19 | Stop reason: HOSPADM

## 2020-07-17 RX ORDER — SODIUM CHLORIDE, SODIUM LACTATE, POTASSIUM CHLORIDE, CALCIUM CHLORIDE 600; 310; 30; 20 MG/100ML; MG/100ML; MG/100ML; MG/100ML
INJECTION, SOLUTION INTRAVENOUS CONTINUOUS
Status: DISCONTINUED | OUTPATIENT
Start: 2020-07-17 | End: 2020-07-19 | Stop reason: HOSPADM

## 2020-07-17 RX ORDER — AMOXICILLIN 250 MG
2 CAPSULE ORAL 2 TIMES DAILY
Status: DISCONTINUED | OUTPATIENT
Start: 2020-07-17 | End: 2020-07-19 | Stop reason: HOSPADM

## 2020-07-17 RX ADMIN — IBUPROFEN 400 MG: 400 TABLET, FILM COATED ORAL at 17:04

## 2020-07-17 RX ADMIN — DOCUSATE SODIUM 50 MG AND SENNOSIDES 8.6 MG 2 TABLET: 8.6; 5 TABLET, FILM COATED ORAL at 22:00

## 2020-07-17 RX ADMIN — CEFTRIAXONE SODIUM 1 G: 1 INJECTION, POWDER, FOR SOLUTION INTRAMUSCULAR; INTRAVENOUS at 12:47

## 2020-07-17 RX ADMIN — HEPARIN SODIUM 5000 UNITS: 5000 INJECTION, SOLUTION INTRAVENOUS; SUBCUTANEOUS at 22:01

## 2020-07-17 RX ADMIN — ACETAMINOPHEN 650 MG: 325 TABLET, FILM COATED ORAL at 15:23

## 2020-07-17 RX ADMIN — LATANOPROST 1 DROP: 50 SOLUTION OPHTHALMIC at 20:55

## 2020-07-17 RX ADMIN — SODIUM CHLORIDE, POTASSIUM CHLORIDE, SODIUM LACTATE AND CALCIUM CHLORIDE: 600; 310; 30; 20 INJECTION, SOLUTION INTRAVENOUS at 15:35

## 2020-07-17 RX ADMIN — SODIUM CHLORIDE, POTASSIUM CHLORIDE, SODIUM LACTATE AND CALCIUM CHLORIDE 1000 ML: 600; 310; 30; 20 INJECTION, SOLUTION INTRAVENOUS at 12:45

## 2020-07-17 RX ADMIN — METOPROLOL TARTRATE 100 MG: 50 TABLET, FILM COATED ORAL at 17:51

## 2020-07-17 RX ADMIN — SODIUM CHLORIDE, POTASSIUM CHLORIDE, SODIUM LACTATE AND CALCIUM CHLORIDE: 600; 310; 30; 20 INJECTION, SOLUTION INTRAVENOUS at 17:55

## 2020-07-17 RX ADMIN — TIMOLOL MALEATE 1 DROP: 5 SOLUTION/ DROPS OPHTHALMIC at 20:55

## 2020-07-17 RX ADMIN — SODIUM CHLORIDE, POTASSIUM CHLORIDE, SODIUM LACTATE AND CALCIUM CHLORIDE 1000 ML: 600; 310; 30; 20 INJECTION, SOLUTION INTRAVENOUS at 17:09

## 2020-07-17 ASSESSMENT — ENCOUNTER SYMPTOMS
ABDOMINAL PAIN: 1
SHORTNESS OF BREATH: 0
NAUSEA: 0
HALLUCINATIONS: 1
FEVER: 1
CHILLS: 1
COUGH: 0
VOMITING: 0

## 2020-07-17 ASSESSMENT — FIBROSIS 4 INDEX: FIB4 SCORE: 5.02

## 2020-07-17 NOTE — ED TRIAGE NOTES
"Chief Complaint   Patient presents with   • Fever   • Urinary Frequency     Pts  reports increased urination x2 days. Hx of urostomy - described to bags full of dark urine with blood at the bottom.      /76   Pulse 84   Temp 37.4 °C (99.3 °F) (Temporal)   Resp 18   Ht 1.727 m (5' 8\")   Wt 68.8 kg (151 lb 10.8 oz)   SpO2 96%   Breastfeeding No   BMI 23.06 kg/m²     Pt brought into triage in WC, mask in place. Spouse also reports increased confusion, hallucinations, fatigue. VS as above, NAD, encouraged to return to the triage nurse or tech with any new complaints or symptoms. Sepsis protocol initiated. Lab called for draw.    " Alert and oriented to person, place and time

## 2020-07-17 NOTE — H&P
Hospital Medicine History & Physical Note    Date of Service  7/17/2020    Primary Care Physician  Coreen Cisse M.D.    Code Status  Full Code    Chief Complaint  Chief Complaint   Patient presents with   • Fever   • Urinary Frequency     Pts  reports increased urination x2 days. Hx of urostomy - described to bags full of dark urine with blood at the bottom.        History of Presenting Illness  84 y.o. female who presented 7/17/2020 with fever.  Ms. 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 Chavarria on 2/27/20 with cultures growing E. coli and Bacteroides as well as Streptococcus.  She continues to have drainage out of this wound to this day which is unchanged.  Days ago she developed some dark urine with blood in her urostomy and has had confusion for the past 2 days with hallucinations.  This morning her  took her temperature is 102.  He brought her to the emergency room her white count 17,000 as she has a very positive urinalysis with an elevated creatinine will be admitted for IV fluids and IV antibiotics.  In the Emergency room patient is not confused though she admits she has been confused intermittently.  Her  is also at bedside we discussed her condition at length.  She has no exposure to persons known to have COVID, denies cough or shortness of breath, denies change in her taste or smell, no diarrhea, no sore throat.    Review of Systems  Review of Systems   Constitutional: Positive for chills and fever.   Respiratory: Negative for cough and shortness of breath.    Cardiovascular: Negative for chest pain.   Gastrointestinal:        Diarrhea 3 days ago though none since   Skin: Negative for itching and rash.   All other systems reviewed and are negative.      Past Medical History   has a past medical history of A-fib (HCC), Ailment, Arthritis,  Atrial fibrillation (HCC) (2/2009), Atrial fibrillation with rapid ventricular response (HCC) (3/18/2019), Atrial fibrillation, rapid (HCC) (1/31/2014), Back pain, Bladder disease (2011), Bladder problem, CAD (coronary artery disease), Cancer (HCC), CATARACT, CHEST PAIN (3/11/2011), Glaucoma, History of hematuria, HTN (hypertension), malignant (3/18/2019), HTN (hypertension), malignant (3/18/2019), Hyperlipidemia (2/24/2011), Hypertension, Hypothyroid (2/24/2011), Indigestion, Intrinsic eczema (10/31/2017), Other specified pre-operative examination (1/28/2014), Paroxysmal atrial fibrillation (HCC), Sepsis urinary source (1/23/2012), Stroke ([facility]), T.I.A., TIA (transient ischemic attack) (3/11/2011), Unspecified disorder of thyroid, Unspecified hemorrhagic conditions, Urinary bladder disorder, UTI (urinary tract infection) (2/24/2011), and Vaginal bleeding (10/22/2012).    Surgical History   has a past surgical history that includes bladder biopsy with cystoscopy (10/10/08); pyelogram (10/10/08); retrogrades (10/10/08); bladder biopsy with cystoscopy (6/15/2009); bladder biopsy with cystoscopy (10/12/2009); other abdominal surgery; cholecystectomy (1994); cholecystectomy (1994); gyn surgery; pelvic exam under anesthesia (10/12/2009); rectocele repair (11/3/2009); other; cystectomy (9/6/2011); ileo loop diversion (9/6/2011); biopsy general (10/22/2012); parastomal hernia repair  (1/28/2014); ventral hernia repair robotic xi (12/24/2019); and irrigation & debridement general (N/A, 2/27/2020).     Family History  family history includes Stroke (age of onset: 77) in her father; Stroke (age of onset: 81) in her mother.     Social History   reports that she has never smoked. She has never used smokeless tobacco. She reports that she does not drink alcohol or use drugs.    Allergies  Allergies   Allergen Reactions   • Cardizem Swelling   • Doxycycline      Swollen lips.   • Sulfa Drugs      Makes tongue swell, severely   •  "Zyvox Swelling     \"Whole mouth swelled up\"    • Codeine      Does not remember the reaction         Medications  Prior to Admission Medications   Prescriptions Last Dose Informant Patient Reported? Taking?   BIOTIN PO 7/17/2020 at AM Patient Yes No   Sig: Take 1 Cap by mouth every day.   Magnesium Oxide 400 MG Cap 7/17/2020 at AM Patient No No   Sig: Take 1 Cap by mouth every day.   Multiple Vitamins-Minerals (CENTRUM SILVER ULTRA WOMENS) Tab 7/17/2020 at AM Patient Yes No   Sig: Take 1 Tab by mouth every day.   Multiple Vitamins-Minerals (PRESERVISION AREDS PO) 7/17/2020 at AM Patient Yes No   Sig: Take 1 Tab by mouth 2 Times a Day.   aspirin EC 81 MG EC tablet 7/17/2020 at AM Patient No No   Sig: Take 1 Tab by mouth every day.   ferrous gluconate (IRON 27) 240 (27 Fe) MG tablet 7/17/2020 at AM Patient Yes No   Sig: Take 240 mg by mouth every day.   furosemide (LASIX) 20 MG Tab 7/16/2020 at PM Patient Yes No   Sig: Take 20 mg by mouth 1 time daily as needed.   latanoprost (XALATAN) 0.005 % Solution 7/16/2020 at PM Patient Yes No   Sig: Place 1 Drop in both eyes every evening.   levothyroxine (SYNTHROID) 112 MCG Tab 7/17/2020 at AM Patient Yes No   Sig: Take 112 mcg by mouth Every morning on an empty stomach.   lovastatin (MEVACOR) 10 MG tablet 7/17/2020 at AM Patient Yes No   Sig: Take 10 mg by mouth every day.   meloxicam (MOBIC) 7.5 MG Tab 7/17/2020 at AM Patient Yes No   Sig: Take 7.5 mg by mouth 2 Times a Day.   metoprolol (LOPRESSOR) 100 MG Tab 7/17/2020 at AM Patient No No   Sig: Take 1 Tab by mouth 2 times a day.   pantoprazole (PROTONIX) 40 MG TBEC 7/17/2020 at AM Patient Yes No   Sig: Take 40 mg by mouth every day.   timolol (TIMOPTIC) 0.5 % Solution 7/17/2020 at AM Patient Yes No   Sig: Place 1 Drop in both eyes 2 times a day.      Facility-Administered Medications: None       Physical Exam  Temp:  [37.4 °C (99.3 °F)-38.5 °C (101.3 °F)] 38.5 °C (101.3 °F)  Pulse:  [84-86] 84  Resp:  [18-29] 29  BP: " (130-150)/(65-76) 150/67  SpO2:  [96 %-99 %] 99 %    Physical Exam  Vitals signs and nursing note reviewed.   Constitutional:       Appearance: She is ill-appearing. She is not toxic-appearing.   HENT:      Head: Normocephalic and atraumatic.      Mouth/Throat:      Mouth: Mucous membranes are dry.      Pharynx: Oropharynx is clear.   Eyes:      General: No scleral icterus.     Conjunctiva/sclera: Conjunctivae normal.   Neck:      Musculoskeletal: Normal range of motion and neck supple.   Cardiovascular:      Rate and Rhythm: Normal rate and regular rhythm.      Heart sounds: No murmur.   Pulmonary:      Effort: Pulmonary effort is normal. No respiratory distress.      Breath sounds: Normal breath sounds.   Abdominal:      Tenderness: There is no abdominal tenderness.      Comments: Midline incision with slight brown discharge, no surrounding erythema  Right sided ileal justin-bladder with urine   Musculoskeletal:      Right lower leg: No edema.      Left lower leg: No edema.   Skin:     General: Skin is warm and dry.      Coloration: Skin is pale.   Neurological:      General: No focal deficit present.      Mental Status: She is alert and oriented to person, place, and time. Mental status is at baseline.   Psychiatric:         Mood and Affect: Mood normal.         Behavior: Behavior normal.         Laboratory:  Recent Labs     07/17/20  1241   WBC 17.5*   RBC 4.37   HEMOGLOBIN 13.1   HEMATOCRIT 41.0   MCV 93.8   MCH 30.0   MCHC 32.0*   RDW 49.0   PLATELETCT 115*   MPV 11.4     Recent Labs     07/17/20  1241   SODIUM 135   POTASSIUM 4.8   CHLORIDE 101   CO2 20   GLUCOSE 107*   BUN 20   CREATININE 1.72*   CALCIUM 9.0     Recent Labs     07/17/20  1241   ALTSGPT 20   ASTSGOT 29   ALKPHOSPHAT 183*   TBILIRUBIN 1.2   GLUCOSE 107*     Recent Labs     07/17/20  1244   INR 1.18*     No results for input(s): NTPROBNP in the last 72 hours.      No results for input(s): TROPONINT in the last 72  hours.    Imaging:  DX-CHEST-PORTABLE (1 VIEW)   Final Result         Findings on chest radiograph appear stable since the prior radiograph.  No new abnormalities are identified. No new infiltrates or consolidations are identified.            Assessment/Plan:      * Sepsis (HCC)- (present on admission)  Assessment & Plan  This is Sepsis Present on admission  SIRS criteria identified on my evaluation include: Leukocytosis, with WBC greater than 12,000  Source is UTI  Sepsis protocol initiated  Fluid resuscitation ordered per protocol  IV antibiotics as appropriate for source of sepsis: Rocephin  While organ dysfunction may be noted elsewhere in this problem list or in the chart, degree of organ dysfunction does not meet CMS criteria for severe sepsis          Pyelonephritis- (present on admission)  Assessment & Plan  +UA status post urostomy  IV rocephin as she has not been hospitalized     MARSHALL (acute kidney injury) ([facility])- (present on admission)  Assessment & Plan  Upon review of the records, her baseline Cr was 1.1 on 5/17/20  Her Cr is now up to 1.72 likely due to sepsis and dehydration  IV fluids have been ordered  Follow urine output  Check Cr in the morning    Essential hypertension- (present on admission)  Assessment & Plan  Hold lasix given dehydration  Metoprolol if BP allows    AF (atrial fibrillation) ([facility])- (present on admission)  Assessment & Plan  Hx of  Continue metoprolol with holding parameters.  She is not on anticoagulation    Presence of urostomy ([facility])- (present on admission)  Assessment & Plan  History of

## 2020-07-17 NOTE — ED PROVIDER NOTES
ED Provider Note    Scribed for Sherwin Lyle M.D. by Alma Diop. 7/17/2020, 12:05 PM.    Primary care provider: Coreen Cisse M.D.  Means of arrival: Walk in  History obtained from: patient  History limited by: none    CHIEF COMPLAINT  Chief Complaint   Patient presents with   • Fever   • Urinary Frequency     Pts  reports increased urination x2 days. Hx of urostomy - described to bags full of dark urine with blood at the bottom.        HPI  Dorie Wadsworth is a 84 y.o. female who presents to the Emergency Department for urinary frequency onset 2 days ago. Patients  describes that the patients urostomy bag has been filling up quicker than normal and her urine has been darker with traces of blood.   She has associated confusion, hallucinations, fever of 102 °F, chills, and suprapubic abdominal pain. Denies nausea or vomiting. Patient has a past medical history of A. Fib. but denies use of blood thinners.    REVIEW OF SYSTEMS  Review of Systems   Constitutional: Positive for chills and fever.   Gastrointestinal: Positive for abdominal pain. Negative for nausea and vomiting.   Genitourinary: Positive for frequency.   Psychiatric/Behavioral: Positive for hallucinations.   All other systems reviewed and are negative.      PAST MEDICAL HISTORY   has a past medical history of A-fib (HCC), Ailment, Arthritis, Atrial fibrillation (HCC) (2/2009), Atrial fibrillation with rapid ventricular response (HCC) (3/18/2019), Atrial fibrillation, rapid (HCC) (1/31/2014), Back pain, Bladder disease (2011), Bladder problem, CAD (coronary artery disease), Cancer (HCC), CATARACT, CHEST PAIN (3/11/2011), Glaucoma, History of hematuria, HTN (hypertension), malignant (3/18/2019), HTN (hypertension), malignant (3/18/2019), Hyperlipidemia (2/24/2011), Hypertension, Hypothyroid (2/24/2011), Indigestion, Intrinsic eczema (10/31/2017), Other specified pre-operative examination (1/28/2014), Paroxysmal atrial  fibrillation (HCC), Sepsis urinary source (1/23/2012), Stroke (HCC), T.I.A., TIA (transient ischemic attack) (3/11/2011), Unspecified disorder of thyroid, Unspecified hemorrhagic conditions, Urinary bladder disorder, UTI (urinary tract infection) (2/24/2011), and Vaginal bleeding (10/22/2012).    SURGICAL HISTORY   has a past surgical history that includes bladder biopsy with cystoscopy (10/10/08); pyelogram (10/10/08); retrogrades (10/10/08); bladder biopsy with cystoscopy (6/15/2009); bladder biopsy with cystoscopy (10/12/2009); other abdominal surgery; cholecystectomy (1994); cholecystectomy (1994); gyn surgery; pelvic exam under anesthesia (10/12/2009); rectocele repair (11/3/2009); other; cystectomy (9/6/2011); ileo loop diversion (9/6/2011); biopsy general (10/22/2012); parastomal hernia repair (1/28/2014); ventral hernia repair robotic xi (12/24/2019); and irrigation & debridement general (N/A, 2/27/2020).    SOCIAL HISTORY  Social History     Tobacco Use   • Smoking status: Never Smoker   • Smokeless tobacco: Never Used   Substance Use Topics   • Alcohol use: No   • Drug use: No      Social History     Substance and Sexual Activity   Drug Use No       FAMILY HISTORY  Family History   Problem Relation Age of Onset   • Stroke Mother 81   • Stroke Father 77       CURRENT MEDICATIONS  Home Medications     Reviewed by Jus Prado (Pharmacy Tech) on 07/17/20 at 1353  Med List Status: Complete   Medication Last Dose Status   aspirin EC 81 MG EC tablet 7/17/2020 Active   BIOTIN PO 7/17/2020 Active   ferrous gluconate (IRON 27) 240 (27 Fe) MG tablet 7/17/2020 Active   furosemide (LASIX) 20 MG Tab 7/16/2020 Active   latanoprost (XALATAN) 0.005 % Solution 7/16/2020 Active   levothyroxine (SYNTHROID) 112 MCG Tab 7/17/2020 Active   lovastatin (MEVACOR) 10 MG tablet 7/17/2020 Active   Magnesium Oxide 400 MG Cap 7/17/2020 Active   meloxicam (MOBIC) 7.5 MG Tab 7/17/2020 Active   metoprolol (LOPRESSOR) 100 MG Tab  "7/17/2020 Active   Multiple Vitamins-Minerals (CENTRUM SILVER ULTRA WOMENS) Tab 7/17/2020 Active   Multiple Vitamins-Minerals (PRESERVISION AREDS PO) 7/17/2020 Active   pantoprazole (PROTONIX) 40 MG TBEC 7/17/2020 Active   timolol (TIMOPTIC) 0.5 % Solution 7/17/2020 Active                ALLERGIES  Allergies   Allergen Reactions   • Cardizem Swelling   • Doxycycline      Swollen lips.   • Sulfa Drugs      Makes tongue swell, severely   • Zyvox Swelling     \"Whole mouth swelled up\"    • Codeine      Does not remember the reaction         PHYSICAL EXAM  THIS POINT HE IS VITAL SIGNS: /65   Pulse 86   Temp (!) 38.5 °C (101.3 °F) (Oral)   Resp 18   Ht 1.727 m (5' 8\")   Wt 68.8 kg (151 lb 10.8 oz)   SpO2 98%   Breastfeeding No   BMI 23.06 kg/m²     Constitutional:  mild distress  HENT:  Moist mucous membranes  Eyes:  No conjunctivitis or icterus  Cardiovascular:  Tachycardic, Regular rhythm, no murmurs  Thorax & Lungs: Normal breath sounds, no rhonchi  Abdomen: lower quadrant tenderness, urostomy bag in place.  Back: Bilateral CVA tenderness left greater than right  Neurologic: Normal gross motor    LABS  Labs Reviewed   CBC WITH DIFFERENTIAL - Abnormal; Notable for the following components:       Result Value    WBC 17.5 (*)     MCHC 32.0 (*)     Platelet Count 115 (*)     Neutrophils-Polys 77.60 (*)     Lymphocytes 10.30 (*)     Neutrophils (Absolute) 13.58 (*)     Monos (Absolute) 2.12 (*)     All other components within normal limits   COMP METABOLIC PANEL - Abnormal; Notable for the following components:    Glucose 107 (*)     Creatinine 1.72 (*)     Alkaline Phosphatase 183 (*)     Globulin 4.2 (*)     All other components within normal limits   URINALYSIS - Abnormal; Notable for the following components:    Character Turbid (*)     Protein 100 (*)     Nitrite Positive (*)     Leukocyte Esterase Large (*)     Occult Blood Large (*)     All other components within normal limits    Narrative:     " "Indication for culture:->Septic Shock: Persistent  hypotension,  Lactic acid > 4, vasopressors/inotropes started   URINE MICROSCOPIC (W/UA) - Abnormal; Notable for the following components:    WBC Packed (*)     Bacteria Many (*)     Hyaline Cast 6-10 (*)     All other components within normal limits    Narrative:     Indication for culture:->Septic Shock: Persistent  hypotension,  Lactic acid > 4, vasopressors/inotropes started   PROTHROMBIN TIME - Abnormal; Notable for the following components:    PT 15.4 (*)     INR 1.18 (*)     All other components within normal limits    Narrative:     If not done within the last 4 hours  Indicate which anticoagulants the patient is on:->UNKNOWN   ESTIMATED GFR - Abnormal; Notable for the following components:    GFR If  34 (*)     GFR If Non  28 (*)     All other components within normal limits   LACTIC ACID   URINE CULTURE(NEW)    Narrative:     Indication for culture:->Septic Shock: Persistent  hypotension,  Lactic acid > 4, vasopressors/inotropes started   BLOOD CULTURE    Narrative:     Per Hospital Policy: Only change Specimen Src: to \"Line\" if  specified by physician order.   BLOOD CULTURE    Narrative:     Per Hospital Policy: Only change Specimen Src: to \"Line\" if  specified by physician order.   DIFFERENTIAL MANUAL   PERIPHERAL SMEAR REVIEW   PLATELET ESTIMATE   MORPHOLOGY   COVID/SARS COV-2     All labs reviewed by me.    RADIOLOGY  DX-CHEST-PORTABLE (1 VIEW)   Final Result         Findings on chest radiograph appear stable since the prior radiograph.  No new abnormalities are identified. No new infiltrates or consolidations are identified.        The radiologist's interpretation of all radiological studies have been reviewed by me.    COURSE & MEDICAL DECISION MAKING  Pertinent Labs & Imaging studies reviewed. (See chart for details)    12:05 PM - Patient seen and examined at bedside. I discussed that the patients symptoms seem to be " related to a UTI that may have spread to her kidneys. I informed the patient that because of her tachycardia and fever she will need IV fluids. Additionally we will start her on IV antibiotics for her UTI. I further discussed that the patient will likely need to be admitted fo has some bruises r further treatment. Patient will be treated with Rocephin 1 g and IV fluids for tachycardia and fever. Ordered Chest x-ray, lactic acid,CBC w/ diff, CMP, UA, urine culture    2:03 PM - I discussed the patient's case and the above findings with Dr. Julian (Hospitalist) who agreed to evaluate patient for hospitalization. Patients care will be transferred at this time.     HYDRATION: Based on the patient's presentation of Tachycardia and fevers the patient was given IV fluids. IV Hydration was used because oral hydration was not adequate alone. Upon recheck following hydration, the patient was well improved.      DISPOSITION:  Patient will be hospitalized by Dr. Julian in guarded condition.    FINAL IMPRESSION  1. Pyelonephritis          IAlma (Scribe), am scribing for, and in the presence of, Sherwin Lyle M.D..    Electronically signed by: Alma Diop (Scribe), 7/17/2020    ISherwin M.D. personally performed the services described in this documentation, as scribed by Alma Diop in my presence, and it is both accurate and complete. C    The note accurately reflects work and decisions made by me.  Sherwin Lyle M.D.  7/17/2020  6:28 PM

## 2020-07-17 NOTE — ASSESSMENT & PLAN NOTE
Upon review of the records, her baseline Cr was 1.1 on 5/17/20  IV fluids have been ordered  Follow urine output  Recheck cmp in am

## 2020-07-17 NOTE — ASSESSMENT & PLAN NOTE
This is Sepsis Present on admission  SIRS criteria identified on my evaluation include: Leukocytosis, with WBC greater than 12,000  Source is UTI  Sepsis protocol initiated  Fluid resuscitation ordered per protocol  IV antibiotics as appropriate for source of sepsis: Rocephin  While organ dysfunction may be noted elsewhere in this problem list or in the chart, degree of organ dysfunction does not meet CMS criteria for severe sepsis

## 2020-07-17 NOTE — ASSESSMENT & PLAN NOTE
+UA status post urostomy  IV rocephin as she has not been hospitalized   Follow cultures   Leukocytosis with only mild improvement  descilate to oral abx as appropriate

## 2020-07-17 NOTE — PROGRESS NOTES
Pt arrived to unit via hospital bed at 1535. Pt oriented to room, unit, and plan of care. Bed alarm on. Pt axo x3, disoriented to time.   was at bedside upon arrival.  Pt is fatigue, has chills, poor historian.  Unable to complete full admit profile due to pt lethargy.  WCTM.     All questions answered at this time. Call light within reach; fall precautions in place.

## 2020-07-18 LAB
ANION GAP SERPL CALC-SCNC: 14 MMOL/L (ref 7–16)
BASOPHILS # BLD AUTO: 0.5 % (ref 0–1.8)
BASOPHILS # BLD: 0.08 K/UL (ref 0–0.12)
BUN SERPL-MCNC: 25 MG/DL (ref 8–22)
CALCIUM SERPL-MCNC: 8.3 MG/DL (ref 8.5–10.5)
CHLORIDE SERPL-SCNC: 102 MMOL/L (ref 96–112)
CO2 SERPL-SCNC: 21 MMOL/L (ref 20–33)
CREAT SERPL-MCNC: 1.91 MG/DL (ref 0.5–1.4)
EOSINOPHIL # BLD AUTO: 0.02 K/UL (ref 0–0.51)
EOSINOPHIL NFR BLD: 0.1 % (ref 0–6.9)
ERYTHROCYTE [DISTWIDTH] IN BLOOD BY AUTOMATED COUNT: 50.1 FL (ref 35.9–50)
GLUCOSE SERPL-MCNC: 123 MG/DL (ref 65–99)
HCT VFR BLD AUTO: 37.6 % (ref 37–47)
HGB BLD-MCNC: 11.7 G/DL (ref 12–16)
IMM GRANULOCYTES # BLD AUTO: 0.15 K/UL (ref 0–0.11)
IMM GRANULOCYTES NFR BLD AUTO: 0.9 % (ref 0–0.9)
LYMPHOCYTES # BLD AUTO: 1.06 K/UL (ref 1–4.8)
LYMPHOCYTES NFR BLD: 6.1 % (ref 22–41)
MCH RBC QN AUTO: 29.8 PG (ref 27–33)
MCHC RBC AUTO-ENTMCNC: 31.1 G/DL (ref 33.6–35)
MCV RBC AUTO: 95.9 FL (ref 81.4–97.8)
MONOCYTES # BLD AUTO: 2.34 K/UL (ref 0–0.85)
MONOCYTES NFR BLD AUTO: 13.5 % (ref 0–13.4)
NEUTROPHILS # BLD AUTO: 13.67 K/UL (ref 2–7.15)
NEUTROPHILS NFR BLD: 78.9 % (ref 44–72)
NRBC # BLD AUTO: 0 K/UL
NRBC BLD-RTO: 0 /100 WBC
PLATELET # BLD AUTO: 96 K/UL (ref 164–446)
PMV BLD AUTO: 11.6 FL (ref 9–12.9)
POTASSIUM SERPL-SCNC: 4.6 MMOL/L (ref 3.6–5.5)
PROCALCITONIN SERPL-MCNC: 0.59 NG/ML
RBC # BLD AUTO: 3.92 M/UL (ref 4.2–5.4)
SARS-COV-2 RNA RESP QL NAA+PROBE: NOTDETECTED
SODIUM SERPL-SCNC: 137 MMOL/L (ref 135–145)
SPECIMEN SOURCE: NORMAL
WBC # BLD AUTO: 17.3 K/UL (ref 4.8–10.8)

## 2020-07-18 PROCEDURE — 770006 HCHG ROOM/CARE - MED/SURG/GYN SEMI*

## 2020-07-18 PROCEDURE — 700111 HCHG RX REV CODE 636 W/ 250 OVERRIDE (IP): Performed by: HOSPITALIST

## 2020-07-18 PROCEDURE — A9270 NON-COVERED ITEM OR SERVICE: HCPCS | Performed by: HOSPITALIST

## 2020-07-18 PROCEDURE — 700102 HCHG RX REV CODE 250 W/ 637 OVERRIDE(OP): Performed by: HOSPITALIST

## 2020-07-18 PROCEDURE — 36415 COLL VENOUS BLD VENIPUNCTURE: CPT

## 2020-07-18 PROCEDURE — 99232 SBSQ HOSP IP/OBS MODERATE 35: CPT | Performed by: HOSPITALIST

## 2020-07-18 PROCEDURE — 84145 PROCALCITONIN (PCT): CPT

## 2020-07-18 PROCEDURE — 700105 HCHG RX REV CODE 258: Performed by: HOSPITALIST

## 2020-07-18 PROCEDURE — 85025 COMPLETE CBC W/AUTO DIFF WBC: CPT

## 2020-07-18 PROCEDURE — 80048 BASIC METABOLIC PNL TOTAL CA: CPT

## 2020-07-18 RX ORDER — HYDRALAZINE HYDROCHLORIDE 20 MG/ML
10 INJECTION INTRAMUSCULAR; INTRAVENOUS ONCE
Status: DISCONTINUED | OUTPATIENT
Start: 2020-07-18 | End: 2020-07-19 | Stop reason: HOSPADM

## 2020-07-18 RX ADMIN — LEVOTHYROXINE SODIUM 112 MCG: 112 TABLET ORAL at 04:44

## 2020-07-18 RX ADMIN — TIMOLOL MALEATE 1 DROP: 5 SOLUTION/ DROPS OPHTHALMIC at 18:14

## 2020-07-18 RX ADMIN — TIMOLOL MALEATE 1 DROP: 5 SOLUTION/ DROPS OPHTHALMIC at 04:44

## 2020-07-18 RX ADMIN — ASPIRIN 81 MG: 81 TABLET, COATED ORAL at 04:45

## 2020-07-18 RX ADMIN — HEPARIN SODIUM 5000 UNITS: 5000 INJECTION, SOLUTION INTRAVENOUS; SUBCUTANEOUS at 04:45

## 2020-07-18 RX ADMIN — SODIUM CHLORIDE, POTASSIUM CHLORIDE, SODIUM LACTATE AND CALCIUM CHLORIDE: 600; 310; 30; 20 INJECTION, SOLUTION INTRAVENOUS at 03:30

## 2020-07-18 RX ADMIN — CEFTRIAXONE SODIUM 2 G: 2 INJECTION, POWDER, FOR SOLUTION INTRAMUSCULAR; INTRAVENOUS at 04:28

## 2020-07-18 RX ADMIN — HEPARIN SODIUM 5000 UNITS: 5000 INJECTION, SOLUTION INTRAVENOUS; SUBCUTANEOUS at 14:03

## 2020-07-18 RX ADMIN — SODIUM CHLORIDE, POTASSIUM CHLORIDE, SODIUM LACTATE AND CALCIUM CHLORIDE: 600; 310; 30; 20 INJECTION, SOLUTION INTRAVENOUS at 18:13

## 2020-07-18 RX ADMIN — ACETAMINOPHEN 650 MG: 325 TABLET, FILM COATED ORAL at 14:03

## 2020-07-18 RX ADMIN — METOPROLOL TARTRATE 100 MG: 50 TABLET, FILM COATED ORAL at 04:45

## 2020-07-18 RX ADMIN — LATANOPROST 1 DROP: 50 SOLUTION OPHTHALMIC at 18:14

## 2020-07-18 RX ADMIN — HEPARIN SODIUM 5000 UNITS: 5000 INJECTION, SOLUTION INTRAVENOUS; SUBCUTANEOUS at 21:32

## 2020-07-18 RX ADMIN — ACETAMINOPHEN 650 MG: 325 TABLET, FILM COATED ORAL at 03:45

## 2020-07-18 RX ADMIN — METOPROLOL TARTRATE 100 MG: 50 TABLET, FILM COATED ORAL at 18:13

## 2020-07-18 ASSESSMENT — LIFESTYLE VARIABLES
ON A TYPICAL DAY WHEN YOU DRINK ALCOHOL HOW MANY DRINKS DO YOU HAVE: 0
TOTAL SCORE: 0
EVER FELT BAD OR GUILTY ABOUT YOUR DRINKING: NO
ALCOHOL_USE: NO
CONSUMPTION TOTAL: NEGATIVE
HOW MANY TIMES IN THE PAST YEAR HAVE YOU HAD 5 OR MORE DRINKS IN A DAY: 0
EVER HAD A DRINK FIRST THING IN THE MORNING TO STEADY YOUR NERVES TO GET RID OF A HANGOVER: NO
DOES PATIENT WANT TO STOP DRINKING: NO
EVER_SMOKED: NEVER
SUBSTANCE_ABUSE: 0
HAVE YOU EVER FELT YOU SHOULD CUT DOWN ON YOUR DRINKING: NO
EVER_SMOKED: NEVER
HAVE PEOPLE ANNOYED YOU BY CRITICIZING YOUR DRINKING: NO
TOTAL SCORE: 0
TOTAL SCORE: 0
AVERAGE NUMBER OF DAYS PER WEEK YOU HAVE A DRINK CONTAINING ALCOHOL: 0

## 2020-07-18 ASSESSMENT — COGNITIVE AND FUNCTIONAL STATUS - GENERAL
SUGGESTED CMS G CODE MODIFIER MOBILITY: CH
MOBILITY SCORE: 24
DAILY ACTIVITIY SCORE: 24
SUGGESTED CMS G CODE MODIFIER DAILY ACTIVITY: CH

## 2020-07-18 ASSESSMENT — ENCOUNTER SYMPTOMS
CHILLS: 1
ABDOMINAL PAIN: 0
BRUISES/BLEEDS EASILY: 0
EYE DISCHARGE: 0
FLANK PAIN: 0
SHORTNESS OF BREATH: 0
SPEECH CHANGE: 0
DOUBLE VISION: 0
SENSORY CHANGE: 0
HEARTBURN: 0
PALPITATIONS: 0
DEPRESSION: 0
MYALGIAS: 0
FOCAL WEAKNESS: 0
HEMOPTYSIS: 0
NAUSEA: 0
WEAKNESS: 0
VOMITING: 0
DIZZINESS: 0
FEVER: 0
HALLUCINATIONS: 0
COUGH: 0
BLURRED VISION: 0

## 2020-07-18 NOTE — PROGRESS NOTES
During morning vitals pt was shivering, paged JA De Los Santos and she  ordered to give pt Tylenol.

## 2020-07-18 NOTE — CARE PLAN
Problem: Fluid Volume:  Goal: Will maintain balanced intake and output  Outcome: PROGRESSING AS EXPECTED  Intervention: Monitor, educate, and encourage compliance with therapeutic intake of liquids  Note: Pt is on IV fluids and frequently monitored for response fluid therapy.      Problem: Communication  Goal: The ability to communicate needs accurately and effectively will improve  Outcome: PROGRESSING AS EXPECTED  Intervention: [place] patient and significant other/support system to call light to alert staff of needs  Note: Pt encouraged to use call light and alert staff when in need.

## 2020-07-18 NOTE — PROGRESS NOTES
"Pharmacy verified new medication order in MAR    Rechecked VS prior to med administrations.  Pt BP now 123/54, headache improved, chills resolved. Pt reports feeling \"better\".  Hydralazine held. WCTM.  "

## 2020-07-18 NOTE — PROGRESS NOTES
Pt found to be shivering in her bed, nasal cannula out of place. NC replaced, VS assessed. Pt found to be hypertensive. Patient also complaining of headache, medication provided per MAR.  Urostomy bag emptied, 200ml of leah cloudy urine. Pt is alert and oriented x4, calm and cooperative. WCTM    Page out to hospitalist at 3535.

## 2020-07-18 NOTE — WOUND TEAM
Attempted to see patient regarding neobladder and possible abdominal fistula. Patient on phone, with no indication that she would hang up. Her her state that she will go home tomorrow. Remains on wound team consult list.

## 2020-07-18 NOTE — CARE PLAN
Problem: Safety  Goal: Will remain free from injury  Outcome: PROGRESSING AS EXPECTED  Bed is in low, locked position.  Call light and belongings are within reach.       Problem: Venous Thromboembolism (VTW)/Deep Vein Thrombosis (DVT) Prevention:  Goal: Patient will participate in Venous Thrombosis (VTE)/Deep Vein Thrombosis (DVT)Prevention Measures  Outcome: PROGRESSING AS EXPECTED   Heparin as ordered

## 2020-07-18 NOTE — CARE PLAN
Problem: Safety  Goal: Will remain free from injury  Outcome: PROGRESSING AS EXPECTED  Bed is in low, locked position.  Call light and belongings are within reach.       Problem: Knowledge Deficit  Goal: Knowledge of disease process/condition, treatment plan, diagnostic tests, and medications will improve  Outcome: PROGRESSING AS EXPECTED   Pt educated regarding plan of care and medications. All questions answered.

## 2020-07-18 NOTE — PROGRESS NOTES
Assumed care of pt. A&Ox4. Sitting up in bed, sweaty and lethargic. Pt O2 saturations in the high 80s, pt placed on 2L of oxygen via nasal cannula. POC discused with patient. Running LR at 100ml/hr. All comfort measures in place. Personal belongings and call light by bedside. Bed locked and in lowest position. All needs met at this time. Hourly rounding in place.

## 2020-07-18 NOTE — PROGRESS NOTES
2 RN skin check complete with SAI Nicole  Devices in place: PIV, glasses, urostomy  Skin assessed under devices: yes  Confirmed pressure ulcers found: none  New potential pressure ulcers found: none  The following interventions in place: 2 rn skin check on arrival, IV fluids, education regarding self turns, urostomy care    Notes:   -generalized bruising  -urostomy stoma pink, round, budded  -bilateral LE 1+ pitting edema  -bilateral heels pink/blanching  -bilateral elbows pink/blanching  -bilateral ears pink/ blanching

## 2020-07-18 NOTE — PROGRESS NOTES
Late entry. Pt found to be tachycardic by CNA upon assessment of her first set of vitals. Charge RN notified, pt assessed, rapid called.    Admitting provider Dr. Eliel graham.      RRT at bedside.    Page was returned and new orders placed.

## 2020-07-18 NOTE — PROGRESS NOTES
Dr. Samayoa returned page, new order for 10mg IV hydralazine.  Verified by unit pharmacist (David), Ok to administer despite contraindication with cardizem allergy.

## 2020-07-18 NOTE — CARE PLAN
Problem: Fluid Volume:  Goal: Will maintain balanced intake and output  Outcome: PROGRESSING AS EXPECTED  Intervention: Monitor, educate, and encourage compliance with therapeutic intake of liquids  Note: Pt is on IV fluids and frequently monitored for response fluid therapy.      Problem: Communication  Goal: The ability to communicate needs accurately and effectively will improve  Outcome: PROGRESSING AS EXPECTED  Intervention: [place] patient and significant other/support system to call light to alert staff of needs  Note: Pt encouraged to use call light and alert staff when in need.

## 2020-07-18 NOTE — PROGRESS NOTES
Hospital Medicine Daily Progress Note    Date of Service  7/18/2020    Chief Complaint  84 y.o. female admitted 7/17/2020 with sepsis 2/2 to pyelonephritis    Hospital Course    Ms. 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 Chavarria on 2/27/20 with cultures growing E. coli and Bacteroides as well as Streptococcus.  She continues to have drainage out of this wound to this day which is unchanged.  Days ago she developed some dark urine with blood in her urostomy and has had confusion for the past 2 days with hallucinations.  This morning her  took her temperature is 102.  He brought her to the emergency room her white count 17,000 as she has a very positive urinalysis with an elevated creatinine will be admitted for IV fluids and IV antibiotics.      Interval Problem Update  Improvement of mentation   Mild chills this am  Febrile overnight   Otherwise VS stable and no acute events    Consultants/Specialty  none    Code Status  full    Disposition  tbd    Review of Systems  Review of Systems   Constitutional: Positive for chills and malaise/fatigue. Negative for fever.   HENT: Negative for congestion, hearing loss and tinnitus.    Eyes: Negative for blurred vision, double vision and discharge.   Respiratory: Negative for cough, hemoptysis and shortness of breath.    Cardiovascular: Negative for chest pain, palpitations and leg swelling.   Gastrointestinal: Negative for abdominal pain, heartburn, nausea and vomiting.   Genitourinary: Positive for dysuria. Negative for flank pain.   Musculoskeletal: Negative for joint pain and myalgias.   Skin: Negative for rash.   Neurological: Negative for dizziness, sensory change, speech change, focal weakness and weakness.   Endo/Heme/Allergies: Negative for environmental allergies. Does not bruise/bleed easily.    Psychiatric/Behavioral: Negative for depression, hallucinations and substance abuse.        Physical Exam  Temp:  [35.8 °C (96.5 °F)-39 °C (102.2 °F)] 35.8 °C (96.5 °F)  Pulse:  [] 67  Resp:  [16-20] 18  BP: (106-157)/(50-80) 108/50  SpO2:  [90 %-100 %] 97 %    Physical Exam  Vitals signs reviewed.   Constitutional:       Appearance: Normal appearance. She is ill-appearing.   HENT:      Head: Normocephalic and atraumatic.      Nose: No congestion.      Mouth/Throat:      Mouth: Mucous membranes are dry.   Eyes:      Extraocular Movements: Extraocular movements intact.      Pupils: Pupils are equal, round, and reactive to light.   Neck:      Musculoskeletal: Normal range of motion and neck supple. No muscular tenderness.   Cardiovascular:      Rate and Rhythm: Normal rate and regular rhythm.      Pulses: Normal pulses.      Heart sounds: Normal heart sounds. No murmur.   Pulmonary:      Effort: Pulmonary effort is normal. No respiratory distress.      Breath sounds: Normal breath sounds. No stridor.   Abdominal:      General: Bowel sounds are normal. There is no distension.      Palpations: Abdomen is soft.      Tenderness: There is no abdominal tenderness.   Musculoskeletal:         General: No swelling or deformity.   Skin:     General: Skin is warm and dry.      Capillary Refill: Capillary refill takes 2 to 3 seconds.   Neurological:      General: No focal deficit present.      Mental Status: She is alert and oriented to person, place, and time.   Psychiatric:         Mood and Affect: Mood normal.         Behavior: Behavior normal.         Thought Content: Thought content normal.         Judgment: Judgment normal.         Fluids    Intake/Output Summary (Last 24 hours) at 7/18/2020 1026  Last data filed at 7/18/2020 0530  Gross per 24 hour   Intake 1495 ml   Output 400 ml   Net 1095 ml       Laboratory  Recent Labs     07/17/20  1241 07/18/20  0155   WBC 17.5* 17.3*   RBC 4.37 3.92*   HEMOGLOBIN 13.1 11.7*    HEMATOCRIT 41.0 37.6   MCV 93.8 95.9   MCH 30.0 29.8   MCHC 32.0* 31.1*   RDW 49.0 50.1*   PLATELETCT 115* 96*   MPV 11.4 11.6     Recent Labs     07/17/20  1241 07/18/20  0155   SODIUM 135 137   POTASSIUM 4.8 4.6   CHLORIDE 101 102   CO2 20 21   GLUCOSE 107* 123*   BUN 20 25*   CREATININE 1.72* 1.91*   CALCIUM 9.0 8.3*     Recent Labs     07/17/20  1244   INR 1.18*               Imaging  DX-CHEST-PORTABLE (1 VIEW)   Final Result         Findings on chest radiograph appear stable since the prior radiograph.  No new abnormalities are identified. No new infiltrates or consolidations are identified.           Assessment/Plan  * Pyelonephritis- (present on admission)  Assessment & Plan  +UA status post urostomy  IV rocephin as she has not been hospitalized   Follow cultures   Leukocytosis with only mild improvement  descilate to oral abx as appropriate    MARSHALL (acute kidney injury) ([facility])- (present on admission)  Assessment & Plan  Upon review of the records, her baseline Cr was 1.1 on 5/17/20  IV fluids have been ordered  Follow urine output  Recheck cmp in am    Sepsis ([facility])- (present on admission)  Assessment & Plan  This is Sepsis Present on admission  SIRS criteria identified on my evaluation include: Leukocytosis, with WBC greater than 12,000  Source is UTI  Sepsis protocol initiated  Fluid resuscitation ordered per protocol  IV antibiotics as appropriate for source of sepsis: Rocephin  While organ dysfunction may be noted elsewhere in this problem list or in the chart, degree of organ dysfunction does not meet CMS criteria for severe sepsis          Essential hypertension- (present on admission)  Assessment & Plan  Hold lasix given dehydration  Metoprolol if BP allows    AF (atrial fibrillation) ([facility])- (present on admission)  Assessment & Plan  Hx of  Continue metoprolol with holding parameters.  She is not on anticoagulation    Presence of urostomy ([facility])- (present on admission)  Assessment & Plan  History of        VTE prophylaxis: heparin

## 2020-07-19 VITALS
HEIGHT: 68 IN | OXYGEN SATURATION: 98 % | WEIGHT: 151.68 LBS | TEMPERATURE: 97.8 F | SYSTOLIC BLOOD PRESSURE: 124 MMHG | HEART RATE: 92 BPM | BODY MASS INDEX: 22.99 KG/M2 | DIASTOLIC BLOOD PRESSURE: 72 MMHG | RESPIRATION RATE: 18 BRPM

## 2020-07-19 PROBLEM — N17.9 AKI (ACUTE KIDNEY INJURY) (HCC): Status: RESOLVED | Noted: 2019-03-16 | Resolved: 2020-07-19

## 2020-07-19 LAB
ALBUMIN SERPL BCP-MCNC: 2.6 G/DL (ref 3.2–4.9)
ALBUMIN/GLOB SERPL: 0.7 G/DL
ALP SERPL-CCNC: 123 U/L (ref 30–99)
ALT SERPL-CCNC: 15 U/L (ref 2–50)
ANION GAP SERPL CALC-SCNC: 11 MMOL/L (ref 7–16)
AST SERPL-CCNC: 21 U/L (ref 12–45)
BACTERIA UR CULT: ABNORMAL
BACTERIA UR CULT: ABNORMAL
BASOPHILS # BLD AUTO: 0 % (ref 0–1.8)
BASOPHILS # BLD: 0 K/UL (ref 0–0.12)
BILIRUB SERPL-MCNC: 0.4 MG/DL (ref 0.1–1.5)
BUN SERPL-MCNC: 26 MG/DL (ref 8–22)
CALCIUM SERPL-MCNC: 8.1 MG/DL (ref 8.5–10.5)
CHLORIDE SERPL-SCNC: 102 MMOL/L (ref 96–112)
CO2 SERPL-SCNC: 21 MMOL/L (ref 20–33)
CREAT SERPL-MCNC: 1.49 MG/DL (ref 0.5–1.4)
EOSINOPHIL # BLD AUTO: 0 K/UL (ref 0–0.51)
EOSINOPHIL NFR BLD: 0 % (ref 0–6.9)
ERYTHROCYTE [DISTWIDTH] IN BLOOD BY AUTOMATED COUNT: 48 FL (ref 35.9–50)
GLOBULIN SER CALC-MCNC: 3.6 G/DL (ref 1.9–3.5)
GLUCOSE SERPL-MCNC: 106 MG/DL (ref 65–99)
HCT VFR BLD AUTO: 35.1 % (ref 37–47)
HGB BLD-MCNC: 11.2 G/DL (ref 12–16)
LYMPHOCYTES # BLD AUTO: 0.61 K/UL (ref 1–4.8)
LYMPHOCYTES NFR BLD: 4.3 % (ref 22–41)
MANUAL DIFF BLD: NORMAL
MCH RBC QN AUTO: 29.7 PG (ref 27–33)
MCHC RBC AUTO-ENTMCNC: 31.9 G/DL (ref 33.6–35)
MCV RBC AUTO: 93.1 FL (ref 81.4–97.8)
MONOCYTES # BLD AUTO: 0.99 K/UL (ref 0–0.85)
MONOCYTES NFR BLD AUTO: 7 % (ref 0–13.4)
MORPHOLOGY BLD-IMP: NORMAL
NEUTROPHILS # BLD AUTO: 12.51 K/UL (ref 2–7.15)
NEUTROPHILS NFR BLD: 88.7 % (ref 44–72)
NRBC # BLD AUTO: 0 K/UL
NRBC BLD-RTO: 0 /100 WBC
PLATELET # BLD AUTO: 80 K/UL (ref 164–446)
PLATELET BLD QL SMEAR: NORMAL
PMV BLD AUTO: 12.6 FL (ref 9–12.9)
POTASSIUM SERPL-SCNC: 3.9 MMOL/L (ref 3.6–5.5)
PROT SERPL-MCNC: 6.2 G/DL (ref 6–8.2)
RBC # BLD AUTO: 3.77 M/UL (ref 4.2–5.4)
RBC BLD AUTO: NORMAL
SIGNIFICANT IND 70042: ABNORMAL
SITE SITE: ABNORMAL
SODIUM SERPL-SCNC: 134 MMOL/L (ref 135–145)
SOURCE SOURCE: ABNORMAL
WBC # BLD AUTO: 14.1 K/UL (ref 4.8–10.8)

## 2020-07-19 PROCEDURE — 302098 PASTE RING (FLAT): Performed by: HOSPITALIST

## 2020-07-19 PROCEDURE — 302111 WAFER OST 2.25IN N IMG RD 2 PC (BARRIER): Performed by: HOSPITALIST

## 2020-07-19 PROCEDURE — 700102 HCHG RX REV CODE 250 W/ 637 OVERRIDE(OP): Performed by: HOSPITALIST

## 2020-07-19 PROCEDURE — A9270 NON-COVERED ITEM OR SERVICE: HCPCS | Performed by: HOSPITALIST

## 2020-07-19 PROCEDURE — 80053 COMPREHEN METABOLIC PANEL: CPT

## 2020-07-19 PROCEDURE — 85027 COMPLETE CBC AUTOMATED: CPT

## 2020-07-19 PROCEDURE — 36415 COLL VENOUS BLD VENIPUNCTURE: CPT

## 2020-07-19 PROCEDURE — 700111 HCHG RX REV CODE 636 W/ 250 OVERRIDE (IP): Performed by: HOSPITALIST

## 2020-07-19 PROCEDURE — 85007 BL SMEAR W/DIFF WBC COUNT: CPT

## 2020-07-19 PROCEDURE — 700105 HCHG RX REV CODE 258: Performed by: HOSPITALIST

## 2020-07-19 PROCEDURE — 99239 HOSP IP/OBS DSCHRG MGMT >30: CPT | Performed by: HOSPITALIST

## 2020-07-19 RX ORDER — CIPROFLOXACIN 500 MG/1
500 TABLET, FILM COATED ORAL 2 TIMES DAILY
Qty: 6 TAB | Refills: 0 | Status: SHIPPED | OUTPATIENT
Start: 2020-07-19 | End: 2020-07-19 | Stop reason: SDUPTHER

## 2020-07-19 RX ORDER — CIPROFLOXACIN 500 MG/1
500 TABLET, FILM COATED ORAL 2 TIMES DAILY
Qty: 6 TAB | Refills: 0 | Status: SHIPPED | OUTPATIENT
Start: 2020-07-19 | End: 2020-07-22

## 2020-07-19 RX ADMIN — ACETAMINOPHEN 650 MG: 325 TABLET, FILM COATED ORAL at 01:28

## 2020-07-19 RX ADMIN — SODIUM CHLORIDE, POTASSIUM CHLORIDE, SODIUM LACTATE AND CALCIUM CHLORIDE: 600; 310; 30; 20 INJECTION, SOLUTION INTRAVENOUS at 01:58

## 2020-07-19 RX ADMIN — HEPARIN SODIUM 5000 UNITS: 5000 INJECTION, SOLUTION INTRAVENOUS; SUBCUTANEOUS at 05:24

## 2020-07-19 RX ADMIN — METOPROLOL TARTRATE 100 MG: 50 TABLET, FILM COATED ORAL at 05:24

## 2020-07-19 RX ADMIN — ASPIRIN 81 MG: 81 TABLET, COATED ORAL at 05:24

## 2020-07-19 RX ADMIN — LEVOTHYROXINE SODIUM 112 MCG: 112 TABLET ORAL at 05:24

## 2020-07-19 RX ADMIN — TIMOLOL MALEATE 1 DROP: 5 SOLUTION/ DROPS OPHTHALMIC at 06:24

## 2020-07-19 RX ADMIN — CEFTRIAXONE SODIUM 2 G: 2 INJECTION, POWDER, FOR SOLUTION INTRAMUSCULAR; INTRAVENOUS at 05:20

## 2020-07-19 NOTE — PROGRESS NOTES
Pt dc'd 1245. IV removed. Pt left unit via wheelchair with BRN. Personal belongings with pt when leaving unit. Pt given discharge instructions prior to leaving unit including where to  prescriptions and when to follow-up; verbalizes understanding. Copy of discharge instructions with pt and in the chart.

## 2020-07-19 NOTE — PROGRESS NOTES
Report received from day shift nurse. Assumed care @ 1900.     Patient is alert and oriented x 4. Able to make needs known. Assessment completed. On O2 at  1 LPM via nasal cannula as endorsed, tolerating well.  in place and functioning, O2 sat at 98%. Urostomy in place as endorsed. Denies any pain and discomfort at this time. Patient educated regarding plan of care. 1X assist. Bed alarm in place and functioning.    Bed locked, in lowest position, treaded socks on. Needs attended. No further needs at this time. Communication board updated. Call light and personal belongings within reach.

## 2020-07-19 NOTE — DISCHARGE SUMMARY
Discharge Summary    CHIEF COMPLAINT ON ADMISSION  Chief Complaint   Patient presents with   • Fever   • Urinary Frequency     Pts  reports increased urination x2 days. Hx of urostomy - described to bags full of dark urine with blood at the bottom.        Reason for Admission  Fever     Admission Date  7/17/2020    CODE STATUS  Full Code    HPI & HOSPITAL COURSE  This is a 84 y.o. female here with Ms. 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 Chavarria on 2/27/20 with cultures growing E. coli and Bacteroides as well as Streptococcus who presents with fever. She continues to have drainage out of this wound to this day which is unchanged.  Days ago she developed some dark urine with blood in her urostomy and has had confusion for the past 2 days with hallucinations.   her  took her temperature is 102.  she was found to have evidence of pyelonephritis, treated with IV abx.  Urine culture grew Klebsiella pneumonia pansensitive.  She also has MARSHALL that is improving on IV fluids.  She is tolerating oral intake.  I encouraged continued p.o. intake.  Her leukocytosis is improving at the time of discharge.  She will be discharged home on oral ciprofloxacin and follow-up with primary care physician in 1 week.    Therefore, she is discharged in good and stable condition to home with close outpatient follow-up.    The patient met 2-midnight criteria for an inpatient stay at the time of discharge.    Discharge Date  7/19/20    FOLLOW UP ITEMS POST DISCHARGE  PCP x 1 week    DISCHARGE DIAGNOSES  Principal Problem:    Pyelonephritis POA: Yes  Active Problems:    Essential hypertension POA: Yes    Presence of urostomy (HCC) POA: Yes    AF (atrial fibrillation) (HCC) POA: Yes  Resolved Problems:    MARSHALL (acute kidney injury) (HCC) POA: Yes    Sepsis (HCC) POA: Yes      Overview:  ICD-10 transition      FOLLOW UP  No future appointments.  Coreen Cisse M.D.  [id number] [facility] [id number] [phone]    In 1 week        MEDICATIONS ON DISCHARGE     Medication List      START taking these medications      Instructions   ciprofloxacin 500 MG Tabs  Commonly known as:  CIPRO   Take 1 Tab by mouth 2 times a day for 3 days.  Dose:  500 mg        CONTINUE taking these medications      Instructions   aspirin 81 MG EC tablet   Take 1 Tab by mouth every day.  Dose:  81 mg     BIOTIN PO   Take 1 Cap by mouth every day.  Dose:  1 Cap     Iron 27 240 (27 Fe) MG tablet  Generic drug:  ferrous gluconate   Take 240 mg by mouth every day.  Dose:  240 mg     latanoprost 0.005 % Soln  Commonly known as:  XALATAN   Place 1 Drop in both eyes every evening.  Dose:  1 Drop     levothyroxine 112 MCG Tabs  Commonly known as:  SYNTHROID   Take 112 mcg by mouth Every morning on an empty stomach.  Dose:  112 mcg     lovastatin 10 MG tablet  Commonly known as:  MEVACOR   Take 10 mg by mouth every day.  Dose:  10 mg     Magnesium Oxide 400 MG Caps   Take 1 Cap by mouth every day.  Dose:  1 Cap     meloxicam 7.5 MG Tabs  Commonly known as:  MOBIC   Take 7.5 mg by mouth 2 Times a Day.  Dose:  7.5 mg     metoprolol 100 MG Tabs  Commonly known as:  LOPRESSOR   Take 1 Tab by mouth 2 times a day.  Dose:  100 mg     pantoprazole 40 MG Tbec  Commonly known as:  PROTONIX   Take 40 mg by mouth every day.  Dose:  40 mg     * PRESERVISION AREDS PO   Take 1 Tab by mouth 2 Times a Day.  Dose:  1 Tab     * Centrum Silver Ultra Womens Tabs   Take 1 Tab by mouth every day.  Dose:  1 Tab     timolol 0.5 % Soln  Commonly known as:  TIMOPTIC   Place 1 Drop in both eyes 2 times a day.  Dose:  1 Drop         * This list has 2 medication(s) that are the same as other medications prescribed for you. Read the directions carefully, and ask your doctor or other care provider to review them with you.            STOP taking these  "medications    furosemide 20 MG Tabs  Commonly known as:  LASIX            Allergies  Allergies   Allergen Reactions   • Cardizem Swelling   • Doxycycline      Swollen lips.   • Sulfa Drugs      Makes tongue swell, severely   • Zyvox Swelling     \"Whole mouth swelled up\"    • Codeine      Does not remember the reaction         DIET  Orders Placed This Encounter   Procedures   • Diet Order Regular     Standing Status:   Standing     Number of Occurrences:   1     Order Specific Question:   Diet:     Answer:   Regular [1]       ACTIVITY  As tolerated.  Weight bearing as tolerated    CONSULTATIONS  none    PROCEDURES  none    LABORATORY  Lab Results   Component Value Date    SODIUM 134 (L) 07/19/2020    POTASSIUM 3.9 07/19/2020    CHLORIDE 102 07/19/2020    CO2 21 07/19/2020    GLUCOSE 106 (H) 07/19/2020    BUN 26 (H) 07/19/2020    CREATININE 1.49 (H) 07/19/2020    CREATININE 1.2 02/10/2009        Lab Results   Component Value Date    WBC 14.1 (H) 07/19/2020    HEMOGLOBIN 11.2 (L) 07/19/2020    HEMATOCRIT 35.1 (L) 07/19/2020    PLATELETCT 80 (L) 07/19/2020        Total time of the discharge process exceeds 33 minutes.  "

## 2020-07-19 NOTE — DISCHARGE INSTRUCTIONS
Discharge Instructions    Discharged to home by car with relative. Discharged via wheelchair, hospital escort: Yes.  Special equipment needed: Not Applicable    Be sure to schedule a follow-up appointment with your primary care doctor or any specialists as instructed.     Discharge Plan:   Diet Plan: Discussed  Activity Level: Discussed  Confirmed Follow up Appointment: Patient to Call and Schedule Appointment  Confirmed Symptoms Management: Discussed  Medication Reconciliation Updated: Yes    I understand that a diet low in cholesterol, fat, and sodium is recommended for good health. Unless I have been given specific instructions below for another diet, I accept this instruction as my diet prescription.       Special Instructions: None    · Is patient discharged on Warfarin / Coumadin?   No     Depression / Suicide Risk    As you are discharged from this [facility] facility, it is important to learn how to keep safe from harming yourself.    Recognize the warning signs:  · Abrupt changes in personality, positive or negative- including increase in energy   · Giving away possessions  · Change in eating patterns- significant weight changes-  positive or negative  · Change in sleeping patterns- unable to sleep or sleeping all the time   · Unwillingness or inability to communicate  · Depression  · Unusual sadness, discouragement and loneliness  · Talk of wanting to die  · Neglect of personal appearance   · Rebelliousness- reckless behavior  · Withdrawal from people/activities they love  · Confusion- inability to concentrate     If you or a loved one observes any of these behaviors or has concerns about self-harm, here's what you can do:  · Talk about it- your feelings and reasons for harming yourself  · Remove any means that you might use to hurt yourself (examples: pills, rope, extension cords, firearm)  · Get professional help from the community (Mental Health, Substance Abuse, psychological counseling)  · Do not  be alone:Call your Safe Contact- someone whom you trust who will be there for you.  · Call your local CRISIS HOTLINE 392-2249 or 026-992-9149  · Call your local Children's Mobile Crisis Response Team [place] (245) 721-7260 or www.[employer]  · Call the toll free National Suicide Prevention Hotlines   · National Suicide Prevention Lifeline 631-042-DVTI (5617)  · [employer] Line Network 800-SUICIDE (175-1254)    Pyelonephritis, Adult    Pyelonephritis is an infection that occurs in the kidney. The kidneys are organs that help clean the blood by moving waste out of the blood and into the pee (urine). This infection can happen quickly, or it can last for a long time. In most cases, it clears up with treatment and does not cause other problems.  What are the causes?  This condition may be caused by:  · Germs (bacteria) going from the bladder up to the kidney. This may happen after having a bladder infection.  · Germs going from the blood to the kidney.  What increases the risk?  This condition is more likely to develop in:  · Pregnant women.  · Older people.  · People who have any of these conditions:  ? Diabetes.  ? Inflammation of the prostate gland (prostatitis), in males.  ? Kidney stones or bladder stones.  ? Other problems with the kidney or the parts of your body that carry pee from the kidneys to the bladder (ureters).  ? Cancer.  · People who have a small, thin tube (catheter) placed in the bladder.  · People who are sexually active.  · Women who use a medicine that kills sperm (spermicide) to prevent pregnancy.  · People who have had a prior urinary tract infection (UTI).  What are the signs or symptoms?  Symptoms of this condition include:  · Peeing often.  · A strong urge to pee right away.  · Burning or stinging when peeing.  · Belly pain.  · Back pain.  · Pain in the side (flank area).  · Fever or chills.  · Blood in the pee, or dark pee.  · Feeling sick to your stomach (nauseous) or throwing  up (vomiting).  How is this treated?  This condition may be treated by:  · Taking antibiotic medicines by mouth (orally).  · Drinking enough fluids.  If the infection is bad, you may need to stay in the hospital. You may be given antibiotics and fluids that are put directly into a vein through an IV tube.  In some cases, other treatments may be needed.  Follow these instructions at home:  Medicines  · Take your antibiotic medicine as told by your doctor. Do not stop taking the antibiotic even if you start to feel better.  · Take over-the-counter and prescription medicines only as told by your doctor.  General instructions    · Drink enough fluid to keep your pee pale yellow.  · Avoid caffeine, tea, and carbonated drinks.  · Pee (urinate) often. Avoid holding in pee for long periods of time.  · Pee before and after sex.  · After pooping (having a bowel movement), women should wipe from front to back. Use each tissue only once.  · Keep all follow-up visits as told by your doctor. This is important.  Contact a doctor if:  · You do not feel better after 2 days.  · Your symptoms get worse.  · You have a fever.  Get help right away if:  · You cannot take your medicine or drink fluids as told.  · You have chills and shaking.  · You throw up.  · You have very bad pain in your side or back.  · You feel very weak or you pass out (faint).  Summary  · Pyelonephritis is an infection that occurs in the kidney.  · In most cases, this infection clears up with treatment and does not cause other problems.  · Take your antibiotic medicine as told by your doctor. Do not stop taking the antibiotic even if you start to feel better.  · Drink enough fluid to keep your pee pale yellow.  This information is not intended to replace advice given to you by your health care provider. Make sure you discuss any questions you have with your health care provider.  Document Released: 01/25/2006 Document Revised: 10/22/2019 Document Reviewed:  10/22/2019  Elsevier Patient Education © 2020 [employer]

## 2020-07-19 NOTE — NON-PROVIDER
Daily Progress Note    Date of Service  7/19/2020    Chief Complaint  84 y.o. female admitted 7/17/2020 with altered mental status, fever, urinary frequency    Hospital Course    Mrs. Wadsworth is an 84 y.o. female who presented on 07/17/2020 with a fever. Her  brought her in because he said she had dark urine in her urostomy and had confusion with hallucinations for 2 days prior to arrival. The morning before he brought her in to the hospital, he took her temperature and noted it was 102 F. In the emergency room, her WBC was 17,000 and a urinalysis was found to be positive. Of note, she does have a history of ileal conduit infections with a recent admission from 02/27-03/03 due to an abdominal abscess that was surgically treated by Dr. Chavarria on 2/27/2020. Cultures were taken which were positive for E. Coli, bacteroides, and streptococcus. She has not had any known exposures to COVID, denied cough, or shortness of breath. The ED provider noted that she was not confused, but that she did report some intermittent confusion. She was admitted for treatment of pyelonephritis with sepsis.      Interval Problem Update  Feels better today. A/Ox3.   Urostomy bag full with leah colored urine.   Dressing over suprapubic wound has not been changed since arrival. Wound draining serous fluid. Erythema present. No foul odor noted.    Consultants/Specialty  None    Code Status  full    Disposition  Clinical for now    Review of Systems  ROS     Physical Exam  Temp:  [36.1 °C (96.9 °F)-38.1 °C (100.6 °F)] 36.6 °C (97.8 °F)  Pulse:  [] 92  Resp:  [16-20] 18  BP: (115-161)/(49-78) 124/72  SpO2:  [96 %-100 %] 98 %    Physical Exam    Fluids    Intake/Output Summary (Last 24 hours) at 7/19/2020 1215  Last data filed at 7/19/2020 1125  Gross per 24 hour   Intake 840 ml   Output 1800 ml   Net -960 ml       Laboratory  Recent Labs     07/17/20  1241 07/18/20  0155 07/19/20  0318   WBC 17.5* 17.3* 14.1*   RBC 4.37 3.92* 3.77*    HEMOGLOBIN 13.1 11.7* 11.2*   HEMATOCRIT 41.0 37.6 35.1*   MCV 93.8 95.9 93.1   MCH 30.0 29.8 29.7   MCHC 32.0* 31.1* 31.9*   RDW 49.0 50.1* 48.0   PLATELETCT 115* 96* 80*   MPV 11.4 11.6 12.6     Recent Labs     07/17/20  1241 07/18/20  0155 07/19/20  0318   SODIUM 135 137 134*   POTASSIUM 4.8 4.6 3.9   CHLORIDE 101 102 102   CO2 20 21 21   GLUCOSE 107* 123* 106*   BUN 20 25* 26*   CREATININE 1.72* 1.91* 1.49*   CALCIUM 9.0 8.3* 8.1*     Recent Labs     07/17/20  1244   INR 1.18*               Imaging  DX-CHEST-PORTABLE (1 VIEW)   Final Result         Findings on chest radiograph appear stable since the prior radiograph.  No new abnormalities are identified. No new infiltrates or consolidations are identified.           Assessment/Plan   * Pyelonephritis- (present on admission)  Assessment & Plan  +UA status post urostomy   IV rocephin 2 g Q 24 hours  Urine culture +Klebsiella, sensitive to Rocephin  Leukocytosis mildly improved at 14.1       MARSHALL (acute kidney injury) (HCC)- (present on admission)  Assessment & Plan  Upon review of the records, her baseline Cr was 1.1 on 5/17/20   mL/hr continuous  Follow urine output  Improved today at 1.49.     Sepsis (HCC)- (present on admission)  Assessment & Plan  This is Sepsis Present on admission  SIRS criteria identified on admit include: Leukocytosis, with WBC greater than 12,000, HR > 90  Source is UTI  Sepsis protocol completed  Fluid resuscitation completed   IV antibiotics as appropriate for source of sepsis: Rocephin     Essential hypertension- (present on admission)  Assessment & Plan  Hold lasix given dehydration/acute kidney injury. Consider restarting as outpatient  Continue home metoprolol     AF (atrial fibrillation) (HCC)- (present on admission)  Assessment & Plan  History of A-fib   Continue metoprolol with holding parameters.  CHADS-Vasc score 6 for history of TIA  She is not on anticoagulation due to recurrent bleeding from stoma, per cards note  from last April.   Aspirin 81 mg PO daily     Presence of urostomy (HCC)- (present on admission)  Assessment & Plan  Draining urine. Urostomy bag full at time of assessment with leah colored urine     Bilateral pitting edema to feet   Assessment & Plan       Per patient, this is a chronic issue.        2+ pitting to bilateral feet and ankles, no edema noted to calves    Hyperlipidemia (present on admission)  Assessment & Plan       Restart home lovastatin 10 mg PO daily    Hypothyroidism (Present on admission)  Assessment & Plan        Continue home levothyroxine        TSH 0.466. Draw Free T-4    Iron deficiency anemia (Present on admission)  Assessment & Plan         Chronic               Last iron draw 05/22/19 - 42         On ferrous gluconate 240 mg PO daily         Follow up as outpatient          Mild, H/H 11.2/35.1             VTE prophylaxis: heparin

## 2020-08-07 NOTE — DOCUMENTATION QUERY
[facility]                                                                       Query Response Note      PATIENT:               BERNARD BAER  ACCT #:                  [id number]  MRN:                     [id number]  :                      1935  ADMIT DATE:       2020 11:29 AM  DISCH DATE:        2020 12:57 PM  RESPONDING  PROVIDER #:        [id number]           QUERY TEXT:    Please clarify in documentation the relationship, if any, between UTI/pyelonephritis and the urostomy.    *If an appropriate response is not listed below, please respond with a new note:    The patient's Clinical Indicators include:  History and physical report documents Sepsis- source is UTI. Pyelonephritis. +UA status post urostomy  Options provided:   -- Pyelonephritis is due to or associated with urostomy   -- Unrelated to each other   -- Unable to determine      Query created by: Frandy Ac on 2020 5:39 AM    RESPONSE TEXT:    Unrelated to each other          Electronically signed by:  HAILEY MANCUSO MD 2020 1:30 PM

## 2020-08-23 ENCOUNTER — APPOINTMENT (OUTPATIENT)
Dept: RADIOLOGY | Facility: MEDICAL CENTER | Age: 85
DRG: 856 | End: 2020-08-23
Attending: EMERGENCY MEDICINE
Payer: MEDICARE

## 2020-08-23 ENCOUNTER — HOSPITAL ENCOUNTER (INPATIENT)
Facility: MEDICAL CENTER | Age: 85
LOS: 5 days | DRG: 856 | End: 2020-08-28
Attending: EMERGENCY MEDICINE | Admitting: HOSPITALIST
Payer: MEDICARE

## 2020-08-23 DIAGNOSIS — L02.211 ABDOMINAL WALL ABSCESS: ICD-10-CM

## 2020-08-23 DIAGNOSIS — A41.9 SEPSIS WITHOUT ACUTE ORGAN DYSFUNCTION, DUE TO UNSPECIFIED ORGANISM (HCC): ICD-10-CM

## 2020-08-23 DIAGNOSIS — T81.49XA ABDOMINAL WALL ABSCESS AT SITE OF SURGICAL WOUND: ICD-10-CM

## 2020-08-23 PROBLEM — E87.1 HYPONATREMIA: Status: ACTIVE | Noted: 2020-08-23

## 2020-08-23 PROBLEM — R93.2 ABNORMAL CT SCAN, LIVER: Status: ACTIVE | Noted: 2020-08-23

## 2020-08-23 LAB
ALBUMIN SERPL BCP-MCNC: 3.4 G/DL (ref 3.2–4.9)
ALBUMIN/GLOB SERPL: 0.7 G/DL
ALP SERPL-CCNC: 192 U/L (ref 30–99)
ALT SERPL-CCNC: 17 U/L (ref 2–50)
ANION GAP SERPL CALC-SCNC: 13 MMOL/L (ref 7–16)
AST SERPL-CCNC: 27 U/L (ref 12–45)
BASOPHILS # BLD AUTO: 0.5 % (ref 0–1.8)
BASOPHILS # BLD: 0.06 K/UL (ref 0–0.12)
BILIRUB SERPL-MCNC: 0.6 MG/DL (ref 0.1–1.5)
BLOOD CULTURE HOLD CXBCH: NORMAL
BUN SERPL-MCNC: 16 MG/DL (ref 8–22)
CALCIUM SERPL-MCNC: 9.4 MG/DL (ref 8.5–10.5)
CHLORIDE SERPL-SCNC: 101 MMOL/L (ref 96–112)
CO2 SERPL-SCNC: 20 MMOL/L (ref 20–33)
COVID ORDER STATUS COVID19: NORMAL
CREAT SERPL-MCNC: 1.28 MG/DL (ref 0.5–1.4)
EOSINOPHIL # BLD AUTO: 0.11 K/UL (ref 0–0.51)
EOSINOPHIL NFR BLD: 0.8 % (ref 0–6.9)
ERYTHROCYTE [DISTWIDTH] IN BLOOD BY AUTOMATED COUNT: 50.2 FL (ref 35.9–50)
GLOBULIN SER CALC-MCNC: 4.6 G/DL (ref 1.9–3.5)
GLUCOSE SERPL-MCNC: 89 MG/DL (ref 65–99)
HCT VFR BLD AUTO: 41 % (ref 37–47)
HGB BLD-MCNC: 13.4 G/DL (ref 12–16)
IMM GRANULOCYTES # BLD AUTO: 0.14 K/UL (ref 0–0.11)
IMM GRANULOCYTES NFR BLD AUTO: 1.1 % (ref 0–0.9)
LACTATE BLD-SCNC: 1.3 MMOL/L (ref 0.5–2)
LIPASE SERPL-CCNC: 70 U/L (ref 11–82)
LYMPHOCYTES # BLD AUTO: 2.14 K/UL (ref 1–4.8)
LYMPHOCYTES NFR BLD: 16.5 % (ref 22–41)
MCH RBC QN AUTO: 30 PG (ref 27–33)
MCHC RBC AUTO-ENTMCNC: 32.7 G/DL (ref 33.6–35)
MCV RBC AUTO: 91.7 FL (ref 81.4–97.8)
MONOCYTES # BLD AUTO: 2.18 K/UL (ref 0–0.85)
MONOCYTES NFR BLD AUTO: 16.8 % (ref 0–13.4)
NEUTROPHILS # BLD AUTO: 8.35 K/UL (ref 2–7.15)
NEUTROPHILS NFR BLD: 64.3 % (ref 44–72)
NRBC # BLD AUTO: 0 K/UL
NRBC BLD-RTO: 0 /100 WBC
PLATELET # BLD AUTO: 160 K/UL (ref 164–446)
PMV BLD AUTO: 11.9 FL (ref 9–12.9)
POTASSIUM SERPL-SCNC: 4 MMOL/L (ref 3.6–5.5)
PROT SERPL-MCNC: 8 G/DL (ref 6–8.2)
RBC # BLD AUTO: 4.47 M/UL (ref 4.2–5.4)
SARS-COV-2 RNA RESP QL NAA+PROBE: NOTDETECTED
SODIUM SERPL-SCNC: 134 MMOL/L (ref 135–145)
SPECIMEN SOURCE: NORMAL
WBC # BLD AUTO: 13 K/UL (ref 4.8–10.8)

## 2020-08-23 PROCEDURE — 700101 HCHG RX REV CODE 250: Performed by: EMERGENCY MEDICINE

## 2020-08-23 PROCEDURE — U0003 INFECTIOUS AGENT DETECTION BY NUCLEIC ACID (DNA OR RNA); SEVERE ACUTE RESPIRATORY SYNDROME CORONAVIRUS 2 (SARS-COV-2) (CORONAVIRUS DISEASE [COVID-19]), AMPLIFIED PROBE TECHNIQUE, MAKING USE OF HIGH THROUGHPUT TECHNOLOGIES AS DESCRIBED BY CMS-2020-01-R: HCPCS

## 2020-08-23 PROCEDURE — 87077 CULTURE AEROBIC IDENTIFY: CPT | Mod: 91

## 2020-08-23 PROCEDURE — 99223 1ST HOSP IP/OBS HIGH 75: CPT | Performed by: HOSPITALIST

## 2020-08-23 PROCEDURE — 74177 CT ABD & PELVIS W/CONTRAST: CPT

## 2020-08-23 PROCEDURE — 700101 HCHG RX REV CODE 250: Performed by: HOSPITALIST

## 2020-08-23 PROCEDURE — 87070 CULTURE OTHR SPECIMN AEROBIC: CPT

## 2020-08-23 PROCEDURE — 96375 TX/PRO/DX INJ NEW DRUG ADDON: CPT

## 2020-08-23 PROCEDURE — 770006 HCHG ROOM/CARE - MED/SURG/GYN SEMI*

## 2020-08-23 PROCEDURE — 87205 SMEAR GRAM STAIN: CPT

## 2020-08-23 PROCEDURE — 700102 HCHG RX REV CODE 250 W/ 637 OVERRIDE(OP): Performed by: HOSPITALIST

## 2020-08-23 PROCEDURE — 87186 SC STD MICRODIL/AGAR DIL: CPT

## 2020-08-23 PROCEDURE — C9803 HOPD COVID-19 SPEC COLLECT: HCPCS | Performed by: HOSPITALIST

## 2020-08-23 PROCEDURE — 96367 TX/PROPH/DG ADDL SEQ IV INF: CPT

## 2020-08-23 PROCEDURE — 700111 HCHG RX REV CODE 636 W/ 250 OVERRIDE (IP): Performed by: HOSPITALIST

## 2020-08-23 PROCEDURE — 700117 HCHG RX CONTRAST REV CODE 255: Performed by: EMERGENCY MEDICINE

## 2020-08-23 PROCEDURE — 80053 COMPREHEN METABOLIC PANEL: CPT

## 2020-08-23 PROCEDURE — 700105 HCHG RX REV CODE 258: Performed by: EMERGENCY MEDICINE

## 2020-08-23 PROCEDURE — 87040 BLOOD CULTURE FOR BACTERIA: CPT | Mod: 91

## 2020-08-23 PROCEDURE — A9270 NON-COVERED ITEM OR SERVICE: HCPCS | Performed by: HOSPITALIST

## 2020-08-23 PROCEDURE — 96365 THER/PROPH/DIAG IV INF INIT: CPT

## 2020-08-23 PROCEDURE — 700111 HCHG RX REV CODE 636 W/ 250 OVERRIDE (IP): Performed by: EMERGENCY MEDICINE

## 2020-08-23 PROCEDURE — 700105 HCHG RX REV CODE 258: Performed by: HOSPITALIST

## 2020-08-23 PROCEDURE — 83605 ASSAY OF LACTIC ACID: CPT

## 2020-08-23 PROCEDURE — 85025 COMPLETE CBC W/AUTO DIFF WBC: CPT

## 2020-08-23 PROCEDURE — 83690 ASSAY OF LIPASE: CPT

## 2020-08-23 PROCEDURE — 99285 EMERGENCY DEPT VISIT HI MDM: CPT

## 2020-08-23 RX ORDER — OXYCODONE HYDROCHLORIDE 5 MG/1
5 TABLET ORAL
Status: DISCONTINUED | OUTPATIENT
Start: 2020-08-23 | End: 2020-08-28 | Stop reason: HOSPADM

## 2020-08-23 RX ORDER — ACETAMINOPHEN 325 MG/1
650 TABLET ORAL EVERY 6 HOURS PRN
Status: DISCONTINUED | OUTPATIENT
Start: 2020-08-23 | End: 2020-08-28 | Stop reason: HOSPADM

## 2020-08-23 RX ORDER — TIMOLOL MALEATE 5 MG/ML
1 SOLUTION/ DROPS OPHTHALMIC 2 TIMES DAILY
Status: DISCONTINUED | OUTPATIENT
Start: 2020-08-23 | End: 2020-08-28 | Stop reason: HOSPADM

## 2020-08-23 RX ORDER — LOVASTATIN 20 MG/1
10 TABLET ORAL DAILY
Status: DISCONTINUED | OUTPATIENT
Start: 2020-08-24 | End: 2020-08-28 | Stop reason: HOSPADM

## 2020-08-23 RX ORDER — LATANOPROST 50 UG/ML
1 SOLUTION/ DROPS OPHTHALMIC EVERY EVENING
COMMUNITY
Start: 2020-07-28 | End: 2020-08-23

## 2020-08-23 RX ORDER — PANTOPRAZOLE SODIUM 40 MG/1
40 TABLET, DELAYED RELEASE ORAL DAILY
Status: DISCONTINUED | OUTPATIENT
Start: 2020-08-24 | End: 2020-08-23

## 2020-08-23 RX ORDER — LATANOPROST 50 UG/ML
1 SOLUTION/ DROPS OPHTHALMIC EVERY EVENING
Status: DISCONTINUED | OUTPATIENT
Start: 2020-08-23 | End: 2020-08-28 | Stop reason: HOSPADM

## 2020-08-23 RX ORDER — METOPROLOL TARTRATE 100 MG/1
50 TABLET ORAL 2 TIMES DAILY
COMMUNITY

## 2020-08-23 RX ORDER — METOPROLOL TARTRATE 50 MG/1
50 TABLET, FILM COATED ORAL 2 TIMES DAILY
Status: DISCONTINUED | OUTPATIENT
Start: 2020-08-23 | End: 2020-08-28 | Stop reason: HOSPADM

## 2020-08-23 RX ORDER — BISACODYL 10 MG
10 SUPPOSITORY, RECTAL RECTAL
Status: DISCONTINUED | OUTPATIENT
Start: 2020-08-23 | End: 2020-08-28 | Stop reason: HOSPADM

## 2020-08-23 RX ORDER — SODIUM CHLORIDE, SODIUM LACTATE, POTASSIUM CHLORIDE, CALCIUM CHLORIDE 600; 310; 30; 20 MG/100ML; MG/100ML; MG/100ML; MG/100ML
INJECTION, SOLUTION INTRAVENOUS CONTINUOUS
Status: DISCONTINUED | OUTPATIENT
Start: 2020-08-23 | End: 2020-08-28 | Stop reason: HOSPADM

## 2020-08-23 RX ORDER — HYDROMORPHONE HYDROCHLORIDE 1 MG/ML
0.25 INJECTION, SOLUTION INTRAMUSCULAR; INTRAVENOUS; SUBCUTANEOUS
Status: DISCONTINUED | OUTPATIENT
Start: 2020-08-23 | End: 2020-08-28 | Stop reason: HOSPADM

## 2020-08-23 RX ORDER — FUROSEMIDE 40 MG/1
40 TABLET ORAL DAILY
COMMUNITY
Start: 2020-07-28 | End: 2020-10-26

## 2020-08-23 RX ORDER — METOPROLOL TARTRATE 50 MG/1
100 TABLET, FILM COATED ORAL 2 TIMES DAILY
Status: DISCONTINUED | OUTPATIENT
Start: 2020-08-23 | End: 2020-08-23

## 2020-08-23 RX ORDER — SODIUM CHLORIDE, SODIUM LACTATE, POTASSIUM CHLORIDE, AND CALCIUM CHLORIDE .6; .31; .03; .02 G/100ML; G/100ML; G/100ML; G/100ML
30 INJECTION, SOLUTION INTRAVENOUS
Status: DISCONTINUED | OUTPATIENT
Start: 2020-08-23 | End: 2020-08-28 | Stop reason: HOSPADM

## 2020-08-23 RX ORDER — OMEPRAZOLE 20 MG/1
20 CAPSULE, DELAYED RELEASE ORAL DAILY
Status: DISCONTINUED | OUTPATIENT
Start: 2020-08-24 | End: 2020-08-28 | Stop reason: HOSPADM

## 2020-08-23 RX ORDER — LEVOTHYROXINE SODIUM 112 UG/1
112 TABLET ORAL
Status: DISCONTINUED | OUTPATIENT
Start: 2020-08-24 | End: 2020-08-28 | Stop reason: HOSPADM

## 2020-08-23 RX ORDER — POLYETHYLENE GLYCOL 3350 17 G/17G
1 POWDER, FOR SOLUTION ORAL
Status: DISCONTINUED | OUTPATIENT
Start: 2020-08-23 | End: 2020-08-28 | Stop reason: HOSPADM

## 2020-08-23 RX ORDER — OXYCODONE HYDROCHLORIDE 5 MG/1
2.5 TABLET ORAL
Status: DISCONTINUED | OUTPATIENT
Start: 2020-08-23 | End: 2020-08-28 | Stop reason: HOSPADM

## 2020-08-23 RX ORDER — ONDANSETRON 2 MG/ML
4 INJECTION INTRAMUSCULAR; INTRAVENOUS EVERY 4 HOURS PRN
Status: DISCONTINUED | OUTPATIENT
Start: 2020-08-23 | End: 2020-08-28 | Stop reason: HOSPADM

## 2020-08-23 RX ORDER — POTASSIUM CHLORIDE 750 MG/1
10 CAPSULE, EXTENDED RELEASE ORAL DAILY
COMMUNITY
Start: 2020-07-28 | End: 2020-10-26

## 2020-08-23 RX ORDER — CALCIUM CARBONATE/VITAMIN D3 500-10/5ML
1 LIQUID (ML) ORAL DAILY
Status: DISCONTINUED | OUTPATIENT
Start: 2020-08-24 | End: 2020-08-23

## 2020-08-23 RX ORDER — AMOXICILLIN 250 MG
2 CAPSULE ORAL 2 TIMES DAILY
Status: DISCONTINUED | OUTPATIENT
Start: 2020-08-23 | End: 2020-08-28 | Stop reason: HOSPADM

## 2020-08-23 RX ORDER — SODIUM CHLORIDE, SODIUM LACTATE, POTASSIUM CHLORIDE, AND CALCIUM CHLORIDE .6; .31; .03; .02 G/100ML; G/100ML; G/100ML; G/100ML
1000 INJECTION, SOLUTION INTRAVENOUS ONCE
Status: COMPLETED | OUTPATIENT
Start: 2020-08-23 | End: 2020-08-23

## 2020-08-23 RX ORDER — VIT A/VIT C/VIT E/ZINC/COPPER 2148-113
1 TABLET ORAL 2 TIMES DAILY
Status: DISCONTINUED | OUTPATIENT
Start: 2020-08-23 | End: 2020-08-23

## 2020-08-23 RX ORDER — ONDANSETRON 4 MG/1
4 TABLET, ORALLY DISINTEGRATING ORAL EVERY 4 HOURS PRN
Status: DISCONTINUED | OUTPATIENT
Start: 2020-08-23 | End: 2020-08-28 | Stop reason: HOSPADM

## 2020-08-23 RX ORDER — MORPHINE SULFATE 4 MG/ML
4 INJECTION, SOLUTION INTRAMUSCULAR; INTRAVENOUS ONCE
Status: COMPLETED | OUTPATIENT
Start: 2020-08-23 | End: 2020-08-23

## 2020-08-23 RX ADMIN — SODIUM CHLORIDE 3 G: 900 INJECTION INTRAVENOUS at 16:32

## 2020-08-23 RX ADMIN — LATANOPROST 1 DROP: 50 SOLUTION OPHTHALMIC at 22:37

## 2020-08-23 RX ADMIN — PIPERACILLIN AND TAZOBACTAM 3.38 G: 3; .375 INJECTION, POWDER, LYOPHILIZED, FOR SOLUTION INTRAVENOUS; PARENTERAL at 21:53

## 2020-08-23 RX ADMIN — SODIUM CHLORIDE, POTASSIUM CHLORIDE, SODIUM LACTATE AND CALCIUM CHLORIDE 1000 ML: 600; 310; 30; 20 INJECTION, SOLUTION INTRAVENOUS at 21:23

## 2020-08-23 RX ADMIN — IOHEXOL 100 ML: 350 INJECTION, SOLUTION INTRAVENOUS at 18:09

## 2020-08-23 RX ADMIN — METOPROLOL TARTRATE 50 MG: 50 TABLET, FILM COATED ORAL at 21:23

## 2020-08-23 RX ADMIN — ACETAMINOPHEN 650 MG: 325 TABLET, FILM COATED ORAL at 23:02

## 2020-08-23 RX ADMIN — TIMOLOL MALEATE 1 DROP: 5 SOLUTION/ DROPS OPHTHALMIC at 22:38

## 2020-08-23 RX ADMIN — OXYCODONE HYDROCHLORIDE 5 MG: 5 TABLET ORAL at 21:53

## 2020-08-23 RX ADMIN — SODIUM CHLORIDE, POTASSIUM CHLORIDE, SODIUM LACTATE AND CALCIUM CHLORIDE 1000 ML: 600; 310; 30; 20 INJECTION, SOLUTION INTRAVENOUS at 17:07

## 2020-08-23 RX ADMIN — MORPHINE SULFATE 4 MG: 4 INJECTION INTRAVENOUS at 16:33

## 2020-08-23 RX ADMIN — METRONIDAZOLE 500 MG: 500 INJECTION, SOLUTION INTRAVENOUS at 19:49

## 2020-08-23 ASSESSMENT — LIFESTYLE VARIABLES
AVERAGE NUMBER OF DAYS PER WEEK YOU HAVE A DRINK CONTAINING ALCOHOL: 0
ALCOHOL_USE: NO
TOTAL SCORE: 0
HAVE PEOPLE ANNOYED YOU BY CRITICIZING YOUR DRINKING: NO
HOW MANY TIMES IN THE PAST YEAR HAVE YOU HAD 5 OR MORE DRINKS IN A DAY: 0
HAVE YOU EVER FELT YOU SHOULD CUT DOWN ON YOUR DRINKING: NO
EVER FELT BAD OR GUILTY ABOUT YOUR DRINKING: NO
EVER_SMOKED: NEVER
TOTAL SCORE: 0
CONSUMPTION TOTAL: NEGATIVE
EVER HAD A DRINK FIRST THING IN THE MORNING TO STEADY YOUR NERVES TO GET RID OF A HANGOVER: NO
ON A TYPICAL DAY WHEN YOU DRINK ALCOHOL HOW MANY DRINKS DO YOU HAVE: 0
TOTAL SCORE: 0

## 2020-08-23 ASSESSMENT — COGNITIVE AND FUNCTIONAL STATUS - GENERAL
SUGGESTED CMS G CODE MODIFIER MOBILITY: CH
MOBILITY SCORE: 24
SUGGESTED CMS G CODE MODIFIER DAILY ACTIVITY: CH
DAILY ACTIVITIY SCORE: 24

## 2020-08-23 ASSESSMENT — COPD QUESTIONNAIRES
IN THE PAST 12 MONTHS DO YOU DO LESS THAN YOU USED TO BECAUSE OF YOUR BREATHING PROBLEMS: DISAGREE/UNSURE
DURING THE PAST 4 WEEKS HOW MUCH DID YOU FEEL SHORT OF BREATH: NONE/LITTLE OF THE TIME
DO YOU EVER COUGH UP ANY MUCUS OR PHLEGM?: NO/ONLY WITH OCCASIONAL COLDS OR INFECTIONS
COPD SCREENING SCORE: 2
HAVE YOU SMOKED AT LEAST 100 CIGARETTES IN YOUR ENTIRE LIFE: NO/DON'T KNOW

## 2020-08-23 ASSESSMENT — FIBROSIS 4 INDEX: FIB4 SCORE: 5.56

## 2020-08-23 ASSESSMENT — PATIENT HEALTH QUESTIONNAIRE - PHQ9
1. LITTLE INTEREST OR PLEASURE IN DOING THINGS: NOT AT ALL
2. FEELING DOWN, DEPRESSED, IRRITABLE, OR HOPELESS: NOT AT ALL
SUM OF ALL RESPONSES TO PHQ9 QUESTIONS 1 AND 2: 0

## 2020-08-23 NOTE — ED TRIAGE NOTES
".  Chief Complaint   Patient presents with   • Post-Op Complications     hx of hernia repair with Dr. Chavarria in December with subsequent abscess formation     ./81   Pulse 94 Comment: IRREGULAR  Temp 37.2 °C (99 °F) (Temporal)   Resp 16   Ht 1.727 m (5' 8\")   Wt 66 kg (145 lb 8.1 oz)   SpO2 98%   BMI 22.12 kg/m²     Ambulatory to triage with above complaints, wearing mask, denies respiratory complaints, educated on triage process, placed in lobby with significant other.    "

## 2020-08-23 NOTE — ED PROVIDER NOTES
ED Provider Note    CHIEF COMPLAINT  Chief Complaint   Patient presents with   • Post-Op Complications     hx of hernia repair with Dr. Chavarria in December with subsequent abscess formation       HPI  Dorie Wadsworth is a 84 y.o. female who presents for evaluation of abdominal pain.  She states over the past 2 days she has had abdominal pain surrounding a nonhealing surgical incision and this pain wraps around to her back.  She underwent extensive surgery multiple months ago for intra-abdominal abscess that was a postop hernia repair.  She had this abscess drained with a partial bowel resection.  Postoperatively she had a wound VAC, she reports that the wound has never healed and has been going to her surgeon, Dr. Chavarria weekly since then.  Over the past 2 days his pain is increased, she is never had symptoms like this in the past, she feels general malaise and fatigue and decreased appetite with this.  No vomiting.  No fever.  No diarrhea.  She also has a headache that is improving, no weakness, numbness or tingling or neck pain or stiffness.    REVIEW OF SYSTEMS  Negative for fever, rash, chest pain, dyspnea, focal weakness, focal numbness, focal tingling. All other systems are negative.     PAST MEDICAL HISTORY  Past Medical History:   Diagnosis Date   • A-fib (HCC)    • Ailment     urostomy   • Arthritis     fingers   • Atrial fibrillation (HCC) 2/2009    A FIB X2,[ resolved on own][   • Atrial fibrillation with rapid ventricular response (HCC) 3/18/2019   • Atrial fibrillation, rapid (HCC) 1/31/2014   • Back pain    • Bladder disease 2011    bladder removed   • Bladder problem     Chronic bladder infection for the past 17months   • CAD (coronary artery disease)    • Cancer (HCC)     skin   • CATARACT     freddie surgery complete   • CHEST PAIN 3/11/2011   • Glaucoma    • History of hematuria     3/15/10: with Plavix.   • HTN (hypertension), malignant 3/18/2019   • HTN (hypertension), malignant 3/18/2019   •  Hyperlipidemia 2/24/2011   • Hypertension    • Hypothyroid 2/24/2011   • Indigestion    • Intrinsic eczema 10/31/2017   • Other specified pre-operative examination 1/28/2014   • Paroxysmal atrial fibrillation (HCC)    • Sepsis urinary source 1/23/2012   • Stroke (HCC)     2 TIAs 2/2010, no stroke, 2/2013   • T.I.A.    • TIA (transient ischemic attack) 3/11/2011   • Unspecified disorder of thyroid    • Unspecified hemorrhagic conditions     not sure when (2009)  blood in urin    • Urinary bladder disorder     urinary incontinence   • UTI (urinary tract infection) 2/24/2011   • Vaginal bleeding 10/22/2012       FAMILY HISTORY  Family History   Problem Relation Age of Onset   • Stroke Mother 81   • Stroke Father 77       SOCIAL HISTORY  Social History     Tobacco Use   • Smoking status: Never Smoker   • Smokeless tobacco: Never Used   Substance Use Topics   • Alcohol use: No   • Drug use: No       SURGICAL HISTORY  Past Surgical History:   Procedure Laterality Date   • IRRIGATION & DEBRIDEMENT GENERAL N/A 2/27/2020    Procedure: IRRIGATION AND DEBRIDEMENT, WOUND - ABDOMINAL, PSB. WOUND VAC;  Surgeon: Len Chavarria M.D.;  Location: SURGERY [facility];  Service: General   • VENTRAL HERNIA REPAIR ROBOTIC XI  12/24/2019    Procedure: REPAIR, HERNIA, VENTRAL, ROBOT-ASSISTED, USING DA RUTHIE XI - PARASTOMAL HERNIA REPAIR WITH LYSIS OF ADHESIONS;  Surgeon: Len Chavarria M.D.;  Location: [facility];  Service: General   • PARASTOMAL HERNIA REPAIR   1/28/2014    Performed by Nicol Dorman M.D. at SURGERY [facility]   • BIOPSY GENERAL  10/22/2012    Performed by Jennifer Lincoln M.D. at SURGERY [facility]   • CYSTECTOMY  9/6/2011    Performed by JENNIFER LINCOLN at [facility]   • ILEO LOOP DIVERSION  9/6/2011    Performed by JENNIFER LINCOLN at SURGERY [facility]   • RECTOCELE REPAIR  11/3/2009    Performed by ZEKE HARRINGTON at SURGERY [facility] DAY [facility]   •  "BLADDER BIOPSY WITH CYSTOSCOPY  10/12/2009    Performed by JENNIFER MENDES at SURGERY [facility] ORS   • PELVIC EXAM UNDER ANESTHESIA  10/12/2009    Performed by JENNIFER MENDES at SURGERY [facility] ORS   • BLADDER BIOPSY WITH CYSTOSCOPY  6/15/2009    Performed by CARLTON LUNA at [facility] ORS   • BLADDER BIOPSY WITH CYSTOSCOPY  10/10/08    Performed by CARLTON LUNA at SURGERY [facility] ORS   • PYELOGRAM  10/10/08    Performed by CARLTON LUNA at SURGERY [facility] ORS   • RETROGRADES  10/10/08    Performed by CARLTON LUNA at SURGERY [facility] ORS   • CHOLECYSTECTOMY  1994   • CHOLECYSTECTOMY  1994   • GYN SURGERY      hysterectomy   • OTHER      TONSILLECTOMY AS TEENAGER   • OTHER ABDOMINAL SURGERY      appendectomy, pancratitis        CURRENT MEDICATIONS  I personally reviewed the medication list in the charting documentation.     ALLERGIES  Allergies   Allergen Reactions   • Cardizem Swelling   • Doxycycline      Swollen lips.   • Sulfa Drugs      Makes tongue swell, severely   • Zyvox Swelling     \"Whole mouth swelled up\"    • Codeine      Does not remember the reaction         MEDICAL RECORD  I have reviewed patient's medical record and pertinent results are listed above.      PHYSICAL EXAM  VITAL SIGNS: /61   Pulse (!) 109   Temp 37.2 °C (99 °F) (Temporal)   Resp (!) 21   Ht 1.727 m (5' 8\")   Wt 66 kg (145 lb 8.1 oz)   SpO2 95%   BMI 22.12 kg/m²    Constitutional: Ill-appearing, appears uncomfortable   hENT: Mucus membranes moist.    Eyes: No scleral icterus. Normal conjunctiva   Neck: Supple, comfortable, nonpainful range of motion.   Cardiovascular: Tachycardic, irregularly irregular  Thorax & Lungs: Chest is nontender.  Lungs are clear to auscultation with good air movement bilaterally.  No wheeze, rhonchi, nor rales.   Abdomen: Soft, nondistended.  Midline wound is appreciated.  Just to the right of the incision is a firm subcutaneous nodular-like mass that " when pressed expresses purulent material through the wound.  This is very tender.  Skin: Warm, dry. No rash appreciated  Extremities/Musculoskeletal: No sign of trauma. No asymmetric calf tenderness, erythema or edema. Normal range of motion   Neurologic: Alert & oriented. No focal deficits observed.   Psychiatric: Normal affect appropriate for the clinical situation.    DIAGNOSTIC STUDIES / PROCEDURES    LABS/EKGs  Results for orders placed or performed during the hospital encounter of 08/23/20   CBC WITH DIFFERENTIAL   Result Value Ref Range    WBC 13.0 (H) 4.8 - 10.8 K/uL    RBC 4.47 4.20 - 5.40 M/uL    Hemoglobin 13.4 12.0 - 16.0 g/dL    Hematocrit 41.0 37.0 - 47.0 %    MCV 91.7 81.4 - 97.8 fL    MCH 30.0 27.0 - 33.0 pg    MCHC 32.7 (L) 33.6 - 35.0 g/dL    RDW 50.2 (H) 35.9 - 50.0 fL    Platelet Count 160 (L) 164 - 446 K/uL    MPV 11.9 9.0 - 12.9 fL    Neutrophils-Polys 64.30 44.00 - 72.00 %    Lymphocytes 16.50 (L) 22.00 - 41.00 %    Monocytes 16.80 (H) 0.00 - 13.40 %    Eosinophils 0.80 0.00 - 6.90 %    Basophils 0.50 0.00 - 1.80 %    Immature Granulocytes 1.10 (H) 0.00 - 0.90 %    Nucleated RBC 0.00 /100 WBC    Neutrophils (Absolute) 8.35 (H) 2.00 - 7.15 K/uL    Lymphs (Absolute) 2.14 1.00 - 4.80 K/uL    Monos (Absolute) 2.18 (H) 0.00 - 0.85 K/uL    Eos (Absolute) 0.11 0.00 - 0.51 K/uL    Baso (Absolute) 0.06 0.00 - 0.12 K/uL    Immature Granulocytes (abs) 0.14 (H) 0.00 - 0.11 K/uL    NRBC (Absolute) 0.00 K/uL   COMP METABOLIC PANEL   Result Value Ref Range    Sodium 134 (L) 135 - 145 mmol/L    Potassium 4.0 3.6 - 5.5 mmol/L    Chloride 101 96 - 112 mmol/L    Co2 20 20 - 33 mmol/L    Anion Gap 13.0 7.0 - 16.0    Glucose 89 65 - 99 mg/dL    Bun 16 8 - 22 mg/dL    Creatinine 1.28 0.50 - 1.40 mg/dL    Calcium 9.4 8.5 - 10.5 mg/dL    AST(SGOT) 27 12 - 45 U/L    ALT(SGPT) 17 2 - 50 U/L    Alkaline Phosphatase 192 (H) 30 - 99 U/L    Total Bilirubin 0.6 0.1 - 1.5 mg/dL    Albumin 3.4 3.2 - 4.9 g/dL    Total Protein  8.0 6.0 - 8.2 g/dL    Globulin 4.6 (H) 1.9 - 3.5 g/dL    A-G Ratio 0.7 g/dL   LIPASE   Result Value Ref Range    Lipase 70 11 - 82 U/L   Blood Culture,Hold   Result Value Ref Range    Blood Culture Hold Collected    ESTIMATED GFR   Result Value Ref Range    GFR If  48 (A) >60 mL/min/1.73 m 2    GFR If Non African American 40 (A) >60 mL/min/1.73 m 2   LACTIC ACID   Result Value Ref Range    Lactic Acid 1.3 0.5 - 2.0 mmol/L   COVID/SARS CoV-2 PCR    Specimen: Nasopharyngeal; Respirate   Result Value Ref Range    COVID Order Status Received    SARS-CoV-2, PCR (In-House)   Result Value Ref Range    SARS-CoV-2 Source NP Swab     SARS-CoV-2 by PCR NotDetected         RADIOLOGY  CT-ABDOMEN-PELVIS WITH   Final Result      Fluid collection in the right lower quadrant abdominal wall above the level of the stoma measures 3.1 x 0.9 cm. There are bowel loops adjacent to the collection and a fistula is not excluded.      Fluid collection in the anterior abdominal wall to the left of midline now measures 2.4 x 1.8 cm.      Status post cystectomy with a urinary conduit and a right lower quadrant stoma.      Bilateral hydronephrosis which is moderate to severe on the right and moderate on the left is slightly increased on the right and moderately increased on the left compared to prior.      Renal cortical scarring is seen bilaterally.      Atherosclerotic plaque.      Nodular appearance of the liver suspicious for cirrhosis.      Groundglass opacities in the right middle lobe may be infectious or inflammatory. Follow-up is recommended.            COURSE & MEDICAL DECISION MAKING  I have reviewed any medical record information, laboratory studies and radiographic results as noted above.  Differential diagnoses includes: Sepsis, intra-abdominal abscess, subcutaneous abscess, dehydration electrolyte abnormalities, anemia    Encounter Summary: This is a 84 y.o. female with 2 days of progressive abdominal pain radiates  to her back, associated with a wound on her abdomen that has been a nonhealing surgical incision, on exam she seems to have an abscess that communicates through this incision with a significant amount of purulent drainage.  I am quite concerned that the pain seems to radiate around to her back bilaterally, she has had intra-abdominal abscess in this location and concern that is what is happening again.  She presents tachycardic, her respiratory rate is elevated, her WBC is elevated and this patient is septic secondary to his infection.  She will receive broad-spectrum antibiotics, at this point they will be limited to Unasyn however if the CAT scan reveals an intra-abdominal extension of this abscess the antibiotics will be broadened.  Meantime she will receive some pain medication and IV fluids ------- blood work reveals leukocytosis.  CT scan reveals 2 separate fluid collections within the abdominal wall, the first collection actually surrounds the stoma for the urostomy, possibly tracking intra-abdominal a concerning for the possibility of a fistula.  The second fluid collection corresponds to the abscess just adjacent to the nonhealing incision.  I have expanded antibiotic coverage with IV Flagyl.  I discussed the case with Dr. Goyal, general surgeon on call for Dr. Chavarria.  She recommends continuing antibiotic treatment and admission to the hospital under the care of the hospitalist and she will consult.  Patient admitted to the hospital in guarded condition      CRITICAL CARE  The very real possibilty of a deterioration of this patient's condition required the highest level of my preparedness for sudden, emergent intervention.  I provided critical care services, which included medication orders, frequent reevaluations of the patient's condition and response to treatment, ordering and reviewing test results, and discussing the case with various consultants.  The critical care time associated with the care of  the patient was 41 minutes. Review chart for interventions. This time is exclusive of any other billable procedures.       DISPOSITION: Admit in guarded condition      FINAL IMPRESSION  1. Sepsis without acute organ dysfunction, due to unspecified organism (HCC)    2. Abdominal wall abscess           This dictation was created using voice recognition software. The accuracy of the dictation is limited to the abilities of the software. I expect there may be some errors of grammar and possibly content. The nursing notes were reviewed and certain aspects of this information were incorporated into this note.    Electronically signed by: Bull Carlson M.D., 8/23/2020 4:21 PM

## 2020-08-24 PROBLEM — N18.31 CHRONIC KIDNEY DISEASE (CKD) STAGE G3A/A2, MODERATELY DECREASED GLOMERULAR FILTRATION RATE (GFR) BETWEEN 45-59 ML/MIN/1.73 SQUARE METER AND ALBUMINURIA CREATININE RATIO BETWEEN 30-299 MG/G (HCC): Status: ACTIVE | Noted: 2020-08-24

## 2020-08-24 LAB
ANION GAP SERPL CALC-SCNC: 13 MMOL/L (ref 7–16)
BASOPHILS # BLD AUTO: 0.4 % (ref 0–1.8)
BASOPHILS # BLD: 0.05 K/UL (ref 0–0.12)
BUN SERPL-MCNC: 14 MG/DL (ref 8–22)
CALCIUM SERPL-MCNC: 8.5 MG/DL (ref 8.5–10.5)
CHLORIDE SERPL-SCNC: 98 MMOL/L (ref 96–112)
CO2 SERPL-SCNC: 21 MMOL/L (ref 20–33)
CREAT SERPL-MCNC: 1.15 MG/DL (ref 0.5–1.4)
EOSINOPHIL # BLD AUTO: 0.13 K/UL (ref 0–0.51)
EOSINOPHIL NFR BLD: 1.1 % (ref 0–6.9)
ERYTHROCYTE [DISTWIDTH] IN BLOOD BY AUTOMATED COUNT: 49.4 FL (ref 35.9–50)
GLUCOSE SERPL-MCNC: 108 MG/DL (ref 65–99)
GRAM STN SPEC: NORMAL
HAV IGM SERPL QL IA: NORMAL
HBV CORE IGM SER QL: NORMAL
HBV SURFACE AG SER QL: NORMAL
HCT VFR BLD AUTO: 37.1 % (ref 37–47)
HCV AB SER QL: NORMAL
HGB BLD-MCNC: 12.1 G/DL (ref 12–16)
IMM GRANULOCYTES # BLD AUTO: 0.05 K/UL (ref 0–0.11)
IMM GRANULOCYTES NFR BLD AUTO: 0.4 % (ref 0–0.9)
INR PPP: 1.2 (ref 0.87–1.13)
LACTATE BLD-SCNC: 1.3 MMOL/L (ref 0.5–2)
LACTATE BLD-SCNC: 1.3 MMOL/L (ref 0.5–2)
LACTATE BLD-SCNC: 1.4 MMOL/L (ref 0.5–2)
LYMPHOCYTES # BLD AUTO: 2.31 K/UL (ref 1–4.8)
LYMPHOCYTES NFR BLD: 19.7 % (ref 22–41)
MCH RBC QN AUTO: 29.6 PG (ref 27–33)
MCHC RBC AUTO-ENTMCNC: 32.6 G/DL (ref 33.6–35)
MCV RBC AUTO: 90.7 FL (ref 81.4–97.8)
MONOCYTES # BLD AUTO: 1.93 K/UL (ref 0–0.85)
MONOCYTES NFR BLD AUTO: 16.5 % (ref 0–13.4)
NEUTROPHILS # BLD AUTO: 7.23 K/UL (ref 2–7.15)
NEUTROPHILS NFR BLD: 61.9 % (ref 44–72)
NRBC # BLD AUTO: 0 K/UL
NRBC BLD-RTO: 0 /100 WBC
PLATELET # BLD AUTO: 140 K/UL (ref 164–446)
PMV BLD AUTO: 11.6 FL (ref 9–12.9)
POTASSIUM SERPL-SCNC: 3.7 MMOL/L (ref 3.6–5.5)
PROTHROMBIN TIME: 15.5 SEC (ref 12–14.6)
RBC # BLD AUTO: 4.09 M/UL (ref 4.2–5.4)
SIGNIFICANT IND 70042: NORMAL
SITE SITE: NORMAL
SODIUM SERPL-SCNC: 132 MMOL/L (ref 135–145)
SOURCE SOURCE: NORMAL
VANCOMYCIN TROUGH SERPL-MCNC: 17.1 UG/ML (ref 10–20)
WBC # BLD AUTO: 11.7 K/UL (ref 4.8–10.8)

## 2020-08-24 PROCEDURE — 700102 HCHG RX REV CODE 250 W/ 637 OVERRIDE(OP): Performed by: HOSPITALIST

## 2020-08-24 PROCEDURE — 700105 HCHG RX REV CODE 258

## 2020-08-24 PROCEDURE — 700111 HCHG RX REV CODE 636 W/ 250 OVERRIDE (IP): Performed by: HOSPITALIST

## 2020-08-24 PROCEDURE — 302128 INFUSION PUMP: Performed by: HOSPITALIST

## 2020-08-24 PROCEDURE — 85610 PROTHROMBIN TIME: CPT

## 2020-08-24 PROCEDURE — 700105 HCHG RX REV CODE 258: Performed by: HOSPITALIST

## 2020-08-24 PROCEDURE — 80048 BASIC METABOLIC PNL TOTAL CA: CPT

## 2020-08-24 PROCEDURE — 85025 COMPLETE CBC W/AUTO DIFF WBC: CPT

## 2020-08-24 PROCEDURE — 99233 SBSQ HOSP IP/OBS HIGH 50: CPT | Performed by: INTERNAL MEDICINE

## 2020-08-24 PROCEDURE — 770006 HCHG ROOM/CARE - MED/SURG/GYN SEMI*

## 2020-08-24 PROCEDURE — 83605 ASSAY OF LACTIC ACID: CPT | Mod: 91

## 2020-08-24 PROCEDURE — 700111 HCHG RX REV CODE 636 W/ 250 OVERRIDE (IP): Performed by: INTERNAL MEDICINE

## 2020-08-24 PROCEDURE — 36415 COLL VENOUS BLD VENIPUNCTURE: CPT

## 2020-08-24 PROCEDURE — 80074 ACUTE HEPATITIS PANEL: CPT

## 2020-08-24 PROCEDURE — 80202 ASSAY OF VANCOMYCIN: CPT

## 2020-08-24 PROCEDURE — 700105 HCHG RX REV CODE 258: Performed by: INTERNAL MEDICINE

## 2020-08-24 PROCEDURE — A9270 NON-COVERED ITEM OR SERVICE: HCPCS | Performed by: HOSPITALIST

## 2020-08-24 RX ORDER — SODIUM CHLORIDE 9 MG/ML
INJECTION, SOLUTION INTRAVENOUS
Status: COMPLETED
Start: 2020-08-24 | End: 2020-08-24

## 2020-08-24 RX ADMIN — MAGNESIUM OXIDE TAB 400 MG (241.3 MG ELEMENTAL MG) 400 MG: 400 (241.3 MG) TAB at 05:33

## 2020-08-24 RX ADMIN — METOPROLOL TARTRATE 50 MG: 50 TABLET, FILM COATED ORAL at 21:51

## 2020-08-24 RX ADMIN — LEVOTHYROXINE SODIUM 112 MCG: 112 TABLET ORAL at 05:33

## 2020-08-24 RX ADMIN — METOPROLOL TARTRATE 50 MG: 50 TABLET, FILM COATED ORAL at 10:02

## 2020-08-24 RX ADMIN — SODIUM CHLORIDE, POTASSIUM CHLORIDE, SODIUM LACTATE AND CALCIUM CHLORIDE: 600; 310; 30; 20 INJECTION, SOLUTION INTRAVENOUS at 23:11

## 2020-08-24 RX ADMIN — PIPERACILLIN AND TAZOBACTAM 3.38 G: 3; .375 INJECTION, POWDER, LYOPHILIZED, FOR SOLUTION INTRAVENOUS; PARENTERAL at 01:04

## 2020-08-24 RX ADMIN — OXYCODONE HYDROCHLORIDE 5 MG: 5 TABLET ORAL at 04:42

## 2020-08-24 RX ADMIN — VANCOMYCIN HYDROCHLORIDE 1750 MG: 500 INJECTION, POWDER, LYOPHILIZED, FOR SOLUTION INTRAVENOUS at 00:04

## 2020-08-24 RX ADMIN — SODIUM CHLORIDE, POTASSIUM CHLORIDE, SODIUM LACTATE AND CALCIUM CHLORIDE: 600; 310; 30; 20 INJECTION, SOLUTION INTRAVENOUS at 12:59

## 2020-08-24 RX ADMIN — SODIUM CHLORIDE 500 ML: 9 INJECTION, SOLUTION INTRAVENOUS at 01:05

## 2020-08-24 RX ADMIN — DAPTOMYCIN 350 MG: 350 INJECTION, POWDER, LYOPHILIZED, FOR SOLUTION INTRAVENOUS at 13:32

## 2020-08-24 RX ADMIN — TIMOLOL MALEATE 1 DROP: 5 SOLUTION/ DROPS OPHTHALMIC at 21:50

## 2020-08-24 RX ADMIN — OMEPRAZOLE 20 MG: 20 CAPSULE, DELAYED RELEASE ORAL at 05:33

## 2020-08-24 RX ADMIN — PIPERACILLIN AND TAZOBACTAM 3.38 G: 3; .375 INJECTION, POWDER, LYOPHILIZED, FOR SOLUTION INTRAVENOUS; PARENTERAL at 14:44

## 2020-08-24 RX ADMIN — PIPERACILLIN AND TAZOBACTAM 3.38 G: 3; .375 INJECTION, POWDER, LYOPHILIZED, FOR SOLUTION INTRAVENOUS; PARENTERAL at 05:33

## 2020-08-24 RX ADMIN — ACETAMINOPHEN 650 MG: 325 TABLET, FILM COATED ORAL at 21:50

## 2020-08-24 RX ADMIN — TIMOLOL MALEATE 1 DROP: 5 SOLUTION/ DROPS OPHTHALMIC at 10:02

## 2020-08-24 RX ADMIN — LOVASTATIN 10 MG: 20 TABLET ORAL at 05:33

## 2020-08-24 RX ADMIN — PIPERACILLIN AND TAZOBACTAM 3.38 G: 3; .375 INJECTION, POWDER, LYOPHILIZED, FOR SOLUTION INTRAVENOUS; PARENTERAL at 21:47

## 2020-08-24 RX ADMIN — LATANOPROST 1 DROP: 50 SOLUTION OPHTHALMIC at 17:43

## 2020-08-24 ASSESSMENT — ENCOUNTER SYMPTOMS
WEIGHT LOSS: 1
NEUROLOGICAL NEGATIVE: 1
HEARTBURN: 0
VOMITING: 0
CONSTIPATION: 0
PSYCHIATRIC NEGATIVE: 1
FEVER: 0
DIARRHEA: 0
NAUSEA: 0
CARDIOVASCULAR NEGATIVE: 1
MUSCULOSKELETAL NEGATIVE: 1
RESPIRATORY NEGATIVE: 1
EYES NEGATIVE: 1
ABDOMINAL PAIN: 1

## 2020-08-24 NOTE — CONSULTS
General Surgery Consult    CHIEF COMPLAINT: Abdominal pain.     HISTORY OF PRESENT ILLNESS: The patient is a 84 y.o. female, who presents with abdominal pain.  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  It is been very low output and I have been managing her wound from home however her fistulous output increased over the last few days.  It is nonbilious.  It is hemopurulent.  She underwent a CT scan showing a 2 cm fluid collection and a second fluid collection above the ostomy.  She was admitted to the medicine service and a general surgery consultation obtained.     PAST MEDICAL HISTORY:  has a past medical history of A-fib ([facility]), Ailment, Arthritis, Atrial fibrillation ([facility]) (2/2009), Atrial fibrillation with rapid ventricular response ([facility]) (3/18/2019), Atrial fibrillation, rapid ([facility]) (1/31/2014), Back pain, Bladder disease (2011), Bladder problem, CAD (coronary artery disease), Cancer ([facility]), CATARACT, CHEST PAIN (3/11/2011), Glaucoma, History of hematuria, HTN (hypertension), malignant (3/18/2019), HTN (hypertension), malignant (3/18/2019), Hyperlipidemia (2/24/2011), Hypertension, Hypothyroid (2/24/2011), Indigestion, Intrinsic eczema (10/31/2017), Other specified pre-operative examination (1/28/2014), Paroxysmal atrial fibrillation ([facility]), Sepsis urinary source (1/23/2012), Stroke ([facility]), T.I.A., TIA (transient ischemic attack) (3/11/2011), Unspecified disorder of thyroid, Unspecified hemorrhagic conditions, Urinary bladder disorder, UTI (urinary tract infection) (2/24/2011), and Vaginal bleeding (10/22/2012).     PAST SURGICAL HISTORY:  has a past surgical history that includes bladder biopsy with cystoscopy (10/10/08); pyelogram (10/10/08); retrogrades (10/10/08); bladder biopsy with cystoscopy (6/15/2009); bladder biopsy with cystoscopy (10/12/2009); other abdominal surgery; cholecystectomy (1994);  "cholecystectomy (1994); gyn surgery; pelvic exam under anesthesia (10/12/2009); rectocele repair (11/3/2009); other; cystectomy (9/6/2011); ileo loop diversion (9/6/2011); biopsy general (10/22/2012); parastomal hernia repair (1/28/2014); ventral hernia repair robotic xi (12/24/2019); and irrigation & debridement general (N/A, 2/27/2020).     ALLERGIES:   Allergies   Allergen Reactions   • Cardizem Swelling   • Doxycycline      Swollen lips.   • Sulfa Drugs      Makes tongue swell, severely   • Zyvox Swelling     \"Whole mouth swelled up\"    • Codeine      Does not remember the reaction          CURRENT MEDICATIONS:   Home Medications     Reviewed by Jus Dumont (Pharmacy Tech) on 08/23/20 at 2053  Med List Status: Complete   Medication Last Dose Status   aspirin EC 81 MG EC tablet 8/23/2020 Active   furosemide (LASIX) 40 MG Tab 8/23/2020 Active   latanoprost (XALATAN) 0.005 % Solution 8/22/2020 Active   levothyroxine (SYNTHROID) 112 MCG Tab 8/23/2020 Active   lovastatin (MEVACOR) 10 MG tablet 8/23/2020 Active   metoprolol (LOPRESSOR) 100 MG Tab 8/23/2020 Active   Multiple Vitamins-Minerals (PRESERVISION AREDS PO) 8/23/2020 Active   pantoprazole (PROTONIX) 40 MG TBEC 8/23/2020 Active   potassium chloride (MICRO-K) 10 MEQ capsule 8/23/2020 Active   timolol (TIMOPTIC) 0.5 % Solution 8/23/2020 Active                FAMILY HISTORY:   Family History   Problem Relation Age of Onset   • Stroke Mother 81   • Stroke Father 77        SOCIAL HISTORY:   Social History     Tobacco Use   • Smoking status: Never Smoker   • Smokeless tobacco: Never Used   Substance and Sexual Activity   • Alcohol use: No   • Drug use: No   • Sexual activity: Not on file       REVIEW OF SYSTEMS: Comprehensive review of systems was negative aside from abdominal pain and back pain in the HPI or    PHYSICAL EXAMINATION:     GENERAL: The patient is in no acute distress.   VITAL SIGNS: BP (!) 95/55   Pulse 97   Temp 36.2 °C (97.2 °F) (Temporal)   " "Resp 16   Ht 1.727 m (5' 8\")   Wt 66 kg (145 lb 8.1 oz)   SpO2 94%   HEAD AND NECK: Demonstrates symmetric, reactive pupils. Extraocular muscles   are intact. Nares and oropharynx are clear.   NECK: Supple. No adenopathy.  CHEST:No respiratory distress.    CARDIOVASCULAR: Regular rate. The extremities are well perfused.   ABDOMEN: Midline wound with a punctate opening with erythema..   EXTREMITIES: Examination of the upper and lower extremities demonstrates no cyanosis edema or clubbing.  NEUROLOGIC: Alert & oriented x 3, Normal motor function, Normal sensory function, No focal deficits noted.    LABORATORY VALUES:   Recent Labs     08/23/20  1450 08/24/20  0043   WBC 13.0* 11.7*   RBC 4.47 4.09*   HEMOGLOBIN 13.4 12.1   HEMATOCRIT 41.0 37.1   MCV 91.7 90.7   MCH 30.0 29.6   MCHC 32.7* 32.6*   RDW 50.2* 49.4   PLATELETCT 160* 140*   MPV 11.9 11.6     Recent Labs     08/23/20  1450 08/24/20  0043   SODIUM 134* 132*   POTASSIUM 4.0 3.7   CHLORIDE 101 98   CO2 20 21   GLUCOSE 89 108*   BUN 16 14   CREATININE 1.28 1.15   CALCIUM 9.4 8.5     Recent Labs     08/23/20  1450 08/24/20  0043   ASTSGOT 27  --    ALTSGPT 17  --    TBILIRUBIN 0.6  --    ALKPHOSPHAT 192*  --    GLOBULIN 4.6*  --    INR  --  1.20*     Recent Labs     08/24/20  0043   INR 1.20*        IMAGING:   CT-ABDOMEN-PELVIS WITH   Final Result      Fluid collection in the right lower quadrant abdominal wall above the level of the stoma measures 3.1 x 0.9 cm. There are bowel loops adjacent to the collection and a fistula is not excluded.      Fluid collection in the anterior abdominal wall to the left of midline now measures 2.4 x 1.8 cm.      Status post cystectomy with a urinary conduit and a right lower quadrant stoma.      Bilateral hydronephrosis which is moderate to severe on the right and moderate on the left is slightly increased on the right and moderately increased on the left compared to prior.      Renal cortical scarring is seen bilaterally.    "   Atherosclerotic plaque.      Nodular appearance of the liver suspicious for cirrhosis.      Groundglass opacities in the right middle lobe may be infectious or inflammatory. Follow-up is recommended.          IMPRESSION AND PLAN:     1.  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    1.  The patient will be taken to the operating room for wound debridement and wound VAC placement. The surgical conduct was explained. Potential complications including but not limited to infection, bleeding, damage to adjacent structures, anesthetic complications were discussed in detail. Questions were elicited and answered to her satisfaction. She understands the rationale for surgery and elects to proceed.  Operative consent signed.    2.  At present I am not pursuing a bowel resection.  We will continue to try to manage this low output fistula with agressive wound care.  Clears okay today.  N.p.o. after midnight.      ___________________________________   Len Chavarria M.D.    DD: 8/24/2020 DT: 9:13 AM

## 2020-08-24 NOTE — H&P
Hospital Medicine History & Physical Note    Date of Service  8/23/2020    Primary Care Physician  Ramiro Arita M.D.    Consultants  Surgery Dr. Goyal    Code Status  Full Code    Chief Complaint  Chief Complaint   Patient presents with   • Post-Op Complications     hx of hernia repair with Dr. Chavarria in December with subsequent abscess formation       History of Presenting Illness  84 y.o. female who presented 8/23/2020 with history of hernia repair in December 2019 with subsequent wound infection requiring irrigation and debridement in February 2020 and recurrent hospitalizations for wound infection.  She was last hospitalized in July and was treated with IV antibiotics and subsequently ciprofloxacin.  She has been following up with Dr. Chavarria on a weekly basis for wound recheck.  She reports that over the past 2 days she started to experience increased abdominal wall pain moderate to severe worse with movement nonradiating associated with chills and diaphoresis no documented fever.  She has also noted increased purulent drainage from her abdominal wound.  She presented to the emergency department for further evaluation where CT of the abdomen revealed abdominal wall abscesses.  Patient has a history of cystectomy and urostomy for recurrent urinary tract infections.  She was recently treated for UTI.    Review of Systems  Review of Systems   All other systems reviewed and are negative.      Past Medical History   has a past medical history of A-fib ([facility]ly [facility]), Ailment, Arthritis, Atrial fibrillation ([facility]) (2/2009), Atrial fibrillation with rapid ventricular response (Former[facility] [place]) (3/18/2019), Atrial fibrillation, rapid (Formerly [facility] [place]) (1/31/2014), Back pain, Bladder disease (2011), Bladder problem, CAD (coronary artery disease), Cancer (Formerly [facility] [place]), CATARACT, CHEST PAIN (3/11/2011), Glaucoma, History of hematuria, HTN (hypertension), malignant (3/18/2019), HTN (hypertension), malignant (3/18/2019), Hyperlipidemia (2/24/2011), Hypertension,  "Hypothyroid (2/24/2011), Indigestion, Intrinsic eczema (10/31/2017), Other specified pre-operative examination (1/28/2014), Paroxysmal atrial fibrillation (HCC), Sepsis urinary source (1/23/2012), Stroke (HCC), T.I.A., TIA (transient ischemic attack) (3/11/2011), Unspecified disorder of thyroid, Unspecified hemorrhagic conditions, Urinary bladder disorder, UTI (urinary tract infection) (2/24/2011), and Vaginal bleeding (10/22/2012).    Surgical History   has a past surgical history that includes bladder biopsy with cystoscopy (10/10/08); pyelogram (10/10/08); retrogrades (10/10/08); bladder biopsy with cystoscopy (6/15/2009); bladder biopsy with cystoscopy (10/12/2009); other abdominal surgery; cholecystectomy (1994); cholecystectomy (1994); gyn surgery; pelvic exam under anesthesia (10/12/2009); rectocele repair (11/3/2009); other; cystectomy (9/6/2011); ileo loop diversion (9/6/2011); biopsy general (10/22/2012); parastomal hernia repair  (1/28/2014); ventral hernia repair robotic xi (12/24/2019); and irrigation & debridement general (N/A, 2/27/2020).     Family History  family history includes Stroke (age of onset: 77) in her father; Stroke (age of onset: 81) in her mother.     Social History   reports that she has never smoked. She has never used smokeless tobacco. She reports that she does not drink alcohol or use drugs.    Allergies  Allergies   Allergen Reactions   • Cardizem Swelling   • Doxycycline      Swollen lips.   • Sulfa Drugs      Makes tongue swell, severely   • Zyvox Swelling     \"Whole mouth swelled up\"    • Codeine      Does not remember the reaction         Medications  Prior to Admission Medications   Prescriptions Last Dose Informant Patient Reported? Taking?   Multiple Vitamins-Minerals (PRESERVISION AREDS PO)  Patient Yes No   Sig: Take 1 Tab by mouth 2 Times a Day.   aspirin EC 81 MG EC tablet  Patient No No   Sig: Take 1 Tab by mouth every day.   furosemide (LASIX) 40 MG Tab   Yes Yes "   Sig: Take 40 mg by mouth every day.   latanoprost (XALATAN) 0.005 % Solution  Patient Yes No   Sig: Place 1 Drop in both eyes every evening.   levothyroxine (SYNTHROID) 112 MCG Tab  Patient Yes No   Sig: Take 112 mcg by mouth Every morning on an empty stomach.   lovastatin (MEVACOR) 10 MG tablet  Patient Yes No   Sig: Take 10 mg by mouth every day.   metoprolol (LOPRESSOR) 100 MG Tab  Patient No No   Sig: Take 1 Tab by mouth 2 times a day.   pantoprazole (PROTONIX) 40 MG TBEC  Patient Yes No   Sig: Take 40 mg by mouth every day.   potassium chloride (MICRO-K) 10 MEQ capsule   Yes Yes   Sig: Take 10 mEq by mouth every day.   timolol (TIMOPTIC) 0.5 % Solution  Patient Yes No   Sig: Place 1 Drop in both eyes 2 times a day.      Facility-Administered Medications: None       Physical Exam  Temp:  [37.2 °C (99 °F)] 37.2 °C (99 °F)  Pulse:  [] 107  Resp:  [14-21] 19  BP: (116-127)/(58-81) 116/68  SpO2:  [93 %-99 %] 93 %    Physical Exam  Vitals signs and nursing note reviewed.   Constitutional:       General: She is not in acute distress.  HENT:      Head: Normocephalic and atraumatic.      Nose: Nose normal.      Mouth/Throat:      Pharynx: No oropharyngeal exudate or posterior oropharyngeal erythema.   Eyes:      General:         Right eye: No discharge.         Left eye: No discharge.   Neck:      Musculoskeletal: Neck supple.   Cardiovascular:      Rate and Rhythm: Regular rhythm. Tachycardia present.      Heart sounds: No murmur. No friction rub. No gallop.    Pulmonary:      Effort: Pulmonary effort is normal. No respiratory distress.      Breath sounds: No stridor. No rhonchi or rales.   Chest:      Chest wall: No tenderness.   Abdominal:      General: Bowel sounds are normal. There is no distension.      Palpations: Abdomen is soft. There is no mass.      Tenderness: There is abdominal tenderness. There is no guarding.      Comments: Urostomy in place  Lower abdominal tenderness no guarding or  rebound  Surgical wound with small amount of purulent drainage from upper wound area   Musculoskeletal:         General: No swelling or tenderness.   Skin:     General: Skin is warm and dry.      Coloration: Skin is not cyanotic.      Nails: There is no clubbing.     Neurological:      General: No focal deficit present.      Mental Status: She is alert and oriented to person, place, and time.      Cranial Nerves: No cranial nerve deficit.      Motor: No weakness.   Psychiatric:         Mood and Affect: Mood normal.         Behavior: Behavior normal.         Thought Content: Thought content normal.         Judgment: Judgment normal.         Laboratory:  Recent Labs     08/23/20  1450   WBC 13.0*   RBC 4.47   HEMOGLOBIN 13.4   HEMATOCRIT 41.0   MCV 91.7   MCH 30.0   MCHC 32.7*   RDW 50.2*   PLATELETCT 160*   MPV 11.9     Recent Labs     08/23/20  1450   SODIUM 134*   POTASSIUM 4.0   CHLORIDE 101   CO2 20   GLUCOSE 89   BUN 16   CREATININE 1.28   CALCIUM 9.4     Recent Labs     08/23/20  1450   ALTSGPT 17   ASTSGOT 27   ALKPHOSPHAT 192*   TBILIRUBIN 0.6   LIPASE 70   GLUCOSE 89         No results for input(s): NTPROBNP in the last 72 hours.      No results for input(s): TROPONINT in the last 72 hours.    Imaging:  CT-ABDOMEN-PELVIS WITH   Final Result      Fluid collection in the right lower quadrant abdominal wall above the level of the stoma measures 3.1 x 0.9 cm. There are bowel loops adjacent to the collection and a fistula is not excluded.      Fluid collection in the anterior abdominal wall to the left of midline now measures 2.4 x 1.8 cm.      Status post cystectomy with a urinary conduit and a right lower quadrant stoma.      Bilateral hydronephrosis which is moderate to severe on the right and moderate on the left is slightly increased on the right and moderately increased on the left compared to prior.      Renal cortical scarring is seen bilaterally.      Atherosclerotic plaque.      Nodular appearance of  the liver suspicious for cirrhosis.      Groundglass opacities in the right middle lobe may be infectious or inflammatory. Follow-up is recommended.            Assessment/Plan:  I anticipate this patient will require at least two midnights for appropriate medical management, necessitating inpatient admission.    * Abdominal wall abscess at site of surgical wound  Assessment & Plan  Dr. Goyal from surgery has been consulted she reports that Dr. Chavarria will evaluate patient in the morning  We will start patient on IV Zosyn and vancomycin given her history of recurrent infections and exposure to antibiotics  We will check wound cultures and follow-up on blood cultures  IV hydration close clinical monitoring    Hyponatremia  Assessment & Plan  IV  hydration and monitor  Hold Lasix    Abnormal CT scan, liver  Assessment & Plan  Concerning for possible cirrhosis    Check hepatitis panel  Will need further eval when more clinically stable    Sepsis (HCC)  Assessment & Plan  This is Sepsis Present on admission  SIRS criteria identified on my evaluation include: Tachycardia, with heart rate greater than 90 BPM and Leukocytosis, with WBC greater than 12,000  Source is abdominal wall abscess  Sepsis protocol initiated  Fluid resuscitation ordered per protocol  IV antibiotics as appropriate for source of sepsis  While organ dysfunction may be noted elsewhere in this problem list or in the chart, degree of organ dysfunction does not meet CMS criteria for severe sepsis          Essential hypertension- (present on admission)  Assessment & Plan  Continue metoprolol  We will hold Lasix given sepsis    Presence of urostomy (HCC)- (present on admission)  Assessment & Plan  With hydronephrosis on CT  We will need follow-up imaging when more clinically stable and  urology evaluation she is established with Dr. Garcia    Hypothyroidism- (present on admission)  Assessment & Plan  Continue levothyroxine

## 2020-08-24 NOTE — ED NOTES
Assumed care, patient given extra pillow for comfort, patient delclines any other needs at this time

## 2020-08-24 NOTE — ASSESSMENT & PLAN NOTE
This is Sepsis Present on admission  SIRS criteria identified on my evaluation include: Tachycardia, with heart rate greater than 90 BPM and Leukocytosis, with WBC greater than 12,000  Source is abdominal wall abscess  Sepsis protocol initiated  Fluid resuscitation ordered per protocol  IV antibiotics as appropriate for source of sepsis  While organ dysfunction may be noted elsewhere in this problem list or in the chart, degree of organ dysfunction does not meet CMS criteria for severe sepsis  Leukocytosis has resolved  She remains hemodynamically stable  Blood culture so far negative  Surgical cultures growing e coli  Continue daptomycin and Zosyn - ID arranging outpatient abx

## 2020-08-24 NOTE — PROGRESS NOTES
2 RN skin check with Almita    Tape to R 4th finger to prevent finger from bending per pt.  Midline abdominal wound, dressing changed and CDI  R urostomy in place     All other skin intact, no signs of skin breakdown over bony prominences.    Devices in place: PIV

## 2020-08-24 NOTE — ASSESSMENT & PLAN NOTE
With hydronephrosis on CT  Will need follow-up imaging when more clinically stable and  urology evaluation she is established with Dr. Garcia

## 2020-08-24 NOTE — ASSESSMENT & PLAN NOTE
Concerning for possible cirrhosis  Hepatitis panel is negative  Her liver function is stable  The patient will need to follow-up as outpatient

## 2020-08-24 NOTE — PROGRESS NOTES
Hospital Medicine Daily Progress Note    Date of Service  8/24/2020    Chief Complaint  84 y.o. female admitted 8/23/2020 with abdominal pain    Hospital Course    *84-year-old female with history of hypertension with history of urostomy due to recurrent urine infection presented on 8/23, the patient has history of hernia repair in December 2019 with subsequent wound infection requiring irrigation and debridement in February 2020 and recurrent hospitalizations for wound infection, last hospitalization was in July treated with IV antibiotics and subsequently ciprofloxacin the patient has been following up with  for wound recheck, however last 2 weeks before this admission she experienced increased abdominal wall pain with moderate to severe worse with movement, not radiated and not associated with this chill or fever however she noticed increased purulent drainage from the abdominal wound, on admission CT scan for abdomen showed abdominal wall abscess, IV antibiotics have been initiated, the patient was evaluated by surgeon who felt 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 Negative for fever.   Eyes: Negative.    Respiratory: Negative.    Cardiovascular: Negative.    Gastrointestinal: Positive for abdominal pain. Negative for constipation, diarrhea, heartburn, nausea and vomiting.   Genitourinary: Negative.    Musculoskeletal: Negative.     Neurological: Negative.    Psychiatric/Behavioral: Negative.         Physical Exam  Temp:  [36.2 °C (97.2 °F)-36.7 °C (98.1 °F)] 36.6 °C (97.9 °F)  Pulse:  [] 113  Resp:  [16-19] 17  BP: ()/(55-78) 110/68  SpO2:  [93 %-98 %] 97 %    Physical Exam  HENT:      Head: Normocephalic and atraumatic.   Eyes:      General: No scleral icterus.  Cardiovascular:      Rate and Rhythm: Normal rate.      Heart sounds: No murmur. No gallop.    Pulmonary:      Effort: Pulmonary effort is normal. No respiratory distress.      Breath sounds: No stridor. No wheezing or rales.   Abdominal:      General: Bowel sounds are normal. There is no distension.      Palpations: Abdomen is soft.      Tenderness: There is abdominal tenderness. There is no guarding.      Comments: Urostomy in place   Musculoskeletal:         General: No swelling or deformity.      Right lower leg: No edema.   Skin:     Coloration: Skin is not jaundiced.      Findings: No bruising.   Neurological:      General: No focal deficit present.      Mental Status: She is oriented to person, place, and time. Mental status is at baseline.      Cranial Nerves: No cranial nerve deficit.      Motor: No weakness.      Gait: Gait normal.         Fluids    Intake/Output Summary (Last 24 hours) at 8/24/2020 1546  Last data filed at 8/24/2020 1444  Gross per 24 hour   Intake 3741.67 ml   Output 1100 ml   Net 2641.67 ml       Laboratory  Recent Labs     08/23/20  1450 08/24/20  0043   WBC 13.0* 11.7*   RBC 4.47 4.09*   HEMOGLOBIN 13.4 12.1   HEMATOCRIT 41.0 37.1   MCV 91.7 90.7   MCH 30.0 29.6   MCHC 32.7* 32.6*   RDW 50.2* 49.4   PLATELETCT 160* 140*   MPV 11.9 11.6     Recent Labs     08/23/20  1450 08/24/20  0043   SODIUM 134* 132*   POTASSIUM 4.0 3.7   CHLORIDE 101 98   CO2 20 21   GLUCOSE 89 108*   BUN 16 14   CREATININE 1.28 1.15   CALCIUM 9.4 8.5     Recent Labs     08/24/20  0043   INR 1.20*               Imaging  CT-ABDOMEN-PELVIS WITH   Final Result      Fluid  collection in the right lower quadrant abdominal wall above the level of the stoma measures 3.1 x 0.9 cm. There are bowel loops adjacent to the collection and a fistula is not excluded.      Fluid collection in the anterior abdominal wall to the left of midline now measures 2.4 x 1.8 cm.      Status post cystectomy with a urinary conduit and a right lower quadrant stoma.      Bilateral hydronephrosis which is moderate to severe on the right and moderate on the left is slightly increased on the right and moderately increased on the left compared to prior.      Renal cortical scarring is seen bilaterally.      Atherosclerotic plaque.      Nodular appearance of the liver suspicious for cirrhosis.      Groundglass opacities in the right middle lobe may be infectious or inflammatory. Follow-up is recommended.           Assessment/Plan  * Abdominal wall abscess at site of surgical wound  Assessment & Plan  Complicated after hernia repair  With many admission to the hospital  Continue with daptomycin and Zosyn at this time  Waiting for surgery tomorrow  Waiting for blood culture  ID on board appreciate the recommendation      Sepsis (HCC)  Assessment & Plan  This is Sepsis Present on admission  SIRS criteria identified on my evaluation include: Tachycardia, with heart rate greater than 90 BPM and Leukocytosis, with WBC greater than 12,000  Source is abdominal wall abscess  Sepsis protocol initiated  Fluid resuscitation ordered per protocol  IV antibiotics as appropriate for source of sepsis  While organ dysfunction may be noted elsewhere in this problem list or in the chart, degree of organ dysfunction does not meet CMS criteria for severe sepsis  Reassessment  Hemodynamically stable  Mild leukocytosis and no fever  Blood culture is pending  Surgery for tomorrow  Continue daptomycin and Zosyn, ID was consulted appreciate the recommendation        Chronic kidney disease (CKD) stage G3a/A2, moderately decreased glomerular  filtration rate (GFR) between 45-59 mL/min/1.73 square meter and albuminuria creatinine ratio between  mg/g (HCC)  Assessment & Plan  Creatinine around baseline  Avoid nephrotoxic medication    Hyponatremia  Assessment & Plan  IV  hydration and monitor  Improved    Abnormal CT scan, liver  Assessment & Plan  Concerning for possible cirrhosis  hepatitis panel is negative  Her liver function is stable at this time  The patient will need to follow-up as outpatient    Presence of urostomy (HCC)- (present on admission)  Assessment & Plan  Related to recurrent ??  with hydronephrosis on CT  We will need follow-up imaging when more clinically stable and  urology evaluation she is established with Dr. Garcia    Essential hypertension- (present on admission)  Assessment & Plan  Continue metoprolol  Continue holding Lasix at this time    Hypothyroidism- (present on admission)  Assessment & Plan  Continue levothyroxine       VTE prophylaxis: Lovenox

## 2020-08-24 NOTE — PROGRESS NOTES
Bedside report received.  Assessment complete.  A&O x 4. Patient calls appropriately.  Patient ambulates with SB assist. Bed alarm NA.   Patient has 5/10 pain. Pain managed with prescribed medications.  Denies N&V. Tolerating clear liquid diet. Surgery tomorrow  Surgical incision CDI w/ gauze and hypafix.  + void via urostomy, + flatus, 8/24 BM.  Patient denies SOB.  SCD's on. Treaded socks on  Review plan with of care with patient. Call light and personal belongings with in reach. Hourly rounding in place. All needs met at this time.

## 2020-08-24 NOTE — CARE PLAN
Problem: Communication  Goal: The ability to communicate needs accurately and effectively will improve  Outcome: PROGRESSING AS EXPECTED  Note: Updated on POC, pt able to communicate needs appropriately, and call light is within reach     Problem: Safety  Goal: Will remain free from injury  Outcome: PROGRESSING AS EXPECTED  Goal: Will remain free from falls  Outcome: PROGRESSING AS EXPECTED  Note: Standby assist, steady gait     Problem: Infection  Goal: Will remain free from infection  Outcome: PROGRESSING AS EXPECTED  Note: IV ABX therapy in place

## 2020-08-24 NOTE — PROGRESS NOTES
Assessment complete.  A&O x 4. Patient calls appropriately.  Patient ambulates with no assistance needed. Bed alarm off.   Patient has 6/10 pain. Pain managed with prescribed medications.  Denies N&V. NPO at this time.  DIP to midline abdominal wound, CDI  + void into urostomy, + flatus, + BM in ED per pt.  Patient denies SOB.  SCD's off.    Review plan with of care with patient. Call light and personal belongings with in reach. Hourly rounding in place. All needs met at this time.

## 2020-08-24 NOTE — DISCHARGE PLANNING
Care Transition Team Discharge Planning         Discharge Plan:  TBD    Per Rounds discussion with Dr Hickman, Plan  Is Pt to have Surgery tomorrow. Per Chart, Pt is scheduled for wound debridement and wound vac placement. Will await recommendations from the Care Team on Pt's discharge disposition.

## 2020-08-24 NOTE — ASSESSMENT & PLAN NOTE
Complicated after hernia repair  POD #2, s/p debridement with wound vac placement  Continue with daptomycin and Zosyn at this time  Cultures growing e coli  ID following, arranging outpatient iv abx  Will place midline

## 2020-08-24 NOTE — PROGRESS NOTES
"Pharmacy Kinetics 84 y.o. female on vancomycin day # 1 2020    Currently on Vancomycin LD 1750 mg iv x1 (0000)  Provider specified end date: 7 days     Indication for Treatment: abdominal wall abscess     Pertinent history per medical record: Admitted on 2020 for abdominal wall abscess at site of surgical wound. Pt has hx of recurrent infections.     Other antibiotics: Zosyn extended infusion 3.375 iv q8h     Allergies: Cardizem, Doxycycline, Sulfa drugs, Zyvox, and Codeine     List concerns for renal function: age, concurrent use of Zosyn, concern for possible cirrhosis per MD    Pertinent cultures to date:    - blood cx (x2) pending     MRSA nares swab if pneumonia is a concern (ordered/positive/negative/n-a): N/A    Recent Labs     20  1450   WBC 13.0*   NEUTSPOLYS 64.30     Recent Labs     20  1450   BUN 16   CREATININE 1.28   ALBUMIN 3.4     No results for input(s): VANCOTROUGH, VANCOPEAK, VANCORANDOM in the last 72 hours.    Intake/Output Summary (Last 24 hours) at 2020  Last data filed at 2020 1837  Gross per 24 hour   Intake 1000 ml   Output no documentation   Net 1000 ml      Blood Pressure 116/68   Pulse (Abnormal) 107   Temperature 37.2 °C (99 °F) (Temporal)   Respiration 19   Height 1.727 m (5' 8\")   Weight 66 kg (145 lb 8.1 oz)   Oxygen Saturation 93%  Temp (24hrs), Av.2 °C (99 °F), Min:37.2 °C (99 °F), Max:37.2 °C (99 °F)      A/P   1. Vancomycin dose change: new start vanco  2. Next vancomycin level:  at 1200 - ordered   3. Goal trough: 10-15 mcg/mL   4. Comments: Random trough level to be obtained approx 12 hours post-loading dose due to above renal concerns. Pharmacy will continue to follow and recommend de-escalation of antibiotics as appropriate.      Taylor Doshi, PharmD      "

## 2020-08-24 NOTE — ED NOTES
Med rec updated and complete. Pt stated that her doctor recently instructed her to cut her metoprolol in half and take 50 mg.  Pt denies antibiotic use in last 14 days.    Pt recently changed pharmacies from [facility] to [facility].

## 2020-08-25 ENCOUNTER — ANESTHESIA (OUTPATIENT)
Dept: SURGERY | Facility: MEDICAL CENTER | Age: 85
DRG: 856 | End: 2020-08-25
Payer: MEDICARE

## 2020-08-25 ENCOUNTER — ANESTHESIA EVENT (OUTPATIENT)
Dept: SURGERY | Facility: MEDICAL CENTER | Age: 85
DRG: 856 | End: 2020-08-25
Payer: MEDICARE

## 2020-08-25 LAB — CK SERPL-CCNC: 35 U/L (ref 0–154)

## 2020-08-25 PROCEDURE — 160009 HCHG ANES TIME/MIN: Performed by: SURGERY

## 2020-08-25 PROCEDURE — 99232 SBSQ HOSP IP/OBS MODERATE 35: CPT | Performed by: HOSPITALIST

## 2020-08-25 PROCEDURE — 700111 HCHG RX REV CODE 636 W/ 250 OVERRIDE (IP): Performed by: ANESTHESIOLOGY

## 2020-08-25 PROCEDURE — 700102 HCHG RX REV CODE 250 W/ 637 OVERRIDE(OP): Performed by: ANESTHESIOLOGY

## 2020-08-25 PROCEDURE — 700105 HCHG RX REV CODE 258: Performed by: ANESTHESIOLOGY

## 2020-08-25 PROCEDURE — 160048 HCHG OR STATISTICAL LEVEL 1-5: Performed by: SURGERY

## 2020-08-25 PROCEDURE — 36415 COLL VENOUS BLD VENIPUNCTURE: CPT

## 2020-08-25 PROCEDURE — 700102 HCHG RX REV CODE 250 W/ 637 OVERRIDE(OP): Performed by: HOSPITALIST

## 2020-08-25 PROCEDURE — 700105 HCHG RX REV CODE 258

## 2020-08-25 PROCEDURE — 160002 HCHG RECOVERY MINUTES (STAT): Performed by: SURGERY

## 2020-08-25 PROCEDURE — 501445 HCHG STAPLER, SKIN DISP: Performed by: SURGERY

## 2020-08-25 PROCEDURE — 160035 HCHG PACU - 1ST 60 MINS PHASE I: Performed by: SURGERY

## 2020-08-25 PROCEDURE — 82550 ASSAY OF CK (CPK): CPT

## 2020-08-25 PROCEDURE — 700111 HCHG RX REV CODE 636 W/ 250 OVERRIDE (IP): Performed by: INTERNAL MEDICINE

## 2020-08-25 PROCEDURE — 700101 HCHG RX REV CODE 250: Performed by: ANESTHESIOLOGY

## 2020-08-25 PROCEDURE — 160038 HCHG SURGERY MINUTES - EA ADDL 1 MIN LEVEL 2: Performed by: SURGERY

## 2020-08-25 PROCEDURE — A9270 NON-COVERED ITEM OR SERVICE: HCPCS | Performed by: HOSPITALIST

## 2020-08-25 PROCEDURE — 770006 HCHG ROOM/CARE - MED/SURG/GYN SEMI*

## 2020-08-25 PROCEDURE — 160027 HCHG SURGERY MINUTES - 1ST 30 MINS LEVEL 2: Performed by: SURGERY

## 2020-08-25 PROCEDURE — 700105 HCHG RX REV CODE 258: Performed by: INTERNAL MEDICINE

## 2020-08-25 PROCEDURE — 700111 HCHG RX REV CODE 636 W/ 250 OVERRIDE (IP): Performed by: HOSPITALIST

## 2020-08-25 PROCEDURE — 0JB80ZZ EXCISION OF ABDOMEN SUBCUTANEOUS TISSUE AND FASCIA, OPEN APPROACH: ICD-10-PCS | Performed by: SURGERY

## 2020-08-25 PROCEDURE — A9270 NON-COVERED ITEM OR SERVICE: HCPCS | Performed by: ANESTHESIOLOGY

## 2020-08-25 PROCEDURE — 700105 HCHG RX REV CODE 258: Performed by: HOSPITALIST

## 2020-08-25 RX ORDER — CEFOTETAN DISODIUM 1 G/10ML
INJECTION, POWDER, FOR SOLUTION INTRAMUSCULAR; INTRAVENOUS PRN
Status: DISCONTINUED | OUTPATIENT
Start: 2020-08-25 | End: 2020-08-25 | Stop reason: SURG

## 2020-08-25 RX ORDER — HYDROMORPHONE HYDROCHLORIDE 1 MG/ML
0.2 INJECTION, SOLUTION INTRAMUSCULAR; INTRAVENOUS; SUBCUTANEOUS
Status: DISCONTINUED | OUTPATIENT
Start: 2020-08-25 | End: 2020-08-25 | Stop reason: HOSPADM

## 2020-08-25 RX ORDER — HALOPERIDOL 5 MG/ML
1 INJECTION INTRAMUSCULAR
Status: DISCONTINUED | OUTPATIENT
Start: 2020-08-25 | End: 2020-08-25 | Stop reason: HOSPADM

## 2020-08-25 RX ORDER — HYDROMORPHONE HYDROCHLORIDE 1 MG/ML
0.4 INJECTION, SOLUTION INTRAMUSCULAR; INTRAVENOUS; SUBCUTANEOUS
Status: DISCONTINUED | OUTPATIENT
Start: 2020-08-25 | End: 2020-08-25 | Stop reason: HOSPADM

## 2020-08-25 RX ORDER — ROCURONIUM BROMIDE 10 MG/ML
INJECTION, SOLUTION INTRAVENOUS PRN
Status: DISCONTINUED | OUTPATIENT
Start: 2020-08-25 | End: 2020-08-25 | Stop reason: SURG

## 2020-08-25 RX ORDER — SODIUM CHLORIDE 9 MG/ML
INJECTION, SOLUTION INTRAVENOUS
Status: COMPLETED
Start: 2020-08-25 | End: 2020-08-25

## 2020-08-25 RX ORDER — OXYCODONE HYDROCHLORIDE AND ACETAMINOPHEN 5; 325 MG/1; MG/1
1 TABLET ORAL
Status: COMPLETED | OUTPATIENT
Start: 2020-08-25 | End: 2020-08-25

## 2020-08-25 RX ORDER — DIPHENHYDRAMINE HYDROCHLORIDE 50 MG/ML
12.5 INJECTION INTRAMUSCULAR; INTRAVENOUS
Status: DISCONTINUED | OUTPATIENT
Start: 2020-08-25 | End: 2020-08-25 | Stop reason: HOSPADM

## 2020-08-25 RX ORDER — PHENYLEPHRINE HCL IN 0.9% NACL 0.5 MG/5ML
SYRINGE (ML) INTRAVENOUS PRN
Status: DISCONTINUED | OUTPATIENT
Start: 2020-08-25 | End: 2020-08-25 | Stop reason: SURG

## 2020-08-25 RX ORDER — LORAZEPAM 2 MG/ML
0.5 INJECTION INTRAMUSCULAR
Status: DISCONTINUED | OUTPATIENT
Start: 2020-08-25 | End: 2020-08-25 | Stop reason: HOSPADM

## 2020-08-25 RX ORDER — LIDOCAINE HYDROCHLORIDE 20 MG/ML
INJECTION, SOLUTION EPIDURAL; INFILTRATION; INTRACAUDAL; PERINEURAL PRN
Status: DISCONTINUED | OUTPATIENT
Start: 2020-08-25 | End: 2020-08-25 | Stop reason: SURG

## 2020-08-25 RX ORDER — ONDANSETRON 2 MG/ML
4 INJECTION INTRAMUSCULAR; INTRAVENOUS
Status: COMPLETED | OUTPATIENT
Start: 2020-08-25 | End: 2020-08-25

## 2020-08-25 RX ORDER — SODIUM CHLORIDE, SODIUM LACTATE, POTASSIUM CHLORIDE, CALCIUM CHLORIDE 600; 310; 30; 20 MG/100ML; MG/100ML; MG/100ML; MG/100ML
INJECTION, SOLUTION INTRAVENOUS
Status: DISCONTINUED | OUTPATIENT
Start: 2020-08-25 | End: 2020-08-25 | Stop reason: SURG

## 2020-08-25 RX ORDER — HYDROMORPHONE HYDROCHLORIDE 1 MG/ML
0.1 INJECTION, SOLUTION INTRAMUSCULAR; INTRAVENOUS; SUBCUTANEOUS
Status: DISCONTINUED | OUTPATIENT
Start: 2020-08-25 | End: 2020-08-25 | Stop reason: HOSPADM

## 2020-08-25 RX ORDER — SODIUM CHLORIDE, SODIUM LACTATE, POTASSIUM CHLORIDE, CALCIUM CHLORIDE 600; 310; 30; 20 MG/100ML; MG/100ML; MG/100ML; MG/100ML
INJECTION, SOLUTION INTRAVENOUS CONTINUOUS
Status: DISCONTINUED | OUTPATIENT
Start: 2020-08-25 | End: 2020-08-25 | Stop reason: HOSPADM

## 2020-08-25 RX ORDER — MEPERIDINE HYDROCHLORIDE 25 MG/ML
6.25 INJECTION INTRAMUSCULAR; INTRAVENOUS; SUBCUTANEOUS
Status: DISCONTINUED | OUTPATIENT
Start: 2020-08-25 | End: 2020-08-25 | Stop reason: HOSPADM

## 2020-08-25 RX ORDER — OXYCODONE HYDROCHLORIDE AND ACETAMINOPHEN 5; 325 MG/1; MG/1
2 TABLET ORAL
Status: COMPLETED | OUTPATIENT
Start: 2020-08-25 | End: 2020-08-25

## 2020-08-25 RX ADMIN — TIMOLOL MALEATE 1 DROP: 5 SOLUTION/ DROPS OPHTHALMIC at 21:10

## 2020-08-25 RX ADMIN — SUGAMMADEX 100 MG: 100 INJECTION, SOLUTION INTRAVENOUS at 10:40

## 2020-08-25 RX ADMIN — FENTANYL CITRATE 50 MCG: 50 INJECTION INTRAMUSCULAR; INTRAVENOUS at 11:02

## 2020-08-25 RX ADMIN — PIPERACILLIN AND TAZOBACTAM 3.38 G: 3; .375 INJECTION, POWDER, LYOPHILIZED, FOR SOLUTION INTRAVENOUS; PARENTERAL at 12:15

## 2020-08-25 RX ADMIN — SODIUM CHLORIDE, POTASSIUM CHLORIDE, SODIUM LACTATE AND CALCIUM CHLORIDE: 600; 310; 30; 20 INJECTION, SOLUTION INTRAVENOUS at 10:01

## 2020-08-25 RX ADMIN — FENTANYL CITRATE 100 MCG: 50 INJECTION INTRAMUSCULAR; INTRAVENOUS at 10:05

## 2020-08-25 RX ADMIN — ROCURONIUM BROMIDE 20 MG: 10 INJECTION, SOLUTION INTRAVENOUS at 10:05

## 2020-08-25 RX ADMIN — OMEPRAZOLE 20 MG: 20 CAPSULE, DELAYED RELEASE ORAL at 05:33

## 2020-08-25 RX ADMIN — OXYCODONE HYDROCHLORIDE 5 MG: 5 TABLET ORAL at 21:11

## 2020-08-25 RX ADMIN — MAGNESIUM OXIDE TAB 400 MG (241.3 MG ELEMENTAL MG) 400 MG: 400 (241.3 MG) TAB at 05:33

## 2020-08-25 RX ADMIN — SODIUM CHLORIDE 500 ML: 9 INJECTION, SOLUTION INTRAVENOUS at 05:38

## 2020-08-25 RX ADMIN — ONDANSETRON 4 MG: 2 INJECTION INTRAMUSCULAR; INTRAVENOUS at 11:07

## 2020-08-25 RX ADMIN — PROPOFOL 100 MG: 10 INJECTION, EMULSION INTRAVENOUS at 10:05

## 2020-08-25 RX ADMIN — METOPROLOL TARTRATE 50 MG: 50 TABLET, FILM COATED ORAL at 21:10

## 2020-08-25 RX ADMIN — SODIUM CHLORIDE, POTASSIUM CHLORIDE, SODIUM LACTATE AND CALCIUM CHLORIDE: 600; 310; 30; 20 INJECTION, SOLUTION INTRAVENOUS at 09:03

## 2020-08-25 RX ADMIN — EPHEDRINE SULFATE 10 MG: 50 INJECTION, SOLUTION INTRAVENOUS at 10:15

## 2020-08-25 RX ADMIN — LIDOCAINE HYDROCHLORIDE 60 MG: 20 INJECTION, SOLUTION EPIDURAL; INFILTRATION; INTRACAUDAL at 10:05

## 2020-08-25 RX ADMIN — CEFOTETAN 2 G: 1 INJECTION, POWDER, FOR SOLUTION INTRAMUSCULAR; INTRAVENOUS at 10:08

## 2020-08-25 RX ADMIN — LATANOPROST 1 DROP: 50 SOLUTION OPHTHALMIC at 17:00

## 2020-08-25 RX ADMIN — OXYCODONE HYDROCHLORIDE 5 MG: 5 TABLET ORAL at 12:44

## 2020-08-25 RX ADMIN — LEVOTHYROXINE SODIUM 112 MCG: 112 TABLET ORAL at 05:33

## 2020-08-25 RX ADMIN — Medication 100 MCG: at 10:15

## 2020-08-25 RX ADMIN — PIPERACILLIN AND TAZOBACTAM 3.38 G: 3; .375 INJECTION, POWDER, LYOPHILIZED, FOR SOLUTION INTRAVENOUS; PARENTERAL at 05:32

## 2020-08-25 RX ADMIN — FENTANYL CITRATE 50 MCG: 50 INJECTION INTRAMUSCULAR; INTRAVENOUS at 11:10

## 2020-08-25 RX ADMIN — SODIUM CHLORIDE, POTASSIUM CHLORIDE, SODIUM LACTATE AND CALCIUM CHLORIDE: 600; 310; 30; 20 INJECTION, SOLUTION INTRAVENOUS at 22:37

## 2020-08-25 RX ADMIN — TIMOLOL MALEATE 1 DROP: 5 SOLUTION/ DROPS OPHTHALMIC at 08:45

## 2020-08-25 RX ADMIN — PIPERACILLIN AND TAZOBACTAM 3.38 G: 3; .375 INJECTION, POWDER, LYOPHILIZED, FOR SOLUTION INTRAVENOUS; PARENTERAL at 21:10

## 2020-08-25 RX ADMIN — OXYCODONE AND ACETAMINOPHEN 1 TABLET: 5; 325 TABLET ORAL at 11:22

## 2020-08-25 RX ADMIN — METOPROLOL TARTRATE 50 MG: 50 TABLET, FILM COATED ORAL at 08:45

## 2020-08-25 RX ADMIN — DAPTOMYCIN 350 MG: 350 INJECTION, POWDER, LYOPHILIZED, FOR SOLUTION INTRAVENOUS at 13:08

## 2020-08-25 ASSESSMENT — ENCOUNTER SYMPTOMS
DIARRHEA: 0
RESPIRATORY NEGATIVE: 1
WEIGHT LOSS: 1
CARDIOVASCULAR NEGATIVE: 1
FEVER: 0
MUSCULOSKELETAL NEGATIVE: 1
NEUROLOGICAL NEGATIVE: 1
EYES NEGATIVE: 1
CONSTIPATION: 0
HEARTBURN: 0
VOMITING: 0
ABDOMINAL PAIN: 1
NAUSEA: 0
PSYCHIATRIC NEGATIVE: 1

## 2020-08-25 NOTE — CARE PLAN
Problem: Communication  Goal: The ability to communicate needs accurately and effectively will improve  Outcome: PROGRESSING AS EXPECTED  Pt calls appropriately     Problem: Pain Management  Goal: Pain level will decrease to patient's comfort goal  Outcome: PROGRESSING AS EXPECTED  Pt rates pain 0/10     Problem: Urinary Elimination:  Goal: Ability to reestablish a normal urinary elimination pattern will improve  Outcome: PROGRESSING AS EXPECTED  Pt urostomy with +output

## 2020-08-25 NOTE — CARE PLAN
Problem: Nutritional:  Goal: Achieve adequate nutritional intake  Description: Patient will consume 50% of meals and diet will advance past clear liquid.  Outcome: NOT MET

## 2020-08-25 NOTE — PROGRESS NOTES
Pt A&Ox4, calls appropriately  Pt on RA, no SOB  Pt declines any pain, -numbness, -tingling  Last BM 8/25, +flatus  Pt has urostomy to RLQ and abdominal wound in LLQ with CDI gauze and tape  Urostomy has +output with hazy mucous output  NPO since midnight  Pt up standby  Hourly rounding in place, call light and belongings within reach, all needs are met at this time

## 2020-08-25 NOTE — CARE PLAN
Problem: Knowledge Deficit  Goal: Knowledge of disease process/condition, treatment plan, diagnostic tests, and medications will improve  Outcome: PROGRESSING AS EXPECTED  Note: Discussed plan for I&D and wound vac placement 8/25. Answered pt questions.     Problem: Fluid Volume:  Goal: Will maintain balanced intake and output  Outcome: PROGRESSING AS EXPECTED  Note: Provided adequate fluids prior to pt NPO status

## 2020-08-25 NOTE — PROGRESS NOTES
84 yof with low output fistula and associated soft tissue infection  Plan on wound debridement with placement of wound vac  Discussed risks and benefits  Wishes to proceed.

## 2020-08-25 NOTE — PROGRESS NOTES
report received. Assessment completed.  Pt is A&O x4. Pt on room air.   Medicating for pain PRN per MAR Pain 5/10  Denies nausea.   - numbness, - tingling.  Skin urostomy in RLQ and abdominal wound in LLQ with gauze and tape   LDA 20G in left hand and 20 in right a/c   Last BM 8/24/20 . +flatus,   +void.  Clear liquid diet and NPO at midnight. Tolerates well.   Pt up standby.  Call light and belongings within reach. All needs met at this time. Fall Precautions and hourly rounding in place.

## 2020-08-25 NOTE — ANESTHESIA PREPROCEDURE EVALUATION
85yo female with history of wound infection, history of parastomal hernia/infection.      Relevant Problems   NEURO   (+) History of hematuria   (+) TIA (transient ischemic attack)      CARDIAC   (+) AF (atrial fibrillation) (HCC)   (+) Essential hypertension   (+) Paroxysmal A-fib (HCC)         (+) CKD (chronic kidney disease), stage II   (+) Chronic kidney disease (CKD) stage G3a/A2, moderately decreased glomerular filtration rate (GFR) between 45-59 mL/min/1.73 square meter and albuminuria creatinine ratio between  mg/g (HCC)   (+) Cirrhosis (HCC)      ENDO   (+) Hypothyroidism       Physical Exam    Airway   Mallampati: I  TM distance: >3 FB  Neck ROM: full       Cardiovascular - normal exam  Rhythm: regular  Rate: normal  (-) murmur     Dental - normal exam           Pulmonary - normal exam  Breath sounds clear to auscultation     Abdominal    Neurological - normal exam                 Anesthesia Plan    ASA 2       Plan - general             Induction: intravenous    Postoperative Plan: Postoperative administration of opioids is intended.    Pertinent diagnostic labs and testing reviewed    Informed Consent:    Anesthetic plan and risks discussed with patient.    Use of blood products discussed with: patient whom consented to blood products.

## 2020-08-25 NOTE — OR NURSING
1052: Pt arrives to PACU awake and alert. Wound vac to mid abdomen.    1100: Pt c/o pain- medicated per MAR.    1130: Pt asleep and calm at this time, called pts spouse Garrett x2- no answer.    1138: Report given to Zulema GIBBS.    1145: Pt states pain is tolerable at this time.     1148: Pt going to her room with this RN on O2.

## 2020-08-25 NOTE — OR SURGEON
Immediate Post OP Note    PreOp Diagnosis: fistula and soft tissue infection    PostOp Diagnosis:  fistula and soft tissue infection    Procedure(s):  IRRIGATION AND DEBRIDEMENT, WOUND-ABDOMINAL AND WOUND VAC PLACEMENT    Surgeon(s):  Jude Swift M.D.    Anesthesiologist/Type of Anesthesia:  Anesthesiologist: Mary Grace Cohn M.D./* No anesthesia type entered *    Surgical Staff:  Circulator: Maryann Serrano R.N.  Scrub Person: Hayden Martinez    Specimens removed if any:  * No specimens in log *    Estimated Blood Loss: 50 cc    Findings: fistula and infected tissue    Complications: none        8/25/2020 10:44 AM Jude Swift M.D.

## 2020-08-25 NOTE — ANESTHESIA QCDR
2019 [facility] Clinical Data Registry (for Quality Improvement)     Postoperative nausea/vomiting risk protocol (Adult = 18 yrs and Pediatric 3-17 yrs)- (430 and 463)  General inhalation anesthetic (NOT TIVA) with PONV risk factors: No  Provision of anti-emetic therapy with at least 2 different classes of agents: N/A  Patient DID NOT receive anti-emetic therapy and reason is documented in Medical Record: N/A    Multimodal Pain Management- (477)  Non-emergent surgery AND patient age >= 18: Yes  Use of Multimodal Pain Management, two or more drugs and/or interventions, NOT including systemic opioids: Yes  Exception: Documented allergy to multiple classes of analgesics: N/A    Smoking Abstinence (404)  Patient is current smoker (cigarette, pipe, e-cig, marijuanna): No  Elective Surgery:   Abstinence instructions provided prior to day of surgery:   Patient abstained from smoking on day of surgery:     Pre-Op Beta-Blocker in Isolated CABG (44)  Isolated CABG AND patient age >= 18: No  Beta-blocker admin within 24 hours of surgical incision:   Exception:of medical reason(s) for not administering beta blocker within 24 hours prior to surgical incision (e.g., not  indicated,other medical reason):     PACU assessment of acute postoperative pain prior to Anesthesia Care End- Applies to Patients Age = 18- (ABG7)  Initial PACU pain score is which of the following: < 7/10  Patient unable to report pain score: N/A    Post-anesthetic transfer of care checklist/protocol to PACU/ICU- (426 and 427)  Upon conclusion of case, patient transferred to which of the following locations: PACU/Non-ICU  Use of transfer checklist/protocol: Yes  Exclusion: Service Performed in Patient Hospital Room (and thus did not require transfer): N/A  Unplanned admission to ICU related to anesthesia service up through end of PACU care- (MD51)  Unplanned admission to ICU (not initially anticipated at anesthesia start time): No

## 2020-08-25 NOTE — OP REPORT
DATE OF SERVICE:  08/25/2020    SURGEON:  Jude Swift MD    PREOPERATIVE DIAGNOSIS:  Low output fistula with associated soft tissue   infection of the abdominal wall.    POSTOPERATIVE DIAGNOSIS:  Low output fistula with associated soft tissue   infection of the abdominal wall.    PROCEDURE PERFORMED:  Debridement of abdominal wall with placement of a wound   VAC.    ANESTHESIA:  General endotracheal anesthesia.    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  She had associated soft tissue infection.    Dr. Chavarria, who is her primary surgeon, had planned on debriding this and   place in a wound VAC.  Unfortunately, he was unable to perform the surgery   which scheduled due to extenuating circumstances and asked me to step in.  I   did and discussed the plan with the patient and her  in detail.  They   agreed to proceed.    DESCRIPTION OF PROCEDURE:  She was placed under anesthesia by Dr. Cohn.    Abdomen was prepped and draped with Betadine prep, draped sterilely.  The   scarred somewhat cellulitic tissue was excised in its entirety.  Some residual   purulence was noted in this tissue.  This was cleaned.  There was an easily   identifiable fistula tract.  I created the space for a wound VAC.  A white   sponge was placed followed by a black sponge and the wound VAC was placed over   this.  Good function of the wound VAC was noted.  Patient tolerated the   procedure well without apparent complication.  Final counts were reported as   correct.  Her wound class was class IV, dirty.       ____________________________________     MD ISAURO ROSS / NTS    DD:  08/25/2020 10:44:38  DT:  08/25/2020 11:02:52    D#:  [id number]  Job#:  [id number]

## 2020-08-25 NOTE — ANESTHESIA TIME REPORT
Anesthesia Start and Stop Event Times     Date Time Event    8/25/2020 0943 Ready for Procedure     1001 Anesthesia Start     1054 Anesthesia Stop        Responsible Staff  08/25/20    Name Role Begin End    Mary Grace Cohn M.D. Anesth 1001 1054        Preop Diagnosis (Free Text):  Pre-op Diagnosis             Preop Diagnosis (Codes):    Post op Diagnosis  Abdominal wall abscess      Premium Reason  Non-Premium    Comments:

## 2020-08-25 NOTE — DIETARY
"Nutrition services: Day 2 of admit.  Dorie Wadsworth is a 84 y.o. female with admitting DX of abdominal wall abscess.    Consult received for wt loss (24-33 lbs in 6 months), MST score of 3. Attempted to speak with pt at bedside. Pt was off the floor for a procedure. MD note reports recent decrease in appetite for 2 days PTA related to pain.    Assessment:  Height: 172.7 cm (5' 8\")  Weight: 66 kg (145 lb 8.1 oz)  Body mass index is 22.12 kg/m²., BMI classification: normal  Diet/Intake: Clear liquid; no meals recorded yet to assess PO intake    Evaluation:   1. Hx of hernia repair with abscess drainage and partial bowel resection.   2. I&D of abdominal wound with wound vac placement today.  3. Wt hx per chart review: 146 lbs 5/17/20 and 156 lbs 2/28/20.  4. Wt loss of 7% in six months is not significant, but worth noting.    Malnutrition Risk: Does not meet criteria per ASPEN guidelines at this time.    Recommendations/Plan:  1. Advance diet as tolerated per MD.  2. Add oral nutrition supplements to optimize intake and healing.  3. Encourage intake of meals.  4. Monitor weight.    RD will continue to follow.        "

## 2020-08-25 NOTE — PROGRESS NOTES
Hospital Medicine Daily Progress Note    Date of Service  8/25/2020    Chief Complaint  84 y.o. 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  She has a history of hernia repair in December 2019 with subsequent wound infection requiring irrigation and debridement in February 2020 and recurrent hospitalizations for wound infection, last hospitalization was in July treated with IV antibiotics and subsequently ciprofloxacin the patient has been following up with  for wound recheck, however last 2 weeks before this admission she experienced increased abdominal wall pain with moderate to severe worse with movement, not radiated and not associated with this chill or fever however she noticed increased purulent drainage from the abdominal wound, on admission CT scan for abdomen showed abdominal wall abscess, IV antibiotics have been initiated, the patient was evaluated by surgeon.  On 8/25/20 she was taken to the OR with Dr. Swift and underwent wound debridement with wound vac placement      Interval Problem Update  Back from OR   at bedside  Wound vac site 5/10  Denies sob, no cp  axox3  Ros otherwise negative    Consultants/Specialty  ID  General surgeon    Code Status  Full Code    Disposition  tbd    Review of Systems  Review of Systems   Constitutional: Positive for malaise/fatigue and weight loss. Negative for fever.   Eyes: Negative.    Respiratory: Negative.    Cardiovascular: Negative.    Gastrointestinal: Positive for abdominal pain. Negative for constipation, diarrhea, heartburn, nausea and vomiting.   Genitourinary: Negative.    Musculoskeletal: Negative.    Neurological: Negative.    Psychiatric/Behavioral: Negative.         Physical Exam  Temp:  [36.3 °C (97.3 °F)-36.9 °C (98.4 °F)] 36.3 °C (97.4 °F)  Pulse:  [] 91  Resp:  [9-23] 13  BP:  ()/(48-90) 112/71  SpO2:  [96 %-100 %] 100 %    Physical Exam  HENT:      Head: Normocephalic and atraumatic.   Eyes:      General: No scleral icterus.  Cardiovascular:      Rate and Rhythm: Normal rate.      Heart sounds: No murmur. No gallop.    Pulmonary:      Effort: Pulmonary effort is normal. No respiratory distress.      Breath sounds: No stridor. No wheezing or rales.   Abdominal:      General: Bowel sounds are normal. There is no distension.      Palpations: Abdomen is soft.      Tenderness: There is abdominal tenderness. There is no guarding.      Comments: Urostomy in place  Wound vac in place   Musculoskeletal:         General: No swelling or deformity.      Right lower leg: No edema.   Skin:     Coloration: Skin is not jaundiced.      Findings: No bruising.   Neurological:      General: No focal deficit present.      Mental Status: She is oriented to person, place, and time. Mental status is at baseline.      Cranial Nerves: No cranial nerve deficit.      Motor: No weakness.      Gait: Gait normal.         Fluids    Intake/Output Summary (Last 24 hours) at 8/25/2020 1325  Last data filed at 8/25/2020 1053  Gross per 24 hour   Intake 2700 ml   Output 1675 ml   Net 1025 ml       Laboratory  Recent Labs     08/23/20  1450 08/24/20  0043   WBC 13.0* 11.7*   RBC 4.47 4.09*   HEMOGLOBIN 13.4 12.1   HEMATOCRIT 41.0 37.1   MCV 91.7 90.7   MCH 30.0 29.6   MCHC 32.7* 32.6*   RDW 50.2* 49.4   PLATELETCT 160* 140*   MPV 11.9 11.6     Recent Labs     08/23/20  1450 08/24/20  0043   SODIUM 134* 132*   POTASSIUM 4.0 3.7   CHLORIDE 101 98   CO2 20 21   GLUCOSE 89 108*   BUN 16 14   CREATININE 1.28 1.15   CALCIUM 9.4 8.5     Recent Labs     08/24/20  0043   INR 1.20*               Imaging  CT-ABDOMEN-PELVIS WITH   Final Result      Fluid collection in the right lower quadrant abdominal wall above the level of the stoma measures 3.1 x 0.9 cm. There are bowel loops adjacent to the collection and a fistula is not  excluded.      Fluid collection in the anterior abdominal wall to the left of midline now measures 2.4 x 1.8 cm.      Status post cystectomy with a urinary conduit and a right lower quadrant stoma.      Bilateral hydronephrosis which is moderate to severe on the right and moderate on the left is slightly increased on the right and moderately increased on the left compared to prior.      Renal cortical scarring is seen bilaterally.      Atherosclerotic plaque.      Nodular appearance of the liver suspicious for cirrhosis.      Groundglass opacities in the right middle lobe may be infectious or inflammatory. Follow-up is recommended.           Assessment/Plan  * Abdominal wall abscess at site of surgical wound  Assessment & Plan  Complicated after hernia repair  POD #0, s/p debridement with wound vac placement  Continue with daptomycin and Zosyn at this time  Cultures so far negative  ID following      Sepsis ([facility])  Assessment & Plan  This is Sepsis Present on admission  SIRS criteria identified on my evaluation include: Tachycardia, with heart rate greater than 90 BPM and Leukocytosis, with WBC greater than 12,000  Source is abdominal wall abscess  Sepsis protocol initiated  Fluid resuscitation ordered per protocol  IV antibiotics as appropriate for source of sepsis  While organ dysfunction may be noted elsewhere in this problem list or in the chart, degree of organ dysfunction does not meet CMS criteria for severe sepsis  resolving  Hemodynamically stable  Blood culture so far negative  Continue daptomycin and Zosyn        Chronic kidney disease (CKD) stage G3a/A2, moderately decreased glomerular filtration rate (GFR) between 45-59 mL/min/1.73 square meter and albuminuria creatinine ratio between  mg/g ([facility])  Assessment & Plan  Creatinine near baseline  Avoid nephrotoxic medication    Hyponatremia  Assessment & Plan  Some fluctuation  Following  mild    Abnormal CT scan, liver  Assessment & Plan  Concerning  for possible cirrhosis  Hepatitis panel is negative  Her liver function is stable  The patient will need to follow-up as outpatient    Presence of urostomy (HCC)- (present on admission)  Assessment & Plan    With hydronephrosis on CT  Will need follow-up imaging when more clinically stable and  urology evaluation she is established with Dr. Garcia    Essential hypertension- (present on admission)  Assessment & Plan  Continue metoprolol  Continue holding Lasix at this time    Hypothyroidism- (present on admission)  Assessment & Plan  Continue levothyroxine       VTE prophylaxis: Lovenox

## 2020-08-26 LAB
ANION GAP SERPL CALC-SCNC: 13 MMOL/L (ref 7–16)
BACTERIA WND AEROBE CULT: ABNORMAL
BASOPHILS # BLD AUTO: 0.8 % (ref 0–1.8)
BASOPHILS # BLD: 0.07 K/UL (ref 0–0.12)
BUN SERPL-MCNC: 17 MG/DL (ref 8–22)
CALCIUM SERPL-MCNC: 8.3 MG/DL (ref 8.5–10.5)
CHLORIDE SERPL-SCNC: 106 MMOL/L (ref 96–112)
CO2 SERPL-SCNC: 21 MMOL/L (ref 20–33)
CREAT SERPL-MCNC: 1.42 MG/DL (ref 0.5–1.4)
EOSINOPHIL # BLD AUTO: 0.35 K/UL (ref 0–0.51)
EOSINOPHIL NFR BLD: 4.2 % (ref 0–6.9)
ERYTHROCYTE [DISTWIDTH] IN BLOOD BY AUTOMATED COUNT: 50.5 FL (ref 35.9–50)
GLUCOSE SERPL-MCNC: 95 MG/DL (ref 65–99)
GRAM STN SPEC: ABNORMAL
GRAM STN SPEC: NORMAL
HCT VFR BLD AUTO: 37.1 % (ref 37–47)
HGB BLD-MCNC: 12 G/DL (ref 12–16)
IMM GRANULOCYTES # BLD AUTO: 0.04 K/UL (ref 0–0.11)
IMM GRANULOCYTES NFR BLD AUTO: 0.5 % (ref 0–0.9)
LYMPHOCYTES # BLD AUTO: 1.76 K/UL (ref 1–4.8)
LYMPHOCYTES NFR BLD: 21 % (ref 22–41)
MCH RBC QN AUTO: 29.6 PG (ref 27–33)
MCHC RBC AUTO-ENTMCNC: 32.3 G/DL (ref 33.6–35)
MCV RBC AUTO: 91.4 FL (ref 81.4–97.8)
MONOCYTES # BLD AUTO: 1.12 K/UL (ref 0–0.85)
MONOCYTES NFR BLD AUTO: 13.4 % (ref 0–13.4)
NEUTROPHILS # BLD AUTO: 5.04 K/UL (ref 2–7.15)
NEUTROPHILS NFR BLD: 60.1 % (ref 44–72)
NRBC # BLD AUTO: 0 K/UL
NRBC BLD-RTO: 0 /100 WBC
PLATELET # BLD AUTO: 142 K/UL (ref 164–446)
PMV BLD AUTO: 11.5 FL (ref 9–12.9)
POTASSIUM SERPL-SCNC: 3.9 MMOL/L (ref 3.6–5.5)
RBC # BLD AUTO: 4.06 M/UL (ref 4.2–5.4)
SIGNIFICANT IND 70042: ABNORMAL
SIGNIFICANT IND 70042: NORMAL
SITE SITE: ABNORMAL
SITE SITE: NORMAL
SODIUM SERPL-SCNC: 140 MMOL/L (ref 135–145)
SOURCE SOURCE: ABNORMAL
SOURCE SOURCE: NORMAL
WBC # BLD AUTO: 8.4 K/UL (ref 4.8–10.8)

## 2020-08-26 PROCEDURE — 770006 HCHG ROOM/CARE - MED/SURG/GYN SEMI*

## 2020-08-26 PROCEDURE — 700102 HCHG RX REV CODE 250 W/ 637 OVERRIDE(OP): Performed by: HOSPITALIST

## 2020-08-26 PROCEDURE — 700111 HCHG RX REV CODE 636 W/ 250 OVERRIDE (IP): Performed by: INTERNAL MEDICINE

## 2020-08-26 PROCEDURE — 700105 HCHG RX REV CODE 258: Performed by: HOSPITALIST

## 2020-08-26 PROCEDURE — 87086 URINE CULTURE/COLONY COUNT: CPT

## 2020-08-26 PROCEDURE — 700105 HCHG RX REV CODE 258: Performed by: INTERNAL MEDICINE

## 2020-08-26 PROCEDURE — 80048 BASIC METABOLIC PNL TOTAL CA: CPT

## 2020-08-26 PROCEDURE — A9270 NON-COVERED ITEM OR SERVICE: HCPCS | Performed by: HOSPITALIST

## 2020-08-26 PROCEDURE — 700111 HCHG RX REV CODE 636 W/ 250 OVERRIDE (IP): Performed by: HOSPITALIST

## 2020-08-26 PROCEDURE — 85025 COMPLETE CBC W/AUTO DIFF WBC: CPT

## 2020-08-26 PROCEDURE — 36415 COLL VENOUS BLD VENIPUNCTURE: CPT

## 2020-08-26 PROCEDURE — 87077 CULTURE AEROBIC IDENTIFY: CPT

## 2020-08-26 PROCEDURE — 99223 1ST HOSP IP/OBS HIGH 75: CPT | Performed by: INTERNAL MEDICINE

## 2020-08-26 PROCEDURE — 87205 SMEAR GRAM STAIN: CPT

## 2020-08-26 PROCEDURE — 87186 SC STD MICRODIL/AGAR DIL: CPT | Mod: 91

## 2020-08-26 PROCEDURE — 99232 SBSQ HOSP IP/OBS MODERATE 35: CPT | Performed by: HOSPITALIST

## 2020-08-26 RX ADMIN — METOPROLOL TARTRATE 50 MG: 50 TABLET, FILM COATED ORAL at 09:17

## 2020-08-26 RX ADMIN — SODIUM CHLORIDE, POTASSIUM CHLORIDE, SODIUM LACTATE AND CALCIUM CHLORIDE: 600; 310; 30; 20 INJECTION, SOLUTION INTRAVENOUS at 09:25

## 2020-08-26 RX ADMIN — DAPTOMYCIN 350 MG: 350 INJECTION, POWDER, LYOPHILIZED, FOR SOLUTION INTRAVENOUS at 12:07

## 2020-08-26 RX ADMIN — MAGNESIUM OXIDE TAB 400 MG (241.3 MG ELEMENTAL MG) 400 MG: 400 (241.3 MG) TAB at 05:00

## 2020-08-26 RX ADMIN — TIMOLOL MALEATE 1 DROP: 5 SOLUTION/ DROPS OPHTHALMIC at 21:20

## 2020-08-26 RX ADMIN — PIPERACILLIN AND TAZOBACTAM 3.38 G: 3; .375 INJECTION, POWDER, LYOPHILIZED, FOR SOLUTION INTRAVENOUS; PARENTERAL at 05:02

## 2020-08-26 RX ADMIN — ACETAMINOPHEN 650 MG: 325 TABLET, FILM COATED ORAL at 05:00

## 2020-08-26 RX ADMIN — PIPERACILLIN AND TAZOBACTAM 3.38 G: 3; .375 INJECTION, POWDER, LYOPHILIZED, FOR SOLUTION INTRAVENOUS; PARENTERAL at 12:07

## 2020-08-26 RX ADMIN — LATANOPROST 1 DROP: 50 SOLUTION OPHTHALMIC at 17:31

## 2020-08-26 RX ADMIN — PIPERACILLIN AND TAZOBACTAM 3.38 G: 3; .375 INJECTION, POWDER, LYOPHILIZED, FOR SOLUTION INTRAVENOUS; PARENTERAL at 21:20

## 2020-08-26 RX ADMIN — METOPROLOL TARTRATE 50 MG: 50 TABLET, FILM COATED ORAL at 21:20

## 2020-08-26 RX ADMIN — LEVOTHYROXINE SODIUM 112 MCG: 112 TABLET ORAL at 05:01

## 2020-08-26 RX ADMIN — LOVASTATIN 10 MG: 20 TABLET ORAL at 05:01

## 2020-08-26 RX ADMIN — OMEPRAZOLE 20 MG: 20 CAPSULE, DELAYED RELEASE ORAL at 05:00

## 2020-08-26 RX ADMIN — TIMOLOL MALEATE 1 DROP: 5 SOLUTION/ DROPS OPHTHALMIC at 09:16

## 2020-08-26 RX ADMIN — ACETAMINOPHEN 650 MG: 325 TABLET, FILM COATED ORAL at 21:20

## 2020-08-26 ASSESSMENT — ENCOUNTER SYMPTOMS
SPUTUM PRODUCTION: 0
CHILLS: 0
BLURRED VISION: 0
DIARRHEA: 1
DOUBLE VISION: 0
MYALGIAS: 0
HEADACHES: 0
FEVER: 0
CARDIOVASCULAR NEGATIVE: 1
COUGH: 0
CONSTIPATION: 0
PALPITATIONS: 0
HEARTBURN: 0
NERVOUS/ANXIOUS: 0
SHORTNESS OF BREATH: 0
ABDOMINAL PAIN: 1
VOMITING: 0
WEIGHT LOSS: 1
NAUSEA: 0
DIARRHEA: 0
FLANK PAIN: 0
MUSCULOSKELETAL NEGATIVE: 1
NEUROLOGICAL NEGATIVE: 1
RESPIRATORY NEGATIVE: 1
PSYCHIATRIC NEGATIVE: 1
EYES NEGATIVE: 1

## 2020-08-26 NOTE — PROGRESS NOTES
report received. Assessment completed.  Pt is A&O x4. Pt on room air.   Medicating for pain PRN per MAR Pain 7/10  Denies nausea.   - numbness, - tingling.  Skin urostomy in RLQ has slightly hazy urine with mucous or purulent drainage and abdominal wound vac midline.   LDA 20G in left hand and 20 in right a/c   Last BM 8/24/20 . +flatus,   +void.  Clear liquid diet. Tolerates well.   Pt up standby.  Call light and belongings within reach. All needs met at this time. Fall Precautions and hourly rounding in place.

## 2020-08-26 NOTE — WOUND TEAM
"Wound consult received for urostomy care. Patient is established and competent with care, states she changed it this morning and brought all of her own supplies. 2 piece barrier and pouch in place, no leaks. Patient is using 1 3/4\" pouch and barrier. Page sent to [facility] for on call surgeon for orders for wound vac care and changes. Wound vac is intact to mid abdomen, no leaks noted. Discussed POC with Mary Ann GIBBS.   "

## 2020-08-26 NOTE — WOUND TEAM
Discussed POC with Dr. Swift. Wound team to begin 3 x weekly dressing changes beginning tomorrow. Discussed POC with staff SAI Poole  .

## 2020-08-26 NOTE — DISCHARGE PLANNING
Care Transition Team Discharge Planning    Anticipated Discharge Information  Discharge Disposition: D/T to home under [facility] care in anticipation of covered skilled care (06)       Discharge Plan:  Home with Home Health on Wound Vac    Pt prefers to go Home with Home Health on Wound Vac. Pt prefers [facility] . Choice form faxed to [facility].    Pt 's medical records faxed to [facility] for Pt's acceptance for Wound Vac.  Placed [facility] Wound Vac Order Form on top of Pt's Chart for Dr Swift to sign.  Plan is to place midline for Pt's access for IV antibiotics per Chart.    This RN CM will follow and assist Pt with discharge as needed.

## 2020-08-26 NOTE — CONSULTS
Consults   INFECTIOUS DISEASES INPATIENT CONSULT NOTE     Date of Service: 8/26/2020    Consult Requested By: Paola Lujan M.D.    Reason for Consultation: Intra abdominal abscess     History of Present Illness:   Dorie Wadsworth is a 84 y.o.  admitted 8/23/2020. Pt has a past medical history of cystectomy with ileal conduit, A. fib and hypothyroidism.  She also is a prior history of incarcerated parastomal hernia causing bowel obstruction which was repaired on 12/24/2019.  The surgery was complicated by intra-abdominal abscess which was drained.  Drainage cultures at the time grew Bacteroides fragilis and the patient was treated with antibiotics.  She then presented in 2/2020 complaining of left lower abdomen pain.  CT at the time showed an enhancing fluid collection in the anterior abdominal wall compatible with abscess.  She had to the OR on 2/27/2020 where 50 cc of nichole purulence was drained and culture sent.  OR cultures grew E. coli, strep viridans and B fragilis.  Concern at the time was whether that was an enteric or biliary fistula and plan was for follow-up with surgery with repeat CT abdomen pelvis.  The patient was treated with Zosyn and then transition to Augmentin with plan to stop on 3/12/2020.  She is admitted in July and diagnosed with UTI and pyelonephritis.  Urine cultures grew Klebsiella pneumoniae and she was treated with IV antibiotics and then discharged home on ciprofloxacin.     She is been following with surgery on a weekly basis for wound check.  She states the prior incision never completely healed but had improved.  Then she began to have increasing abdominal pain, erythema and drainage. Due to this she presented to the ER.    Hospital Course:   She has been afebrile.  Leukocytosis on arrival to 13 but now improved.  CT abdomen pelvis on arrival with finding of fluid collection in right lower quadrant abdominal wall measuring 3.1 x 0.9 cm, bowel loops adjacent to the collection  and fistula is not excluded.  Second fluid collection in abdominal wall also noted measuring 2.4 x 1.8 cm.  She was taken to the OR for debridement on 8/25.  She started on Zosyn and vancomycin then transition to daptomycin.     Review Of Systems:  Review of Systems   Constitutional: Negative for chills, fever and malaise/fatigue.   HENT: Negative for hearing loss.    Eyes: Negative for blurred vision and double vision.   Respiratory: Negative for cough, sputum production and shortness of breath.    Cardiovascular: Negative for chest pain, palpitations and leg swelling.   Gastrointestinal: Positive for abdominal pain and diarrhea. Negative for constipation, nausea and vomiting.   Genitourinary: Negative for dysuria and flank pain.   Musculoskeletal: Negative for joint pain and myalgias.   Skin: Negative for rash.   Neurological: Negative for headaches.   Psychiatric/Behavioral: The patient is not nervous/anxious.          PMH:   Past Medical History:   Diagnosis Date   • A-fib (HCC)    • Ailment     urostomy   • Arthritis     fingers   • Atrial fibrillation (HCC) 2/2009    A FIB X2,[ resolved on own][   • Atrial fibrillation with rapid ventricular response (HCC) 3/18/2019   • Atrial fibrillation, rapid ([facility]) 1/31/2014   • Back pain    • Bladder disease 2011    bladder removed   • Bladder problem     Chronic bladder infection for the past 17months   • CAD (coronary artery disease)    • Cancer (HCC)     skin   • CATARACT     freddie surgery complete   • CHEST PAIN 3/11/2011   • Glaucoma    • History of hematuria     3/15/10: with Plavix.   • HTN (hypertension), malignant 3/18/2019   • HTN (hypertension), malignant 3/18/2019   • Hyperlipidemia 2/24/2011   • Hypertension    • Hypothyroid 2/24/2011   • Indigestion    • Intrinsic eczema 10/31/2017   • Other specified pre-operative examination 1/28/2014   • Paroxysmal atrial fibrillation (HCC)    • Sepsis urinary source 1/23/2012   • Stroke (HCC)     2 TIAs 2/2010, no stroke,  2/2013   • TRuelIRuelARuel    • TIA (transient ischemic attack) 3/11/2011   • Unspecified disorder of thyroid    • Unspecified hemorrhagic conditions     not sure when (2009)  blood in urin    • Urinary bladder disorder     urinary incontinence   • UTI (urinary tract infection) 2/24/2011   • Vaginal bleeding 10/22/2012       PSH:  Past Surgical History:   Procedure Laterality Date   • IRRIGATION & DEBRIDEMENT GENERAL  8/25/2020    Procedure: IRRIGATION AND DEBRIDEMENT, WOUND-ABDOMINAL AND WOUND VAC PLACEMENT;  Surgeon: Jude Swift M.D.;  Location: SURGERY [facility];  Service: General   • IRRIGATION & DEBRIDEMENT GENERAL N/A 2/27/2020    Procedure: IRRIGATION AND DEBRIDEMENT, WOUND - ABDOMINAL, PSB. WOUND VAC;  Surgeon: Len Chavarria M.D.;  Location: SURGERY [facility];  Service: General   • VENTRAL HERNIA REPAIR ROBOTIC XI  12/24/2019    Procedure: REPAIR, HERNIA, VENTRAL, ROBOT-ASSISTED, USING DA RUTHIE XI - PARASTOMAL HERNIA REPAIR WITH LYSIS OF ADHESIONS;  Surgeon: Len Chavarria M.D.;  Location: SURGERY [facility];  Service: General   • PARASTOMAL HERNIA REPAIR   1/28/2014    Performed by Nicol Dorman M.D. at [facility]   • BIOPSY GENERAL  10/22/2012    Performed by Jennifer Lincoln M.D. at [facility]   • CYSTECTOMY  9/6/2011    Performed by JENNIFER LINCOLN at [facility]   • ILEO LOOP DIVERSION  9/6/2011    Performed by JENNIFER LINCOLN at [facility]   • RECTOCELE REPAIR  11/3/2009    Performed by ZEKE HARRINGTON at SURGERY SAME DAY [facility]   • BLADDER BIOPSY WITH CYSTOSCOPY  10/12/2009    Performed by JENNIFER LINCOLN at [facility]   • PELVIC EXAM UNDER ANESTHESIA  10/12/2009    Performed by JENNIFER LINCOLN at [facility]   • BLADDER BIOPSY WITH CYSTOSCOPY  6/15/2009    Performed by CARLTON LUNA at [facility]   • BLADDER BIOPSY WITH CYSTOSCOPY  10/10/08    Performed by  CARLTON LUNA at SURGERY [facility]S   • PYELOGRAM  10/10/08    Performed by CARLTON LUNA at SURGERY [facility] OR[facility]   • RETROGRADES  10/10/08    Performed by CARLTON LUNA at SURGERY [facility] ORS   • CHOLECYSTECTOMY  1994   • CHOLECYSTECTOMY  1994   • GYN SURGERY      hysterectomy   • OTHER      TONSILLECTOMY AS TEENAGER   • OTHER ABDOMINAL SURGERY      appendectomy, pancratitis        FAMILY HX:  Family History   Problem Relation Age of Onset   • Stroke Mother 81   • Stroke Father 77       SOCIAL HX:  Social History     Socioeconomic History   • Marital status:      Spouse name: Not on file   • Number of children: Not on file   • Years of education: Not on file   • Highest education level: Not on file   Occupational History   • Not on file   Social Needs   • Financial resource strain: Not hard at all   • Food insecurity     Worry: Never true     Inability: Never true   • Transportation needs     Medical: No     Non-medical: No   Tobacco Use   • Smoking status: Never Smoker   • Smokeless tobacco: Never Used   Substance and Sexual Activity   • Alcohol use: No   • Drug use: No   • Sexual activity: Not on file   Lifestyle   • Physical activity     Days per week: 7 days     Minutes per session: 60 min   • Stress: Not at all   Relationships   • Social connections     Talks on phone: More than three times a week     Gets together: More than three times a week     Attends Zoroastrianism service: More than 4 times per year     Active member of club or organization: Yes     Attends meetings of clubs or organizations: More than 4 times per year     Relationship status:    • Intimate partner violence     Fear of current or ex partner: No     Emotionally abused: No     Physically abused: No     Forced sexual activity: No   Other Topics Concern   • Not on file   Social History Narrative   • Not on file     Social History     Tobacco Use   Smoking Status Never Smoker   Smokeless Tobacco Never Used     Social  "History     Substance and Sexual Activity   Alcohol Use No       Allergies/Intolerances:  Allergies   Allergen Reactions   • Cardizem Swelling   • Doxycycline      Swollen lips.   • Sulfa Drugs      Makes tongue swell, severely   • Zyvox Swelling     \"Whole mouth swelled up\"    • Codeine      Does not remember the reaction         History reviewed with the patient       Other Current Medications:    Current Facility-Administered Medications:   •  DAPTOmycin 350 mg in NS 50 mL IVPB, 350 mg, Intravenous, Q24HRS, Alia Hickman M.D., Last Rate: 100 mL/hr at 08/26/20 1207, 350 mg at 08/26/20 1207  •  latanoprost (XALATAN) 0.005 % ophthalmic solution 1 Drop, 1 Drop, Both Eyes, Q EVENING, Rafa Gross M.D., 1 Drop at 08/25/20 1700  •  levothyroxine (SYNTHROID) tablet 112 mcg, 112 mcg, Oral, AM ES, Rafa Gross M.D., 112 mcg at 08/26/20 0501  •  lovastatin (MEVACOR) tablet 10 mg, 10 mg, Oral, DAILY, Rafa Gross M.D., 10 mg at 08/26/20 0501  •  timolol (TIMOPTIC) 0.5 % ophthalmic solution 1 Drop, 1 Drop, Both Eyes, BID, Rafa Gross M.D., 1 Drop at 08/26/20 0916  •  senna-docusate (PERICOLACE or SENOKOT S) 8.6-50 MG per tablet 2 Tab, 2 Tab, Oral, BID, Stopped at 08/23/20 2045 **AND** polyethylene glycol/lytes (MIRALAX) PACKET 1 Packet, 1 Packet, Oral, QDAY PRN **AND** magnesium hydroxide (MILK OF MAGNESIA) suspension 30 mL, 30 mL, Oral, QDAY PRN **AND** bisacodyl (DULCOLAX) suppository 10 mg, 10 mg, Rectal, QDAY PRN, Rafa Gross M.D.  •  lactated ringers infusion, , Intravenous, Continuous, Rafa Gross M.D., Last Rate: 100 mL/hr at 08/26/20 0925  •  lactated ringers infusion (BOLUS): BMI less than or equal to 30, 30 mL/kg, Intravenous, Once PRN, Rafa Gross M.D.  •  acetaminophen (TYLENOL) tablet 650 mg, 650 mg, Oral, Q6HRS PRN, Rafa Gross M.D., 650 mg at 08/26/20 0500  •  Notify provider if pain remains uncontrolled, , , CONTINUOUS **AND** Use the " "numeric rating scale (NRS-11) on regular floors and Critical-Care Pain Observation Tool (CPOT) on ICUs/Trauma to assess pain, , , CONTINUOUS **AND** Pulse Ox (Oximetry), , , CONTINUOUS **AND** Pharmacy Consult Request ...Pain Management Review 1 Each, 1 Each, Other, PHARMACY TO DOSE **AND** If patient difficult to arouse and/or has respiratory depression, stop any opiates that are currently infusing and call a Rapid Response., , , CONTINUOUS **AND** oxyCODONE immediate-release (ROXICODONE) tablet 2.5 mg, 2.5 mg, Oral, Q3HRS PRN **AND** oxyCODONE immediate-release (ROXICODONE) tablet 5 mg, 5 mg, Oral, Q3HRS PRN, 5 mg at 20 **AND** HYDROmorphone pf (DILAUDID) injection 0.25 mg, 0.25 mg, Intravenous, Q3HRS PRN, Rafa Gross M.D.  •  [COMPLETED] piperacillin-tazobactam (ZOSYN) 3.375 g in  mL IVPB, 3.375 g, Intravenous, Once, Stopped at 20 2223 **AND** piperacillin-tazobactam (ZOSYN) 3.375 g in  mL IVPB, 3.375 g, Intravenous, Q8HRS, Rafa Gross M.D., Last Rate: 25 mL/hr at 20 1207, 3.375 g at 20 1207  •  ondansetron (ZOFRAN) syringe/vial injection 4 mg, 4 mg, Intravenous, Q4HRS PRN, Rafa Gross M.D.  •  ondansetron (ZOFRAN ODT) dispertab 4 mg, 4 mg, Oral, Q4HRS PRN, Rafa Gross M.D.  •  metoprolol (LOPRESSOR) tablet 50 mg, 50 mg, Oral, BID, Rafa Gross M.D., 50 mg at 20 0917  •  magnesium oxide (MAG-OX) tablet 400 mg, 400 mg, Oral, DAILY, Rafa Gross M.D., 400 mg at 20 0500  •  omeprazole (PRILOSEC) capsule 20 mg, 20 mg, Oral, DAILY, Rafa Gross M.D., 20 mg at 20 0500  [unfilled]    Most Recent Vital Signs:  /80   Pulse 94   Temp 36.7 °C (98 °F) (Temporal)   Resp 18   Ht 1.727 m (5' 8\")   Wt 66 kg (145 lb 8.1 oz)   SpO2 97%   BMI 22.12 kg/m²   Temp  Av.9 °C (98.4 °F)  Min: 35.8 °C (96.5 °F)  Max: 39 °C (102.2 °F)    Physical Exam:  Physical Exam   Constitutional: She is " oriented to person, place, and time and well-developed, well-nourished, and in no distress. No distress.   HENT:   Head: Normocephalic and atraumatic.   Right Ear: External ear normal.   Left Ear: External ear normal.   Nose: Nose normal.   Eyes: Pupils are equal, round, and reactive to light. Conjunctivae and EOM are normal.   Cardiovascular: Normal rate, regular rhythm and normal heart sounds.   Pulmonary/Chest: Effort normal and breath sounds normal.   Abdominal: Soft. There is abdominal tenderness.   Tenderness to palpation in left mid abdomen and near stoma.  Wound VAC in place.  No obvious infection of surrounding tissue.   Musculoskeletal: Normal range of motion.   Neurological: She is alert and oriented to person, place, and time.   Skin: Skin is warm and dry. She is not diaphoretic.   Psychiatric: Affect and judgment normal.           Pertinent Lab Results:  Recent Labs     08/23/20  1450 08/24/20  0043 08/26/20  0528   WBC 13.0* 11.7* 8.4      Recent Labs     08/23/20  1450 08/24/20  0043 08/26/20  0528   HEMOGLOBIN 13.4 12.1 12.0   HEMATOCRIT 41.0 37.1 37.1   MCV 91.7 90.7 91.4   MCH 30.0 29.6 29.6   PLATELETCT 160* 140* 142*         Recent Labs     08/23/20  1450 08/24/20  0043 08/26/20  0528   SODIUM 134* 132* 140   POTASSIUM 4.0 3.7 3.9   CHLORIDE 101 98 106   CO2 20 21 21   CREATININE 1.28 1.15 1.42*        Recent Labs     08/23/20  1450   ALBUMIN 3.4        Pertinent Micro:  Results     Procedure Component Value Units Date/Time    CULTURE WOUND W/ GRAM STAIN [[id number]]  (Abnormal)  (Susceptibility) Collected: 08/23/20 2131    Order Status: Completed Specimen: Wound from Abdominal Updated: 08/26/20 1044     Significant Indicator POS     Source WND     Site ABDOMINAL     Culture Result -     Gram Stain Result Rare WBCs.  No organisms seen.       Culture Result Escherichia coli  Light growth        Group D Enterococcus species  Rare growth  Combination therapy with ampicillin, penicillin,  or  vancomycin (for susceptible strains) plus an aminoglycoside  is usually indicated for serious enterococcal infections,  such as endocarditis unless high-level resistance to both  gentamicin and streptomycin is documented; such combinations  are predicted to result in synergistic killing of the  Enterococcus.      Susceptibility     Escherichia coli (1)     Antibiotic Interpretation Microscan Method Status    Ampicillin Resistant >16 mcg/mL MICKI Preliminary    Ceftriaxone Sensitive <=1 mcg/mL MICKI Preliminary    Ceftazidime Sensitive <=1 mcg/mL MICKI Preliminary    Cefotaxime Sensitive <=2 mcg/mL MICKI Preliminary    Cefazolin Sensitive <=2 mcg/mL MICKI Preliminary    Ciprofloxacin Resistant >2 mcg/mL MICKI Preliminary    Ampicillin/sulbactam Intermediate 16/8 mcg/mL MICKI Preliminary    Cefepime Sensitive <=2 mcg/mL MICKI Preliminary    Tobramycin Sensitive <=4 mcg/mL MICKI Preliminary    Cefotetan Sensitive <=16 mcg/mL MICKI Preliminary    Ertapenem Sensitive <=0.5 mcg/mL MICKI Preliminary    Gentamicin Sensitive <=4 mcg/mL MICKI Preliminary    Pip/Tazobactam Sensitive <=16 mcg/mL MICKI Preliminary    Trimeth/Sulfa Sensitive <=2/38 mcg/mL MICKI Preliminary                   URINE CULTURE(NEW) [[id number]] Collected: 08/26/20 0615    Order Status: Completed Specimen: Urine, Suprapubic Updated: 08/26/20 0640    Narrative:      Collected By:[id number] MINA ROBLEDO  Indication for culture:->Patient WITHOUT an indwelling Rodriguez  catheter in place with new onset of Dysuria, Frequency,  Urgency, and/or Suprapubic pain    FLUID CULTURE [[id number]] Collected: 08/26/20 0615    Order Status: Canceled Specimen: Other Body Fluid     GRAM STAIN [[id number]] Collected: 08/23/20 2131    Order Status: Completed Specimen: Wound Updated: 08/24/20 1449     Significant Indicator .     Source WND     Site ABDOMINAL     Gram Stain Result Rare WBCs.  No organisms seen.      BLOOD CULTURE [[id number]] Collected: 08/23/20 1450    Order Status: Completed  "Specimen: Blood from Peripheral Updated: 08/24/20 0835     Significant Indicator NEG     Source BLD     Site PERIPHERAL     Culture Result No Growth  Note: Blood cultures are incubated for 5 days and  are monitored continuously.Positive blood cultures  are called to the RN and reported as soon as  they are identified.      Narrative:      1 of 2 for Blood Culture x 2 sites order. Per Hospital  Policy: Only change Specimen Src: to \"Line\" if specified by  physician order.  Right AC    BLOOD CULTURE [[id number]] Collected: 08/23/20 1828    Order Status: Completed Specimen: Blood from Peripheral Updated: 08/24/20 0833     Significant Indicator NEG     Source BLD     Site PERIPHERAL     Culture Result No Growth  Note: Blood cultures are incubated for 5 days and  are monitored continuously.Positive blood cultures  are called to the RN and reported as soon as  they are identified.      Narrative:      2 of 2 blood culture x2  Sites order. Per Hospital Policy:  Only change Specimen Src: to \"Line\" if specified by physician  order.  Right Wrist    SARS-CoV-2, PCR (In-House) [[id number]] Collected: 08/23/20 2146    Order Status: Completed Updated: 08/23/20 2252     SARS-CoV-2 Source NP Swab     SARS-CoV-2 by PCR NotDetected     Comment: [facility] providers: PLEASE REFER TO DE-ESCALATION AND RETESTING PROTOCOL  on [email or web address]  **The [employer] GeneXpert Xpress SARS-CoV-2 Test has been made available for  use under the Emergency Use Authorization (EUA) only.         COVID/SARS CoV-2 PCR [[id number]] Collected: 08/23/20 2146    Order Status: Completed Specimen: Respirate from Nasopharyngeal Updated: 08/23/20 2149     COVID Order Status Received     Comment: The order for SARS CoV-2 testing has been received by the  Laboratory. This result is neither positive nor negative.  Final results of testing will report in 24-48 hours, separately.         Routine (COVID/SARS COV-2 In-House PCR up to 24 hours) [[id number]]     Order Status: " Completed Specimen: Respirate from Nasopharyngeal     Blood Culture,Hold [[id number]] Collected: 08/23/20 1450    Order Status: Completed Updated: 08/23/20 1527     Blood Culture Hold Collected    URINALYSIS,CULTURE IF INDICATED [[id number]]     Order Status: Canceled Specimen: Urine     Blood Culture [[id number]] Collected: 08/23/20 0000    Order Status: Canceled Specimen: Other from Peripheral     Blood Culture [[id number]] Collected: 08/23/20 0000    Order Status: Canceled Specimen: Other from Peripheral         Blood Culture   Date Value Ref Range Status   03/13/2019 No growth after 5 days of incubation.  Final     Blood Culture Hold   Date Value Ref Range Status   08/23/2020 Collected  Final        Studies:  Ct-abdomen-pelvis With    Result Date: 8/23/2020 8/23/2020 5:55 PM HISTORY/REASON FOR EXAM:  Abdominal infection suspected; IV contrast only. Postop complications. TECHNIQUE/EXAM DESCRIPTION:  CT scan of the abdomen and pelvis with contrast. Contrast-enhanced helical scanning was obtained from the diaphragmatic domes through the pubic symphysis following the bolus administration of nonionic contrast without complication. 100 mL of Omnipaque 350 nonionic contrast was administered without complication. Low dose optimization technique was utilized for this CT exam including automated exposure control and adjustment of the mA and/or kV according to patient size. COMPARISON: 5/18/2020 FINDINGS: Lung bases: There is bibasilar atelectasis. There are groundglass opacities in the right middle lobe which may be infectious or inflammatory. The heart is enlarged. Abdomen: No free air is seen in the abdomen or pelvis. The liver is mildly nodular suspicious for cirrhosis. Gallbladder is surgically absent. Common duct measures 8 mm which may represent ectasia the setting of prior cholecystectomy. Pancreas appears unremarkable. Spleen and adrenal glands appear unremarkable. There is bilateral hydronephrosis which is moderate  on the left and moderate to severe on the right. There is renal cortical scarring bilaterally. Atherosclerotic plaque is seen in the aorta and its branches. There are small inguinal, retroperitoneal and mesenteric lymph nodes. There are colonic diverticula. Terminal ileum appears unremarkable. The appendix is not identified. There is likely a small sliding hiatal hernia. Bladder is surgically absent. No free fluid is seen in the pelvis. There is a right lower quadrant stoma. There is a fluid and air collection within the right lower quadrant abdominal wall superior to the stoma measuring 3.1 x 0.9 cm. There is a collection in the anterior abdominal wall to the left of midline measuring 1.8 x 2.4 cm. Degenerative changes are seen in the spine.     Fluid collection in the right lower quadrant abdominal wall above the level of the stoma measures 3.1 x 0.9 cm. There are bowel loops adjacent to the collection and a fistula is not excluded. Fluid collection in the anterior abdominal wall to the left of midline now measures 2.4 x 1.8 cm. Status post cystectomy with a urinary conduit and a right lower quadrant stoma. Bilateral hydronephrosis which is moderate to severe on the right and moderate on the left is slightly increased on the right and moderately increased on the left compared to prior. Renal cortical scarring is seen bilaterally. Atherosclerotic plaque. Nodular appearance of the liver suspicious for cirrhosis. Groundglass opacities in the right middle lobe may be infectious or inflammatory. Follow-up is recommended.      ASSESSMENT/PLAN:     84 y.o.  admitted 8/23/2020. Pt has a past medical history of cystectomy with ileal conduit and A. Fib. Prior history of incarcerated parastomal hernia causing bowel obstruction which was repaired on 12/24/2019.  The surgery was complicated by intra-abdominal abscess which was drained.  Drainage cultures at the time grew Bacteroides fragilis. She then presented in 2/2020  complaining of left lower abdomen pain.  CT at the time showed an enhancing fluid collection in the anterior abdominal wall compatible with abscess.  She had to the OR on 2/27/2020 where 50 cc of nichole purulence was drained and culture sent.  OR cultures grew E. coli, strep viridans and B fragilis.  Concern at the time was whether that was an enteric or biliary fistula and plan was for follow-up with surgery with repeat CT abdomen pelvis.  The patient was treated with Zosyn and then transition to Augmentin with plan to stop on 3/12/2020.  Then admitted in 7/20 with UTI and pyelonephritis.  Urine cultures grew Klebsiella pneumoniae and she was treated with IV antibiotics and then discharged home on ciprofloxacin.     She has been following with surgery on a weekly basis for wound check.  She states the prior incision never completely healed but had improved.  Then she began to have increasing abdominal pain, erythema and drainage.    Hospital Course:   She has been afebrile.  Leukocytosis on arrival to 13 but now improved.  CT abdomen pelvis on arrival with finding of fluid collection in right lower quadrant abdominal wall measuring 3.1 x 0.9 cm, bowel loops adjacent to the collection and fistula is not excluded.  Second fluid collection in abdominal wall also noted measuring 2.4 x 1.8 cm.  She was taken to the OR for debridement on 8/25.  She was started on Zosyn and vancomycin then transitioned to daptomycin.     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 coli, group B enterococcus and Bacteroides fragilis  -OR on 8/25 for debridement of abdominal wall, identified fistula tract and wound VAC placed  Acute on chronic kidney disease, ongoing  -CT abdomen pelvis noted bilateral hydronephrosis described as moderate to severe on the right and moderate on the left, some increased from prior  History of cystectomy with ileal  conduit  -Surgical course complicated by intra-abdominal abscess x2.  Drainage in 2/2020 and cultures positive for E. coli, strep viridans and B fragilis.  Antibiotic allergies: Doxycycline reported as swollen lips, sulfa drugs with mouth swelling and Zyvox with mouth swelling    Plan     --- Continue Zosyn and daptomycin for now-final antibiotic selection and duration to be determined.  Will need final results from microbiology.  She has limited oral options due to resistance as well as allergies.  Anticipate that she will need IV antibiotics.   --- Okay to place midline  --- If she does indeed have an enterocutaneous fistula this will not likely be resolved by antibiotics alone and may need further surgical procedures  --- Continue to follow labs  --- Monitor CPK 2 times weekly while on daptomycin, 35 on 8/25  --- Monitor kidney function and avoid nephrotoxins  --- She has worsening hydronephrosis which will need follow-up and evaluation either as an inpatient or after discharge   --- Wound VAC management per primary/surgery       Plan of care discussed with JUSTUS Lujan M.D.. Will continue to follow    Lori Shafer M.D.

## 2020-08-26 NOTE — CARE PLAN
Problem: Infection  Goal: Will remain free from infection  Outcome: PROGRESSING SLOWER THAN EXPECTED  Note: Pt urine being cultured. Pt expressed concern over cloudiness of urine.     Problem: Bowel/Gastric:  Goal: Normal bowel function is maintained or improved  Outcome: PROGRESSING SLOWER THAN EXPECTED  Note: Pt experiencing diarrhea and incontinence.

## 2020-08-26 NOTE — DISCHARGE PLANNING
Care Transition Team Discharge Planning         Discharge Plan:  Home with [facility] follow up    This RN OJE is following the case. Per SAI Poole, Pt needs wound vac and referral to [facility]. Edgardo texted Dr Lujan to put in the order/referral and to sign the [facility] Wound Vac Order form.     13:35 Pt has [employer] for Insurance which is not contracted with [facility]. [facility]. Edgardo texted Dr Lujan.     Choice form for [facility] faxed to Yue HINTON.

## 2020-08-26 NOTE — PROGRESS NOTES
Hospital Medicine Daily Progress Note    Date of Service  8/26/2020    Chief Complaint  84 y.o. 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  She has a history of hernia repair in December 2019 with subsequent wound infection requiring irrigation and debridement in February 2020 and recurrent hospitalizations for wound infection, last hospitalization was in July treated with IV antibiotics and subsequently ciprofloxacin the patient has been following up with  for wound recheck, however last 2 weeks before this admission she experienced increased abdominal wall pain with moderate to severe worse with movement, not radiated and not associated with this chill or fever however she noticed increased purulent drainage from the abdominal wound, on admission CT scan for abdomen showed abdominal wall abscess, IV antibiotics have been initiated, the patient was evaluated by surgeon.  On 8/25/20 she was taken to the OR with Dr. Swift and underwent wound debridement with wound vac placement      Interval Problem Update  She reports she is feeling much better today, mild abdominal pain  3//10  One episode of diarrhea this am  No cp, no sob  axox4  Discussed with nursing and ID  Calcium is low, will check albumin  Will recheck vit D levels  Mild thrombocytopenia, following    Consultants/Specialty  ID  General surgeon    Code Status  Full Code    Disposition  tbd    Review of Systems  Review of Systems   Constitutional: Positive for malaise/fatigue and weight loss. Negative for fever.   Eyes: Negative.    Respiratory: Negative.    Cardiovascular: Negative.    Gastrointestinal: Positive for abdominal pain. Negative for constipation, diarrhea, heartburn, nausea and vomiting.   Genitourinary: Negative.    Musculoskeletal: Negative.    Neurological: Negative.     Psychiatric/Behavioral: Negative.         Physical Exam  Temp:  [36.5 °C (97.7 °F)-36.7 °C (98 °F)] 36.7 °C (98 °F)  Pulse:  [89-97] 94  Resp:  [16-18] 18  BP: (109-134)/(66-84) 128/80  SpO2:  [96 %-98 %] 97 %    Physical Exam  HENT:      Head: Normocephalic and atraumatic.   Eyes:      General: No scleral icterus.  Cardiovascular:      Rate and Rhythm: Normal rate.      Heart sounds: No murmur. No gallop.    Pulmonary:      Effort: Pulmonary effort is normal. No respiratory distress.      Breath sounds: No stridor. No wheezing or rales.   Abdominal:      General: Bowel sounds are normal. There is no distension.      Palpations: Abdomen is soft.      Tenderness: There is abdominal tenderness. There is no guarding.      Comments: Urostomy in place  Wound vac in place   Musculoskeletal:         General: No swelling or deformity.      Right lower leg: No edema.   Skin:     Coloration: Skin is not jaundiced.      Findings: No bruising.   Neurological:      General: No focal deficit present.      Mental Status: She is oriented to person, place, and time. Mental status is at baseline.      Cranial Nerves: No cranial nerve deficit.      Motor: No weakness.      Gait: Gait normal.         Fluids    Intake/Output Summary (Last 24 hours) at 8/26/2020 1630  Last data filed at 8/26/2020 1315  Gross per 24 hour   Intake 1896.25 ml   Output 2225 ml   Net -328.75 ml       Laboratory  Recent Labs     08/24/20 0043 08/26/20 0528   WBC 11.7* 8.4   RBC 4.09* 4.06*   HEMOGLOBIN 12.1 12.0   HEMATOCRIT 37.1 37.1   MCV 90.7 91.4   MCH 29.6 29.6   MCHC 32.6* 32.3*   RDW 49.4 50.5*   PLATELETCT 140* 142*   MPV 11.6 11.5     Recent Labs     08/24/20 0043 08/26/20 0528   SODIUM 132* 140   POTASSIUM 3.7 3.9   CHLORIDE 98 106   CO2 21 21   GLUCOSE 108* 95   BUN 14 17   CREATININE 1.15 1.42*   CALCIUM 8.5 8.3*     Recent Labs     08/24/20 0043   INR 1.20*               Imaging  CT-ABDOMEN-PELVIS WITH   Final Result      Fluid collection  in the right lower quadrant abdominal wall above the level of the stoma measures 3.1 x 0.9 cm. There are bowel loops adjacent to the collection and a fistula is not excluded.      Fluid collection in the anterior abdominal wall to the left of midline now measures 2.4 x 1.8 cm.      Status post cystectomy with a urinary conduit and a right lower quadrant stoma.      Bilateral hydronephrosis which is moderate to severe on the right and moderate on the left is slightly increased on the right and moderately increased on the left compared to prior.      Renal cortical scarring is seen bilaterally.      Atherosclerotic plaque.      Nodular appearance of the liver suspicious for cirrhosis.      Groundglass opacities in the right middle lobe may be infectious or inflammatory. Follow-up is recommended.           Assessment/Plan  * Abdominal wall abscess at site of surgical wound  Assessment & Plan  Complicated after hernia repair  POD #1, s/p debridement with wound vac placement  Continue with daptomycin and Zosyn at this time  Cultures so far negative        Sepsis ([facility])  Assessment & Plan  This is Sepsis Present on admission  SIRS criteria identified on my evaluation include: Tachycardia, with heart rate greater than 90 BPM and Leukocytosis, with WBC greater than 12,000  Source is abdominal wall abscess  Sepsis protocol initiated  Fluid resuscitation ordered per protocol  IV antibiotics as appropriate for source of sepsis  While organ dysfunction may be noted elsewhere in this problem list or in the chart, degree of organ dysfunction does not meet CMS criteria for severe sepsis  Leukocytosis has resolved  She remains hemodynamically stable  Blood culture so far negative  Continue daptomycin and Zosyn        Chronic kidney disease (CKD) stage G3a/A2, moderately decreased glomerular filtration rate (GFR) between 45-59 mL/min/1.73 square meter and albuminuria creatinine ratio between  mg/g ([facility])  Assessment &  Plan  Creatinine with mild increase overnight  following  Avoid nephrotoxic medication    Hyponatremia  Assessment & Plan  resolved    Abnormal CT scan, liver  Assessment & Plan  Concerning for possible cirrhosis  Hepatitis panel is negative  Her liver function is stable  The patient will need to follow-up as outpatient    Presence of urostomy (HCC)- (present on admission)  Assessment & Plan    With hydronephrosis on CT  Will need follow-up imaging when more clinically stable and  urology evaluation she is established with Dr. Garcia    Essential hypertension- (present on admission)  Assessment & Plan  Continue metoprolol  Continue holding Lasix at this time    Hypothyroidism- (present on admission)  Assessment & Plan  Continue levothyroxine       VTE prophylaxis: Lovenox

## 2020-08-26 NOTE — FACE TO FACE
Face to Face Supporting Documentation - Home Health    The encounter with this patient was in whole or in part the primary reason for home health admission.    Date of encounter:   Patient:                    MRN:                       YOB: 2018  Dorie Wadsworth  [id number]  1935     Home health to see patient for:  Wound Care    Skilled need for:  Surgical Aftercare abdominal wall cellulitis    Skilled nursing interventions to include:  Wound Care    Homebound status evidenced by:  Needs the assistance of another person in order to leave the home. Leaving home requires a considerable and taxing effort. There is a normal inability to leave the home.    Community Physician to provide follow up care: Ramiro Ariat M.D.     Optional Interventions? No      I certify the face to face encounter for this home health care referral meets the CMS requirements and the encounter/clinical assessment with the patient was, in whole, or in part, for the medical condition(s) listed above, which is the primary reason for home health care. Based on my clinical findings: the service(s) are medically necessary, support the need for home health care, and the homebound criteria are met.  I certify that this patient has had a face to face encounter by myself.  Paola Lujan M.D. - NPI: [id number]

## 2020-08-26 NOTE — PROGRESS NOTES
Pt A&Ox4, calls appropriately  Pt on RA, denies SOB  Pt rates pain 5/10, declines pain medication  -N/V, -numbness, -tingling  Last BM 8/25, +flatus  Pt has urostomy on RLQ, dressing changed today, +output, hazy mucous output  Abdominal wound in LLQ with CDI gauze and tape  Pt on clear liquid diet, tolerating well  Pt up standby  Hourly rounding in place, call light and belongings within reach, all needs met at this time

## 2020-08-26 NOTE — CARE PLAN
Problem: Communication  Goal: The ability to communicate needs accurately and effectively will improve  Outcome: PROGRESSING AS EXPECTED  Pt calls appropriately     Problem: Pain Management  Goal: Pain level will decrease to patient's comfort goal  Outcome: PROGRESSING AS EXPECTED  Pt rates pain 5/10, declines pain medication     Problem: Urinary Elimination:  Goal: Ability to reestablish a normal urinary elimination pattern will improve  Outcome: PROGRESSING AS EXPECTED  Pt has RLQ urostomy, +output

## 2020-08-27 ENCOUNTER — APPOINTMENT (OUTPATIENT)
Dept: RADIOLOGY | Facility: MEDICAL CENTER | Age: 85
DRG: 856 | End: 2020-08-27
Attending: HOSPITALIST
Payer: MEDICARE

## 2020-08-27 ENCOUNTER — APPOINTMENT (OUTPATIENT)
Dept: RADIOLOGY | Facility: MEDICAL CENTER | Age: 85
DRG: 856 | End: 2020-08-27
Attending: INTERNAL MEDICINE
Payer: MEDICARE

## 2020-08-27 LAB
25(OH)D3 SERPL-MCNC: 28 NG/ML (ref 30–100)
ALBUMIN SERPL BCP-MCNC: 2.7 G/DL (ref 3.2–4.9)
ANION GAP SERPL CALC-SCNC: 9 MMOL/L (ref 7–16)
BASOPHILS # BLD AUTO: 0.8 % (ref 0–1.8)
BASOPHILS # BLD: 0.06 K/UL (ref 0–0.12)
BUN SERPL-MCNC: 16 MG/DL (ref 8–22)
CALCIUM SERPL-MCNC: 8.7 MG/DL (ref 8.5–10.5)
CHLORIDE SERPL-SCNC: 104 MMOL/L (ref 96–112)
CO2 SERPL-SCNC: 23 MMOL/L (ref 20–33)
CREAT SERPL-MCNC: 1.33 MG/DL (ref 0.5–1.4)
EOSINOPHIL # BLD AUTO: 0.3 K/UL (ref 0–0.51)
EOSINOPHIL NFR BLD: 4.1 % (ref 0–6.9)
ERYTHROCYTE [DISTWIDTH] IN BLOOD BY AUTOMATED COUNT: 51.5 FL (ref 35.9–50)
GLUCOSE SERPL-MCNC: 101 MG/DL (ref 65–99)
HCT VFR BLD AUTO: 36.7 % (ref 37–47)
HGB BLD-MCNC: 11.6 G/DL (ref 12–16)
IMM GRANULOCYTES # BLD AUTO: 0.03 K/UL (ref 0–0.11)
IMM GRANULOCYTES NFR BLD AUTO: 0.4 % (ref 0–0.9)
LYMPHOCYTES # BLD AUTO: 1.56 K/UL (ref 1–4.8)
LYMPHOCYTES NFR BLD: 21.4 % (ref 22–41)
MCH RBC QN AUTO: 29.3 PG (ref 27–33)
MCHC RBC AUTO-ENTMCNC: 31.6 G/DL (ref 33.6–35)
MCV RBC AUTO: 92.7 FL (ref 81.4–97.8)
MONOCYTES # BLD AUTO: 0.94 K/UL (ref 0–0.85)
MONOCYTES NFR BLD AUTO: 12.9 % (ref 0–13.4)
NEUTROPHILS # BLD AUTO: 4.39 K/UL (ref 2–7.15)
NEUTROPHILS NFR BLD: 60.4 % (ref 44–72)
NRBC # BLD AUTO: 0 K/UL
NRBC BLD-RTO: 0 /100 WBC
PLATELET # BLD AUTO: 142 K/UL (ref 164–446)
PMV BLD AUTO: 11.3 FL (ref 9–12.9)
POTASSIUM SERPL-SCNC: 3.9 MMOL/L (ref 3.6–5.5)
RBC # BLD AUTO: 3.96 M/UL (ref 4.2–5.4)
SODIUM SERPL-SCNC: 136 MMOL/L (ref 135–145)
WBC # BLD AUTO: 7.3 K/UL (ref 4.8–10.8)

## 2020-08-27 PROCEDURE — 82040 ASSAY OF SERUM ALBUMIN: CPT

## 2020-08-27 PROCEDURE — 87493 C DIFF AMPLIFIED PROBE: CPT

## 2020-08-27 PROCEDURE — 700105 HCHG RX REV CODE 258: Performed by: HOSPITALIST

## 2020-08-27 PROCEDURE — 700102 HCHG RX REV CODE 250 W/ 637 OVERRIDE(OP): Performed by: HOSPITALIST

## 2020-08-27 PROCEDURE — 82306 VITAMIN D 25 HYDROXY: CPT

## 2020-08-27 PROCEDURE — 770021 HCHG ROOM/CARE - ISO PRIVATE

## 2020-08-27 PROCEDURE — A9270 NON-COVERED ITEM OR SERVICE: HCPCS | Performed by: HOSPITALIST

## 2020-08-27 PROCEDURE — 05HB33Z INSERTION OF INFUSION DEVICE INTO RIGHT BASILIC VEIN, PERCUTANEOUS APPROACH: ICD-10-PCS | Performed by: HOSPITALIST

## 2020-08-27 PROCEDURE — 700111 HCHG RX REV CODE 636 W/ 250 OVERRIDE (IP): Performed by: HOSPITALIST

## 2020-08-27 PROCEDURE — 99233 SBSQ HOSP IP/OBS HIGH 50: CPT | Performed by: INTERNAL MEDICINE

## 2020-08-27 PROCEDURE — 700105 HCHG RX REV CODE 258: Performed by: INTERNAL MEDICINE

## 2020-08-27 PROCEDURE — C1751 CATH, INF, PER/CENT/MIDLINE: HCPCS

## 2020-08-27 PROCEDURE — 99232 SBSQ HOSP IP/OBS MODERATE 35: CPT | Performed by: HOSPITALIST

## 2020-08-27 PROCEDURE — 85025 COMPLETE CBC W/AUTO DIFF WBC: CPT

## 2020-08-27 PROCEDURE — 700111 HCHG RX REV CODE 636 W/ 250 OVERRIDE (IP): Performed by: INTERNAL MEDICINE

## 2020-08-27 PROCEDURE — 97605 NEG PRS WND THER DME<=50SQCM: CPT

## 2020-08-27 PROCEDURE — 80048 BASIC METABOLIC PNL TOTAL CA: CPT

## 2020-08-27 PROCEDURE — 36415 COLL VENOUS BLD VENIPUNCTURE: CPT

## 2020-08-27 RX ADMIN — PIPERACILLIN AND TAZOBACTAM 3.38 G: 3; .375 INJECTION, POWDER, LYOPHILIZED, FOR SOLUTION INTRAVENOUS; PARENTERAL at 12:39

## 2020-08-27 RX ADMIN — SODIUM CHLORIDE, POTASSIUM CHLORIDE, SODIUM LACTATE AND CALCIUM CHLORIDE: 600; 310; 30; 20 INJECTION, SOLUTION INTRAVENOUS at 20:40

## 2020-08-27 RX ADMIN — ACETAMINOPHEN 650 MG: 325 TABLET, FILM COATED ORAL at 12:46

## 2020-08-27 RX ADMIN — LEVOTHYROXINE SODIUM 112 MCG: 112 TABLET ORAL at 05:50

## 2020-08-27 RX ADMIN — LOVASTATIN 10 MG: 20 TABLET ORAL at 05:50

## 2020-08-27 RX ADMIN — METOPROLOL TARTRATE 50 MG: 50 TABLET, FILM COATED ORAL at 08:22

## 2020-08-27 RX ADMIN — SODIUM CHLORIDE, POTASSIUM CHLORIDE, SODIUM LACTATE AND CALCIUM CHLORIDE: 600; 310; 30; 20 INJECTION, SOLUTION INTRAVENOUS at 12:40

## 2020-08-27 RX ADMIN — ACETAMINOPHEN 650 MG: 325 TABLET, FILM COATED ORAL at 22:01

## 2020-08-27 RX ADMIN — OXYCODONE HYDROCHLORIDE 5 MG: 5 TABLET ORAL at 12:48

## 2020-08-27 RX ADMIN — LATANOPROST 1 DROP: 50 SOLUTION OPHTHALMIC at 17:24

## 2020-08-27 RX ADMIN — MAGNESIUM OXIDE TAB 400 MG (241.3 MG ELEMENTAL MG) 400 MG: 400 (241.3 MG) TAB at 05:51

## 2020-08-27 RX ADMIN — TIMOLOL MALEATE 1 DROP: 5 SOLUTION/ DROPS OPHTHALMIC at 08:20

## 2020-08-27 RX ADMIN — TIMOLOL MALEATE 1 DROP: 5 SOLUTION/ DROPS OPHTHALMIC at 20:34

## 2020-08-27 RX ADMIN — OMEPRAZOLE 20 MG: 20 CAPSULE, DELAYED RELEASE ORAL at 05:50

## 2020-08-27 RX ADMIN — SODIUM CHLORIDE, POTASSIUM CHLORIDE, SODIUM LACTATE AND CALCIUM CHLORIDE: 600; 310; 30; 20 INJECTION, SOLUTION INTRAVENOUS at 02:08

## 2020-08-27 RX ADMIN — METOPROLOL TARTRATE 50 MG: 50 TABLET, FILM COATED ORAL at 20:35

## 2020-08-27 RX ADMIN — PIPERACILLIN AND TAZOBACTAM 3.38 G: 3; .375 INJECTION, POWDER, LYOPHILIZED, FOR SOLUTION INTRAVENOUS; PARENTERAL at 05:50

## 2020-08-27 RX ADMIN — PIPERACILLIN AND TAZOBACTAM 3.38 G: 3; .375 INJECTION, POWDER, LYOPHILIZED, FOR SOLUTION INTRAVENOUS; PARENTERAL at 20:34

## 2020-08-27 ASSESSMENT — ENCOUNTER SYMPTOMS
DIARRHEA: 1
CHILLS: 0
HEARTBURN: 0
COUGH: 0
ABDOMINAL PAIN: 1
VOMITING: 0
CARDIOVASCULAR NEGATIVE: 1
RESPIRATORY NEGATIVE: 1
WEIGHT LOSS: 1
NAUSEA: 0
CONSTIPATION: 0
PSYCHIATRIC NEGATIVE: 1
NEUROLOGICAL NEGATIVE: 1
FEVER: 0
EYES NEGATIVE: 1
SHORTNESS OF BREATH: 0
DIARRHEA: 0
MUSCULOSKELETAL NEGATIVE: 1

## 2020-08-27 ASSESSMENT — PAIN SCALES - GENERAL: PAIN_LEVEL: 5

## 2020-08-27 NOTE — PROGRESS NOTES
Infectious Disease Progress Note    Author: Lori Shafer M.D. Date & Time of service: 2020  9:54 AM    Chief Complaint:  Intra abdominal abscess     Interval History:    Review of Systems:  Review of Systems   Constitutional: Negative for chills, fever and malaise/fatigue.   Respiratory: Negative for cough and shortness of breath.    Gastrointestinal: Positive for abdominal pain and diarrhea. Negative for nausea and vomiting.       Hemodynamics:  Temp (24hrs), Av.9 °C (98.5 °F), Min:36.9 °C (98.4 °F), Max:37 °C (98.6 °F)  Temperature: 36.9 °C (98.4 °F)  Pulse  Av.1  Min: 62  Max: 132   Blood Pressure : (!) 165/94(RN notifed)       Physical Exam:  Physical Exam  Constitutional:       Appearance: Normal appearance.   Cardiovascular:      Rate and Rhythm: Normal rate and regular rhythm.      Heart sounds: Normal heart sounds.   Pulmonary:      Effort: Pulmonary effort is normal.      Breath sounds: Normal breath sounds.   Abdominal:      General: Abdomen is flat. There is no distension.      Tenderness: There is abdominal tenderness. There is guarding.      Comments: Very tender to palpation on right side, ostomy with clear urine    Musculoskeletal:      Right lower leg: No edema.      Left lower leg: No edema.   Skin:     General: Skin is warm and dry.   Neurological:      General: No focal deficit present.      Mental Status: She is alert and oriented to person, place, and time.   Psychiatric:         Mood and Affect: Mood normal.         Behavior: Behavior normal.         Meds:    Current Facility-Administered Medications:   •  DAPTOmycin  •  latanoprost  •  levothyroxine  •  lovastatin  •  timolol  •  senna-docusate **AND** polyethylene glycol/lytes **AND** magnesium hydroxide **AND** bisacodyl  •  LR  •  LR  •  acetaminophen  •  Notify provider if pain remains uncontrolled **AND** Use the numeric rating scale (NRS-11) on regular floors and Critical-Care Pain Observation Tool (CPOT) on  ICUs/Trauma to assess pain **AND** Pulse Ox (Oximetry) **AND** Pharmacy Consult Request **AND** If patient difficult to arouse and/or has respiratory depression, stop any opiates that are currently infusing and call a Rapid Response. **AND** oxyCODONE immediate-release **AND** oxyCODONE immediate-release **AND** HYDROmorphone  •  [COMPLETED] piperacillin-tazobactam **AND** piperacillin-tazobactam  •  ondansetron  •  ondansetron  •  metoprolol  •  magnesium oxide  •  omeprazole    Labs:  Recent Labs     08/26/20  0528 08/27/20  0722   WBC 8.4 7.3   RBC 4.06* 3.96*   HEMOGLOBIN 12.0 11.6*   HEMATOCRIT 37.1 36.7*   MCV 91.4 92.7   MCH 29.6 29.3   RDW 50.5* 51.5*   PLATELETCT 142* 142*   MPV 11.5 11.3   NEUTSPOLYS 60.10 60.40   LYMPHOCYTES 21.00* 21.40*   MONOCYTES 13.40 12.90   EOSINOPHILS 4.20 4.10   BASOPHILS 0.80 0.80     Recent Labs     08/25/20  0640 08/26/20  0528 08/27/20  0722   SODIUM  --  140 136   POTASSIUM  --  3.9 3.9   CHLORIDE  --  106 104   CO2  --  21 23   GLUCOSE  --  95 101*   BUN  --  17 16   CPKTOTAL 35  --   --      Recent Labs     08/26/20 0528 08/27/20  0722   ALBUMIN  --  2.7*   CREATININE 1.42* 1.33       Imaging:  Ct-abdomen-pelvis With    Result Date: 8/23/2020 8/23/2020 5:55 PM HISTORY/REASON FOR EXAM:  Abdominal infection suspected; IV contrast only. Postop complications. TECHNIQUE/EXAM DESCRIPTION:  CT scan of the abdomen and pelvis with contrast. Contrast-enhanced helical scanning was obtained from the diaphragmatic domes through the pubic symphysis following the bolus administration of nonionic contrast without complication. 100 mL of Omnipaque 350 nonionic contrast was administered without complication. Low dose optimization technique was utilized for this CT exam including automated exposure control and adjustment of the mA and/or kV according to patient size. COMPARISON: 5/18/2020 FINDINGS: Lung bases: There is bibasilar atelectasis. There are groundglass opacities in the right  middle lobe which may be infectious or inflammatory. The heart is enlarged. Abdomen: No free air is seen in the abdomen or pelvis. The liver is mildly nodular suspicious for cirrhosis. Gallbladder is surgically absent. Common duct measures 8 mm which may represent ectasia the setting of prior cholecystectomy. Pancreas appears unremarkable. Spleen and adrenal glands appear unremarkable. There is bilateral hydronephrosis which is moderate on the left and moderate to severe on the right. There is renal cortical scarring bilaterally. Atherosclerotic plaque is seen in the aorta and its branches. There are small inguinal, retroperitoneal and mesenteric lymph nodes. There are colonic diverticula. Terminal ileum appears unremarkable. The appendix is not identified. There is likely a small sliding hiatal hernia. Bladder is surgically absent. No free fluid is seen in the pelvis. There is a right lower quadrant stoma. There is a fluid and air collection within the right lower quadrant abdominal wall superior to the stoma measuring 3.1 x 0.9 cm. There is a collection in the anterior abdominal wall to the left of midline measuring 1.8 x 2.4 cm. Degenerative changes are seen in the spine.     Fluid collection in the right lower quadrant abdominal wall above the level of the stoma measures 3.1 x 0.9 cm. There are bowel loops adjacent to the collection and a fistula is not excluded. Fluid collection in the anterior abdominal wall to the left of midline now measures 2.4 x 1.8 cm. Status post cystectomy with a urinary conduit and a right lower quadrant stoma. Bilateral hydronephrosis which is moderate to severe on the right and moderate on the left is slightly increased on the right and moderately increased on the left compared to prior. Renal cortical scarring is seen bilaterally. Atherosclerotic plaque. Nodular appearance of the liver suspicious for cirrhosis. Groundglass opacities in the right middle lobe may be infectious or  inflammatory. Follow-up is recommended.      Micro:  Results     Procedure Component Value Units Date/Time    CULTURE WOUND W/ GRAM STAIN [[id number]]  (Abnormal)  (Susceptibility) Collected: 08/23/20 2131    Order Status: Completed Specimen: Wound from Abdominal Updated: 08/26/20 4548     Significant Indicator POS     Source WND     Site ABDOMINAL     Culture Result Rare growth mixed skin jing.     Gram Stain Result Rare WBCs.  No organisms seen.       Culture Result Escherichia coli  Light growth        Enterococcus faecalis  Rare growth  Combination therapy with ampicillin, penicillin, or  vancomycin (for susceptible strains) plus an aminoglycoside  is usually indicated for serious enterococcal infections,  such as endocarditis unless high-level resistance to both  gentamicin and streptomycin is documented; such combinations  are predicted to result in synergistic killing of the  Enterococcus.  The susceptibility profile for this organism indicates that  Gentamycin would not be an effective component of combination  therapy.        Bacteroides fragilis Group  Light growth      Susceptibility     Escherichia coli (1)     Antibiotic Interpretation Microscan Method Status    Ampicillin Resistant >16 mcg/mL MICKI Final    Ceftriaxone Sensitive <=1 mcg/mL MICKI Final    Ceftazidime Sensitive <=1 mcg/mL MICKI Final    Cefotaxime Sensitive <=2 mcg/mL MICKI Final    Cefazolin Sensitive <=2 mcg/mL MICKI Final    Ciprofloxacin Resistant >2 mcg/mL MICKI Final    Cefepime Sensitive <=2 mcg/mL MICKI Final    Cefotetan Sensitive <=16 mcg/mL MICKI Final    Ampicillin/sulbactam Intermediate 16/8 mcg/mL MICKI Final    Ertapenem Sensitive <=0.5 mcg/mL MICKI Final    Gentamicin Sensitive <=4 mcg/mL MICKI Final    Pip/Tazobactam Sensitive <=16 mcg/mL MICKI Final    Trimeth/Sulfa Sensitive <=2/38 mcg/mL MICKI Final    Tobramycin Sensitive <=4 mcg/mL MICKI Final          Enterococcus faecalis (2)     Antibiotic Interpretation Microscan Method Status     Ampicillin Sensitive <=2 mcg/mL MICKI Final    Vancomycin Sensitive 1 mcg/mL MICKI Final    Daptomycin Sensitive <=1 mcg/mL MICKI Final    Gent Synergy Resistant >500 mcg/mL MICKI Final    Penicillin Sensitive 2 mcg/mL MICKI Final    Strep Synergy Sensitive <=1000 mcg/mL MICKI Final                   GRAM STAIN [[id number]] Collected: 08/26/20 0615    Order Status: Completed Specimen: Other Updated: 08/26/20 1504     Significant Indicator .     Source URSP     Site URINE, SUPRAPUBIC     Gram Stain Result Few WBCs.  Moderate Gram positive cocci.  Moderate Yeast.      Narrative:      Collected By:[id number] LICHTIG-GROOM JOSHUA   Indication for culture:->Patient WITHOUT an indwelling Rodriguez  catheter in place with new onset of Dysuria, Frequency,  Urgency, and/or Suprapubic pain  Collected By:[id number] LICHTIG-GROOM JOSHUA     URINE CULTURE(NEW) [[id number]] Collected: 08/26/20 0615    Order Status: Completed Specimen: Urine, Suprapubic Updated: 08/26/20 0640    Narrative:      Collected By:[id number] LICHTIG-GROOM JOSHUA   Indication for culture:->Patient WITHOUT an indwelling Rodriguez  catheter in place with new onset of Dysuria, Frequency,  Urgency, and/or Suprapubic pain    FLUID CULTURE [[id number]] Collected: 08/26/20 0615    Order Status: Canceled Specimen: Other Body Fluid     GRAM STAIN [[id number]] Collected: 08/23/20 2131    Order Status: Completed Specimen: Wound Updated: 08/24/20 1449     Significant Indicator .     Source WND     Site ABDOMINAL     Gram Stain Result Rare WBCs.  No organisms seen.      BLOOD CULTURE [[id number]] Collected: 08/23/20 1450    Order Status: Completed Specimen: Blood from Peripheral Updated: 08/24/20 0835     Significant Indicator NEG     Source BLD     Site PERIPHERAL     Culture Result No Growth  Note: Blood cultures are incubated for 5 days and  are monitored continuously.Positive blood cultures  are called to the RN and reported as soon as  they are identified.      Narrative:      1 of 2 for  "Blood Culture x 2 sites order. Per Hospital  Policy: Only change Specimen Src: to \"Line\" if specified by  physician order.  Right AC    BLOOD CULTURE [[id number]] Collected: 08/23/20 1828    Order Status: Completed Specimen: Blood from Peripheral Updated: 08/24/20 0833     Significant Indicator NEG     Source BLD     Site PERIPHERAL     Culture Result No Growth  Note: Blood cultures are incubated for 5 days and  are monitored continuously.Positive blood cultures  are called to the RN and reported as soon as  they are identified.      Narrative:      2 of 2 blood culture x2  Sites order. Per Hospital Policy:  Only change Specimen Src: to \"Line\" if specified by physician  order.  Right Wrist    SARS-CoV-2, PCR (In-House) [[id number]] Collected: 08/23/20 2146    Order Status: Completed Updated: 08/23/20 2252     SARS-CoV-2 Source NP Swab     SARS-CoV-2 by PCR NotDetected     Comment: [facility] providers: PLEASE REFER TO DE-ESCALATION AND RETESTING PROTOCOL  on [email or web address]  **The [email or web address] GeneXpert Xpress SARS-CoV-2 Test has been made available for  use under the Emergency Use Authorization (EUA) only.         COVID/SARS CoV-2 PCR [[id number]] Collected: 08/23/20 2146    Order Status: Completed Specimen: Respirate from Nasopharyngeal Updated: 08/23/20 2149     COVID Order Status Received     Comment: The order for SARS CoV-2 testing has been received by the  Laboratory. This result is neither positive nor negative.  Final results of testing will report in 24-48 hours, separately.         Routine (COVID/SARS COV-2 In-House PCR up to 24 hours) [[id number]]     Order Status: Completed Specimen: Respirate from Nasopharyngeal     Blood Culture,Hold [[id number]] Collected: 08/23/20 1450    Order Status: Completed Updated: 08/23/20 1527     Blood Culture Hold Collected    URINALYSIS,CULTURE IF INDICATED [[id number]]     Order Status: Canceled Specimen: Urine     Blood Culture [[id number]] Collected: 08/23/20 0000    Order Status: " Canceled Specimen: Other from Peripheral     Blood Culture [[id number]] Collected: 08/23/20 0000    Order Status: Canceled Specimen: Other from Peripheral           Assessment:  Active Hospital Problems    Diagnosis   • *Abdominal wall abscess at site of surgical wound [T81.49XA]   • Sepsis (HCC) [A41.9]   • Chronic kidney disease (CKD) stage G3a/A2, moderately decreased glomerular filtration rate (GFR) between 45-59 mL/min/1.73 square meter and albuminuria creatinine ratio between  mg/g (HCC) [N18.3]   • Hyponatremia [E87.1]   • Abnormal CT scan, liver [R93.2]   • Presence of urostomy (HCC) [Z93.6]   • Essential hypertension [I10]   • Hypothyroidism [E03.9]     Interval 24 hours:      AF, O2 RA  Labs reviewed  Micro reviewed    Pt with abdominal pain on right side and profuse watery diarrhea yesterday and today.  Continued on zosyn.      ASSESSMENT/PLAN:      84 y.o.  admitted 8/23/2020. Pt has a past medical history of cystectomy with ileal conduit and A. Fib. Prior history of incarcerated parastomal hernia causing bowel obstruction which was repaired on 12/24/2019.  The surgery was complicated by intra-abdominal abscess which was drained.  Drainage cultures at the time grew Bacteroides fragilis. She then presented in 2/2020 complaining of left lower abdomen pain.  CT at the time showed an enhancing fluid collection in the anterior abdominal wall compatible with abscess.  She had to the OR on 2/27/2020 where 50 cc of nichole purulence was drained and culture sent.  OR cultures grew E. coli, strep viridans and B fragilis.  Concern at the time was whether that was an enteric or biliary fistula and plan was for follow-up with surgery with repeat CT abdomen pelvis.  The patient was treated with Zosyn and then transition to Augmentin with plan to stop on 3/12/2020.  Then admitted in 7/20 with UTI and pyelonephritis.  Urine cultures grew Klebsiella pneumoniae and she was treated with IV antibiotics and then  discharged home on ciprofloxacin.      She has been following with surgery on a weekly basis for wound check.  She states the prior incision never completely healed but had improved.  Then she began to have increasing abdominal pain, erythema and drainage.     Hospital Course:   She has been afebrile.  Leukocytosis on arrival to 13 but now improved.  CT abdomen pelvis on arrival with finding of fluid collection in right lower quadrant abdominal wall measuring 3.1 x 0.9 cm, bowel loops adjacent to the collection and fistula is not excluded.  Second fluid collection in abdominal wall also noted measuring 2.4 x 1.8 cm.  She was taken to the OR for debridement on 8/25.  She was started on Zosyn and vancomycin then transitioned to daptomycin.      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 coli, Enterococcus faecalis and Bacteroides fragilis  -OR on 8/25 for debridement of abdominal wall, identified fistula tract and wound VAC placed  Acute on chronic kidney disease, ongoing  -CT abdomen pelvis noted bilateral hydronephrosis described as moderate to severe on the right and moderate on the left, some increased from prior  Thrombocytopenia, ongoing, stable  History of cystectomy with ileal conduit  -Surgical course complicated by intra-abdominal abscess x2.  Drainage in 2/2020 and cultures positive for E. coli, strep viridans and B fragilis.  Antibiotic allergies: Doxycycline reported as swollen lips, sulfa drugs with mouth swelling and Zyvox with mouth swelling  Diarrhea, ongoing      Plan      --- Continue Zosyn and stop daptomycin. Will plan on an antibiotic course with continuous infusion Zosyn 10.125 grams daily for an additional 14 days (end 9/10/20) No viable oral options due to resistant organism and allergies  - orders for home infusion given to CM   --- Okay to place midline  --- If she does indeed have an enterocutaneous fistula this  will not likely be resolved by antibiotics alone and may need further surgical procedures  --- Continue to follow labs  --- Monitor kidney function and avoid nephrotoxins  --- She has worsening hydronephrosis which will need follow-up and evaluation either as an inpatient or after discharge   --- Wound VAC management per primary/surgery   --- C diff testing prior to discharge - ordered         Plan of care discussed with case management and nurse.  Will continue to follow     ADDENDA     Noted growth of urine from ostomy.  Patient is doing well on her current antibiotic regimen with no fevers or increase in WBC.  Urine culture from ostomy and in particular if it was from the bag is likely contaminant and not true pathogen.  Plan is to discharge on Zosyn.  If the patient has any new fevers, new leukocytosis or new findings would be consistent with a urinary tract infection recommend obtaining new cultures from the ostomy itself with a fresh bag.

## 2020-08-27 NOTE — DOCUMENTATION QUERY
[facility] hospital                                                                       Query Response Note      PATIENT:               BERNARD BAER  ACCT #:                  [id number]  MRN:                     [id number]  :                      [id number]  ADMIT DATE:       2020 2:17 PM  DISCH DATE:          RESPONDING  PROVIDER #:        [id number]           QUERY TEXT:    Excisional debridement (or documentation describing an excision)  has been documented in the  OP Report.  Please document the deepest level of debridement treated.      NOTE:  If an appropriate response is not listed below, please respond with a new note.        The patient's Clinical Indicators include:  Per  OP Report : debridement of abdominal wall w/ placement of wound vac.  The scarred somewhat cellulitic tissue was excised in its entirety   Risk Factors: fistula w/ soft tissue infection of abdominal wall  Treatment: debridement, placement of wound vac  Options provided:   -- Deepest level of debridement treated - skin   -- Deepest level of debridement treated - subcutaneous tissue or fascia   -- Deepest level of debridement treated - muscle   -- Deepest level of debridement treated - bone   -- Unable to determine      Query created by: Tariq Bear on 2020 7:16 AM    RESPONSE TEXT:    Deepest level of debridement treated - subcutaneous tissue or fascia          Electronically signed by:  CAT DALE MD 2020 9:57 AM

## 2020-08-27 NOTE — DISCHARGE PLANNING
Received Choice form at 0800  Agency/Facility Name: [facility]   Referral sent per Choice form @ [phone]

## 2020-08-27 NOTE — PROGRESS NOTES
Pt A&Ox4, calls appropriately  Pt rates pain 5/10, declines pain medication  Denies N/V, -numbness, -tingling  Last BM 8/27  Pt has urostomy on RLQ, dressing changed yesterday (8/26), +output  Abdominal wound on LLQ with wound vac, CDI dressing, +output  Pt on regular diet, tolerating well  Pt up standby  Hourly rounding in place, call light and belongings within reach, all needs met at this time

## 2020-08-27 NOTE — WOUND TEAM
[facility]  Inpatient Services  Initial Wound and Skin Care Evaluation    Admission Date: 8/23/2020     Consult Date: 08/27/2020 at 1pm  HPI, PMH, SH: Reviewed    Unit where seen by Wound Team: [id number]     WOUND CONSULT RELATED TO:  Abdomen wound vac 1st change after surgery    Self Report / Pain Level: Pt denies pain when at rest.  Reports pain to left lateral aspect of wound with palpation.  Declines pain medication.     OBJECTIVE:  Seated in bed talking on the phone.    WOUND TYPE, LOCATION, CHARACTERISTICS (Pressure Injuries: location, stage, POA or date identified)    Negative Pressure Wound Therapy 08/25/20 Surgical (Active)   NPWT Pump Mode / Pressure Setting Continuous;125 mmHg 08/27/20 1300   Dressing Type Black Foam (Regular);White Foam 08/27/20 1300   Number of Foam Pieces Used 3 (1 white as wound contact layer, 2 black) 08/27/20 1300   Canister Changed No 08/27/20 1300      Wound 08/25/20 Incision Abdomen white sponge dressing covered with traditional black sponge dressing with WoundVac (Active)   Site Assessment Red;Yellow;Several small black areas appear to have been cauterized 08/27/20 1200   Periwound Assessment Intact;Mild irritation near border of urostomy wafer 08/27/20 1200   Margins Attached edges 08/27/20 1200   Closure Secondary intention 08/27/20 1200   Drainage Amount Moderate 08/27/20 1200   Drainage Description Serosanguineous 08/27/20 1200   Treatments Cleansed;Staples removed 08/27/20 1200   Wound Cleansing Approved Wound Cleanser 08/27/20 1200   Periwound Protectant Benzoin;Drape 08/27/20 1200   Dressing Options Wound Vac 08/27/20 1200   Dressing Changed Changed 08/27/20 1200   Dressing Status Clean;Dry;Intact 08/27/20 1200   Dressing Change/Treatment Frequency Tuesday, Thursday, Saturday, and As Needed 08/27/20 1200   NEXT Dressing Change/Treatment Date 08/29/20 08/27/20 1200   Number of Staples Removed 10 08/27/20 1200   Non-staged Wound Description Full thickness  08/27/20 1200   Measurements Estimated: 6.8uvf4gkj3.5cm    Tunneling (cm) 1 cm 08/27/20 1200   Tunneling Clock Position of Wound 6 08/27/20 1200   Undermining (cm) 0 cm 08/27/20 1200   Shape oval 08/27/20 1200   Wound Odor None 08/27/20 1200   Exposed Structures Adipose 08/27/20 1200   WOUND NURSE ONLY - Time Spent with Patient (mins) 60 08/27/20 1200        Lab Values:    Lab Results   Component Value Date/Time    WBC 7.3 08/27/2020 07:22 AM    RBC 3.96 (L) 08/27/2020 07:22 AM    HEMOGLOBIN 11.6 (L) 08/27/2020 07:22 AM    HEMATOCRIT 36.7 (L) 08/27/2020 07:22 AM                    Lab Results   Component Value Date/Time    HBA1C 5.1 07/08/2020 06:35 AM           Culture:  Abdominal wound 08/23/2020:  Culture Result Rare growth mixed skin jing.Abnormal     Gram Stain Result Rare WBCs.   No organisms seen.    Culture Result Abnormal   Escherichia coli   Light growth     Culture Result Abnormal   Enterococcus faecalis   Rare growth   Combination therapy with ampicillin, penicillin, or   vancomycin (for susceptible strains) plus an aminoglycoside   is usually indicated for serious enterococcal infections,   such as endocarditis unless high-level resistance to both   gentamicin and streptomycin is documented; such combinations   are predicted to result in synergistic killing of the   Enterococcus.   The susceptibility profile for this organism indicates that   Gentamycin would not be an effective component of combination   therapy.     Culture Result Abnormal   Bacteroides fragilis Group   Light growth          INTERVENTIONS BY WOUND TEAM: Pt resting reclined in bed.  Wound vac dressing removed, including 10 staples securing black foam.  Wound vac changed in the usual fashion, with 1 piece white foam placed to wound bed, 1 piece black foam to fill remainder of wound space, 1 piece black foam over top as button for trac pad.  Pt tolerated well; cheerful and chatty.  Unfortunately camera phone failed.  Picture and  measurements lost.      Interdisciplinary consultation: Patient, Bedside RN    EVALUATION: Initial vac dressing change post op.  Pt tolerated well.  Has urostomy (x10 years) with which she is independent.  She reports she changed her appliance this a.m.  She is very familiar with npwt as she has had it 2 prior times at this location.  She is anticipating d/c to home with HH soon.    Goals: Steady decrease in wound area and depth weekly.    NURSING PLAN OF CARE ORDERS (X):  Dressing changes: See Dressing Care orders: X  Skin care: See Skin Care orders: X  Rectal tube care: See Rectal Tube Care orders:        Other orders:                           RSKIN:   CURRENTLY IN PLACE (X), APPLIED THIS VISIT (A), ORDERED (O):   Q shift Grayson:  X  Q shift pressure point assessments:  X  Pressure redistribution mattress                             Low Airloss                        Bariatric MALIHA                     Bariatric foam                        Heel float boots                     Heel Silicone dressing                         Float Heels off Bed with Pillows               Barrier wipes         Barrier Cream         Barrier paste          Sacral silicone dressing         Silicone O2 tubing         Anchorfast         Cannula fixation Device (Tender )          Gray Foam Ear protectors           Trach with Optifoam split foam                 Waffle cushion        Waffle Overlay         Rectal tube or BMS    Purwick/Condom Cath          Antifungal tx      Interdry          Reposition q 2 hours        Up to chair        Ambulate      PT/OT        Dietician        Diabetes Education      PO     TF     TPN     NPO   # days   Other        WOUND TEAM PLAN OF CARE (X):   Dressing changes by wound team:          Follow up 1-2 times weekly:               Follow up 3 times weekly:                NPWT change 3 times weekly:  X   Follow up as needed:       Other (explain):     Anticipated discharge plans (X):  LTACH:         SNF/Rehab:                  Home Care:   X        Outpatient Wound Center:            Self Care:            Other:

## 2020-08-27 NOTE — PROGRESS NOTES
Hospital Medicine Daily Progress Note    Date of Service  8/27/2020    Chief Complaint  84 y.o. 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  She has a history of hernia repair in December 2019 with subsequent wound infection requiring irrigation and debridement in February 2020 and recurrent hospitalizations for wound infection, last hospitalization was in July treated with IV antibiotics and subsequently ciprofloxacin the patient has been following up with  for wound recheck, however last 2 weeks before this admission she experienced increased abdominal wall pain with moderate to severe worse with movement, not radiated and not associated with this chill or fever however she noticed increased purulent drainage from the abdominal wound, on admission CT scan for abdomen showed abdominal wall abscess, IV antibiotics have been initiated, the patient was evaluated by surgeon.  On 8/25/20 she was taken to the OR with Dr. Swift and underwent wound debridement with wound vac placement      Interval Problem Update  She reports vac site pain currently 5/10  States she is feeling better today  Denies n/v  + ostomy output  No cp, no sob, no cough  Ros otherwise negative  Discussed with ID and nursing    Consultants/Specialty  ID  General surgeon    Code Status  Full Code    Disposition  tbd    Review of Systems  Review of Systems   Constitutional: Positive for malaise/fatigue and weight loss. Negative for fever.   Eyes: Negative.    Respiratory: Negative.    Cardiovascular: Negative.    Gastrointestinal: Positive for abdominal pain. Negative for constipation, diarrhea, heartburn, nausea and vomiting.   Genitourinary: Negative.    Musculoskeletal: Negative.    Neurological: Negative.    Psychiatric/Behavioral: Negative.         Physical Exam  Temp:  [36.8 °C (98.3 °F)-37 °C  (98.6 °F)] 36.8 °C (98.3 °F)  Pulse:  [] 93  Resp:  [16-18] 18  BP: (124-165)/() 141/87  SpO2:  [94 %-97 %] 95 %    Physical Exam  HENT:      Head: Normocephalic and atraumatic.   Eyes:      General: No scleral icterus.  Cardiovascular:      Rate and Rhythm: Normal rate.      Heart sounds: No murmur. No gallop.    Pulmonary:      Effort: Pulmonary effort is normal. No respiratory distress.      Breath sounds: No stridor. No wheezing or rales.   Abdominal:      General: Bowel sounds are normal. There is no distension.      Palpations: Abdomen is soft.      Tenderness: There is abdominal tenderness. There is no guarding.      Comments: Urostomy in place  Wound vac in place   Musculoskeletal:         General: No swelling or deformity.      Right lower leg: No edema.   Skin:     Coloration: Skin is not jaundiced.      Findings: No bruising.   Neurological:      General: No focal deficit present.      Mental Status: She is oriented to person, place, and time. Mental status is at baseline.      Cranial Nerves: No cranial nerve deficit.      Motor: No weakness.      Gait: Gait normal.         Fluids    Intake/Output Summary (Last 24 hours) at 8/27/2020 1617  Last data filed at 8/27/2020 1130  Gross per 24 hour   Intake 2329.59 ml   Output 2100 ml   Net 229.59 ml       Laboratory  Recent Labs     08/26/20  0528 08/27/20  0722   WBC 8.4 7.3   RBC 4.06* 3.96*   HEMOGLOBIN 12.0 11.6*   HEMATOCRIT 37.1 36.7*   MCV 91.4 92.7   MCH 29.6 29.3   MCHC 32.3* 31.6*   RDW 50.5* 51.5*   PLATELETCT 142* 142*   MPV 11.5 11.3     Recent Labs     08/26/20  0528 08/27/20  0722   SODIUM 140 136   POTASSIUM 3.9 3.9   CHLORIDE 106 104   CO2 21 23   GLUCOSE 95 101*   BUN 17 16   CREATININE 1.42* 1.33   CALCIUM 8.3* 8.7                   Imaging  IR-MIDLINE CATHETER INSERTION WO GUIDANCE > AGE 3   Final Result                  Ultrasound-guided midline placement performed by qualified nursing staff    as above.           CT-ABDOMEN-PELVIS WITH   Final Result      Fluid collection in the right lower quadrant abdominal wall above the level of the stoma measures 3.1 x 0.9 cm. There are bowel loops adjacent to the collection and a fistula is not excluded.      Fluid collection in the anterior abdominal wall to the left of midline now measures 2.4 x 1.8 cm.      Status post cystectomy with a urinary conduit and a right lower quadrant stoma.      Bilateral hydronephrosis which is moderate to severe on the right and moderate on the left is slightly increased on the right and moderately increased on the left compared to prior.      Renal cortical scarring is seen bilaterally.      Atherosclerotic plaque.      Nodular appearance of the liver suspicious for cirrhosis.      Groundglass opacities in the right middle lobe may be infectious or inflammatory. Follow-up is recommended.           Assessment/Plan  * Abdominal wall abscess at site of surgical wound  Assessment & Plan  Complicated after hernia repair  POD #2, s/p debridement with wound vac placement  Continue with daptomycin and Zosyn at this time  Cultures growing e coli  ID following, arranging outpatient iv abx  Will place midline        Sepsis (HCC)  Assessment & Plan  This is Sepsis Present on admission  SIRS criteria identified on my evaluation include: Tachycardia, with heart rate greater than 90 BPM and Leukocytosis, with WBC greater than 12,000  Source is abdominal wall abscess  Sepsis protocol initiated  Fluid resuscitation ordered per protocol  IV antibiotics as appropriate for source of sepsis  While organ dysfunction may be noted elsewhere in this problem list or in the chart, degree of organ dysfunction does not meet CMS criteria for severe sepsis  Leukocytosis has resolved  She remains hemodynamically stable  Blood culture so far negative  Surgical cultures growing e coli  Continue daptomycin and Zosyn - ID arranging outpatient abx        Chronic kidney disease (CKD)  stage G3a/A2, moderately decreased glomerular filtration rate (GFR) between 45-59 mL/min/1.73 square meter and albuminuria creatinine ratio between  mg/g (HCC)  Assessment & Plan  Creatinine improved overnight  following  Avoid nephrotoxic medication  Follow up with urology    Hyponatremia  Assessment & Plan  resolved    Abnormal CT scan, liver  Assessment & Plan  Concerning for possible cirrhosis  Hepatitis panel is negative  Her liver function is stable  The patient will need to follow-up as outpatient    Presence of urostomy (HCC)- (present on admission)  Assessment & Plan    With hydronephrosis on CT  Will need follow-up imaging when more clinically stable and  urology evaluation she is established with Dr. Garcia    Essential hypertension- (present on admission)  Assessment & Plan  Continue metoprolol  Continue holding Lasix at this time    Hypothyroidism- (present on admission)  Assessment & Plan  Continue levothyroxine       VTE prophylaxis: Lovenox

## 2020-08-27 NOTE — DISCHARGE PLANNING
Agency/Facility Name: [facility]  Spoke To: Isabel  Outcome: Currently running insurance and submitting for authorization. Will update CCA later this afternoon. Preeti to be to bedside tomorrow AM for teaching if all goes through with insurance.

## 2020-08-27 NOTE — PROGRESS NOTES
Doing well  Vac change pending  Vac form signed  Ok to discharge from surgery standpoint when outpatient vac available and home health arranged  Need to ensure outpatient wound vac changes include white sponge against tissue

## 2020-08-27 NOTE — DISCHARGE PLANNING
Care Transition Team Discharge Planning    Anticipated Discharge Information  Discharge Disposition: D/T to home under A care in anticipation of covered skilled care (06)         Discharge Plan:  Home with Home Health    This RN CM faxed Pt's signed Wound Vac Order Form to Nella of [facility].    This RN CM spoke with Pt who prefers to discharge to Home with ,  wound vac thru [facility].    Per Pt if she will need IV antibiotics after discharge from [facility], she prefers to do it at Home and she prefers to use [facility]. Pt prefers that I do not bother her  with this update. Per Pt she can manage giving her IV antibiotic at home. Informed Dr Shafer via tiger text.    Will follow and assist Pt with discharge as needed.

## 2020-08-27 NOTE — PROCEDURES
Vascular Access Team    Date of Insertion: 8/27/20  Arm Circumference: 27  Internal length: 17  External Length: 0  Vein Occupancy %: 42  Reason for Midline: Antibiotic therapy  Labs: WBC 7.3, , BUN 16, Cr 1.33, GFR 38, INR 1.20 on 8/24    Orders confirmed, vessel patency confirmed with ultrasound. Risks and benefits of procedure explained to patient and education regarding line associated bloodstream infections provided. Questions answered.     Power Midline placed in RUE per licensed provider order with ultrasound guidance. 4 Fr, Single lumen Power Midline placed in Bacilic vein after 1 attempt(s). 2 mL of 1% lidocaine injected intradermally, 21 gauge microintroducer needle and modified Seldinger technique used. 17 cm catheter inserted with good blood return. Secured at 0 cm marker. Each lumen flushed without resistance with 10 mL 0.9% normal saline. Midline secured with Biopatch and Tegaderm.     Midline placement is confirmed by nurse using ultrasound and ability to flush and draw blood. Midline is appropriate for use at this time.  Patient tolerated procedure well, without complications.  No X-ray is needed for placement confirmation.  Patient condition relayed to unit RN or ordering physician via this post procedure note in the EMR.     Ultrasound images uploaded to PACS and viewable in the EMR - yes  Ultrasound imaged printed and placed in paper chart - no     BARD Power Midline ref # [id number], Lot # [id number], Expiration Date 5/31/21

## 2020-08-27 NOTE — PROGRESS NOTES
report received. Assessment completed.  Pt is A&O x4. Pt on room air.   Pt declines medication for Pain 6/10  Denies nausea.   - numbness, - tingling.  Skin urostomy in RLQ has clear yellow urine and abdominal wound vac midline has small amount of drainage.   LDA 20G in left forearm and 20 in right forearm   Last BM 8/25/20 . +flatus,   +void.  Regular diet. Tolerates well.   Pt up standby.  Call light and belongings within reach. All needs met at this time. Fall Precautions and hourly rounding in place.

## 2020-08-27 NOTE — DISCHARGE PLANNING
Received Choice form at [id number]  Agency/Facility Name: [facility]  Referral sent per Choice form @ 9130

## 2020-08-27 NOTE — CARE PLAN
Problem: Infection  Goal: Will remain free from infection  Outcome: PROGRESSING AS EXPECTED  Note: Administer antibiotics as ordered. Pt eating and hydrating well.     Problem: Urinary Elimination:  Goal: Ability to reestablish a normal urinary elimination pattern will improve  Outcome: PROGRESSING AS EXPECTED  Note: Pt has clear urine and has good oral intake.

## 2020-08-27 NOTE — DISCHARGE PLANNING
Care Transition Team Discharge Planning    Anticipated Discharge Information  Discharge Disposition: D/T to home under HHA care in anticipation of covered skilled care (06)         Discharge Plan:  Home with Home Health on Wound Vac and Home  Antibiotic Infusion    Received a call from Marry of [facility] that Pt's wound vac will be delivered today between 16:00 -17:00. Informed Charge Nurse Brad

## 2020-08-27 NOTE — CARE PLAN
Problem: Communication  Goal: The ability to communicate needs accurately and effectively will improve  Outcome: PROGRESSING AS EXPECTED  Pt calls appropriately     Problem: Pain Management  Goal: Pain level will decrease to patient's comfort goal  Outcome: PROGRESSING AS EXPECTED  Pt rates pain 5/10, declines pain medications     Problem: Urinary Elimination:  Goal: Ability to reestablish a normal urinary elimination pattern will improve  Outcome: PROGRESSING AS EXPECTED  Pt has urostomy with +output

## 2020-08-27 NOTE — ANESTHESIA POSTPROCEDURE EVALUATION
Patient: Dorie Wadsworth    Procedure Summary     Date: 08/25/20 Room / Location: [facility] OR 09 / SURGERY [facility] ORS    Anesthesia Start: 1001 Anesthesia Stop: 1054    Procedure: IRRIGATION AND DEBRIDEMENT, WOUND-ABDOMINAL AND WOUND VAC PLACEMENT (Abdomen) Diagnosis: (fistula and abdominal soft tissue infection)    Surgeon: Jude Swift M.D. Responsible Provider: Mary Grace Cohn M.D.    Anesthesia Type: general ASA Status: 2          Final Anesthesia Type: general  Last vitals  BP   Blood Pressure : 136/96    Temp   36.9 °C (98.4 °F)    Pulse   Pulse: (!) 104   Resp   18    SpO2   97 %      Anesthesia Post Evaluation    Patient location during evaluation: PACU  Patient participation: complete - patient participated  Level of consciousness: awake and alert  Pain score: 5    Airway patency: patent  Anesthetic complications: no  Cardiovascular status: hemodynamically stable  Respiratory status: acceptable  Hydration status: euvolemic    PONV: none           Nurse Pain Score: 6 (NPRS)

## 2020-08-27 NOTE — DISCHARGE PLANNING
Care Transition Team Discharge Planning    Anticipated Discharge Information  Discharge Disposition: D/T to home under HHA care in anticipation of covered skilled care (06)       Discharge Plan:  Home with Home Health    This RN CM requested Dr Lujan to update Pt's Home Health order and add Midline care in the reasons for referral.    Pt's choice for [facility] and manual order for Pt;s IV antibiotics order faxed to Yue HINTON.     11:35  Per Nella of [facility], Pt has been accepted for Wound Vac, Delivery service to call CM regarding delivery date.

## 2020-08-28 ENCOUNTER — PATIENT OUTREACH (OUTPATIENT)
Dept: HEALTH INFORMATION MANAGEMENT | Facility: OTHER | Age: 85
End: 2020-08-28

## 2020-08-28 VITALS
WEIGHT: 145.5 LBS | RESPIRATION RATE: 18 BRPM | DIASTOLIC BLOOD PRESSURE: 61 MMHG | BODY MASS INDEX: 22.05 KG/M2 | OXYGEN SATURATION: 98 % | HEIGHT: 68 IN | TEMPERATURE: 97.7 F | SYSTOLIC BLOOD PRESSURE: 155 MMHG | HEART RATE: 74 BPM

## 2020-08-28 PROBLEM — T81.49XA ABDOMINAL WALL ABSCESS AT SITE OF SURGICAL WOUND: Status: RESOLVED | Noted: 2020-08-23 | Resolved: 2020-08-28

## 2020-08-28 PROBLEM — E87.1 HYPONATREMIA: Status: RESOLVED | Noted: 2020-08-23 | Resolved: 2020-08-28

## 2020-08-28 LAB
BACTERIA BLD CULT: NORMAL
BASOPHILS # BLD AUTO: 0.8 % (ref 0–1.8)
BASOPHILS # BLD: 0.06 K/UL (ref 0–0.12)
C DIFF DNA SPEC QL NAA+PROBE: NEGATIVE
C DIFF TOX GENS STL QL NAA+PROBE: NEGATIVE
EOSINOPHIL # BLD AUTO: 0.24 K/UL (ref 0–0.51)
EOSINOPHIL NFR BLD: 3.2 % (ref 0–6.9)
ERYTHROCYTE [DISTWIDTH] IN BLOOD BY AUTOMATED COUNT: 52.2 FL (ref 35.9–50)
HCT VFR BLD AUTO: 35 % (ref 37–47)
HGB BLD-MCNC: 11 G/DL (ref 12–16)
IMM GRANULOCYTES # BLD AUTO: 0.02 K/UL (ref 0–0.11)
IMM GRANULOCYTES NFR BLD AUTO: 0.3 % (ref 0–0.9)
LYMPHOCYTES # BLD AUTO: 1.38 K/UL (ref 1–4.8)
LYMPHOCYTES NFR BLD: 18.6 % (ref 22–41)
MCH RBC QN AUTO: 29.5 PG (ref 27–33)
MCHC RBC AUTO-ENTMCNC: 31.4 G/DL (ref 33.6–35)
MCV RBC AUTO: 93.8 FL (ref 81.4–97.8)
MONOCYTES # BLD AUTO: 0.89 K/UL (ref 0–0.85)
MONOCYTES NFR BLD AUTO: 12 % (ref 0–13.4)
NEUTROPHILS # BLD AUTO: 4.84 K/UL (ref 2–7.15)
NEUTROPHILS NFR BLD: 65.1 % (ref 44–72)
NRBC # BLD AUTO: 0 K/UL
NRBC BLD-RTO: 0 /100 WBC
PLATELET # BLD AUTO: 122 K/UL (ref 164–446)
PMV BLD AUTO: 11 FL (ref 9–12.9)
RBC # BLD AUTO: 3.73 M/UL (ref 4.2–5.4)
SIGNIFICANT IND 70042: NORMAL
SITE SITE: NORMAL
SOURCE SOURCE: NORMAL
WBC # BLD AUTO: 7.4 K/UL (ref 4.8–10.8)

## 2020-08-28 PROCEDURE — A9270 NON-COVERED ITEM OR SERVICE: HCPCS | Performed by: HOSPITALIST

## 2020-08-28 PROCEDURE — 99239 HOSP IP/OBS DSCHRG MGMT >30: CPT | Performed by: HOSPITALIST

## 2020-08-28 PROCEDURE — 700105 HCHG RX REV CODE 258: Performed by: INTERNAL MEDICINE

## 2020-08-28 PROCEDURE — 700102 HCHG RX REV CODE 250 W/ 637 OVERRIDE(OP): Performed by: HOSPITALIST

## 2020-08-28 PROCEDURE — 700105 HCHG RX REV CODE 258: Performed by: HOSPITALIST

## 2020-08-28 PROCEDURE — 85025 COMPLETE CBC W/AUTO DIFF WBC: CPT

## 2020-08-28 PROCEDURE — 36415 COLL VENOUS BLD VENIPUNCTURE: CPT

## 2020-08-28 PROCEDURE — 700111 HCHG RX REV CODE 636 W/ 250 OVERRIDE (IP): Performed by: INTERNAL MEDICINE

## 2020-08-28 RX ORDER — OXYCODONE HYDROCHLORIDE 5 MG/1
5 TABLET ORAL EVERY 8 HOURS PRN
Qty: 20 TAB | Refills: 0 | Status: SHIPPED | OUTPATIENT
Start: 2020-08-28 | End: 2020-09-04

## 2020-08-28 RX ADMIN — LEVOTHYROXINE SODIUM 112 MCG: 112 TABLET ORAL at 05:56

## 2020-08-28 RX ADMIN — METOPROLOL TARTRATE 50 MG: 50 TABLET, FILM COATED ORAL at 08:49

## 2020-08-28 RX ADMIN — PIPERACILLIN AND TAZOBACTAM 3.38 G: 3; .375 INJECTION, POWDER, LYOPHILIZED, FOR SOLUTION INTRAVENOUS; PARENTERAL at 12:06

## 2020-08-28 RX ADMIN — SODIUM CHLORIDE, POTASSIUM CHLORIDE, SODIUM LACTATE AND CALCIUM CHLORIDE: 600; 310; 30; 20 INJECTION, SOLUTION INTRAVENOUS at 06:02

## 2020-08-28 RX ADMIN — MAGNESIUM OXIDE TAB 400 MG (241.3 MG ELEMENTAL MG) 400 MG: 400 (241.3 MG) TAB at 05:56

## 2020-08-28 RX ADMIN — PIPERACILLIN AND TAZOBACTAM 3.38 G: 3; .375 INJECTION, POWDER, LYOPHILIZED, FOR SOLUTION INTRAVENOUS; PARENTERAL at 05:57

## 2020-08-28 RX ADMIN — TIMOLOL MALEATE 1 DROP: 5 SOLUTION/ DROPS OPHTHALMIC at 08:49

## 2020-08-28 RX ADMIN — LOVASTATIN 10 MG: 20 TABLET ORAL at 05:56

## 2020-08-28 RX ADMIN — ACETAMINOPHEN 650 MG: 325 TABLET, FILM COATED ORAL at 12:06

## 2020-08-28 RX ADMIN — OMEPRAZOLE 20 MG: 20 CAPSULE, DELAYED RELEASE ORAL at 05:57

## 2020-08-28 NOTE — DISCHARGE PLANNING
Care Transition Team Discharge Planning    Anticipated Discharge Information  Discharge Disposition: D/T to home under HHA care in anticipation of covered skilled care (06)       Discharge Plan:  Home with Home Health on Wound Vac and Home IV antibiotic Infusion with [facility]    This RN CM informed Pt that Preeti Mishra of [facility] will come and educate her on IV antibiotic infusion at home as long as Insurance Auth is complete.    Pt's  will also see Pt today.  Pt has been accepted by [facility] and Pt's Wound vac was delivered to bedside. Plan is to discharge Pt today.    This RN CM will follow and assist Pt with discharge as needed.     09:25 Per Preeti Mishra of [facility] she will do the education at 10;30 am today, though Insurance Auth is still pending.

## 2020-08-28 NOTE — PROGRESS NOTES
Bedside report received. Assessment completed.  Pt is A&O x4. Pt on room air.   Denies pain  Denies nausea.   - numbness, - tingling.  Wound vac to mid lower abd, CDI, no signs of air leak  Last BM 8/28 . +flatus,   Urostomy to RLQ, + output, pt cares for appliance herself  Regular diet. Tolerates well.   Pt up SBA. Tolerates well.   Call light within reach. All needs met at this time. Fall Precautions and hourly rounding in place.

## 2020-08-28 NOTE — DISCHARGE SUMMARY
Discharge Summary    CHIEF COMPLAINT ON ADMISSION  Chief Complaint   Patient presents with   • Post-Op Complications     hx of hernia repair with Dr. 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  She has a history of hernia repair in December 2019 with subsequent wound infection requiring irrigation and debridement in February 2020 and recurrent hospitalizations for wound infection, last hospitalization was in July treated with IV antibiotics and subsequently ciprofloxacin the patient has been following up with  for wound recheck, however last 2 weeks before this admission she experienced increased abdominal wall pain with moderate to severe worse with movement, not radiated and not associated with this chill or fever however she noticed increased purulent drainage from the abdominal wound, on admission CT scan for abdomen showed abdominal wall abscess, IV antibiotics have been initiated, the patient was evaluated by surgeon.  On 8/25/20 she was taken to the OR with Dr. Swift and underwent wound debridement with wound vac placement.  The surgical wound cultures grew e coli.  She was followed by ID and they have arranged for [facility] IV Zosyn through 9/10/20.  Home health for wound vac care has also been arranged.    She had some diarrhea that resolved and c dif studies were negative.  Her abdominal CT showed evidence of hydronephrosis, however her renal function has been stable.  She will follow up with urology Dr. Garcia to evaluate this as an outpatient.  She agrees to return to the ER if needed and to continue treating her chronic issues with her pcp. Her anemia will need to be followed.  Her Vitamin D level was also low at 28 and she was advised to  take 5000 IU daily and have repeat levels within a month with her pcp.    Therefore, she is discharged in fair and stable condition to home with organized home healthcare and close outpatient follow-up.    The patient met 2-midnight criteria for an inpatient stay at the time of discharge.    Discharge Date  8/28/20    FOLLOW UP ITEMS POST DISCHARGE  Home health  Urology  Surgery   Pcp    DISCHARGE DIAGNOSES  Principal Problem (Resolved):    Abdominal wall abscess at site of surgical wound POA: Unknown  Active Problems:    Chronic kidney disease (CKD) stage G3a/A2, moderately decreased glomerular filtration rate (GFR) between 45-59 mL/min/1.73 square meter and albuminuria creatinine ratio between  mg/g (HCC) POA: Unknown    Hypothyroidism POA: Yes    Essential hypertension POA: Yes    Presence of urostomy (HCC) POA: Yes    Abnormal CT scan, liver POA: Unknown  Resolved Problems:    Sepsis (HCC) POA: Unknown      Overview: ICD-10 transition    Hyponatremia POA: Unknown      FOLLOW UP  No future appointments.  Ramiro Arita M.D.  [address] [facility] [id number] [phone]    In 2 weeks      Kayode Portillo M.D.  [address] [facility] [id number]  [phone]    In 2 weeks        MEDICATIONS ON DISCHARGE     Medication List      START taking these medications      Instructions   oxyCODONE immediate-release 5 MG Tabs  Commonly known as: ROXICODONE   Take 1 Tab by mouth every 8 hours as needed (1/2 to 1 tab q 8 hrs prn pain, taper off as soon as possible) for up to 7 days.  Dose: 5 mg        CONTINUE taking these medications      Instructions   aspirin 81 MG EC tablet   Take 1 Tab by mouth every day.  Dose: 81 mg     furosemide 40 MG Tabs  Commonly known as: LASIX   Take 40 mg by mouth every day.  Dose: 40 mg     latanoprost 0.005 % Soln  Commonly known as: XALATAN   Place 1 Drop in both eyes every evening.  Dose: 1 Drop     levothyroxine 112 MCG Tabs  Commonly known as: SYNTHROID   Take 112  "mcg by mouth Every morning on an empty stomach.  Dose: 112 mcg     lovastatin 10 MG tablet  Commonly known as: MEVACOR   Take 10 mg by mouth every day.  Dose: 10 mg     metoprolol 100 MG Tabs  Commonly known as: LOPRESSOR   Take 50 mg by mouth 2 times a day.  Dose: 50 mg     pantoprazole 40 MG Tbec  Commonly known as: PROTONIX   Take 40 mg by mouth every day.  Dose: 40 mg     potassium chloride 10 MEQ capsule  Commonly known as: MICRO-K   Take 10 mEq by mouth every day.  Dose: 10 mEq     PRESERVISION AREDS PO   Take 1 Tab by mouth 2 Times a Day.  Dose: 1 Tab     timolol 0.5 % Soln  Commonly known as: TIMOPTIC   Place 1 Drop in both eyes 2 times a day.  Dose: 1 Drop            Allergies  Allergies   Allergen Reactions   • Cardizem Swelling   • Doxycycline      Swollen lips.   • Sulfa Drugs      Makes tongue swell, severely   • Zyvox Swelling     \"Whole mouth swelled up\"    • Codeine      Does not remember the reaction         DIET  Orders Placed This Encounter   Procedures   • Diet Order Regular     Standing Status:   Standing     Number of Occurrences:   1     Order Specific Question:   Diet:     Answer:   Regular [1]       ACTIVITY  As tolerated.  Weight bearing as tolerated    CONSULTATIONS  Surgery  ID    PROCEDURES  IR-MIDLINE CATHETER INSERTION WO GUIDANCE > AGE 3   Final Result                  Ultrasound-guided midline placement performed by qualified nursing staff    as above.          CT-ABDOMEN-PELVIS WITH   Final Result      Fluid collection in the right lower quadrant abdominal wall above the level of the stoma measures 3.1 x 0.9 cm. There are bowel loops adjacent to the collection and a fistula is not excluded.      Fluid collection in the anterior abdominal wall to the left of midline now measures 2.4 x 1.8 cm.      Status post cystectomy with a urinary conduit and a right lower quadrant stoma.      Bilateral hydronephrosis which is moderate to severe on the right and moderate on the left is slightly " increased on the right and moderately increased on the left compared to prior.      Renal cortical scarring is seen bilaterally.      Atherosclerotic plaque.      Nodular appearance of the liver suspicious for cirrhosis.      Groundglass opacities in the right middle lobe may be infectious or inflammatory. Follow-up is recommended.            LABORATORY  Lab Results   Component Value Date    SODIUM 136 08/27/2020    POTASSIUM 3.9 08/27/2020    CHLORIDE 104 08/27/2020    CO2 23 08/27/2020    GLUCOSE 101 (H) 08/27/2020    BUN 16 08/27/2020    CREATININE 1.33 08/27/2020    CREATININE 1.2 02/10/2009        Lab Results   Component Value Date    WBC 7.4 08/28/2020    HEMOGLOBIN 11.0 (L) 08/28/2020    HEMATOCRIT 35.0 (L) 08/28/2020    PLATELETCT 122 (L) 08/28/2020        Total time of the discharge process exceeds 45 minutes.

## 2020-08-28 NOTE — DISCHARGE PLANNING
Care Transition Team Discharge Planning    Anticipated Discharge Information  Discharge Disposition: D/T to home under HHA care in anticipation of covered skilled care (06)         Discharge Plan:  Home with Home Health    This RN CM received a call from Dick that Pt has an appointment with Dr Arita at [facility] in [place] on 9/1/20 at 10:30 am.     Informed SAI Huynh of this update.

## 2020-08-28 NOTE — PROGRESS NOTES
Discharging Patient home per physician order.  Discharged with family.  Demonstrated understanding of discharge instructions, follow up appointments, home medications, prescriptions, home care for surgical wound and nursing care instructions for wound vac.  Ambulating without assistance, voiding without difficulty, pain well controlled, tolerating oral medications, oxygen saturation greater than 90%, tolerating diet.  Educational handouts were given and discussed.  Verbalized understanding of discharge instructions and educational handouts.  All questions answered.  Belongings with patient at time of discharge.

## 2020-08-28 NOTE — DISCHARGE INSTRUCTIONS
Abscess  Care After  An abscess (also called a boil or furuncle) is an infected area that contains a collection of pus. Signs and symptoms of an abscess include pain, tenderness, redness, or hardness, or you may feel a moveable soft area under your skin. An abscess can occur anywhere in the body. The infection may spread to surrounding tissues causing cellulitis. A cut (incision) by the surgeon was made over your abscess and the pus was drained out. Gauze may have been packed into the space to provide a drain that will allow the cavity to heal from the inside outwards. The boil may be painful for 5 to 7 days. Most people with a boil do not have high fevers. Your abscess, if seen early, may not have localized, and may not have been lanced. If not, another appointment may be required for this if it does not get better on its own or with medications.  HOME CARE INSTRUCTIONS   · Only take over-the-counter or prescription medicines for pain, discomfort, or fever as directed by your caregiver.  · When you bathe, soak and then remove gauze or iodoform packs at least daily or as directed by your caregiver. You may then wash the wound gently with mild soapy water. Repack with gauze or do as your caregiver directs.  SEEK IMMEDIATE MEDICAL CARE IF:   · You develop increased pain, swelling, redness, drainage, or bleeding in the wound site.  · You develop signs of generalized infection including muscle aches, chills, fever, or a general ill feeling.  · An oral temperature above 102° F (38.9° C) develops, not controlled by medication.  See your caregiver for a recheck if you develop any of the symptoms described above. If medications (antibiotics) were prescribed, take them as directed.  Document Released: 07/06/2006 Document Revised: 03/11/2013 Document Reviewed: 03/02/2009  [employer]® Patient Information ©2014 [employer].      Antibiotic Medicine, Adult    Antibiotic medicines treat infections caused by a type of germ called  bacteria. They work by killing the bacteria that make you sick.  When do I need to take antibiotics?  You often need these medicines to treat bacterial infections, such as:  · A urinary tract infection (UTI).  · Strep throat.  · Meningitis. This affects the spinal cord and brain.  · A bad lung infection.  You may start the medicines while your doctor waits for tests to come back. When the tests come back, your doctor may change or stop your medicine.  When are antibiotics not needed?  You do not need these medicines for most common illnesses, such as:  · A cold.  · The flu.  · A sore throat.  Antibiotics are not always needed for all infections caused by bacteria. Do not ask for these medicines, or take them, when they are not needed.  What are the risks of taking antibiotics?  Most antibiotics can cause an infection called Clostridioides difficile (C. diff). This causes watery poop (diarrhea). Let your doctor know right away if:  · You have watery poop while taking an antibiotic.  · You have watery poop after you stop taking an antibiotic. The illness can happen weeks after you stop the medicine.  You also have a risk of getting an infection in the future that antibiotics cannot treat (antibiotic-resistant infection). This type of infection can be dangerous.  What else should I know about taking antibiotics?    · You need to take the entire prescription.  ? Take the medicine for as long as told by your doctor.  ? Do not stop taking it even if you start to feel better.  · Try not to miss any doses. If you miss a dose, call your doctor.  · Birth control pills may not work. If you take birth control pills:  ? Keep on taking them.  ? Use a second form of birth control, such as a condom. Do this for as long as told by your doctor.  · Ask your doctor:  ? How long to wait in between doses.  ? If you should take the medicine with food.  ? If there is anything you should stay away from while taking the antibiotic, such  as:  ? Food.  ? Drinks.  ? Medicines.  ? If there are any side effects you should watch for.  · Only take the medicines that your doctor told you to take. Do not take medicines that were given to someone else.  · Drink a large glass of water with the medicine.  · Ask the pharmacist for a tool to measure the medicine, such as:  ? A syringe.  ? A cup.  ? A spoon.  · Throw away any extra medicine.  Contact a doctor if:  · You get worse.  · You have new joint pain or muscle aches after starting the medicine.  · You have side effects from the medicine, such as:  ? Stomach pain.  ? Watery poop.  ? Feeling sick to your stomach (nausea).  Get help right away if:  · You have signs of a very bad allergic reaction. If this happens, stop taking the medicine right away. Signs may include:  ? Hives. These are raised, itchy, red bumps on the skin.  ? Skin rash.  ? Trouble breathing.  ? Wheezing.  ? Swelling.  ? Feeling dizzy.  ? Throwing up (vomiting).  · Your pee (urine) is dark, or is the color of blood.  · Your skin turns yellow.  · You bruise easily.  · You bleed easily.  · You have very bad watery poop and cramps in your belly.  · You have a very bad headache.  Summary  · Antibiotics are often used to treat infections caused by bacteria.  · Only take these medicines when needed.  · Let your doctor know if you have watery poop while taking an antibiotic.  · You need to take the entire prescription.  This information is not intended to replace advice given to you by your health care provider. Make sure you discuss any questions you have with your health care provider.  Document Released: 09/26/2009 Document Revised: 01/24/2020 Document Reviewed: 12/20/2017  Elsevier Patient Education © 2020 [employer].      Discharge Instructions    Discharged to home by car with relative. Discharged via wheelchair, hospital escort: Yes.  Special equipment needed: Not Applicable    Be sure to schedule a follow-up appointment with your primary  care doctor or any specialists as instructed.     Discharge Plan:   Diet Plan: Discussed  Activity Level: Discussed  Confirmed Follow up Appointment: Appointment Scheduled  Confirmed Symptoms Management: Discussed  Medication Reconciliation Updated: Yes    I understand that a diet low in cholesterol, fat, and sodium is recommended for good health. Unless I have been given specific instructions below for another diet, I accept this instruction as my diet prescription.   Other diet: regular diet    Special Instructions: None    · Is patient discharged on Warfarin / Coumadin?   No     Depression / Suicide Risk    As you are discharged from this [facility] facility, it is important to learn how to keep safe from harming yourself.    Recognize the warning signs:  · Abrupt changes in personality, positive or negative- including increase in energy   · Giving away possessions  · Change in eating patterns- significant weight changes-  positive or negative  · Change in sleeping patterns- unable to sleep or sleeping all the time   · Unwillingness or inability to communicate  · Depression  · Unusual sadness, discouragement and loneliness  · Talk of wanting to die  · Neglect of personal appearance   · Rebelliousness- reckless behavior  · Withdrawal from people/activities they love  · Confusion- inability to concentrate     If you or a loved one observes any of these behaviors or has concerns about self-harm, here's what you can do:  · Talk about it- your feelings and reasons for harming yourself  · Remove any means that you might use to hurt yourself (examples: pills, rope, extension cords, firearm)  · Get professional help from the community (Mental Health, Substance Abuse, psychological counseling)  · Do not be alone:Call your Safe Contact- someone whom you trust who will be there for you.  · Call your local CRISIS HOTLINE 686-9665 or 923-207-4343  · Call your local Children's Mobile Crisis Response Team [facility]  (251) 143-1905 or www.[employer].com.Zapcoder  · Call the toll free National Suicide Prevention Hotlines   · National Suicide Prevention Lifeline 578-954-HCHJ (9866)  · National [employer] Line Network 800-SUICIDE (031-9443)

## 2020-08-28 NOTE — DISCHARGE PLANNING
Agency/Facility Name: [facility]   Spoke To: Angie  Outcome: Patient accepted. Notified of discharge for today.     Agency/Facility Name: [facility]  Spoke To: Preeti  Outcome: Will be to bedside at 1030 for teaching.     VIRGILIO Cano notified.

## 2020-08-28 NOTE — CARE PLAN
Problem: Nutritional:  Goal: Achieve adequate nutritional intake  Description:  Diet advancement > CLD, patient will consume 50% of meals  Outcome: MET     Diet advanced to Regular on 8/26, PO % of meals. Pt receiving adequate nutrition.

## 2020-08-28 NOTE — DISCHARGE PLANNING
Agency/Facility Name: [facility]  Spoke To: Isabel  Outcome: Insurance authorization remains pending. Hoping to hear back soon as there as a 24 hour turn around time.

## 2020-08-28 NOTE — DISCHARGE PLANNING
Care Transition Team Discharge Planning    Anticipated Discharge Information  Discharge Disposition: D/T to home under HHA care in anticipation of covered skilled care (06)         Discharge Plan:      This RN JOE spoke with Luzmaria of [facility]. Pt's  Insurance Auth is complete . Per Luzmaria, Pt has to pay a co-pay of $16 per week . Pt needs antibiotic up to 9/10.   Informed SAI agarwal this update.

## 2020-08-29 LAB
BACTERIA BLD CULT: NORMAL
SIGNIFICANT IND 70042: NORMAL
SITE SITE: NORMAL
SOURCE SOURCE: NORMAL

## 2020-08-30 LAB
BACTERIA UR CULT: ABNORMAL
GRAM STN SPEC: ABNORMAL
SIGNIFICANT IND 70042: ABNORMAL
SITE SITE: ABNORMAL
SOURCE SOURCE: ABNORMAL

## 2020-09-15 ENCOUNTER — HOSPITAL ENCOUNTER (OUTPATIENT)
Facility: MEDICAL CENTER | Age: 85
End: 2020-09-15
Attending: SURGERY
Payer: MEDICARE

## 2020-09-15 LAB
AMORPH CRY #/AREA URNS HPF: PRESENT /HPF
APPEARANCE UR: ABNORMAL
BACTERIA #/AREA URNS HPF: ABNORMAL /HPF
BILIRUB UR QL STRIP.AUTO: NEGATIVE
COLOR UR: YELLOW
EPI CELLS #/AREA URNS HPF: ABNORMAL /HPF
GLUCOSE UR STRIP.AUTO-MCNC: NEGATIVE MG/DL
HYALINE CASTS #/AREA URNS LPF: ABNORMAL /LPF
KETONES UR STRIP.AUTO-MCNC: NEGATIVE MG/DL
LEUKOCYTE ESTERASE UR QL STRIP.AUTO: ABNORMAL
MICRO URNS: ABNORMAL
NITRITE UR QL STRIP.AUTO: POSITIVE
PH UR STRIP.AUTO: 6.5 [PH] (ref 5–8)
PROT UR QL STRIP: NEGATIVE MG/DL
RBC # URNS HPF: ABNORMAL /HPF
RBC UR QL AUTO: ABNORMAL
SP GR UR STRIP.AUTO: 1.01
UROBILINOGEN UR STRIP.AUTO-MCNC: 0.2 MG/DL
WBC #/AREA URNS HPF: ABNORMAL /HPF

## 2020-09-15 PROCEDURE — 81001 URINALYSIS AUTO W/SCOPE: CPT

## 2020-11-03 ENCOUNTER — HOSPITAL ENCOUNTER (OUTPATIENT)
Dept: HOSPITAL 8 - STAR | Age: 85
Discharge: HOME | End: 2020-11-03
Attending: SURGERY
Payer: MEDICARE

## 2020-11-03 VITALS — BODY MASS INDEX: 22.17 KG/M2 | HEIGHT: 68 IN | WEIGHT: 146.3 LBS

## 2020-11-03 DIAGNOSIS — K63.2: ICD-10-CM

## 2020-11-03 DIAGNOSIS — Z20.828: ICD-10-CM

## 2020-11-03 DIAGNOSIS — Z01.812: Primary | ICD-10-CM

## 2020-11-03 DIAGNOSIS — I51.7: ICD-10-CM

## 2020-11-03 LAB
ALBUMIN SERPL-MCNC: 2.6 G/DL (ref 3.4–5)
ALP SERPL-CCNC: 159 U/L (ref 45–117)
ALT SERPL-CCNC: 17 U/L (ref 12–78)
ANION GAP SERPL CALC-SCNC: 2 MMOL/L (ref 5–15)
BASOPHILS # BLD AUTO: 0.1 X10^3/UL (ref 0–0.1)
BASOPHILS NFR BLD AUTO: 1 % (ref 0–1)
BILIRUB SERPL-MCNC: 0.3 MG/DL (ref 0.2–1)
CALCIUM SERPL-MCNC: 8.4 MG/DL (ref 8.5–10.1)
CHLORIDE SERPL-SCNC: 114 MMOL/L (ref 98–107)
CREAT SERPL-MCNC: 1.44 MG/DL (ref 0.55–1.02)
EOSINOPHIL # BLD AUTO: 0.2 X10^3/UL (ref 0–0.4)
EOSINOPHIL NFR BLD AUTO: 3 % (ref 1–7)
ERYTHROCYTE [DISTWIDTH] IN BLOOD BY AUTOMATED COUNT: 17.6 % (ref 9.6–15.2)
LYMPHOCYTES # BLD AUTO: 2.5 X10^3/UL (ref 1–3.4)
LYMPHOCYTES NFR BLD AUTO: 30 % (ref 22–44)
MCH RBC QN AUTO: 29.8 PG (ref 27–34.8)
MCHC RBC AUTO-ENTMCNC: 32.1 G/DL (ref 32.4–35.8)
MD: (no result)
MONOCYTES # BLD AUTO: 1.7 X10^3/UL (ref 0.2–0.8)
MONOCYTES NFR BLD AUTO: 20 % (ref 2–9)
NEUTROPHILS # BLD AUTO: 3.7 X10^3/UL (ref 1.8–6.8)
NEUTROPHILS NFR BLD AUTO: 45 % (ref 42–75)
PLATELET # BLD AUTO: 129 X10^3/UL (ref 130–400)
PMV BLD AUTO: 9.5 FL (ref 7.4–10.4)
PROT SERPL-MCNC: 7.8 G/DL (ref 6.4–8.2)
RBC # BLD AUTO: 4.02 X10^6/UL (ref 3.82–5.3)

## 2020-11-03 PROCEDURE — 87635 SARS-COV-2 COVID-19 AMP PRB: CPT

## 2020-11-03 PROCEDURE — 93005 ELECTROCARDIOGRAM TRACING: CPT

## 2020-11-03 PROCEDURE — 36415 COLL VENOUS BLD VENIPUNCTURE: CPT

## 2020-11-03 PROCEDURE — 80053 COMPREHEN METABOLIC PANEL: CPT

## 2020-11-03 PROCEDURE — 85025 COMPLETE CBC W/AUTO DIFF WBC: CPT

## 2021-05-21 ENCOUNTER — TELEPHONE (OUTPATIENT)
Dept: CARDIOLOGY | Facility: MEDICAL CENTER | Age: 86
End: 2021-05-21

## 2021-05-21 NOTE — TELEPHONE ENCOUNTER
AA    Pts  Len called stating Pt has [employer] insurance and is asking if AA could give Pt a referral for a cardiologist that accepts their insurance. Please call Len back at [phone].    Thank you

## 2021-09-23 ENCOUNTER — HOSPITAL ENCOUNTER (OUTPATIENT)
Dept: HOSPITAL 8 - CFH | Age: 86
Discharge: HOME | End: 2021-09-23
Attending: INTERNAL MEDICINE
Payer: MEDICARE

## 2021-09-23 DIAGNOSIS — K57.32: ICD-10-CM

## 2021-09-23 DIAGNOSIS — E46: ICD-10-CM

## 2021-09-23 DIAGNOSIS — R19.7: ICD-10-CM

## 2021-09-23 DIAGNOSIS — K44.9: Primary | ICD-10-CM

## 2021-09-23 PROCEDURE — 72197 MRI PELVIS W/O & W/DYE: CPT

## 2021-09-23 PROCEDURE — A9575 INJ GADOTERATE MEGLUMI 0.1ML: HCPCS

## 2021-09-23 PROCEDURE — 74183 MRI ABD W/O CNTR FLWD CNTR: CPT

## 2022-04-07 NOTE — ASSESSMENT & PLAN NOTE
-chronic and stable at her baseline  -avoid nephrotoxins  -renally dose medications as indicated  -follow labs   Pt already had appt scheduled .

## 2023-02-04 NOTE — PROGRESS NOTES
"Outbound call to Osmond for post discharge medication review. Reviewed indication, dose, and timing of each medication. Reviewed changes made to metoprolol. Patient's states her friend is currently at pharmacy to get correct metoprolol dose and amiodarone. Reviewed instructions with patient. Patient reports feeling weak \"but is taking it easy.\" She has follow-up scheduled with cardiology 3/27/19. Interaction noted with lovastatin and amiodarone with recommendation to limit lovastatin dose to 40 mg. Patient is currently on low-dose lovastatin 10 mg. Provided patient with my contact information for additional medication questions/ concerns.      Holli Swann, PharmD    " Yes

## 2023-05-18 NOTE — PROGRESS NOTES
Monitor Summary:  JOAQUINA/S.T.    (R)PAC's  [birth year]     Pt's procedure w/ Dr. Jamil was canceled yesterday due to a cough and fever. Spoke w/ pt's son this morning. He has not resumed warfarin yet. Reminded him to have pt continue w/ daily Lovenox injections and take warfarin 5 mg on 5/18; 2.5 mg on 5/19; 5 mg on 5/20 and 5mg on 5/21.     Per chart review appears surgery was rescheduled for 5/30/23. Will need to provide holding instructions prior to that date.

## 2023-06-28 NOTE — PROGRESS NOTES
Pt sat up on edge of bed with CNA assistance. Dressings around LUCIA soaked with drainage again. Old dressings removed; new dressings applied and reinforced. Will inform day shift. CN aware.    monthly
